# Patient Record
Sex: MALE | Race: BLACK OR AFRICAN AMERICAN | NOT HISPANIC OR LATINO | Employment: OTHER | URBAN - METROPOLITAN AREA
[De-identification: names, ages, dates, MRNs, and addresses within clinical notes are randomized per-mention and may not be internally consistent; named-entity substitution may affect disease eponyms.]

---

## 2017-06-29 ENCOUNTER — OFFICE VISIT (OUTPATIENT)
Dept: INTERNAL MEDICINE | Facility: CLINIC | Age: 72
End: 2017-06-29
Payer: MEDICARE

## 2017-06-29 VITALS
HEART RATE: 76 BPM | SYSTOLIC BLOOD PRESSURE: 110 MMHG | BODY MASS INDEX: 37.24 KG/M2 | DIASTOLIC BLOOD PRESSURE: 60 MMHG | HEIGHT: 71 IN | WEIGHT: 266 LBS

## 2017-06-29 DIAGNOSIS — E78.5 HYPERLIPIDEMIA, UNSPECIFIED HYPERLIPIDEMIA TYPE: ICD-10-CM

## 2017-06-29 DIAGNOSIS — Z13.5 SCREENING FOR EYE CONDITION: ICD-10-CM

## 2017-06-29 DIAGNOSIS — Z87.898 HISTORY OF ELEVATED PSA: ICD-10-CM

## 2017-06-29 DIAGNOSIS — I10 ESSENTIAL HYPERTENSION: Chronic | ICD-10-CM

## 2017-06-29 DIAGNOSIS — F43.10 PTSD (POST-TRAUMATIC STRESS DISORDER): Primary | ICD-10-CM

## 2017-06-29 DIAGNOSIS — M19.019 SHOULDER ARTHRITIS: ICD-10-CM

## 2017-06-29 DIAGNOSIS — I77.819 ECTATIC AORTA: ICD-10-CM

## 2017-06-29 DIAGNOSIS — N40.0 BENIGN NON-NODULAR PROSTATIC HYPERPLASIA WITHOUT LOWER URINARY TRACT SYMPTOMS: ICD-10-CM

## 2017-06-29 DIAGNOSIS — H90.3 SENSORINEURAL HEARING LOSS (SNHL) OF BOTH EARS: ICD-10-CM

## 2017-06-29 DIAGNOSIS — Z00.00 ENCOUNTER FOR PREVENTIVE HEALTH EXAMINATION: ICD-10-CM

## 2017-06-29 DIAGNOSIS — H25.10 NUCLEAR SCLEROSIS, UNSPECIFIED LATERALITY: ICD-10-CM

## 2017-06-29 PROCEDURE — G0439 PPPS, SUBSEQ VISIT: HCPCS | Mod: S$GLB,,, | Performed by: NURSE PRACTITIONER

## 2017-06-29 PROCEDURE — 99499 UNLISTED E&M SERVICE: CPT | Mod: S$GLB,,, | Performed by: NURSE PRACTITIONER

## 2017-06-29 PROCEDURE — 99999 PR PBB SHADOW E&M-EST. PATIENT-LVL IV: CPT | Mod: PBBFAC,,, | Performed by: NURSE PRACTITIONER

## 2017-06-29 NOTE — PATIENT INSTRUCTIONS
Counseling and Referral of Other Preventative  (Italic type indicates deductible and co-insurance are waived)    Patient Name: Brandan Werner  Today's Date: 6/29/2017      SERVICE LIMITATIONS RECOMMENDATION    Vaccines    · Pneumococcal (once after 65)    · Influenza (annually)    · Hepatitis B (if medium/high risk)    · Prevnar 13      Hepatitis B medium/high risk factors:       - End-stage renal disease       - Hemophiliacs who received Factor VII or         IX concentrates       - Clients of institutions for the mentally             retarded       - Persons who live in the same house as          a HepB carrier       - Homosexual men       - Illicit injectable drug abusers     Pneumococcal: N/A     Influenza: currrent     Hepatitis B: CDC handout given     Prevnar 13: [facility] handoutgiven- due    Prostate cancer screening (annually to age 75)     Prostate specific antigen (PSA) Shared decision making with Provider. Sometimes a co-pay may be required if the patient decides to have this test. The USPSTF no longer recommends prostate cancer screening routinely in medicine:   11/2/16- due in nov 2017    Colorectal cancer screening (to age 75)    · Fecal occult blood test (annual)  · Flexible sigmoidoscopy (5y)  · Screening colonoscopy (10y)  · Barium enema   due    Diabetes self-management training (no USPSTF recommendations)  Requires referral by treating physician for patient with diabetes or renal disease. 10 hours of initial DSMT sessions of no less than 30 minutes each in a continuous 12-month period. 2 hours of follow-up DSMT in subsequent years.  N/A    Glaucoma screening (no USPSTF recommendation)  Diabetes mellitus, family history   , age 50 or over    American, age 65 or over  due    Medical nutrition therapy for diabetes or renal disease (no recommended schedule)  Requires referral by treating physician for patient with diabetes or renal disease or kidney transplant within the past 3  years.  Can be provided in same year as diabetes self-management training (DSMT), and CMS recommends medical nutrition therapy take place after DSMT. Up to 3 hours for initial year and 2 hours in subsequent years.  N/A    Cardiovascular screening blood tests (every 5 years)  · Fasting lipid panel  Order as a panel if possible  current- due in nov 2017    Diabetes screening tests (at least every 3 years, Medicare covers annually or at 6-month intervals for prediabetic patients)  · Fasting blood sugar (FBS) or glucose tolerance test (GTT)  Patient must be diagnosed with one of the following:       - Hypertension       - Dyslipidemia       - Obesity (BMI 30kg/m2)       - Previous elevated impaired FBS or GTT       ... or any two of the following:       - Overweight (BMI 25 but <30)       - Family history of diabetes       - Age 65 or older       - History of gestational diabetes or birth of baby weighing more than 9 pounds  current- due in nov 2017    Abdominal aortic aneurysm screening (once)  · Sonogram   Limited to patients who meet one of the following criteria:       - Men who are 65-75 years old and have smoked more than 100 cigarette in their lifetime       - Anyone with a family history of abdominal aortic aneurysm       - Anyone recommended for screening by the USPSTF  last 10/16/16- due for repeat in October 2017    HIV screening (annually for increased risk patients)  · HIV-1 and HIV-2 by EIA, or BINU, rapid antibody test or oral mucosa transudate  Patients must be at increased risk for HIV infection per USPSTF guidelines or pregnant. Tests covered annually for patient at increased risk or as requested by the patient. Pregnant patients may receive up to 3 tests during pregnancy.  Risks discussed, screening is declined    Smoking cessation counseling (up to 8 sessions per year)  Patients must be asymptomatic of tobacco-related conditions to receive as a preventative service.  N/A    Subsequent annual  wellness visit  At least 12 months since last AWV  Return in one year     The following information is provided to all patients.  This information is to help you find resources for any of the problems found today that may be affecting your health:                Living healthy guide: www.[facility].[place].[facility]      Understanding Diabetes: www.diabetes.org      Eating healthy: www.cdc.gov/healthyweight      CDC home safety checklist: www.cdc.gov/steadi/patient.html      Agency on Aging: www.goea.[place].[facility]      Alcoholics anonymous (AA): www.aa.org      Physical Activity: www.[place].nih.gov/fq3tjzx      Tobacco use: www.quitwithusla.org

## 2017-06-29 NOTE — PROGRESS NOTES
"Brandan Werner presented for a  Medicare AWV and comprehensive Health Risk Assessment today. The following components were reviewed and updated:    · Medical history  · Family History  · Social history  · Allergies and Current Medications  · Health Risk Assessment  · Health Maintenance  · Care Team     [facility], [facility]- he could not remember names to update care team in [facility]    ** See Completed Assessments for Annual Wellness Visit within the encounter summary.**       The following assessments were completed:  · Living Situation  · CAGE  · Depression Screening  · Timed Get Up and Go  · Whisper Test  · Cognitive Function Screening  · Nutrition Screening  · ADL Screening  · PAQ Screening    Vitals:    06/29/17 1016   BP: 110/60   Pulse: 76   Weight: 120.7 kg (266 lb)   Height: 5' 10.5" (1.791 m)     Body mass index is 37.63 kg/m².  Physical Exam   Constitutional: He is oriented to person, place, and time. He appears well-developed and well-nourished.   HENT:   Head: Normocephalic and atraumatic.   Cardiovascular: Normal rate, regular rhythm and normal heart sounds.    No murmur heard.  Pulmonary/Chest: Effort normal and breath sounds normal.   Abdominal: Soft. Bowel sounds are normal. There is no tenderness.   Musculoskeletal: He exhibits no edema.   Neurological: He is alert and oriented to person, place, and time.   Skin: Skin is warm and dry.   Psychiatric: He has a normal mood and affect. His behavior is normal. Judgment and thought content normal.   Nursing note and vitals reviewed.        Diagnoses and health risks identified today and associated recommendations/orders:    1. PTSD (post-traumatic stress disorder)  Stable- [facility]    2. Obesity, Class II, BMI 35-39.9, with comorbidity  CHronic with no recent weight loss.Reviewed current BMI & ideal BMI range. Encouraged weight loss,  diet and exercise. Followed by PCP.    3. Nuclear sclerosis, unspecified " laterality  Stable- followed by opthalmology-overdue on annual- referral given    4. Hyperlipidemia, unspecified hyperlipidemia type  Stable- followed by PCP    5. Essential hypertension  Stable- followed by PCP    6. History of elevated PSA  Stable- followed by PCP, urology    7. Benign non-nodular prostatic hyperplasia without lower urinary tract symptoms  Stable- followed by PCP, urology    8. Shoulder arthritis  Stable- followed by orthopedics    9. Ectatic aorta  Stable- followed by PCP    10. Encounter for preventive health examination  Assessments completed  Preventative health recommendations reviewed       11. Screening for eye condition  Stable- followed by PCP  - Ambulatory Referral to Ophthalmology    12. Sensorineural hearing loss (SNHL) of both ears  Stable- [facility]    Provided Brandan with a 5-10 year written screening schedule and personal prevention plan. Recommendations were developed using the USPSTF age appropriate recommendations. Education, counseling, and referrals were provided as needed. After Visit Summary printed and given to patient which includes a list of additional screenings\tests needed.    Return in about 1 year (around 6/29/2018) for HRA.    Anna Parker NP

## 2017-06-29 NOTE — Clinical Note
Dr Chicas Last AAA screen dated 10/16/16 revealed suprarenal diameter 2.6- did you want to repeat screen? Also annual labs due in nov-pt to schedule annual visit- He will schedule annual eye visit Thanks harpreet

## 2017-07-10 ENCOUNTER — OFFICE VISIT (OUTPATIENT)
Dept: OPTOMETRY | Facility: CLINIC | Age: 72
End: 2017-07-10
Payer: MEDICARE

## 2017-07-10 DIAGNOSIS — H25.13 NUCLEAR SCLEROSIS, BILATERAL: Primary | ICD-10-CM

## 2017-07-10 DIAGNOSIS — H52.4 PRESBYOPIA OU: ICD-10-CM

## 2017-07-10 DIAGNOSIS — Z13.5 GLAUCOMA SCREENING: ICD-10-CM

## 2017-07-10 PROCEDURE — 92015 DETERMINE REFRACTIVE STATE: CPT | Mod: S$GLB,,, | Performed by: OPTOMETRIST

## 2017-07-10 PROCEDURE — 99999 PR PBB SHADOW E&M-EST. PATIENT-LVL II: CPT | Mod: PBBFAC,,, | Performed by: OPTOMETRIST

## 2017-07-10 PROCEDURE — 92014 COMPRE OPH EXAM EST PT 1/>: CPT | Mod: S$GLB,,, | Performed by: OPTOMETRIST

## 2017-07-10 RX ORDER — CEFDINIR 300 MG/1
CAPSULE ORAL
COMMUNITY
Start: 2017-07-09 | End: 2018-04-09 | Stop reason: ALTCHOICE

## 2017-07-10 NOTE — PROGRESS NOTES
"HPI     DLS: 10/4/2016 with Dr. Guillermo  Pt states [facility] has [facility]. Wear +2.50 otc readers   Denies f/f  Says he "cant see as good as he used to", but no problems with distance  No gtts    Last edited by Ad Hartman, OD on 7/10/2017  7:54 AM. (History)        ROS     Negative for: Constitutional, Gastrointestinal, Neurological, Skin,   Genitourinary, Musculoskeletal, HENT, Endocrine, Cardiovascular, Eyes,   Respiratory, Psychiatric, Allergic/Imm, Heme/Lymph    Last edited by Ad Hartman, OD on 7/10/2017  7:54 AM. (History)        Assessment /Plan     For exam results, see Encounter Report.    Nuclear sclerosis, bilateral    Glaucoma screening    Presbyopia OU      Cat OU--pt happy w otc readers    PLAN:    rtc 1 yr                 "

## 2017-07-10 NOTE — LETTER
July 10, 2017      Anna Parker, NP  [address] [address]           [place] [facility]  [address]  [place] [address]  Phone: [phone]  Fax: [phone]          Patient: Brandan Werner   MR Number: [id number]   YOB: 1945   Date of Visit: 7/10/2017       Dear Anna Parker:    Thank you for referring Brandan Werner to me for evaluation. Attached you will find relevant portions of my assessment and plan of care.    If you have questions, please do not hesitate to call me. I look forward to following Brandan Werner along with you.    Sincerely,    Ad Hartman, OD    Enclosure  CC:  No Recipients    If you would like to receive this communication electronically, please contact [email or web address] or [phone] to request more information on [facility] access.    For providers and/or their staff who would like to refer a patient to [facility], please contact us through our one-stop-shop provider referral line, [facility], at [phone].    If you feel you have received this communication in error or would no longer like to receive these types of communications, please e-mail [email or web address]

## 2017-08-25 DIAGNOSIS — Z12.11 COLON CANCER SCREENING: ICD-10-CM

## 2018-01-03 ENCOUNTER — LAB VISIT (OUTPATIENT)
Dept: LAB | Facility: HOSPITAL | Age: 73
End: 2018-01-03
Attending: UROLOGY
Payer: MEDICARE

## 2018-01-03 ENCOUNTER — OFFICE VISIT (OUTPATIENT)
Dept: UROLOGY | Facility: CLINIC | Age: 73
End: 2018-01-03
Payer: MEDICARE

## 2018-01-03 VITALS
DIASTOLIC BLOOD PRESSURE: 73 MMHG | HEIGHT: 71 IN | WEIGHT: 266 LBS | BODY MASS INDEX: 37.24 KG/M2 | SYSTOLIC BLOOD PRESSURE: 108 MMHG | HEART RATE: 118 BPM

## 2018-01-03 DIAGNOSIS — R97.20 ELEVATED PSA: ICD-10-CM

## 2018-01-03 DIAGNOSIS — N52.1 ERECTILE DYSFUNCTION DUE TO DISEASES CLASSIFIED ELSEWHERE: ICD-10-CM

## 2018-01-03 DIAGNOSIS — N40.1 BPH WITH OBSTRUCTION/LOWER URINARY TRACT SYMPTOMS: ICD-10-CM

## 2018-01-03 DIAGNOSIS — R97.20 ELEVATED PSA: Primary | ICD-10-CM

## 2018-01-03 DIAGNOSIS — N62 GYNECOMASTIA, MALE: ICD-10-CM

## 2018-01-03 DIAGNOSIS — N13.8 BPH WITH OBSTRUCTION/LOWER URINARY TRACT SYMPTOMS: ICD-10-CM

## 2018-01-03 LAB — COMPLEXED PSA SERPL-MCNC: 5.3 NG/ML

## 2018-01-03 PROCEDURE — 84153 ASSAY OF PSA TOTAL: CPT

## 2018-01-03 PROCEDURE — 99215 OFFICE O/P EST HI 40 MIN: CPT | Mod: S$GLB,,, | Performed by: UROLOGY

## 2018-01-03 PROCEDURE — 36415 COLL VENOUS BLD VENIPUNCTURE: CPT

## 2018-01-03 PROCEDURE — 99999 PR PBB SHADOW E&M-EST. PATIENT-LVL III: CPT | Mod: PBBFAC,,, | Performed by: UROLOGY

## 2018-01-03 RX ORDER — TRAMADOL HYDROCHLORIDE 50 MG/1
TABLET ORAL
COMMUNITY
Start: 2017-11-03 | End: 2019-08-26

## 2018-01-03 RX ORDER — TAMSULOSIN HYDROCHLORIDE 0.4 MG/1
0.4 CAPSULE ORAL NIGHTLY
Qty: 30 CAPSULE | Refills: 11 | Status: SHIPPED | OUTPATIENT
Start: 2018-01-03

## 2018-01-03 RX ORDER — CELECOXIB 200 MG/1
CAPSULE ORAL
COMMUNITY
Start: 2017-09-29 | End: 2019-08-26

## 2018-01-03 RX ORDER — DICLOFENAC SODIUM 10 MG/G
4 GEL TOPICAL 4 TIMES DAILY PRN
Status: ON HOLD | COMMUNITY
Start: 2017-10-02 | End: 2023-09-14

## 2018-01-03 NOTE — PROGRESS NOTES
HPI:  Brandan Werner is a 72 y.o. year old male that  presents with   Chief Complaint   Patient presents with    Follow-up   .  This patient presents to the office for prostate follow-up.  Patient is new to me however is seen [place] [facility] in the past.  Patient is status post prostate biopsy 2013 for atypical small acinar proliferation (ASAP) repeat biopsy in 2014 being negative.  The patient last seen in [place] [facility] in 2016 for follow-up.  Note made of MRI and outside institution showing non-concerning MRI of prostate showing PIRADS 2.    She has noticed some bilateral breast enlargement with no pain she associates with his prostate medication.  He has been on both Flomax and Proscar and is not sure which one he stopped.    He currently has no K strength of stream with nocturia ×3 and no straining to void with no dysuria or gross hematuria    Patient is a 3 piece inflatable penile prosthesis for erectile dysfunction    Chart review shows a PSA from November 2016 being 3.0.  At that time GFR is greater than 60.  CT IVP reviewed by me from 2015 shows an enlarged prostate.  With this finding.      Past Medical History:   Diagnosis Date    BPH (benign prostatic hyperplasia)     Cataract     Elevated PSA     Erectile dysfunction     Hyperlipidemia     Hypertension     Hypogonadism male     Obesity     Urinary tract infection      Social History     Social History    Marital status:      Spouse name: N/A    Number of children: N/A    Years of education: N/A     Occupational History          self-employed     Social History Main Topics    Smoking status: Former Smoker     Packs/day: 1.00     Years: 10.00    Smokeless tobacco: Never Used    Alcohol use No    Drug use: No    Sexual activity: Yes     Partners: Female     Other Topics Concern    Not on file     Social History Narrative    No narrative on file     Past Surgical History:   Procedure Laterality Date    COLONOSCOPY W/  "BIOPSIES  2010    PENILE PROSTHESIS IMPLANT       Family History   Problem Relation Age of Onset    Cancer Mother      cancer    Cataracts Mother     Heart disease Father 62     M.I.    Stroke Brother 62    Amblyopia Neg Hx     Blindness Neg Hx     Glaucoma Neg Hx     Macular degeneration Neg Hx     Retinal detachment Neg Hx     Strabismus Neg Hx     Prostate cancer Neg Hx     Kidney disease Neg Hx            Review of Systems  The patient has no chest pains.  The patient has no shortness of breath  Patient wears        glasses.  All other review of systems are negative.      Physical Exam:  /73   Pulse (!) 118   Ht 5' 10.5" (1.791 m)   Wt 120.7 kg (266 lb)   BMI 37.63 kg/m²   General appearance: alert, cooperative, no distress  Constitutional:Oriented to person, place, and time.appears well-developed and well-nourished.   HEENT: Normocephalic, atraumatic, neck symmetrical, no nasal discharge   Eyes: conjunctivae/corneas clear, PERRL, EOM's intact  Lungs: clear to auscultation bilaterally, no dullness to percussion bilaterally  Heart: regular rate and rhythm without rub; no displacement of the PMI   Abdomen: soft, non-tender; bowel sounds normoactive; no organomegaly  :Penis/perineum without lesions, scrotum without rash/cysts, epididymis nontender bilaterally, urethral meatus in normal location normal size, no penile plaques palpated, prostate:    Smooth enlarged and benign feeling                   seminal vesicles not palpated.  No rectal masses, sphincter tone normal.  Testes equal in size without masses, 3-piece penile implant in place with no evidence of erosion  Extremities: extremities symmetric; no clubbing, cyanosis, or edema  Integument: Skin color, texture, turgor normal; no rashes; hair distrubution normal, mild bilateral gynecomastia  Neurologic: Alert and oriented X 3, normal strength, normal coordination and gait  Psychiatric: no pressured speech; normal affect; no evidence " of impaired cognition     LABS:    Complete Blood Count  Lab Results   Component Value Date    RBC 4.47 (L) 11/02/2016    HGB 14.0 11/02/2016    HCT 42.6 11/02/2016    MCV 95 11/02/2016    MCH 31.3 (H) 11/02/2016    MCHC 32.9 11/02/2016    RDW 13.6 11/02/2016     11/02/2016    MPV 10.1 11/02/2016    GRAN 5.6 11/02/2016    GRAN 64.4 11/02/2016    LYMPH 2.4 11/02/2016    LYMPH 27.8 11/02/2016    MONO 0.5 11/02/2016    MONO 5.6 11/02/2016    EOS 0.2 11/02/2016    BASO 0.02 11/02/2016    EOSINOPHIL 1.7 11/02/2016    BASOPHIL 0.2 11/02/2016    DIFFMETHOD Automated 11/02/2016       Comprehensive Metabolic Panel  Lab Results   Component Value Date     11/02/2016    BUN 24 (H) 11/02/2016    CREATININE 1.0 11/02/2016     11/02/2016    K 4.4 11/02/2016     11/02/2016    PROT 6.4 11/02/2016    ALBUMIN 3.6 11/02/2016    BILITOT 0.4 11/02/2016    AST 12 11/02/2016    ALKPHOS 47 (L) 11/02/2016    CO2 20 (L) 11/02/2016    ALT 20 11/02/2016    ANIONGAP 11 11/02/2016    EGFRNONAA >60.0 11/02/2016    ESTGFRAFRICA >60.0 11/02/2016       PSA  Lab Results   Component Value Date    PSA 3.0 11/02/2016         Assessment:    ICD-10-CM ICD-9-CM    1. Elevated PSA R97.20 790.93 Prostate Specific Antigen, Diagnostic      Prostate Specific Antigen, Diagnostic   2. BPH with obstruction/lower urinary tract symptoms N40.1 600.01     N13.8 599.69    3. Erectile dysfunction due to diseases classified elsewhere N52.1 607.84    4. Gynecomastia, male N62 611.1      The primary encounter diagnosis was Elevated PSA. Diagnoses of BPH with obstruction/lower urinary tract symptoms, Erectile dysfunction due to diseases classified elsewhere, and Gynecomastia, male were also pertinent to this visit.      Plan:.  #1.  Elevated PSA with most recent prostate biopsy being negative in 2014 and ASAP on P BX in 2013 PSA today and patient will follow-up with me in 6 months    #2 and 3.  BPH and gynecomastia.  Plan.  I instructed patient to  stop his finasteride which has a known side effect of gynecomastia.  Patient instructed to continue Flomax.    #4.  Erectile dysfunction.  Plan.  3 piece inflatable device in place.  Orders Placed This Encounter   Procedures    Prostate Specific Antigen, Diagnostic    Prostate Specific Antigen, Diagnostic           Jeremy Dao MD    PLEASE NOTE:  Please be advised that portions of this note were dictated using voice recognition software and may contain dictation related errors in spelling/grammar/appropriate pronouns/syntax or other errors that might have not been found and or corrected on text review.

## 2018-01-04 ENCOUNTER — TELEPHONE (OUTPATIENT)
Dept: UROLOGY | Facility: CLINIC | Age: 73
End: 2018-01-04

## 2018-01-04 DIAGNOSIS — R97.20 ELEVATED PSA: Primary | ICD-10-CM

## 2018-01-04 NOTE — TELEPHONE ENCOUNTER
----- Message from Jeremy Dao MD sent at 1/4/2018  8:49 AM CST -----  Please contact the patient and let him know that his PSA blood test was slightly higher than last year.  It is however lower than it was 2 years ago.  Because of this I would like to keep a closer eye on his PSA.  Therefore please schedule an  appointment for 3 months and instruct patient to get a PSA blood test one week prior to this 3 month appointment.

## 2018-01-04 NOTE — TELEPHONE ENCOUNTER
Patient informed, voiced understanding.  Appointments scheduled accordingly and mailed out to patient.

## 2018-01-15 ENCOUNTER — PES CALL (OUTPATIENT)
Dept: ADMINISTRATIVE | Facility: CLINIC | Age: 73
End: 2018-01-15

## 2018-02-11 ENCOUNTER — OFFICE VISIT (OUTPATIENT)
Dept: URGENT CARE | Facility: CLINIC | Age: 73
End: 2018-02-11
Payer: MEDICARE

## 2018-02-11 VITALS
RESPIRATION RATE: 18 BRPM | DIASTOLIC BLOOD PRESSURE: 82 MMHG | HEART RATE: 77 BPM | SYSTOLIC BLOOD PRESSURE: 135 MMHG | BODY MASS INDEX: 37.24 KG/M2 | OXYGEN SATURATION: 96 % | WEIGHT: 266 LBS | HEIGHT: 71 IN | TEMPERATURE: 98 F

## 2018-02-11 DIAGNOSIS — B34.9 VIRAL SYNDROME: Primary | ICD-10-CM

## 2018-02-11 PROCEDURE — 1159F MED LIST DOCD IN RCRD: CPT | Mod: S$GLB,,, | Performed by: FAMILY MEDICINE

## 2018-02-11 PROCEDURE — 3008F BODY MASS INDEX DOCD: CPT | Mod: S$GLB,,, | Performed by: FAMILY MEDICINE

## 2018-02-11 PROCEDURE — 99214 OFFICE O/P EST MOD 30 MIN: CPT | Mod: S$GLB,,, | Performed by: FAMILY MEDICINE

## 2018-02-11 NOTE — PATIENT INSTRUCTIONS
"  Viral Syndrome (Adult)  A viral illness may cause a number of symptoms. The symptoms depend on the part of the body that the virus affects. If it settles in your nose, throat, and lungs, it may cause cough, sore throat, congestion, and sometimes headache. If it settles in your stomach and intestinal tract, it may cause vomiting and diarrhea. Sometimes it causes vague symptoms like "aching all over," feeling tired, loss of appetite, or fever.  A viral illness usually lasts 1 to 2 weeks, but sometimes it lasts longer. In some cases, a more serious infection can look like a viral syndrome in the first few days of the illness. You may need another exam and additional tests to know the difference. Watch for the warning signs listed below.  Home care  Follow these guidelines for taking care of yourself at home:  · If symptoms are severe, rest at home for the first 2 to 3 days.  · Stay away from cigarette smoke - both your smoke and the smoke from others.  · You may use over-the-counter acetaminophen or ibuprofen for fever, muscle aching, and headache, unless another medicine was prescribed for this. If you have chronic liver or kidney disease or ever had a stomach ulcer or GI bleeding, talk with your doctor before using these medicines. No one who is younger than 18 and ill with a fever should take aspirin. It may cause severe disease or death.  · Your appetite may be poor, so a light diet is fine. Avoid dehydration by drinking 8 to 12 8-ounce glasses of fluids each day. This may include water; orange juice; lemonade; apple, grape, and cranberry juice; clear fruit drinks; electrolyte replacement and sports drinks; and decaffeinated teas and coffee. If you have been diagnosed with a kidney disease, ask your doctor how much and what types of fluids you should drink to prevent dehydration. If you have kidney disease, drinking too much fluid can cause it build up in the your body and be dangerous to your " health.  · Over-the-counter remedies won't shorten the length of the illness but may be helpful for cough, sore throat; and nasal and sinus congestion. Don't use decongestants if you have high blood pressure.  Follow-up care  Follow up with your healthcare provider if you do not improve over the next week.  Call 911  Get emergency medical care if any of the following occur:  · Convulsion  · Feeling weak, dizzy, or like you are going to faint  · Chest pain, shortness of breath, wheezing, or difficulty breathing  When to seek medical advice  Call your healthcare provider right away if any of these occur:  · Cough with lots of colored sputum (mucus) or blood in your sputum  · Chest pain, shortness of breath, wheezing, or difficulty breathing  · Severe headache; face, neck, or ear pain  · Severe, constant pain in the lower right side of your belly (abdominal)  · Continued vomiting (cant keep liquids down)  · Frequent diarrhea (more than 5 times a day); blood (red or black color) or mucus in diarrhea  · Feeling weak, dizzy, or like you are going to faint  · Extreme thirst  · Fever of 100.4°F (38°C) or higher, or as directed by your healthcare provider  Date Last Reviewed: 9/25/2015  © 9584-2927 [employer]. [address] [place] [address], [place] [address]. All rights reserved. This information is not intended as a substitute for professional medical care. Always follow your healthcare professional's instructions.    TRY CLARITAN (generic is Loratidine) AS NEEDED FOR RUNNY NOSE    Make sure that you follow up with your primary care doctor in the next 2-5 days if needed .  Return to the Urgent Care if signs or symptoms change and certainly if you have worsening symptoms go to the nearest emergency department for further evaluation.

## 2018-02-11 NOTE — PROGRESS NOTES
"Subjective:       Patient ID: Brandan Werner is a 72 y.o. male.    Vitals:  height is 5' 10.5" (1.791 m) and weight is 120.7 kg (266 lb). His oral temperature is 98.4 °F (36.9 °C). His blood pressure is 135/82 and his pulse is 77. His respiration is 18 and oxygen saturation is 96%.     Chief Complaint: Sinus Problem    72 yp with 2 days of runny now, clear. No F/C. No N/V/D. Nonsmoker. No hx of reactive airways. Had flu shot      Sinus Problem   This is a new problem. The current episode started yesterday. The problem is unchanged. There has been no fever. His pain is at a severity of 3/10. The pain is mild. Associated symptoms include congestion, sinus pressure and sneezing. Pertinent negatives include no chills, coughing, ear pain, headaches, hoarse voice, shortness of breath or sore throat. Past treatments include nothing.     Review of Systems   Constitution: Negative for chills, fever and malaise/fatigue.   HENT: Positive for congestion, sinus pressure and sneezing. Negative for ear pain, hoarse voice and sore throat.    Eyes: Negative for discharge and redness.   Cardiovascular: Negative for chest pain, dyspnea on exertion and leg swelling.   Respiratory: Negative for cough, shortness of breath, sputum production and wheezing.    Musculoskeletal: Negative for myalgias.   Gastrointestinal: Negative for abdominal pain and nausea.   Neurological: Negative for headaches.       Objective:      Physical Exam   Constitutional: He is oriented to person, place, and time. He appears well-developed and well-nourished. He is cooperative.  Non-toxic appearance. He does not appear ill. No distress.   HENT:   Head: Normocephalic and atraumatic.   Right Ear: Hearing, tympanic membrane, external ear and ear canal normal.   Left Ear: Hearing, tympanic membrane, external ear and ear canal normal.   Nose: Nose normal. No mucosal edema, rhinorrhea or nasal deformity. No epistaxis. Right sinus exhibits no maxillary sinus " tenderness and no frontal sinus tenderness. Left sinus exhibits no maxillary sinus tenderness and no frontal sinus tenderness.   Mouth/Throat: Uvula is midline, oropharynx is clear and moist and mucous membranes are normal. No trismus in the jaw. Normal dentition. No uvula swelling. No posterior oropharyngeal erythema.   Eyes: Conjunctivae and lids are normal. No scleral icterus.   Sclera clear bilat   Neck: Trachea normal, full passive range of motion without pain and phonation normal. Neck supple.   Cardiovascular: Normal rate, regular rhythm, normal heart sounds, intact distal pulses and normal pulses.    Pulmonary/Chest: Effort normal and breath sounds normal. No respiratory distress. He has no wheezes. He has no rales.   Abdominal: Soft. Normal appearance and bowel sounds are normal. He exhibits no distension and no mass. There is no tenderness. There is no guarding.   Musculoskeletal: Normal range of motion. He exhibits no edema or deformity.   Neurological: He is alert and oriented to person, place, and time. He exhibits normal muscle tone. Coordination normal.   Skin: Skin is warm, dry and intact. He is not diaphoretic. No pallor.   Psychiatric: He has a normal mood and affect. His speech is normal and behavior is normal. Judgment and thought content normal. Cognition and memory are normal.   Nursing note and vitals reviewed.      Assessment:       1. Viral syndrome        Plan:         Viral syndrome    TRY CLARITAN (generic is Loratidine) AS NEEDED FOR RUNNY NOSE    Make sure that you follow up with your primary care doctor in the next 2-5 days if needed .  Return to the Urgent Care if signs or symptoms change and certainly if you have worsening symptoms go to the nearest emergency department for further evaluation.

## 2018-02-15 PROBLEM — J84.10 CALCIFIED GRANULOMA OF LUNG: Status: ACTIVE | Noted: 2018-02-15

## 2018-02-15 PROBLEM — J98.4 CALCIFIED GRANULOMA OF LUNG: Status: ACTIVE | Noted: 2018-02-15

## 2018-02-19 ENCOUNTER — DOCUMENTATION ONLY (OUTPATIENT)
Dept: INTERNAL MEDICINE | Facility: CLINIC | Age: 73
End: 2018-02-19

## 2018-02-19 NOTE — PROGRESS NOTES
Pt was scheduled HRA appt this AM- Checked in for HRA appt. Last HRA June 2017. Spoke with Amelia Manual regarding time line for next HRA 2018 appt- recommended rescheduling HRA until after June 2018. Pt notified and agreed to cancel HRA appt.today

## 2018-04-09 ENCOUNTER — OFFICE VISIT (OUTPATIENT)
Dept: URGENT CARE | Facility: CLINIC | Age: 73
End: 2018-04-09
Payer: MEDICARE

## 2018-04-09 VITALS
OXYGEN SATURATION: 96 % | RESPIRATION RATE: 16 BRPM | BODY MASS INDEX: 35.21 KG/M2 | DIASTOLIC BLOOD PRESSURE: 82 MMHG | TEMPERATURE: 99 F | SYSTOLIC BLOOD PRESSURE: 125 MMHG | HEIGHT: 72 IN | WEIGHT: 260 LBS | HEART RATE: 87 BPM

## 2018-04-09 DIAGNOSIS — J32.9 SINUSITIS, UNSPECIFIED CHRONICITY, UNSPECIFIED LOCATION: Primary | ICD-10-CM

## 2018-04-09 PROCEDURE — 3079F DIAST BP 80-89 MM HG: CPT | Mod: CPTII,S$GLB,, | Performed by: FAMILY MEDICINE

## 2018-04-09 PROCEDURE — 99214 OFFICE O/P EST MOD 30 MIN: CPT | Mod: S$GLB,,, | Performed by: FAMILY MEDICINE

## 2018-04-09 PROCEDURE — 3074F SYST BP LT 130 MM HG: CPT | Mod: CPTII,S$GLB,, | Performed by: FAMILY MEDICINE

## 2018-04-09 RX ORDER — AMOXICILLIN 500 MG/1
1000 CAPSULE ORAL EVERY 12 HOURS
Qty: 40 CAPSULE | Refills: 0 | Status: SHIPPED | OUTPATIENT
Start: 2018-04-09 | End: 2018-04-19

## 2018-04-09 RX ORDER — ATORVASTATIN CALCIUM 20 MG/1
20 TABLET, FILM COATED ORAL DAILY
COMMUNITY
End: 2019-08-26

## 2018-04-09 RX ORDER — FLUTICASONE PROPIONATE 50 MCG
2 SPRAY, SUSPENSION (ML) NASAL DAILY
Qty: 1 BOTTLE | Refills: 0 | Status: ON HOLD | OUTPATIENT
Start: 2018-04-09 | End: 2023-09-14

## 2018-04-09 NOTE — PATIENT INSTRUCTIONS
Sinusitis (Antibiotic Treatment)    The sinuses are air-filled spaces within the bones of the face. They connect to the inside of the nose. Sinusitis is an inflammation of the tissue lining the sinus cavity. Sinus inflammation can occur during a cold. It can also be due to allergies to pollens and other particles in the air. Sinusitis can cause symptoms of sinus congestion and fullness. A sinus infection causes fever, headache and facial pain. There is often green or yellow drainage from the nose or into the back of the throat (post-nasal drip). You have been given antibiotics to treat this condition.  Home care:  · Take the full course of antibiotics as instructed. Do not stop taking them, even if you feel better.  · Drink plenty of water, hot tea, and other liquids. This may help thin mucus. It also may promote sinus drainage.  · Heat may help soothe painful areas of the face. Use a towel soaked in hot water. Or,  the shower and direct the hot spray onto your face. Using a vaporizer along with a menthol rub at night may also help.   · An expectorant containing guaifenesin may help thin the mucus and promote drainage from the sinuses.  · Over-the-counter decongestants may be used unless a similar medicine was prescribed. Nasal sprays work the fastest. Use one that contains phenylephrine or oxymetazoline. First blow the nose gently. Then use the spray. Do not use these medicines more often than directed on the label or symptoms may get worse. You may also use tablets containing pseudoephedrine. Avoid products that combine ingredients, because side effects may be increased. Read labels. You can also ask the pharmacist for help. (NOTE: Persons with high blood pressure should not use decongestants. They can raise blood pressure.)  · Over-the-counter antihistamines may help if allergies contributed to your sinusitis.    · Do not use nasal rinses or irrigation during an acute sinus infection, unless told to by  your health care provider. Rinsing may spread the infection to other sinuses.  · Use acetaminophen or ibuprofen to control pain, unless another pain medicine was prescribed. (If you have chronic liver or kidney disease or ever had a stomach ulcer, talk with your doctor before using these medicines. Aspirin should never be used in anyone under 18 years of age who is ill with a fever. It may cause severe liver damage.)  · Don't smoke. This can worsen symptoms.  Follow-up care  Follow up with your healthcare provider or our staff if you are not improving within the next week.  When to seek medical advice  Call your healthcare provider if any of these occur:  · Facial pain or headache becoming more severe  · Stiff neck  · Unusual drowsiness or confusion  · Swelling of the forehead or eyelids  · Vision problems, including blurred or double vision  · Fever of 100.4ºF (38ºC) or higher, or as directed by your healthcare provider  · Seizure  · Breathing problems  · Symptoms not resolving within 10 days  Date Last Reviewed: 4/13/2015  © 8110-7985 [employer]. [address] [place] [address]. All rights reserved. This information is not intended as a substitute for professional medical care. Always follow your healthcare professional's instructions.                                                                    Sinusitis   If your condition worsens or fails to improve we recommend that you receive another evaluation at the ER immediately or contact your PCP to discuss your concerns or return here. You must understand that you've received an urgent care treatment only and that you may be released before all your medical problems are known or treated. You the patient will arrange for followup care as instructed.   If we discussed that I think your illness is viral it will not respond to antibiotics and it will last 10-14 days. However, if over the next few days the symptoms worsen start the  "antibiotics I have given you.   If we discussed that you require antibiotics start them now and take them to completion.   If you are female and on BCP and do take the antibiotics, use additional methods to prevent pregnancy while on the antibiotics and for one cycle after.   Flonase (fluticasone) is a nasal spray which is available over the counter and may help with your symptoms   Zyrtec D, Claritin D or allegra D can also help with symptoms of congestion and drainage.   If you have hypertension avoid using the "D" which is the decongestant   If you just have drainage you can take plain zyrtec, claritin or allegra   If you just have a congested feeling you can take pseudoephedrine (unless you have high blood pressure) which you have to sign for behind the counter. Do not buy the phenylephrine which is on the shelf as it is not effective   Rest and fluids are also important.   Tylenol or ibuprofen can also be used as directed for pain unless you have an allergy to them or medical condition such as stomach ulcers, kidney or liver disease or blood thinners etc for which you should not be taking these type of medications.   If you are flying in the next few days Afrin nose drops for the airplane flight upon take off and landing may help. Other than at those times refrain from using afrin.   If you were prescribed a narcotic do not drive or operate heavy machinery while taking these medications.       "

## 2018-04-09 NOTE — PROGRESS NOTES
Subjective:       Patient ID: Brandan Werner is a 72 y.o. male.    Vitals:  height is 6' (1.829 m) and weight is 117.9 kg (260 lb). His temperature is 98.6 °F (37 °C). His blood pressure is 125/82 and his pulse is 87. His respiration is 16 and oxygen saturation is 96%.     Chief Complaint: Cough    Cough   This is a new problem. Episode onset: 3 days. The problem has been gradually worsening. The problem occurs every few minutes. The cough is productive of sputum. Associated symptoms include nasal congestion and postnasal drip. Pertinent negatives include no chest pain, chills, ear pain, eye redness, fever, headaches, myalgias, sore throat, shortness of breath or wheezing. Nothing aggravates the symptoms. He has tried nothing for the symptoms.     Review of Systems   Constitution: Negative for chills, fever and malaise/fatigue.   HENT: Positive for congestion and postnasal drip. Negative for ear pain, hoarse voice and sore throat.    Eyes: Negative for discharge and redness.   Cardiovascular: Negative for chest pain, dyspnea on exertion and leg swelling.   Respiratory: Positive for cough and sputum production. Negative for shortness of breath and wheezing.    Musculoskeletal: Negative for myalgias.   Gastrointestinal: Negative for abdominal pain and nausea.   Neurological: Negative for headaches.       Objective:      Physical Exam   Constitutional: He is oriented to person, place, and time. He appears well-developed and well-nourished. He is cooperative.  Non-toxic appearance. He does not appear ill. No distress.   HENT:   Head: Normocephalic and atraumatic.   Right Ear: Hearing, tympanic membrane, external ear and ear canal normal.   Left Ear: Hearing, tympanic membrane, external ear and ear canal normal.   Nose: No rhinorrhea or nasal deformity. No epistaxis. Right sinus exhibits maxillary sinus tenderness. Right sinus exhibits no frontal sinus tenderness. Left sinus exhibits maxillary sinus tenderness. Left  sinus exhibits no frontal sinus tenderness.   Mouth/Throat: Uvula is midline and mucous membranes are normal. No trismus in the jaw. Normal dentition. No uvula swelling. Posterior oropharyngeal edema and posterior oropharyngeal erythema present.   Eyes: Conjunctivae and lids are normal. No scleral icterus.   Sclera clear bilat   Neck: Trachea normal, full passive range of motion without pain and phonation normal. Neck supple.   Cardiovascular: Normal rate, regular rhythm, normal heart sounds, intact distal pulses and normal pulses.    Pulmonary/Chest: Effort normal and breath sounds normal. No respiratory distress. He has no decreased breath sounds. He has no wheezes. He has no rhonchi. He has no rales.   Abdominal: Soft. Normal appearance and bowel sounds are normal. He exhibits no distension. There is no tenderness.   Musculoskeletal: Normal range of motion. He exhibits no edema or deformity.   Neurological: He is alert and oriented to person, place, and time. He exhibits normal muscle tone. Coordination normal.   Skin: Skin is warm, dry and intact. He is not diaphoretic. No pallor.   Psychiatric: He has a normal mood and affect. His speech is normal and behavior is normal. Judgment and thought content normal. Cognition and memory are normal.   Nursing note and vitals reviewed.      Assessment:       1. Sinusitis, unspecified chronicity, unspecified location        Plan:         Sinusitis, unspecified chronicity, unspecified location  -     fluticasone (FLONASE) 50 mcg/actuation nasal spray; 2 sprays (100 mcg total) by Each Nare route once daily.  Dispense: 1 Bottle; Refill: 0  -     amoxicillin (AMOXIL) 500 MG capsule; Take 2 capsules (1,000 mg total) by mouth every 12 (twelve) hours.  Dispense: 40 capsule; Refill: 0      Follow Up Comments   Make sure that you follow up with your primary care doctor in the next 2-5 days if needed .  Return to the Urgent Care if signs or symptoms change and certainly if you have  worsening symptoms go to the nearest emergency department for further evaluation.

## 2018-04-13 ENCOUNTER — HOSPITAL ENCOUNTER (EMERGENCY)
Facility: HOSPITAL | Age: 73
Discharge: HOME | End: 2018-04-13
Attending: EMERGENCY MEDICINE
Payer: MEDICARE

## 2018-04-13 VITALS
HEART RATE: 58 BPM | BODY MASS INDEX: 35.21 KG/M2 | HEIGHT: 72 IN | OXYGEN SATURATION: 96 % | SYSTOLIC BLOOD PRESSURE: 165 MMHG | WEIGHT: 260 LBS | RESPIRATION RATE: 18 BRPM | TEMPERATURE: 99 F | DIASTOLIC BLOOD PRESSURE: 86 MMHG

## 2018-04-13 DIAGNOSIS — M54.50 ACUTE RIGHT-SIDED LOW BACK PAIN WITHOUT SCIATICA: Primary | ICD-10-CM

## 2018-04-13 PROCEDURE — 99283 EMERGENCY DEPT VISIT LOW MDM: CPT | Mod: 25

## 2018-04-13 PROCEDURE — 96372 THER/PROPH/DIAG INJ SC/IM: CPT

## 2018-04-13 PROCEDURE — 63600175 PHARM REV CODE 636 W HCPCS: Performed by: EMERGENCY MEDICINE

## 2018-04-13 RX ORDER — TIZANIDINE 2 MG/1
2 TABLET ORAL EVERY 8 HOURS PRN
Qty: 15 TABLET | Refills: 0 | Status: SHIPPED | OUTPATIENT
Start: 2018-04-13 | End: 2018-04-18

## 2018-04-13 RX ORDER — NAPROXEN 500 MG/1
500 TABLET ORAL 2 TIMES DAILY WITH MEALS
Qty: 14 TABLET | Refills: 0 | Status: SHIPPED | OUTPATIENT
Start: 2018-04-13 | End: 2018-04-13 | Stop reason: CLARIF

## 2018-04-13 RX ORDER — KETOROLAC TROMETHAMINE 30 MG/ML
15 INJECTION, SOLUTION INTRAMUSCULAR; INTRAVENOUS
Status: COMPLETED | OUTPATIENT
Start: 2018-04-13 | End: 2018-04-13

## 2018-04-13 RX ADMIN — KETOROLAC TROMETHAMINE 15 MG: 30 INJECTION, SOLUTION INTRAMUSCULAR at 02:04

## 2018-04-13 NOTE — ED PROVIDER NOTES
"Encounter Date: 4/13/2018    SCRIBE #1 NOTE: I, Brandon Ruth, am scribing for, and in the presence of,  Dr. Chadd Boles. I have scribed the entire note.       History     Chief Complaint   Patient presents with    Back Pain     The history is provided by the patient.      Brandan Werner is a 72 y.o. male who  has a past medical history of BPH (benign prostatic hyperplasia); Cataract; Elevated PSA; Erectile dysfunction; Hyperlipidemia; Hypertension; Hypogonadism male; Obesity; and Urinary tract infection.    The patient presents to the ED due to back pain. Patient reports that he has a history of chronic back pain, similar in character and location, but states tonight it has worsened, enough to wake him out of his sleep. Patient describes the pain as a "tight pain" in his lower right back that starts near his hip and goes into his back. Patient denies any radiation, tingling, or numbness in his legs. He denies bowel/bladder dysfunction, fever, or groin numbness. He reports no dysuria, blood in urine, nausea, vomiting, fever or chills. Patient states that the pain is exacerbated when moving from a sitting position to standing. Patient reports that he is still ambulatory, but needs assistance to stand due to pain. Patient states he has not taken anything for the pain. He denies history of back surgery. No history of kidney stones or kidney infections.    Review of patient's allergies indicates:  No Known Allergies  Past Medical History:   Diagnosis Date    BPH (benign prostatic hyperplasia)     Cataract     Elevated PSA     Erectile dysfunction     Hyperlipidemia     Hypertension     Hypogonadism male     Obesity     Urinary tract infection      Past Surgical History:   Procedure Laterality Date    COLONOSCOPY W/ BIOPSIES  2010    PENILE PROSTHESIS IMPLANT       Family History   Problem Relation Age of Onset    Cancer Mother      cancer    Cataracts Mother     Heart disease Father 62     M.I.    " Stroke Brother 62    Amblyopia Neg Hx     Blindness Neg Hx     Glaucoma Neg Hx     Macular degeneration Neg Hx     Retinal detachment Neg Hx     Strabismus Neg Hx     Prostate cancer Neg Hx     Kidney disease Neg Hx      Social History   Substance Use Topics    Smoking status: Former Smoker     Packs/day: 1.00     Years: 10.00    Smokeless tobacco: Never Used    Alcohol use No     Review of Systems   Constitutional: Negative for chills and fever.   HENT: Negative for congestion, ear pain, rhinorrhea and sore throat.    Respiratory: Negative for cough, shortness of breath and wheezing.    Cardiovascular: Negative for chest pain and palpitations.   Gastrointestinal: Negative for abdominal pain, constipation, diarrhea, nausea and vomiting.   Genitourinary: Negative for dysuria, frequency, hematuria and urgency.   Musculoskeletal: Positive for back pain. Negative for myalgias and neck pain.   Skin: Negative for rash and wound.   Neurological: Negative for dizziness, weakness, light-headedness, numbness and headaches.   Hematological: Does not bruise/bleed easily.   Psychiatric/Behavioral: Negative for agitation, behavioral problems and confusion.       Physical Exam     Initial Vitals [04/13/18 0144]   BP Pulse Resp Temp SpO2   (!) 188/93 64 18 98.2 °F (36.8 °C) 95 %      MAP       124.67         Physical Exam    Nursing note and vitals reviewed.  Constitutional: He appears well-developed and well-nourished. He is not diaphoretic. No distress.   HENT:   Head: Normocephalic and atraumatic.   Mouth/Throat: Oropharynx is clear and moist.   Eyes: EOM are normal. Pupils are equal, round, and reactive to light.   Neck: No tracheal deviation present.   Cardiovascular: Normal rate, regular rhythm, normal heart sounds and intact distal pulses.   Pulmonary/Chest: Breath sounds normal. No stridor. No respiratory distress.   Abdominal: Soft. He exhibits no distension and no mass. There is no tenderness.    Musculoskeletal: Normal range of motion. He exhibits no edema.        Cervical back: He exhibits no tenderness and no bony tenderness.        Thoracic back: He exhibits no tenderness and no bony tenderness.        Lumbar back: He exhibits no tenderness and no bony tenderness.        Back:    Focal R-sided lumbar paraspinous muscle tenderness.  No midline/bony tenderness.   Neurological: He is alert and oriented to person, place, and time. No cranial nerve deficit or sensory deficit.   Skin: Skin is warm and dry. Capillary refill takes less than 2 seconds. No rash noted.   Psychiatric: He has a normal mood and affect. His behavior is normal. Thought content normal.         ED Course   Procedures  Labs Reviewed - No data to display          Medical Decision Making:   Initial Assessment:   Patient is a 72 y.o. male with a history of intermittent chronic back pain presents with complaints of lower right back pain that started this evening, waking him up from his sleep. Patient reports similar episodes and history of chronic back pain but states that it had been years since he has had it this bad. He feels he may have rolled onto his back while sleeping and exacerbated his pain. Denies taking any medication for pain today. Patient denies any fever, nausea, vomiting, midline back or leg tenderness, focal weaknesses, numbness, dysuria or blood in bladder. Vitals are unremarkable and patient appears well and shows no acute distress. Exam shows tenderness to right lateral paraspinal muscle without midline or bony tenderness.  Differential Diagnosis:   Differential Diagnosis includes, but is not limited to:  Fracture, dislocation, compartment syndrome, nerve injury/palsy, vascular injury, rhabdomyolysis, hemarthrosis, septic joint, bursitis, muscle strain, ligament tear/sprain, laceration with foreign body, abrasion, soft tissue contusion, osteoarthritis.    ED Management:  After complete evaluation, including thorough  history and physical exam, the patient's symptoms are most likely due to muscule strain/mechanical back pain. There are no concerning features of bilateral weakness, significant new motor/sensory deficits, saddle anesthesia, or bowel/bladder incontinence to suggest acute cauda equina syndrome. On physical exam, there is no focal midline bony tenderness or evidence of significant trauma to suggest acute fracture or hematoma. There is no fever, history of recent surgery, or erythema/fluctuance to suggest acute diskitis, infection, or abscess. The patient will be treated with supportive care; upon chart review, patient is already on Celebrex and diclofenac for chronic joint pain, so will prescribe short course of low dose muscle relaxant. Counseled appropriately on avoiding high-risk activities while taking medication that may cause drowsiness. Discussed other symptomatic and supportive care instructions, including use of heating pads, stretching and ROM exercises, improving posture, and slowly resuming activity as tolerated. Counseled on need to follow-up with PCP for further evaluation if symptoms persist, including further imaging as an outpatient if symptoms persist.  Upon re-evaluation, the patient's status has remained stable.  After complete ED evaluation, clinical impression is most consistent with acute on chronic low back pain.  At this time, I feel there is no emergent condition requiring further evaluation or admission. I believe the patient is stable for discharge from the ED. The patient and any additional family present were updated with test results, overall clinical impression, and recommended further plan of care. All questions were answered. The patient expressed understanding and agreed with current plan for discharge with PCP follow-up within 1 week. Strict return precautions were provided, including return/worsening of current symptoms or any other concerns.                         Clinical  Impression:     1. Acute right-sided low back pain without sciatica              I, Dr. Chadd Boles, personally performed the services described in this documentation. All medical record entries made by the scribe were at my direction and in my presence.  I have reviewed the chart and agree that the record reflects my personal performance and is accurate and complete.     Chadd Boles MD.                 Chadd Boles MD  05/11/18 [id number]

## 2018-04-13 NOTE — ED NOTES
[facility]  LOC: The patient is awake, alert and aware of environment with an appropriate affect, the patient is oriented x 3 and speaking appropriately.  APPEARANCE: Patient uncomfortable and in no acute distress, patient is clean and well groomed, patient's clothing are properly fastened.  SKIN: The skin is warm and dry, patient has normal skin turgor and moist mucus membranes, skin intact, no breakdown or brusing noted.  MUSKULOSKELETAL: Patient moving all extremities well, no obvious swelling or deformities noted.  RESPIRATORY: Airway is open and patent, respirations are spontaneous, patient has a normal effort and rate.

## 2018-06-05 ENCOUNTER — OFFICE VISIT (OUTPATIENT)
Dept: URGENT CARE | Facility: CLINIC | Age: 73
End: 2018-06-05
Payer: MEDICARE

## 2018-06-05 ENCOUNTER — TELEPHONE (OUTPATIENT)
Dept: UROLOGY | Facility: CLINIC | Age: 73
End: 2018-06-05

## 2018-06-05 VITALS
WEIGHT: 260 LBS | DIASTOLIC BLOOD PRESSURE: 88 MMHG | HEART RATE: 75 BPM | SYSTOLIC BLOOD PRESSURE: 134 MMHG | HEIGHT: 72 IN | BODY MASS INDEX: 35.21 KG/M2 | TEMPERATURE: 98 F | RESPIRATION RATE: 16 BRPM | OXYGEN SATURATION: 96 %

## 2018-06-05 DIAGNOSIS — N39.0 URINARY TRACT INFECTION WITHOUT HEMATURIA, SITE UNSPECIFIED: Primary | ICD-10-CM

## 2018-06-05 LAB
BILIRUB UR QL STRIP: NEGATIVE
GLUCOSE UR QL STRIP: NEGATIVE
KETONES UR QL STRIP: NEGATIVE
LEUKOCYTE ESTERASE UR QL STRIP: POSITIVE
PH, POC UA: 5.5 (ref 5–8)
POC BLOOD, URINE: NEGATIVE
POC NITRATES, URINE: NEGATIVE
PROT UR QL STRIP: NEGATIVE
SP GR UR STRIP: 1.03 (ref 1–1.03)
UROBILINOGEN UR STRIP-ACNC: NORMAL (ref 0.3–2.2)

## 2018-06-05 PROCEDURE — 3079F DIAST BP 80-89 MM HG: CPT | Mod: CPTII,S$GLB,, | Performed by: PHYSICIAN ASSISTANT

## 2018-06-05 PROCEDURE — 81003 URINALYSIS AUTO W/O SCOPE: CPT | Mod: QW,S$GLB,, | Performed by: PHYSICIAN ASSISTANT

## 2018-06-05 PROCEDURE — 3075F SYST BP GE 130 - 139MM HG: CPT | Mod: CPTII,S$GLB,, | Performed by: PHYSICIAN ASSISTANT

## 2018-06-05 PROCEDURE — 99214 OFFICE O/P EST MOD 30 MIN: CPT | Mod: 25,S$GLB,, | Performed by: PHYSICIAN ASSISTANT

## 2018-06-05 PROCEDURE — 99000 SPECIMEN HANDLING OFFICE-LAB: CPT | Mod: S$GLB,,, | Performed by: PHYSICIAN ASSISTANT

## 2018-06-05 RX ORDER — CIPROFLOXACIN 250 MG/1
500 TABLET, FILM COATED ORAL 2 TIMES DAILY
Qty: 40 TABLET | Refills: 0 | Status: SHIPPED | OUTPATIENT
Start: 2018-06-05 | End: 2018-06-15

## 2018-06-05 NOTE — PROGRESS NOTES
Subjective:       Patient ID: Brandan Werner is a 72 y.o. male.    Vitals:  height is 6' (1.829 m) and weight is 117.9 kg (260 lb). His tympanic temperature is 98.2 °F (36.8 °C). His blood pressure is 134/88 and his pulse is 75. His respiration is 16 and oxygen saturation is 96%.     Chief Complaint: Back Pain (LT FLANK/BACK PAIN)    PT C/O LT SIDED FLANK OR BACK PAIN. NO TRAUMA. NO HEMATURIA. PT SAYS HE HAS HAD THIS PAIN BEFORE AND WAS DX W KIDNEYB INFECTION AND GIVEN ANTIBIOTICS WHICH RESOLVED HIS SYMPTOMS.      Back Pain   This is a new problem. The current episode started in the past 7 days. The problem occurs intermittently. The problem has been waxing and waning since onset. The pain is present in the lumbar spine. The quality of the pain is described as stabbing. The pain does not radiate. The pain is moderate. The pain is worse during the night. The symptoms are aggravated by lying down. Pertinent negatives include no dysuria or fever. He has tried nothing for the symptoms.     Review of Systems   Constitution: Negative for chills and fever.   Eyes: Negative for discharge.   Skin: Negative for flushing and rash.   Musculoskeletal: Positive for back pain.   Gastrointestinal: Negative for nausea and vomiting.   Genitourinary: Negative for dysuria, genital sores, hematuria and urgency.       Objective:      Physical Exam   Constitutional: He is oriented to person, place, and time. Vital signs are normal. He appears well-developed and well-nourished. He does not appear ill. No distress.   HENT:   Head: Normocephalic and atraumatic.   Right Ear: External ear normal.   Left Ear: External ear normal.   Nose: Nose normal.   Eyes: Conjunctivae and EOM are normal. Right eye exhibits no discharge. Left eye exhibits no discharge.   Neck: Normal range of motion. Neck supple.   Cardiovascular: Normal rate, regular rhythm and normal heart sounds.  Exam reveals no gallop and no friction rub.    No murmur  heard.  Pulmonary/Chest: Effort normal and breath sounds normal. No respiratory distress. He has no wheezes. He has no rales.   Abdominal: Normal appearance and bowel sounds are normal. There is no tenderness. There is no rigidity, no rebound, no guarding, no CVA tenderness, no tenderness at McBurney's point and negative Mclain's sign.   Musculoskeletal: Normal range of motion.        Cervical back: Normal.        Thoracic back: Normal.        Lumbar back: Normal.        Back:    Neurological: He is alert and oriented to person, place, and time.   Skin: Skin is warm and dry. No bruising and no rash noted. He is not diaphoretic. No erythema. No pallor.   Psychiatric: He has a normal mood and affect. His behavior is normal.   Nursing note and vitals reviewed.      Assessment:       1. Urinary tract infection without hematuria, site unspecified        Office Visit on 06/05/2018   Component Date Value Ref Range Status    POC Blood, Urine 06/05/2018 Negative  Negative Final    POC Bilirubin, Urine 06/05/2018 Negative  Negative Final    POC Urobilinogen, Urine 06/05/2018 Normal  0.3 - 2.2 Final    POC Ketones, Urine 06/05/2018 Negative  Negative Final    POC Protein, Urine 06/05/2018 Negative  Negative Final    POC Nitrates, Urine 06/05/2018 Negative  Negative Final    POC Glucose, Urine 06/05/2018 Negative  Negative Final    pH, UA 06/05/2018 5.5  5 - 8 Final    POC Specific Gravity, Urine 06/05/2018 1.030* 1.003 - 1.029 Final    POC Leukocytes, Urine 06/05/2018 Positive* Negative Final     Plan:       Treating patient based on history and complaints. Pt states he has an appointment with his urologist next week. Sending urine for a culture as well. Discussed treatment plan with patient. He expressed verbal understanding and agreement with treatment plan.    Urinary tract infection without hematuria, site unspecified  -     POCT Urinalysis, Dipstick, Automated, W/O Scope  -     ciprofloxacin HCl (CIPRO) 250 MG  tablet; Take 2 tablets (500 mg total) by mouth 2 (two) times daily.  Dispense: 40 tablet; Refill: 0  -     Urine culture      Patient Instructions   -Please take antibiotic to completion.  -We will call with urine culture results in the next 3-7 days.  -Follow up with your urologist next week as discussed.  -Rest and stay hydrated.    Please follow up with your primary care provider within 2-5 days if your signs and symptoms have not resolved or worsen.     If your condition worsens or fails to improve we recommend that you receive another evaluation at the emergency room immediately or contact your primary medical clinic to discuss your concerns.   You must understand that you have received an Urgent Care treatment only and that you may be released before all of your medical problems are known or treated. You, the patient, will arrange for follow up care as instructed.           Patient Instructions   -Please take antibiotic to completion.  -We will call with urine culture results in the next 3-7 days.  -Follow up with your urologist next week as discussed.  -Rest and stay hydrated.    Please follow up with your primary care provider within 2-5 days if your signs and symptoms have not resolved or worsen.     If your condition worsens or fails to improve we recommend that you receive another evaluation at the emergency room immediately or contact your primary medical clinic to discuss your concerns.   You must understand that you have received an Urgent Care treatment only and that you may be released before all of your medical problems are known or treated. You, the patient, will arrange for follow up care as instructed.

## 2018-06-05 NOTE — TELEPHONE ENCOUNTER
----- Message from Amanda Obrien sent at 6/5/2018  9:52 AM CDT -----  Contact: [phone]/pt's wife  Patient is requesting orders for urinalysis.  Please advise.

## 2018-06-05 NOTE — TELEPHONE ENCOUNTER
Returned call, spoke with wife after confirming .  Informed patient had a visit and was evaluated today.  Encouraged to keep appointment with Dr Dao on Friday.

## 2018-06-05 NOTE — PATIENT INSTRUCTIONS
-Please take antibiotic to completion.  -We will call with urine culture results in the next 3-7 days.  -Follow up with your urologist next week as discussed.  -Rest and stay hydrated.    Please follow up with your primary care provider within 2-5 days if your signs and symptoms have not resolved or worsen.     If your condition worsens or fails to improve we recommend that you receive another evaluation at the emergency room immediately or contact your primary medical clinic to discuss your concerns.   You must understand that you have received an Urgent Care treatment only and that you may be released before all of your medical problems are known or treated. You, the patient, will arrange for follow up care as instructed.

## 2018-06-07 LAB
BACTERIA UR CULT: NO GROWTH
BACTERIA UR CULT: NORMAL

## 2018-06-08 ENCOUNTER — TELEPHONE (OUTPATIENT)
Dept: URGENT CARE | Facility: CLINIC | Age: 73
End: 2018-06-08

## 2018-06-08 ENCOUNTER — OFFICE VISIT (OUTPATIENT)
Dept: UROLOGY | Facility: CLINIC | Age: 73
End: 2018-06-08
Payer: MEDICARE

## 2018-06-08 ENCOUNTER — LAB VISIT (OUTPATIENT)
Dept: LAB | Facility: HOSPITAL | Age: 73
End: 2018-06-08
Attending: UROLOGY
Payer: MEDICARE

## 2018-06-08 VITALS
SYSTOLIC BLOOD PRESSURE: 109 MMHG | HEART RATE: 86 BPM | BODY MASS INDEX: 35.21 KG/M2 | HEIGHT: 72 IN | WEIGHT: 260 LBS | DIASTOLIC BLOOD PRESSURE: 76 MMHG

## 2018-06-08 DIAGNOSIS — M54.9 BACK PAIN, UNSPECIFIED BACK LOCATION, UNSPECIFIED BACK PAIN LATERALITY, UNSPECIFIED CHRONICITY: ICD-10-CM

## 2018-06-08 DIAGNOSIS — N40.1 BPH WITH OBSTRUCTION/LOWER URINARY TRACT SYMPTOMS: ICD-10-CM

## 2018-06-08 DIAGNOSIS — N13.8 BPH WITH OBSTRUCTION/LOWER URINARY TRACT SYMPTOMS: ICD-10-CM

## 2018-06-08 DIAGNOSIS — R97.20 ELEVATED PSA: Primary | ICD-10-CM

## 2018-06-08 DIAGNOSIS — R97.20 ELEVATED PSA: ICD-10-CM

## 2018-06-08 DIAGNOSIS — N39.0 URINARY TRACT INFECTION WITHOUT HEMATURIA, SITE UNSPECIFIED: ICD-10-CM

## 2018-06-08 DIAGNOSIS — N52.1 ERECTILE DYSFUNCTION DUE TO DISEASES CLASSIFIED ELSEWHERE: ICD-10-CM

## 2018-06-08 LAB
BILIRUB SERPL-MCNC: ABNORMAL MG/DL
BLOOD URINE, POC: ABNORMAL
COLOR, POC UA: YELLOW
COMPLEXED PSA SERPL-MCNC: 6.4 NG/ML
GLUCOSE UR QL STRIP: NORMAL
KETONES UR QL STRIP: ABNORMAL
LEUKOCYTE ESTERASE URINE, POC: ABNORMAL
NITRITE, POC UA: ABNORMAL
PH, POC UA: 5
PROTEIN, POC: ABNORMAL
SPECIFIC GRAVITY, POC UA: 1.02
UROBILINOGEN, POC UA: NORMAL

## 2018-06-08 PROCEDURE — 36415 COLL VENOUS BLD VENIPUNCTURE: CPT

## 2018-06-08 PROCEDURE — 3074F SYST BP LT 130 MM HG: CPT | Mod: CPTII,S$GLB,, | Performed by: UROLOGY

## 2018-06-08 PROCEDURE — 84153 ASSAY OF PSA TOTAL: CPT

## 2018-06-08 PROCEDURE — 3078F DIAST BP <80 MM HG: CPT | Mod: CPTII,S$GLB,, | Performed by: UROLOGY

## 2018-06-08 PROCEDURE — 99214 OFFICE O/P EST MOD 30 MIN: CPT | Mod: 25,S$GLB,, | Performed by: UROLOGY

## 2018-06-08 PROCEDURE — 99999 PR PBB SHADOW E&M-EST. PATIENT-LVL III: CPT | Mod: PBBFAC,,, | Performed by: UROLOGY

## 2018-06-08 PROCEDURE — 81002 URINALYSIS NONAUTO W/O SCOPE: CPT | Mod: S$GLB,,, | Performed by: UROLOGY

## 2018-06-08 NOTE — PROGRESS NOTES
This patient was last seen by me January this year in follow-up for elevated PSA with history of biopsy showing ASAP in 2013 with repeat biopsy 2014 being negative.  Patient also has a 3 piece inflatable device for erectile dysfunction in place.    Patient now comes in follow-up and had his PSA drawn today.    He is currently on a course of Cipro for possible urinary tract infection.  Urine culture did come back negative.  His symptoms were some back pain    Patient describes back pain as fleeting in nature which gets better with exercise.  No nausea vomiting or fever    Patient has no dysuria gross hematuria.  He has no case strength of stream with nocturia x2.  He is currently on tamsulosin only.  He previously stop finasteride due to gynecomastia    Physical exam reveals reveals a well-developed well-nourished patient  in no acute distress.  Patient is alert and oriented ×3 with normal mood and affect.  Respiratory effort is normal and there is no peripheral edema.  Skin is normal to inspection and palpation.  Penis/perineum without lesions, scrotum without rash/cysts, epididymis nontender bilaterally, urethral meatus in normal location normal size, no penile plaques palpated, prostate:     Smooth, enlarged, and benign-feeling                  seminal vesicles not palpated.  No rectal masses, sphincter tone normal.  Testes equal in size without masses.  Three piece inflatable device in place    /76 (BP Location: Left arm, Patient Position: Sitting, BP Method: Large (Automatic))   Pulse 86   Ht 6' (1.829 m)   Wt 117.9 kg (260 lb)   BMI 35.26 kg/m²   Review of Systems  General ROS: negative for chills, fever or weight loss  Respiratory ROS: no cough, shortness of breath, or wheezing  Cardiovascular ROS: no chest pain or dyspnea on exertion  Musculoskeletal ROS: negative for gait disturbance or muscular weakness    Family History   Problem Relation Age of Onset    Cancer Mother         cancer     Cataracts Mother     Heart disease Father 62        M.I.    Stroke Brother 62    Amblyopia Neg Hx     Blindness Neg Hx     Glaucoma Neg Hx     Macular degeneration Neg Hx     Retinal detachment Neg Hx     Strabismus Neg Hx     Prostate cancer Neg Hx     Kidney disease Neg Hx      Past Medical History:   Diagnosis Date    BPH (benign prostatic hyperplasia)     Cataract     Elevated PSA     Erectile dysfunction     Hyperlipidemia     Hypertension     Hypogonadism male     Obesity     Urinary tract infection      Family History   Problem Relation Age of Onset    Cancer Mother         cancer    Cataracts Mother     Heart disease Father 62        M.I.    Stroke Brother 62    Amblyopia Neg Hx     Blindness Neg Hx     Glaucoma Neg Hx     Macular degeneration Neg Hx     Retinal detachment Neg Hx     Strabismus Neg Hx     Prostate cancer Neg Hx     Kidney disease Neg Hx      Social History   Substance Use Topics    Smoking status: Former Smoker     Packs/day: 1.00     Years: 10.00    Smokeless tobacco: Never Used    Alcohol use No       Impression:      ICD-10-CM ICD-9-CM    1. Elevated PSA R97.20 790.93 Prostate Specific Antigen, Diagnostic   2. BPH with obstruction/lower urinary tract symptoms N40.1 600.01     N13.8 599.69    3. Urinary tract infection without hematuria, site unspecified N39.0 599.0 POCT URINE DIPSTICK WITHOUT MICROSCOPE   4. Back pain, unspecified back location, unspecified back pain laterality, unspecified chronicity M54.9 724.5    5. Erectile dysfunction due to diseases classified elsewhere N52.1 607.84        Plan:    #1.  Elevated PSA with history of ASAP with repeat biopsy being negative.  Plan.  Will contact the patient with any significant finding on his PSA from today.  We will see him back in 3 months with recheck of PSA at that time.  We will be aware that patient has recently stopped his finasteride in assessing repeat PSAs    2.  BPH.  Plan.  Patient is  satisfied with his voiding pattern on tamsulosin    3.  Possible urinary tract infection.  Plan.  Recent urine culture was negative    4.  Back pain.  Plan.  I discussed with the patient that his symptoms do not sound urologic in origin.  I recommend that he follow up with his PCP if they continue    5.  Erectile dysfunction.  Plan.  Patient has 3 piece inflatable device in place.    PLEASE NOTE:  Please be advised that portions of this note were dictated using voice recognition software and may contain dictation related errors in spelling/grammar/appropriate pronouns/syntax or other errors that might have not been found and or corrected on text review.

## 2018-06-08 NOTE — TELEPHONE ENCOUNTER
Called patient, left message to return my call. I was calling to inform patient of his negative urine culture results.

## 2018-06-08 NOTE — TELEPHONE ENCOUNTER
----- Message from Viki Saenz MD sent at 6/7/2018 11:13 AM CDT -----  Please call the patient regarding his urine culture which had no bacteria that grew.  Follow up with his PCP if he has any further symptoms.

## 2018-06-10 ENCOUNTER — PATIENT MESSAGE (OUTPATIENT)
Dept: URGENT CARE | Facility: CLINIC | Age: 73
End: 2018-06-10

## 2018-06-10 ENCOUNTER — OFFICE VISIT (OUTPATIENT)
Dept: URGENT CARE | Facility: CLINIC | Age: 73
End: 2018-06-10
Payer: MEDICARE

## 2018-06-10 VITALS
RESPIRATION RATE: 18 BRPM | OXYGEN SATURATION: 96 % | HEART RATE: 88 BPM | SYSTOLIC BLOOD PRESSURE: 133 MMHG | WEIGHT: 260 LBS | BODY MASS INDEX: 35.21 KG/M2 | DIASTOLIC BLOOD PRESSURE: 80 MMHG | HEIGHT: 72 IN

## 2018-06-10 DIAGNOSIS — E65 BUFFALO HUMP: Primary | ICD-10-CM

## 2018-06-10 PROCEDURE — 3075F SYST BP GE 130 - 139MM HG: CPT | Mod: CPTII,S$GLB,, | Performed by: PHYSICIAN ASSISTANT

## 2018-06-10 PROCEDURE — 99214 OFFICE O/P EST MOD 30 MIN: CPT | Mod: S$GLB,,, | Performed by: PHYSICIAN ASSISTANT

## 2018-06-10 PROCEDURE — 3079F DIAST BP 80-89 MM HG: CPT | Mod: CPTII,S$GLB,, | Performed by: PHYSICIAN ASSISTANT

## 2018-06-10 NOTE — PROGRESS NOTES
Subjective:       Patient ID: Brandan Werner is a 72 y.o. male.    Vitals:  height is 6' (1.829 m) and weight is 117.9 kg (260 lb). His blood pressure is 133/80 and his pulse is 88. His respiration is 18 and oxygen saturation is 96%.     Chief Complaint: Mass    Pt notice a mass on back of his neck.      Mass   This is a new problem. The current episode started today. The problem occurs constantly. The problem has been unchanged. Pertinent negatives include no abdominal pain, chest pain, chills, fever, headaches, nausea, rash, sore throat or vomiting. Nothing aggravates the symptoms. He has tried nothing for the symptoms.     Review of Systems   Constitution: Negative for chills and fever.   HENT: Negative for sore throat.    Eyes: Negative for blurred vision.   Cardiovascular: Negative for chest pain.   Respiratory: Negative for shortness of breath.    Skin: Negative for rash.   Musculoskeletal: Negative for back pain and joint pain.   Gastrointestinal: Negative for abdominal pain, diarrhea, nausea and vomiting.   Neurological: Negative for headaches.   Psychiatric/Behavioral: The patient is not nervous/anxious.    All other systems reviewed and are negative.      Objective:      Physical Exam   Constitutional: He is oriented to person, place, and time. Vital signs are normal. He appears well-developed and well-nourished. He does not appear ill. No distress.   HENT:   Head: Normocephalic and atraumatic.   Right Ear: External ear normal.   Left Ear: External ear normal.   Nose: Nose normal.   Eyes: Conjunctivae, EOM and lids are normal. Right eye exhibits no discharge. Left eye exhibits no discharge.   Neck: Normal range of motion. Neck supple. No spinous process tenderness and no muscular tenderness present. No neck rigidity. No edema, no erythema and normal range of motion present.   Cardiovascular: Normal rate, regular rhythm and normal heart sounds.  Exam reveals no gallop and no friction rub.    No murmur  heard.  Pulmonary/Chest: Effort normal and breath sounds normal. No respiratory distress. He has no decreased breath sounds. He has no wheezes. He has no rhonchi. He has no rales.   Musculoskeletal: Normal range of motion.        Cervical back: He exhibits deformity (resembling buffalo hump). He exhibits normal range of motion, no tenderness, no bony tenderness, no swelling, no edema, no pain and no spasm.        Thoracic back: Normal.        Lumbar back: Normal.   Neurological: He is alert and oriented to person, place, and time.   Skin: Skin is warm and dry. No bruising and no rash noted. He is not diaphoretic. No erythema.   Psychiatric: He has a normal mood and affect. His behavior is normal.   Nursing note and vitals reviewed.      Assessment:       1. [place] hump        Plan:       Pt also has recently noticed gynecomastia. Unknown if this is related to buffalo hump. Advised patient to follow up with his PCP for blood work. Patient expressed verbal understanding and agreement with treatment plan.    [place] hump  -     X-Ray Cervical Spine 2 or 3 Views; Future; Expected date: 06/10/2018      Patient Instructions   -Please follow up with your primary care provider.    Please follow up with your primary care provider within 2-5 days if your signs and symptoms have not resolved or worsen.     If your condition worsens or fails to improve we recommend that you receive another evaluation at the emergency room immediately or contact your primary medical clinic to discuss your concerns.   You must understand that you have received an Urgent Care treatment only and that you may be released before all of your medical problems are known or treated. You, the patient, will arrange for follow up care as instructed.

## 2018-06-10 NOTE — PATIENT INSTRUCTIONS
-Please follow up with your primary care provider.    Please follow up with your primary care provider within 2-5 days if your signs and symptoms have not resolved or worsen.     If your condition worsens or fails to improve we recommend that you receive another evaluation at the emergency room immediately or contact your primary medical clinic to discuss your concerns.   You must understand that you have received an Urgent Care treatment only and that you may be released before all of your medical problems are known or treated. You, the patient, will arrange for follow up care as instructed.

## 2018-06-15 ENCOUNTER — LAB VISIT (OUTPATIENT)
Dept: LAB | Facility: HOSPITAL | Age: 73
End: 2018-06-15
Attending: INTERNAL MEDICINE
Payer: MEDICARE

## 2018-06-15 ENCOUNTER — OFFICE VISIT (OUTPATIENT)
Dept: INTERNAL MEDICINE | Facility: CLINIC | Age: 73
End: 2018-06-15
Payer: MEDICARE

## 2018-06-15 VITALS
SYSTOLIC BLOOD PRESSURE: 114 MMHG | HEART RATE: 69 BPM | DIASTOLIC BLOOD PRESSURE: 73 MMHG | BODY MASS INDEX: 36.4 KG/M2 | OXYGEN SATURATION: 97 % | WEIGHT: 268.75 LBS | TEMPERATURE: 98 F | HEIGHT: 72 IN

## 2018-06-15 DIAGNOSIS — R97.20 ELEVATED PSA: ICD-10-CM

## 2018-06-15 DIAGNOSIS — M40.202 KYPHOSIS OF CERVICAL REGION, UNSPECIFIED KYPHOSIS TYPE: ICD-10-CM

## 2018-06-15 DIAGNOSIS — I10 ESSENTIAL HYPERTENSION: Chronic | ICD-10-CM

## 2018-06-15 DIAGNOSIS — N40.0 BENIGN PROSTATIC HYPERPLASIA, UNSPECIFIED WHETHER LOWER URINARY TRACT SYMPTOMS PRESENT: ICD-10-CM

## 2018-06-15 DIAGNOSIS — I10 ESSENTIAL HYPERTENSION: Primary | Chronic | ICD-10-CM

## 2018-06-15 DIAGNOSIS — M76.61 ACHILLES TENDINITIS, RIGHT LEG: ICD-10-CM

## 2018-06-15 DIAGNOSIS — E78.5 HYPERLIPIDEMIA, UNSPECIFIED HYPERLIPIDEMIA TYPE: ICD-10-CM

## 2018-06-15 DIAGNOSIS — M54.6 ACUTE LEFT-SIDED THORACIC BACK PAIN: ICD-10-CM

## 2018-06-15 LAB
ALBUMIN SERPL BCP-MCNC: 3.7 G/DL
ALP SERPL-CCNC: 57 U/L
ALT SERPL W/O P-5'-P-CCNC: 16 U/L
ANION GAP SERPL CALC-SCNC: 6 MMOL/L
AST SERPL-CCNC: 13 U/L
BILIRUB SERPL-MCNC: 0.5 MG/DL
BUN SERPL-MCNC: 19 MG/DL
CALCIUM SERPL-MCNC: 8.9 MG/DL
CHLORIDE SERPL-SCNC: 107 MMOL/L
CHOLEST SERPL-MCNC: 151 MG/DL
CHOLEST/HDLC SERPL: 3 {RATIO}
CO2 SERPL-SCNC: 27 MMOL/L
CORTIS SERPL-MCNC: 6.9 UG/DL
CREAT SERPL-MCNC: 0.9 MG/DL
EST. GFR  (AFRICAN AMERICAN): >60 ML/MIN/1.73 M^2
EST. GFR  (NON AFRICAN AMERICAN): >60 ML/MIN/1.73 M^2
GLUCOSE SERPL-MCNC: 88 MG/DL
HDLC SERPL-MCNC: 51 MG/DL
HDLC SERPL: 33.8 %
LDLC SERPL CALC-MCNC: 89.8 MG/DL
NONHDLC SERPL-MCNC: 100 MG/DL
POTASSIUM SERPL-SCNC: 4.6 MMOL/L
PROT SERPL-MCNC: 6.5 G/DL
SODIUM SERPL-SCNC: 140 MMOL/L
TESTOST SERPL-MCNC: 388 NG/DL
TRIGL SERPL-MCNC: 51 MG/DL
TSH SERPL DL<=0.005 MIU/L-ACNC: 0.81 UIU/ML

## 2018-06-15 PROCEDURE — 99999 PR PBB SHADOW E&M-EST. PATIENT-LVL IV: CPT | Mod: PBBFAC,,, | Performed by: INTERNAL MEDICINE

## 2018-06-15 PROCEDURE — 99214 OFFICE O/P EST MOD 30 MIN: CPT | Mod: S$GLB,,, | Performed by: INTERNAL MEDICINE

## 2018-06-15 PROCEDURE — 36415 COLL VENOUS BLD VENIPUNCTURE: CPT | Mod: PO

## 2018-06-15 PROCEDURE — 84403 ASSAY OF TOTAL TESTOSTERONE: CPT

## 2018-06-15 PROCEDURE — 80061 LIPID PANEL: CPT

## 2018-06-15 PROCEDURE — 3078F DIAST BP <80 MM HG: CPT | Mod: CPTII,S$GLB,, | Performed by: INTERNAL MEDICINE

## 2018-06-15 PROCEDURE — 80053 COMPREHEN METABOLIC PANEL: CPT

## 2018-06-15 PROCEDURE — 84443 ASSAY THYROID STIM HORMONE: CPT

## 2018-06-15 PROCEDURE — 3074F SYST BP LT 130 MM HG: CPT | Mod: CPTII,S$GLB,, | Performed by: INTERNAL MEDICINE

## 2018-06-15 PROCEDURE — 82533 TOTAL CORTISOL: CPT

## 2018-06-18 ENCOUNTER — HOSPITAL ENCOUNTER (OUTPATIENT)
Dept: RADIOLOGY | Facility: HOSPITAL | Age: 73
Discharge: HOME | End: 2018-06-18
Attending: PHYSICIAN ASSISTANT
Payer: MEDICARE

## 2018-06-18 ENCOUNTER — OFFICE VISIT (OUTPATIENT)
Dept: SPORTS MEDICINE | Facility: CLINIC | Age: 73
End: 2018-06-18
Payer: MEDICARE

## 2018-06-18 VITALS
HEIGHT: 72 IN | HEART RATE: 91 BPM | WEIGHT: 268.75 LBS | DIASTOLIC BLOOD PRESSURE: 80 MMHG | BODY MASS INDEX: 36.4 KG/M2 | SYSTOLIC BLOOD PRESSURE: 124 MMHG

## 2018-06-18 DIAGNOSIS — M25.571 RIGHT ANKLE PAIN, UNSPECIFIED CHRONICITY: ICD-10-CM

## 2018-06-18 DIAGNOSIS — M76.61 ACHILLES TENDINITIS OF RIGHT LOWER EXTREMITY: Primary | ICD-10-CM

## 2018-06-18 PROCEDURE — 3074F SYST BP LT 130 MM HG: CPT | Mod: CPTII,S$GLB,, | Performed by: PHYSICIAN ASSISTANT

## 2018-06-18 PROCEDURE — 3079F DIAST BP 80-89 MM HG: CPT | Mod: CPTII,S$GLB,, | Performed by: PHYSICIAN ASSISTANT

## 2018-06-18 PROCEDURE — 73610 X-RAY EXAM OF ANKLE: CPT | Mod: 26,RT,, | Performed by: RADIOLOGY

## 2018-06-18 PROCEDURE — 99204 OFFICE O/P NEW MOD 45 MIN: CPT | Mod: S$GLB,,, | Performed by: PHYSICIAN ASSISTANT

## 2018-06-18 PROCEDURE — 73610 X-RAY EXAM OF ANKLE: CPT | Mod: TC,FY,PO,RT

## 2018-06-18 PROCEDURE — 99999 PR PBB SHADOW E&M-EST. PATIENT-LVL IV: CPT | Mod: PBBFAC,,, | Performed by: PHYSICIAN ASSISTANT

## 2018-06-18 NOTE — LETTER
June 18, 2018      Km Chicas MD  [address]  [place] [address]           [address] [address]  Phone: [phone]          Patient: Brandan Werner   MR Number: [id number]   YOB: 1945   Date of Visit: 6/18/2018       Dear Dr. Km Chicas:    Thank you for referring Brandan Werner to me for evaluation. Attached you will find relevant portions of my assessment and plan of care.    If you have questions, please do not hesitate to call me. I look forward to following Brandan Werner along with you.    Sincerely,    PAWEL Cerratoosure  CC:  No Recipients    If you would like to receive this communication electronically, please contact [email or web address] or [phone] to request more information on [facility] [facility] access.    For providers and/or their staff who would like to refer a patient to [facility], please contact us through our one-stop-shop provider referral line, [facility], at [phone].    If you feel you have received this communication in error or would no longer like to receive these types of communications, please e-mail [email or web address]

## 2018-06-18 NOTE — PROGRESS NOTES
Subjective:          Chief Complaint: Brandan Werner is a 72 y.o. male who had concerns including Pain of the Left Lower Leg.    Brandan Werner is a .The pain started 6 months ago coming down out of a truck and felt pain in the back of his foot (points to) over his achilles tendon. Pain is intermittent. He reports that the pain is a 3 /10 aching pain today and he has not tried consistently conservative measures which have included activity modifications, rest, and oral medication (celebrex. Once daily). Has not tried icing. Is affecting ADLs and limiting desired level of activity. Denies numbness, tingling, radiation, and inability to bear weight.  Pain is 8 /10 at its worst    Mechanical symptoms: none  Subjective instability: (--)   Worse with walking  Better with rest.   Nocturnal symptoms: (--)    No previous surgeries or trauma on ankle              Review of Systems   Constitution: Negative for chills and fever.   HENT: Negative for congestion and sore throat.    Eyes: Negative for discharge and double vision.   Cardiovascular: Negative for chest pain, palpitations and syncope.   Respiratory: Negative for cough and shortness of breath.    Endocrine: Negative for cold intolerance and heat intolerance.   Skin: Negative for dry skin and rash.   Musculoskeletal: Positive for joint pain. Negative for falls, gout, joint swelling, muscle cramps, muscle weakness, myalgias, neck pain and stiffness.   Gastrointestinal: Negative for abdominal pain, nausea and vomiting.   Neurological: Negative for focal weakness, numbness and paresthesias.       Pain Related Questions  Over the past 3 days, what was your average pain during activity? (I.e. running, jogging, walking, climbing stairs, getting dressed, ect.): 7  Over the past 3 days, what was your highest pain level?: 7  Over the past 3 days, what was your lowest pain level? : 3    Other  How many nights a week are you awakened by your affected body part?:  0  Was the patient's HEIGHT measured or patient reported?: Patient Reported  Was the patient's WEIGHT measured or patient reported?: Measured      Objective:        General: Brandan is well-developed, well-nourished, appears stated age, in no acute distress, alert and oriented to time, place and person.     General    Vitals reviewed.  Constitutional: He is oriented to person, place, and time. He appears well-developed and well-nourished. No distress.   HENT:   Head: Normocephalic and atraumatic.   Nose: Nose normal.   Eyes: Conjunctivae and EOM are normal. Pupils are equal, round, and reactive to light.   Neck: Normal range of motion. Neck supple. No JVD present.   Cardiovascular: Normal rate and regular rhythm.    Pulmonary/Chest: Effort normal and breath sounds normal. No respiratory distress.   Abdominal: Soft. Bowel sounds are normal. He exhibits no distension.   Neurological: He is alert and oriented to person, place, and time.   Psychiatric: He has a normal mood and affect. His behavior is normal. Judgment and thought content normal.     General Musculoskeletal Exam   Gait: normal     Right Ankle/Foot Exam     Inspection   Erythema: absent  Bruising: Ankle - absent Foot - absent  Effusion: Ankle - absent Foot - absent  Atrophy: Ankle - absent Foot - absent    Tenderness   The patient is tender to palpation of the Achilles tendon.    Pain   The patient exhibits pain of the Achilles tendon.    Range of Motion   Ankle Joint   Dorsiflexion: 20   Plantar flexion: 50   Subtalar Joint   Inversion: 30   Eversion: 10     Alignment   Knee Alignment: neutral  Hindfoot Alignment: neutral    Tests   Anterior drawer: negative  Varus tilt: negative  Heel Walk: able to perform  Tiptoe Walk: able to perform  Single Heel Rise: able to perform  Squeeze Test: negative    Other   Ankle Crepitus: absent  Sensation: normal    Comments:  -Ruiz Test  -Matles Test    Left Ankle/Foot Exam     Inspection  Erythema: absent  Bruising:  Ankle - absent Foot - absent  Effusion: Ankle - absent Foot - absent  Atrophy: Ankle - absent Foot - absent    Range of Motion   Ankle Joint  Dorsiflexion: 20   Plantar flexion: 50     Subtalar Joint   Inversion: 30   Eversion: 10     Alignment   Knee Alignment: neutral  Hindfoot Alignment: neutral    Tests   Anterior drawer: negative  Varus tilt: negative  Heel Walk: able to perform  Tiptoe Walk: able to perform  Single Heel Rise: able to perform  Squeeze Test: absent    Other   Ankle Crepitus: absent  Sensation: normal      Muscle Strength   Right Lower Extremity   Anterior tibial:  5/5/5  Posterior tibial:  5/5/5  Gastrocsoleus:  5/5/5  Peroneal muscle:  5/5/5  EHL:  5/5  FDL: 5/5  EDL: 5/5  FHL: 5/5  Left Lower Extremity   Anterior tibial:  5/5/5   Posterior tibial:  5/5/5  Gastrocsoleus:  5/5/5  Peroneal muscle:  5/5/5  EHL:  5/5  FDL: 5/5  EDL: 5/5  FHL: 5/5    Vascular Exam     Right Pulses  Dorsalis Pedis:      2+  Posterior Tibial:      2+        Left Pulses  Dorsalis Pedis:      2+  Posterior Tibial:      2+          Radiographic Findings 06/18/2018:    EXAMINATION:  XR ANKLE COMPLETE 3 VIEW RIGHT    TECHNIQUE:  Three views of the right ankle were obtained, with AP, lateral, and oblique projections submitted.    COMPARISON:  No relevant comparison examinations are currently available.    FINDINGS:  Visualized osseous structures appear intact, with no definite evidence of recent or healing fracture, lytic destructive process, or other significant abnormality identified.  Tibiotalar joint space is adequately maintained, without narrowing.  A posterior calcaneal spur is observed.  No significant soft tissue swelling about the ankle.    Xrays of the right ankle were ordered and reviewed by me today. These findings were discussed and reviewed with the patient.          Assessment:       Encounter Diagnoses   Name Primary?    Achilles tendinitis of right lower extremity Yes    Right ankle pain, unspecified  chronicity           Plan:       1. Continue celebrex 200 mg BID as needed for pain management.  2. Ambulatory referral to physical therapy for ankle strengthening and conditioning.  3. Ice compress to the affected area 2-3x a day for 15-20 minutes as needed for pain management.  4. RTC to see Kofi Danielson PA-C in 8 weeks for follow-up.      All of the patient's questions were answered and the patient will contact us if they have any questions or concerns in the interim.        Patient questionnaires may have been collected.

## 2018-06-22 ENCOUNTER — CLINICAL SUPPORT (OUTPATIENT)
Dept: REHABILITATION | Facility: HOSPITAL | Age: 73
End: 2018-06-22
Payer: MEDICARE

## 2018-06-22 DIAGNOSIS — M62.81 MUSCLE WEAKNESS OF LOWER EXTREMITY: ICD-10-CM

## 2018-06-22 DIAGNOSIS — R26.89 ANTALGIC GAIT: ICD-10-CM

## 2018-06-22 DIAGNOSIS — M76.61 ACHILLES TENDINITIS OF RIGHT LOWER EXTREMITY: ICD-10-CM

## 2018-06-22 PROCEDURE — 97161 PT EVAL LOW COMPLEX 20 MIN: CPT | Mod: PN

## 2018-06-22 PROCEDURE — G8978 MOBILITY CURRENT STATUS: HCPCS | Mod: CK,PN

## 2018-06-22 PROCEDURE — G8979 MOBILITY GOAL STATUS: HCPCS | Mod: CI,PN

## 2018-06-22 PROCEDURE — 97110 THERAPEUTIC EXERCISES: CPT | Mod: PN

## 2018-06-22 NOTE — PLAN OF CARE
[facility]  Physical Therapy Initial Evaluation    Name: Brandan Werner  Clinic Number: [id number]    Therapy Diagnosis:   Encounter Diagnoses   Name Primary?    Achilles tendinitis of right lower extremity     Antalgic gait     Muscle weakness of lower extremity      Physician: Sarath Danielson, *    Physician Orders: PT Eval and Treat   Medical Diagnosis: Achilles tendinitis right  Evaluation Date: 6/22/2018  Authorization Period Expiration: 12/31/2018  Plan of Care Certification Period: 06/22/2018 to 8/23/2018  Visit # / Visits authorized: 1/ 50    Time In: 1010  Time Out: 1100  Total Billable Time: 50 minutes    Precautions: Standard    Subjective   Mr. 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  He states that he was getting better but re-injured his R calf/ankle in a similar way as before approximately 6 weeks ago.  He states that he has been doing what the doctor told him in regards to icing his injury area; this gives him temporary relief.  Also states that the 'doctor said I was getting arthritis in my ankle'.  He is wearing tennis shoes to the initial evaluation, but states that he normally wears work boots.  He is a short-.  He reports no pain in his R calf/ankle when driving; only when walking. Noticeable limp.  Denies DM or circulation problems.  Denies falls, feet numbness/tingling, hip/knee/back pain.     Past Medical History:   Diagnosis Date    BPH (benign prostatic hyperplasia)     Cataract     Elevated PSA     Erectile dysfunction     Hyperlipidemia     Hypertension     Hypogonadism male     Obesity     Urinary tract infection    R shoulder OA and pain    Brandan Werner  has a past surgical history that includes Colonoscopy w/ biopsies (2010) and Penile  prosthesis implant. R shoulder surgery.     Brandan has a current medication list which includes the following prescription(s): ascorbic acid (vitamin c), atorvastatin, celecoxib, diclofenac, fluoxetine, fluticasone, folic acid, polyethylene glycol, simvastatin, tamsulosin, tramadol, vitamin e, and voltaren.    Review of patient's allergies indicates:  No Known Allergies     Imaging:  R ankle Xrays on 6/18/2018; see chart    Prior Therapy: Not for this problemm  Social History: Lives with their spouse  Occupation:   Prior Level of Function: No prior history of R ankle/calf pain.l;  Current Level of Function: Still working full-time.     Pain:  Current 2/10, worst 8/10, best 0/10   Location: right ankles  Description: Aching and Sharp  Aggravating Factors: Walking  Easing Factors: relaxation    Pts goals: To be able to walk without pain.     Objective   Palpation: TTP at the insertion of his R Achilles tendon onto the posterior calcaneus as well as along the tendon 4-6 cm superior to its insertion.           Noted B ankle plantar calcaneal medial/lateral callous formations as well as callous along the medial distal phalanx of his great toe B.     Posture: Stands in B hip and knee flexion with normal L ankle hindfoot valgus in RCS and with L ankle hindfoot valgus > 5 degrees on the R.  Normal arch. Stands in mild B genu valgus. Gluteal atrophy       Compensated hindfoot valgus in RCS.       Obesity.     Gross Movement Analysis:  - Gait:  Antalgic gait pattern with quickened L step to unload the R leg in stance phase.  Early heel off on the right in TSt  - Squats: medial knee collapse.  - Sit-to-Stand: moderate laboring from a standard chair.   - Balance: Good- BLE    Range of Motion:   LE Right Left   Hip flexion 90 (soft block) 90 (soft block)   Hip abduction WNL WNL   Hip ER 35 35   Hip IR  35 35   Hip extension 5 5   Knee WNL WNL   Ankle DF: 9 22   Ankle PF: WNL WNL   Ankle Inversion 40 40   Ankle  Eversion: 22 21   Great toe extension 65 65     Lower Extremity Strength                 LE           Right           Left   Hip flexion: 3+/5 3+/5   Hip abduction 3/5 3/5   Hip extension 4/ 4/5   Hip ER 3/5 3/5   Knee flexion 4/5 4/5   Knee extension 3+/5 3+/5   Ankle dorsiflexion: 5/5 5/5   Ankle plantarflexion: 1/5 2/5   Great toe extension: 4/5 4/5     Special Tests:   Left Right   Anterior drawer negative negative   Calcaneal titl test stiff stiff   DF + ER test negative negative   Squeeze test negative negative   Ruiz test negative negative   Shuck test negative negative   Maltes test negative negative                         Joint Mobility:   -midtarsal joint: mild stiffness B  -subtalar joint: moderate stiffness B  -talocrual joint: moderate stiffness on the right    Sensation: intact light touch sensation to BLE throughout L2-S2 dermatomal pattern  Flexibility:    Ely's test: NT   Popliteal Angle: positive B   Heriberto test: positive B   P/SLR test: 55 degrees B    Edema: mild increase in R leg/ankle/foot edema as compared to the right.     B legs/feets hairless and shiny.  3-5 second capillary refill B toes.       CMS Impairment/Limitation/Restriction for FOTO Ankle Survey    Therapist reviewed FOTO scores for Brandan Werner on 6/22/2018.   FOTO documents entered into [employer] - see Media section.    Limitation Score: 44%  Category: Mobility    Current : CK = at least 40% but < 60% impaired, limited or restricted  Goal: CI = at least 1% but < 20% impaired, limited or restricted         TREATMENT   Treatment Time In: 1045  Treatment Time Out: 1100  Total Treatment time separate from Evaluation time:15'    Brandan received therapeutic exercises to develop strength and flexibility for 15' with PT 1:1 per log sheet.  PT also provided a trial of gastrocnemius, tibialis posterior, and peroneus longus K-tape with ankle in STJN position.  Ambulation pain improved with this trial measure.  Left K-tape on.   DATE  06/22/2018   VISIT 1   G CODE 1/10   FOTO 1/5   Cap Visit  Cap Total 113.20  113.20       Seated towel stretch 5x30 seconds   Ankle PF isos with knee extended 10 reps x 5 sec holds (with towel)               INITIALS JH     Home Exercises and Patient Education Provided  Education provided re:   -Continuation of icing at home.  -Wear time and doffing of K-tape after 3 days.     Written Home Exercises Provided: none provided today.   Brandan demonstrated good  understanding of the education provided.     See EMR under patient instructions for exercises given.   Assessment   Brandan is a 72 y.o. male referred to outpatient Physical Therapy with a medical diagnosis of Achilles tendinitis, right. Pt presents with signs and symptoms consistent with Achilles tendinitis diagnosis including stiffness of gastrocnemius/soleus complex, antalgic gait, pain with contraction of Achilles tendon with eccentric loading, and localized inflammation at mid-portion of Achilles tendon.  Noted gastrocnemius/soleus atrophy on the right indicating a chronic problem.  Complicated by obesity, low activity level, LE proximal musculature and joint stiffness, BLE proximal weakness, and age.     Pt prognosis is Excellent.   Pt will benefit from skilled outpatient Physical Therapy to address the deficits stated above and in the chart below, provide pt/family education, and to maximize pt's level of independence.     Plan of care discussed with patient: Yes  Pt's spiritual, cultural and educational needs considered and patient is agreeable to the plan of care and goals as stated below:     Anticipated Barriers for therapy:      Medical Necessity is demonstrated by the following  History  Co-morbidities and personal factors that may impact the plan of care Co-morbidities:    Arthritis, BMI over 30, Depression, Hearing Impairment, High Blood Pressure, Sleep  dysfunction    Personal Factors:   age     moderate   Examination  Body Structures and  Functions, activity limitations and participation restrictions that may impact the plan of care Body Regions:   lower extremities    Body Systems:    gross symmetry  ROM  strength  gross coordinated movement  balance  gait  transfers  motor control  motor learning    Participation Restrictions:   Work schedule    Activity limitations:   Learning and applying knowledge  no deficits    General Tasks and Commands  no deficits    Communication  no deficits    Mobility  walking, climbing in/out of his work truck    Self care  washing oneself (bathing, drying, washing hands)  dressing  looking after one's health    Domestic Life  doing house work (cleaning house, washing dishes, laundry)    Interactions/Relationships  no deficits    Life Areas  no deficits    Community and Social Life  no deficits         moderate   Clinical Presentation stable and uncomplicated low   Decision Making/ Complexity Score: low     Goals:  Short Term Goals: 3-4 weeks:  1.  Patient will demonstrate 3/5 R gastrocnemius strength for improved LE mechanics  2.  Patient will demonstrate normal non-antalgic gait on smooth level surface at normal gait velocity.  3.  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  4.  Patient will demonstrate 15 degrees of R ankle DF PROM with knee flexed.     Long Term Goals: 8 weeks  1.  Patient will demonstrate 10 degrees of R ankle DF PROM with knee flexed.  2.  Patient will demonstrate 4/5 BLE strength throughout.   3.  Patient will report  at least 1% but < 20% impaired, limited or restricted on ankle FOTO survey.    Plan   Certification Period/Plan of care expiration: 06/22/2018 to 8/23/2018.    Outpatient Physical Therapy 2 times weekly for 8 weeks to include the following interventions: Electrical Stimulation IFC, Gait Training, Iontophoresis (with Dexamethasone), Manual Therapy, Moist Heat/ Ice, Neuromuscular Re-ed, Orthotic Management and Training,  Patient Education, Self Care, Therapeutic Activites and Therapeutic Exercise.     Khoi Vilchis, PT

## 2018-06-25 ENCOUNTER — CLINICAL SUPPORT (OUTPATIENT)
Dept: REHABILITATION | Facility: HOSPITAL | Age: 73
End: 2018-06-25
Payer: MEDICARE

## 2018-06-25 DIAGNOSIS — R26.89 ANTALGIC GAIT: ICD-10-CM

## 2018-06-25 DIAGNOSIS — M76.61 ACHILLES TENDINITIS OF RIGHT LOWER EXTREMITY: ICD-10-CM

## 2018-06-25 DIAGNOSIS — M62.81 MUSCLE WEAKNESS OF LOWER EXTREMITY: ICD-10-CM

## 2018-06-25 PROCEDURE — 97110 THERAPEUTIC EXERCISES: CPT | Mod: PN

## 2018-06-25 PROCEDURE — 97140 MANUAL THERAPY 1/> REGIONS: CPT | Mod: PN

## 2018-06-25 NOTE — PATIENT INSTRUCTIONS
Gastroc, Sitting (Passive)     Sit with leg straight out in front of you and a towel under your heel and around ball of foot. Gently pull toward body. Hold 30 seconds.   Repeat 3 times right side per set. Do 1 sets per session. Do 2 sessions per day.

## 2018-06-29 ENCOUNTER — TELEPHONE (OUTPATIENT)
Dept: REHABILITATION | Facility: HOSPITAL | Age: 73
End: 2018-06-29

## 2018-07-02 ENCOUNTER — CLINICAL SUPPORT (OUTPATIENT)
Dept: REHABILITATION | Facility: HOSPITAL | Age: 73
End: 2018-07-02
Payer: MEDICARE

## 2018-07-02 DIAGNOSIS — M62.81 MUSCLE WEAKNESS OF LOWER EXTREMITY: ICD-10-CM

## 2018-07-02 DIAGNOSIS — R26.89 ANTALGIC GAIT: ICD-10-CM

## 2018-07-02 DIAGNOSIS — M76.61 ACHILLES TENDINITIS OF RIGHT LOWER EXTREMITY: ICD-10-CM

## 2018-07-02 PROCEDURE — 97110 THERAPEUTIC EXERCISES: CPT | Mod: PN

## 2018-07-02 PROCEDURE — 97140 MANUAL THERAPY 1/> REGIONS: CPT | Mod: PN

## 2018-07-02 NOTE — PROGRESS NOTES
Physical Therapy Daily Treatment Note     Name: Brandan Werner  Clinic Number: [id number]    Therapy Diagnosis:   Encounter Diagnoses   Name Primary?    Achilles tendinitis of right lower extremity     Antalgic gait     Muscle weakness of lower extremity      Physician: Sarath Danielson, *    Visit Date: 7/2/2018  Physician Orders: PT Eval and Treat   Medical Diagnosis: Achilles tendinitis right  Evaluation Date: 6/22/2018  Authorization Period Expiration: 12/31/2018  Plan of Care Certification Period: 06/22/2018 to 8/23/2018  Visit # / Visits authorized: 3/ 50     Time In: 1700  Time Out: 1745  Total Billable Time: 45'  Precautions: Standard    Subjective   Mr. Gipson presents to PT today.  Reports that he is doing better.  He is pleased with his progress so far.        Response to previous treatment:no adverse reactions  Functional change: none  Pain: 1/1010  Location: right ankle    Objective   No pain with Achilles passive stretching today or with non-weightbearing R gastrocnemius maximal contraction.  Mild pain with standing double-leg heel raise in R Achilles tendon area.      Brandan received therapeutic exercises to develop strength, ROM and flexibility for 35 minutes per chart below  Including kinesiotape applied to R ankle for gastocnemius inhibition, tibialis posterior and peroneus longus facilitation.   G CODE 3/10   FOTO 3/5   Cap Visit  Cap Total 88.1  291.10       Seated towel stretch 5x30 seconds   Ankle PF isos with knee extended 10 reps x 5 sec holds (with towel)   tband 4 way R ankle RTB 2x10    Heel raises leg press 2x20 at 2 plates (pain with 4 plates)  2x20 at 1 plate single-leg B   Leg press 3x15 at 6 plates   Fitter 5x30 seconds BLE  5x30 seconds single-leg B             Brandan received the following manual therapy techniques: Joint mobilizations and Myofacial release were applied to the: R ankle for 10 minutes, including:  R gastrocnemius/soleus STM/MFR followed by posterior talar  grade III/IV+ joint mobilizations, medial/lateral subtalar joint mobilizations; 5-7 bouts each for 15-30 seconds each.       Home Exercises Provided and Patient Education Provided   Education provided:   - on ktape and to remove if any adverse reactions occur    Written Home Exercises Provided: self gastros stretch with towel/bedsheet.  Exercises were reviewed and Brandan was able to demonstrate them prior to the end of the session.  Brandan demonstrated good  understanding of the education provided.   Pt received a written copy of exercises to perform at home.   See EMR under patient instructions for exercises given.     Brandan demonstrated good  understanding of the education provided.     Assessment   L Achilles tendon strain grade II.    Brandan is progressing well towards his goals.   Pt prognosis is Good.     Pt will continue to benefit from skilled outpatient physical therapy to address the deficits listed in the problem list box on initial evaluation, provide pt/family education and to maximize pt's level of independence in the home and community environment.   Pt's spiritual, cultural and educational needs considered and pt agreeable to plan of care and goals.  Anticipated barriers to physical therapy: none    Goals:  Short Term Goals: 3-4 weeks:  1.  Patient will demonstrate 3/5 R gastrocnemius strength for improved LE mechanics.  2.  Patient will demonstrate normal non-antalgic gait on smooth level surface at normal gait velocity.   3.  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   4.  Patient will demonstrate 15 degrees of R ankle DF PROM with knee flexed.       Long Term Goals: 8 weeks  1.  Patient will demonstrate 10 degrees of R ankle DF PROM with knee flexed.  2.  Patient will demonstrate 4/5 BLE strength throughout.   3.  Patient will report  at least 1% but < 20% impaired, limited or restricted on ankle FOTO survey.     Plan      Continue with PT POC.     Khoi Vilchis, PT

## 2018-07-16 ENCOUNTER — OFFICE VISIT (OUTPATIENT)
Dept: OPTOMETRY | Facility: CLINIC | Age: 73
End: 2018-07-16
Payer: COMMERCIAL

## 2018-07-16 DIAGNOSIS — H52.7 REFRACTIVE ERROR: ICD-10-CM

## 2018-07-16 DIAGNOSIS — H25.13 NUCLEAR SCLEROSIS, BILATERAL: Primary | ICD-10-CM

## 2018-07-16 PROCEDURE — 92015 DETERMINE REFRACTIVE STATE: CPT | Mod: S$GLB,,, | Performed by: OPTOMETRIST

## 2018-07-16 PROCEDURE — 92014 COMPRE OPH EXAM EST PT 1/>: CPT | Mod: S$GLB,,, | Performed by: OPTOMETRIST

## 2018-07-16 PROCEDURE — 99999 PR PBB SHADOW E&M-EST. PATIENT-LVL II: CPT | Mod: PBBFAC,,, | Performed by: OPTOMETRIST

## 2018-07-16 NOTE — PROGRESS NOTES
HPI     No eye problems  Uses visine on occasion  No itchy eyes  No teary eyes  Wants glasses RX    Last edited by Jorge L Guillermo, OD on 7/16/2018  9:00 AM. (History)            Assessment /Plan     For exam results, see Encounter Report.    Nuclear sclerosis, bilateral    Refractive error      1. Educated pt on presence of cataracts and effects on vision. No surgery at this time. Recheck in one year.  2. Spec Rx given. Different lens options discussed with patient. RTC 1 year full exam.

## 2018-07-23 ENCOUNTER — CLINICAL SUPPORT (OUTPATIENT)
Dept: REHABILITATION | Facility: HOSPITAL | Age: 73
End: 2018-07-23
Payer: MEDICARE

## 2018-07-23 DIAGNOSIS — M76.61 ACHILLES TENDINITIS OF RIGHT LOWER EXTREMITY: ICD-10-CM

## 2018-07-23 DIAGNOSIS — R26.89 ANTALGIC GAIT: ICD-10-CM

## 2018-07-23 DIAGNOSIS — M62.81 MUSCLE WEAKNESS OF LOWER EXTREMITY: ICD-10-CM

## 2018-07-23 PROCEDURE — 97110 THERAPEUTIC EXERCISES: CPT | Mod: PN

## 2018-07-23 NOTE — PROGRESS NOTES
Physical Therapy Daily Treatment Note     [facility]  [facility] Number[facility] [id number]    Therapy Diagnosis:   Encounter Diagnoses   Name Primary?    Achilles tendinitis of right lower extremity     Antalgic gait     Muscle weakness of lower extremity      Physician: Sarath Danielson, *    Visit Date: 7/23/2018  Physician Orders: PT Eval and Treat   Medical Diagnosis: Achilles tendinitis right  Evaluation Date: 6/22/2018  Authorization Period Expiration: 12/31/2018  Plan of Care Certification Period: 06/22/2018 to 8/23/2018  Visit # / Visits authorized: 3/ 50     Time In: 1700  Time Out: 1750  Total Billable Time: 30' (2 TE)  Precautions: Standard    Subjective   Mr. Gipson presents to PT today.  Reports that he is doing better.  He has missed his last several PT appointments.     Response to previous treatment:no adverse reactions  Functional change: none  Pain: 1/1010  Location: right ankle    Objective   O:  No Achilles tendon or gastrocnemius pain     Brandan received therapeutic exercises to develop strength, ROM and flexibility for 25 minutes per chart below with PT 1:1 and 10' under supervision.      Brandan received the following manual therapy techniques: Joint mobilizations and Myofacial release were applied to the: R ankle for 10 minutes, including:  R gastrocnemius/soleus STM/MFR followed by posterior talar grade III/IV+ joint mobilizations, medial/lateral subtalar joint mobilizations; 5-7 bouts each for 15-30 seconds each.       G CODE 4/10   FOTO 4/5   Cap Visit  Cap Total 60.64  351.74       Seated towel stretch 5x30 seconds   Ankle PF isos with knee extended 10 reps x 5 sec holds (with towel)   tband 4 way R ankle RTB 2x10    Heel raises leg press 2x20 at 4 plates   2x20 at 2 plate single-leg B   Leg press 3x15 at 8 plates   Fitter 5x30 seconds BLE  5x30 seconds single-leg B   Standing heel raises 2x20             Home Exercises Provided and Patient Education Provided   Education  provided:   - continue HEP     Written Home Exercises Provided: self gastros stretch with towel/bedsheet.  Exercises were reviewed and Brandan was able to demonstrate them prior to the end of the session.  Brandan demonstrated good  understanding of the education provided.   Pt received a written copy of exercises to perform at home.   See EMR under patient instructions for exercises given.     Brandan demonstrated good  understanding of the education provided.     Assessment   L Achilles tendon strain grade II.  Improving overall.     Brandan is progressing well towards his goals.   Pt prognosis is Good.     Pt will continue to benefit from skilled outpatient physical therapy to address the deficits listed in the problem list box on initial evaluation, provide pt/family education and to maximize pt's level of independence in the home and community environment.   Pt's spiritual, cultural and educational needs considered and pt agreeable to plan of care and goals.  Anticipated barriers to physical therapy: none    Goals:  Short Term Goals: 3-4 weeks:  1.  Patient will demonstrate 3/5 R gastrocnemius strength for improved LE mechanics.  2.  Patient will demonstrate normal non-antalgic gait on smooth level surface at normal gait velocity.   3.  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   4.  Patient will demonstrate 15 degrees of R ankle DF PROM with knee flexed.       Long Term Goals: 8 weeks  1.  Patient will demonstrate 10 degrees of R ankle DF PROM with knee flexed.  2.  Patient will demonstrate 4/5 BLE strength throughout.   3.  Patient will report  at least 1% but < 20% impaired, limited or restricted on ankle FOTO survey.     Plan     Continue with PT POC.     Khoi Vilchis, PT

## 2018-07-25 ENCOUNTER — CLINICAL SUPPORT (OUTPATIENT)
Dept: REHABILITATION | Facility: HOSPITAL | Age: 73
End: 2018-07-25
Payer: MEDICARE

## 2018-07-25 DIAGNOSIS — M76.61 ACHILLES TENDINITIS OF RIGHT LOWER EXTREMITY: ICD-10-CM

## 2018-07-25 DIAGNOSIS — M62.81 MUSCLE WEAKNESS OF LOWER EXTREMITY: ICD-10-CM

## 2018-07-25 DIAGNOSIS — R26.89 ANTALGIC GAIT: ICD-10-CM

## 2018-07-25 PROCEDURE — 97110 THERAPEUTIC EXERCISES: CPT | Mod: PN

## 2018-07-25 PROCEDURE — 97140 MANUAL THERAPY 1/> REGIONS: CPT | Mod: PN

## 2018-07-25 NOTE — PROGRESS NOTES
Physical Therapy Daily Treatment Note     Name: Brandan Werner  Clinic Number: [id number]    Therapy Diagnosis:   Encounter Diagnoses   Name Primary?    Achilles tendinitis of right lower extremity     Antalgic gait     Muscle weakness of lower extremity      Physician: Sarath Danielson, *    Visit Date: 7/25/2018  Physician Orders: PT Eval and Treat   Medical Diagnosis: Achilles tendinitis right  Evaluation Date: 6/22/2018  Authorization Period Expiration: 12/31/2018  Plan of Care Certification Period: 06/22/2018 to 8/23/2018  Visit # / Visits authorized: 5/ 50     Time In: 17:00  Time Out: 17:45  Total Billable Time: 45 (TE-2, MT-1)  Precautions: Standard    Subjective   Mr. Gipson presents to PT today. No new c/o. Pt relays felt good after last therapy session    Response to previous treatment:no adverse reactions  Functional change: none  Pain: 1/10  Location: right ankle    Objective       Brandan received therapeutic exercises to develop strength, ROM and flexibility for 35 minutes per chart below with PTA 1:1     Brandan received the following manual therapy techniques: Joint mobilizations and Myofacial release were applied to the: R ankle for 10 minutes, including:  R gastrocnemius/soleus STM/MFR followed by posterior talar grade III/IV+ joint mobilizations, medial/lateral subtalar joint mobilizations; 5-7 bouts each for 15-30 seconds each.       G CODE 5/10 4/10   FOTO Done 4/5   Cap Visit  Cap Total 88.10  439.84 60.64  351.74        Seated towel stretch 5x30 sec 5x30 seconds   Ankle PF isos with knee extended 10 reps x 5 sec holds (with towel) 10 reps x 5 sec holds (with towel)   tband 4 way R ankle RTB 2 x 10 RTB 2x10    Heel raises leg press 2 x 20 at 4 plates  2 x 20 at 2 plates single-leg B 2x20 at 4 plates   2x20 at 2 plate single-leg B   Leg press 3 x 15 at 8 plates 3x15 at 8 plates   Fitter 5 x 30 seconds BLE  5 x 30 seconds single leg B 5x30 seconds BLE  5x30 seconds single-leg B    Standing heel raises 2 x 20 2x20              Home Exercises Provided and Patient Education Provided   Education provided:   - continue HEP with regular stretching    Written Home Exercises Provided: self gastroc stretch with towel/bedsheet.  Exercises were reviewed and Brandan was able to demonstrate them prior to the end of the session.  Brandan demonstrated good  understanding of the education provided.   Pt received a written copy of exercises to perform at home.   See EMR under patient instructions for exercises given.     Brandan demonstrated good  understanding of the education provided.     Assessment     L Achilles tendon strain grade II.  Improving overall. Pt ambulates with slight antalgic gait    Brandan is progressing well towards his goals.   Pt prognosis is Good.     Pt will continue to benefit from skilled outpatient physical therapy to address the deficits listed in the problem list box on initial evaluation, provide pt/family education and to maximize pt's level of independence in the home and community environment.   Pt's spiritual, cultural and educational needs considered and pt agreeable to plan of care and goals.  Anticipated barriers to physical therapy: none    Goals:  Short Term Goals: 3-4 weeks:  1.  Patient will demonstrate 3/5 R gastrocnemius strength for improved LE mechanics.  2.  Patient will demonstrate normal non-antalgic gait on smooth level surface at normal gait velocity.   3.  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   4.  Patient will demonstrate 15 degrees of R ankle DF PROM with knee flexed.       Long Term Goals: 8 weeks  1.  Patient will demonstrate 10 degrees of R ankle DF PROM with knee flexed.  2.  Patient will demonstrate 4/5 BLE strength throughout.   3.  Patient will report  at least 1% but < 20% impaired, limited or restricted on ankle FOTO survey.     Plan     Cont POC to progress towards  established goals    Sammi Estrada PTA

## 2018-07-30 ENCOUNTER — CLINICAL SUPPORT (OUTPATIENT)
Dept: REHABILITATION | Facility: HOSPITAL | Age: 73
End: 2018-07-30
Payer: MEDICARE

## 2018-07-30 DIAGNOSIS — M76.61 ACHILLES TENDINITIS OF RIGHT LOWER EXTREMITY: ICD-10-CM

## 2018-07-30 DIAGNOSIS — M62.81 MUSCLE WEAKNESS OF LOWER EXTREMITY: ICD-10-CM

## 2018-07-30 DIAGNOSIS — R26.89 ANTALGIC GAIT: ICD-10-CM

## 2018-07-30 PROCEDURE — 97140 MANUAL THERAPY 1/> REGIONS: CPT | Mod: PN

## 2018-07-30 PROCEDURE — 97110 THERAPEUTIC EXERCISES: CPT | Mod: PN

## 2018-07-30 NOTE — PROGRESS NOTES
Physical Therapy Daily Treatment Note     Name: Branadn Mosesut  Clinic Number: [id number]    Therapy Diagnosis:   Encounter Diagnoses   Name Primary?    Achilles tendinitis of right lower extremity     Antalgic gait     Muscle weakness of lower extremity      Physician: Sarath Danielson, *    Visit Date: 7/30/2018  Physician Orders: PT Eval and Treat   Medical Diagnosis: Achilles tendinitis right  Evaluation Date: 6/22/2018  Authorization Period Expiration: 12/31/2018  Plan of Care Certification Period: 06/22/2018 to 8/23/2018  Visit # / Visits authorized: 6/ 50     Time In: 17:00  Time Out: 17:45  Total Billable Time: 30 (TE-1, MT-1)  Precautions: Standard    Subjective   Mr. Gipson presents to PT today with R Achilles tendon pain.  He worked today.  He states that he did not notice the pain until he got off work and swapped into his Crocs.      Response to previous treatment:no adverse reactions  Functional change: none  Pain: 4/10  Location: right ankle    Objective   O:  TTP along at his Achilles tendon insertion on the right.  Pain with DF with overpressure.     Brandan received therapeutic exercises to develop strength, ROM and flexibility for 20' minutes per chart below with PT 1:1 and 10' with supervision.      Brandan received the following manual therapy techniques: Joint mobilizations and Myofacial release were applied to the: R ankle for 15 minutes, including:  R gastrocnemius/soleus STM/MFR followed by posterior talar grade III/IV+ joint mobilizations, medial/lateral subtalar joint mobilizations; 5-7 bouts each for 15-30 seconds each.       G CODE 6/10   FOTO 6/10   Cap Visit  Cap Total 57.78  497.62       Seated towel stretch 5x30 sec   Ankle PF isos with knee extended 10 reps x 5 sec holds (with towel)   tband 4 way R ankle RTB 2 x 10   Heel raises leg press 2 x 20 at 4 plates  2 x 20 at 2 plates single-leg B   Leg press 3 x 15 at 8 plates   Fitter 5 x 30 seconds BLE  5 x 30 seconds single  leg B   Standing heel raises 2 x 20             Home Exercises Provided and Patient Education Provided   Education provided:   - continue HEP with regular stretching    Written Home Exercises Provided: self gastroc stretch with towel/bedsheet.  Exercises were reviewed and Brandan was able to demonstrate them prior to the end of the session.  Brandan demonstrated good  understanding of the education provided.   Pt received a written copy of exercises to perform at home.   See EMR under patient instructions for exercises given.     Brandan demonstrated good  understanding of the education provided.     Assessment   L Achilles tendon strain grade II.  Improving overall. Pt ambulates with slight antalgic gait.      Brandan is progressing well towards his goals.   Pt prognosis is Good.     Pt will continue to benefit from skilled outpatient physical therapy to address the deficits listed in the problem list box on initial evaluation, provide pt/family education and to maximize pt's level of independence in the home and community environment.   Pt's spiritual, cultural and educational needs considered and pt agreeable to plan of care and goals.  Anticipated barriers to physical therapy: none    Goals:  Short Term Goals: 3-4 weeks:  1.  Patient will demonstrate 3/5 R gastrocnemius strength for improved LE mechanics.  2.  Patient will demonstrate normal non-antalgic gait on smooth level surface at normal gait velocity.   3.  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   4.  Patient will demonstrate 15 degrees of R ankle DF PROM with knee flexed.       Long Term Goals: 8 weeks  1.  Patient will demonstrate 10 degrees of R ankle DF PROM with knee flexed.  2.  Patient will demonstrate 4/5 BLE strength throughout.   3.  Patient will report  at least 1% but < 20% impaired, limited or restricted on ankle FOTO survey.     Plan   Cont POC to progress towards  established goals    Khoi Vilchis, PT

## 2018-08-01 ENCOUNTER — CLINICAL SUPPORT (OUTPATIENT)
Dept: REHABILITATION | Facility: HOSPITAL | Age: 73
End: 2018-08-01
Payer: MEDICARE

## 2018-08-01 DIAGNOSIS — M76.61 ACHILLES TENDINITIS OF RIGHT LOWER EXTREMITY: ICD-10-CM

## 2018-08-01 DIAGNOSIS — R26.89 ANTALGIC GAIT: ICD-10-CM

## 2018-08-01 DIAGNOSIS — M62.81 MUSCLE WEAKNESS OF LOWER EXTREMITY: ICD-10-CM

## 2018-08-01 PROCEDURE — 97110 THERAPEUTIC EXERCISES: CPT | Mod: PN

## 2018-08-01 PROCEDURE — 97140 MANUAL THERAPY 1/> REGIONS: CPT | Mod: PN

## 2018-08-01 NOTE — PROGRESS NOTES
"  Physical Therapy Daily Treatment Note     Name: Brandan Werner  Clinic Number: [id number]    Therapy Diagnosis:   Encounter Diagnoses   Name Primary?    Achilles tendinitis of right lower extremity     Antalgic gait     Muscle weakness of lower extremity      Physician: Sarath Danielson, *    Visit Date: 8/1/2018  Physician Orders: PT Eval and Treat   Medical Diagnosis: Achilles tendinitis right  Evaluation Date: 6/22/2018  Authorization Period Expiration: 12/31/2018  Plan of Care Certification Period: 06/22/2018 to 8/23/2018  Visit # / Visits authorized: 7/ 50     Time In: 17:07  Time Out: 17:57  Total Billable Time: 50 minutes (TE-2, MT-1)  Precautions: Standard    Subjective     Pt reports: that his ankle is feeling better today that Mondays visit.    He wasn't compliant with home exercise program.  Relays he has difficulty remembering to perform HEP  Response to previous treatment: no adverse reaction  Functional change: none    Pain: 4/10  Location: right ankles     Objective     Brandan received therapeutic exercises to develop strength and ROM for 35 minutes with PTA 1:1 including:  See log    G CODE 7/10 6/10   FOTO 7/10 6/10   Cap Visit  Cap Total 88.10  585.72 57.78  497.62          Seated towel stretch 5x30" 5x30 sec   Ankle PF isos with knee extended 10x5" w/towel 10 reps x 5 sec holds (with towel)   tband 4 way R ankle 2x10 RTB RTB 2 x 10   Heel raises leg press 2x20 at 4 plates  2x20 at 2 plates SL B 2 x 20 at 4 plates  2 x 20 at 2 plates single-leg B   Leg press 3x15 at 8 plates 3 x 15 at 8 plates   Fitter 5x30" BLE  5x30" SL B 5 x 30 seconds BLE  5 x 30 seconds single leg B   Standing heel raises 2x20 2 x 20               Brandan received the following manual therapy techniques: Joint mobilizations, Myofacial release and Soft tissue Mobilization were applied to the: R ankle for 15 minutes, including:   R gastrocnemius/soleus STM/MFR followed by posterior talar grade III/IV+ joint " mobilizations, medial/lateral subtalar joint mobilizations; 5-7 bouts each for 15-30 seconds each.       Home Exercises Provided and Patient Education Provided     Education provided:   - perform HEP daily    Written Home Exercises Provided: Patient instructed to cont prior HEP.  Exercises were reviewed and Brandan was able to demonstrate them prior to the end of the session.  Brandan demonstrated good  understanding of the education provided.     See EMR under Patient Instructions for exercises provided prior visit.    Assessment     Pt tolerates therapy activities without difficulties, relays that he feels better, decreased pain upon completion of session. Rates pain 3/10    Brandan is progressing well towards his goals.   Pt prognosis is Excellent.     Pt will continue to benefit from skilled outpatient physical therapy to address the deficits listed in the problem list box on initial evaluation, provide pt/family education and to maximize pt's level of independence in the home and community environment.     Pt's spiritual, cultural and educational needs considered and pt agreeable to plan of care and goals.    Anticipated barriers to physical therapy: none    Goals:   Short Term Goals: 3-4 weeks:  1.  Patient will demonstrate 3/5 R gastrocnemius strength for improved LE mechanics.  2.  Patient will demonstrate normal non-antalgic gait on smooth level surface at normal gait velocity.   3.  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   4.  Patient will demonstrate 15 degrees of R ankle DF PROM with knee flexed.       Long Term Goals: 8 weeks  1.  Patient will demonstrate 10 degrees of R ankle DF PROM with knee flexed.  2.  Patient will demonstrate 4/5 BLE strength throughout.   3.  Patient will report  at least 1% but < 20% impaired, limited or restricted on ankle FOTO survey.      Plan     Cont POC to progress towards established goals    Sammi  Natalie, PTA

## 2018-08-01 NOTE — PROGRESS NOTES
"  Physical Therapy Daily Treatment Note     Name: Brandan Werner  Clinic Number: [id number]    Therapy Diagnosis:   Encounter Diagnoses   Name Primary?    Achilles tendinitis of right lower extremity     Antalgic gait     Muscle weakness of lower extremity      Physician: Sarath Danielson, *    Visit Date: 8/1/2018  Physician Orders: PT Eval and Treat   Medical Diagnosis: Achilles tendinitis right  Evaluation Date: 6/22/2018  Authorization Period Expiration: 12/31/2018  Plan of Care Certification Period: 06/22/2018 to 8/23/2018  Visit # / Visits authorized: 6/ 50     Time In: ***  Time Out: ***  Total Billable Time: ***    Precautions: Standard    Subjective     Pt reports: ***.  He {Actions; was/was not:89701} compliant with home exercise program.  Response to previous treatment: ***  Functional change: ***    Pain: {0-10:16537::"0"}/10  Location: {RIGHT/LEFT/BILATERAL:66381} {LOCATION ON BODY:62883}     Objective     Brandan received therapeutic exercises to develop {AMB PT PROGRESS OBJECTIVE:38141} for *** minutes including:  ***    Brandan received the following manual therapy techniques: {AMB PT PROGRESS MANUAL THERAPY:90139} were applied to the: *** for *** minutes, including:  ***    Brandan participated in neuromuscular re-education activities to improve: {AMB PT PROGRESS NEURO RE-ED:28956} for *** minutes. The following activities were included:  ***    Brandan participated in dynamic functional therapeutic activities to improve functional performance for ***  minutes, including:  ***    Brandan participated in gait training to improve functional mobility and safety for ***  minutes, including:  ***    Brandan received the following direct contact modalities after being cleared for contraindications: {AMB PT PROGRESS DIRECT CONTACT MODES:09460}    Brandan received the following supervised modalities after being cleared for contradictions: {AMB PT SUPERVISED MODES:44656}    Brandan received hot pack " "for *** minutes to ***.    Brandan received cold pack for *** minutes to ***.      Home Exercises Provided and Patient Education Provided     Education provided:   - ***    Written Home Exercises Provided: {Blank single:28128::"yes","Patient instructed to cont prior HEP"}.  Exercises were reviewed and Brandan was able to demonstrate them prior to the end of the session.  Brandan demonstrated {Desc; good/fair/poor:68951} understanding of the education provided.     See EMR under {Blank single:36467::"Media","Patient Instructions"} for exercises provided {Blank single:19349::"8/1/2018","prior visit"}.    Assessment     ***    Brandan is progressing towards his goals.   Pt prognosis is Good.     Pt will continue to benefit from skilled outpatient physical therapy to address the deficits listed in the problem list box on initial evaluation, provide pt/family education and to maximize pt's level of independence in the home and community environment.     Pt's spiritual, cultural and educational needs considered and pt agreeable to plan of care and goals.    Anticipated barriers to physical therapy: none    Goals:     Short Term Goals: 3-4 weeks:  1.  Patient will demonstrate 3/5 R gastrocnemius strength for improved LE mechanics.  2.  Patient will demonstrate normal non-antalgic gait on smooth level surface at normal gait velocity.   3.  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   4.  Patient will demonstrate 15 degrees of R ankle DF PROM with knee flexed.       Long Term Goals: 8 weeks  1.  Patient will demonstrate 10 degrees of R ankle DF PROM with knee flexed.  2.  Patient will demonstrate 4/5 BLE strength throughout.   3.  Patient will report  at least 1% but < 20% impaired, limited or restricted on ankle FOTO survey.    Plan     Cont POC.    Leonela Rivas, PT     "

## 2018-08-13 ENCOUNTER — OFFICE VISIT (OUTPATIENT)
Dept: SPORTS MEDICINE | Facility: CLINIC | Age: 73
End: 2018-08-13
Payer: MEDICARE

## 2018-08-13 VITALS
HEART RATE: 78 BPM | DIASTOLIC BLOOD PRESSURE: 73 MMHG | SYSTOLIC BLOOD PRESSURE: 124 MMHG | HEIGHT: 72 IN | WEIGHT: 268 LBS | BODY MASS INDEX: 36.3 KG/M2

## 2018-08-13 DIAGNOSIS — M76.61 ACHILLES TENDINITIS OF RIGHT LOWER EXTREMITY: Primary | ICD-10-CM

## 2018-08-13 DIAGNOSIS — M25.571 RIGHT ANKLE PAIN, UNSPECIFIED CHRONICITY: ICD-10-CM

## 2018-08-13 PROCEDURE — 99999 PR PBB SHADOW E&M-EST. PATIENT-LVL III: CPT | Mod: PBBFAC,,, | Performed by: PHYSICIAN ASSISTANT

## 2018-08-13 PROCEDURE — 3074F SYST BP LT 130 MM HG: CPT | Mod: CPTII,S$GLB,, | Performed by: PHYSICIAN ASSISTANT

## 2018-08-13 PROCEDURE — 99213 OFFICE O/P EST LOW 20 MIN: CPT | Mod: S$GLB,,, | Performed by: PHYSICIAN ASSISTANT

## 2018-08-13 PROCEDURE — 3078F DIAST BP <80 MM HG: CPT | Mod: CPTII,S$GLB,, | Performed by: PHYSICIAN ASSISTANT

## 2018-08-13 NOTE — PROGRESS NOTES
Subjective:          Chief Complaint: Brandan Werner is a 72 y.o. male who had concerns including Pain of the Right Ankle.    Brandan Werner is a .    Interval hx: Pain is better with physical therapy. He admits he hasn't been going as often due to travel.    The pain started 6 months ago coming down out of a truck and felt pain in the back of his foot (points to) over his achilles tendon. Pain is intermittent. He reports that the pain is a 3 /10 aching pain today and he has not tried consistently conservative measures which have included activity modifications, rest, and oral medication (celebrex. Once daily). Has not tried icing. Is affecting ADLs and limiting desired level of activity. Denies numbness, tingling, radiation, and inability to bear weight.  Pain is 8 /10 at its worst    Mechanical symptoms: none  Subjective instability: (--)   Worse with walking  Better with rest.   Nocturnal symptoms: (--)    No previous surgeries or trauma on ankle          Pain   Pertinent negatives include no abdominal pain, chest pain, chills, congestion, coughing, fever, joint swelling, myalgias, nausea, neck pain, numbness, rash, sore throat or vomiting.       Review of Systems   Constitution: Negative for chills and fever.   HENT: Negative for congestion and sore throat.    Eyes: Negative for discharge and double vision.   Cardiovascular: Negative for chest pain, palpitations and syncope.   Respiratory: Negative for cough and shortness of breath.    Endocrine: Negative for cold intolerance and heat intolerance.   Skin: Negative for dry skin and rash.   Musculoskeletal: Positive for joint pain. Negative for falls, gout, joint swelling, muscle cramps, muscle weakness, myalgias, neck pain and stiffness.   Gastrointestinal: Negative for abdominal pain, nausea and vomiting.   Neurological: Negative for focal weakness, numbness and paresthesias.       Pain Related Questions  Over the past 3 days, what was your average  pain during activity? (I.e. running, jogging, walking, climbing stairs, getting dressed, ect.): 1  Over the past 3 days, what was your highest pain level?: 1  Over the past 3 days, what was your lowest pain level? : 1    Other  How many nights a week are you awakened by your affected body part?: 0      Objective:        General: Brandan is well-developed, well-nourished, appears stated age, in no acute distress, alert and oriented to time, place and person.     General    Vitals reviewed.  Constitutional: He is oriented to person, place, and time. He appears well-developed and well-nourished. No distress.   HENT:   Head: Normocephalic and atraumatic.   Nose: Nose normal.   Eyes: Conjunctivae and EOM are normal. Pupils are equal, round, and reactive to light.   Neck: Normal range of motion. Neck supple. No JVD present.   Cardiovascular: Normal rate and regular rhythm.    Pulmonary/Chest: Effort normal and breath sounds normal. No respiratory distress.   Abdominal: Soft. Bowel sounds are normal. He exhibits no distension.   Neurological: He is alert and oriented to person, place, and time.   Psychiatric: He has a normal mood and affect. His behavior is normal. Judgment and thought content normal.     General Musculoskeletal Exam   Gait: normal     Right Ankle/Foot Exam     Inspection   Erythema: absent  Bruising: Ankle - absent Foot - absent  Effusion: Ankle - absent Foot - absent  Atrophy: Ankle - absent Foot - absent    Tenderness   The patient is tender to palpation of the Achilles tendon.    Pain   The patient exhibits pain of the Achilles tendon.    Range of Motion   Ankle Joint   Dorsiflexion: 20   Plantar flexion: 50   Subtalar Joint   Inversion: 30   Eversion: 10     Alignment   Knee Alignment: neutral  Hindfoot Alignment: neutral    Tests   Anterior drawer: negative  Varus tilt: negative  Heel Walk: able to perform  Tiptoe Walk: able to perform  Single Heel Rise: able to perform  Squeeze Test:  negative    Other   Ankle Crepitus: absent  Sensation: normal    Comments:  -Ruiz Test  -Matles Test    Left Ankle/Foot Exam     Inspection  Erythema: absent  Bruising: Ankle - absent Foot - absent  Effusion: Ankle - absent Foot - absent  Atrophy: Ankle - absent Foot - absent    Range of Motion   Ankle Joint  Dorsiflexion: 20   Plantar flexion: 50     Subtalar Joint   Inversion: 30   Eversion: 10     Alignment   Knee Alignment: neutral  Hindfoot Alignment: neutral    Tests   Anterior drawer: negative  Varus tilt: negative  Heel Walk: able to perform  Tiptoe Walk: able to perform  Single Heel Rise: able to perform  Squeeze Test: absent    Other   Ankle Crepitus: absent  Sensation: normal      Muscle Strength   Right Lower Extremity   Anterior tibial:  5/5/5  Posterior tibial:  5/5/5  Gastrocsoleus:  5/5/5  Peroneal muscle:  5/5/5  EHL:  5/5  FDL: 5/5  EDL: 5/5  FHL: 5/5  Left Lower Extremity   Anterior tibial:  5/5/5   Posterior tibial:  5/5/5  Gastrocsoleus:  5/5/5  Peroneal muscle:  5/5/5  EHL:  5/5  FDL: 5/5  EDL: 5/5  FHL: 5/5    Vascular Exam     Right Pulses  Dorsalis Pedis:      2+  Posterior Tibial:      2+        Left Pulses  Dorsalis Pedis:      2+  Posterior Tibial:      2+          Radiographic Findings 08/13/2018:    EXAMINATION:  XR ANKLE COMPLETE 3 VIEW RIGHT    TECHNIQUE:  Three views of the right ankle were obtained, with AP, lateral, and oblique projections submitted.    COMPARISON:  No relevant comparison examinations are currently available.    FINDINGS:  Visualized osseous structures appear intact, with no definite evidence of recent or healing fracture, lytic destructive process, or other significant abnormality identified.  Tibiotalar joint space is adequately maintained, without narrowing.  A posterior calcaneal spur is observed.  No significant soft tissue swelling about the ankle.    Xrays of the right ankle were reviewed by me today. These findings were discussed and reviewed with the  patient.          Assessment:       Encounter Diagnoses   Name Primary?    Achilles tendinitis of right lower extremity Yes    Right ankle pain, unspecified chronicity           Plan:       1. Continue celebrex 200 mg BID as needed for pain management.  2. Continue physical therapy for ankle strengthening and conditioning.  3. Continue ice compress to the affected area 2-3x a day for 15-20 minutes as needed for pain management.  4. RTC to see Kofi Danielson PA-C in 3 months for follow-up.      All of the patient's questions were answered and the patient will contact us if they have any questions or concerns in the interim.        Patient questionnaires may have been collected.

## 2018-08-15 ENCOUNTER — CLINICAL SUPPORT (OUTPATIENT)
Dept: REHABILITATION | Facility: HOSPITAL | Age: 73
End: 2018-08-15
Payer: MEDICARE

## 2018-08-15 DIAGNOSIS — M62.81 MUSCLE WEAKNESS OF LOWER EXTREMITY: ICD-10-CM

## 2018-08-15 DIAGNOSIS — M76.61 ACHILLES TENDINITIS OF RIGHT LOWER EXTREMITY: ICD-10-CM

## 2018-08-15 DIAGNOSIS — R26.89 ANTALGIC GAIT: ICD-10-CM

## 2018-08-15 PROCEDURE — 97110 THERAPEUTIC EXERCISES: CPT | Mod: PN

## 2018-08-15 PROCEDURE — 97140 MANUAL THERAPY 1/> REGIONS: CPT | Mod: PN

## 2018-08-15 NOTE — PROGRESS NOTES
"  Physical Therapy Daily Treatment Note     Name: Brandan Werner  Clinic Number: [id number]    Therapy Diagnosis:   Encounter Diagnoses   Name Primary?   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  He was compliant with home exercise program.  Response to previous treatment: no adverse reaction  Functional change: none    Pain: 3/10  Location: right ankles     Objective     Brandan received therapeutic exercises to develop strength, endurance and ROM for 40 minutes with PTA 1:1 including:  See log  G CODE 8/10 7/10 6/10   FOTO 8/10 7/10 6/10   Cap Visit  Cap Total 118.42  704.14 88.10  585.72 57.78  497.62            Seated towel stretch 5x30" 5x30" 5x30 sec   Ankle PF isos with knee extended 10x5" w/towel 10x5" w/towel 10 reps x 5 sec holds (with towel)   tband 4 way R ankle 2x15 RTB 2x10 RTB RTB 2 x 10   Heel raises leg press 2x20 5 plates  2x20 2.5 plates 2x20 at 4 plates  2x20 at 2 plates SL B 2 x 20 at 4 plates  2 x 20 at 2 plates single-leg B   Leg press 3x15 8 plates 3x15 at 8 plates 3 x 15 at 8 plates   Fitter 5x30" BLE 5x30" BLE  5x30" SL B 5 x 30 seconds BLE  5 x 30 seconds single leg B   Standing heel raises 2x25 2x20 2 x 20                Brandan received the following manual therapy techniques: Joint mobilizations, Myofacial release and Soft tissue Mobilization were applied to the: R ankle/achilles/gastroc for 15 minutes, including:  R " gastrocnemius/soleus STM/MFR followed by posterior talar grade II/III joint mobilizations, medial/lateral subtalar joint mobilizations; 5-7 bouts each for 15-30 seconds each  Home Exercises Provided and Patient Education Provided     Education provided:   - cont HEP regularly    Written Home Exercises Provided: Patient instructed to cont prior HEP.  Exercises were reviewed and Brandan was able to demonstrate them prior to the end of the session.  Brandan demonstrated good  understanding of the education provided.     See EMR under Patient Instructions for exercises provided prior visit.    Assessment     Pt relays feeling better upon completion of therapy session.  Tolerates all therex and MT without difficulites or c/o increasd pain  Brandan is progressing well towards his goals.   Pt prognosis is Excellent.     Pt will continue to benefit from skilled outpatient physical therapy to address the deficits listed in the problem list box on initial evaluation, provide pt/family education and to maximize pt's level of independence in the home and community environment.     Pt's spiritual, cultural and educational needs considered and pt agreeable to plan of care and goals.    Anticipated barriers to physical therapy: none    Goals:   Short Term Goals: 3-4 weeks:  1.  Patient will demonstrate 3/5 R gastrocnemius strength for improved LE mechanics.  2.  Patient will demonstrate normal non-antalgic gait on smooth level surface at normal gait velocity.   3.  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   4.  Patient will demonstrate 15 degrees of R ankle DF PROM with knee flexed.       Long Term Goals: 8 weeks  1.  Patient will demonstrate 10 degrees of R ankle DF PROM with knee flexed.  2.  Patient will demonstrate 4/5 BLE strength throughout.   3.  Patient will report  at least 1% but < 20% impaired, limited or restricted on ankle FOTO survey.       Plan      PT to reasses next visit    Sammi Estrada, PTA

## 2018-08-20 ENCOUNTER — CLINICAL SUPPORT (OUTPATIENT)
Dept: REHABILITATION | Facility: HOSPITAL | Age: 73
End: 2018-08-20
Payer: MEDICARE

## 2018-08-20 DIAGNOSIS — M62.81 MUSCLE WEAKNESS OF LOWER EXTREMITY: ICD-10-CM

## 2018-08-20 DIAGNOSIS — R26.89 ANTALGIC GAIT: ICD-10-CM

## 2018-08-20 DIAGNOSIS — M76.61 ACHILLES TENDINITIS OF RIGHT LOWER EXTREMITY: ICD-10-CM

## 2018-08-20 PROCEDURE — 97110 THERAPEUTIC EXERCISES: CPT | Mod: PN

## 2018-08-20 NOTE — PLAN OF CARE
Outpatient Therapy Updated Plan of Care     Visit Date: 8/20/2018  Name: Brandan Werner  Clinic Number: [id number]    Therapy Diagnosis:   Encounter Diagnoses   Name Primary?   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     Subjective   Update: Mr. Werner reports mild R calf pain that is dependent upon how much work he does.  Denies morning pain.  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  He has missed several appointments due to his work schedule.  He states that some days he cannot get back to [place] in time to make his appts.     Objective   Update: Mild loss of ankle DF ROM B.  R gastrocnemius strength 3/5.  B hip and knee extension strength 4-/5.  Comparable sign: descending stairs with R foot on the highest step lowering his body weight.     Assessment   Update: Achilles tendinopathy vs strain.  Weak gastrocnemius/soleus muscle with atrophy.  Weak hip/knee extensor musculature.    Previous Short Term Goals Status:     Short Term Goals: 3-4 weeks:  1.  Patient will demonstrate 3/5 R gastrocnemius strength for improved LE mechanics. MET  2.  Patient will demonstrate normal non-antalgic gait on smooth level surface at normal gait velocity. MET  3.  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  Progressing towards  4.  Patient will demonstrate 15 degrees of R ankle DF PROM  with knee flexed. Progressing towards     New Short Term Goals Status:   -  Long Term Goal Status:   continue per initial plan of care.  Reasons for Recertification of Therapy:   Mr. Gipson's job prevents him from being consistent with his PT course.  However, he would continue to benefit from PT as the appointment he has attended have resulted in improvements in his R calf function, strength, and pain.     Plan     Updated Certification Period: 8/20/2018 to 10/01/2018  Recommended Treatment Plan: 2 times per week for 6 weeks: Electrical Stimulation IFC, Gait Training, Manual Therapy, Moist Heat/ Ice, Neuromuscular Re-ed, Orthotic Management and Training, Patient Education, Self Care, Therapeutic Activites and Therapeutic Exercise  Other Recommendations: Aria Vilchis, PT  8/20/2018      I CERTIFY THE NEED FOR THESE SERVICES FURNISHED UNDER THIS PLAN OF TREATMENT AND WHILE UNDER MY CARE    Physician's comments:        Physician's Signature: ___________________________________________________

## 2018-08-20 NOTE — PROGRESS NOTES
"  Physical Therapy Daily Treatment Note     Name: Brandan Werner  Clinic Number: [id number]    Therapy Diagnosis:   Encounter Diagnoses   Name Primary?    Achilles tendinitis of right lower extremity     Antalgic gait     Muscle weakness of lower extremity      Physician: Sarath Danielson, *    Visit Date: 8/20/2018  Physician Orders: PT Eval and Treat   Medical Diagnosis: Achilles tendinitis right  Evaluation Date: 6/22/2018  Authorization Period Expiration: 12/31/2018  Plan of Care Certification Period: 06/22/2018 to 8/23/2018  Visit # / Visits authorized: 9/ 50     Time In: 1705  Time Out: 1805  Total Billable Time: 30 minutes  (2TE)  Precautions: Standard     Subjective   Mr. Werner reports mild R calf pain that is dependent upon how much work he does.  Denies morning pain.  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  He has missed several appointments due to his work schedule.  He states that some days he cannot get back to [place] in time to make his appts.     He was compliant with home exercise program.  Response to previous treatment: no adverse reaction  Functional change: none  Pain: 3/10  Location: right ankles     Objective     Brandan received therapeutic exercises to develop strength, endurance and ROM for 30 minutes with PT 1:1 and 10' under supervision including: See log     08/20/2018   G CODE 9/10   FOTO 9/10 NEXT   Cap Visit  Cap Total 60.64  704.14            Forward lunges At ballet bar; 3x10 B   Mini-squats  At ballet bar; 3x10 with heel raises   Heel raises leg press 2x20 8.5 plates double leg  2x20 6.5 plates single leg   Leg press 3x15 9 plates double leg  2x10 5 plates double leg   Fitter 5x30" BLE   Standing heel raises 2x20   Forward step-up/down At stairs, single UE support  3x10 B       INITIALS KATHRYN Gipson received the following manual therapy techniques for a total of 5' including CFR to R Achilles tendon at its insertion. "     Home Exercises Provided and Patient Education Provided   Education provided:   - cont HEP regularly    Written Home Exercises Provided: Patient instructed to cont prior HEP.  Exercises were reviewed and Brandan was able to demonstrate them prior to the end of the session.  Brandan demonstrated good  understanding of the education provided.     See EMR under Patient Instructions for exercises provided prior visit.    Assessment   A: Achilles tendinopathy vs strain.  Weak gastrocnemius/soleus muscle with atrophy.  Weak hip/knee extensor musculature.     Brandan is progressing well towards his goals.   Pt prognosis is Excellent.     Pt will continue to benefit from skilled outpatient physical therapy to address the deficits listed in the problem list box on initial evaluation, provide pt/family education and to maximize pt's level of independence in the home and community environment.     Pt's spiritual, cultural and educational needs considered and pt agreeable to plan of care and goals.    Anticipated barriers to physical therapy: none    Goals:   Short Term Goals: 3-4 weeks:  1.  Patient will demonstrate 3/5 R gastrocnemius strength for improved LE mechanics.  2.  Patient will demonstrate normal non-antalgic gait on smooth level surface at normal gait velocity.   3.  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   4.  Patient will demonstrate 15 degrees of R ankle DF PROM with knee flexed.       Long Term Goals: 8 weeks  1.  Patient will demonstrate 10 degrees of R ankle DF PROM with knee extended-.  2.  Patient will demonstrate 4/5 BLE strength throughout.   3.  Patient will report  at least 1% but < 20% impaired, limited or restricted on ankle FOTO survey.     Plan     PT to reasses next visit    Khoi Vilchis, PT

## 2018-08-27 ENCOUNTER — CLINICAL SUPPORT (OUTPATIENT)
Dept: REHABILITATION | Facility: HOSPITAL | Age: 73
End: 2018-08-27
Payer: MEDICARE

## 2018-08-27 DIAGNOSIS — M76.61 ACHILLES TENDINITIS OF RIGHT LOWER EXTREMITY: ICD-10-CM

## 2018-08-27 DIAGNOSIS — R26.89 ANTALGIC GAIT: ICD-10-CM

## 2018-08-27 DIAGNOSIS — M62.81 MUSCLE WEAKNESS OF LOWER EXTREMITY: ICD-10-CM

## 2018-08-27 PROCEDURE — 97140 MANUAL THERAPY 1/> REGIONS: CPT | Mod: PN

## 2018-08-27 PROCEDURE — 97110 THERAPEUTIC EXERCISES: CPT | Mod: PN

## 2018-08-27 NOTE — PROGRESS NOTES
"  Physical Therapy Daily Treatment Note     [facility]  [facility] Number: [id number]    Therapy Diagnosis:   Encounter Diagnoses   Name Primary?    Achilles tendinitis of right lower extremity     Antalgic gait     Muscle weakness of lower extremity      Physician: Sarath Danielson, *    Visit Date: 8/27/2018  Physician Orders: PT Eval and Treat   Medical Diagnosis: Achilles tendinitis right  Evaluation Date: 6/22/2018  Authorization Period Expiration: 12/31/2018  Plan of Care Certification Period: 8/20/2018 to 10/01/2018  Visit # / Visits authorized: 9/ 50     Time In: 1710  Time Out: 1800  Total Billable Time: 50 minutes  (TE-2, MT-1)   Precautions: Standard       Subjective     Pt reports: no new c/o.  Has some mild pain today.  He was compliant with home exercise program.  Response to previous treatment: no adverse reaction  Functional change: none    Pain: 1/10  Location: right ankles     Objective     Brandan received therapeutic exercises to develop strength, endurance and ROM for 40 minutes with PTA 1:1 including:  See log      8/27/2018 08/20/2018   G CODE 10/10 9/10   FOTO 10/10 9/10 NEXT   Cap Visit  Cap Total 88.10  792.24 60.64  704.14                 Forward lunges At ballet bar; 3x10 B At ballet bar; 3x10 B   Mini-squats  At ballet bar; 3x10 w/heel raises At ballet bar; 3x10 with heel raises   Heel raises leg press 2x20 8.5 plates DL  2x20 5.5 plates SL 2x20 8.5 plates double leg  2x20 6.5 plates single leg   Leg press x15 9 plates DL  2x20 10.5 plates DL 3x15 9 plates double leg  2x10 5 plates double leg   Fitter 5x30" BLE 5x30" BLE   Standing heel raises 2x20 2x20   Forward step-up/down At stairs, single UE support 3x10 B At stairs, single UE support  3x10 B          INITIALS SR 1/6 KATHRYN         Brandan received the following manual therapy techniques: Myofacial release and Soft tissue Mobilization were applied to the: R ankle for 10 minutes, including:  CFR to R Achilles tendon at its " insertion/R gastrocnemius/soleus STM/MFR       Home Exercises Provided and Patient Education Provided     Education provided:   - cont HEP daily    Written Home Exercises Provided: Patient instructed to cont prior HEP.  Exercises were reviewed and Brandan was able to demonstrate them prior to the end of the session.  Brandan demonstrated good  understanding of the education provided.     See EMR under Patient Instructions for exercises provided prior visit.    Assessment     Pt tolerates therapy activities without difficulties or c/o increased pain  Brandan is progressing well towards his goals.   Pt prognosis is Excellent.     Pt will continue to benefit from skilled outpatient physical therapy to address the deficits listed in the problem list box on initial evaluation, provide pt/family education and to maximize pt's level of independence in the home and community environment.     Pt's spiritual, cultural and educational needs considered and pt agreeable to plan of care and goals.    Anticipated barriers to physical therapy: none    Goals:   Short Term Goals: 3-4 weeks:  1.  Patient will demonstrate 3/5 R gastrocnemius strength for improved LE mechanics. MET  2.  Patient will demonstrate normal non-antalgic gait on smooth level surface at normal gait velocity. MET  3.  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  Progressing towards  4.  Patient will demonstrate 15 degrees of R ankle DF PROM with knee flexed. Progressing towards      New Short Term Goals Status:   -  Long Term Goal Status:   continue per initial plan of care.  Reasons for Recertification of Therapy:   Mr. Gipson's job prevents him from being consistent with his PT course.  However, he would continue to benefit from PT as the appointment he has attended have resulted in improvements in his R calf function, strength, and pain.        Plan     Cont POC to progress towards established  goals    Sammi Estrada, PTA

## 2018-08-29 ENCOUNTER — CLINICAL SUPPORT (OUTPATIENT)
Dept: REHABILITATION | Facility: HOSPITAL | Age: 73
End: 2018-08-29
Payer: MEDICARE

## 2018-08-29 DIAGNOSIS — R26.89 ANTALGIC GAIT: ICD-10-CM

## 2018-08-29 DIAGNOSIS — M76.61 ACHILLES TENDINITIS OF RIGHT LOWER EXTREMITY: ICD-10-CM

## 2018-08-29 DIAGNOSIS — M62.81 MUSCLE WEAKNESS OF LOWER EXTREMITY: ICD-10-CM

## 2018-08-29 PROCEDURE — 97140 MANUAL THERAPY 1/> REGIONS: CPT | Mod: PN

## 2018-08-29 PROCEDURE — 97110 THERAPEUTIC EXERCISES: CPT | Mod: PN

## 2018-08-29 NOTE — PROGRESS NOTES
"  Physical Therapy Daily Treatment Note     Name: Brandan Mosesut  Clinic Number: [id number]    Therapy Diagnosis: No diagnosis found.  Physician: Sarath Danielson, *    Visit Date: 8/29/2018  Physician Orders: PT Eval and Treat   Medical Diagnosis: Achilles tendinitis right  Evaluation Date: 6/22/2018  Authorization Period Expiration: 12/31/2018  Plan of Care Certification Period: 8/20/2018 to 10/01/2018  Visit # / Visits authorized: 11/ 50     Time In: 1610  Time Out: 1650  Total Billable Time: 40 minutes (TE-1, MT-1)  Precautions: Standard    Subjective     Pt reports: that he feels he is getting better.  Overall decreased pain  He was compliant with home exercise program.  Response to previous treatment: no adverse reaction  Functional change: none    Pain: 2/10  Location: right ankles and achilles      Objective     Brandan received therapeutic exercises to develop strength and ROM for 10 minutes with PTA 1:1 and 20 min supervision including:  See log   8/29/18 8/27/2018 08/20/2018   G CODE  10/10 9/10   FOTO  10/10 9/10 NEXT   Cap Visit  Cap Total 57.78  850.02 88.10  792.24 60.64  704.14                     Forward lunges  At ballet bar; 3x10 B At ballet bar; 3x10 B   Mini-squats   At ballet bar; 3x10 w/heel raises At ballet bar; 3x10 with heel raises   Heel raises leg press  2x20 8.5 plates DL  2x20 5.5 plates SL 2x20 8.5 plates double leg  2x20 6.5 plates single leg   Leg press  x15 9 plates DL  2x20 10.5 plates DL 3x15 9 plates double leg  2x10 5 plates double leg   Fitter  5x30" BLE 5x30" BLE   Standing heel raises  2x20 2x20   Forward step-up/down  At stairs, single UE support 3x10 B At stairs, single UE support  3x10 B            INITIALS  SR 1/6 KATHRYN         Brandan received the following manual therapy techniques: Myofacial release and Soft tissue Mobilization were applied to the: R achilles for 10 minutes  CFR to R Achilles tendon at its insertion/R gastrocnemius/soleus STM/MFR       Home Exercises " Provided and Patient Education Provided     Education provided:   - cont HEP regularly. Pt needs reinforcement    Written Home Exercises Provided: Patient instructed to cont prior HEP.  Exercises were reviewed and Brandan was able to demonstrate them prior to the end of the session.  Brandan demonstrated good  understanding of the education provided.     See EMR under Patient Instructions for exercises provided prior visit.    Assessment     Tolerates therapy activities without difficulties or c/o increased pain.  Pt relays feeling better when done  Brandan is progressing well towards his goals.   Pt prognosis is Excellent.     Pt will continue to benefit from skilled outpatient physical therapy to address the deficits listed in the problem list box on initial evaluation, provide pt/family education and to maximize pt's level of independence in the home and community environment.     Pt's spiritual, cultural and educational needs considered and pt agreeable to plan of care and goals.    Anticipated barriers to physical therapy: none    Goals:   Short Term Goals: 3-4 weeks:  1.  Patient will demonstrate 3/5 R gastrocnemius strength for improved LE mechanics. MET  2.  Patient will demonstrate normal non-antalgic gait on smooth level surface at normal gait velocity. MET  3.  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  Progressing towards  4.  Patient will demonstrate 15 degrees of R ankle DF PROM with knee flexed. Progressing towards      New Short Term Goals Status:   -  Long Term Goal Status:   continue per initial plan of care.  Reasons for Recertification of Therapy:   Mr. Gipson's job prevents him from being consistent with his PT course.  However, he would continue to benefit from PT as the appointment he has attended have resulted in improvements in his R calf function, strength, and pain.        Plan     Cont POC to progress towards established  goals    Sammi Estrada, PTA

## 2018-09-05 ENCOUNTER — CLINICAL SUPPORT (OUTPATIENT)
Dept: REHABILITATION | Facility: HOSPITAL | Age: 73
End: 2018-09-05
Payer: MEDICARE

## 2018-09-05 DIAGNOSIS — R26.89 ANTALGIC GAIT: ICD-10-CM

## 2018-09-05 DIAGNOSIS — M76.61 ACHILLES TENDINITIS OF RIGHT LOWER EXTREMITY: ICD-10-CM

## 2018-09-05 DIAGNOSIS — M62.81 MUSCLE WEAKNESS OF LOWER EXTREMITY: ICD-10-CM

## 2018-09-05 PROCEDURE — 97140 MANUAL THERAPY 1/> REGIONS: CPT | Mod: PN

## 2018-09-05 PROCEDURE — 97110 THERAPEUTIC EXERCISES: CPT | Mod: PN

## 2018-09-05 NOTE — PROGRESS NOTES
"  Physical Therapy Daily Treatment Note     Name: Brandan Werner  Clinic Number: [id number]    Therapy Diagnosis:   Encounter Diagnoses   Name Primary?    Achilles tendinitis of right lower extremity     Antalgic gait     Muscle weakness of lower extremity      Physician: Sarath Danielson, *    Visit Date: 9/5/2018  Physician Orders: PT Eval and Treat   Medical Diagnosis: Achilles tendinitis right  Evaluation Date: 6/22/2018  Authorization Period Expiration: 12/31/2018  Plan of Care Certification Period: 8/20/2018 to 10/01/2018  Visit # / Visits authorized: 12/ 50     Time In: 1710  Time Out: 1755  Total Billable Time: 45 minutes (TE-2, MT-1)  Precautions: Standard       Subjective     Pt reports: that he is feeling better.  NO pain today  He was compliant with home exercise program.  Response to previous treatment: no adverse reaction  Functional change: none    Pain: 0/10  Location: right ankles     Objective     Brandan received therapeutic exercises to develop strength and flexibility for 35 minutes with PTA 1:1 including:  See log     9/5/18 8/29/18 8/27/2018 08/20/2018   G CODE 12   10/10 9/10   FOTO 12   10/10 9/10 NEXT   Cap Visit  Cap Total 88.10  938.12 57.78  850.02 88.10  792.24 60.64  704.14                         Forward lunges // bars 3x12 B   At ballet bar; 3x10 B At ballet bar; 3x10 B   Mini-squats  // bars 3x12 w/heel raises   At ballet bar; 3x10 w/heel raises At ballet bar; 3x10 with heel raises   Heel raises leg press 2x20 9 plates DL   2x20 8.5 plates DL  2x20 5.5 plates SL 2x20 8.5 plates double leg  2x20 6.5 plates single leg   Leg press 3x15 9 plates DL  2x30 7 plates DL   x15 9 plates DL  2x20 10.5 plates DL 3x15 9 plates double leg  2x10 5 plates double leg   Fitter 5x30" BLE   5x30" BLE 5x30" BLE   Standing heel raises 2x25   2x20 2x20   Forward step-up/down Blue step at //bars 1 UE for support 3x12 B   At stairs, single UE support 3x10 B At stairs, single UE support  3x10 B        "       INITIALS SR 3/6   SR 1/6 KATHRYN Gipson received the following manual therapy techniques: Myofacial release and Soft tissue Mobilization were applied to the: R achilles for 10 minutes, including:  CFR to R Achilles tendon at its insertion/R gastrocnemius/soleus STM/MFR         Home Exercises Provided and Patient Education Provided     Education provided:   - cont HEP daily    Written Home Exercises Provided: Patient instructed to cont prior HEP.  Exercises were reviewed and Brandan was able to demonstrate them prior to the end of the session.  Brandan demonstrated good  understanding of the education provided.     See EMR under Patient Instructions for exercises provided prior visit.    Assessment     Pt is tolerating all therapy activities without difficulties or c/o increased pain  Brandan is progressing well towards his goals.   Pt prognosis is Excellent.     Pt will continue to benefit from skilled outpatient physical therapy to address the deficits listed in the problem list box on initial evaluation, provide pt/family education and to maximize pt's level of independence in the home and community environment.     Pt's spiritual, cultural and educational needs considered and pt agreeable to plan of care and goals.    Anticipated barriers to physical therapy: none    Goals:   Short Term Goals: 3-4 weeks:  1.  Patient will demonstrate 3/5 R gastrocnemius strength for improved LE mechanics. MET  2.  Patient will demonstrate normal non-antalgic gait on smooth level surface at normal gait velocity. MET  3.  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  Progressing towards  4.  Patient will demonstrate 15 degrees of R ankle DF PROM with knee flexed. Progressing towards      New Short Term Goals Status:   -  Long Term Goal Status:   continue per initial plan of care.  Reasons for Recertification of Therapy:   Mr. Gipson's job prevents him from  being consistent with his PT course.  However, he would continue to benefit from PT as the appointment he has attended have resulted in improvements in his R calf function, strength, and pain.     Plan     Cont POC to progress towards established goals    Sammi Estrada, PTA

## 2018-09-07 ENCOUNTER — TELEPHONE (OUTPATIENT)
Dept: UROLOGY | Facility: CLINIC | Age: 73
End: 2018-09-07

## 2018-09-07 ENCOUNTER — LAB VISIT (OUTPATIENT)
Dept: LAB | Facility: HOSPITAL | Age: 73
End: 2018-09-07
Attending: UROLOGY
Payer: MEDICARE

## 2018-09-07 ENCOUNTER — OFFICE VISIT (OUTPATIENT)
Dept: UROLOGY | Facility: CLINIC | Age: 73
End: 2018-09-07
Payer: MEDICARE

## 2018-09-07 ENCOUNTER — DOCUMENTATION ONLY (OUTPATIENT)
Dept: UROLOGY | Facility: CLINIC | Age: 73
End: 2018-09-07

## 2018-09-07 VITALS
HEART RATE: 89 BPM | DIASTOLIC BLOOD PRESSURE: 76 MMHG | SYSTOLIC BLOOD PRESSURE: 123 MMHG | BODY MASS INDEX: 34.61 KG/M2 | TEMPERATURE: 98 F | WEIGHT: 255.5 LBS | HEIGHT: 72 IN

## 2018-09-07 DIAGNOSIS — N52.1 ERECTILE DYSFUNCTION DUE TO DISEASES CLASSIFIED ELSEWHERE: ICD-10-CM

## 2018-09-07 DIAGNOSIS — R97.20 ELEVATED PSA: Primary | ICD-10-CM

## 2018-09-07 DIAGNOSIS — N13.8 BPH WITH OBSTRUCTION/LOWER URINARY TRACT SYMPTOMS: ICD-10-CM

## 2018-09-07 DIAGNOSIS — R97.20 ELEVATED PSA: ICD-10-CM

## 2018-09-07 DIAGNOSIS — N40.1 BPH WITH OBSTRUCTION/LOWER URINARY TRACT SYMPTOMS: ICD-10-CM

## 2018-09-07 LAB
BILIRUB SERPL-MCNC: NORMAL MG/DL
BLOOD URINE, POC: NORMAL
COLOR, POC UA: YELLOW
COMPLEXED PSA SERPL-MCNC: 6.6 NG/ML
GLUCOSE UR QL STRIP: NORMAL
KETONES UR QL STRIP: NORMAL
LEUKOCYTE ESTERASE URINE, POC: NORMAL
NITRITE, POC UA: NORMAL
PH, POC UA: 5
PROTEIN, POC: NORMAL
SPECIFIC GRAVITY, POC UA: 1.01
UROBILINOGEN, POC UA: NORMAL

## 2018-09-07 PROCEDURE — 99999 PR PBB SHADOW E&M-EST. PATIENT-LVL III: CPT | Mod: PBBFAC,,, | Performed by: UROLOGY

## 2018-09-07 PROCEDURE — 3078F DIAST BP <80 MM HG: CPT | Mod: CPTII,,, | Performed by: UROLOGY

## 2018-09-07 PROCEDURE — 99214 OFFICE O/P EST MOD 30 MIN: CPT | Mod: S$PBB,,, | Performed by: UROLOGY

## 2018-09-07 PROCEDURE — 1101F PT FALLS ASSESS-DOCD LE1/YR: CPT | Mod: CPTII,,, | Performed by: UROLOGY

## 2018-09-07 PROCEDURE — 99213 OFFICE O/P EST LOW 20 MIN: CPT | Mod: PBBFAC,PO | Performed by: UROLOGY

## 2018-09-07 PROCEDURE — 81002 URINALYSIS NONAUTO W/O SCOPE: CPT | Mod: PBBFAC,PO | Performed by: UROLOGY

## 2018-09-07 PROCEDURE — 84153 ASSAY OF PSA TOTAL: CPT

## 2018-09-07 PROCEDURE — 3074F SYST BP LT 130 MM HG: CPT | Mod: CPTII,,, | Performed by: UROLOGY

## 2018-09-07 PROCEDURE — 36415 COLL VENOUS BLD VENIPUNCTURE: CPT

## 2018-09-07 NOTE — PROGRESS NOTES
This patient was last seen by me  June of this year in follow-up for elevated PSA.  He has not yet had his repeat PSA drawn     He has a prior history of ASAP on prostate biopsy 2013 with repeat biopsy in 2014 being negative    He also has a 3 piece inflatable device for erectile dysfunction    Patient currently on tamsulosin only for BPH.  He previously had stopped finasteride due to gynecomastia.  [facility]    He has no case strength stream with nocturia x3 and no straining to void.  He has no dysuria    Physical exam reveals reveals a well-developed well-nourished patient  in no acute distress.  Patient is alert and oriented ×3 with normal mood and affect.  Respiratory effort is normal and there is no peripheral edema.  Skin is normal to inspection and palpation. Penis/perineum without lesions, scrotum without rash/cysts, epididymis nontender bilaterally, urethral meatus in normal location normal size, no penile plaques palpated, prostate:      Smooth enlarged and benign-feeling                  seminal vesicles not palpated.  No rectal masses, sphincter tone normal.  Testes equal in size without masses.  3 piece inflatable device in place without erosion    /76 (BP Location: Left arm, Patient Position: Sitting, BP Method: Large (Automatic))   Pulse 89   Temp 97.6 °F (36.4 °C) (Oral)   Ht 6' (1.829 m)   Wt 115.9 kg (255 lb 8.2 oz)   BMI 34.65 kg/m²   Review of Systems  General ROS: negative for chills, fever or weight loss  Respiratory ROS: no cough, shortness of breath, or wheezing  Cardiovascular ROS: no chest pain or dyspnea on exertion  Musculoskeletal ROS: negative for gait disturbance or muscular weakness    Family History   Problem Relation Age of Onset    Cancer Mother         cancer    Cataracts Mother     Heart disease Father 62        M.I.    Stroke Brother 62    Amblyopia Neg Hx     Blindness Neg Hx     Glaucoma Neg Hx     Macular degeneration  Neg Hx     Retinal detachment Neg Hx     Strabismus Neg Hx     Prostate cancer Neg Hx     Kidney disease Neg Hx      Past Medical History:   Diagnosis Date    BPH (benign prostatic hyperplasia)     Cataract     Elevated PSA     Erectile dysfunction     Hyperlipidemia     Hypertension     Hypogonadism male     Obesity     Urinary tract infection      Family History   Problem Relation Age of Onset    Cancer Mother         cancer    Cataracts Mother     Heart disease Father 62        M.I.    Stroke Brother 62    Amblyopia Neg Hx     Blindness Neg Hx     Glaucoma Neg Hx     Macular degeneration Neg Hx     Retinal detachment Neg Hx     Strabismus Neg Hx     Prostate cancer Neg Hx     Kidney disease Neg Hx      Social History     Tobacco Use    Smoking status: Former Smoker     Packs/day: 1.00     Years: 10.00     Pack years: 10.00    Smokeless tobacco: Never Used   Substance Use Topics    Alcohol use: No       Impression:      ICD-10-CM ICD-9-CM    1. Elevated PSA R97.20 790.93 POCT URINE DIPSTICK WITHOUT MICROSCOPE      Prostate Specific Antigen, Diagnostic   2. BPH with obstruction/lower urinary tract symptoms N40.1 600.01     N13.8 599.69    3. Erectile dysfunction due to diseases classified elsewhere N52.1 607.84        Plan:    #1. Elevated PSA.  Plan.  Will check PSA today and contact patient with any significant finding.  Otherwise follow-up 6 months with recheck    2. BPH.  Plan.  Continue Flomax    3. Erectile dysfunction.  Plan.  Patient has 3 piece device in place    PLEASE NOTE:  Please be advised that portions of this note were dictated using voice recognition software and may contain dictation related errors in spelling/grammar/appropriate pronouns/syntax or other errors that might have not been found and or corrected on text review.

## 2018-09-12 ENCOUNTER — CLINICAL SUPPORT (OUTPATIENT)
Dept: REHABILITATION | Facility: HOSPITAL | Age: 73
End: 2018-09-12
Payer: MEDICARE

## 2018-09-12 DIAGNOSIS — M62.81 MUSCLE WEAKNESS OF LOWER EXTREMITY: ICD-10-CM

## 2018-09-12 DIAGNOSIS — M76.61 ACHILLES TENDINITIS OF RIGHT LOWER EXTREMITY: ICD-10-CM

## 2018-09-12 DIAGNOSIS — R26.89 ANTALGIC GAIT: ICD-10-CM

## 2018-09-12 PROCEDURE — 97140 MANUAL THERAPY 1/> REGIONS: CPT | Mod: PN

## 2018-09-12 NOTE — PROGRESS NOTES
"  Physical Therapy Daily Treatment Note     Name: Brandan Werner  Clinic Number: [id number]    Therapy Diagnosis: No diagnosis found.  Physician: Sarath Danielson, *    Visit Date: 9/12/2018    Physician Orders: PT Eval and Treat   Medical Diagnosis: Achilles tendinitis right  Evaluation Date: 6/22/2018  Authorization Period Expiration: 12/31/2018  Plan of Care Certification Period: 8/20/2018 to 10/01/2018  Visit # / Visits authorized: 13/ 50     Time In: 1700  Time Out: 1745  Total Billable Time: 15 minutes (MT-1)  Precautions: Standard    Subjective      Pt reports: no pain today.  He was compliant with home exercise program.  Response to previous treatment: no adverse reaction  Functional change: none    Pain: 0/10  Location: right ankles     Objective     Brandan received therapeutic exercises to develop strength and flexibility for 30 minutes with supervision including:  See log     9/12/18 9/5/18 8/29/18 8/27/2018   G CODE 13 12   10/10   FOTO 13 12   10/10   Cap Visit  Cap Total 27.46  965.58 88.10  938.12 57.78  850.02 88.10  792.24                         Forward lunges // 3x15 B // bars 3x12 B   At ballet bar; 3x10 B   Mini-squats  // 3x15 w/heel raises // bars 3x12 w/heel raises   At ballet bar; 3x10 w/heel raises   Heel raises leg press 2x30 DL 9 plates 2x20 9 plates DL   2x20 8.5 plates DL  2x20 5.5 plates SL   Leg press 3x20 9 plates  2x30 7 plates 3x15 9 plates DL  2x30 7 plates DL   x15 9 plates DL  2x20 10.5 plates DL   Fitter 5x30" BLE 5x30" BLE   5x30" BLE   Standing heel raises 2x30 2x25   2x20   Forward step-up/down Blue step // 1 UE for support 3x15 B Blue step at //bars 1 UE for support 3x12 B   At stairs, single UE support 3x10 B              INITIALSheel SR 4/6 SR 3/6   SR 1/6          Brandan received the following manual therapy techniques: Myofacial release and Soft tissue Mobilization were applied to the: R heel/achilles for 15 minutes, including:  CFR to R Achilles tendon at its " insertion/R gastrocnemius/soleus STM/MFR     Home Exercises Provided and Patient Education Provided     Education provided:   - cont HEP and need to  Make next scheduled appt    Written Home Exercises Provided: Patient instructed to cont prior HEP.  Exercises were reviewed and Brandan was able to demonstrate them prior to the end of the session.  Brandan demonstrated good  understanding of the education provided.     See EMR under Patient Instructions for exercises provided prior visit.    Assessment     Pt tolerates therapy without any pain.  Brandan is progressing well towards his goals.   Pt prognosis is Excellent.     Pt will continue to benefit from skilled outpatient physical therapy to address the deficits listed in the problem list box on initial evaluation, provide pt/family education and to maximize pt's level of independence in the home and community environment.     Pt's spiritual, cultural and educational needs considered and pt agreeable to plan of care and goals.    Anticipated barriers to physical therapy: none    Goals:   Short Term Goals: 3-4 weeks:  1.  Patient will demonstrate 3/5 R gastrocnemius strength for improved LE mechanics. MET  2.  Patient will demonstrate normal non-antalgic gait on smooth level surface at normal gait velocity. MET  3.  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  Progressing towards  4.  Patient will demonstrate 15 degrees of R ankle DF PROM with knee flexed. Progressing towards      New Short Term Goals Status:   -  Long Term Goal Status:   continue per initial plan of care.  Reasons for Recertification of Therapy:   Mr. Gipson's job prevents him from being consistent with his PT course.  However, he would continue to benefit from PT as the appointment he has attended have resulted in improvements in his R calf function, strength, and pain.     Plan     Cont POC to progress towards established goals, f/u  with PT next visit    Sammi Estrada, PTA

## 2018-10-15 ENCOUNTER — DOCUMENTATION ONLY (OUTPATIENT)
Dept: REHABILITATION | Facility: HOSPITAL | Age: 73
End: 2018-10-15

## 2018-10-15 PROBLEM — M76.61 ACHILLES TENDINITIS OF RIGHT LOWER EXTREMITY: Status: RESOLVED | Noted: 2018-06-22 | Resolved: 2018-10-15

## 2018-10-15 PROBLEM — M62.81 MUSCLE WEAKNESS OF LOWER EXTREMITY: Status: RESOLVED | Noted: 2018-06-22 | Resolved: 2018-10-15

## 2018-10-15 PROBLEM — R26.89 ANTALGIC GAIT: Status: RESOLVED | Noted: 2018-06-22 | Resolved: 2018-10-15

## 2018-10-15 NOTE — PROGRESS NOTES
Mr. Gipson participated in 14 OPPT visits for a gastrocnemius strain grade II.  Made steady gain toward improved gastroc/soleus strength, hip strength, and ankle DF ROM.  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  Discharged due to 3 cancellations/no-shows met.  Met STGs.  Issued HEP prior to DC.  Discharge from PT at this time.      Khoi Vilchis, JERRELLT

## 2018-10-23 ENCOUNTER — LAB VISIT (OUTPATIENT)
Dept: LAB | Facility: HOSPITAL | Age: 73
End: 2018-10-23
Attending: INTERNAL MEDICINE
Payer: MEDICARE

## 2018-10-23 ENCOUNTER — OFFICE VISIT (OUTPATIENT)
Dept: INTERNAL MEDICINE | Facility: CLINIC | Age: 73
End: 2018-10-23
Payer: MEDICARE

## 2018-10-23 VITALS
TEMPERATURE: 99 F | HEIGHT: 72 IN | DIASTOLIC BLOOD PRESSURE: 87 MMHG | HEART RATE: 64 BPM | SYSTOLIC BLOOD PRESSURE: 132 MMHG | WEIGHT: 254.88 LBS | BODY MASS INDEX: 34.52 KG/M2

## 2018-10-23 DIAGNOSIS — Z00.00 WELL ADULT EXAM: ICD-10-CM

## 2018-10-23 DIAGNOSIS — Z11.3 SCREEN FOR STD (SEXUALLY TRANSMITTED DISEASE): ICD-10-CM

## 2018-10-23 DIAGNOSIS — R97.20 ELEVATED PSA: ICD-10-CM

## 2018-10-23 DIAGNOSIS — Z12.11 ENCOUNTER FOR SCREENING COLONOSCOPY: ICD-10-CM

## 2018-10-23 DIAGNOSIS — E78.5 HYPERLIPIDEMIA, UNSPECIFIED HYPERLIPIDEMIA TYPE: ICD-10-CM

## 2018-10-23 DIAGNOSIS — Z11.4 ENCOUNTER FOR SCREENING FOR HIV: ICD-10-CM

## 2018-10-23 DIAGNOSIS — I10 ESSENTIAL HYPERTENSION: Primary | Chronic | ICD-10-CM

## 2018-10-23 DIAGNOSIS — N40.0 BENIGN PROSTATIC HYPERPLASIA, UNSPECIFIED WHETHER LOWER URINARY TRACT SYMPTOMS PRESENT: ICD-10-CM

## 2018-10-23 DIAGNOSIS — H90.3 SENSORINEURAL HEARING LOSS (SNHL) OF BOTH EARS: ICD-10-CM

## 2018-10-23 DIAGNOSIS — N51 DISORDERS OF MALE GENITAL ORGANS IN DISEASES CLASSIFIED ELSEWHERE: ICD-10-CM

## 2018-10-23 DIAGNOSIS — I10 ESSENTIAL HYPERTENSION: Chronic | ICD-10-CM

## 2018-10-23 DIAGNOSIS — I77.819 ECTATIC AORTA: ICD-10-CM

## 2018-10-23 DIAGNOSIS — F43.10 PTSD (POST-TRAUMATIC STRESS DISORDER): ICD-10-CM

## 2018-10-23 DIAGNOSIS — M19.019 SHOULDER ARTHRITIS: ICD-10-CM

## 2018-10-23 LAB
ALBUMIN SERPL BCP-MCNC: 3.6 G/DL
ALP SERPL-CCNC: 50 U/L
ALT SERPL W/O P-5'-P-CCNC: 16 U/L
ANION GAP SERPL CALC-SCNC: 5 MMOL/L
AST SERPL-CCNC: 17 U/L
BASOPHILS # BLD AUTO: 0.05 K/UL
BASOPHILS NFR BLD: 0.6 %
BILIRUB SERPL-MCNC: 0.4 MG/DL
BUN SERPL-MCNC: 17 MG/DL
CALCIUM SERPL-MCNC: 9.1 MG/DL
CHLORIDE SERPL-SCNC: 108 MMOL/L
CHOLEST SERPL-MCNC: 129 MG/DL
CHOLEST/HDLC SERPL: 2.4 {RATIO}
CO2 SERPL-SCNC: 28 MMOL/L
CREAT SERPL-MCNC: 0.8 MG/DL
DIFFERENTIAL METHOD: ABNORMAL
EOSINOPHIL # BLD AUTO: 0.2 K/UL
EOSINOPHIL NFR BLD: 1.9 %
ERYTHROCYTE [DISTWIDTH] IN BLOOD BY AUTOMATED COUNT: 13.5 %
EST. GFR  (AFRICAN AMERICAN): >60 ML/MIN/1.73 M^2
EST. GFR  (NON AFRICAN AMERICAN): >60 ML/MIN/1.73 M^2
GLUCOSE SERPL-MCNC: 82 MG/DL
HCT VFR BLD AUTO: 43 %
HDLC SERPL-MCNC: 53 MG/DL
HDLC SERPL: 41.1 %
HGB BLD-MCNC: 13.8 G/DL
HIV 1+2 AB+HIV1 P24 AG SERPL QL IA: NEGATIVE
IMM GRANULOCYTES # BLD AUTO: 0.04 K/UL
IMM GRANULOCYTES NFR BLD AUTO: 0.5 %
LDLC SERPL CALC-MCNC: 68 MG/DL
LYMPHOCYTES # BLD AUTO: 2.5 K/UL
LYMPHOCYTES NFR BLD: 27.9 %
MCH RBC QN AUTO: 31.4 PG
MCHC RBC AUTO-ENTMCNC: 32.1 G/DL
MCV RBC AUTO: 98 FL
MONOCYTES # BLD AUTO: 0.4 K/UL
MONOCYTES NFR BLD: 4.9 %
NEUTROPHILS # BLD AUTO: 5.7 K/UL
NEUTROPHILS NFR BLD: 64.2 %
NONHDLC SERPL-MCNC: 76 MG/DL
NRBC BLD-RTO: 0 /100 WBC
PLATELET # BLD AUTO: 254 K/UL
PMV BLD AUTO: 10.4 FL
POTASSIUM SERPL-SCNC: 4.3 MMOL/L
PROT SERPL-MCNC: 6.6 G/DL
RBC # BLD AUTO: 4.39 M/UL
SODIUM SERPL-SCNC: 141 MMOL/L
TRIGL SERPL-MCNC: 40 MG/DL
TSH SERPL DL<=0.005 MIU/L-ACNC: 1.16 UIU/ML
WBC # BLD AUTO: 8.82 K/UL

## 2018-10-23 PROCEDURE — 86592 SYPHILIS TEST NON-TREP QUAL: CPT

## 2018-10-23 PROCEDURE — 80053 COMPREHEN METABOLIC PANEL: CPT

## 2018-10-23 PROCEDURE — 90662 IIV NO PRSV INCREASED AG IM: CPT | Mod: HCNC,S$GLB,, | Performed by: INTERNAL MEDICINE

## 2018-10-23 PROCEDURE — G0009 ADMIN PNEUMOCOCCAL VACCINE: HCPCS | Mod: HCNC,S$GLB,, | Performed by: INTERNAL MEDICINE

## 2018-10-23 PROCEDURE — 90670 PCV13 VACCINE IM: CPT | Mod: HCNC,S$GLB,, | Performed by: INTERNAL MEDICINE

## 2018-10-23 PROCEDURE — 3079F DIAST BP 80-89 MM HG: CPT | Mod: CPTII,HCNC,S$GLB, | Performed by: INTERNAL MEDICINE

## 2018-10-23 PROCEDURE — 84443 ASSAY THYROID STIM HORMONE: CPT

## 2018-10-23 PROCEDURE — 99499 UNLISTED E&M SERVICE: CPT | Mod: S$GLB,,, | Performed by: INTERNAL MEDICINE

## 2018-10-23 PROCEDURE — 99999 PR PBB SHADOW E&M-EST. PATIENT-LVL IV: CPT | Mod: PBBFAC,HCNC,, | Performed by: INTERNAL MEDICINE

## 2018-10-23 PROCEDURE — 3075F SYST BP GE 130 - 139MM HG: CPT | Mod: CPTII,HCNC,S$GLB, | Performed by: INTERNAL MEDICINE

## 2018-10-23 PROCEDURE — 80061 LIPID PANEL: CPT

## 2018-10-23 PROCEDURE — 99397 PER PM REEVAL EST PAT 65+ YR: CPT | Mod: HCNC,25,S$GLB, | Performed by: INTERNAL MEDICINE

## 2018-10-23 PROCEDURE — G0008 ADMIN INFLUENZA VIRUS VAC: HCPCS | Mod: HCNC,S$GLB,, | Performed by: INTERNAL MEDICINE

## 2018-10-23 PROCEDURE — 86703 HIV-1/HIV-2 1 RESULT ANTBDY: CPT

## 2018-10-23 PROCEDURE — 85025 COMPLETE CBC W/AUTO DIFF WBC: CPT

## 2018-10-23 PROCEDURE — 36415 COLL VENOUS BLD VENIPUNCTURE: CPT | Mod: PO

## 2018-10-23 NOTE — PROGRESS NOTES
Subjective:       Patient ID: Brandan Werner is a 73 y.o. male.    Chief Complaint: Annual Exam    HPI     The patient presents for annual physical examination.  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  The patient still notes chronic right shoulder pain.  He has had physical therapy and acupuncture but has not obtained any lasting relief.  He was treated by a chiropractor 1 year ago.   He still notes pain at rest and with mobility. He is still trying to work.  He is unable to lie on his right side.    The patient also has a history of posttraumatic stress disorder.  [facility] is [facility] the [facility].  He does experience sleep disturbances which include waking up with cold sweats at night.    He did note breast enlargement with use of medication for treatment of his BPH.  He is followed by Urology. He does report nocturia x4 daily.    Review of Systems   Constitutional: Negative for activity change, appetite change, chills, fatigue, fever and unexpected weight change.   HENT: Positive for hearing loss. Negative for congestion, ear pain, nosebleeds and postnasal drip.    Eyes: Negative for pain, redness, itching and visual disturbance.   Respiratory: Negative for cough, chest tightness, shortness of breath and wheezing.    Cardiovascular: Negative for chest pain, palpitations and leg swelling.   Gastrointestinal: Negative for abdominal pain, blood in stool, constipation, nausea and vomiting.   Genitourinary: Positive for frequency and urgency. Negative for difficulty urinating, dysuria and hematuria.   Musculoskeletal: Positive for arthralgias. Negative for back pain, gait problem, joint swelling, myalgias, neck pain and neck stiffness.   Skin: Negative for color change and rash.   Neurological: Negative for dizziness, seizures, syncope, weakness, light-headedness,  numbness and headaches.   Hematological: Does not bruise/bleed easily.   Psychiatric/Behavioral: Positive for sleep disturbance. Negative for agitation, confusion and hallucinations. The patient is nervous/anxious.        Objective:      Physical Exam   Constitutional: He is oriented to person, place, and time. He appears well-developed and well-nourished. No distress.   HENT:   Head: Normocephalic and atraumatic.   Right Ear: External ear normal.   Left Ear: External ear normal.   Mouth/Throat: Oropharynx is clear and moist. No oropharyngeal exudate.   Eyes: Conjunctivae and EOM are normal. Pupils are equal, round, and reactive to light. No scleral icterus.   Neck: Normal range of motion. Neck supple. No JVD present. No thyromegaly present.   Cardiovascular: Normal rate, regular rhythm, normal heart sounds and intact distal pulses. Exam reveals no gallop and no friction rub.   No murmur heard.  Pulmonary/Chest: Effort normal and breath sounds normal. No respiratory distress. He has no wheezes. He has no rales.   Abdominal: Soft. Bowel sounds are normal. He exhibits no distension and no mass. There is no tenderness. There is no guarding.   Musculoskeletal: He exhibits no edema.   Right shoulder exhibits decreased abduction and posterior rotation.  There is tenderness on range of motion testing.  Left shoulder range of motion is intact.      Left Achilles tendon is tender on palpation.  Ankle and foot range of motion is intact.   Lymphadenopathy:     He has no cervical adenopathy.   Neurological: He is alert and oriented to person, place, and time. He has normal reflexes. No cranial nerve deficit. He exhibits normal muscle tone.   Skin: Skin is warm and dry. No rash noted.   Psychiatric: He has a normal mood and affect. His behavior is normal. Thought content normal.   Nursing note and vitals reviewed.      Assessment:       1. Essential hypertension    2. Hyperlipidemia, unspecified hyperlipidemia type    3. Benign  prostatic hyperplasia, unspecified whether lower urinary tract symptoms present    4. Elevated PSA    5. Shoulder arthritis    6. Ectatic aorta    7. PTSD (post-traumatic stress disorder)    8. Sensorineural hearing loss (SNHL) of both ears    9. Screen for STD (sexually transmitted disease)    10. Well adult exam    11. Disorders of male genital organs in diseases classified elsewhere     12. Encounter for screening for HIV     13. Encounter for screening colonoscopy        Plan:     Brandan was seen today for annual exam.  Blood tests will be obtained today including testing for STD as requested by the patient.  Prevnar -13 vaccine will be administered today.  A screening colonoscopy will be ordered.    Diagnoses and all orders for this visit:    Essential hypertension  -     URINALYSIS; Future  -     C. trachomatis/N. gonorrhoeae by AMP DNA; Future  -     HIV 1 / 2 ANTIBODY; Future  -     CBC auto differential; Future  -     Comprehensive metabolic panel; Future  -     Lipid panel; Future  -     TSH; Future  -     RPR; Future    Hyperlipidemia, unspecified hyperlipidemia type  -     URINALYSIS; Future  -     C. trachomatis/N. gonorrhoeae by AMP DNA; Future  -     HIV 1 / 2 ANTIBODY; Future  -     CBC auto differential; Future  -     Comprehensive metabolic panel; Future  -     Lipid panel; Future  -     TSH; Future  -     RPR; Future    Benign prostatic hyperplasia, unspecified whether lower urinary tract symptoms present    Elevated PSA    Shoulder arthritis    Ectatic aorta    PTSD (post-traumatic stress disorder)    Sensorineural hearing loss (SNHL) of both ears    Screen for STD (sexually transmitted disease)  -     C. trachomatis/N. gonorrhoeae by AMP DNA; Future  -     HIV 1 / 2 ANTIBODY; Future  -     RPR; Future    Well adult exam  -     URINALYSIS; Future  -     C. trachomatis/N. gonorrhoeae by AMP DNA; Future  -     HIV 1 / 2 ANTIBODY; Future  -     CBC auto differential; Future  -     Comprehensive  metabolic panel; Future  -     Lipid panel; Future  -     TSH; Future  -     RPR; Future    Disorders of male genital organs in diseases classified elsewhere   -     C. trachomatis/N. gonorrhoeae by AMP DNA; Future    Encounter for screening for HIV   -     HIV 1 / 2 ANTIBODY; Future    Encounter for screening colonoscopy  -     Case request GI: COLONOSCOPY    Other orders  -     Influenza - High Dose (65+) (PF) (IM)  -     (In Office Administered) Pneumococcal Conjugate Vaccine (13 Valent) (IM)

## 2018-10-24 LAB — RPR SER QL: NORMAL

## 2018-10-30 ENCOUNTER — TELEPHONE (OUTPATIENT)
Dept: ENDOSCOPY | Facility: HOSPITAL | Age: 73
End: 2018-10-30

## 2018-10-30 DIAGNOSIS — Z12.11 SPECIAL SCREENING FOR MALIGNANT NEOPLASMS, COLON: Primary | ICD-10-CM

## 2018-10-30 RX ORDER — POLYETHYLENE GLYCOL 3350, SODIUM SULFATE ANHYDROUS, SODIUM BICARBONATE, SODIUM CHLORIDE, POTASSIUM CHLORIDE 236; 22.74; 6.74; 5.86; 2.97 G/4L; G/4L; G/4L; G/4L; G/4L
POWDER, FOR SOLUTION ORAL
Qty: 4000 ML | Refills: 0 | Status: SHIPPED | OUTPATIENT
Start: 2018-10-30 | End: 2019-04-30 | Stop reason: ALTCHOICE

## 2018-10-30 NOTE — TELEPHONE ENCOUNTER
Contacted Pt to schedule colonoscopy, no answer, left message for Pt to call back to schedule, number provided [phone].

## 2018-11-24 ENCOUNTER — NURSE TRIAGE (OUTPATIENT)
Dept: ADMINISTRATIVE | Facility: CLINIC | Age: 73
End: 2018-11-24

## 2018-11-24 NOTE — TELEPHONE ENCOUNTER
Pt wife has questions for nurse(pt doesn't have prescription for medication).   Per message 10/30 polyethylene glycol (GOLYTELY,NULYTELY) [phone] [phone] gram suspension sent to [facility] in [place]. Spouse notified. Prep instructions offered. Call back with questions.     Reason for Disposition   Health Information question, no triage required and triager able to answer question    Protocols used: ST INFORMATION ONLY CALL-A-AH

## 2018-11-26 ENCOUNTER — HOSPITAL ENCOUNTER (OUTPATIENT)
Facility: HOSPITAL | Age: 73
Discharge: HOME | End: 2018-11-26
Attending: COLON & RECTAL SURGERY | Admitting: COLON & RECTAL SURGERY
Payer: MEDICARE

## 2018-11-26 ENCOUNTER — ANESTHESIA (OUTPATIENT)
Dept: ENDOSCOPY | Facility: HOSPITAL | Age: 73
End: 2018-11-26
Payer: MEDICARE

## 2018-11-26 ENCOUNTER — ANESTHESIA EVENT (OUTPATIENT)
Dept: ENDOSCOPY | Facility: HOSPITAL | Age: 73
End: 2018-11-26
Payer: MEDICARE

## 2018-11-26 VITALS
TEMPERATURE: 99 F | DIASTOLIC BLOOD PRESSURE: 76 MMHG | OXYGEN SATURATION: 98 % | RESPIRATION RATE: 12 BRPM | WEIGHT: 265 LBS | SYSTOLIC BLOOD PRESSURE: 127 MMHG | HEIGHT: 72 IN | HEART RATE: 82 BPM | BODY MASS INDEX: 35.89 KG/M2

## 2018-11-26 DIAGNOSIS — Z12.11 SCREENING FOR COLON CANCER: ICD-10-CM

## 2018-11-26 PROCEDURE — 45385 COLONOSCOPY W/LESION REMOVAL: CPT | Mod: PT,HCNC,, | Performed by: COLON & RECTAL SURGERY

## 2018-11-26 PROCEDURE — 45385 COLONOSCOPY W/LESION REMOVAL: CPT | Mod: HCNC | Performed by: COLON & RECTAL SURGERY

## 2018-11-26 PROCEDURE — 37000009 HC ANESTHESIA EA ADD 15 MINS: Mod: HCNC | Performed by: COLON & RECTAL SURGERY

## 2018-11-26 PROCEDURE — 63600175 PHARM REV CODE 636 W HCPCS: Mod: HCNC | Performed by: NURSE ANESTHETIST, CERTIFIED REGISTERED

## 2018-11-26 PROCEDURE — 27201089 HC SNARE, DISP (ANY): Mod: HCNC | Performed by: COLON & RECTAL SURGERY

## 2018-11-26 PROCEDURE — 88305 TISSUE EXAM BY PATHOLOGIST: CPT | Mod: 26,HCNC,, | Performed by: PATHOLOGY

## 2018-11-26 PROCEDURE — E9220 PRA ENDO ANESTHESIA: HCPCS | Mod: HCNC,PT,, | Performed by: NURSE ANESTHETIST, CERTIFIED REGISTERED

## 2018-11-26 PROCEDURE — 88305 TISSUE EXAM BY PATHOLOGIST: CPT | Mod: HCNC | Performed by: PATHOLOGY

## 2018-11-26 PROCEDURE — 37000008 HC ANESTHESIA 1ST 15 MINUTES: Mod: HCNC | Performed by: COLON & RECTAL SURGERY

## 2018-11-26 PROCEDURE — 25000003 PHARM REV CODE 250: Mod: HCNC | Performed by: NURSE PRACTITIONER

## 2018-11-26 RX ORDER — LIDOCAINE HCL/PF 100 MG/5ML
SYRINGE (ML) INTRAVENOUS
Status: DISCONTINUED | OUTPATIENT
Start: 2018-11-26 | End: 2018-11-26

## 2018-11-26 RX ORDER — PROPOFOL 10 MG/ML
VIAL (ML) INTRAVENOUS
Status: DISCONTINUED | OUTPATIENT
Start: 2018-11-26 | End: 2018-11-26

## 2018-11-26 RX ORDER — SODIUM CHLORIDE 9 MG/ML
INJECTION, SOLUTION INTRAVENOUS CONTINUOUS
Status: DISCONTINUED | OUTPATIENT
Start: 2018-11-26 | End: 2018-11-26 | Stop reason: HOSPADM

## 2018-11-26 RX ORDER — SODIUM CHLORIDE 0.9 % (FLUSH) 0.9 %
3 SYRINGE (ML) INJECTION
Status: DISCONTINUED | OUTPATIENT
Start: 2018-11-26 | End: 2018-11-26 | Stop reason: HOSPADM

## 2018-11-26 RX ORDER — PROPOFOL 10 MG/ML
VIAL (ML) INTRAVENOUS CONTINUOUS PRN
Status: DISCONTINUED | OUTPATIENT
Start: 2018-11-26 | End: 2018-11-26

## 2018-11-26 RX ADMIN — LIDOCAINE HYDROCHLORIDE 100 MG: 20 INJECTION, SOLUTION INTRAVENOUS at 10:11

## 2018-11-26 RX ADMIN — PROPOFOL 70 MG: 10 INJECTION, EMULSION INTRAVENOUS at 10:11

## 2018-11-26 RX ADMIN — SODIUM CHLORIDE: 0.9 INJECTION, SOLUTION INTRAVENOUS at 10:11

## 2018-11-26 RX ADMIN — PROPOFOL 175 MCG/KG/MIN: 10 INJECTION, EMULSION INTRAVENOUS at 10:11

## 2018-11-26 NOTE — TRANSFER OF CARE
Anesthesia Transfer of Care Note    Patient: Brandan Werner    Procedure(s) Performed: Procedure(s) (LRB):  COLONOSCOPY (N/A)    Patient location: PACU    Anesthesia Type: general    Transport from OR: Transported from OR on room air with adequate spontaneous ventilation    Post pain: adequate analgesia    Post assessment: no apparent anesthetic complications and tolerated procedure well    Post vital signs: stable    Level of consciousness: awake, alert and oriented    Nausea/Vomiting: no nausea/vomiting    Complications: none    Transfer of care protocol was followed      Last vitals:   Visit Vitals  /85   Pulse 88   Temp 36.7 °C (98.1 °F)   Resp 14   Ht 6' (1.829 m)   Wt 120.2 kg (265 lb)   SpO2 96%   BMI 35.94 kg/m²

## 2018-11-26 NOTE — [employer] PATIENT INSTRUCTIONS
Discharge Summary/Instructions after an Endoscopic Procedure  Patient Name: Brandan Werner  Patient MRN: [id number]  Patient YOB: 1945 Monday, November 26, 2018  Germán Moss MD  RESTRICTIONS:  During your procedure today, you received medications for sedation.  These   medications may affect your judgment, balance and coordination.  Therefore,   for 24 hours, you have the following restrictions:   - DO NOT drive a car, operate machinery, make legal/financial decisions,   sign important papers or drink alcohol.    ACTIVITY:  Today: no heavy lifting, straining or running due to procedural   sedation/anesthesia.  The following day: return to full activity including work.  DIET:  Eat and drink normally unless instructed otherwise.     TREATMENT FOR COMMON SIDE EFFECTS:  - Mild abdominal pain, nausea, belching, bloating or excessive gas:  rest,   eat lightly and use a heating pad.  - Sore Throat: treat with throat lozenges and/or gargle with warm salt   water.  - Because air was used during the procedure, expelling large amounts of air   from your rectum or belching is normal.  - If a bowel prep was taken, you may not have a bowel movement for 1-3 days.    This is normal.  SYMPTOMS TO WATCH FOR AND REPORT TO YOUR PHYSICIAN:  1. Abdominal pain or bloating, other than gas cramps.  2. Chest pain.  3. Back pain.  4. Signs of infection such as: chills or fever occurring within 24 hours   after the procedure.  5. Rectal bleeding, which would show as bright red, maroon, or black stools.   (A tablespoon of blood from the rectum is not serious, especially if   hemorrhoids are present.)  6. Vomiting.  7. Weakness or dizziness.  GO DIRECTLY TO THE NEAREST EMERGENCY ROOM IF YOU HAVE ANY OF THE FOLLOWING:      Difficulty breathing              Chills and/or fever over 101 F   Persistent vomiting and/or vomiting blood   Severe abdominal pain   Severe chest pain   Black, tarry stools   Bleeding- more than one  tablespoon   Any other symptom or condition that you feel may need urgent attention  Your doctor recommends these additional instructions:  If any biopsies were taken, your doctors clinic will contact you in 1 to 2   weeks with any results.  - Repeat colonoscopy in 5 years for surveillance.   - Resume previous diet indefinitely.   - Continue present medications.   - Discharge patient to home (ambulatory).  For questions, problems or results please call your physician - Germán Moss MD at Work:  [phone].  [facility], EMERGENCY ROOM PHONE NUMBER: [phone]  IF A COMPLICATION OR EMERGENCY SITUATION ARISES AND YOU ARE UNABLE TO REACH   YOUR PHYSICIAN - GO DIRECTLY TO THE EMERGENCY ROOM.  Germán Moss MD  11/26/2018 10:40:26 AM  This report has been verified and signed electronically.  [employer]

## 2018-11-26 NOTE — H&P
Endoscopy H&P    Procedure : Colonoscopy      asymptomatic screening exam      Past Medical History:   Diagnosis Date    BPH (benign prostatic hyperplasia)     Cataract     Elevated PSA     Erectile dysfunction     Hyperlipidemia     Hypertension     Hypogonadism male     Obesity     Shoulder arthritis     Urinary tract infection        Family History   Problem Relation Age of Onset    Cancer Mother         cancer    Cataracts Mother     Heart disease Father 62        M.I.    Stroke Brother 62    Amblyopia Neg Hx     Blindness Neg Hx     Glaucoma Neg Hx     Macular degeneration Neg Hx     Retinal detachment Neg Hx     Strabismus Neg Hx     Prostate cancer Neg Hx     Kidney disease Neg Hx        Social History     Socioeconomic History    Marital status:      Spouse name: Not on file    Number of children: Not on file    Years of education: Not on file    Highest education level: Not on file   Social Needs    Financial resource strain: Not on file    Food insecurity - worry: Not on file    Food insecurity - inability: Not on file    Transportation needs - medical: Not on file    Transportation needs - non-medical: Not on file   Occupational History    Occupation:      Comment: self-employed   Tobacco Use    Smoking status: Former Smoker     Packs/day: 1.00     Years: 10.00     Pack years: 10.00    Smokeless tobacco: Never Used   Substance and Sexual Activity    Alcohol use: No    Drug use: No    Sexual activity: Yes     Partners: Female   Other Topics Concern    Not on file   Social History Narrative    Not on file       Review of Systems:  Respiratory ROS: no cough, shortness of breath, or wheezing  Cardiovascular ROS: no chest pain or dyspnea on exertion  Gastrointestinal ROS: no abdominal pain, change in bowel habits, or black or bloody stools  Musculoskeletal ROS:  negative  Neurological ROS: no TIA or stroke symptoms        Physical Exam:  General: no distress  Head: normocephalic  Neck: supple, symmetrical, trachea midline  Lungs:  clear to auscultation bilaterally and normal respiratory effort  Heart: regular rate and rhythm, S1, S2 normal, no murmur, rub or gallop  Abdomen: soft, non-tender non-distented; bowel sounds normal; no masses,  no organomegaly  Extremities: no cyanosis or edema, or clubbing       Deep Sedation: Mallampati Score II (hard and soft palate, upper portion of tonsils anduvula visible)    II    Assessment and Plan:  Proceed with Colonoscopy

## 2018-11-26 NOTE — ANESTHESIA PREPROCEDURE EVALUATION
11/26/2018  Brandan Werner is a 73 y.o., male.  Past Medical History:   Diagnosis Date    BPH (benign prostatic hyperplasia)     Cataract     Elevated PSA     Erectile dysfunction     Hyperlipidemia     Hypertension     Hypogonadism male     Obesity     Shoulder arthritis     Urinary tract infection      Past Surgical History:   Procedure Laterality Date    COLONOSCOPY W/ BIOPSIES  2010    INSERTION-PROSTHESIS-PENILE INFLATABLE N/A 9/9/2014    Performed by Reji Arnold MD at [facility] OR [address] [place] [address]    PENILE PROSTHESIS IMPLANT         Anesthesia Evaluation    I have reviewed the Patient Summary Reports.    I have reviewed the Nursing Notes.   I have reviewed the Medications.     Review of Systems  Anesthesia Hx:  No problems with previous Anesthesia    Hematology/Oncology:  Hematology Normal   Oncology Normal     EENT/Dental:EENT/Dental Normal   Cardiovascular:   Hypertension  Hypertension    Pulmonary:  Pulmonary Normal    Renal/:  Renal/ Normal     Hepatic/GI:  Hepatic/GI Normal    Musculoskeletal:  Musculoskeletal Normal    Neurological:  Neurology Normal    Endocrine:  Endocrine Normal    Dermatological:  Skin Normal    Psych:   Psychiatric History          Physical Exam  General:  Well nourished                 Anesthesia Plan  Type of Anesthesia, risks & benefits discussed:  Anesthesia Type:  general  Patient's Preference:   Intra-op Monitoring Plan:   Intra-op Monitoring Plan Comments:   Post Op Pain Control Plan:   Post Op Pain Control Plan Comments:   Induction:   IV  Beta Blocker:  Patient is not currently on a Beta-Blocker (No further documentation required).       Informed Consent: Patient understands risks and agrees with Anesthesia plan.  Questions answered. Anesthesia consent signed with patient.  ASA Score: 2     Day of Surgery Review of History & Physical: I have interviewed and  examined the patient. I have reviewed the patient's H&P dated:  There are no significant changes.  H&P update referred to the provider.         Ready For Surgery From Anesthesia Perspective.

## 2018-12-03 ENCOUNTER — TELEPHONE (OUTPATIENT)
Dept: ENDOSCOPY | Facility: HOSPITAL | Age: 73
End: 2018-12-03

## 2019-02-26 ENCOUNTER — PES CALL (OUTPATIENT)
Dept: ADMINISTRATIVE | Facility: CLINIC | Age: 74
End: 2019-02-26

## 2019-03-07 ENCOUNTER — LAB VISIT (OUTPATIENT)
Dept: LAB | Facility: HOSPITAL | Age: 74
End: 2019-03-07
Attending: UROLOGY
Payer: MEDICARE

## 2019-03-07 ENCOUNTER — OFFICE VISIT (OUTPATIENT)
Dept: UROLOGY | Facility: CLINIC | Age: 74
End: 2019-03-07
Payer: MEDICARE

## 2019-03-07 VITALS
HEART RATE: 76 BPM | HEIGHT: 72 IN | WEIGHT: 265 LBS | BODY MASS INDEX: 35.89 KG/M2 | TEMPERATURE: 99 F | SYSTOLIC BLOOD PRESSURE: 152 MMHG | DIASTOLIC BLOOD PRESSURE: 93 MMHG

## 2019-03-07 DIAGNOSIS — R82.81 PYURIA: ICD-10-CM

## 2019-03-07 DIAGNOSIS — R97.20 ELEVATED PSA: Primary | ICD-10-CM

## 2019-03-07 DIAGNOSIS — R97.20 ELEVATED PSA: ICD-10-CM

## 2019-03-07 DIAGNOSIS — N40.1 BPH WITH OBSTRUCTION/LOWER URINARY TRACT SYMPTOMS: ICD-10-CM

## 2019-03-07 DIAGNOSIS — N13.8 BPH WITH OBSTRUCTION/LOWER URINARY TRACT SYMPTOMS: ICD-10-CM

## 2019-03-07 DIAGNOSIS — N52.1 ERECTILE DYSFUNCTION DUE TO DISEASES CLASSIFIED ELSEWHERE: ICD-10-CM

## 2019-03-07 LAB
BACTERIA #/AREA URNS AUTO: NORMAL /HPF
BILIRUB SERPL-MCNC: ABNORMAL MG/DL
BLOOD URINE, POC: ABNORMAL
CAOX CRY UR QL COMP ASSIST: NORMAL
COLOR, POC UA: YELLOW
COMPLEXED PSA SERPL-MCNC: 7 NG/ML
GLUCOSE UR QL STRIP: ABNORMAL
HYALINE CASTS UR QL AUTO: 0 /LPF
KETONES UR QL STRIP: ABNORMAL
LEUKOCYTE ESTERASE URINE, POC: ABNORMAL
MICROSCOPIC COMMENT: NORMAL
NITRITE, POC UA: ABNORMAL
PH, POC UA: 5
PROTEIN, POC: ABNORMAL
RBC #/AREA URNS AUTO: 0 /HPF (ref 0–4)
SPECIFIC GRAVITY, POC UA: 1.02
SQUAMOUS #/AREA URNS AUTO: 0 /HPF
UROBILINOGEN, POC UA: ABNORMAL
WBC #/AREA URNS AUTO: 0 /HPF (ref 0–5)

## 2019-03-07 PROCEDURE — 3080F DIAST BP >= 90 MM HG: CPT | Mod: HCNC,CPTII,S$GLB, | Performed by: UROLOGY

## 2019-03-07 PROCEDURE — 87088 URINE BACTERIA CULTURE: CPT | Mod: HCNC

## 2019-03-07 PROCEDURE — 84153 ASSAY OF PSA TOTAL: CPT | Mod: HCNC

## 2019-03-07 PROCEDURE — 3080F PR MOST RECENT DIASTOLIC BLOOD PRESSURE >= 90 MM HG: ICD-10-PCS | Mod: HCNC,CPTII,S$GLB, | Performed by: UROLOGY

## 2019-03-07 PROCEDURE — 1101F PR PT FALLS ASSESS DOC 0-1 FALLS W/OUT INJ PAST YR: ICD-10-PCS | Mod: HCNC,CPTII,S$GLB, | Performed by: UROLOGY

## 2019-03-07 PROCEDURE — 81002 URINALYSIS NONAUTO W/O SCOPE: CPT | Mod: HCNC,S$GLB,, | Performed by: UROLOGY

## 2019-03-07 PROCEDURE — 81002 POCT URINE DIPSTICK WITHOUT MICROSCOPE: ICD-10-PCS | Mod: HCNC,S$GLB,, | Performed by: UROLOGY

## 2019-03-07 PROCEDURE — 3077F SYST BP >= 140 MM HG: CPT | Mod: HCNC,CPTII,S$GLB, | Performed by: UROLOGY

## 2019-03-07 PROCEDURE — 36415 COLL VENOUS BLD VENIPUNCTURE: CPT | Mod: HCNC

## 2019-03-07 PROCEDURE — 99999 PR PBB SHADOW E&M-EST. PATIENT-LVL IV: ICD-10-PCS | Mod: PBBFAC,HCNC,, | Performed by: UROLOGY

## 2019-03-07 PROCEDURE — 99214 OFFICE O/P EST MOD 30 MIN: CPT | Mod: 25,HCNC,S$GLB, | Performed by: UROLOGY

## 2019-03-07 PROCEDURE — 99999 PR PBB SHADOW E&M-EST. PATIENT-LVL IV: CPT | Mod: PBBFAC,HCNC,, | Performed by: UROLOGY

## 2019-03-07 PROCEDURE — 81001 URINALYSIS AUTO W/SCOPE: CPT | Mod: HCNC

## 2019-03-07 PROCEDURE — 3077F PR MOST RECENT SYSTOLIC BLOOD PRESSURE >= 140 MM HG: ICD-10-PCS | Mod: HCNC,CPTII,S$GLB, | Performed by: UROLOGY

## 2019-03-07 PROCEDURE — 87086 URINE CULTURE/COLONY COUNT: CPT | Mod: HCNC

## 2019-03-07 PROCEDURE — 99214 PR OFFICE/OUTPT VISIT, EST, LEVL IV, 30-39 MIN: ICD-10-PCS | Mod: 25,HCNC,S$GLB, | Performed by: UROLOGY

## 2019-03-07 PROCEDURE — 1101F PT FALLS ASSESS-DOCD LE1/YR: CPT | Mod: HCNC,CPTII,S$GLB, | Performed by: UROLOGY

## 2019-03-09 LAB — BACTERIA UR CULT: NORMAL

## 2019-04-30 ENCOUNTER — OFFICE VISIT (OUTPATIENT)
Dept: INTERNAL MEDICINE | Facility: CLINIC | Age: 74
End: 2019-04-30
Payer: MEDICARE

## 2019-04-30 VITALS
TEMPERATURE: 98 F | DIASTOLIC BLOOD PRESSURE: 74 MMHG | WEIGHT: 253.5 LBS | SYSTOLIC BLOOD PRESSURE: 122 MMHG | RESPIRATION RATE: 15 BRPM | HEART RATE: 80 BPM | OXYGEN SATURATION: 95 % | BODY MASS INDEX: 34.34 KG/M2 | HEIGHT: 72 IN

## 2019-04-30 DIAGNOSIS — R25.1 TREMOR: ICD-10-CM

## 2019-04-30 DIAGNOSIS — N40.0 BENIGN PROSTATIC HYPERPLASIA, UNSPECIFIED WHETHER LOWER URINARY TRACT SYMPTOMS PRESENT: ICD-10-CM

## 2019-04-30 DIAGNOSIS — I10 ESSENTIAL HYPERTENSION: Primary | ICD-10-CM

## 2019-04-30 DIAGNOSIS — Z77.098 HISTORY OF AGENT ORANGE EXPOSURE: ICD-10-CM

## 2019-04-30 DIAGNOSIS — F43.10 PTSD (POST-TRAUMATIC STRESS DISORDER): ICD-10-CM

## 2019-04-30 DIAGNOSIS — E78.5 HYPERLIPIDEMIA, UNSPECIFIED HYPERLIPIDEMIA TYPE: ICD-10-CM

## 2019-04-30 DIAGNOSIS — I77.819 ECTATIC AORTA: ICD-10-CM

## 2019-04-30 DIAGNOSIS — G89.29 CHRONIC RIGHT SHOULDER PAIN: ICD-10-CM

## 2019-04-30 DIAGNOSIS — M25.511 CHRONIC RIGHT SHOULDER PAIN: ICD-10-CM

## 2019-04-30 DIAGNOSIS — M19.019 SHOULDER ARTHRITIS: ICD-10-CM

## 2019-04-30 PROCEDURE — 3074F SYST BP LT 130 MM HG: CPT | Mod: HCNC,CPTII,S$GLB, | Performed by: INTERNAL MEDICINE

## 2019-04-30 PROCEDURE — 3078F PR MOST RECENT DIASTOLIC BLOOD PRESSURE < 80 MM HG: ICD-10-PCS | Mod: HCNC,CPTII,S$GLB, | Performed by: INTERNAL MEDICINE

## 2019-04-30 PROCEDURE — 3074F PR MOST RECENT SYSTOLIC BLOOD PRESSURE < 130 MM HG: ICD-10-PCS | Mod: HCNC,CPTII,S$GLB, | Performed by: INTERNAL MEDICINE

## 2019-04-30 PROCEDURE — 1101F PT FALLS ASSESS-DOCD LE1/YR: CPT | Mod: HCNC,CPTII,S$GLB, | Performed by: INTERNAL MEDICINE

## 2019-04-30 PROCEDURE — 99214 OFFICE O/P EST MOD 30 MIN: CPT | Mod: HCNC,S$GLB,, | Performed by: INTERNAL MEDICINE

## 2019-04-30 PROCEDURE — 99214 PR OFFICE/OUTPT VISIT, EST, LEVL IV, 30-39 MIN: ICD-10-PCS | Mod: HCNC,S$GLB,, | Performed by: INTERNAL MEDICINE

## 2019-04-30 PROCEDURE — 99499 RISK ADDL DX/OHS AUDIT: ICD-10-PCS | Mod: HCNC,S$GLB,, | Performed by: INTERNAL MEDICINE

## 2019-04-30 PROCEDURE — 99499 UNLISTED E&M SERVICE: CPT | Mod: HCNC,S$GLB,, | Performed by: INTERNAL MEDICINE

## 2019-04-30 PROCEDURE — 99999 PR PBB SHADOW E&M-EST. PATIENT-LVL III: ICD-10-PCS | Mod: PBBFAC,HCNC,, | Performed by: INTERNAL MEDICINE

## 2019-04-30 PROCEDURE — 1101F PR PT FALLS ASSESS DOC 0-1 FALLS W/OUT INJ PAST YR: ICD-10-PCS | Mod: HCNC,CPTII,S$GLB, | Performed by: INTERNAL MEDICINE

## 2019-04-30 PROCEDURE — 99999 PR PBB SHADOW E&M-EST. PATIENT-LVL III: CPT | Mod: PBBFAC,HCNC,, | Performed by: INTERNAL MEDICINE

## 2019-04-30 PROCEDURE — 3078F DIAST BP <80 MM HG: CPT | Mod: HCNC,CPTII,S$GLB, | Performed by: INTERNAL MEDICINE

## 2019-05-08 PROBLEM — M25.511 CHRONIC RIGHT SHOULDER PAIN: Status: ACTIVE | Noted: 2019-05-08

## 2019-05-08 PROBLEM — Z77.098 HISTORY OF AGENT ORANGE EXPOSURE: Status: ACTIVE | Noted: 2019-05-08

## 2019-05-08 PROBLEM — G89.29 CHRONIC RIGHT SHOULDER PAIN: Status: ACTIVE | Noted: 2019-05-08

## 2019-05-08 PROBLEM — R25.1 TREMOR: Status: ACTIVE | Noted: 2019-05-08

## 2019-05-09 NOTE — PROGRESS NOTES
Subjective:       Patient ID: Brandan Werner is a 73 y.o. male.    Chief Complaint: post traumatic stress disorder; Shoulder Pain (6/10 px level); and Hypertension    HPI   The patient presents for follow-up of medical conditions.  The patient relates he is a [place] .  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  He gives a history of bilateral tremors which have been progressive over the past several years.  The time of exposure was 1966 through 1967.  [facility].  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 He has received physical therapy and acupuncture but has not noted any significant relief.  He experiences pain at rest and with mobility.  Decreased strength in the right upper extremities noted.  He is unable to sleep on his right side due to pain. Local steroid injections have not been helpful.    The patient does experience sleep disturbances and anxiety.  He is not sleeping well at night.  He states he often feels as though something is biting him.  He only averages 2-3 hours of sleep at night.  He does experience daytime sleepiness.    Review of Systems   Constitutional: Negative for activity change, appetite change, fatigue and unexpected weight change.   HENT: Negative for sinus pressure and sore throat.    Eyes: Negative for visual disturbance.   Respiratory: Negative for cough, chest tightness, shortness of breath and wheezing.    Cardiovascular: Negative for chest pain, palpitations and leg swelling.   Gastrointestinal: Negative for abdominal pain, blood in stool, nausea and vomiting.   Genitourinary: Negative for dysuria, hematuria and urgency.   Musculoskeletal: Positive for arthralgias. Negative for back pain, gait problem, joint swelling, myalgias and neck stiffness.   Skin: Positive for rash. Negative for color  change.        A recurrent rash involving the left hand is noted.   Neurological: Positive for tremors and weakness. Negative for dizziness, syncope, light-headedness, numbness and headaches.   Psychiatric/Behavioral: Positive for sleep disturbance. Negative for hallucinations and suicidal ideas. The patient is nervous/anxious.        Objective:      Physical Exam   Constitutional: He is oriented to person, place, and time. He appears well-developed and well-nourished. No distress.   The patient's weight has remained stable since 10/23/2018.   HENT:   Head: Normocephalic and atraumatic.   Eyes: Conjunctivae and EOM are normal. No scleral icterus.   Neck: Normal range of motion. Neck supple. No JVD present. No thyromegaly present.   Cardiovascular: Normal rate, regular rhythm, normal heart sounds and intact distal pulses. Exam reveals no gallop and no friction rub.   No murmur heard.  Pulmonary/Chest: Effort normal and breath sounds normal. No respiratory distress. He has no wheezes. He has no rales.   Abdominal: Soft. Bowel sounds are normal. He exhibits no mass. There is no tenderness.   Musculoskeletal:   Right shoulder exhibits decreased abduction and posterior rotation.  The right shoulder is tender on range-of-motion testing.   Lymphadenopathy:     He has no cervical adenopathy.   Neurological: He is alert and oriented to person, place, and time.   Gait is normal.  Fine hand tremors are noted bilaterally. No tremor is present at rest.   Skin: Skin is warm and dry. No rash noted.   Ecchymoses of both forearms are present.  No hand rash is noted.   Nursing note and vitals reviewed.      Assessment:       1. Essential hypertension    2. Hyperlipidemia, unspecified hyperlipidemia type    3. PTSD (post-traumatic stress disorder)    4. Ectatic aorta    5. Benign prostatic hyperplasia, unspecified whether lower urinary tract symptoms present    6. Shoulder arthritis    7. Chronic right shoulder pain    8. Tremor    9.  History of agent Orange exposure        Plan:       Brandan was seen today for post traumatic stress disorder, shoulder pain and hypertension.  Current therapy will be continued.  Orthopedic follow-up will be obtained as discussed.  Blood tests and a follow-up visit in 4 months will be obtained.    Diagnoses and all orders for this visit:    Essential hypertension  -     CBC auto differential; Future  -     TSH; Future    Hyperlipidemia, unspecified hyperlipidemia type  -     Comprehensive metabolic panel; Future  -     Lipid panel; Future    PTSD (post-traumatic stress disorder)    Ectatic aorta    Benign prostatic hyperplasia, unspecified whether lower urinary tract symptoms present    Shoulder arthritis    Chronic right shoulder pain    Tremor

## 2019-06-10 ENCOUNTER — TELEPHONE (OUTPATIENT)
Dept: INTERNAL MEDICINE | Facility: CLINIC | Age: 74
End: 2019-06-10

## 2019-06-10 DIAGNOSIS — H91.8X3 OTHER SPECIFIED HEARING LOSS OF BOTH EARS: Primary | ICD-10-CM

## 2019-06-10 NOTE — TELEPHONE ENCOUNTER
----- Message from Tia Colby sent at 6/10/2019 10:40 AM CDT -----  Contact: Babar Mcghee [phone]  Patient would like to get a referral.  Referral to what specialty:   ENT  Does the patient want the referral with a specific physician:  Yes  [facility] the [facility]  Reason (be specific):  Hearing loss  Does the patient already have the specialty clinic appointment scheduled:  no  If yes, what date is the appointment scheduled:     Is the insurance listed in Epic correct? (this is important for a referral):  Yes   Comments:

## 2019-08-09 ENCOUNTER — LAB VISIT (OUTPATIENT)
Dept: LAB | Facility: HOSPITAL | Age: 74
End: 2019-08-09
Attending: INTERNAL MEDICINE
Payer: MEDICARE

## 2019-08-09 DIAGNOSIS — I10 ESSENTIAL HYPERTENSION: ICD-10-CM

## 2019-08-09 DIAGNOSIS — E78.5 HYPERLIPIDEMIA, UNSPECIFIED HYPERLIPIDEMIA TYPE: ICD-10-CM

## 2019-08-09 LAB
ALBUMIN SERPL BCP-MCNC: 3.9 G/DL (ref 3.5–5.2)
ALP SERPL-CCNC: 45 U/L (ref 55–135)
ALT SERPL W/O P-5'-P-CCNC: 12 U/L (ref 10–44)
ANION GAP SERPL CALC-SCNC: 7 MMOL/L (ref 8–16)
AST SERPL-CCNC: 12 U/L (ref 10–40)
BASOPHILS # BLD AUTO: 0.01 K/UL (ref 0–0.2)
BASOPHILS NFR BLD: 0.1 % (ref 0–1.9)
BILIRUB SERPL-MCNC: 0.4 MG/DL (ref 0.1–1)
BUN SERPL-MCNC: 20 MG/DL (ref 8–23)
CALCIUM SERPL-MCNC: 8.7 MG/DL (ref 8.7–10.5)
CHLORIDE SERPL-SCNC: 106 MMOL/L (ref 95–110)
CHOLEST SERPL-MCNC: 135 MG/DL (ref 120–199)
CHOLEST/HDLC SERPL: 2.5 {RATIO} (ref 2–5)
CO2 SERPL-SCNC: 27 MMOL/L (ref 23–29)
CREAT SERPL-MCNC: 0.8 MG/DL (ref 0.5–1.4)
DIFFERENTIAL METHOD: ABNORMAL
EOSINOPHIL # BLD AUTO: 0.2 K/UL (ref 0–0.5)
EOSINOPHIL NFR BLD: 2.9 % (ref 0–8)
ERYTHROCYTE [DISTWIDTH] IN BLOOD BY AUTOMATED COUNT: 12.9 % (ref 11.5–14.5)
EST. GFR  (AFRICAN AMERICAN): >60 ML/MIN/1.73 M^2
EST. GFR  (NON AFRICAN AMERICAN): >60 ML/MIN/1.73 M^2
GLUCOSE SERPL-MCNC: 116 MG/DL (ref 70–110)
HCT VFR BLD AUTO: 42.5 % (ref 40–54)
HDLC SERPL-MCNC: 55 MG/DL (ref 40–75)
HDLC SERPL: 40.7 % (ref 20–50)
HGB BLD-MCNC: 13.7 G/DL (ref 14–18)
LDLC SERPL CALC-MCNC: 70.4 MG/DL (ref 63–159)
LYMPHOCYTES # BLD AUTO: 2.8 K/UL (ref 1–4.8)
LYMPHOCYTES NFR BLD: 35.5 % (ref 18–48)
MCH RBC QN AUTO: 30.7 PG (ref 27–31)
MCHC RBC AUTO-ENTMCNC: 32.2 G/DL (ref 32–36)
MCV RBC AUTO: 95 FL (ref 82–98)
MONOCYTES # BLD AUTO: 0.5 K/UL (ref 0.3–1)
MONOCYTES NFR BLD: 6 % (ref 4–15)
NEUTROPHILS # BLD AUTO: 4.4 K/UL (ref 1.8–7.7)
NEUTROPHILS NFR BLD: 55.5 % (ref 38–73)
NONHDLC SERPL-MCNC: 80 MG/DL
PLATELET # BLD AUTO: 219 K/UL (ref 150–350)
PMV BLD AUTO: 9.6 FL (ref 9.2–12.9)
POTASSIUM SERPL-SCNC: 3.9 MMOL/L (ref 3.5–5.1)
PROT SERPL-MCNC: 6.4 G/DL (ref 6–8.4)
RBC # BLD AUTO: 4.46 M/UL (ref 4.6–6.2)
SODIUM SERPL-SCNC: 140 MMOL/L (ref 136–145)
TRIGL SERPL-MCNC: 48 MG/DL (ref 30–150)
TSH SERPL DL<=0.005 MIU/L-ACNC: 1.4 UIU/ML (ref 0.4–4)
WBC # BLD AUTO: 7.89 K/UL (ref 3.9–12.7)

## 2019-08-09 PROCEDURE — 36415 COLL VENOUS BLD VENIPUNCTURE: CPT | Mod: HCNC

## 2019-08-09 PROCEDURE — 80053 COMPREHEN METABOLIC PANEL: CPT | Mod: HCNC

## 2019-08-09 PROCEDURE — 80061 LIPID PANEL: CPT | Mod: HCNC

## 2019-08-09 PROCEDURE — 85025 COMPLETE CBC W/AUTO DIFF WBC: CPT | Mod: HCNC

## 2019-08-09 PROCEDURE — 84443 ASSAY THYROID STIM HORMONE: CPT | Mod: HCNC

## 2019-08-16 ENCOUNTER — TELEPHONE (OUTPATIENT)
Dept: INTERNAL MEDICINE | Facility: CLINIC | Age: 74
End: 2019-08-16

## 2019-08-16 NOTE — TELEPHONE ENCOUNTER
----- Message from Chastity Lennon sent at 8/16/2019  9:29 AM CDT -----  Contact: Maria Alejandra / [phone]  Pt missed his appt yesterday as he is out of town. The next available ep is not until November. Can you fit him in sooner? Please call to advise .

## 2019-08-16 NOTE — TELEPHONE ENCOUNTER
I reached shubham and I fit him in a month from now for 4 mos fol up.  Reminded them to arrive by 3:30  This is his last appt of day.  He expressed understanding.

## 2019-08-22 ENCOUNTER — OFFICE VISIT (OUTPATIENT)
Dept: FAMILY MEDICINE | Facility: CLINIC | Age: 74
End: 2019-08-22
Payer: MEDICARE

## 2019-08-22 VITALS
HEART RATE: 80 BPM | BODY MASS INDEX: 33.64 KG/M2 | DIASTOLIC BLOOD PRESSURE: 78 MMHG | WEIGHT: 248 LBS | OXYGEN SATURATION: 96 % | SYSTOLIC BLOOD PRESSURE: 132 MMHG

## 2019-08-22 DIAGNOSIS — M47.812 FACET ARTHRITIS OF CERVICAL REGION: ICD-10-CM

## 2019-08-22 DIAGNOSIS — E78.5 HYPERLIPIDEMIA, UNSPECIFIED HYPERLIPIDEMIA TYPE: ICD-10-CM

## 2019-08-22 DIAGNOSIS — M25.511 CHRONIC RIGHT SHOULDER PAIN: ICD-10-CM

## 2019-08-22 DIAGNOSIS — I10 ESSENTIAL HYPERTENSION: Chronic | ICD-10-CM

## 2019-08-22 DIAGNOSIS — G89.29 CHRONIC RIGHT SHOULDER PAIN: ICD-10-CM

## 2019-08-22 DIAGNOSIS — F43.10 PTSD (POST-TRAUMATIC STRESS DISORDER): ICD-10-CM

## 2019-08-22 DIAGNOSIS — Z00.00 ENCOUNTER FOR PREVENTIVE HEALTH EXAMINATION: Primary | ICD-10-CM

## 2019-08-22 DIAGNOSIS — E66.9 OBESITY (BMI 30.0-34.9): ICD-10-CM

## 2019-08-22 DIAGNOSIS — I77.819 ECTATIC AORTA: ICD-10-CM

## 2019-08-22 DIAGNOSIS — J84.10 CALCIFIED GRANULOMA OF LUNG: ICD-10-CM

## 2019-08-22 DIAGNOSIS — N40.0 BENIGN PROSTATIC HYPERPLASIA, UNSPECIFIED WHETHER LOWER URINARY TRACT SYMPTOMS PRESENT: ICD-10-CM

## 2019-08-22 PROBLEM — J32.9 SINUSITIS: Status: RESOLVED | Noted: 2018-04-09 | Resolved: 2019-08-22

## 2019-08-22 PROBLEM — E66.811 OBESITY (BMI 30.0-34.9): Status: ACTIVE | Noted: 2019-08-22

## 2019-08-22 PROCEDURE — G0439 PPPS, SUBSEQ VISIT: HCPCS | Mod: HCNC,S$GLB,, | Performed by: NURSE PRACTITIONER

## 2019-08-22 PROCEDURE — 3078F DIAST BP <80 MM HG: CPT | Mod: HCNC,CPTII,S$GLB, | Performed by: NURSE PRACTITIONER

## 2019-08-22 PROCEDURE — 3075F PR MOST RECENT SYSTOLIC BLOOD PRESS GE 130-139MM HG: ICD-10-PCS | Mod: HCNC,CPTII,S$GLB, | Performed by: NURSE PRACTITIONER

## 2019-08-22 PROCEDURE — G0439 PR MEDICARE ANNUAL WELLNESS SUBSEQUENT VISIT: ICD-10-PCS | Mod: HCNC,S$GLB,, | Performed by: NURSE PRACTITIONER

## 2019-08-22 PROCEDURE — 99999 PR PBB SHADOW E&M-EST. PATIENT-LVL IV: CPT | Mod: PBBFAC,HCNC,, | Performed by: NURSE PRACTITIONER

## 2019-08-22 PROCEDURE — 3078F PR MOST RECENT DIASTOLIC BLOOD PRESSURE < 80 MM HG: ICD-10-PCS | Mod: HCNC,CPTII,S$GLB, | Performed by: NURSE PRACTITIONER

## 2019-08-22 PROCEDURE — 99999 PR PBB SHADOW E&M-EST. PATIENT-LVL IV: ICD-10-PCS | Mod: PBBFAC,HCNC,, | Performed by: NURSE PRACTITIONER

## 2019-08-22 PROCEDURE — 3075F SYST BP GE 130 - 139MM HG: CPT | Mod: HCNC,CPTII,S$GLB, | Performed by: NURSE PRACTITIONER

## 2019-08-22 NOTE — PROGRESS NOTES
Brandan Werner presented for a  Medicare AWV and comprehensive Health Risk Assessment today. The following components were reviewed and updated:    · Medical history  · Family History  · Social history  · Allergies and Current Medications  · Health Risk Assessment  · Health Maintenance  · Care Team     ** See Completed Assessments for Annual Wellness Visit within the encounter summary.**       The following assessments were completed:  · Living Situation  · CAGE  · Depression Screening  · Timed Get Up and Go  · Whisper Test  · Cognitive Function Screening  · Nutrition Screening  · ADL Screening  · PAQ Screening    Vitals:    08/22/19 0715   BP: 132/78   BP Location: Right arm   Patient Position: Sitting   BP Method: Large (Manual)   Pulse: 80   SpO2: 96%   Weight: 112.5 kg (248 lb 0.3 oz)     Body mass index is 33.64 kg/m².     Physical Exam   Constitutional: He is oriented to person, place, and time. He appears well-developed. No distress.   obese   HENT:   Head: Normocephalic and atraumatic.   Eyes: Pupils are equal, round, and reactive to light. EOM are normal.   Neck: Neck supple. No JVD present. No tracheal deviation present.   Cardiovascular: Normal rate, regular rhythm, normal heart sounds and intact distal pulses.   No murmur heard.  Pulmonary/Chest: Effort normal and breath sounds normal. No respiratory distress. He has no wheezes. He has no rales.   Abdominal: Soft. Bowel sounds are normal. He exhibits no distension and no mass. There is no tenderness.   Musculoskeletal: Normal range of motion. He exhibits no edema or tenderness.   Neurological: He is alert and oriented to person, place, and time. Coordination normal.   Skin: Skin is warm and dry. No erythema. No pallor.   Psychiatric: He has a normal mood and affect. His behavior is normal. Judgment and thought content normal. Cognition and memory are normal. He expresses no homicidal and no suicidal ideation.   Nursing note and vitals reviewed.       [facility].  Medications reconciled based list and patient.  Current Outpatient Medications on File Prior to Visit   Medication Sig Dispense Refill    ascorbic acid (VITAMIN C) 250 MG tablet Take 250 mg by mouth once daily.      atorvastatin (LIPITOR) 40 MG tablet Take 20 mg by mouth once daily.      diclofenac (VOLTAREN) 75 MG EC tablet Take 1 tablet (75 mg total) by mouth 2 (two) times daily as needed (joint pain). 60 tablet 3    FLUoxetine 20 MG capsule Take 40 mg by mouth once daily.      fluticasone (FLONASE) 50 mcg/actuation nasal spray 2 sprays (100 mcg total) by Each Nare route once daily. 1 Bottle 0    FOLIC ACID ORAL Take 1 tablet by mouth every morning.      lisinopril (PRINIVIL,ZESTRIL) 5 MG tablet Take 5 mg by mouth once daily.      meloxicam (MOBIC) 15 MG tablet Take 15 mg by mouth daily as needed for Pain (and inflammation).      multivitamin (ONE DAILY MULTIVITAMIN) per tablet Take 1 tablet by mouth once daily.      polyethylene glycol (GLYCOLAX) 17 gram PwPk Take 17 g by mouth once daily. 30 packet 2    tamsulosin (FLOMAX) 0.4 mg Cp24 Take 1 capsule (0.4 mg total) by mouth nightly. 30 capsule 11    vitamin E 100 UNIT capsule Take 100 Units by mouth every morning.      VOLTAREN 1 % Gel Apply 4 g topically 4 (four) times daily as needed (pain and inflammation).        No current facility-administered medications on file prior to visit.        Diagnoses and health risks identified today and associated recommendations/orders:    1. Encounter for preventive health examination    2. Essential hypertension  Chronic; stable.  Followed by PCP.    3. Hyperlipidemia, unspecified hyperlipidemia type  Chronic; stable on medication.  Followed by PCP.    4. Ectatic aorta  Chronic; stable.  Followed by PCP.    5. Calcified granuloma of lung  Chronic; stable.  Followed by PCP.    6. PTSD (post-traumatic stress disorder)  Chronic; stable on medication.   Followed by external Pyschiatry.  Positive depression screening; PHQ-9 score: 4    No suicidal or homicidal ideation noted.      7. Benign prostatic hyperplasia, unspecified whether lower urinary tract symptoms present  Chronic; stable on medication.  Followed by Urology.    8. Facet arthritis of cervical region  Chronic; stable on medication.  As seen on imaging dated 06/10/18.  Followed by PCP.    9. Chronic right shoulder pain  Chronic; stable on medication.  Followed by PCP.    10. Obesity (BMI 30.0-34.9)  Chronic, stable. Therapeutic lifestyle changes discussed. Followed by PCP.      Provided Brandan with a 5-10 year written screening schedule and personal prevention plan. Recommendations were developed using the USPSTF age appropriate recommendations. Education, counseling, and referrals were provided as needed. After Visit Summary printed and given to patient which includes a list of additional screenings\tests needed.    Follow up in 29 days (on 9/20/2019) for follow-up with PCP, Annual Wellness Visit in 1 year.    Kenyetta Chen NP         I offered to discuss end of life issues, including information on how to make advance directives that the patient could use to name someone who would make medical decisions on their behalf if they became too ill to make themselves.    ___Patient declined  _X_Patient is interested, I provided paper work and offered to discuss.

## 2019-08-22 NOTE — PATIENT INSTRUCTIONS
Counseling and Referral of Other Preventative  (Italic type indicates deductible and co-insurance are waived)    Patient Name: Brandan Werner  Today's Date: 8/22/2019    Health Maintenance       Date Due Completion Date    TETANUS VACCINE 08/20/1963 ---    Shingles Vaccine (1 of 2) 08/20/1995 ---    Fecal Occult Blood Test (FOBT)/[employer] 05/28/2019 5/28/2018 (Done)    Override on 5/28/2018: Done (negative)    Influenza Vaccine (1) 09/01/2019 10/23/2018    Pneumococcal Vaccine (65+ Low/Medium Risk) (2 of 2 - PPSV23) 10/23/2019 10/23/2018    Lipid Panel 08/09/2024 8/9/2019        No orders of the defined types were placed in this encounter.    The following information is provided to all patients.  This information is to help you find resources for any of the problems found today that may be affecting your health:                Living healthy guide: www.[facility].[place].[facility]      Understanding Diabetes: www.diabetes.org      Eating healthy: www.cdc.gov/healthyweight      CDC home safety checklist: www.cdc.gov/steadi/patient.html      Agency on Aging: www.goea.[place].[facility]      Alcoholics anonymous (AA): www.aa.org      Physical Activity: www.[place].nih.gov/kn5kjzk      Tobacco use: www.quitwithusla.org

## 2019-08-26 RX ORDER — MULTIVITAMIN
1 TABLET ORAL DAILY
COMMUNITY
End: 2022-08-09

## 2019-08-26 RX ORDER — FLUOXETINE HYDROCHLORIDE 20 MG/1
40 CAPSULE ORAL DAILY
COMMUNITY
End: 2022-02-25

## 2019-08-26 RX ORDER — LISINOPRIL 5 MG/1
5 TABLET ORAL DAILY
COMMUNITY

## 2019-08-26 RX ORDER — MELOXICAM 15 MG/1
15 TABLET ORAL DAILY PRN
Status: ON HOLD | COMMUNITY
End: 2022-12-02 | Stop reason: HOSPADM

## 2019-08-26 RX ORDER — ATORVASTATIN CALCIUM 40 MG/1
20 TABLET, FILM COATED ORAL DAILY
COMMUNITY

## 2019-11-14 ENCOUNTER — PATIENT OUTREACH (OUTPATIENT)
Dept: ADMINISTRATIVE | Facility: HOSPITAL | Age: 74
End: 2019-11-14

## 2019-11-25 ENCOUNTER — OFFICE VISIT (OUTPATIENT)
Dept: INTERNAL MEDICINE | Facility: CLINIC | Age: 74
End: 2019-11-25
Payer: MEDICARE

## 2019-11-25 ENCOUNTER — LAB VISIT (OUTPATIENT)
Dept: LAB | Facility: HOSPITAL | Age: 74
End: 2019-11-25
Attending: INTERNAL MEDICINE
Payer: MEDICARE

## 2019-11-25 VITALS
SYSTOLIC BLOOD PRESSURE: 110 MMHG | HEIGHT: 72 IN | WEIGHT: 234.38 LBS | DIASTOLIC BLOOD PRESSURE: 70 MMHG | BODY MASS INDEX: 31.74 KG/M2 | TEMPERATURE: 98 F | RESPIRATION RATE: 18 BRPM | HEART RATE: 80 BPM

## 2019-11-25 DIAGNOSIS — R63.4 UNEXPLAINED WEIGHT LOSS: ICD-10-CM

## 2019-11-25 DIAGNOSIS — N40.0 BENIGN PROSTATIC HYPERPLASIA, UNSPECIFIED WHETHER LOWER URINARY TRACT SYMPTOMS PRESENT: ICD-10-CM

## 2019-11-25 DIAGNOSIS — R97.20 ELEVATED PSA: ICD-10-CM

## 2019-11-25 DIAGNOSIS — I10 ESSENTIAL HYPERTENSION: Primary | ICD-10-CM

## 2019-11-25 DIAGNOSIS — F43.10 PTSD (POST-TRAUMATIC STRESS DISORDER): ICD-10-CM

## 2019-11-25 DIAGNOSIS — R73.09 BLOOD GLUCOSE ABNORMAL: ICD-10-CM

## 2019-11-25 DIAGNOSIS — R25.1 TREMOR: ICD-10-CM

## 2019-11-25 DIAGNOSIS — E78.5 HYPERLIPIDEMIA, UNSPECIFIED HYPERLIPIDEMIA TYPE: ICD-10-CM

## 2019-11-25 DIAGNOSIS — M19.019 SHOULDER ARTHRITIS: ICD-10-CM

## 2019-11-25 LAB
ALBUMIN SERPL BCP-MCNC: 3.8 G/DL (ref 3.5–5.2)
ALP SERPL-CCNC: 52 U/L (ref 55–135)
ALT SERPL W/O P-5'-P-CCNC: 9 U/L (ref 10–44)
ANION GAP SERPL CALC-SCNC: 7 MMOL/L (ref 8–16)
AST SERPL-CCNC: 12 U/L (ref 10–40)
BASOPHILS # BLD AUTO: 0.04 K/UL (ref 0–0.2)
BASOPHILS NFR BLD: 0.4 % (ref 0–1.9)
BILIRUB SERPL-MCNC: 0.7 MG/DL (ref 0.1–1)
BUN SERPL-MCNC: 17 MG/DL (ref 8–23)
CALCIUM SERPL-MCNC: 9 MG/DL (ref 8.7–10.5)
CHLORIDE SERPL-SCNC: 107 MMOL/L (ref 95–110)
CO2 SERPL-SCNC: 26 MMOL/L (ref 23–29)
CREAT SERPL-MCNC: 0.8 MG/DL (ref 0.5–1.4)
DIFFERENTIAL METHOD: ABNORMAL
EOSINOPHIL # BLD AUTO: 0.1 K/UL (ref 0–0.5)
EOSINOPHIL NFR BLD: 0.8 % (ref 0–8)
ERYTHROCYTE [DISTWIDTH] IN BLOOD BY AUTOMATED COUNT: 12.8 % (ref 11.5–14.5)
EST. GFR  (AFRICAN AMERICAN): >60 ML/MIN/1.73 M^2
EST. GFR  (NON AFRICAN AMERICAN): >60 ML/MIN/1.73 M^2
ESTIMATED AVG GLUCOSE: 114 MG/DL (ref 68–131)
GLUCOSE SERPL-MCNC: 92 MG/DL (ref 70–110)
HBA1C MFR BLD HPLC: 5.6 % (ref 4–5.6)
HCT VFR BLD AUTO: 41.9 % (ref 40–54)
HGB BLD-MCNC: 13.7 G/DL (ref 14–18)
IMM GRANULOCYTES # BLD AUTO: 0.05 K/UL (ref 0–0.04)
IMM GRANULOCYTES NFR BLD AUTO: 0.5 % (ref 0–0.5)
LYMPHOCYTES # BLD AUTO: 2.2 K/UL (ref 1–4.8)
LYMPHOCYTES NFR BLD: 20.9 % (ref 18–48)
MCH RBC QN AUTO: 31.9 PG (ref 27–31)
MCHC RBC AUTO-ENTMCNC: 32.7 G/DL (ref 32–36)
MCV RBC AUTO: 97 FL (ref 82–98)
MONOCYTES # BLD AUTO: 0.6 K/UL (ref 0.3–1)
MONOCYTES NFR BLD: 5.6 % (ref 4–15)
NEUTROPHILS # BLD AUTO: 7.6 K/UL (ref 1.8–7.7)
NEUTROPHILS NFR BLD: 71.8 % (ref 38–73)
NRBC BLD-RTO: 0 /100 WBC
PLATELET # BLD AUTO: 224 K/UL (ref 150–350)
PMV BLD AUTO: 10.6 FL (ref 9.2–12.9)
POTASSIUM SERPL-SCNC: 4.5 MMOL/L (ref 3.5–5.1)
PROT SERPL-MCNC: 6.5 G/DL (ref 6–8.4)
RBC # BLD AUTO: 4.3 M/UL (ref 4.6–6.2)
SODIUM SERPL-SCNC: 140 MMOL/L (ref 136–145)
T4 FREE SERPL-MCNC: 0.84 NG/DL (ref 0.71–1.51)
TSH SERPL DL<=0.005 MIU/L-ACNC: 1.44 UIU/ML (ref 0.4–4)
WBC # BLD AUTO: 10.63 K/UL (ref 3.9–12.7)

## 2019-11-25 PROCEDURE — 1125F PR PAIN SEVERITY QUANTIFIED, PAIN PRESENT: ICD-10-PCS | Mod: HCNC,S$GLB,, | Performed by: INTERNAL MEDICINE

## 2019-11-25 PROCEDURE — 80053 COMPREHEN METABOLIC PANEL: CPT | Mod: HCNC

## 2019-11-25 PROCEDURE — 1125F AMNT PAIN NOTED PAIN PRSNT: CPT | Mod: HCNC,S$GLB,, | Performed by: INTERNAL MEDICINE

## 2019-11-25 PROCEDURE — 3074F PR MOST RECENT SYSTOLIC BLOOD PRESSURE < 130 MM HG: ICD-10-PCS | Mod: HCNC,CPTII,S$GLB, | Performed by: INTERNAL MEDICINE

## 2019-11-25 PROCEDURE — 3078F PR MOST RECENT DIASTOLIC BLOOD PRESSURE < 80 MM HG: ICD-10-PCS | Mod: HCNC,CPTII,S$GLB, | Performed by: INTERNAL MEDICINE

## 2019-11-25 PROCEDURE — 1101F PT FALLS ASSESS-DOCD LE1/YR: CPT | Mod: HCNC,CPTII,S$GLB, | Performed by: INTERNAL MEDICINE

## 2019-11-25 PROCEDURE — 99499 UNLISTED E&M SERVICE: CPT | Mod: HCNC,S$GLB,, | Performed by: INTERNAL MEDICINE

## 2019-11-25 PROCEDURE — 85025 COMPLETE CBC W/AUTO DIFF WBC: CPT | Mod: HCNC

## 2019-11-25 PROCEDURE — 99214 OFFICE O/P EST MOD 30 MIN: CPT | Mod: HCNC,S$GLB,, | Performed by: INTERNAL MEDICINE

## 2019-11-25 PROCEDURE — 84439 ASSAY OF FREE THYROXINE: CPT | Mod: HCNC

## 2019-11-25 PROCEDURE — 84443 ASSAY THYROID STIM HORMONE: CPT | Mod: HCNC

## 2019-11-25 PROCEDURE — 1159F PR MEDICATION LIST DOCUMENTED IN MEDICAL RECORD: ICD-10-PCS | Mod: HCNC,S$GLB,, | Performed by: INTERNAL MEDICINE

## 2019-11-25 PROCEDURE — 99499 RISK ADDL DX/OHS AUDIT: ICD-10-PCS | Mod: HCNC,S$GLB,, | Performed by: INTERNAL MEDICINE

## 2019-11-25 PROCEDURE — 99999 PR PBB SHADOW E&M-EST. PATIENT-LVL III: CPT | Mod: PBBFAC,HCNC,, | Performed by: INTERNAL MEDICINE

## 2019-11-25 PROCEDURE — 99999 PR PBB SHADOW E&M-EST. PATIENT-LVL III: ICD-10-PCS | Mod: PBBFAC,HCNC,, | Performed by: INTERNAL MEDICINE

## 2019-11-25 PROCEDURE — 83036 HEMOGLOBIN GLYCOSYLATED A1C: CPT | Mod: HCNC

## 2019-11-25 PROCEDURE — 3074F SYST BP LT 130 MM HG: CPT | Mod: HCNC,CPTII,S$GLB, | Performed by: INTERNAL MEDICINE

## 2019-11-25 PROCEDURE — 1101F PR PT FALLS ASSESS DOC 0-1 FALLS W/OUT INJ PAST YR: ICD-10-PCS | Mod: HCNC,CPTII,S$GLB, | Performed by: INTERNAL MEDICINE

## 2019-11-25 PROCEDURE — 36415 COLL VENOUS BLD VENIPUNCTURE: CPT | Mod: HCNC,PO

## 2019-11-25 PROCEDURE — 1159F MED LIST DOCD IN RCRD: CPT | Mod: HCNC,S$GLB,, | Performed by: INTERNAL MEDICINE

## 2019-11-25 PROCEDURE — 99214 PR OFFICE/OUTPT VISIT, EST, LEVL IV, 30-39 MIN: ICD-10-PCS | Mod: HCNC,S$GLB,, | Performed by: INTERNAL MEDICINE

## 2019-11-25 PROCEDURE — 3078F DIAST BP <80 MM HG: CPT | Mod: HCNC,CPTII,S$GLB, | Performed by: INTERNAL MEDICINE

## 2019-11-25 NOTE — PROGRESS NOTES
Subjective:       Patient ID: Brandan Werner is a 74 y.o. male.    Chief Complaint: Follow-up (4 month); Hand shaking (both ); and Shoulder Pain (right)    HPI   The patient presents for follow-up of medical conditions.  His main complaint today is worsening bilateral hand tremor.  The tremor affects his handwriting.    He has chronic right shoulder pain.  He has been advised that he needs shoulder surgery but he is not yet ready to proceed.  He feels this would interfere with his ability to work and earn a living at the present time.    Active medical conditions include hypertension, hyperlipidemia, abnormal blood glucose, posttraumatic stress disorder, BPH, shoulder arthritis with chronic right shoulder pain.    Review of Systems   Constitutional: Positive for activity change, appetite change and unexpected weight change. Negative for chills, diaphoresis, fatigue and fever.   HENT: Negative for sinus pressure and sore throat.    Eyes: Negative for visual disturbance.   Respiratory: Negative for cough, chest tightness, shortness of breath and wheezing.    Cardiovascular: Negative for chest pain, palpitations and leg swelling.   Gastrointestinal: Negative for abdominal pain, blood in stool, nausea and vomiting.   Genitourinary: Negative for dysuria, hematuria and urgency.   Musculoskeletal: Positive for arthralgias. Negative for back pain, gait problem, joint swelling, myalgias and neck stiffness.   Skin: Negative for color change and rash.   Neurological: Positive for tremors. Negative for dizziness, syncope, speech difficulty, weakness, light-headedness, numbness and headaches.   Psychiatric/Behavioral: Negative for sleep disturbance.       Objective:      Physical Exam   Constitutional: He is oriented to person, place, and time. He appears well-developed and well-nourished. No distress.   The patient has lost 19 lb since 04/30/2019.   HENT:   Head: Normocephalic and atraumatic.   Eyes: Conjunctivae and EOM are  normal. No scleral icterus.   Neck: Normal range of motion. Neck supple. No JVD present. No thyromegaly present.   Cardiovascular: Normal rate, regular rhythm, normal heart sounds and intact distal pulses. Exam reveals no gallop and no friction rub.   No murmur heard.  Pulmonary/Chest: Effort normal and breath sounds normal. No respiratory distress. He has no wheezes. He has no rales.   Abdominal: Soft. Bowel sounds are normal. He exhibits no mass. There is no tenderness.   Musculoskeletal: He exhibits tenderness.   Marked limitation of range of motion of the right shoulder is present with decreased abduction and rotation noted. Left shoulder range of motion is intact   Lymphadenopathy:     He has no cervical adenopathy.   Neurological: He is alert and oriented to person, place, and time.   Gait is normal.  Bilateral hand tremors are present.  A resting tremor is present bilaterally. No muscle weakness is appreciated.   Skin: Skin is warm and dry. No rash noted.   Nursing note and vitals reviewed.      Results for orders placed or performed in visit on 08/09/19   CBC auto differential   Result Value Ref Range    WBC 7.89 3.90 - 12.70 K/uL    RBC 4.46 (L) 4.60 - 6.20 M/uL    Hemoglobin 13.7 (L) 14.0 - 18.0 g/dL    Hematocrit 42.5 40.0 - 54.0 %    Mean Corpuscular Volume 95 82 - 98 fL    Mean Corpuscular Hemoglobin 30.7 27.0 - 31.0 pg    Mean Corpuscular Hemoglobin Conc 32.2 32.0 - 36.0 g/dL    RDW 12.9 11.5 - 14.5 %    Platelets 219 150 - 350 K/uL    MPV 9.6 9.2 - 12.9 fL    Gran # (ANC) 4.4 1.8 - 7.7 K/uL    Lymph # 2.8 1.0 - 4.8 K/uL    Mono # 0.5 0.3 - 1.0 K/uL    Eos # 0.2 0.0 - 0.5 K/uL    Baso # 0.01 0.00 - 0.20 K/uL    Gran% 55.5 38.0 - 73.0 %    Lymph% 35.5 18.0 - 48.0 %    Mono% 6.0 4.0 - 15.0 %    Eosinophil% 2.9 0.0 - 8.0 %    Basophil% 0.1 0.0 - 1.9 %    Differential Method Automated    Comprehensive metabolic panel   Result Value Ref Range    Sodium 140 136 - 145 mmol/L    Potassium 3.9 3.5 - 5.1 mmol/L     Chloride 106 95 - 110 mmol/L    CO2 27 23 - 29 mmol/L    Glucose 116 (H) 70 - 110 mg/dL    BUN, Bld 20 8 - 23 mg/dL    Creatinine 0.8 0.5 - 1.4 mg/dL    Calcium 8.7 8.7 - 10.5 mg/dL    Total Protein 6.4 6.0 - 8.4 g/dL    Albumin 3.9 3.5 - 5.2 g/dL    Total Bilirubin 0.4 0.1 - 1.0 mg/dL    Alkaline Phosphatase 45 (L) 55 - 135 U/L    AST 12 10 - 40 U/L    ALT 12 10 - 44 U/L    Anion Gap 7 (L) 8 - 16 mmol/L    eGFR if African American >60 >60 mL/min/1.73 m^2    eGFR if non African American >60 >60 mL/min/1.73 m^2   Lipid panel   Result Value Ref Range    Cholesterol 135 120 - 199 mg/dL    Triglycerides 48 30 - 150 mg/dL    HDL 55 40 - 75 mg/dL    LDL Cholesterol 70.4 63.0 - 159.0 mg/dL    Hdl/Cholesterol Ratio 40.7 20.0 - 50.0 %    Total Cholesterol/HDL Ratio 2.5 2.0 - 5.0    Non-HDL Cholesterol 80 mg/dL   TSH   Result Value Ref Range    TSH 1.404 0.400 - 4.000 uIU/mL       Assessment:       1. Essential hypertension    2. Tremor    3. Blood glucose abnormal    4. Unexplained weight loss    5. Benign prostatic hyperplasia, unspecified whether lower urinary tract symptoms present    6. PTSD (post-traumatic stress disorder)    7. Hyperlipidemia, unspecified hyperlipidemia type    8. Shoulder arthritis    9. Elevated PSA        Plan:       Brandan was seen today for follow-up of worsening tremors and other medical conditions.  Fasting blood tests will be obtained today.  Neurology consultation will be obtained regarding hand tremors.  Current medications will be continued.  The patient is to return to clinic in 6 months.    Diagnoses and all orders for this visit:    Essential hypertension    Tremor  -     Comprehensive metabolic panel; Future  -     Hemoglobin A1c; Future  -     T4, free; Future  -     TSH; Future  -     CBC auto differential; Future  -     Ambulatory consult to Neurology    Blood glucose abnormal  -     Comprehensive metabolic panel; Future  -     Hemoglobin A1c; Future  -     T4, free; Future  -      TSH; Future  -     CBC auto differential; Future    Unexplained weight loss  -     Comprehensive metabolic panel; Future  -     Hemoglobin A1c; Future  -     T4, free; Future  -     TSH; Future  -     CBC auto differential; Future    Benign prostatic hyperplasia, unspecified whether lower urinary tract symptoms present    PTSD (post-traumatic stress disorder)    Hyperlipidemia, unspecified hyperlipidemia type    Shoulder arthritis    Elevated PSA

## 2020-01-27 ENCOUNTER — PATIENT OUTREACH (OUTPATIENT)
Dept: ADMINISTRATIVE | Facility: OTHER | Age: 75
End: 2020-01-27

## 2020-01-30 ENCOUNTER — TELEPHONE (OUTPATIENT)
Dept: UROLOGY | Facility: CLINIC | Age: 75
End: 2020-01-30

## 2020-01-30 ENCOUNTER — LAB VISIT (OUTPATIENT)
Dept: LAB | Facility: HOSPITAL | Age: 75
End: 2020-01-30
Attending: STUDENT IN AN ORGANIZED HEALTH CARE EDUCATION/TRAINING PROGRAM
Payer: MEDICARE

## 2020-01-30 ENCOUNTER — OFFICE VISIT (OUTPATIENT)
Dept: UROLOGY | Facility: CLINIC | Age: 75
End: 2020-01-30
Payer: MEDICARE

## 2020-01-30 VITALS — DIASTOLIC BLOOD PRESSURE: 70 MMHG | TEMPERATURE: 98 F | HEART RATE: 87 BPM | SYSTOLIC BLOOD PRESSURE: 101 MMHG

## 2020-01-30 DIAGNOSIS — Z12.5 ENCOUNTER FOR PROSTATE CANCER SCREENING: ICD-10-CM

## 2020-01-30 DIAGNOSIS — Z12.5 ENCOUNTER FOR PROSTATE CANCER SCREENING: Primary | ICD-10-CM

## 2020-01-30 DIAGNOSIS — R97.20 ELEVATED PSA: ICD-10-CM

## 2020-01-30 LAB — COMPLEXED PSA SERPL-MCNC: 6.5 NG/ML (ref 0–4)

## 2020-01-30 PROCEDURE — 3078F PR MOST RECENT DIASTOLIC BLOOD PRESSURE < 80 MM HG: ICD-10-PCS | Mod: HCNC,CPTII,S$GLB, | Performed by: STUDENT IN AN ORGANIZED HEALTH CARE EDUCATION/TRAINING PROGRAM

## 2020-01-30 PROCEDURE — 3074F SYST BP LT 130 MM HG: CPT | Mod: HCNC,CPTII,S$GLB, | Performed by: STUDENT IN AN ORGANIZED HEALTH CARE EDUCATION/TRAINING PROGRAM

## 2020-01-30 PROCEDURE — 1101F PR PT FALLS ASSESS DOC 0-1 FALLS W/OUT INJ PAST YR: ICD-10-PCS | Mod: HCNC,CPTII,S$GLB, | Performed by: STUDENT IN AN ORGANIZED HEALTH CARE EDUCATION/TRAINING PROGRAM

## 2020-01-30 PROCEDURE — 1159F PR MEDICATION LIST DOCUMENTED IN MEDICAL RECORD: ICD-10-PCS | Mod: HCNC,S$GLB,, | Performed by: STUDENT IN AN ORGANIZED HEALTH CARE EDUCATION/TRAINING PROGRAM

## 2020-01-30 PROCEDURE — 99999 PR PBB SHADOW E&M-EST. PATIENT-LVL III: ICD-10-PCS | Mod: PBBFAC,HCNC,, | Performed by: STUDENT IN AN ORGANIZED HEALTH CARE EDUCATION/TRAINING PROGRAM

## 2020-01-30 PROCEDURE — 1126F PR PAIN SEVERITY QUANTIFIED, NO PAIN PRESENT: ICD-10-PCS | Mod: HCNC,S$GLB,, | Performed by: STUDENT IN AN ORGANIZED HEALTH CARE EDUCATION/TRAINING PROGRAM

## 2020-01-30 PROCEDURE — 36415 COLL VENOUS BLD VENIPUNCTURE: CPT | Mod: HCNC

## 2020-01-30 PROCEDURE — 99214 PR OFFICE/OUTPT VISIT, EST, LEVL IV, 30-39 MIN: ICD-10-PCS | Mod: HCNC,S$GLB,, | Performed by: STUDENT IN AN ORGANIZED HEALTH CARE EDUCATION/TRAINING PROGRAM

## 2020-01-30 PROCEDURE — 1126F AMNT PAIN NOTED NONE PRSNT: CPT | Mod: HCNC,S$GLB,, | Performed by: STUDENT IN AN ORGANIZED HEALTH CARE EDUCATION/TRAINING PROGRAM

## 2020-01-30 PROCEDURE — 3074F PR MOST RECENT SYSTOLIC BLOOD PRESSURE < 130 MM HG: ICD-10-PCS | Mod: HCNC,CPTII,S$GLB, | Performed by: STUDENT IN AN ORGANIZED HEALTH CARE EDUCATION/TRAINING PROGRAM

## 2020-01-30 PROCEDURE — 84153 ASSAY OF PSA TOTAL: CPT | Mod: HCNC

## 2020-01-30 PROCEDURE — 1159F MED LIST DOCD IN RCRD: CPT | Mod: HCNC,S$GLB,, | Performed by: STUDENT IN AN ORGANIZED HEALTH CARE EDUCATION/TRAINING PROGRAM

## 2020-01-30 PROCEDURE — 3078F DIAST BP <80 MM HG: CPT | Mod: HCNC,CPTII,S$GLB, | Performed by: STUDENT IN AN ORGANIZED HEALTH CARE EDUCATION/TRAINING PROGRAM

## 2020-01-30 PROCEDURE — 99999 PR PBB SHADOW E&M-EST. PATIENT-LVL III: CPT | Mod: PBBFAC,HCNC,, | Performed by: STUDENT IN AN ORGANIZED HEALTH CARE EDUCATION/TRAINING PROGRAM

## 2020-01-30 PROCEDURE — 1101F PT FALLS ASSESS-DOCD LE1/YR: CPT | Mod: HCNC,CPTII,S$GLB, | Performed by: STUDENT IN AN ORGANIZED HEALTH CARE EDUCATION/TRAINING PROGRAM

## 2020-01-30 PROCEDURE — 99214 OFFICE O/P EST MOD 30 MIN: CPT | Mod: HCNC,S$GLB,, | Performed by: STUDENT IN AN ORGANIZED HEALTH CARE EDUCATION/TRAINING PROGRAM

## 2020-01-30 NOTE — PROGRESS NOTES
Subjective:       Patient ID: Brandan Werner is a 74 y.o. male.    Chief Complaint:  Prostate cancer screening  This is a 74 y.o.  male patient that is new to me but not new to the system.  The patient is self referred to me for continued monitoring of his PSA.  He does not have any family history of prostate cancer. Has 3 piece IPP for ED. He has a history of 2 neg TRUS biopsies in the past - no cancer detected, ASAP on initial biopsy.      Prostate biopsy history:  2/3/14 was done for a PSA of 62 (10/18/2013); results were benign  4/8/13 was done for a PSA of 4.49 (2/22/13); results had ASAP      LAST PSA  Lab Results   Component Value Date    PSA 3.0 11/02/2016    PSA 5.0 (H) 05/02/2014    PSA 6.2 (H) 10/18/2013    PSA 4.49 (H) 02/22/2013    PSA 4.1 (H) 05/09/2012    PSA 4.04 (H) 04/05/2012    PSA 2.8 08/31/2010    PSA 3.1 02/25/2009    PSA 2.2 09/06/2007    PSA 1.8 09/30/2006    PSA 1.7 12/28/2005    PSA 1.8 10/30/2004    PSADIAG 7.0 (H) 03/07/2019    PSADIAG 6.6 (H) 09/07/2018    PSADIAG 6.4 (H) 06/08/2018    PSADIAG 5.3 (H) 01/03/2018    PSADIAG 2.9 02/22/2016    PSADIAG 2.9 08/24/2015    PSADIAG 5.7 (H) 02/24/2015    PSADIAG 5.1 (H) 06/26/2014    PSADIAG 5.8 (H) 12/03/2013       Lab Results   Component Value Date    CREATININE 0.8 11/25/2019      Past Medical History:   Diagnosis Date    BPH (benign prostatic hyperplasia)     Cataract     Elevated PSA     Erectile dysfunction     Hyperlipidemia     Hypertension     Hypogonadism male     Obesity     Shoulder arthritis     Urinary tract infection        Past Surgical History:   Procedure Laterality Date    COLONOSCOPY N/A 11/26/2018    Procedure: COLONOSCOPY;  Surgeon: Germán Moss MD;  Location: [facility] ([address] [place] [address]);  Service: Endoscopy;  Laterality: N/A;    COLONOSCOPY W/ BIOPSIES  2010    PENILE PROSTHESIS IMPLANT         Family History   Problem Relation Age of Onset    Cancer Mother         cancer    Cataracts Mother     Heart disease  Father 62        M.I.    Stroke Brother 62    Amblyopia Neg Hx     Blindness Neg Hx     Glaucoma Neg Hx     Macular degeneration Neg Hx     Retinal detachment Neg Hx     Strabismus Neg Hx     Prostate cancer Neg Hx     Kidney disease Neg Hx        Social History     Tobacco Use    Smoking status: Former Smoker     Packs/day: 1.00     Years: 10.00     Pack years: 10.00    Smokeless tobacco: Never Used   Substance Use Topics    Alcohol use: No    Drug use: No       Current Outpatient Medications on File Prior to Visit   Medication Sig Dispense Refill    ascorbic acid (VITAMIN C) 250 MG tablet Take 250 mg by mouth once daily.      atorvastatin (LIPITOR) 40 MG tablet Take 20 mg by mouth once daily.      diclofenac (VOLTAREN) 75 MG EC tablet Take 1 tablet (75 mg total) by mouth 2 (two) times daily as needed (joint pain). 60 tablet 3    FLUoxetine 20 MG capsule Take 40 mg by mouth once daily.      fluticasone (FLONASE) 50 mcg/actuation nasal spray 2 sprays (100 mcg total) by Each Nare route once daily. 1 Bottle 0    FOLIC ACID ORAL Take 1 tablet by mouth every morning.      lisinopril (PRINIVIL,ZESTRIL) 5 MG tablet Take 5 mg by mouth once daily.      meloxicam (MOBIC) 15 MG tablet Take 15 mg by mouth daily as needed for Pain (and inflammation).      multivitamin (ONE DAILY MULTIVITAMIN) per tablet Take 1 tablet by mouth once daily.      polyethylene glycol (GLYCOLAX) 17 gram PwPk Take 17 g by mouth once daily. 30 packet 2    tamsulosin (FLOMAX) 0.4 mg Cp24 Take 1 capsule (0.4 mg total) by mouth nightly. 30 capsule 11    vitamin E 100 UNIT capsule Take 100 Units by mouth every morning.      VOLTAREN 1 % Gel Apply 4 g topically 4 (four) times daily as needed (pain and inflammation).        No current facility-administered medications on file prior to visit.        Review of patient's allergies indicates:  No Known Allergies    Review of Systems   Constitutional: Negative for chills.   HENT:  Negative for congestion.    Eyes: Negative for visual disturbance.   Respiratory: Negative for shortness of breath.    Cardiovascular: Negative for chest pain.   Gastrointestinal: Negative for abdominal distention.   Musculoskeletal: Negative for gait problem.   Skin: Negative for color change.   Neurological: Negative for dizziness.   Psychiatric/Behavioral: Negative for agitation.       Objective:      Physical Exam   Constitutional: He appears well-developed and well-nourished.   HENT:   Head: Normocephalic.   Eyes: Pupils are equal, round, and reactive to light.   Neck: Normal range of motion.   Cardiovascular: Intact distal pulses.   Pulmonary/Chest: Effort normal.   Abdominal: Soft.   Genitourinary:   Genitourinary Comments: Smooth 35-40 g prostate; nonboggy, no nodules palpable   Musculoskeletal: Normal range of motion.   Neurological: He is alert.   Skin: Skin is warm and dry.   Psychiatric: He has a normal mood and affect.       Assessment:       1. Encounter for prostate cancer screening    2. Elevated PSA        Plan:       1. PSA today- patient desires a phone call with results.   2. The American Urological Association guideline statements regarding PSA blood testing for prostate cancer screening are as follows for this patient's age group:    The Panel does not recommend routine PSA screening in men age 70+ years or any man with less than a 10 to 15 year life expectancy. (Recommendation; Evidence Strength Grade C)Some men age 70+ years who are in excellent health may benefit from prostate cancer screening. Men over age 70 years who wish to be screened should do so after an understanding that the ratio of benefit to harm declines with age, although there is evidence that men with high risk disease in this age range may benefit from early diagnosis and treatment over a decade or less. In order to identify the older man more likely to benefit from treatment if screening takes place, the Panel recommends  two approaches. First, increasing the prostate biopsy threshold based on evidence that men with a PSA level above 10ng/mL are more likely to benefit from treatment of prostate cancer when compared to those with a PSA below 10ng/mL. Second, discontinuation of PSA screening among men with a PSA below 3ng/mL, given evidence that these men have a significantly lower likelihood of being diagnosed with a lethal prostate cancer during the remaining years of life when compared to men with a PSA above 3ng/mL.    https://www.auanet.org/guidelines/prostate-cancer-early-detection-(2013-reviewed-for-currency-2018)    3. CC Dr. Chicas        Encounter for prostate cancer screening  -     PSA, Screening; Future; Expected date: 01/30/2020    Elevated PSA

## 2020-01-30 NOTE — TELEPHONE ENCOUNTER
----- Message from Katharine Laboy MD sent at 1/30/2020  1:11 PM CST -----  Please call to let him know his PSA resulted back at 6.5, which was lower than when it was last checked by Dr. Dao indicating to me that it is stable. Please create a reminder for patient to get next PSA in 1 year and see me after in clinic.

## 2020-02-03 ENCOUNTER — TELEPHONE (OUTPATIENT)
Dept: INTERNAL MEDICINE | Facility: CLINIC | Age: 75
End: 2020-02-03

## 2020-02-03 ENCOUNTER — OFFICE VISIT (OUTPATIENT)
Dept: URGENT CARE | Facility: CLINIC | Age: 75
End: 2020-02-03

## 2020-02-03 VITALS
TEMPERATURE: 98 F | HEART RATE: 74 BPM | RESPIRATION RATE: 18 BRPM | DIASTOLIC BLOOD PRESSURE: 89 MMHG | SYSTOLIC BLOOD PRESSURE: 138 MMHG | BODY MASS INDEX: 32.64 KG/M2 | HEIGHT: 72 IN | WEIGHT: 241 LBS | OXYGEN SATURATION: 98 %

## 2020-02-03 DIAGNOSIS — Z02.4 ENCOUNTER FOR COMMERCIAL DRIVER MEDICAL EXAMINATION (CDME): Primary | ICD-10-CM

## 2020-02-03 DIAGNOSIS — G47.9 SLEEP DISTURBANCE: Primary | ICD-10-CM

## 2020-02-03 PROBLEM — H90.3 SENSORINEURAL HEARING LOSS (SNHL) OF BOTH EARS: Chronic | Status: ACTIVE | Noted: 2017-06-29

## 2020-02-03 PROCEDURE — 99499 UNLISTED E&M SERVICE: CPT | Mod: S$GLB,,, | Performed by: NURSE PRACTITIONER

## 2020-02-03 PROCEDURE — 99499 PR PHYSICAL - DOT/CDL: ICD-10-PCS | Mod: S$GLB,,, | Performed by: NURSE PRACTITIONER

## 2020-02-03 NOTE — PROGRESS NOTES
Subjective:       Patient ID: Brandan Werner is a 74 y.o. male.    Chief Complaint: Annual Exam (CDL Physical)    HPI  ROS     Objective:      Physical Exam    Assessment:       1. Encounter for  medical examination (CDME)        Plan:       Urine noted positive for protein, ketones, and blood.  States he is just started seeing a urologist for his BPH.  States he has a hearing aid eval next week.  Stop Bang was 3, indicating high risk for apnea.  Advised patient I would give a 3 month temporary until he can have these resolved and give me paperwork regarding these issues.  Verbalized understanding.               Follow up if symptoms worsen or fail to improve.      Patient Instructions   Please follow up with your Primary care provider within 2-5 days if your signs and symptoms have not resolved or worsen.     If your condition worsens or fails to improve we recommend that you receive another evaluation at the emergency room immediately or contact your primary medical clinic to discuss your concerns.    You must understand that you have received an Urgent Care treatment only and that you may be released before all of your medical problems are known or treated.   You, the patient, will arrange for follow up care as instructed.

## 2020-02-03 NOTE — TELEPHONE ENCOUNTER
"----- Message from Lyndon Snell sent at 2/3/2020  1:28 PM CST -----  Contact: Spouse [phone]  Type: Orders Request    What orders/ testing are being requested?    Is there a future appointment scheduled for the patient with PCP? No     When?  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 Spouse [phone]    Please call an advise  Thank you        "

## 2020-02-09 ENCOUNTER — PATIENT OUTREACH (OUTPATIENT)
Dept: ADMINISTRATIVE | Facility: OTHER | Age: 75
End: 2020-02-09

## 2020-02-11 ENCOUNTER — OFFICE VISIT (OUTPATIENT)
Dept: UROLOGY | Facility: CLINIC | Age: 75
End: 2020-02-11
Payer: MEDICARE

## 2020-02-11 VITALS — HEART RATE: 66 BPM | SYSTOLIC BLOOD PRESSURE: 110 MMHG | DIASTOLIC BLOOD PRESSURE: 73 MMHG

## 2020-02-11 DIAGNOSIS — R31.9 HEMATURIA, UNSPECIFIED TYPE: Primary | ICD-10-CM

## 2020-02-11 DIAGNOSIS — R31.21 ASYMPTOMATIC MICROSCOPIC HEMATURIA: ICD-10-CM

## 2020-02-11 DIAGNOSIS — R97.20 ELEVATED PSA: ICD-10-CM

## 2020-02-11 LAB
BILIRUB SERPL-MCNC: NORMAL MG/DL
BLOOD URINE, POC: NORMAL
COLOR, POC UA: YELLOW
GLUCOSE UR QL STRIP: NORMAL
KETONES UR QL STRIP: NORMAL
LEUKOCYTE ESTERASE URINE, POC: NORMAL
NITRITE, POC UA: NORMAL
PH, POC UA: 5
PROTEIN, POC: NORMAL
SPECIFIC GRAVITY, POC UA: 1.02
UROBILINOGEN, POC UA: NORMAL

## 2020-02-11 PROCEDURE — 99214 PR OFFICE/OUTPT VISIT, EST, LEVL IV, 30-39 MIN: ICD-10-PCS | Mod: HCNC,25,S$GLB, | Performed by: STUDENT IN AN ORGANIZED HEALTH CARE EDUCATION/TRAINING PROGRAM

## 2020-02-11 PROCEDURE — 1159F PR MEDICATION LIST DOCUMENTED IN MEDICAL RECORD: ICD-10-PCS | Mod: HCNC,S$GLB,, | Performed by: STUDENT IN AN ORGANIZED HEALTH CARE EDUCATION/TRAINING PROGRAM

## 2020-02-11 PROCEDURE — 81002 URINALYSIS NONAUTO W/O SCOPE: CPT | Mod: HCNC,S$GLB,, | Performed by: STUDENT IN AN ORGANIZED HEALTH CARE EDUCATION/TRAINING PROGRAM

## 2020-02-11 PROCEDURE — 1159F MED LIST DOCD IN RCRD: CPT | Mod: HCNC,S$GLB,, | Performed by: STUDENT IN AN ORGANIZED HEALTH CARE EDUCATION/TRAINING PROGRAM

## 2020-02-11 PROCEDURE — 3078F PR MOST RECENT DIASTOLIC BLOOD PRESSURE < 80 MM HG: ICD-10-PCS | Mod: HCNC,CPTII,S$GLB, | Performed by: STUDENT IN AN ORGANIZED HEALTH CARE EDUCATION/TRAINING PROGRAM

## 2020-02-11 PROCEDURE — 99999 PR PBB SHADOW E&M-EST. PATIENT-LVL III: ICD-10-PCS | Mod: PBBFAC,HCNC,, | Performed by: STUDENT IN AN ORGANIZED HEALTH CARE EDUCATION/TRAINING PROGRAM

## 2020-02-11 PROCEDURE — 99214 OFFICE O/P EST MOD 30 MIN: CPT | Mod: HCNC,25,S$GLB, | Performed by: STUDENT IN AN ORGANIZED HEALTH CARE EDUCATION/TRAINING PROGRAM

## 2020-02-11 PROCEDURE — 3074F PR MOST RECENT SYSTOLIC BLOOD PRESSURE < 130 MM HG: ICD-10-PCS | Mod: HCNC,CPTII,S$GLB, | Performed by: STUDENT IN AN ORGANIZED HEALTH CARE EDUCATION/TRAINING PROGRAM

## 2020-02-11 PROCEDURE — 3078F DIAST BP <80 MM HG: CPT | Mod: HCNC,CPTII,S$GLB, | Performed by: STUDENT IN AN ORGANIZED HEALTH CARE EDUCATION/TRAINING PROGRAM

## 2020-02-11 PROCEDURE — 1126F PR PAIN SEVERITY QUANTIFIED, NO PAIN PRESENT: ICD-10-PCS | Mod: HCNC,S$GLB,, | Performed by: STUDENT IN AN ORGANIZED HEALTH CARE EDUCATION/TRAINING PROGRAM

## 2020-02-11 PROCEDURE — 3074F SYST BP LT 130 MM HG: CPT | Mod: HCNC,CPTII,S$GLB, | Performed by: STUDENT IN AN ORGANIZED HEALTH CARE EDUCATION/TRAINING PROGRAM

## 2020-02-11 PROCEDURE — 1101F PR PT FALLS ASSESS DOC 0-1 FALLS W/OUT INJ PAST YR: ICD-10-PCS | Mod: HCNC,CPTII,S$GLB, | Performed by: STUDENT IN AN ORGANIZED HEALTH CARE EDUCATION/TRAINING PROGRAM

## 2020-02-11 PROCEDURE — 1101F PT FALLS ASSESS-DOCD LE1/YR: CPT | Mod: HCNC,CPTII,S$GLB, | Performed by: STUDENT IN AN ORGANIZED HEALTH CARE EDUCATION/TRAINING PROGRAM

## 2020-02-11 PROCEDURE — 99999 PR PBB SHADOW E&M-EST. PATIENT-LVL III: CPT | Mod: PBBFAC,HCNC,, | Performed by: STUDENT IN AN ORGANIZED HEALTH CARE EDUCATION/TRAINING PROGRAM

## 2020-02-11 PROCEDURE — 81002 POCT URINE DIPSTICK WITHOUT MICROSCOPE: ICD-10-PCS | Mod: HCNC,S$GLB,, | Performed by: STUDENT IN AN ORGANIZED HEALTH CARE EDUCATION/TRAINING PROGRAM

## 2020-02-11 PROCEDURE — 1126F AMNT PAIN NOTED NONE PRSNT: CPT | Mod: HCNC,S$GLB,, | Performed by: STUDENT IN AN ORGANIZED HEALTH CARE EDUCATION/TRAINING PROGRAM

## 2020-02-11 NOTE — PROGRESS NOTES
Subjective:       Patient ID: Brandan Werner is a 74 y.o. male.    Chief Complaint: Hematuria  This is a 74 y.o.  male patient that is an established patient of mine.   The patient is self referred to me for continued monitoring of his PSA.  He does not have any family history of prostate cancer. Has 3 piece IPP for ED. He has a history of 2 neg TRUS biopsies in the past - no cancer detected, ASAP on initial biopsy.    Prostate biopsy history:  2/3/14 was done for a PSA of 62 (10/18/2013); results were benign  4/8/13 was done for a PSA of 4.49 (2/22/13); results had ASAP    He was on flomax, then finasteride was added. He stopped finasteride due to breast enlargement side effects. 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 He actually has undergone a benign CT urogram and cystoscopy in 2015 with Dr. Gusman. He states sometimes he postpones urination (holds urine) and he believes that may have caused trace blood in the urine.   POCT urinalysis - neg today    LAST PSA  Lab Results   Component Value Date    PSA 6.5 (H) 01/30/2020    PSA 3.0 11/02/2016    PSA 5.0 (H) 05/02/2014    PSA 6.2 (H) 10/18/2013    PSA 4.49 (H) 02/22/2013    PSA 4.1 (H) 05/09/2012    PSA 4.04 (H) 04/05/2012    PSA 2.8 08/31/2010    PSA 3.1 02/25/2009    PSA 2.2 09/06/2007    PSA 1.8 09/30/2006    PSA 1.7 12/28/2005    PSA 1.8 10/30/2004    PSADIAG 7.0 (H) 03/07/2019    PSADIAG 6.6 (H) 09/07/2018    PSADIAG 6.4 (H) 06/08/2018    PSADIAG 5.3 (H) 01/03/2018    PSADIAG 2.9 02/22/2016    PSADIAG 2.9 08/24/2015    PSADIAG 5.7 (H) 02/24/2015    PSADIAG 5.1 (H) 06/26/2014    PSADIAG 5.8 (H) 12/03/2013       Lab Results   Component Value Date    CREATININE 0.8 11/25/2019    ---  Past Medical History:   Diagnosis Date    Anxiety     BPH (benign prostatic hyperplasia)     Cataract     Elevated PSA     Erectile dysfunction     Hyperlipidemia      Hypertension     Hypogonadism male     Obesity     Shoulder arthritis     Urinary tract infection        Past Surgical History:   Procedure Laterality Date    COLONOSCOPY N/A 11/26/2018    Procedure: COLONOSCOPY;  Surgeon: Germán Moss MD;  Location: [facility] ([address] [place] [address]);  Service: Endoscopy;  Laterality: N/A;    COLONOSCOPY W/ BIOPSIES  2010    PENILE PROSTHESIS IMPLANT       Family History   Problem Relation Age of Onset    Cancer Mother         cancer    Cataracts Mother     Heart disease Father 62        M.I.    Stroke Brother 62    Amblyopia Neg Hx     Blindness Neg Hx     Glaucoma Neg Hx     Macular degeneration Neg Hx     Retinal detachment Neg Hx     Strabismus Neg Hx     Prostate cancer Neg Hx     Kidney disease Neg Hx        Social History     Tobacco Use    Smoking status: Former Smoker     Packs/day: 1.00     Years: 10.00     Pack years: 10.00    Smokeless tobacco: Never Used   Substance Use Topics    Alcohol use: No    Drug use: No       Current Outpatient Medications on File Prior to Visit   Medication Sig Dispense Refill    ascorbic acid (VITAMIN C) 250 MG tablet Take 250 mg by mouth once daily.      atorvastatin (LIPITOR) 40 MG tablet Take 20 mg by mouth once daily.      diclofenac (VOLTAREN) 75 MG EC tablet Take 1 tablet (75 mg total) by mouth 2 (two) times daily as needed (joint pain). 60 tablet 3    FLUoxetine 20 MG capsule Take 40 mg by mouth once daily.      fluticasone (FLONASE) 50 mcg/actuation nasal spray 2 sprays (100 mcg total) by Each Nare route once daily. 1 Bottle 0    FOLIC ACID ORAL Take 1 tablet by mouth every morning.      lisinopril (PRINIVIL,ZESTRIL) 5 MG tablet Take 5 mg by mouth once daily.      meloxicam (MOBIC) 15 MG tablet Take 15 mg by mouth daily as needed for Pain (and inflammation).      multivitamin (ONE DAILY MULTIVITAMIN) per tablet Take 1 tablet by mouth once daily.      polyethylene glycol (GLYCOLAX) 17 gram PwPk Take 17 g by mouth  once daily. 30 packet 2    tamsulosin (FLOMAX) 0.4 mg Cp24 Take 1 capsule (0.4 mg total) by mouth nightly. 30 capsule 11    vitamin E 100 UNIT capsule Take 100 Units by mouth every morning.      VOLTAREN 1 % Gel Apply 4 g topically 4 (four) times daily as needed (pain and inflammation).        No current facility-administered medications on file prior to visit.        Review of patient's allergies indicates:  No Known Allergies    Review of Systems   Constitutional: Negative for chills.   HENT: Negative for congestion.    Eyes: Negative for visual disturbance.   Respiratory: Negative for shortness of breath.    Cardiovascular: Negative for chest pain.   Gastrointestinal: Negative for abdominal distention.   Musculoskeletal: Negative for gait problem.   Skin: Negative for color change.   Neurological: Negative for dizziness.   Psychiatric/Behavioral: Negative for agitation.       Objective:      Physical Exam   Constitutional: He appears well-developed and well-nourished.   HENT:   Head: Normocephalic.   Eyes: Pupils are equal, round, and reactive to light.   Neck: Normal range of motion.   Cardiovascular: Intact distal pulses.   Pulmonary/Chest: Effort normal.   Abdominal: Soft. He exhibits no distension. There is no tenderness.   Musculoskeletal: Normal range of motion.   Neurological: He is alert.   Skin: Skin is warm and dry.   Psychiatric: He has a normal mood and affect.       Assessment:       1. Hematuria, unspecified type    2. Elevated PSA    3. Asymptomatic microscopic hematuria        Plan:         1. Counseled the patient that even though today's urinalysis is benign, in light of his previous microscopic hematuria as well as the urine performed by an outside facility during his routine physical detecting trace blood in the urine, I recommend repeating the workup. The workup includes a CT urogram and a flexible cystoscopy performed here in the clinic. After answering all of the questions about the  workup the patient was amenable in proceeding.  2. BMP, CT urogram, cystoscopy.  3. 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     Hematuria, unspecified type  -     POCT URINE DIPSTICK WITHOUT MICROSCOPE  -     Basic metabolic panel; Future; Expected date: 02/11/2020  -     CT Urogram Abd Pelvis W WO; Future; Expected date: 02/11/2020  -     Cystoscopy; Future; Expected date: 02/11/2020    Elevated PSA  -     POCT URINE DIPSTICK WITHOUT MICROSCOPE    Asymptomatic microscopic hematuria  -     Basic metabolic panel; Future; Expected date: 02/11/2020  -     CT Urogram Abd Pelvis W WO; Future; Expected date: 02/11/2020  -     Cystoscopy; Future; Expected date: 02/11/2020

## 2020-02-12 ENCOUNTER — LAB VISIT (OUTPATIENT)
Dept: LAB | Facility: HOSPITAL | Age: 75
End: 2020-02-12
Attending: STUDENT IN AN ORGANIZED HEALTH CARE EDUCATION/TRAINING PROGRAM
Payer: MEDICARE

## 2020-02-12 DIAGNOSIS — R31.21 ASYMPTOMATIC MICROSCOPIC HEMATURIA: ICD-10-CM

## 2020-02-12 DIAGNOSIS — R31.9 HEMATURIA, UNSPECIFIED TYPE: ICD-10-CM

## 2020-02-12 LAB
ANION GAP SERPL CALC-SCNC: 5 MMOL/L (ref 8–16)
BUN SERPL-MCNC: 22 MG/DL (ref 8–23)
CALCIUM SERPL-MCNC: 8.4 MG/DL (ref 8.7–10.5)
CHLORIDE SERPL-SCNC: 108 MMOL/L (ref 95–110)
CO2 SERPL-SCNC: 28 MMOL/L (ref 23–29)
CREAT SERPL-MCNC: 0.9 MG/DL (ref 0.5–1.4)
EST. GFR  (AFRICAN AMERICAN): >60 ML/MIN/1.73 M^2
EST. GFR  (NON AFRICAN AMERICAN): >60 ML/MIN/1.73 M^2
GLUCOSE SERPL-MCNC: 93 MG/DL (ref 70–110)
POTASSIUM SERPL-SCNC: 4 MMOL/L (ref 3.5–5.1)
SODIUM SERPL-SCNC: 141 MMOL/L (ref 136–145)

## 2020-02-12 PROCEDURE — 80048 BASIC METABOLIC PNL TOTAL CA: CPT | Mod: HCNC

## 2020-02-12 PROCEDURE — 36415 COLL VENOUS BLD VENIPUNCTURE: CPT | Mod: HCNC

## 2020-02-14 ENCOUNTER — HOSPITAL ENCOUNTER (OUTPATIENT)
Dept: RADIOLOGY | Facility: HOSPITAL | Age: 75
Discharge: HOME | End: 2020-02-14
Attending: STUDENT IN AN ORGANIZED HEALTH CARE EDUCATION/TRAINING PROGRAM
Payer: MEDICARE

## 2020-02-14 DIAGNOSIS — R31.9 HEMATURIA, UNSPECIFIED TYPE: ICD-10-CM

## 2020-02-14 DIAGNOSIS — R31.21 ASYMPTOMATIC MICROSCOPIC HEMATURIA: ICD-10-CM

## 2020-02-14 PROCEDURE — 74178 CT ABD&PLV WO CNTR FLWD CNTR: CPT | Mod: 26,HCNC,, | Performed by: RADIOLOGY

## 2020-02-14 PROCEDURE — 74178 CT ABD&PLV WO CNTR FLWD CNTR: CPT | Mod: TC,HCNC

## 2020-02-14 PROCEDURE — 74178 CT UROGRAM ABD PELVIS W WO: ICD-10-PCS | Mod: 26,HCNC,, | Performed by: RADIOLOGY

## 2020-02-14 PROCEDURE — 25500020 PHARM REV CODE 255: Mod: HCNC | Performed by: STUDENT IN AN ORGANIZED HEALTH CARE EDUCATION/TRAINING PROGRAM

## 2020-02-14 RX ADMIN — IOHEXOL 150 ML: 350 INJECTION, SOLUTION INTRAVENOUS at 04:02

## 2020-02-18 ENCOUNTER — PATIENT OUTREACH (OUTPATIENT)
Dept: ADMINISTRATIVE | Facility: OTHER | Age: 75
End: 2020-02-18

## 2020-02-20 ENCOUNTER — PROCEDURE VISIT (OUTPATIENT)
Dept: UROLOGY | Facility: CLINIC | Age: 75
End: 2020-02-20
Payer: MEDICARE

## 2020-02-20 VITALS
TEMPERATURE: 98 F | HEART RATE: 80 BPM | SYSTOLIC BLOOD PRESSURE: 128 MMHG | OXYGEN SATURATION: 100 % | DIASTOLIC BLOOD PRESSURE: 80 MMHG

## 2020-02-20 DIAGNOSIS — R31.21 ASYMPTOMATIC MICROSCOPIC HEMATURIA: ICD-10-CM

## 2020-02-20 DIAGNOSIS — R31.9 HEMATURIA, UNSPECIFIED TYPE: ICD-10-CM

## 2020-02-20 PROCEDURE — 52000 PR CYSTOURETHROSCOPY: ICD-10-PCS | Mod: HCNC,S$GLB,, | Performed by: STUDENT IN AN ORGANIZED HEALTH CARE EDUCATION/TRAINING PROGRAM

## 2020-02-20 PROCEDURE — 52000 CYSTOURETHROSCOPY: CPT | Mod: HCNC,S$GLB,, | Performed by: STUDENT IN AN ORGANIZED HEALTH CARE EDUCATION/TRAINING PROGRAM

## 2020-02-20 NOTE — LETTER
February 20, 2020      [facility]  [address], [address]  [place] [address]  Phone: [phone]       Patient: Brandan Werner   YOB: 1945  Date of Visit: 02/20/2020    To Whom It May Concern:    Damián Werner  was at [facility] on 02/20/2020. He has undergone the workup for the trace blood in the urine. He may return to work with no restrictions. Please see my clinic note for further details. If you have any questions or concerns, or if I can be of further assistance, please do not hesitate to contact me.    Sincerely,      Katharine Laboy MD

## 2020-02-20 NOTE — PROCEDURES
Procedures   Procedure:   1. Flexible cysto-uretheroscopy    Pre Procedure Diagnosis:  1. Microscopic hematuria    Post Procedure Diagnosis:  1. Microscopic hematuria  2. Enlarged prostate  3. Kidney stone seen on CT    Surgeon: Katharine Laboy MD    Anesthesia: 2% uro-jet lidocaine jelly for local analgesia    Procedure note:  A flexible cysto-urethroscopy was performed after consent was obtained.  The risks and benefits were explained. 2% lidocaine urojet was used for local analgesia. The genitalia was prepped and draped in the sterile fashion.     The flexible scope was advanced into the urethra and into the bladder. A urethral stricture was not seen during scope passage.     Once the cystoscope was in the bladder, we systematically surveyed the entire bladder. The bilateral ureteral orifices were identified in their normal orthotopic locations. clear bilateral ureteral efflux was identified in retroflexed position    The bladder was completely surveyed in a systematic fashion. No bladder tumors or lesions were seen. Upon retroflexion a median lobe was noted to be present - significantly enlarged with prominent vascularity.    As the flexible cystoscope was being withdrawn, the prostate was evaluated carefully. The lateral lobes of the prostate were noted to be significant enlarged and in contact with each other, known as kissing lobes.     The patient tolerated the procedure well without complication.     Findings in summary:  Trilobar hypertrophy of the prostate.  Normal bladder mucosa      Assessment: 74 y.o. male with microscopic hematuria     Plan:  1. Cystoscopy findings - benign, only demonstrated an enlarged prostate.  2. CT urogram - small kidney stone and enlarged prostate.  3. The trace blood in the urine is likely due to the enlarged prostate and possibly also the kidney stone. No interventions needed at this time. He will continue flomax medication for the prostate and we will continue to monitor his  kidney stone with yearly ultrasound.

## 2020-02-20 NOTE — LETTER
February 20, 2020      [facility]  [address], [address]  [place] [address]  Phone: [phone]       Patient: Brandan Werner   YOB: 1945  Date of Visit: 02/20/2020    To Whom It May Concern:    Damián Werner  was at [facility] on 02/20/2020. He has undergone the workup for the trace blood in the urine. 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 Please see my clinic note for further details. If you have any questions or concerns, or if I can be of further assistance, please do not hesitate to contact me.      Sincerely,      Katharine Laboy MD

## 2020-02-28 ENCOUNTER — PATIENT OUTREACH (OUTPATIENT)
Dept: ADMINISTRATIVE | Facility: OTHER | Age: 75
End: 2020-02-28

## 2020-02-28 ENCOUNTER — TELEPHONE (OUTPATIENT)
Dept: URGENT CARE | Facility: CLINIC | Age: 75
End: 2020-02-28

## 2020-02-28 NOTE — TELEPHONE ENCOUNTER
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 I called [facility] and spoke to Tony Oliva who told me to give patient copy of physical because we should be giving patient's their CDL long form physical along with the Medical Examiner's Certificate every time a patient is seen because 's have to have this with them at all times.

## 2020-03-02 ENCOUNTER — OFFICE VISIT (OUTPATIENT)
Dept: NEUROLOGY | Facility: CLINIC | Age: 75
End: 2020-03-02
Payer: MEDICARE

## 2020-03-02 VITALS
WEIGHT: 244.38 LBS | HEART RATE: 80 BPM | SYSTOLIC BLOOD PRESSURE: 127 MMHG | HEIGHT: 72 IN | DIASTOLIC BLOOD PRESSURE: 80 MMHG | BODY MASS INDEX: 33.1 KG/M2

## 2020-03-02 DIAGNOSIS — R25.1 TREMOR: Primary | ICD-10-CM

## 2020-03-02 PROCEDURE — 1101F PT FALLS ASSESS-DOCD LE1/YR: CPT | Mod: HCNC,CPTII,S$GLB, | Performed by: PSYCHIATRY & NEUROLOGY

## 2020-03-02 PROCEDURE — 3074F PR MOST RECENT SYSTOLIC BLOOD PRESSURE < 130 MM HG: ICD-10-PCS | Mod: HCNC,CPTII,S$GLB, | Performed by: PSYCHIATRY & NEUROLOGY

## 2020-03-02 PROCEDURE — 99499 RISK ADDL DX/OHS AUDIT: ICD-10-PCS | Mod: HCNC,S$GLB,, | Performed by: PSYCHIATRY & NEUROLOGY

## 2020-03-02 PROCEDURE — 99999 PR PBB SHADOW E&M-EST. PATIENT-LVL III: CPT | Mod: PBBFAC,HCNC,, | Performed by: PSYCHIATRY & NEUROLOGY

## 2020-03-02 PROCEDURE — 99204 OFFICE O/P NEW MOD 45 MIN: CPT | Mod: HCNC,S$GLB,, | Performed by: PSYCHIATRY & NEUROLOGY

## 2020-03-02 PROCEDURE — 1126F PR PAIN SEVERITY QUANTIFIED, NO PAIN PRESENT: ICD-10-PCS | Mod: HCNC,S$GLB,, | Performed by: PSYCHIATRY & NEUROLOGY

## 2020-03-02 PROCEDURE — 1159F MED LIST DOCD IN RCRD: CPT | Mod: HCNC,S$GLB,, | Performed by: PSYCHIATRY & NEUROLOGY

## 2020-03-02 PROCEDURE — 99204 PR OFFICE/OUTPT VISIT, NEW, LEVL IV, 45-59 MIN: ICD-10-PCS | Mod: HCNC,S$GLB,, | Performed by: PSYCHIATRY & NEUROLOGY

## 2020-03-02 PROCEDURE — 99499 UNLISTED E&M SERVICE: CPT | Mod: HCNC,S$GLB,, | Performed by: PSYCHIATRY & NEUROLOGY

## 2020-03-02 PROCEDURE — 3079F PR MOST RECENT DIASTOLIC BLOOD PRESSURE 80-89 MM HG: ICD-10-PCS | Mod: HCNC,CPTII,S$GLB, | Performed by: PSYCHIATRY & NEUROLOGY

## 2020-03-02 PROCEDURE — 1126F AMNT PAIN NOTED NONE PRSNT: CPT | Mod: HCNC,S$GLB,, | Performed by: PSYCHIATRY & NEUROLOGY

## 2020-03-02 PROCEDURE — 1101F PR PT FALLS ASSESS DOC 0-1 FALLS W/OUT INJ PAST YR: ICD-10-PCS | Mod: HCNC,CPTII,S$GLB, | Performed by: PSYCHIATRY & NEUROLOGY

## 2020-03-02 PROCEDURE — 99999 PR PBB SHADOW E&M-EST. PATIENT-LVL III: ICD-10-PCS | Mod: PBBFAC,HCNC,, | Performed by: PSYCHIATRY & NEUROLOGY

## 2020-03-02 PROCEDURE — 1159F PR MEDICATION LIST DOCUMENTED IN MEDICAL RECORD: ICD-10-PCS | Mod: HCNC,S$GLB,, | Performed by: PSYCHIATRY & NEUROLOGY

## 2020-03-02 PROCEDURE — 3079F DIAST BP 80-89 MM HG: CPT | Mod: HCNC,CPTII,S$GLB, | Performed by: PSYCHIATRY & NEUROLOGY

## 2020-03-02 PROCEDURE — 3074F SYST BP LT 130 MM HG: CPT | Mod: HCNC,CPTII,S$GLB, | Performed by: PSYCHIATRY & NEUROLOGY

## 2020-03-02 NOTE — ASSESSMENT & PLAN NOTE
Jerky hand tremor, without PD-ism. No issues with gait.  Suggested Brain and Cspine MRI.  Suggested he must bring his medication list to screen for etiologies and perhaps then we can trial an anti tremor medication.

## 2020-03-02 NOTE — PROGRESS NOTES
"MOVEMENT DISORDERS CLINIC NEW CONSULT NOTE    PCP/Referring Provider: Km Chicas MD  [address] [facility] [place] [address]  Date of Service: 3/2/2020    Chief Complaint: Tremor    HPI: Brandan Werner is a R HANDED 74 y.o. male with a medical issues significant for PTSD, HTN, who presents with tremor of his hands since 1 year. His wife first noted this tremor a year ago. He notes when he brings a fork to his mouth he shakes. When he writes he also shakes and writing is illegible. He can also have a resting tremor at times.He notes he's had no fall,s and can  Walk 10 blocks. Does note R shoulder pain for 4-5 years.  He notes he took a "parkinsons medication" this AM for the first time, but unclear what he took. Unclear if his tremor improved.    Neuroleptic exposure:  None    PD Review of Symptoms:  Anosmia: none  Dysarthria/Hypophonia: none  Dysphagia/Sialorrhea: none  Depression: none  Cognitive slowing: short term at times  Hallucinations: none  Urinary changes: yes  Constipation: none  Falls: none  Freezing: none  Micrographia:  none  Sleep issues:  -COLEEN: none  -RBD: talks in sleep    Review of Systems:   Review of Systems   Constitutional: Negative for fever.   HENT: Negative for congestion.    Eyes: Negative for double vision.   Respiratory: Negative for cough and shortness of breath.    Cardiovascular: Negative for chest pain and leg swelling.   Gastrointestinal: Negative for nausea.   Genitourinary: Negative for dysuria.   Skin: Negative for rash.   Neurological: Positive for tremors. Negative for speech change and headaches.   Psychiatric/Behavioral: Positive for memory loss. Negative for depression.         Current Medications:  Outpatient Encounter Medications as of 3/2/2020   Medication Sig Dispense Refill    ascorbic acid (VITAMIN C) 250 MG tablet Take 250 mg by mouth once daily.      atorvastatin (LIPITOR) 40 MG tablet Take 20 mg by mouth once daily.      diclofenac (VOLTAREN) 75 " MG EC tablet Take 1 tablet (75 mg total) by mouth 2 (two) times daily as needed (joint pain). 60 tablet 3    FLUoxetine 20 MG capsule Take 40 mg by mouth once daily.      FOLIC ACID ORAL Take 1 tablet by mouth every morning.      lisinopril (PRINIVIL,ZESTRIL) 5 MG tablet Take 5 mg by mouth once daily.      meloxicam (MOBIC) 15 MG tablet Take 15 mg by mouth daily as needed for Pain (and inflammation).      multivitamin (ONE DAILY MULTIVITAMIN) per tablet Take 1 tablet by mouth once daily.      polyethylene glycol (GLYCOLAX) 17 gram PwPk Take 17 g by mouth once daily. 30 packet 2    tamsulosin (FLOMAX) 0.4 mg Cp24 Take 1 capsule (0.4 mg total) by mouth nightly. 30 capsule 11    vitamin E 100 UNIT capsule Take 100 Units by mouth every morning.      VOLTAREN 1 % Gel Apply 4 g topically 4 (four) times daily as needed (pain and inflammation).       fluticasone (FLONASE) 50 mcg/actuation nasal spray 2 sprays (100 mcg total) by Each Nare route once daily. (Patient not taking: Reported on 3/2/2020) 1 Bottle 0     No facility-administered encounter medications on file as of 3/2/2020.        Past Medical History:  Patient Active Problem List   Diagnosis    Essential hypertension    Hyperlipidemia    BPH (benign prostatic hyperplasia)    Elevated PSA    Nuclear sclerosis    PTSD (post-traumatic stress disorder)    Shoulder arthritis    Ectatic aorta    Sensorineural hearing loss (SNHL) of both ears    Calcified granuloma of lung    Tremor    Chronic right shoulder pain    History of agent Orange exposure    Obesity (BMI 30.0-34.9)    Facet arthritis of cervical region       Past Surgical History:  Past Surgical History:   Procedure Laterality Date    COLONOSCOPY N/A 11/26/2018    Procedure: COLONOSCOPY;  Surgeon: Germán Moss MD;  Location: [address]);  Service: Endoscopy;  Laterality: N/A;    COLONOSCOPY W/ BIOPSIES  2010    PENILE PROSTHESIS IMPLANT         Current Living Situation:  home    Social:  Social History     Socioeconomic History    Marital status:      Spouse name: Not on file    Number of children: Not on file    Years of education: Not on file    Highest education level: Not on file   Occupational History    Occupation:      Comment: self-employed   Social Needs    Financial resource strain: Not on file    Food insecurity:     Worry: Not on file     Inability: Not on file    Transportation needs:     Medical: Not on file     Non-medical: Not on file   Tobacco Use    Smoking status: Former Smoker     Packs/day: 1.00     Years: 10.00     Pack years: 10.00    Smokeless tobacco: Never Used   Substance and Sexual Activity    Alcohol use: No    Drug use: No    Sexual activity: Yes     Partners: Female   Lifestyle    Physical activity:     Days per week: Not on file     Minutes per session: Not on file    Stress: Not on file   Relationships    Social connections:     Talks on phone: Not on file     Gets together: Not on file     Attends [employer] service: Not on file     Active member of club or organization: Not on file     Attends meetings of clubs or organizations: Not on file     Relationship status: Not on file   Other Topics Concern    Not on file   Social History Narrative    Not on file       Family History:  Family History   Problem Relation Age of Onset    Cancer Mother         cancer    Cataracts Mother     Heart disease Father 62        M.I.    Stroke Brother 62    Amblyopia Neg Hx     Blindness Neg Hx     Glaucoma Neg Hx     Macular degeneration Neg Hx     Retinal detachment Neg Hx     Strabismus Neg Hx     Prostate cancer Neg Hx     Kidney disease Neg Hx        PHYSICAL:  /80 (BP Location: Left arm, Patient Position: Sitting)   Pulse 80   Ht 6' (1.829 m)   Wt 110.9 kg (244 lb 6.1 oz)   BMI 33.14 kg/m²     General Medical Examination:  General: Good hygiene, appropriate appearance.  HEENT: Normocephalic, atraumatic.    Neck: Supple.   Chest: Unlabored breathing.   CV: Symmetric pulses.   Ext: No clubbing, cyanosis, or edema.     Mental Status:  Mood/Affect: Appropriate/congruent.  Level of consciousness: Awake, alert.  Orientation: Oriented to person, place, time and situation.  Language: No Dysarthria  No hypomimia or hypophonia    Cranial nerves:  I: Not tested  II: PERRL, VFF to counting  III, IV, VI: EOMI with conjugate gaze and no nystagmus on end gaze  V: Facial sensation intact and symmetric over the bilateral V1-V3  VII: Facial muscle activation intact and symmetric over the bilateral upper and lower face  VIII: Hearing intact in the b/l ears and symmetrical to finger rub  IX, X, XII: TUP midline - no atrophy or fasiculations  X: SCMs and shoulder shrug full strength b/l and symmetric    Tremor Exam   Arms extended Arms in wing position, fingers almost touching Re-emergent Arms extended wrists extended Intention Resting Kinetic   Left ?++ jerky ?+ ? ? ?+ ? ?+   Right ?++ ?+ ? ? ?+ ?+ ?+   Head tremor:  NEG  Leg tremor: NEG     Archimedes Spirals   Left ?++   Right ?++         Motor:   -UE: 4/5 deltoids; 5/5 biceps, triceps; 5/5 wrist flexors, extensors; 5/5 interosseous; 5/5   -LEs: 5/5 hip flexion, extension; 5/5 knee flexion, extension; 5/5 ankle flexion, extension        DTRs:  ? Biceps Triceps Brachioradialis Knee Ankle   Left 2+ 2+ 2+ 0 0   Right 2+ 2+ 2+ 0 0       ? Finger taps Finger flicks OLIVA Heel taps   Left 0 0 0 0   Right 0 0 0 0     Neck tone: 0  ? Arm Leg   Left 0 0   Right 0 0     Sensation:   -Light touch: Intact and symmetric in the bilateral upper and lower extremities.  -Temp: Intact and symmetric in the bilateral upper and lower extremities.  -Vibration: Intact and symmetric in the bilateral upper and lower extremities.    Coordination:   -Finger to nose: nl    Gait:  -Arises from chair without use of hands.  -Stoop is neg  -Casual gait is: nl based  -Stride length: nl  -Arm Swing: nl  -Turning:  nl  -FOG: neg      Laboratory Data:  TSH WNL    Imaging:  NA    Assessment//Plan:   Problem List Items Addressed This Visit        Neuro    Tremor - Primary    Current Assessment & Plan     Jerky hand tremor, without PD-ism. No issues with gait.  Suggested Brain and Cspine MRI.  Suggested he must bring his medication list to screen for etiologies and perhaps then we can trial an anti tremor medication.          Relevant Orders    MRI Brain Without Contrast    MRI Cervical Spine With Contrast          Follow-up: 2mos  Discussed the importance of ongoing exercise in efforts to improve mobility and balance.  Suggested a diet high in antioxidants such as berries and green tea.    Briana Murillo MD, MS  [facility]  Department of Neurology  Movement Disorders

## 2020-03-02 NOTE — LETTER
March 2, 2020      Km Chicas MD  [address] [facility] [place] [address]           [facility]  [address]  [place] [address]  Phone: [phone]  Fax: [phone]          Patient: Brandan Werner   MR Number: [id number]   YOB: 1945   Date of Visit: 3/2/2020       Dear Dr. Km Chicas:    Thank you for referring Brandan Werner to me for evaluation. Attached you will find relevant portions of my assessment and plan of care.    If you have questions, please do not hesitate to call me. I look forward to following Brandan Werner along with you.    Sincerely,    Briana Murillo MD    Enclosure  CC:  No Recipients    If you would like to receive this communication electronically, please contact [email or web address] or [phone] to request more information on [employer] Link access.    For providers and/or their staff who would like to refer a patient to [facility], please contact us through our one-stop-shop provider referral line, [facility], at [phone].    If you feel you have received this communication in error or would no longer like to receive these types of communications, please e-mail [email or web address]

## 2020-03-03 RX ORDER — CELECOXIB 100 MG/1
200 CAPSULE ORAL
COMMUNITY
End: 2022-02-25

## 2020-03-03 RX ORDER — CELECOXIB 200 MG/1
200 CAPSULE ORAL
COMMUNITY
End: 2020-07-23

## 2020-03-03 RX ORDER — DIPHENHYDRAMINE HCL 25 MG
25 TABLET ORAL NIGHTLY PRN
COMMUNITY
End: 2022-08-09

## 2020-03-03 RX ORDER — TRAZODONE HYDROCHLORIDE 100 MG/1
100 TABLET ORAL NIGHTLY
COMMUNITY
End: 2022-02-25

## 2020-03-05 ENCOUNTER — HOSPITAL ENCOUNTER (OUTPATIENT)
Dept: RADIOLOGY | Facility: HOSPITAL | Age: 75
Discharge: HOME | End: 2020-03-05
Attending: PSYCHIATRY & NEUROLOGY
Payer: MEDICARE

## 2020-03-05 DIAGNOSIS — R25.1 TREMOR: ICD-10-CM

## 2020-03-05 PROCEDURE — 70551 MRI BRAIN STEM W/O DYE: CPT | Mod: TC,HCNC

## 2020-03-05 PROCEDURE — 70551 MRI BRAIN WITHOUT CONTRAST: ICD-10-PCS | Mod: 26,HCNC,, | Performed by: RADIOLOGY

## 2020-03-05 PROCEDURE — 72156 MRI NECK SPINE W/O & W/DYE: CPT | Mod: 26,HCNC,, | Performed by: RADIOLOGY

## 2020-03-05 PROCEDURE — 72156 MRI NECK SPINE W/O & W/DYE: CPT | Mod: TC,HCNC

## 2020-03-05 PROCEDURE — 70551 MRI BRAIN STEM W/O DYE: CPT | Mod: 26,HCNC,, | Performed by: RADIOLOGY

## 2020-03-05 PROCEDURE — 25500020 PHARM REV CODE 255: Mod: HCNC | Performed by: PSYCHIATRY & NEUROLOGY

## 2020-03-05 PROCEDURE — 72156 MRI CERVICAL SPINE W WO CONTRAST: ICD-10-PCS | Mod: 26,HCNC,, | Performed by: RADIOLOGY

## 2020-03-05 PROCEDURE — A9585 GADOBUTROL INJECTION: HCPCS | Mod: HCNC | Performed by: PSYCHIATRY & NEUROLOGY

## 2020-03-05 RX ORDER — GADOBUTROL 604.72 MG/ML
10 INJECTION INTRAVENOUS
Status: COMPLETED | OUTPATIENT
Start: 2020-03-05 | End: 2020-03-05

## 2020-03-05 RX ADMIN — GADOBUTROL 10 ML: 604.72 INJECTION INTRAVENOUS at 02:03

## 2020-03-06 ENCOUNTER — DOCUMENTATION ONLY (OUTPATIENT)
Dept: NEUROLOGY | Facility: CLINIC | Age: 75
End: 2020-03-06

## 2020-03-12 ENCOUNTER — PES CALL (OUTPATIENT)
Dept: ADMINISTRATIVE | Facility: CLINIC | Age: 75
End: 2020-03-12

## 2020-05-13 ENCOUNTER — PATIENT OUTREACH (OUTPATIENT)
Dept: ADMINISTRATIVE | Facility: OTHER | Age: 75
End: 2020-05-13

## 2020-05-18 ENCOUNTER — OFFICE VISIT (OUTPATIENT)
Dept: SLEEP MEDICINE | Facility: CLINIC | Age: 75
End: 2020-05-18
Payer: MEDICARE

## 2020-05-18 VITALS
HEIGHT: 72 IN | DIASTOLIC BLOOD PRESSURE: 73 MMHG | WEIGHT: 249.63 LBS | HEART RATE: 72 BPM | SYSTOLIC BLOOD PRESSURE: 111 MMHG | BODY MASS INDEX: 33.81 KG/M2

## 2020-05-18 DIAGNOSIS — G47.9 SLEEP DISTURBANCE: ICD-10-CM

## 2020-05-18 PROCEDURE — 3074F PR MOST RECENT SYSTOLIC BLOOD PRESSURE < 130 MM HG: ICD-10-PCS | Mod: HCNC,CPTII,S$GLB, | Performed by: PSYCHIATRY & NEUROLOGY

## 2020-05-18 PROCEDURE — 1125F AMNT PAIN NOTED PAIN PRSNT: CPT | Mod: HCNC,S$GLB,, | Performed by: PSYCHIATRY & NEUROLOGY

## 2020-05-18 PROCEDURE — 3078F DIAST BP <80 MM HG: CPT | Mod: HCNC,CPTII,S$GLB, | Performed by: PSYCHIATRY & NEUROLOGY

## 2020-05-18 PROCEDURE — 1100F PR PT FALLS ASSESS DOC 2+ FALLS/FALL W/INJURY/YR: ICD-10-PCS | Mod: HCNC,CPTII,S$GLB, | Performed by: PSYCHIATRY & NEUROLOGY

## 2020-05-18 PROCEDURE — 99204 OFFICE O/P NEW MOD 45 MIN: CPT | Mod: HCNC,S$GLB,, | Performed by: PSYCHIATRY & NEUROLOGY

## 2020-05-18 PROCEDURE — 99204 PR OFFICE/OUTPT VISIT, NEW, LEVL IV, 45-59 MIN: ICD-10-PCS | Mod: HCNC,S$GLB,, | Performed by: PSYCHIATRY & NEUROLOGY

## 2020-05-18 PROCEDURE — 99999 PR PBB SHADOW E&M-EST. PATIENT-LVL III: CPT | Mod: PBBFAC,HCNC,, | Performed by: PSYCHIATRY & NEUROLOGY

## 2020-05-18 PROCEDURE — 3288F FALL RISK ASSESSMENT DOCD: CPT | Mod: HCNC,CPTII,S$GLB, | Performed by: PSYCHIATRY & NEUROLOGY

## 2020-05-18 PROCEDURE — 3288F PR FALLS RISK ASSESSMENT DOCUMENTED: ICD-10-PCS | Mod: HCNC,CPTII,S$GLB, | Performed by: PSYCHIATRY & NEUROLOGY

## 2020-05-18 PROCEDURE — 3074F SYST BP LT 130 MM HG: CPT | Mod: HCNC,CPTII,S$GLB, | Performed by: PSYCHIATRY & NEUROLOGY

## 2020-05-18 PROCEDURE — 1125F PR PAIN SEVERITY QUANTIFIED, PAIN PRESENT: ICD-10-PCS | Mod: HCNC,S$GLB,, | Performed by: PSYCHIATRY & NEUROLOGY

## 2020-05-18 PROCEDURE — 1159F MED LIST DOCD IN RCRD: CPT | Mod: HCNC,S$GLB,, | Performed by: PSYCHIATRY & NEUROLOGY

## 2020-05-18 PROCEDURE — 1159F PR MEDICATION LIST DOCUMENTED IN MEDICAL RECORD: ICD-10-PCS | Mod: HCNC,S$GLB,, | Performed by: PSYCHIATRY & NEUROLOGY

## 2020-05-18 PROCEDURE — 99999 PR PBB SHADOW E&M-EST. PATIENT-LVL III: ICD-10-PCS | Mod: PBBFAC,HCNC,, | Performed by: PSYCHIATRY & NEUROLOGY

## 2020-05-18 PROCEDURE — 3078F PR MOST RECENT DIASTOLIC BLOOD PRESSURE < 80 MM HG: ICD-10-PCS | Mod: HCNC,CPTII,S$GLB, | Performed by: PSYCHIATRY & NEUROLOGY

## 2020-05-18 PROCEDURE — 1100F PTFALLS ASSESS-DOCD GE2>/YR: CPT | Mod: HCNC,CPTII,S$GLB, | Performed by: PSYCHIATRY & NEUROLOGY

## 2020-05-18 NOTE — PROGRESS NOTES
Brandan Werner  was seen at the request of  Km Chicas MD for sleep evaluation.    05/18/2020 INITIAL HISTORY OF PRESENT ILLNESS:  Brandan Werner is a 74 y.o. male is here to be evaluated for a sleep disorder.       CHIEF COMPLAINT:      Reports difficulty falling falling and staying asleep for the last 10 years    The patient's complaints include excessive daytime fatigue,  and interrupted sleep since  Over  10 yrs ago.    Brandan Werner denied  snoring,  witnessed breathing pauses and  gasping for air in sleep.    [facility]  - trazodone 100mg.  He is also treated for PTSD.    The patient never had tonsillectomy, adenoidectomy or UPPP    Reports difficulty falling and staying asleep     Denies symptoms concerning for parasomnia except for occasional somniloquy.  he Denies cataplexy, sleep paralysis, hallucinations surrounding sleep time.     Brandan Werner denied symptoms concerning for RLS.  He is seeing a neurologist for tremor      EPWORTH SLEEPINESS SCALE 5/18/2020   Sitting and reading 0   Watching TV 2   Sitting, inactive in a public place (e.g. a theatre or a meeting) 0   As a passenger in a car for an hour without a break 0   Lying down to rest in the afternoon when circumstances permit 1   Sitting and talking to someone 0   Sitting quietly after a lunch without alcohol 0   In a car, while stopped for a few minutes in traffic 0   Total score 3       PHQ9 5/18/2020   Little interest or pleasure in doing things: Not at all   Feeling down, depressed or hopeless: Not at all   Trouble falling asleep, staying asleep, or sleeping too much: Several days   Feeling tired or having little energy: Not at all   Poor appetite or overeating: Not at all   Feeling bad about yourself- or that you are a failure or have let yourself or family down Not at all   Trouble concentrating on things, such as reading the newspaper or watching television: Not at all   Moving or  speaking so slowly that other people could have noticed. Or the opposite- being so fidgety or restless that you have been moving around a lot more than usual: Not at all   Thoughts that you would be better off dead or hurting yourself in some way: Not at all   If you indicated you have experienced any of the aforementioned problems, how difficult have these problems made it for you to do your work, take care of things at home or get along with other people? Not difficult at all   Total Score 1     GAD7 5/18/2020   1. Feeling nervous, anxious, or on edge? 1   2. Not being able to stop or control worrying? 0   3. Worrying too much about different things? 0   4. Trouble relaxing? 1   5. Being so restless that it is hard to sit still? 0   6. Becoming easily annoyed or irritable? 0   7. Feeling afraid as if something awful might happen? 0   8. If you checked off any problems, how difficult have these problems made it for you to do your work, take care of things at home, or get along with other people? 0   ANTWAN-7 Score 2         SLEEP ROUTINE AND LIFESTYLE 05/18/2020 :    Sleep Clinic New Patient 5/18/2020   What time do you go to bed on a week day? (Give a range) 8:00p-9:00p   What time do you go to bed on a day off? (Give a range) 8:00p-9:00p   How long does it take you to fall asleep? (Give a range) instant- 3 hours   On average, how many times per night do you wake up? 2-3   How long does it take you to fall back into sleep? (Give a range) immediately   What time do you wake up to start your day on a week day? (Give a range) 3:00p   What time do you wake up to start your day on a day off? (Give a range) 3:00p   What time do you get out of bed? (Give a range) varies   On average, how many hours do you sleep? 10 hours   On average, how many naps do you take per day? 0   Rate your sleep quality from 0 to 5 (0-poor, 5-great). 4   Have you experienced:  N/a   How much weight have you lost or gained (in lbs.) in the last  year? 0   On average, how many times per night do you go to the bathroom?  2-3   Have you ever had a sleep study/CPAP machine/surgery for sleep apnea? No   Have you ever had a CPAP machine for sleep apnea? No   Have you ever had surgery for sleep apnea? No       Sleep Clinic ROS  5/18/2020   Difficulty breathing through the nose?  No   Sore throat or dry mouth in the morning? Sometimes   Irregular or very fast heart beat?  No   Shortness of breath?  No   Acid reflux? Sometimes   Body aches and pains?  No   Morning headaches? No   Dizziness? No   Mood changes?  No   Do you exercise?  No   Do you feel like moving your legs a lot?  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  Bed partner: his wife  Exercise routine:walks - 10 blocks  Caffeine: 1 cup in AM  Alcohol: no  Smoking:no    DME:       PAST MEDICAL HISTORY:    Active Ambulatory Problems     Diagnosis Date Noted    Essential hypertension     Hyperlipidemia     BPH (benign prostatic hyperplasia)     Elevated PSA 03/11/2013    Nuclear sclerosis 04/19/2013    PTSD (post-traumatic stress disorder) 09/28/2016    Shoulder arthritis 10/03/2016    Ectatic aorta 06/29/2017    Sensorineural hearing loss (SNHL) of both ears 06/29/2017    Calcified granuloma of lung 02/15/2018    Tremor 05/08/2019    Chronic right shoulder pain 05/08/2019    History of agent Orange exposure 05/08/2019    Obesity (BMI 30.0-34.9) 08/22/2019    Facet arthritis of cervical region 06/10/2018     Resolved Ambulatory Problems     Diagnosis Date Noted    Hypogonadism male     Erectile dysfunction     Obesity, Class II, BMI 35-39.9, with comorbidity     History of elevated PSA 09/28/2016    Impacted cerumen of right ear 09/28/2016    Sinusitis 04/09/2018    Achilles tendinitis of right lower extremity 06/22/2018    Antalgic gait 06/22/2018    Muscle weakness of lower extremity 06/22/2018     Past  Medical History:   Diagnosis Date    Anxiety     Cataract     Hypertension     Obesity     Urinary tract infection                 PAST SURGICAL HISTORY:    Past Surgical History:   Procedure Laterality Date    COLONOSCOPY N/A 11/26/2018    Procedure: COLONOSCOPY;  Surgeon: Germán Moss MD;  Location: [facility] ([address] [place] [address]);  Service: Endoscopy;  Laterality: N/A;    COLONOSCOPY W/ BIOPSIES  2010    PENILE PROSTHESIS IMPLANT           FAMILY HISTORY:                Family History   Problem Relation Age of Onset    Cancer Mother         cancer    Cataracts Mother     Heart disease Father 62        M.I.    Stroke Brother 62    Amblyopia Neg Hx     Blindness Neg Hx     Glaucoma Neg Hx     Macular degeneration Neg Hx     Retinal detachment Neg Hx     Strabismus Neg Hx     Prostate cancer Neg Hx     Kidney disease Neg Hx        SOCIAL HISTORY:          Tobacco:   Social History     Tobacco Use   Smoking Status Former Smoker    Packs/day: 1.00    Years: 10.00    Pack years: 10.00   Smokeless Tobacco Never Used       alcohol use:    Social History     Substance and Sexual Activity   Alcohol Use No                   ALLERGIES:  Review of patient's allergies indicates:  No Known Allergies    CURRENT MEDICATIONS:    Current Outpatient Medications   Medication Sig Dispense Refill    ascorbic acid (VITAMIN C) 250 MG tablet Take 250 mg by mouth once daily.      atorvastatin (LIPITOR) 40 MG tablet Take 20 mg by mouth once daily.      celecoxib (CELEBREX) 100 MG capsule Take 200 mg by mouth.       celecoxib (CELEBREX) 200 MG capsule Take 200 mg by mouth.      diclofenac (VOLTAREN) 75 MG EC tablet Take 1 tablet (75 mg total) by mouth 2 (two) times daily as needed (joint pain). 60 tablet 3    diphenhydrAMINE (SOMINEX) 25 mg tablet Take 25 mg by mouth nightly as needed for Insomnia.      FLUoxetine 20 MG capsule Take 40 mg by mouth once daily.      fluticasone (FLONASE) 50 mcg/actuation nasal spray 2  sprays (100 mcg total) by Each Nare route once daily. 1 Bottle 0    FOLIC ACID ORAL Take 1 tablet by mouth every morning.      lisinopril (PRINIVIL,ZESTRIL) 5 MG tablet Take 5 mg by mouth once daily.      meloxicam (MOBIC) 15 MG tablet Take 15 mg by mouth daily as needed for Pain (and inflammation).      multivitamin (ONE DAILY MULTIVITAMIN) per tablet Take 1 tablet by mouth once daily.      polyethylene glycol (GLYCOLAX) 17 gram PwPk Take 17 g by mouth once daily. 30 packet 2    tamsulosin (FLOMAX) 0.4 mg Cp24 Take 1 capsule (0.4 mg total) by mouth nightly. 30 capsule 11    traZODone (DESYREL) 100 MG tablet Take 100 mg by mouth every evening.      vitamin E 100 UNIT capsule Take 100 Units by mouth every morning.      VOLTAREN 1 % Gel Apply 4 g topically 4 (four) times daily as needed (pain and inflammation).        No current facility-administered medications for this visit.                         PHYSICAL EXAM:  /73 (BP Location: Left arm, Patient Position: Sitting, BP Method: Large (Automatic))   Pulse 72   Ht 6' (1.829 m)   Wt 113.2 kg (249 lb 9.6 oz)   BMI 33.85 kg/m²   GENERAL: Normal development, well groomed.  HEENT:   HEENT:  Conjunctivae are non-erythematous; Pupils equal, round, and reactive to light; Nose is symmetrical; Nasal mucosa is pink and moist; Septum is midline; Inferior turbinates are normal; Nasal airflow is normal; Posterior pharynx is pink; Modified Mallampati:II; Posterior palate is low; Tonsils not visualized; Uvula is normal and pink;Tongue is enlarged; Dentition is fair; No TMJ tenderness; Jaw opening and protrusion without click and without discomfort.  NECK: Supple. Neck circumference is 17 inches. No thyromegaly. No palpable nodes.     SKIN: On face and neck: No abrasions, no rashes, no lesions.  No subcutaneous nodules are palpable.  RESPIRATORY: Chest is clear to auscultation.  Normal chest expansion and non-labored breathing at rest.  CARDIOVASCULAR: Normal S1,  S2.  No murmurs, gallops or rubs. No carotid bruits bilaterally.  No edema. No clubbing. No cyanosis.    NEURO: Oriented to time, place and person. Normal attention span and concentration. Gait normal.    PSYCH: Affect is full. Mood is normal  MUSCULOSKELETAL: Moves 4 extremities. Gait normal.         Using [facility]: yes      ASSESSMENT:              1. Insomnia; PTSD and possible  sleep disordered breathing likely plays a role.     2. Sleep Apnea NEC. The patient symptomatically has  Interrupted sleep and nocturia with some daytime fatigue.  The patient has medical co-morbidities of hyperlipidemia and hypertension, undergoing work up for possible neurodegenerative disease  which can be worsened by COLEEN. This warrants further investigation for possible obstructive sleep apnea.          PLAN:    Diagnostic: HST (given the speed - needs study completed by [facility] for his CDL) The nature of this procedure and its indication was discussed with the patient. he would  like to come discuss PSG results.    Weight loss strategies were discussed in detail     Sleep hygiene was provided - brochure was provided     Following recommendations were given in the AVS:     He can take his Trazodone 30-60 min before bedtime. If it still take you too long to fall asleep, I will consider Increasing Trazodone to 150 mg.     during our discussion today, we talked about the etiology of  COLEEN as well as the potential ramifications of untreated sleep apnea, which could include daytime sleepiness, hypertension, heart disease and/or stroke.  We discussed potential treatment options, which could include weight loss, body positioning, continuous positive airway pressure (CPAP), or referral for surgical consideration. Meanwhile, he  is urged to avoid supine sleep, weight gain and alcoholic beverages since all of these can worsen COLEEN.     Precautions: The patient was advised to abstain from driving should he feel sleepy or drowsy.    Follow up: MD  after the sleep study has been completed.     Thank you for allowing me the opportunity to participate in the care of your patient.    This visit summary will be sent to referring provider via inbasket

## 2020-05-18 NOTE — LETTER
May 18, 2020      Km Chicas MD  [address] [facility]  [place] [address]           [facility]  [address] [place] [address]  Phone: [phone]  Fax: [phone]          Patient: Brandan Werner   MR Number: [id number]   YOB: 1945   Date of Visit: 5/18/2020       Dear Dr. Km Chicas:    Thank you for referring Brandan Werner to me for evaluation. Attached you will find relevant portions of my assessment and plan of care.    If you have questions, please do not hesitate to call me. I look forward to following Brandan Werner along with you.    Sincerely,    Emelia Nolasco MD    Enclosure  CC:  No Recipients    If you would like to receive this communication electronically, please contact [email or web address] or [phone] to request more information on [employer] Link access.    For providers and/or their staff who would like to refer a patient to [facility], please contact us through our one-stop-shop provider referral line, [facility] , at [phone].    If you feel you have received this communication in error or would no longer like to receive these types of communications, please e-mail [email or web address]

## 2020-05-18 NOTE — PATIENT INSTRUCTIONS
SLEEP LAB ([facility] or [facility]) will contact you to schedulethe sleep study. Their number is [phone] (ext 2). Please call them if you do not hear from them in 2 weeks from now.  The [facility] is located on 7th floor of the [facility]; [place] lab is located in [facility].    SLEEP CLINIC (my assistant) will call you when the sleep study results are ready - if you have not heard from us by 2 weeks from the date of the study, please call [phone] (ext 1) or you can use [facility] to contact me.    You are advised to abstain from driving should you feel sleepy or drowsy.  ________________________________________________________    He can take his Trazodone 30-60 min before bedtime. If it still take you too long to fall asleep, I will consider Increasing Trazodone to 150 mg.  ____________________________________________________________          Sleep Hygiene Practices     1. Try going to bed only when you are drowsy.  ?   2. If you are unable to fall asleep or stay asleep, leave your bedroom and engage in a quiet activity elsewhere. Do not permit yourself to fall asleep outside the bedroom. Return to bed when and only when you are sleepy. Repeat this process as often as necessary throughout night.   3. Maintain regular wake-up time, even on days off work & weekends   4. Use your bedroom for sleep and sex   5. Do not watch TV in bed  6. Avoid napping during the daytime. If daytime sleepiness becomes overwhelming, limit nap time to a single nap of less than 1hr, no later than 3pm.   7. Distract your mind. Avoid clock watching. Lying in bed unable to sleep and frustrated needs to be avoided. Try reading or watching a videotape or listening to books on tape. It may be necessary to go into another room to do these.   8. Avoid caffeine within 4-6hrs of bedtime   9. Avoid use of nicotine close to bedtime   10. do not drink alcoholic beverages within 4-6hrs of bedtime   11. While a light snack before  bedtime can help promote sound sleep, avoid large meals.   12. Obtain regular exercise, but avoid strenuous exercise within 4hrs of bedtime   13. Minimize light, noise, and extremes in temperature in the bedroom.   14. Precautions: The patient was advised to abstain from driving should they feel sleepy or drowsy.

## 2020-05-20 PROBLEM — N20.0 KIDNEY STONE: Status: ACTIVE | Noted: 2020-05-20

## 2020-05-20 PROBLEM — I70.0 ATHEROSCLEROSIS OF AORTA: Status: ACTIVE | Noted: 2020-05-20

## 2020-05-20 PROBLEM — Z96.0 HISTORY OF PENILE IMPLANT: Status: ACTIVE | Noted: 2020-05-20

## 2020-05-25 ENCOUNTER — OFFICE VISIT (OUTPATIENT)
Dept: INTERNAL MEDICINE | Facility: CLINIC | Age: 75
End: 2020-05-25
Payer: MEDICARE

## 2020-05-25 VITALS
OXYGEN SATURATION: 95 % | HEART RATE: 78 BPM | SYSTOLIC BLOOD PRESSURE: 112 MMHG | TEMPERATURE: 99 F | BODY MASS INDEX: 34.16 KG/M2 | WEIGHT: 252.19 LBS | DIASTOLIC BLOOD PRESSURE: 72 MMHG | HEIGHT: 72 IN | RESPIRATION RATE: 16 BRPM

## 2020-05-25 DIAGNOSIS — J84.10 CALCIFIED GRANULOMA OF LUNG: ICD-10-CM

## 2020-05-25 DIAGNOSIS — M19.019 SHOULDER ARTHRITIS: ICD-10-CM

## 2020-05-25 DIAGNOSIS — H90.3 SENSORINEURAL HEARING LOSS (SNHL) OF BOTH EARS: Chronic | ICD-10-CM

## 2020-05-25 DIAGNOSIS — I77.819 ECTATIC AORTA: ICD-10-CM

## 2020-05-25 DIAGNOSIS — I10 ESSENTIAL HYPERTENSION: Primary | Chronic | ICD-10-CM

## 2020-05-25 DIAGNOSIS — R21 RASH: ICD-10-CM

## 2020-05-25 PROCEDURE — 96372 THER/PROPH/DIAG INJ SC/IM: CPT | Mod: HCNC,S$GLB,, | Performed by: INTERNAL MEDICINE

## 2020-05-25 PROCEDURE — 1101F PR PT FALLS ASSESS DOC 0-1 FALLS W/OUT INJ PAST YR: ICD-10-PCS | Mod: HCNC,CPTII,S$GLB, | Performed by: INTERNAL MEDICINE

## 2020-05-25 PROCEDURE — 3074F SYST BP LT 130 MM HG: CPT | Mod: HCNC,CPTII,S$GLB, | Performed by: INTERNAL MEDICINE

## 2020-05-25 PROCEDURE — 1126F AMNT PAIN NOTED NONE PRSNT: CPT | Mod: HCNC,S$GLB,, | Performed by: INTERNAL MEDICINE

## 2020-05-25 PROCEDURE — 99214 PR OFFICE/OUTPT VISIT, EST, LEVL IV, 30-39 MIN: ICD-10-PCS | Mod: 25,HCNC,S$GLB, | Performed by: INTERNAL MEDICINE

## 2020-05-25 PROCEDURE — 3008F PR BODY MASS INDEX (BMI) DOCUMENTED: ICD-10-PCS | Mod: HCNC,CPTII,S$GLB, | Performed by: INTERNAL MEDICINE

## 2020-05-25 PROCEDURE — 99214 OFFICE O/P EST MOD 30 MIN: CPT | Mod: 25,HCNC,S$GLB, | Performed by: INTERNAL MEDICINE

## 2020-05-25 PROCEDURE — 99999 PR PBB SHADOW E&M-EST. PATIENT-LVL IV: CPT | Mod: PBBFAC,HCNC,, | Performed by: INTERNAL MEDICINE

## 2020-05-25 PROCEDURE — 99999 PR PBB SHADOW E&M-EST. PATIENT-LVL IV: ICD-10-PCS | Mod: PBBFAC,HCNC,, | Performed by: INTERNAL MEDICINE

## 2020-05-25 PROCEDURE — 3074F PR MOST RECENT SYSTOLIC BLOOD PRESSURE < 130 MM HG: ICD-10-PCS | Mod: HCNC,CPTII,S$GLB, | Performed by: INTERNAL MEDICINE

## 2020-05-25 PROCEDURE — 3008F BODY MASS INDEX DOCD: CPT | Mod: HCNC,CPTII,S$GLB, | Performed by: INTERNAL MEDICINE

## 2020-05-25 PROCEDURE — 99499 RISK ADDL DX/OHS AUDIT: ICD-10-PCS | Mod: HCNC,S$GLB,, | Performed by: INTERNAL MEDICINE

## 2020-05-25 PROCEDURE — 1101F PT FALLS ASSESS-DOCD LE1/YR: CPT | Mod: HCNC,CPTII,S$GLB, | Performed by: INTERNAL MEDICINE

## 2020-05-25 PROCEDURE — 1159F PR MEDICATION LIST DOCUMENTED IN MEDICAL RECORD: ICD-10-PCS | Mod: HCNC,S$GLB,, | Performed by: INTERNAL MEDICINE

## 2020-05-25 PROCEDURE — 3078F PR MOST RECENT DIASTOLIC BLOOD PRESSURE < 80 MM HG: ICD-10-PCS | Mod: HCNC,CPTII,S$GLB, | Performed by: INTERNAL MEDICINE

## 2020-05-25 PROCEDURE — 1159F MED LIST DOCD IN RCRD: CPT | Mod: HCNC,S$GLB,, | Performed by: INTERNAL MEDICINE

## 2020-05-25 PROCEDURE — 96372 PR INJECTION,THERAP/PROPH/DIAG2ST, IM OR SUBCUT: ICD-10-PCS | Mod: HCNC,S$GLB,, | Performed by: INTERNAL MEDICINE

## 2020-05-25 PROCEDURE — 1126F PR PAIN SEVERITY QUANTIFIED, NO PAIN PRESENT: ICD-10-PCS | Mod: HCNC,S$GLB,, | Performed by: INTERNAL MEDICINE

## 2020-05-25 PROCEDURE — 3078F DIAST BP <80 MM HG: CPT | Mod: HCNC,CPTII,S$GLB, | Performed by: INTERNAL MEDICINE

## 2020-05-25 PROCEDURE — 99499 UNLISTED E&M SERVICE: CPT | Mod: HCNC,S$GLB,, | Performed by: INTERNAL MEDICINE

## 2020-05-25 RX ORDER — TRIAMCINOLONE ACETONIDE 1 MG/G
CREAM TOPICAL 3 TIMES DAILY
Qty: 45 G | Refills: 0 | Status: ON HOLD | OUTPATIENT
Start: 2020-05-25 | End: 2023-09-14

## 2020-05-25 RX ORDER — TRIAMCINOLONE ACETONIDE 40 MG/ML
40 INJECTION, SUSPENSION INTRA-ARTICULAR; INTRAMUSCULAR
Status: COMPLETED | OUTPATIENT
Start: 2020-05-25 | End: 2020-05-25

## 2020-05-25 RX ORDER — HYDROXYZINE HYDROCHLORIDE 25 MG/1
25 TABLET, FILM COATED ORAL EVERY 8 HOURS PRN
Qty: 40 TABLET | Refills: 1 | Status: SHIPPED | OUTPATIENT
Start: 2020-05-25 | End: 2020-06-24

## 2020-05-25 RX ADMIN — TRIAMCINOLONE ACETONIDE 40 MG: 40 INJECTION, SUSPENSION INTRA-ARTICULAR; INTRAMUSCULAR at 10:05

## 2020-05-25 NOTE — PROGRESS NOTES
Subjective:       Patient ID: Brandan Werner is a 74 y.o. male.    Chief Complaint: Follow-up (6 mo); Tremors; and Shoulder Pain (Right shoulder; limited mobility)    HPI   The patient presents for follow-up of several medical conditions.  He has been noting a right arm rash for the past 3 days.  The rash is pruritic.  He denies any new chemical exposure.    He has chronic right shoulder pain.  He has been evaluated by Orthopedics before.  He does not desire surgery.    He has chronic hearing loss.  He is currently using hearing aids bilaterally.  This has helped.  He denies having any tinnitus or vertigo.    Active medical conditions include hypertension, shoulder arthritis, ectatic aorta, sensorineural hearing loss bilaterally, calcified granuloma of the lung, BPH, PTSD, hyperlipidemia, and tremor.  Review of Systems   Constitutional: Negative for activity change, appetite change, fatigue and unexpected weight change.   HENT: Positive for hearing loss. Negative for sinus pressure and sore throat.    Eyes: Negative for visual disturbance.   Respiratory: Negative for cough, chest tightness, shortness of breath and wheezing.    Cardiovascular: Negative for chest pain, palpitations and leg swelling.   Gastrointestinal: Negative for abdominal pain, blood in stool, nausea and vomiting.   Genitourinary: Negative for dysuria, hematuria and urgency.   Musculoskeletal: Positive for arthralgias. Negative for back pain, gait problem, joint swelling, myalgias and neck stiffness.   Skin: Positive for rash. Negative for color change.   Neurological: Negative for dizziness, syncope, weakness, light-headedness, numbness and headaches.   Psychiatric/Behavioral: Negative for sleep disturbance.       Objective:      Physical Exam  Vitals signs and nursing note reviewed.   Constitutional:       General: He is not in acute distress.     Appearance: He is well-developed and well-nourished.      Comments: The patient has gained 18 lb  since 11/25/2019.   HENT:      Head: Normocephalic and atraumatic.   Musculoskeletal:         General: Tenderness present.      Comments: The right shoulder exhibits decreased abduction and rotation.  It is tender on range-of-motion testing.  Left shoulder range of motion is intact.   Skin:     General: Skin is warm and dry.      Findings: Rash present.      Comments: Scaly excoriated eruption of the right arm is present.         Assessment:       1. Essential hypertension    2. Shoulder arthritis    3. Ectatic aorta    4. Calcified granuloma of lung    5. Sensorineural hearing loss (SNHL) of both ears    6. Rash        Plan:     Brandan was seen today for follow-up, tremors and shoulder pain.  Triamcinolone and hydroxyzine will be ordered for the skin rash.  Patient was encouraged to use his hearing aids while driving.  Kenalog will be administered at this for the rash.    Diagnoses and all orders for this visit:    Essential hypertension    Shoulder arthritis    Ectatic aorta    Calcified granuloma of lung    Sensorineural hearing loss (SNHL) of both ears    Rash    Other orders  -     triamcinolone acetonide 0.1% (KENALOG) 0.1 % cream; Apply topically 3 (three) times daily. (Patient not taking: Reported on 6/22/2020)  -     hydrOXYzine HCL (ATARAX) 25 MG tablet; Take 1 tablet (25 mg total) by mouth every 8 (eight) hours as needed for Itching.  -     triamcinolone acetonide injection 40 mg

## 2020-05-28 ENCOUNTER — TELEPHONE (OUTPATIENT)
Dept: NEUROLOGY | Facility: CLINIC | Age: 75
End: 2020-05-28

## 2020-06-22 ENCOUNTER — OFFICE VISIT (OUTPATIENT)
Dept: URGENT CARE | Facility: CLINIC | Age: 75
End: 2020-06-22
Payer: MEDICARE

## 2020-06-22 VITALS
DIASTOLIC BLOOD PRESSURE: 75 MMHG | SYSTOLIC BLOOD PRESSURE: 120 MMHG | TEMPERATURE: 97 F | WEIGHT: 240 LBS | HEIGHT: 72 IN | BODY MASS INDEX: 32.51 KG/M2 | RESPIRATION RATE: 18 BRPM | OXYGEN SATURATION: 95 % | HEART RATE: 91 BPM

## 2020-06-22 DIAGNOSIS — S20.211A CONTUSION OF RIB ON RIGHT SIDE, INITIAL ENCOUNTER: ICD-10-CM
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
## 2020-06-22 DIAGNOSIS — M25.511 ACUTE PAIN OF RIGHT SHOULDER: ICD-10-CM

## 2020-06-22 DIAGNOSIS — M24.011 LOOSE BODY OF RIGHT SHOULDER: ICD-10-CM

## 2020-06-22 PROCEDURE — 99214 OFFICE O/P EST MOD 30 MIN: CPT | Mod: S$GLB,,, | Performed by: PHYSICIAN ASSISTANT

## 2020-06-22 PROCEDURE — 71100 XR RIBS 2 VIEW RIGHT: ICD-10-PCS | Mod: FY,RT,S$GLB, | Performed by: RADIOLOGY

## 2020-06-22 PROCEDURE — 99214 PR OFFICE/OUTPT VISIT, EST, LEVL IV, 30-39 MIN: ICD-10-PCS | Mod: S$GLB,,, | Performed by: PHYSICIAN ASSISTANT

## 2020-06-22 PROCEDURE — 71100 X-RAY EXAM RIBS UNI 2 VIEWS: CPT | Mod: FY,RT,S$GLB, | Performed by: RADIOLOGY

## 2020-06-22 PROCEDURE — 73030 XR SHOULDER TRAUMA 3 VIEW RIGHT: ICD-10-PCS | Mod: FY,RT,S$GLB, | Performed by: RADIOLOGY

## 2020-06-22 PROCEDURE — 73030 X-RAY EXAM OF SHOULDER: CPT | Mod: FY,RT,S$GLB, | Performed by: RADIOLOGY

## 2020-06-22 NOTE — PROGRESS NOTES
"Subjective:       Patient ID: Brandan Werner is a 74 y.o. male.    Vitals:  height is 6' (1.829 m) and weight is 108.9 kg (240 lb). His temperature is 97.2 °F (36.2 °C). His blood pressure is 120/75 and his pulse is 91. His respiration is 18 and oxygen saturation is 95%.     Chief Complaint: Shoulder Pain    Patient presents with right shoulder pain after a fall on Saturday.     States he was walking, tripped and fell onto dirt on his right side. Hit his shoulder and ribs on right. Now c/o pain to right ribs. Exacerbated with deep inhalation and movement. Also c/o right shoulder pain however he chronically has R shoulder pain. States "I need to have surgery on it." But he does not remember what his previous diagnoses was.  Denies head injury, LOC, N/V, confusion, AMS or presyncope or lightheadedness.    Shoulder Pain   The pain is present in the right shoulder. This is a new problem. The current episode started in the past 7 days. There has been a history of trauma. The problem occurs constantly. The problem has been unchanged. The quality of the pain is described as aching. The pain is at a severity of 4/10. The pain is mild. Associated symptoms include a limited range of motion and stiffness. Pertinent negatives include no fever or headaches. The symptoms are aggravated by activity. He has tried acetaminophen and NSAIDS for the symptoms. The treatment provided mild relief.       Constitution: Negative for chills, fatigue and fever.   HENT: Negative for facial swelling, facial trauma, congestion and sore throat.    Neck: Negative for neck stiffness and painful lymph nodes.   Cardiovascular: Negative for chest trauma, chest pain and leg swelling.   Eyes: Negative for eye trauma, double vision and blurred vision.   Respiratory: Negative for cough and shortness of breath.    Gastrointestinal: Negative for abdominal trauma, abdominal pain, nausea, vomiting, diarrhea and rectal bleeding.   Genitourinary: Negative for " dysuria, frequency, urgency, hematuria, genital trauma and pelvic pain.   Musculoskeletal: Positive for pain, trauma, joint pain and abnormal ROM of joint. Negative for joint swelling, pain with walking, muscle cramps and muscle ache.   Skin: Negative for color change, pale, rash, wound, abrasion and laceration.   Allergic/Immunologic: Negative for seasonal allergies.   Neurological: Negative for dizziness, history of vertigo, light-headedness, passing out, coordination disturbances, headaches, altered mental status and loss of consciousness.   Hematologic/Lymphatic: Negative for swollen lymph nodes, easy bruising/bleeding, history of blood clots and history of bleeding disorder. Does not bruise/bleed easily.   Psychiatric/Behavioral: Negative for altered mental status, nervous/anxious, sleep disturbance and depression. The patient is not nervous/anxious.        Objective:      Physical Exam   Constitutional: He is oriented to person, place, and time. He appears well-developed. He is cooperative.  Non-toxic appearance. He does not appear ill. No distress.   HENT:   Head: Normocephalic and atraumatic.   Right Ear: Hearing and external ear normal.   Left Ear: Hearing and external ear normal.   Eyes: Conjunctivae and lids are normal. No scleral icterus.   Neck: Trachea normal, full passive range of motion without pain and phonation normal. Neck supple. No neck rigidity. No edema and no erythema present.   Cardiovascular: Normal rate.   Pulmonary/Chest: Effort normal. No respiratory distress. He exhibits no mass, no tenderness, no laceration, no crepitus, no edema, no deformity, no swelling and no retraction.   There is right rib pain reproducible on deep inhalation        Comments: There is right rib pain reproducible on deep inhalation    Abdominal: Normal appearance.   Musculoskeletal:      Right shoulder: He exhibits decreased range of motion (decreased ROM in right shoulder on flex and abduction to about 45 degrees  secondary to pain), tenderness (anterior shoulder, posterior shoulder, muscular ttp) and pain. He exhibits no bony tenderness, no swelling, no effusion, no crepitus, no deformity, no laceration, no spasm, normal pulse and normal strength.      Cervical back: Normal. He exhibits normal range of motion, no tenderness, no bony tenderness, no swelling, no edema, no deformity, no laceration and no pain.      Thoracic back: He exhibits normal range of motion, no tenderness, no bony tenderness, no swelling, no edema, no deformity and no laceration.      Lumbar back: Normal. He exhibits normal range of motion, no tenderness, no bony tenderness, no swelling, no edema and no deformity.      Comments: Sensation equal in bilat UE  2+ radial pulse bilat  Arms appear equal in color bilat   Neurological: He is alert and oriented to person, place, and time. He exhibits normal muscle tone. Coordination normal.   Skin: Skin is dry, intact, not diaphoretic and not pale.   Psychiatric: His speech is normal and behavior is normal. Judgment and thought content normal.   Nursing note and vitals reviewed.  X-ray Ribs 2 View Right    Result Date: 6/22/2020  EXAMINATION: XR RIBS 2 VIEW RIGHT CLINICAL HISTORY: Unspecified fall, initial encounter TECHNIQUE: Two views of the right ribs were performed. COMPARISON: Right shoulder: 08/29/2016. FINDINGS: No displaced rib fracture or underlying pulmonary or pleural disease identified in this patient status post fall. Degenerative changes are present at the glenohumeral and acromioclavicular joints.     Please see above. Electronically signed by: Emelina Kerns MD Date:    06/22/2020 Time:    16:03    X-ray Shoulder Trauma 3 View Right    Result Date: 6/22/2020  EXAMINATION: XR SHOULDER TRAUMA 3 VIEW RIGHT CLINICAL HISTORY: Unspecified fall, initial encounter TECHNIQUE: Three or four views of the right shoulder were performed. COMPARISON: Right shoulder series 08/29/2016 FINDINGS: Moderate to  advanced degenerative changes at the glenohumeral joint with prominent marginal osteophytes about the humeral head, progressed from 08/29/2016 exam.  Within the medial aspect of the acromial humeral joint space on the frontal view there is an approximately 12 mm well corticated oval-shaped ossific body which could represent a loose body.  Moderate to advanced degenerative changes at the AC joint, grossly similar to prior. No displaced fracture, dislocation or destructive osseous process seen.  No subcutaneous emphysema or radiodense retained foreign body.     No acute displaced fracture-dislocation identified. Moderate to advanced degenerative changes about the shoulder, grossly similar at the AC joint but progressed at the glenohumeral joint, with suspected 12 mm loose body at the acromial humeral joint interval. Electronically signed by: Ad Burnette MD Date:    06/22/2020 Time:    16:05        Assessment:       1. Fall, initial encounter    2. Acute pain of right shoulder    3. Loose body of right shoulder    4. Contusion of rib on right side, initial encounter        Plan:       Loose body in right shoulder. Likely chronic. Recommend sling, RICE and see ortho tomorrow. Our sling is too small. Called in sling to his pharmacy  No rib fractures on xray however recommend tx as bruise or fracture. Incentive spirometer. Rest, ice. F/u pcp within 1 week for re-evaluation    ED for any worsening or changes. All qeustions answered, he is happy with this plan.    Fall, initial encounter  -     X-Ray Ribs 2 View Right; Future; Expected date: 06/22/2020  -     X-Ray Shoulder Trauma 3 view Right; Future; Expected date: 06/22/2020    Acute pain of right shoulder  -     Ambulatory referral/consult to Orthopedics  -     SLING FOR HOME USE    Loose body of right shoulder  -     Ambulatory referral/consult to Orthopedics    Contusion of rib on right side, initial encounter    Other orders  -     arm brace Misc; 1 Piece by  Misc.(Non-Drug; Combo Route) route once. for 1 dose  Dispense: 1 each; Refill: 0    Labs reviewed, pertinent imaging reviewed, previous medical records, medical history, surgical history, social history, family history reviewed.       Patient Instructions   Incentive spirometer - use this throughout the day. Can  at [email or web address] or [facility]  Tylenol for pain    Please follow up with orthopedics tomorrow  [phone] - call this number for appointment    Please follow up with primary care within 1 week  Please rest shoulder, ice, keep in sling    Tylenol for pain      Please arrange follow up with your primary medical clinic as soon as possible. You must understand that you've received an Urgent Care treatment only and that you may be released before all of your medical problems are known or treated. You, the patient, will arrange for follow up as instructed. If your symptoms worsen or fail to improve you should go to the Emergency Room.  WE CANNOT RULE OUT ALL POSSIBLE CAUSES OF YOUR SYMPTOMS IN THE URGENT CARE SETTING PLEASE GO TO THE ER IF YOU FEELS YOUR CONDITION IS WORSENING OR YOU WOULD LIKE EMERGENT EVALUATION.    Please return here or go to the Emergency Department for any concerns or worsening of condition.  If you were prescribed antibiotics, please take them to completion.  If you were prescribed a narcotic medication, do not drive or operate heavy equipment or machinery while taking these medications.  Please follow up with your primary care doctor or specialist as needed.    If you  smoke, please stop smoking.        Shoulder Pain with Uncertain Cause  Shoulder pain can have many causes. Pain often comes from the structures that surround the shoulder joint. These are the joint capsule, ligaments, tendons, muscles, and bursa. Pain can also come from cartilage in the joint. Cartilage can become worn out or injured. Its important to know whats causing your pain so the healthcare provider can use the  correct treatment. But sometimes its difficult to find the exact cause of shoulder pain. You may need to see a specialist (orthopedist). You may also need special tests such as a CT scan or MRI. The provider may need to use special tools to look inside the joint (arthroscopy).  Shoulder pain can be treated with a sling or a device that keeps your shoulder from moving. You can take an anti-inflammatory medicine such as ibuprofen to ease pain. You may need to do special shoulder exercises. Follow up with a specialist if the pain is severe or doesnt go away after a few weeks.  Home care  Follow these tips when caring for yourself at home:  · If a sling was given to you, leave it in place for the time advised by your healthcare provider. If you arent sure how long to wear it, ask for advice. If the sling becomes loose, adjust it so that your forearm is level with the ground. Your shoulder should feel well supported.  · Put an ice pack on the injured area for 20 minutes every 1 to 2 hours the first day. You can make your own ice pack by putting ice cubes in a plastic bag. Wrap the bag in a thin towel. Continue with ice packs 3 to 4 times a day for the next 2 days. Then use the pack as needed to ease pain and swelling.  · You may use acetaminophen or ibuprofen to control pain, unless another pain medicine was prescribed. If you have chronic liver or kidney disease, talk with your healthcare provider before using these medicines. Also talk with your provider if youve ever had a stomach ulcer or GI bleeding.  · Shoulder pain may seem worse at night, when there is less to distract you from the pain. If you sleep on your side, try to keep weight off your painful shoulder. Propping pillows behind you may stop you from rolling over onto that shoulder during sleep.   · Shoulder and elbow joints can become stiff if left in a sling for too long. You should start range of motion exercises about 7 to 10 days after the injury.  Talk with your provider to find out what type of exercises to do and how soon to start.  · You can take the sling off to shower or bathe.  Follow-up care  Follow up with your healthcare provider if you dont start to get better in the next 5 days.  When to seek medical advice  Call your healthcare provider right away if any of these occur:  · Pain or swelling gets worse or continues for more than a few days  · Your hand or fingers become cold, blue, numb, or tingly  · Large amount of bruising on your shoulder or upper arm  · Difficulty moving your hand or fingers  · Weakness in your hand or fingers  · Your shoulder becomes stiff  · It feels like your shoulder is popping out  · You are less able to do your daily activities  Date Last Reviewed: 10/1/2016  © 5725-4089 [employer]. [address] [place] [address], [place] [id number]. All rights reserved. This information is not intended as a substitute for professional medical care. Always follow your healthcare professional's instructions.        Rib Contusion     A rib contusion is a bruise to one or more rib bones. It may cause pain, tenderness, swelling and a purplish discoloration. There may be a sharp pain while breathing.  You will be assessed for other injuries. You will likely be given pain medicine. Rib contusions heal on their own, without further treatment. However, pain may take weeks to months to go away.   Note that a small crack (fracture) in the rib may cause the same symptoms as a rib contusion. The small crack may not be seen on a chest X-ray. However, the conditions are managed in the same way.  Home care  · Rest. Avoid heavy lifting, strenuous exertion, or any activity that causes pain.  · Ice the area to reduce pain and swelling. Put ice cubes in a plastic bag or use a cold pack. (Wrap the cold source in a thin towel. Do not place it directly on your skin.) Ice the injured area for 20 minutes every 1 to 2 hours the first day. Continue with ice  packs 3 to 4 times a day for the next 2 days, then as needed for the relief of pain and swelling.  · Take any prescribed pain medicine as directed by your healthcare provider. If none was prescribed, take acetaminophen, ibuprofen, or naproxen to control pain.  · If you have a significant injury, you may be given a device called an incentive spirometer to keep your lungs healthy. Use as directed.  Follow-up care  Follow up with your healthcare provider during the next week or as directed.  When to seek medical advice  Call your healthcare provider for any of the following:  · Shortness of breath or trouble breathing  · Increasing chest pain with breathing  · Coughing  · Dizziness, weakness, or fainting  · New or worsening pain  · Fever of 100.4°F (38ºC) or higher, or as directed by your healthcare provider  Date Last Reviewed: 2/1/2017  © 0374-8010 [employer]. [address] [place] [address], [place] [address]. All rights reserved. This information is not intended as a substitute for professional medical care. Always follow your healthcare professional's instructions.

## 2020-06-22 NOTE — PATIENT INSTRUCTIONS
Incentive spirometer - use this throughout the day. Can  at [place] or [facility]  Tylenol for pain    Please follow up with orthopedics tomorrow  [phone] - call this number for appointment    Please follow up with primary care within 1 week  Please rest shoulder, ice, keep in sling    Tylenol for pain      Please arrange follow up with your primary medical clinic as soon as possible. You must understand that you've received an Urgent Care treatment only and that you may be released before all of your medical problems are known or treated. You, the patient, will arrange for follow up as instructed. If your symptoms worsen or fail to improve you should go to the Emergency Room.  WE CANNOT RULE OUT ALL POSSIBLE CAUSES OF YOUR SYMPTOMS IN THE URGENT CARE SETTING PLEASE GO TO THE ER IF YOU FEELS YOUR CONDITION IS WORSENING OR YOU WOULD LIKE EMERGENT EVALUATION.    Please return here or go to the Emergency Department for any concerns or worsening of condition.  If you were prescribed antibiotics, please take them to completion.  If you were prescribed a narcotic medication, do not drive or operate heavy equipment or machinery while taking these medications.  Please follow up with your primary care doctor or specialist as needed.    If you  smoke, please stop smoking.        Shoulder Pain with Uncertain Cause  Shoulder pain can have many causes. Pain often comes from the structures that surround the shoulder joint. These are the joint capsule, ligaments, tendons, muscles, and bursa. Pain can also come from cartilage in the joint. Cartilage can become worn out or injured. Its important to know whats causing your pain so the healthcare provider can use the correct treatment. But sometimes its difficult to find the exact cause of shoulder pain. You may need to see a specialist (orthopedist). You may also need special tests such as a CT scan or MRI. The provider may need to use special tools to look inside the joint  (arthroscopy).  Shoulder pain can be treated with a sling or a device that keeps your shoulder from moving. You can take an anti-inflammatory medicine such as ibuprofen to ease pain. You may need to do special shoulder exercises. Follow up with a specialist if the pain is severe or doesnt go away after a few weeks.  Home care  Follow these tips when caring for yourself at home:  · If a sling was given to you, leave it in place for the time advised by your healthcare provider. If you arent sure how long to wear it, ask for advice. If the sling becomes loose, adjust it so that your forearm is level with the ground. Your shoulder should feel well supported.  · Put an ice pack on the injured area for 20 minutes every 1 to 2 hours the first day. You can make your own ice pack by putting ice cubes in a plastic bag. Wrap the bag in a thin towel. Continue with ice packs 3 to 4 times a day for the next 2 days. Then use the pack as needed to ease pain and swelling.  · You may use acetaminophen or ibuprofen to control pain, unless another pain medicine was prescribed. If you have chronic liver or kidney disease, talk with your healthcare provider before using these medicines. Also talk with your provider if youve ever had a stomach ulcer or GI bleeding.  · Shoulder pain may seem worse at night, when there is less to distract you from the pain. If you sleep on your side, try to keep weight off your painful shoulder. Propping pillows behind you may stop you from rolling over onto that shoulder during sleep.   · Shoulder and elbow joints can become stiff if left in a sling for too long. You should start range of motion exercises about 7 to 10 days after the injury. Talk with your provider to find out what type of exercises to do and how soon to start.  · You can take the sling off to shower or bathe.  Follow-up care  Follow up with your healthcare provider if you dont start to get better in the next 5 days.  When to seek  medical advice  Call your healthcare provider right away if any of these occur:  · Pain or swelling gets worse or continues for more than a few days  · Your hand or fingers become cold, blue, numb, or tingly  · Large amount of bruising on your shoulder or upper arm  · Difficulty moving your hand or fingers  · Weakness in your hand or fingers  · Your shoulder becomes stiff  · It feels like your shoulder is popping out  · You are less able to do your daily activities  Date Last Reviewed: 10/1/2016  © 9611-9881 [employer]. [address] [place] [address]. All rights reserved. This information is not intended as a substitute for professional medical care. Always follow your healthcare professional's instructions.        Rib Contusion     A rib contusion is a bruise to one or more rib bones. It may cause pain, tenderness, swelling and a purplish discoloration. There may be a sharp pain while breathing.  You will be assessed for other injuries. You will likely be given pain medicine. Rib contusions heal on their own, without further treatment. However, pain may take weeks to months to go away.   Note that a small crack (fracture) in the rib may cause the same symptoms as a rib contusion. The small crack may not be seen on a chest X-ray. However, the conditions are managed in the same way.  Home care  · Rest. Avoid heavy lifting, strenuous exertion, or any activity that causes pain.  · Ice the area to reduce pain and swelling. Put ice cubes in a plastic bag or use a cold pack. (Wrap the cold source in a thin towel. Do not place it directly on your skin.) Ice the injured area for 20 minutes every 1 to 2 hours the first day. Continue with ice packs 3 to 4 times a day for the next 2 days, then as needed for the relief of pain and swelling.  · Take any prescribed pain medicine as directed by your healthcare provider. If none was prescribed, take acetaminophen, ibuprofen, or naproxen to control  pain.  · If you have a significant injury, you may be given a device called an incentive spirometer to keep your lungs healthy. Use as directed.  Follow-up care  Follow up with your healthcare provider during the next week or as directed.  When to seek medical advice  Call your healthcare provider for any of the following:  · Shortness of breath or trouble breathing  · Increasing chest pain with breathing  · Coughing  · Dizziness, weakness, or fainting  · New or worsening pain  · Fever of 100.4°F (38ºC) or higher, or as directed by your healthcare provider  Date Last Reviewed: 2/1/2017  © 8834-0707 [employer]. [address] [place] [address], [place] [address]. All rights reserved. This information is not intended as a substitute for professional medical care. Always follow your healthcare professional's instructions.

## 2020-07-08 ENCOUNTER — TELEPHONE (OUTPATIENT)
Dept: NEUROLOGY | Facility: CLINIC | Age: 75
End: 2020-07-08

## 2020-07-08 NOTE — TELEPHONE ENCOUNTER
"Spoke with Mr. Werner, he was informed his upcoming appt will need to be converted to a virtual visit, there are no in person appts on 7/17/2020. Mr. Werner states "he has not done a virtual appt before". He confirmed there is no one to assist with virtual appt. Mr. Werner rescheduled his appt for an in person appt on 8/27/2020 at 9 am. Appt letter mailed.   "

## 2020-07-20 ENCOUNTER — PATIENT OUTREACH (OUTPATIENT)
Dept: ADMINISTRATIVE | Facility: OTHER | Age: 75
End: 2020-07-20

## 2020-07-20 NOTE — PROGRESS NOTES
Requested updates within Care Everywhere.  Patient's chart was reviewed for overdue SHARI topics.  Immunizations reconciled.    Orders placed:  Tasked appts:  Labs Linked:

## 2020-07-23 ENCOUNTER — OFFICE VISIT (OUTPATIENT)
Dept: ORTHOPEDICS | Facility: CLINIC | Age: 75
End: 2020-07-23
Payer: MEDICARE

## 2020-07-23 VITALS — WEIGHT: 240 LBS | HEIGHT: 72 IN | BODY MASS INDEX: 32.51 KG/M2

## 2020-07-23 DIAGNOSIS — M19.019 SHOULDER ARTHRITIS: Primary | ICD-10-CM

## 2020-07-23 PROCEDURE — 3008F PR BODY MASS INDEX (BMI) DOCUMENTED: ICD-10-PCS | Mod: HCNC,CPTII,S$GLB, | Performed by: ORTHOPAEDIC SURGERY

## 2020-07-23 PROCEDURE — 1101F PR PT FALLS ASSESS DOC 0-1 FALLS W/OUT INJ PAST YR: ICD-10-PCS | Mod: HCNC,CPTII,S$GLB, | Performed by: ORTHOPAEDIC SURGERY

## 2020-07-23 PROCEDURE — 20610 DRAIN/INJ JOINT/BURSA W/O US: CPT | Mod: HCNC,RT,S$GLB, | Performed by: ORTHOPAEDIC SURGERY

## 2020-07-23 PROCEDURE — 20610 PR DRAIN/INJECT LARGE JOINT/BURSA: ICD-10-PCS | Mod: HCNC,RT,S$GLB, | Performed by: ORTHOPAEDIC SURGERY

## 2020-07-23 PROCEDURE — 1159F PR MEDICATION LIST DOCUMENTED IN MEDICAL RECORD: ICD-10-PCS | Mod: HCNC,S$GLB,, | Performed by: ORTHOPAEDIC SURGERY

## 2020-07-23 PROCEDURE — 1101F PT FALLS ASSESS-DOCD LE1/YR: CPT | Mod: HCNC,CPTII,S$GLB, | Performed by: ORTHOPAEDIC SURGERY

## 2020-07-23 PROCEDURE — 99203 OFFICE O/P NEW LOW 30 MIN: CPT | Mod: 25,HCNC,S$GLB, | Performed by: ORTHOPAEDIC SURGERY

## 2020-07-23 PROCEDURE — 1125F PR PAIN SEVERITY QUANTIFIED, PAIN PRESENT: ICD-10-PCS | Mod: HCNC,S$GLB,, | Performed by: ORTHOPAEDIC SURGERY

## 2020-07-23 PROCEDURE — 99203 PR OFFICE/OUTPT VISIT, NEW, LEVL III, 30-44 MIN: ICD-10-PCS | Mod: 25,HCNC,S$GLB, | Performed by: ORTHOPAEDIC SURGERY

## 2020-07-23 PROCEDURE — 1125F AMNT PAIN NOTED PAIN PRSNT: CPT | Mod: HCNC,S$GLB,, | Performed by: ORTHOPAEDIC SURGERY

## 2020-07-23 PROCEDURE — 3008F BODY MASS INDEX DOCD: CPT | Mod: HCNC,CPTII,S$GLB, | Performed by: ORTHOPAEDIC SURGERY

## 2020-07-23 PROCEDURE — 1159F MED LIST DOCD IN RCRD: CPT | Mod: HCNC,S$GLB,, | Performed by: ORTHOPAEDIC SURGERY

## 2020-07-23 PROCEDURE — 99999 PR PBB SHADOW E&M-EST. PATIENT-LVL III: ICD-10-PCS | Mod: PBBFAC,HCNC,, | Performed by: ORTHOPAEDIC SURGERY

## 2020-07-23 PROCEDURE — 99999 PR PBB SHADOW E&M-EST. PATIENT-LVL III: CPT | Mod: PBBFAC,HCNC,, | Performed by: ORTHOPAEDIC SURGERY

## 2020-07-23 RX ORDER — TRIAMCINOLONE ACETONIDE 40 MG/ML
40 INJECTION, SUSPENSION INTRA-ARTICULAR; INTRAMUSCULAR
Status: COMPLETED | OUTPATIENT
Start: 2020-07-23 | End: 2020-07-23

## 2020-07-23 RX ADMIN — TRIAMCINOLONE ACETONIDE 40 MG: 40 INJECTION, SUSPENSION INTRA-ARTICULAR; INTRAMUSCULAR at 10:07

## 2020-07-23 NOTE — LETTER
July 23, 2020      Abril Landa PA-C  [address] [facility] [address]           [place] [facility]  [address], [address]  [place] [address]  Phone: [phone]          Patient: Brandan Werner   MR Number: [id number]   YOB: 1945   Date of Visit: 7/23/2020       Dear Abril Landa:    Thank you for referring Brandan Werner to me for evaluation. Attached you will find relevant portions of my assessment and plan of care.    If you have questions, please do not hesitate to call me. I look forward to following Brandan Werner along with you.    Sincerely,    Ellis Richardson Jr., MD    Enclosure  CC:  No Recipients    If you would like to receive this communication electronically, please contact [email or web address] or [phone] to request more information on [employer] access.    For providers and/or their staff who would like to refer a patient to [facility], please contact us through our one-stop-shop provider referral line, [facility] , at [phone].    If you feel you have received this communication in error or would no longer like to receive these types of communications, please e-mail [email or web address]

## 2020-07-27 ENCOUNTER — TELEPHONE (OUTPATIENT)
Dept: NEUROLOGY | Facility: CLINIC | Age: 75
End: 2020-07-27

## 2020-07-27 NOTE — TELEPHONE ENCOUNTER
"----- Message from Briana Murillo MD sent at 7/27/2020 12:21 PM CDT -----  Milagrso please help by calling him and reviewing his medication list (entering into the chart)  At last visit he didn't know his medications and I told him we cannot diagnose or treat him if we don't know what he's taking  ----- Message -----  From: Milagros Bartholomew MA  Sent: 7/27/2020   8:31 AM CDT  To: Briana Murillo MD    Good morning,     Pt came to the clinic for appt which is for next month. He informed Michelle he will to speak with you. He states "he does not know his diagnosis, he is not on medication". Will like a call back.       "

## 2020-07-27 NOTE — TELEPHONE ENCOUNTER
"Spoke with  Alphonso, he was informed Dr. Murillo wants me to go over medication and make sure they are updated in Epic. Per  Alphonso he states "he is taking everything in the system".   "

## 2020-08-24 ENCOUNTER — PES CALL (OUTPATIENT)
Dept: ADMINISTRATIVE | Facility: CLINIC | Age: 75
End: 2020-08-24

## 2020-08-26 ENCOUNTER — TELEPHONE (OUTPATIENT)
Dept: NEUROLOGY | Facility: CLINIC | Age: 75
End: 2020-08-26

## 2020-08-26 NOTE — TELEPHONE ENCOUNTER
"Spoke with Mr. Werner, he was informed due to the Inclement weather Thursday 8/37 appts are being converted to virtual visits, there are no in person appts. Mr. Werner verbalized understanding stating "he will reschedule for another day in person, do not how to do a virtual". Appt rescheduled to 10/8/2020 at 8 am.   "

## 2020-09-14 ENCOUNTER — PATIENT OUTREACH (OUTPATIENT)
Dept: ADMINISTRATIVE | Facility: OTHER | Age: 75
End: 2020-09-14

## 2020-09-15 ENCOUNTER — OFFICE VISIT (OUTPATIENT)
Dept: ORTHOPEDICS | Facility: CLINIC | Age: 75
End: 2020-09-15
Payer: MEDICARE

## 2020-09-15 VITALS — WEIGHT: 240 LBS | HEIGHT: 72 IN | BODY MASS INDEX: 32.51 KG/M2

## 2020-09-15 DIAGNOSIS — M19.019 SHOULDER ARTHRITIS: Primary | ICD-10-CM

## 2020-09-15 PROCEDURE — 99999 PR PBB SHADOW E&M-EST. PATIENT-LVL IV: ICD-10-PCS | Mod: PBBFAC,HCNC,, | Performed by: ORTHOPAEDIC SURGERY

## 2020-09-15 PROCEDURE — 99212 OFFICE O/P EST SF 10 MIN: CPT | Mod: HCNC,25,S$GLB, | Performed by: ORTHOPAEDIC SURGERY

## 2020-09-15 PROCEDURE — 1125F AMNT PAIN NOTED PAIN PRSNT: CPT | Mod: HCNC,S$GLB,, | Performed by: ORTHOPAEDIC SURGERY

## 2020-09-15 PROCEDURE — 99999 PR PBB SHADOW E&M-EST. PATIENT-LVL IV: CPT | Mod: PBBFAC,HCNC,, | Performed by: ORTHOPAEDIC SURGERY

## 2020-09-15 PROCEDURE — 1159F MED LIST DOCD IN RCRD: CPT | Mod: HCNC,S$GLB,, | Performed by: ORTHOPAEDIC SURGERY

## 2020-09-15 PROCEDURE — 1101F PR PT FALLS ASSESS DOC 0-1 FALLS W/OUT INJ PAST YR: ICD-10-PCS | Mod: HCNC,CPTII,S$GLB, | Performed by: ORTHOPAEDIC SURGERY

## 2020-09-15 PROCEDURE — 20610 DRAIN/INJ JOINT/BURSA W/O US: CPT | Mod: HCNC,RT,S$GLB, | Performed by: ORTHOPAEDIC SURGERY

## 2020-09-15 PROCEDURE — 1125F PR PAIN SEVERITY QUANTIFIED, PAIN PRESENT: ICD-10-PCS | Mod: HCNC,S$GLB,, | Performed by: ORTHOPAEDIC SURGERY

## 2020-09-15 PROCEDURE — 1159F PR MEDICATION LIST DOCUMENTED IN MEDICAL RECORD: ICD-10-PCS | Mod: HCNC,S$GLB,, | Performed by: ORTHOPAEDIC SURGERY

## 2020-09-15 PROCEDURE — 1101F PT FALLS ASSESS-DOCD LE1/YR: CPT | Mod: HCNC,CPTII,S$GLB, | Performed by: ORTHOPAEDIC SURGERY

## 2020-09-15 PROCEDURE — 99212 PR OFFICE/OUTPT VISIT, EST, LEVL II, 10-19 MIN: ICD-10-PCS | Mod: HCNC,25,S$GLB, | Performed by: ORTHOPAEDIC SURGERY

## 2020-09-15 PROCEDURE — 20610 PR DRAIN/INJECT LARGE JOINT/BURSA: ICD-10-PCS | Mod: HCNC,RT,S$GLB, | Performed by: ORTHOPAEDIC SURGERY

## 2020-09-15 RX ORDER — GARLIC 1000 MG
CAPSULE ORAL
Status: ON HOLD | COMMUNITY
End: 2023-09-14

## 2020-09-15 RX ORDER — TRIAMCINOLONE ACETONIDE 40 MG/ML
40 INJECTION, SUSPENSION INTRA-ARTICULAR; INTRAMUSCULAR
Status: DISCONTINUED | OUTPATIENT
Start: 2020-09-15 | End: 2020-09-15 | Stop reason: HOSPADM

## 2020-09-15 RX ADMIN — TRIAMCINOLONE ACETONIDE 40 MG: 40 INJECTION, SUSPENSION INTRA-ARTICULAR; INTRAMUSCULAR at 10:09

## 2020-09-15 NOTE — PROGRESS NOTES
Subjective:      Patient ID: Brandan Werner is a 75 y.o. male.    Chief Complaint: Follow-up of the Right Shoulder      HPI: Brandan Werner here in follow-up of chronic right shoulder pain related to end-stage osteoarthritis.  Pain with any ROM. Patient was last seen and treated approximately 2 months ago with corticosteroid injection.  Patient reports injection was helpful for 1 month.  He also finds relief with Celebrex as needed.  Previously, Dr. Richardson recommended total shoulder replacement.  Patient is considering surgery in the future.    Past Medical History:   Diagnosis Date    Anxiety     BPH (benign prostatic hyperplasia)     Cataract     Elevated PSA     Erectile dysfunction     Hyperlipidemia     Hypertension     Hypogonadism male     Kidney stone 5/20/2020    Obesity     PTSD (post-traumatic stress disorder)     Shoulder arthritis     Urinary tract infection        Current Outpatient Medications:     ascorbic acid (VITAMIN C) 250 MG tablet, Take 250 mg by mouth once daily., Disp: , Rfl:     atorvastatin (LIPITOR) 40 MG tablet, Take 20 mg by mouth once daily., Disp: , Rfl:     celecoxib (CELEBREX) 100 MG capsule, Take 200 mg by mouth. , Disp: , Rfl:     FLUoxetine 20 MG capsule, Take 40 mg by mouth once daily., Disp: , Rfl:     FOLIC ACID ORAL, Take 1 tablet by mouth every morning., Disp: , Rfl:     folic acid/multivit-min/lutein (CENTRUM SILVER ORAL), Take by mouth., Disp: , Rfl:     garlic 1,000 mg Cap, Take by mouth., Disp: , Rfl:     GINGER ROOT XT-FENNEL SD XT ORAL, Take 550 mg by mouth., Disp: , Rfl:     glucosam/chond-msm1/C/agueda/bor (GLUCOSAMINE-CHOND-MSM COMPLEX ORAL), Take by mouth., Disp: , Rfl:     lisinopril (PRINIVIL,ZESTRIL) 5 MG tablet, Take 5 mg by mouth once daily., Disp: , Rfl:     mecobalamin (B12 ACTIVE ORAL), Take by mouth., Disp: , Rfl:     meloxicam (MOBIC) 15 MG tablet, Take 15 mg by mouth daily as needed for Pain (and inflammation)., Disp: , Rfl:      multivitamin (ONE DAILY MULTIVITAMIN) per tablet, Take 1 tablet by mouth once daily., Disp: , Rfl:     tamsulosin (FLOMAX) 0.4 mg Cp24, Take 1 capsule (0.4 mg total) by mouth nightly., Disp: 30 capsule, Rfl: 11    traZODone (DESYREL) 100 MG tablet, Take 100 mg by mouth every evening., Disp: , Rfl:     vitamin E 100 UNIT capsule, Take 100 Units by mouth every morning., Disp: , Rfl:     diclofenac (VOLTAREN) 75 MG EC tablet, Take 1 tablet (75 mg total) by mouth 2 (two) times daily as needed (joint pain). (Patient not taking: Reported on 9/15/2020), Disp: 60 tablet, Rfl: 3    diphenhydrAMINE (SOMINEX) 25 mg tablet, Take 25 mg by mouth nightly as needed for Insomnia., Disp: , Rfl:     fluticasone (FLONASE) 50 mcg/actuation nasal spray, 2 sprays (100 mcg total) by Each Nare route once daily. (Patient not taking: Reported on 9/15/2020), Disp: 1 Bottle, Rfl: 0    polyethylene glycol (GLYCOLAX) 17 gram PwPk, Take 17 g by mouth once daily. (Patient not taking: Reported on 9/15/2020), Disp: 30 packet, Rfl: 2    triamcinolone acetonide 0.1% (KENALOG) 0.1 % cream, Apply topically 3 (three) times daily. (Patient not taking: Reported on 9/15/2020), Disp: 45 g, Rfl: 0    VOLTAREN 1 % Gel, Apply 4 g topically 4 (four) times daily as needed (pain and inflammation). , Disp: , Rfl:   Review of patient's allergies indicates:  No Known Allergies    Ht 6' (1.829 m)   Wt 108.9 kg (240 lb)   BMI 32.55 kg/m²     Review of Systems   Musculoskeletal: Positive for arthritis, joint pain and stiffness.         Objective:    Ortho Exam       Right shoulder  Skin: No rashes or lesions on exposed areas.  Atrophy: none noted.  Tenderness to palpation: global.  AROM (deg): abduction-45, flexion-60, rotation- restricted, painful rotation- present.  Crepitation with ROM  Cross arm adduction test- positive.  Instability testing: negative.   Distal neuro: normal, no muscle wasting or atrophy.  Pulses: Positive peripheral pulses.   GEN: Well  developed, well nourished male. AAOX3. No acute distress.   Normocephalic, atraumatic.   ZO  Breathing unlabored.  Mood and affect appropriate.       Assessment:     Imaging: AP lateral x-ray right shoulder demonstrates severe arthritic changes of the shoulder joint as well as osteophyte formation        1. Shoulder arthritis          Plan:       Orders Placed This Encounter    Large Joint Aspiration/Injection      Patient understands the nature of the problem in regards to osteoarthritis.  He understands his condition will not improve and will continue to progress.  He is considering total shoulder replacement in the future but for now he would like to receive palliative corticosteroid injections as needed.    Follow up in about 3 months (around 12/15/2020).

## 2020-09-15 NOTE — PROCEDURES
Large Joint Aspiration/Injection    Date/Time: 9/15/2020 10:30 AM  Performed by: Mita Bravo PA-C  Authorized by: Mita Bravo PA-C     Medications:  40 mg triamcinolone acetonide 40 mg/mL    PROCEDURE NOTE:  I have explained the risks, benefits, and alternatives of the procedure in detail.  The patient voices understanding and all questions have been answered.  The patient agrees to proceed as planned, consents to injection. Pause for timeout. After a sterile prep of the skin performed in the normal fashion, the right shoulder is injected from the posterior approach using a 22 gauge needle with a combination of 2cc 1% lidocaine and 40 mg of kenalog. The patient is cautioned and immediate relief of pain is secondary to the local anesthetic and will be temporary.  After the anesthetic wears off there may be a increase in pain that may last for a few hours or a few days and they should use ice to help alleviate this flair up of pain.

## 2020-09-23 ENCOUNTER — OFFICE VISIT (OUTPATIENT)
Dept: INTERNAL MEDICINE | Facility: CLINIC | Age: 75
End: 2020-09-23
Payer: MEDICARE

## 2020-09-23 VITALS
BODY MASS INDEX: 31.85 KG/M2 | SYSTOLIC BLOOD PRESSURE: 102 MMHG | OXYGEN SATURATION: 97 % | HEART RATE: 72 BPM | DIASTOLIC BLOOD PRESSURE: 60 MMHG | RESPIRATION RATE: 14 BRPM | WEIGHT: 240.31 LBS | HEIGHT: 73 IN

## 2020-09-23 DIAGNOSIS — N20.0 KIDNEY STONE: ICD-10-CM

## 2020-09-23 DIAGNOSIS — J84.10 CALCIFIED GRANULOMA OF LUNG: ICD-10-CM

## 2020-09-23 DIAGNOSIS — F43.10 PTSD (POST-TRAUMATIC STRESS DISORDER): ICD-10-CM

## 2020-09-23 DIAGNOSIS — M19.019 SHOULDER ARTHRITIS: ICD-10-CM

## 2020-09-23 DIAGNOSIS — I10 ESSENTIAL HYPERTENSION: Chronic | ICD-10-CM

## 2020-09-23 DIAGNOSIS — R97.20 ELEVATED PSA: ICD-10-CM

## 2020-09-23 DIAGNOSIS — E66.9 OBESITY (BMI 30.0-34.9): ICD-10-CM

## 2020-09-23 DIAGNOSIS — H90.3 SENSORINEURAL HEARING LOSS (SNHL) OF BOTH EARS: Chronic | ICD-10-CM

## 2020-09-23 DIAGNOSIS — Z96.0 HISTORY OF PENILE IMPLANT: ICD-10-CM

## 2020-09-23 DIAGNOSIS — E78.5 HYPERLIPIDEMIA, UNSPECIFIED HYPERLIPIDEMIA TYPE: ICD-10-CM

## 2020-09-23 DIAGNOSIS — G89.29 CHRONIC RIGHT SHOULDER PAIN: ICD-10-CM

## 2020-09-23 DIAGNOSIS — N40.0 BENIGN PROSTATIC HYPERPLASIA, UNSPECIFIED WHETHER LOWER URINARY TRACT SYMPTOMS PRESENT: ICD-10-CM

## 2020-09-23 DIAGNOSIS — Z77.098 HISTORY OF AGENT ORANGE EXPOSURE: ICD-10-CM

## 2020-09-23 DIAGNOSIS — R25.1 TREMOR: ICD-10-CM

## 2020-09-23 DIAGNOSIS — H25.10 NUCLEAR SCLEROSIS, UNSPECIFIED LATERALITY: ICD-10-CM

## 2020-09-23 DIAGNOSIS — I70.0 ATHEROSCLEROSIS OF AORTA: ICD-10-CM

## 2020-09-23 DIAGNOSIS — M25.511 CHRONIC RIGHT SHOULDER PAIN: ICD-10-CM

## 2020-09-23 DIAGNOSIS — Z00.00 ENCOUNTER FOR PREVENTIVE HEALTH EXAMINATION: Primary | ICD-10-CM

## 2020-09-23 DIAGNOSIS — I77.819 ECTATIC AORTA: ICD-10-CM

## 2020-09-23 DIAGNOSIS — M47.812 FACET ARTHRITIS OF CERVICAL REGION: ICD-10-CM

## 2020-09-23 PROCEDURE — 3078F DIAST BP <80 MM HG: CPT | Mod: HCNC,CPTII,S$GLB, | Performed by: NURSE PRACTITIONER

## 2020-09-23 PROCEDURE — 99999 PR PBB SHADOW E&M-EST. PATIENT-LVL V: ICD-10-PCS | Mod: PBBFAC,HCNC,, | Performed by: NURSE PRACTITIONER

## 2020-09-23 PROCEDURE — G0439 PPPS, SUBSEQ VISIT: HCPCS | Mod: HCNC,S$GLB,, | Performed by: NURSE PRACTITIONER

## 2020-09-23 PROCEDURE — G0439 PR MEDICARE ANNUAL WELLNESS SUBSEQUENT VISIT: ICD-10-PCS | Mod: HCNC,S$GLB,, | Performed by: NURSE PRACTITIONER

## 2020-09-23 PROCEDURE — 3074F SYST BP LT 130 MM HG: CPT | Mod: HCNC,CPTII,S$GLB, | Performed by: NURSE PRACTITIONER

## 2020-09-23 PROCEDURE — 3078F PR MOST RECENT DIASTOLIC BLOOD PRESSURE < 80 MM HG: ICD-10-PCS | Mod: HCNC,CPTII,S$GLB, | Performed by: NURSE PRACTITIONER

## 2020-09-23 PROCEDURE — 3074F PR MOST RECENT SYSTOLIC BLOOD PRESSURE < 130 MM HG: ICD-10-PCS | Mod: HCNC,CPTII,S$GLB, | Performed by: NURSE PRACTITIONER

## 2020-09-23 PROCEDURE — 99999 PR PBB SHADOW E&M-EST. PATIENT-LVL V: CPT | Mod: PBBFAC,HCNC,, | Performed by: NURSE PRACTITIONER

## 2020-09-23 NOTE — PATIENT INSTRUCTIONS
Counseling and Referral of Other Preventative  (Italic type indicates deductible and co-insurance are waived)    Patient Name: Brandan Werner  Today's Date: 9/23/2020    Health Maintenance       Date Due Completion Date    TETANUS VACCINE In the future for injury   ---    High Dose Statin 09/23/2021 9/23/2020    Colorectal Cancer Screening 11/26/2023 11/26/2018        Lipid Panel      Eye exam-  Current on exam- [facility]    [facility] please schedule an appt to get rechecked with hearing aid     Follow up with Dr Chicas on your changes in memory-       Eat brain healthy foods- handout-    Due-please schedule       8/9/2019            The following information is provided to all patients.  This information is to help you find resources for any of the problems found today that may be affecting your health:                Living healthy guide: www.[facility].[place].[facility]      Understanding Diabetes: www.diabetes.org      Eating healthy: www.cdc.gov/healthyweight      [facility] home safety checklist: www.cdc.gov/steadi/patient.html      Agency on Aging: www.goea.[place].[facility]      Alcoholics anonymous (AA): www.aa.org      Physical Activity: www.[place].nih.gov/iz5gree      Tobacco use: www.quitwithusla.org     Low-Cholesterol Diet  Your body needs cholesterol to build new cells and create certain hormones. There are 2 kinds of cholesterol in your blood:     · HDL (good) cholesterol. This prevents fat deposits (plaque) from building up in your arteries. In this way it protects against heart disease and stroke.  · LDL (bad) cholesterol. This stays in your body and sticks to artery walls. Over time it may block blood flow to the heart and brain. This can cause a heart attack or stroke.  The cholesterol in your blood comes from 2 sources: cholesterol in food that you eat and cholesterol that your liver makes. You should limit the amount of cholesterol in your diet. But the cholesterol  that your body makes has the greatest disease risk. And your body makes more cholesterol when your diet is high in bad fats (saturated and trans fats). There are 2 kinds of fats you can eat:  · Good fats, or unsaturated fats (mono-unsaturated and poly-unsaturated). They raise the level of good cholesterol and lower the level of bad cholesterol. Good fats are found in vegetable oils such as olive, sunflower, corn, and soybean oils, and in nuts and seeds.  · Bad fats, or saturated fats (including foods high in cholesterol) and trans fats. These raise your risk of disease. They lower the good cholesterol and raise the level of bad cholesterol. Bad fats are found in animal products, including meat, whole-milk dairy products, and butter. Some plants are also high in bad fats (coconut and palm plants). Trans fats are found in hard (stick) margarines. They are also in many fast foods and commercially baked goods. Soft margarine sold in tubs has fewer trans fats and is safer to use.  High blood cholesterol is usually due to a diet high in saturated fat, along with not being physically active. In some cases, genetics plays a role in causing high cholesterol. The tips below will help you create healthy eating habits that will help lower your blood cholesterol level.  Create a diet high in good fats, low in bad fats (and low in cholesterol)  The following steps will help you create a diet high in good fats and low in bad fats:  · Talk with your doctor before starting a low cholesterol diet or weight loss program.  · Learn to read nutrition labels and select appropriate portion sizes.  · When cooking, use plant-based unsaturated vegetable oils (sunflower, corn, soybean, canola, peanut, and olive oils).  · Avoid saturated fats found in animal products such as meat, dairy (whole-milk, cheese and ice cream), poultry skin, and egg yolks. Plants high in saturated oils include coconut oil, palm oil, and palm kernel oil.  · If you eat  meat, choose smaller portions and lean cuts, such as round, desmond, sirloin, or loin. Eat more meatless meals.  · Replace meat with fish at least 2 times a week. Fish is an important source of the unsaturated fat called omega-3 fatty acids. This fat has potential to lower the risk of heart disease.  · Replace whole-milk dairy products with low-fat or nonfat products. Try soy products. Soy helps to reduce total cholesterol.  · Supplement your diet with protective fibers. Eat nuts, seeds, and whole grains rather than white rice and bread. These foods lower both cholesterol and triglyceride levels. (Triglycerides are another fat found in the blood.) Walnuts are one of the best sources of omega-3 fatty acids.  · Eat plenty of fresh fruits and vegetables daily.  · Avoid fast foods and commercial baked goods. Assume they contain saturated fat unless labeled otherwise.  Date Last Reviewed: 8/1/2016 © 2000-2017 [employer]. [address] [place] [address]. All rights reserved. This information is not intended as a substitute for professional medical care. Always follow your healthcare professional's instructions.        Low-Fat Diet    A low-fat diet will help you lose weight. It also can lower cholesterol and prevent symptoms of gallbladder disease. The average American diet contains up to 50% fat. This means that 50% of all calories come from fat (about 80 grams to 100 grams of fat per day). Choosing normal portions of foods from the list below can help lower your fat intake. Experts recommend that only 20% to 35% of your daily calories come from fat. The remaining 65% to 80% of calories will come from protein and carbohydrates. This is much healthier for you.  Breads  Ok: Whole-wheat or rye bread, shashi or soda crackers, nori toast, plain rolls, bagels, English muffins  Avoid: Rolls and breads containing whole milk or egg; waffles, pancakes, biscuits, corn bread; cheese crackers, other flavored  crackers, pastries, doughnuts  Cereals  Ok: Oatmeal, whole-wheat, bran, multigrain, rice  Avoid: Granola or other cereals that have oil, coconut, or more than 2 grams of fat per serving  Cheese and eggs  Ok: Cheeses labeled low-fat; 3 whole eggs per week; egg whites and egg substitutes as desired  Avoid: All other cheeses  Desserts  Ok: Gelatin, slushy, tiana food cake, meringues, nonfat yogurt, and puddings or sherbet made with nonfat milk  Avoid: Any other store-bought desserts, or desserts that have fat, whole milk, cream, chocolate, and coconut  Drinks  Ok: Nonfat milk, coffee, tea, fizzy (carbonated) drinks  Avoid: Whole and reduced-fat milk, evaporated and condensed milk, hot chocolate mixes, milk shakes, malts, eggnog  Fats  Ok: You may have up to 3 teaspoons of fat daily. This can be butter, margarine, mayonnaise, or healthy oils (canola or olive)  Avoid: Cream, nondairy creams, cream cheese, gravies, and cream sauces  Fruits  Ok: All fruits made without fat  Avoid: Coconut, olives  Meats, poultry, fish  Ok: Limit meat to 6 ounces daily (broiled, roasted, baked, grilled, or boiled). Buy lean cuts, and trim off the fat. Try beef, fish, lamb, pork, and canned fish packed in water; also chicken and turkey with the skin removed.  Avoid: Fried meats, fish, or poultry; fried eggs, and fish canned in oils; fatty meats such as kiran, sausage, corned beef, hot dogs, and lunch meats; meats with gravies and sauces  Potatoes, beans, pasta  Ok: Dried beans, split peas, lentils, potatoes, rice, pasta made without added fat  Avoid: French fries, potato chips, potatoes prepared with butter, refried beans  Soups  Ok: Clear broth soups without fat and with allowed vegetables  Avoid: Cream-based soups  Vegetables  Ok: Fresh, frozen, canned or dried vegetables, all made without added fat  Avoid: Fried vegetables and those prepared with butter, cream, sauces  Miscellaneous  Ok: Salt, sugar, jelly, hard candy, marshmallows,  honey, syrup, spices and herbs, mustard, ketchup, lemon, and vinegar. Try to limit sweets and added sugars.  Avoid: Chocolate, nuts, coconut, and cream candies; sunflower, sesame, and other seeds; fried foods; cream sauces and gravies; pizza  Date Last Reviewed: 8/1/2016  © 2094-1122 [employer]. [address] [place] [address], [place] [address]. All rights reserved. This information is not intended as a substitute for professional medical care. Always follow your healthcare professional's instructions.

## 2020-09-23 NOTE — PROGRESS NOTES
"Brandan Werner presented for a  Medicare AWV and comprehensive Health Risk Assessment today. The following components were reviewed and updated:    · Medical history  · Family History  · Social history  · Allergies and Current Medications  · Health Risk Assessment  · Health Maintenance  · [facility]    ** See Completed Assessments for Annual Wellness Visit within the encounter summary.**       The following assessments were completed:  · Living Situation  · CAGE  · Depression Screening  · Timed Get Up and Go  · Whisper Test  · Cognitive Function Screening-subjective changes with memory difficulties- clock draw test abnormal-partially correct  · Nutrition Screening  · ADL Screening  · PAQ Screening    Vitals:    09/23/20 0835   BP: 102/60   Pulse: 72   Resp: 14   SpO2: 97%   Weight: 109 kg (240 lb 4.8 oz)   Height: 6' 0.5" (1.842 m)     Body mass index is 32.14 kg/m².  Physical Exam  Constitutional:       Appearance: He is well-developed.   HENT:      Head: Normocephalic.      Comments: Wears mask     Right Ear: External ear normal.      Left Ear: External ear normal.      Nose: Nose normal.      Mouth/Throat:      Pharynx: No oropharyngeal exudate.   Eyes:      General: No scleral icterus.     Conjunctiva/sclera: Conjunctivae normal.      Pupils: Pupils are equal, round, and reactive to light.   Neck:      Musculoskeletal: Normal range of motion and neck supple.   Cardiovascular:      Rate and Rhythm: Normal rate and regular rhythm.   Pulmonary:      Effort: Pulmonary effort is normal. No respiratory distress.      Breath sounds: Normal breath sounds.   Abdominal:      General: Bowel sounds are normal. There is no distension.      Palpations: Abdomen is soft.      Comments: obese   Musculoskeletal: Normal range of motion.   Skin:     General: Skin is warm and dry.      Findings: No rash.   Neurological:      General: No focal deficit present.      Mental Status: He is alert and oriented to " person, place, and time.      Cranial Nerves: No cranial nerve deficit.      Motor: No abnormal muscle tone.      Coordination: Coordination normal.      Deep Tendon Reflexes: Reflexes are normal and symmetric. Reflexes normal.   Psychiatric:         Mood and Affect: Mood normal.         Behavior: Behavior normal.         Thought Content: Thought content normal.         Judgment: Judgment normal.           Lab Results   Component Value Date    HGBA1C 5.6 11/25/2019    CHOL 135 08/09/2019    HDL 55 08/09/2019    LDLCALC 70.4 08/09/2019    TRIG 48 08/09/2019    CHOLHDL 40.7 08/09/2019      Lab Results   Component Value Date    PSA 6.5 (H) 01/30/2020       Diagnoses and health risks identified today and associated recommendations/orders:    1. Hyperlipidemia, unspecified hyperlipidemia type  Stable- followed by PCP  - Lipid Panel; Future    2. Benign prostatic hyperplasia, unspecified whether lower urinary tract symptoms present  Stable- followed by PCP    3. Elevated PSA  Stable- followed by PCP    4. Nuclear sclerosis, unspecified laterality  Stable- followed by PCP, opth    5. PTSD (post-traumatic stress disorder)  [facility]able- [facility]    6. Shoulder arthritis  Stable- followed by PCP, ortho    7. Ectatic aorta  Stable- followed by PCP    8. Calcified granuloma of lung  Stable- followed by PCP    9. Tremor  Stable- followed by PCP, neurology    10. Chronic right shoulder pain  Stable- followed by PCP, ortho    11. History of agent Orange exposure  Stable- [facility]    12. Obesity (BMI 30.0-34.9)  Chronic problem - no recent weight loss. Followed by PCP.   Centers for Disease Control and Prevention (CDC)  weight recommendations for current BMI & ideal BMI range discussed with patient.  Recommended  Low fat diet, regular exercise x 1 hour as tolerated every day.  .     13. Facet arthritis of cervical region  Stable- followed by PCP    14. Kidney stone  Stable- followed by PCP    15.  Atherosclerosis of aorta  Stable- followed by PCP    16. Sensorineural hearing loss (SNHL) of both ears  Stable- [facility]    17. Essential hypertension  Stable- followed by PCP    18. History of penile implant  Stable- followed by PCP    19. Encounter for preventive health examination  Here for Health Risk Assessment/Annual Wellness Visit.  Health maintenance reviewed and updated. Follow up in one year.         Provided [place] with a 5-10 year written screening schedule and personal prevention plan. Recommendations were developed using the USPSTF age appropriate recommendations. Education, counseling, and referrals were provided as needed. After Visit Summary printed and given to patient which includes a list of additional screenings\tests needed. Abnormal clock draw test & subjective memory changes- referred to PCP for additional workup- neurology W/U in progress for tremors. Encouraged weight loss & regular exercise- covid 19 pandemic- sit & be fit on TV as well, low fat diet- include brain healthy anti-oxidant foods- handout out. [facility].  Permission obtained from pt to notify neurology MD on this HRA visit assessmsment & memory changes & abn clock draw test.     Follow up in about 1 year (around 9/23/2021) for HRA.    Anna Parker NP  I offered to discuss end of life issues, including information on how to make advance directives that the patient could use to name someone who would make medical decisions on their behalf if they became too ill to make themselves.    ___Patient declined  _X_Patient is interested, I provided paper work and offered to discuss.

## 2020-09-24 ENCOUNTER — LAB VISIT (OUTPATIENT)
Dept: LAB | Facility: HOSPITAL | Age: 75
End: 2020-09-24
Attending: NURSE PRACTITIONER
Payer: MEDICARE

## 2020-09-24 DIAGNOSIS — R41.3 MEMORY DEFICITS: Primary | ICD-10-CM

## 2020-09-24 DIAGNOSIS — E78.5 HYPERLIPIDEMIA, UNSPECIFIED HYPERLIPIDEMIA TYPE: ICD-10-CM

## 2020-09-24 LAB
CHOLEST SERPL-MCNC: 159 MG/DL (ref 120–199)
CHOLEST/HDLC SERPL: 2.3 {RATIO} (ref 2–5)
HDLC SERPL-MCNC: 69 MG/DL (ref 40–75)
HDLC SERPL: 43.4 % (ref 20–50)
LDLC SERPL CALC-MCNC: 81.8 MG/DL (ref 63–159)
NONHDLC SERPL-MCNC: 90 MG/DL
TRIGL SERPL-MCNC: 41 MG/DL (ref 30–150)

## 2020-09-24 PROCEDURE — 80061 LIPID PANEL: CPT | Mod: HCNC

## 2020-09-24 PROCEDURE — 36415 COLL VENOUS BLD VENIPUNCTURE: CPT | Mod: HCNC

## 2020-09-28 ENCOUNTER — OFFICE VISIT (OUTPATIENT)
Dept: INTERNAL MEDICINE | Facility: CLINIC | Age: 75
End: 2020-09-28
Payer: MEDICARE

## 2020-09-28 VITALS
DIASTOLIC BLOOD PRESSURE: 82 MMHG | TEMPERATURE: 98 F | HEART RATE: 88 BPM | WEIGHT: 233.69 LBS | RESPIRATION RATE: 18 BRPM | BODY MASS INDEX: 31.65 KG/M2 | SYSTOLIC BLOOD PRESSURE: 122 MMHG | HEIGHT: 72 IN | OXYGEN SATURATION: 96 %

## 2020-09-28 DIAGNOSIS — Z12.5 ENCOUNTER FOR SCREENING FOR MALIGNANT NEOPLASM OF PROSTATE: ICD-10-CM

## 2020-09-28 DIAGNOSIS — N40.0 BENIGN PROSTATIC HYPERPLASIA, UNSPECIFIED WHETHER LOWER URINARY TRACT SYMPTOMS PRESENT: ICD-10-CM

## 2020-09-28 DIAGNOSIS — I10 ESSENTIAL HYPERTENSION: Primary | ICD-10-CM

## 2020-09-28 DIAGNOSIS — E78.5 HYPERLIPIDEMIA, UNSPECIFIED HYPERLIPIDEMIA TYPE: ICD-10-CM

## 2020-09-28 DIAGNOSIS — I70.0 ATHEROSCLEROSIS OF AORTA: ICD-10-CM

## 2020-09-28 DIAGNOSIS — G89.29 CHRONIC RIGHT SHOULDER PAIN: ICD-10-CM

## 2020-09-28 DIAGNOSIS — M25.511 CHRONIC RIGHT SHOULDER PAIN: ICD-10-CM

## 2020-09-28 DIAGNOSIS — F43.10 PTSD (POST-TRAUMATIC STRESS DISORDER): ICD-10-CM

## 2020-09-28 PROCEDURE — 3079F DIAST BP 80-89 MM HG: CPT | Mod: HCNC,CPTII,S$GLB, | Performed by: INTERNAL MEDICINE

## 2020-09-28 PROCEDURE — 1101F PT FALLS ASSESS-DOCD LE1/YR: CPT | Mod: HCNC,CPTII,S$GLB, | Performed by: INTERNAL MEDICINE

## 2020-09-28 PROCEDURE — 3079F PR MOST RECENT DIASTOLIC BLOOD PRESSURE 80-89 MM HG: ICD-10-PCS | Mod: HCNC,CPTII,S$GLB, | Performed by: INTERNAL MEDICINE

## 2020-09-28 PROCEDURE — 1159F PR MEDICATION LIST DOCUMENTED IN MEDICAL RECORD: ICD-10-PCS | Mod: HCNC,S$GLB,, | Performed by: INTERNAL MEDICINE

## 2020-09-28 PROCEDURE — 1101F PR PT FALLS ASSESS DOC 0-1 FALLS W/OUT INJ PAST YR: ICD-10-PCS | Mod: HCNC,CPTII,S$GLB, | Performed by: INTERNAL MEDICINE

## 2020-09-28 PROCEDURE — 99214 OFFICE O/P EST MOD 30 MIN: CPT | Mod: 25,HCNC,S$GLB, | Performed by: INTERNAL MEDICINE

## 2020-09-28 PROCEDURE — 99214 PR OFFICE/OUTPT VISIT, EST, LEVL IV, 30-39 MIN: ICD-10-PCS | Mod: 25,HCNC,S$GLB, | Performed by: INTERNAL MEDICINE

## 2020-09-28 PROCEDURE — 99999 PR PBB SHADOW E&M-EST. PATIENT-LVL V: CPT | Mod: PBBFAC,HCNC,, | Performed by: INTERNAL MEDICINE

## 2020-09-28 PROCEDURE — 3074F SYST BP LT 130 MM HG: CPT | Mod: HCNC,CPTII,S$GLB, | Performed by: INTERNAL MEDICINE

## 2020-09-28 PROCEDURE — G0009 PNEUMOCOCCAL POLYSACCHARIDE VACCINE 23-VALENT =>2YO SQ IM: ICD-10-PCS | Mod: HCNC,S$GLB,, | Performed by: INTERNAL MEDICINE

## 2020-09-28 PROCEDURE — 3074F PR MOST RECENT SYSTOLIC BLOOD PRESSURE < 130 MM HG: ICD-10-PCS | Mod: HCNC,CPTII,S$GLB, | Performed by: INTERNAL MEDICINE

## 2020-09-28 PROCEDURE — 1125F AMNT PAIN NOTED PAIN PRSNT: CPT | Mod: HCNC,S$GLB,, | Performed by: INTERNAL MEDICINE

## 2020-09-28 PROCEDURE — 1125F PR PAIN SEVERITY QUANTIFIED, PAIN PRESENT: ICD-10-PCS | Mod: HCNC,S$GLB,, | Performed by: INTERNAL MEDICINE

## 2020-09-28 PROCEDURE — 99999 PR PBB SHADOW E&M-EST. PATIENT-LVL V: ICD-10-PCS | Mod: PBBFAC,HCNC,, | Performed by: INTERNAL MEDICINE

## 2020-09-28 PROCEDURE — G0009 ADMIN PNEUMOCOCCAL VACCINE: HCPCS | Mod: HCNC,S$GLB,, | Performed by: INTERNAL MEDICINE

## 2020-09-28 PROCEDURE — 90732 PPSV23 VACC 2 YRS+ SUBQ/IM: CPT | Mod: HCNC,S$GLB,, | Performed by: INTERNAL MEDICINE

## 2020-09-28 PROCEDURE — 1159F MED LIST DOCD IN RCRD: CPT | Mod: HCNC,S$GLB,, | Performed by: INTERNAL MEDICINE

## 2020-09-28 PROCEDURE — 90732 PNEUMOCOCCAL POLYSACCHARIDE VACCINE 23-VALENT =>2YO SQ IM: ICD-10-PCS | Mod: HCNC,S$GLB,, | Performed by: INTERNAL MEDICINE

## 2020-09-28 NOTE — PROGRESS NOTES
Subjective:       Patient ID: Brandan Werner is a 75 y.o. male.    Chief Complaint: Follow-up    HPI   The patient presents for of medical conditions which include hypertension, hyperlipidemia, BPH, shoulder arthritis, PTSD, ectatic aorta, chronically elevated PSA level.  The patient also has obesity but has been following a reduced calorie diet to lose weight.  He restarted his workouts at the gym.  He is followed by his orthopedic specialist for his right shoulder pain.    He does note chronic bilateral tremors.  Sometimes the tremors interfere with his ability to feed himself.  He does not monitor blood pressure levels.      Review of Systems   Constitutional: Positive for activity change and appetite change. Negative for fatigue and unexpected weight change.   HENT: Negative for sinus pressure/congestion and sore throat.    Eyes: Negative for visual disturbance.   Respiratory: Negative for cough, chest tightness, shortness of breath and wheezing.    Cardiovascular: Negative for chest pain, palpitations and leg swelling.   Gastrointestinal: Negative for abdominal pain, blood in stool, nausea and vomiting.   Genitourinary: Negative for dysuria, hematuria and urgency.   Musculoskeletal: Negative for arthralgias, back pain, gait problem, joint swelling, myalgias and neck stiffness.   Integumentary:  Negative for color change and rash.   Neurological: Positive for tremors. Negative for dizziness, seizures, syncope, weakness, light-headedness, numbness and headaches.   Psychiatric/Behavioral: Negative for sleep disturbance.         Objective:      Physical Exam  Vitals signs and nursing note reviewed.   Constitutional:       General: He is not in acute distress.     Appearance: He is well-developed.      Comments: The patient has lost 19 lb since 05/25/2020.   HENT:      Head: Normocephalic and atraumatic.   Eyes:      General: No scleral icterus.     Conjunctiva/sclera: Conjunctivae normal.   Neck:       Musculoskeletal: Normal range of motion and neck supple.      Thyroid: No thyromegaly.      Vascular: No JVD.   Cardiovascular:      Rate and Rhythm: Normal rate and regular rhythm.      Heart sounds: Normal heart sounds. No murmur. No friction rub. No gallop.    Pulmonary:      Effort: Pulmonary effort is normal. No respiratory distress.      Breath sounds: Normal breath sounds. No wheezing or rales.   Abdominal:      General: Bowel sounds are normal.      Palpations: Abdomen is soft. There is no mass.      Tenderness: There is no abdominal tenderness.   Musculoskeletal: Normal range of motion.         General: Tenderness present.      Comments: Shoulder joint tenderness is present with range-of-motion testing bilaterally.   Lymphadenopathy:      Cervical: No cervical adenopathy.   Skin:     General: Skin is warm and dry.      Findings: No rash.   Neurological:      Mental Status: He is alert and oriented to person, place, and time.      Comments: Gait is normal.   Psychiatric:         Mood and Affect: Mood normal.         Behavior: Behavior normal.         Thought Content: Thought content normal.         Results for orders placed or performed in visit on 09/24/20   Lipid Panel   Result Value Ref Range    Cholesterol 159 120 - 199 mg/dL    Triglycerides 41 30 - 150 mg/dL    HDL 69 40 - 75 mg/dL    LDL Cholesterol 81.8 63.0 - 159.0 mg/dL    Hdl/Cholesterol Ratio 43.4 20.0 - 50.0 %    Total Cholesterol/HDL Ratio 2.3 2.0 - 5.0    Non-HDL Cholesterol 90 mg/dL       Assessment:       1. Essential hypertension    2. Atherosclerosis of aorta    3. Chronic right shoulder pain    4. PTSD (post-traumatic stress disorder)    5. Benign prostatic hyperplasia, unspecified whether lower urinary tract symptoms present    6. Hyperlipidemia, unspecified hyperlipidemia type        Plan:     Brandan was seen today for follow-up.  The patient has been encouraged to continue weight loss efforts.  Pneumovax will be administered today.   Blood tests and a follow-up visit in 6 months will be obtained.    Diagnoses and all orders for this visit:    Essential hypertension    Atherosclerosis of aorta    Chronic right shoulder pain    PTSD (post-traumatic stress disorder)    Benign prostatic hyperplasia, unspecified whether lower urinary tract symptoms present    Hyperlipidemia, unspecified hyperlipidemia type

## 2020-10-08 ENCOUNTER — TELEPHONE (OUTPATIENT)
Dept: NEUROLOGY | Facility: CLINIC | Age: 75
End: 2020-10-08

## 2020-10-08 NOTE — TELEPHONE ENCOUNTER
----- Message from Shauna Villanueva sent at 10/8/2020  8:16 AM CDT -----  Regarding: appt  Contact: pt@ [phone]  Caller is asking to have patient seen later today, says patient's alarm clock didn't go off, and he missed his 8am appt with Dr. Murillo. Please call.

## 2020-10-08 NOTE — TELEPHONE ENCOUNTER
"Spoke with Mr. Serra, he states "he took a sleeping pill last night and overslept, needs to reschedule appt". Mr. Serra rescheduled the appt for 11/10/2020 at 4:20. Mailed appt letter.   "

## 2020-11-10 ENCOUNTER — OFFICE VISIT (OUTPATIENT)
Dept: NEUROLOGY | Facility: CLINIC | Age: 75
End: 2020-11-10
Payer: MEDICARE

## 2020-11-10 VITALS
DIASTOLIC BLOOD PRESSURE: 83 MMHG | HEIGHT: 72 IN | BODY MASS INDEX: 31.42 KG/M2 | HEART RATE: 81 BPM | WEIGHT: 231.94 LBS | SYSTOLIC BLOOD PRESSURE: 120 MMHG

## 2020-11-10 DIAGNOSIS — R25.1 TREMOR: Chronic | ICD-10-CM

## 2020-11-10 PROCEDURE — 99999 PR PBB SHADOW E&M-EST. PATIENT-LVL IV: CPT | Mod: PBBFAC,HCNC,, | Performed by: PSYCHIATRY & NEUROLOGY

## 2020-11-10 PROCEDURE — 99214 PR OFFICE/OUTPT VISIT, EST, LEVL IV, 30-39 MIN: ICD-10-PCS | Mod: HCNC,S$GLB,, | Performed by: PSYCHIATRY & NEUROLOGY

## 2020-11-10 PROCEDURE — 1126F PR PAIN SEVERITY QUANTIFIED, NO PAIN PRESENT: ICD-10-PCS | Mod: HCNC,S$GLB,, | Performed by: PSYCHIATRY & NEUROLOGY

## 2020-11-10 PROCEDURE — 99499 UNLISTED E&M SERVICE: CPT | Mod: S$PBB,HCNC,, | Performed by: PSYCHIATRY & NEUROLOGY

## 2020-11-10 PROCEDURE — 3079F DIAST BP 80-89 MM HG: CPT | Mod: HCNC,CPTII,S$GLB, | Performed by: PSYCHIATRY & NEUROLOGY

## 2020-11-10 PROCEDURE — 3079F PR MOST RECENT DIASTOLIC BLOOD PRESSURE 80-89 MM HG: ICD-10-PCS | Mod: HCNC,CPTII,S$GLB, | Performed by: PSYCHIATRY & NEUROLOGY

## 2020-11-10 PROCEDURE — 3074F SYST BP LT 130 MM HG: CPT | Mod: HCNC,CPTII,S$GLB, | Performed by: PSYCHIATRY & NEUROLOGY

## 2020-11-10 PROCEDURE — 1159F MED LIST DOCD IN RCRD: CPT | Mod: HCNC,S$GLB,, | Performed by: PSYCHIATRY & NEUROLOGY

## 2020-11-10 PROCEDURE — 1101F PT FALLS ASSESS-DOCD LE1/YR: CPT | Mod: HCNC,CPTII,S$GLB, | Performed by: PSYCHIATRY & NEUROLOGY

## 2020-11-10 PROCEDURE — 99999 PR PBB SHADOW E&M-EST. PATIENT-LVL IV: ICD-10-PCS | Mod: PBBFAC,HCNC,, | Performed by: PSYCHIATRY & NEUROLOGY

## 2020-11-10 PROCEDURE — 1126F AMNT PAIN NOTED NONE PRSNT: CPT | Mod: HCNC,S$GLB,, | Performed by: PSYCHIATRY & NEUROLOGY

## 2020-11-10 PROCEDURE — 1159F PR MEDICATION LIST DOCUMENTED IN MEDICAL RECORD: ICD-10-PCS | Mod: HCNC,S$GLB,, | Performed by: PSYCHIATRY & NEUROLOGY

## 2020-11-10 PROCEDURE — 99499 RISK ADDL DX/OHS AUDIT: ICD-10-PCS | Mod: S$PBB,HCNC,, | Performed by: PSYCHIATRY & NEUROLOGY

## 2020-11-10 PROCEDURE — 99214 OFFICE O/P EST MOD 30 MIN: CPT | Mod: HCNC,S$GLB,, | Performed by: PSYCHIATRY & NEUROLOGY

## 2020-11-10 PROCEDURE — 1101F PR PT FALLS ASSESS DOC 0-1 FALLS W/OUT INJ PAST YR: ICD-10-PCS | Mod: HCNC,CPTII,S$GLB, | Performed by: PSYCHIATRY & NEUROLOGY

## 2020-11-10 PROCEDURE — 3074F PR MOST RECENT SYSTOLIC BLOOD PRESSURE < 130 MM HG: ICD-10-PCS | Mod: HCNC,CPTII,S$GLB, | Performed by: PSYCHIATRY & NEUROLOGY

## 2020-11-10 RX ORDER — PRIMIDONE 50 MG/1
50 TABLET ORAL NIGHTLY
Qty: 30 TABLET | Refills: 11 | Status: SHIPPED | OUTPATIENT
Start: 2020-11-10 | End: 2021-11-10

## 2020-11-10 NOTE — ASSESSMENT & PLAN NOTE
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 No hand weakness to strongly suggest surgery.    Primidone 50mg QHS

## 2020-11-10 NOTE — PROGRESS NOTES
"MOVEMENT DISORDERS CLINIC NEW CONSULT NOTE    PCP/Referring Provider: Aaareferral Self  No address on file  Date of Service: 11/10/2020    Chief Complaint: Tremor    Interval Hx    Since last visit,   Continues to have postural; and resting jerky tremor    No imbalance    MRI cspine shows severe bilateral neural foraminal stenosis. No central stenosis  MRI brain NL    "priorHPI: Brandan Werner is a R HANDED 75 y.o. male with a medical issues significant for PTSD, HTN, who presents with tremor of his hands since 1 year. His wife first noted this tremor a year ago. He notes when he brings a fork to his mouth he shakes. When he writes he also shakes and writing is illegible. He can also have a resting tremor at times.He notes he's had no fall,s and can  Walk 10 blocks. Does note R shoulder pain for 4-5 years.  He notes he took a "parkinsons medication" this AM for the first time, but unclear what he took. Unclear if his tremor improved.    Neuroleptic exposure:  None    PD Review of Symptoms:  Anosmia: none  Dysarthria/Hypophonia: none  Dysphagia/Sialorrhea: none  Depression: none  Cognitive slowing: short term at times  Hallucinations: none  Urinary changes: yes  Constipation: none  Falls: none  Freezing: none  Micrographia:  none  Sleep issues:  -COLEEN: none  -RBD: talks in sleep"    Review of Systems:   Review of Systems   Constitutional: Negative for fever.   HENT: Negative for congestion.    Eyes: Negative for double vision.   Respiratory: Negative for cough and shortness of breath.    Cardiovascular: Negative for chest pain and leg swelling.   Gastrointestinal: Negative for nausea.   Genitourinary: Negative for dysuria.   Skin: Negative for rash.   Neurological: Positive for tremors. Negative for speech change and headaches.   Psychiatric/Behavioral: Positive for memory loss. Negative for depression.         Current Medications:  Outpatient Encounter Medications as of 11/10/2020   Medication Sig Dispense Refill "    ascorbic acid (VITAMIN C) 250 MG tablet Take 250 mg by mouth once daily.      atorvastatin (LIPITOR) 40 MG tablet Take 20 mg by mouth once daily.      celecoxib (CELEBREX) 100 MG capsule Take 200 mg by mouth.       diclofenac (VOLTAREN) 75 MG EC tablet Take 1 tablet (75 mg total) by mouth 2 (two) times daily as needed (joint pain). 60 tablet 3    diphenhydrAMINE (SOMINEX) 25 mg tablet Take 25 mg by mouth nightly as needed for Insomnia.      FLUoxetine 20 MG capsule Take 40 mg by mouth once daily.      FOLIC ACID ORAL Take 1 tablet by mouth every morning.      folic acid/multivit-min/lutein (CENTRUM SILVER ORAL) Take by mouth.      garlic 1,000 mg Cap Take by mouth.      GINGER ROOT XT-FENNEL SD XT ORAL Take 550 mg by mouth.      glucosam/chond-msm1/C/agueda/bor (GLUCOSAMINE-CHOND-MSM COMPLEX ORAL) Take by mouth.      lisinopril (PRINIVIL,ZESTRIL) 5 MG tablet Take 5 mg by mouth once daily.      mecobalamin (B12 ACTIVE ORAL) Take by mouth.      meloxicam (MOBIC) 15 MG tablet Take 15 mg by mouth daily as needed for Pain (and inflammation).      multivitamin (ONE DAILY MULTIVITAMIN) per tablet Take 1 tablet by mouth once daily.      polyethylene glycol (GLYCOLAX) 17 gram PwPk Take 17 g by mouth once daily. 30 packet 2    tamsulosin (FLOMAX) 0.4 mg Cp24 Take 1 capsule (0.4 mg total) by mouth nightly. 30 capsule 11    traZODone (DESYREL) 100 MG tablet Take 100 mg by mouth every evening.      vitamin E 100 UNIT capsule Take 100 Units by mouth every morning.      VOLTAREN 1 % Gel Apply 4 g topically 4 (four) times daily as needed (pain and inflammation).       fluticasone (FLONASE) 50 mcg/actuation nasal spray 2 sprays (100 mcg total) by Each Nare route once daily. (Patient not taking: Reported on 9/28/2020) 1 Bottle 0    primidone (MYSOLINE) 50 MG Tab Take 1 tablet (50 mg total) by mouth every evening. 30 tablet 11    triamcinolone acetonide 0.1% (KENALOG) 0.1 % cream Apply topically 3 (three) times  daily. (Patient not taking: Reported on 9/15/2020) 45 g 0     No facility-administered encounter medications on file as of 11/10/2020.        Past Medical History:  Patient Active Problem List   Diagnosis    Essential hypertension    Hyperlipidemia    BPH (benign prostatic hyperplasia)    Elevated PSA    Nuclear sclerosis    PTSD (post-traumatic stress disorder)    Shoulder arthritis    Ectatic aorta    Sensorineural hearing loss (SNHL) of both ears    Calcified granuloma of lung    Tremor    Chronic right shoulder pain    History of agent Orange exposure    Obesity (BMI 30.0-34.9)    Facet arthritis of cervical region    Kidney stone    Atherosclerosis of aorta    History of penile implant       Past Surgical History:  Past Surgical History:   Procedure Laterality Date    COLONOSCOPY N/A 11/26/2018    Procedure: COLONOSCOPY;  Surgeon: Germán Moss MD;  Location: [facility] ([address] [place] [address]);  Service: Endoscopy;  Laterality: N/A;    COLONOSCOPY W/ BIOPSIES  2010    PENILE PROSTHESIS IMPLANT         Current Living Situation: home    Social:  Social History     Socioeconomic History    Marital status:      Spouse name: Not on file    Number of children: Not on file    Years of education: Not on file    Highest education level: Not on file   Occupational History    Occupation:      Comment: self-employed   Social Needs    Financial resource strain: Not hard at all    Food insecurity     Worry: Never true     Inability: Never true    Transportation needs     Medical: No     Non-medical: No   Tobacco Use    Smoking status: Former Smoker     Packs/day: 1.00     Years: 10.00     Pack years: 10.00    Smokeless tobacco: Never Used   Substance and Sexual Activity    Alcohol use: No    Drug use: No    Sexual activity: Yes     Partners: Female   Lifestyle    Physical activity     Days per week: 3 days     Minutes per session: 10 min    Stress: Only a little   Relationships     Social connections     Talks on phone: More than three times a week     Gets together: More than three times a week     Attends [employer] service: Never     Active member of club or organization: No     Attends meetings of clubs or organizations: Never     Relationship status:    Other Topics Concern    Not on file   Social History Narrative    Not on file       Family History:  Family History   Problem Relation Age of Onset    Cancer Mother         cancer    Cataracts Mother     Heart disease Father 62        M.I.    Stroke Brother 62    Amblyopia Neg Hx     Blindness Neg Hx     Glaucoma Neg Hx     Macular degeneration Neg Hx     Retinal detachment Neg Hx     Strabismus Neg Hx     Prostate cancer Neg Hx     Kidney disease Neg Hx        PHYSICAL:  /83   Pulse 81   Ht 6' (1.829 m)   Wt 105.2 kg (231 lb 14.8 oz)   BMI 31.45 kg/m²     General Medical Examination:  General: Good hygiene, appropriate appearance.  HEENT: Normocephalic, atraumatic.   Neck: Supple.   Chest: Unlabored breathing.   CV: Symmetric pulses.   Ext: No clubbing, cyanosis, or edema.     Mental Status:  Mood/Affect: Appropriate/congruent.  Level of consciousness: Awake, alert.  Orientation: Oriented to person, place, time and situation.  Language: No Dysarthria  No hypomimia or hypophonia    Cranial nerves:  I: Not tested  II: PERRL, VFF to counting  III, IV, VI: EOMI with conjugate gaze and no nystagmus on end gaze  V: Facial sensation intact and symmetric over the bilateral V1-V3  VII: Facial muscle activation intact and symmetric over the bilateral upper and lower face  VIII: Hearing intact in the b/l ears and symmetrical to finger rub  IX, X, XII: TUP midline - no atrophy or fasiculations  X: SCMs and shoulder shrug full strength b/l and symmetric    Tremor Exam   Arms extended Arms in wing position, fingers almost touching Re-emergent Arms extended wrists extended Intention Resting Kinetic   Left ?++ jerky ?+ ? ?  ?+ ? ?+   Right ?++ ?+ ? ? ?+ ?+ ?+   Head tremor:  NEG  Leg tremor: NEG     Archimedes Spirals   Left ?++   Right ?++         Motor:   -UE: 4/5 deltoids; 5/5 biceps, triceps; 5/5 wrist flexors, extensors; 5/5 interosseous; 5/5   -LEs: 5/5 hip flexion, extension; 5/5 knee flexion, extension; 5/5 ankle flexion, extension        DTRs:  ? Biceps Triceps Brachioradialis Knee Ankle   Left 2+ 2+ 2+ 0 0   Right 2+ 2+ 2+ 0 0       ? Finger taps Finger flicks OLIVA Heel taps   Left 0 0 0 0   Right 0 0 0 0     Neck tone: 0  ? Arm Leg   Left 0 0   Right 0 0     Sensation:   -Light touch: Intact and symmetric in the bilateral upper and lower extremities.  -Temp: Intact and symmetric in the bilateral upper and lower extremities.  -Vibration: Intact and symmetric in the bilateral upper and lower extremities.    Coordination:   -Finger to nose: nl    Gait:  -Arises from chair without use of hands.  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 No hand weakness to strongly suggest surgery.    Primidone 50mg QHS                   Briana Murillo MD, MS [facility]  Department of Neurology  Movement Disorders

## 2020-11-11 ENCOUNTER — OFFICE VISIT (OUTPATIENT)
Dept: URGENT CARE | Facility: CLINIC | Age: 75
End: 2020-11-11
Payer: MEDICARE

## 2020-11-11 VITALS
TEMPERATURE: 98 F | SYSTOLIC BLOOD PRESSURE: 139 MMHG | RESPIRATION RATE: 16 BRPM | DIASTOLIC BLOOD PRESSURE: 85 MMHG | BODY MASS INDEX: 31.29 KG/M2 | HEIGHT: 72 IN | WEIGHT: 231 LBS | OXYGEN SATURATION: 95 % | HEART RATE: 63 BPM

## 2020-11-11 DIAGNOSIS — R14.2 BELCHING: Primary | ICD-10-CM

## 2020-11-11 PROCEDURE — 99214 OFFICE O/P EST MOD 30 MIN: CPT | Mod: S$GLB,,, | Performed by: NURSE PRACTITIONER

## 2020-11-11 PROCEDURE — 99214 PR OFFICE/OUTPT VISIT, EST, LEVL IV, 30-39 MIN: ICD-10-PCS | Mod: S$GLB,,, | Performed by: NURSE PRACTITIONER

## 2020-11-11 NOTE — PATIENT INSTRUCTIONS
If your condition worsens or fails to improve we recommend that you receive another evaluation at the ER immediately or contact your PCP to discuss your concerns or return here. You must understand that you've received an urgent care treatment only and that you may be released before all your medical problems are known or treated. You the patient will arrange for followup care as instructed.    If you were prescribed antibiotics, please take them to completion.  If you were prescribed a narcotic medication, do not drive or operate heavy equipment or machinery while taking these medications.  Please follow up with your primary care doctor or specialist as needed.  If you  smoke, please stop smoking.    Tips to Control Acid Reflux    To control acid reflux, youll need to make some basic diet and lifestyle changes. The simple steps outlined below may be all youll need to ease discomfort.  Watch what you eat  · Avoid fatty foods and spicy foods.  · Eat fewer acidic foods, such as citrus and tomato-based foods. These can increase symptoms.  · Limit drinking alcohol, caffeine, and fizzy beverages. All increase acid reflux.  · Try limiting chocolate, peppermint, and spearmint. These can worsen acid reflux in some people.  Watch when you eat  · Avoid lying down for 3 hours after eating.  · Do not snack before going to bed.  Raise your head  Raising your head and upper body by 4 to 6 inches helps limit reflux when youre lying down. Put blocks under the head of your bed frame to raise it.  Other changes  · Lose weight, if you need to  · Dont exercise near bedtime  · Avoid tight-fitting clothes  · Limit aspirin and ibuprofen  · Stop smoking   Date Last Reviewed: 7/1/2016 © 2000-2017 [employer]. [address] [place] [address], [place] [address]. All rights reserved. This information is not intended as a substitute for professional medical care. Always follow your healthcare professional's instructions.

## 2020-11-11 NOTE — PROGRESS NOTES
Subjective:       Patient ID: Brandan Werner is a 75 y.o. male.    Vitals:  height is 6' (1.829 m) and weight is 104.8 kg (231 lb). His temperature is 97.7 °F (36.5 °C). His blood pressure is 139/85 and his pulse is 63. His respiration is 16 and oxygen saturation is 95%.     Chief Complaint: Gastroesophageal Reflux    This is a 75 y.o. male with history of tremor, PTSD, nuclear sclerosis, sensorineural hearing loss of both ears, [facility], calcific granuloma of lung, hypertension, HLD, BPH, elevated PSA, who presents today with a chief complaint of belching yesterday, patient reports  he took Tums and belching resolved, patient denies any belching today, denies nausea, vomiting, diarrhea, denies fever, body aches or chills, denies chest pain, exertional chest pain, radiating chest pain, cough or shortness of breath, denies dizziness or positional lightheadedness, denies facial weakness, denies radiating neck arm pain, denies numbness or tingling, patient denies history of acid reflux, denies epigastric abdominal pain or generalized abdominal pain, upon chart review patient was seen by Neurology yesterday for his tremors for which he was given medication, patient reports he have not started the medication yet and has to  the medication from the pharmacy, patient denies urinary frequency, urgency, dysuria hematuria, denies bladder or bowel incontinence, denies flank pain, abdominal pain or heartburn  Patient is poor historian due to Unable to obtain good medical history regarding patient complaint of belching as his hearing aid does not work, patient does have hearing aid in place but unable to understand all the questions completely    Gastroesophageal Reflux  He complains of belching. He reports no abdominal pain, no chest pain, no coughing, no dysphagia, no early satiety, no globus sensation, no heartburn, no hoarse voice, no nausea or no sore throat. This is a new problem. The  current episode started yesterday. The problem occurs occasionally. The problem has been waxing and waning. Pertinent negatives include no fatigue. He has tried nothing for the symptoms.       Constitution: Negative for chills, fatigue and fever.   HENT: Negative for congestion and sore throat.    Neck: Negative for painful lymph nodes.   Cardiovascular: Negative for chest pain and leg swelling.   Eyes: Negative for double vision and blurred vision.   Respiratory: Negative for cough and shortness of breath.    Gastrointestinal: Negative for abdominal pain, nausea, vomiting, diarrhea and heartburn.   Genitourinary: Negative for dysuria, frequency, urgency, urine decreased, hematuria, history of kidney stones, genital trauma, painful intercourse, genital sore, penile discharge, painful ejaculation, penile pain, penile swelling, scrotal swelling and testicular pain.   Musculoskeletal: Negative for joint pain, joint swelling, back pain, muscle cramps and muscle ache.   Skin: Negative for color change, pale, rash and lesion.   Allergic/Immunologic: Negative for seasonal allergies.   Neurological: Negative for dizziness, history of vertigo, light-headedness, passing out and headaches.   Hematologic/Lymphatic: Negative for swollen lymph nodes, easy bruising/bleeding and history of blood clots. Does not bruise/bleed easily.   Psychiatric/Behavioral: Negative for nervous/anxious, sleep disturbance and depression. The patient is not nervous/anxious.        Objective:      Physical Exam   Constitutional: He is oriented to person, place, and time. He appears well-developed. He is cooperative.  Non-toxic appearance. He does not appear ill. No distress.      Comments:Patient sitting comfortably on the exam table, non toxic appearance  and answering questions appropriately, no acute distress     HENT:   Head: Normocephalic and atraumatic.   Ears:   Right Ear: Tympanic membrane, external ear and ear canal normal. Decreased hearing  (hearing aid in place, not working properly) is noted.   Left Ear: Tympanic membrane, external ear and ear canal normal. Decreased hearing is noted.   Nose: Nose normal. No mucosal edema, rhinorrhea or nasal deformity. No epistaxis. Right sinus exhibits no maxillary sinus tenderness and no frontal sinus tenderness. Left sinus exhibits no maxillary sinus tenderness and no frontal sinus tenderness.   Mouth/Throat: Uvula is midline, oropharynx is clear and moist and mucous membranes are normal. No trismus in the jaw. Normal dentition. No uvula swelling. No oropharyngeal exudate, posterior oropharyngeal edema or posterior oropharyngeal erythema.   Eyes: Conjunctivae and lids are normal. No scleral icterus.   Neck: Trachea normal, full passive range of motion without pain and phonation normal. Neck supple. No neck rigidity. No edema and no erythema present.   Cardiovascular: Normal rate, regular rhythm, normal heart sounds and normal pulses.   Pulmonary/Chest: Effort normal and breath sounds normal. No respiratory distress. He has no decreased breath sounds. He has no rhonchi.   Abdominal: Normal appearance.   Musculoskeletal: Normal range of motion.         General: No deformity.   Neurological: He is alert and oriented to person, place, and time. He exhibits normal muscle tone. Coordination normal.   Skin: Skin is warm, dry, intact, not diaphoretic and not pale. Psychiatric: His speech is normal and behavior is normal. Judgment and thought content normal.   Nursing note and vitals reviewed.          Patient in no acute distress.  Vitals reassuring.  Patient is a poor historian. Limited evaluation as patient unable to provide complete/detailed history.  Strict ER precautions given.  I strongly recommended patient to obtain another evaluation if symptom worsens or does not improve as we cannot rule out all the possible causes of his symptoms in the urgent care setting.  Discussed results/diagnosis/plan in depth with  patient in clinic. Strict precautions given to patient to monitor for worsening signs and symptoms. Advised to follow up with primary.All questions answered. Strict ER precautions given. If your symptoms worsens of fail to improve you should go to the Emergency Room. Discharge and follow-up instructions given verbally/printed. Patient voiced understanding and in agreement with current treatment plan.        Assessment:       1. Belching        Plan:         Belching      Patient Instructions     If your condition worsens or fails to improve we recommend that you receive another evaluation at the ER immediately or contact your PCP to discuss your concerns or return here. You must understand that you've received an urgent care treatment only and that you may be released before all your medical problems are known or treated. You the patient will arrange for followup care as instructed.    If you were prescribed antibiotics, please take them to completion.  If you were prescribed a narcotic medication, do not drive or operate heavy equipment or machinery while taking these medications.  Please follow up with your primary care doctor or specialist as needed.  If you  smoke, please stop smoking.    Tips to Control Acid Reflux    To control acid reflux, youll need to make some basic diet and lifestyle changes. The simple steps outlined below may be all youll need to ease discomfort.  Watch what you eat  · Avoid fatty foods and spicy foods.  · Eat fewer acidic foods, such as citrus and tomato-based foods. These can increase symptoms.  · Limit drinking alcohol, caffeine, and fizzy beverages. All increase acid reflux.  · Try limiting chocolate, peppermint, and spearmint. These can worsen acid reflux in some people.  Watch when you eat  · Avoid lying down for 3 hours after eating.  · Do not snack before going to bed.  Raise your head  Raising your head and upper body by 4 to 6 inches helps limit reflux when youre lying  down. Put blocks under the head of your bed frame to raise it.  Other changes  · Lose weight, if you need to  · Dont exercise near bedtime  · Avoid tight-fitting clothes  · Limit aspirin and ibuprofen  · Stop smoking   Date Last Reviewed: 7/1/2016  © 4664-6491 [employer] [address] [place] [address], [place] [address]. All rights reserved. This information is not intended as a substitute for professional medical care. Always follow your healthcare professional's instructions.

## 2020-11-27 ENCOUNTER — TELEPHONE (OUTPATIENT)
Dept: INTERNAL MEDICINE | Facility: CLINIC | Age: 75
End: 2020-11-27

## 2020-11-27 NOTE — TELEPHONE ENCOUNTER
----- Message from Shanti Gomez sent at 11/27/2020  9:00 AM CST -----  Contact: self/[phone]  .1 Patient would like to get medical advice.  Symptoms (please be specific): botching a lot, stomach ache  How long has patient had these symptoms: 2  weeks ago  Pharmacy name and phone#:[facility] [address] [address] [place] [id number] [facility] [phone] (Phone) [phone] (Fax)  Any drug allergies: on file  Comments: Patient would like to get medical advice. Offered an appointment with another physician but refused.

## 2020-11-27 NOTE — TELEPHONE ENCOUNTER
Wife called for herself and I reached her at this phone.    He tried over the counter reflux meds and seemed to help but didn't continue.  I told her to have him take daily for 2 weeks and notice what food he eats that may bring this on so can avoid it.    Told her to call if any more concerns.

## 2020-12-17 ENCOUNTER — OFFICE VISIT (OUTPATIENT)
Dept: ORTHOPEDICS | Facility: CLINIC | Age: 75
End: 2020-12-17
Payer: MEDICARE

## 2020-12-17 VITALS
HEART RATE: 85 BPM | BODY MASS INDEX: 31.29 KG/M2 | WEIGHT: 231 LBS | SYSTOLIC BLOOD PRESSURE: 138 MMHG | HEIGHT: 72 IN | DIASTOLIC BLOOD PRESSURE: 90 MMHG

## 2020-12-17 DIAGNOSIS — M19.019 SHOULDER ARTHRITIS: Primary | ICD-10-CM

## 2020-12-17 PROCEDURE — 1159F PR MEDICATION LIST DOCUMENTED IN MEDICAL RECORD: ICD-10-PCS | Mod: HCNC,S$GLB,, | Performed by: ORTHOPAEDIC SURGERY

## 2020-12-17 PROCEDURE — 3080F DIAST BP >= 90 MM HG: CPT | Mod: HCNC,CPTII,S$GLB, | Performed by: ORTHOPAEDIC SURGERY

## 2020-12-17 PROCEDURE — 99999 PR PBB SHADOW E&M-EST. PATIENT-LVL IV: CPT | Mod: PBBFAC,HCNC,, | Performed by: ORTHOPAEDIC SURGERY

## 2020-12-17 PROCEDURE — 1101F PT FALLS ASSESS-DOCD LE1/YR: CPT | Mod: HCNC,CPTII,S$GLB, | Performed by: ORTHOPAEDIC SURGERY

## 2020-12-17 PROCEDURE — 99999 PR PBB SHADOW E&M-EST. PATIENT-LVL IV: ICD-10-PCS | Mod: PBBFAC,HCNC,, | Performed by: ORTHOPAEDIC SURGERY

## 2020-12-17 PROCEDURE — 3288F FALL RISK ASSESSMENT DOCD: CPT | Mod: HCNC,CPTII,S$GLB, | Performed by: ORTHOPAEDIC SURGERY

## 2020-12-17 PROCEDURE — 3288F PR FALLS RISK ASSESSMENT DOCUMENTED: ICD-10-PCS | Mod: HCNC,CPTII,S$GLB, | Performed by: ORTHOPAEDIC SURGERY

## 2020-12-17 PROCEDURE — 1125F AMNT PAIN NOTED PAIN PRSNT: CPT | Mod: HCNC,S$GLB,, | Performed by: ORTHOPAEDIC SURGERY

## 2020-12-17 PROCEDURE — 3080F PR MOST RECENT DIASTOLIC BLOOD PRESSURE >= 90 MM HG: ICD-10-PCS | Mod: HCNC,CPTII,S$GLB, | Performed by: ORTHOPAEDIC SURGERY

## 2020-12-17 PROCEDURE — 99213 OFFICE O/P EST LOW 20 MIN: CPT | Mod: HCNC,25,S$GLB, | Performed by: ORTHOPAEDIC SURGERY

## 2020-12-17 PROCEDURE — 3075F PR MOST RECENT SYSTOLIC BLOOD PRESS GE 130-139MM HG: ICD-10-PCS | Mod: HCNC,CPTII,S$GLB, | Performed by: ORTHOPAEDIC SURGERY

## 2020-12-17 PROCEDURE — 1101F PR PT FALLS ASSESS DOC 0-1 FALLS W/OUT INJ PAST YR: ICD-10-PCS | Mod: HCNC,CPTII,S$GLB, | Performed by: ORTHOPAEDIC SURGERY

## 2020-12-17 PROCEDURE — 1159F MED LIST DOCD IN RCRD: CPT | Mod: HCNC,S$GLB,, | Performed by: ORTHOPAEDIC SURGERY

## 2020-12-17 PROCEDURE — 20610 PR DRAIN/INJECT LARGE JOINT/BURSA: ICD-10-PCS | Mod: HCNC,RT,S$GLB, | Performed by: ORTHOPAEDIC SURGERY

## 2020-12-17 PROCEDURE — 1125F PR PAIN SEVERITY QUANTIFIED, PAIN PRESENT: ICD-10-PCS | Mod: HCNC,S$GLB,, | Performed by: ORTHOPAEDIC SURGERY

## 2020-12-17 PROCEDURE — 3075F SYST BP GE 130 - 139MM HG: CPT | Mod: HCNC,CPTII,S$GLB, | Performed by: ORTHOPAEDIC SURGERY

## 2020-12-17 PROCEDURE — 99213 PR OFFICE/OUTPT VISIT, EST, LEVL III, 20-29 MIN: ICD-10-PCS | Mod: HCNC,25,S$GLB, | Performed by: ORTHOPAEDIC SURGERY

## 2020-12-17 PROCEDURE — 20610 DRAIN/INJ JOINT/BURSA W/O US: CPT | Mod: HCNC,RT,S$GLB, | Performed by: ORTHOPAEDIC SURGERY

## 2020-12-17 RX ORDER — TRIAMCINOLONE ACETONIDE 40 MG/ML
40 INJECTION, SUSPENSION INTRA-ARTICULAR; INTRAMUSCULAR
Status: COMPLETED | OUTPATIENT
Start: 2020-12-17 | End: 2020-12-17

## 2020-12-17 RX ADMIN — TRIAMCINOLONE ACETONIDE 40 MG: 40 INJECTION, SUSPENSION INTRA-ARTICULAR; INTRAMUSCULAR at 10:12

## 2020-12-17 NOTE — PROGRESS NOTES
Subjective:      Patient ID: Brandan Werner is a 75 y.o. male.  Chief Complaint: Follow-up and Pain of the Right Shoulder      HPI  Brandan Werner is a  75 y.o. male presenting today for follow up of right shoulder arthritis severe.  He reports that he is having a flare-up again in the right shoulder  He would like some injections today  Not interested in surgery at the present time.    Review of patient's allergies indicates:  No Known Allergies      Current Outpatient Medications   Medication Sig Dispense Refill    ascorbic acid (VITAMIN C) 250 MG tablet Take 250 mg by mouth once daily.      atorvastatin (LIPITOR) 40 MG tablet Take 20 mg by mouth once daily.      celecoxib (CELEBREX) 100 MG capsule Take 200 mg by mouth.       diclofenac (VOLTAREN) 75 MG EC tablet Take 1 tablet (75 mg total) by mouth 2 (two) times daily as needed (joint pain). 60 tablet 3    diphenhydrAMINE (SOMINEX) 25 mg tablet Take 25 mg by mouth nightly as needed for Insomnia.      FLUoxetine 20 MG capsule Take 40 mg by mouth once daily.      FOLIC ACID ORAL Take 1 tablet by mouth every morning.      folic acid/multivit-min/lutein (CENTRUM SILVER ORAL) Take by mouth.      garlic 1,000 mg Cap Take by mouth.      GINGER ROOT XT-FENNEL SD XT ORAL Take 550 mg by mouth.      glucosam/chond-msm1/C/agueda/bor (GLUCOSAMINE-CHOND-MSM COMPLEX ORAL) Take by mouth.      lisinopril (PRINIVIL,ZESTRIL) 5 MG tablet Take 5 mg by mouth once daily.      mecobalamin (B12 ACTIVE ORAL) Take by mouth.      meloxicam (MOBIC) 15 MG tablet Take 15 mg by mouth daily as needed for Pain (and inflammation).      multivitamin (ONE DAILY MULTIVITAMIN) per tablet Take 1 tablet by mouth once daily.      polyethylene glycol (GLYCOLAX) 17 gram PwPk Take 17 g by mouth once daily. 30 packet 2    primidone (MYSOLINE) 50 MG Tab Take 1 tablet (50 mg total) by mouth every evening. 30 tablet 11    tamsulosin (FLOMAX) 0.4 mg Cp24 Take 1 capsule (0.4 mg total) by mouth  nightly. 30 capsule 11    traZODone (DESYREL) 100 MG tablet Take 100 mg by mouth every evening.      triamcinolone acetonide 0.1% (KENALOG) 0.1 % cream Apply topically 3 (three) times daily. 45 g 0    vitamin E 100 UNIT capsule Take 100 Units by mouth every morning.      VOLTAREN 1 % Gel Apply 4 g topically 4 (four) times daily as needed (pain and inflammation).       fluticasone (FLONASE) 50 mcg/actuation nasal spray 2 sprays (100 mcg total) by Each Nare route once daily. (Patient not taking: Reported on 12/17/2020) 1 Bottle 0     No current facility-administered medications for this visit.        Past Medical History:   Diagnosis Date    Anxiety     BPH (benign prostatic hyperplasia)     Cataract     Elevated PSA     Erectile dysfunction     Hyperlipidemia     Hypertension     Hypogonadism male     Kidney stone 5/20/2020    Obesity     PTSD (post-traumatic stress disorder)     Shoulder arthritis     Urinary tract infection        Past Surgical History:   Procedure Laterality Date    COLONOSCOPY N/A 11/26/2018    Procedure: COLONOSCOPY;  Surgeon: Germán Moss MD;  Location: [address]);  Service: Endoscopy;  Laterality: N/A;    COLONOSCOPY W/ BIOPSIES  2010    PENILE PROSTHESIS IMPLANT         OBJECTIVE:   PHYSICAL EXAM:  Height: 6' (182.9 cm) Weight: 104.8 kg (231 lb)  Vitals:    12/17/20 1025   BP: (!) 138/90   Pulse: 85   Weight: 104.8 kg (231 lb)   Height: 6' (1.829 m)   PainSc:   6     Ortho/SPM Exam  Examination right shoulder mild tenderness posteriorly  Range of motion limited secondary to stiffness and pain  Has basically no external rotation very limited elevation  Crepitation with motion  Rotator cuff strength intact  Neurologic exam normal    RADIOGRAPHS:  Previous x-rays AP lateral right shoulder demonstrates severe arthritic changes of the joint  Comments: I have personally reviewed the imaging and I agree with the above radiologist's report.    ASSESSMENT/PLAN:      IMPRESSION:  Severe glenohumeral arthritis right shoulder    PLAN:  After pause for time-out patient identified the right shoulder injected with Kenalog 40 mg 2 cc xylocaine sterile technique  He tolerated the procedure well without complication  Continue current medications  Consider surgery in the future for right total shoulder arthroplasty    FOLLOW UP:  2-3 months    Disclaimer: This note has been generated using voice-recognition software. There may be typographical errors that have been missed during proof-reading.

## 2021-01-25 ENCOUNTER — OFFICE VISIT (OUTPATIENT)
Dept: NEUROLOGY | Facility: CLINIC | Age: 76
End: 2021-01-25
Payer: MEDICARE

## 2021-01-25 DIAGNOSIS — F43.10 PTSD (POST-TRAUMATIC STRESS DISORDER): Primary | ICD-10-CM

## 2021-01-25 DIAGNOSIS — I10 ESSENTIAL HYPERTENSION: Chronic | ICD-10-CM

## 2021-01-25 DIAGNOSIS — Z77.098 HISTORY OF AGENT ORANGE EXPOSURE: ICD-10-CM

## 2021-01-25 DIAGNOSIS — E78.5 HYPERLIPIDEMIA, UNSPECIFIED HYPERLIPIDEMIA TYPE: ICD-10-CM

## 2021-01-25 DIAGNOSIS — R41.89 SIGNS AND SYMPTOMS INVOLVING COGNITION: ICD-10-CM

## 2021-01-25 DIAGNOSIS — R25.1 TREMOR: Chronic | ICD-10-CM

## 2021-01-25 PROCEDURE — 90791 PSYCH DIAGNOSTIC EVALUATION: CPT | Mod: HCNC,95,, | Performed by: PSYCHIATRY & NEUROLOGY

## 2021-01-25 PROCEDURE — 99499 NO LOS: ICD-10-PCS | Mod: HCNC,95,, | Performed by: PSYCHIATRY & NEUROLOGY

## 2021-01-25 PROCEDURE — 90791 PR PSYCHIATRIC DIAGNOSTIC EVALUATION: ICD-10-PCS | Mod: HCNC,95,, | Performed by: PSYCHIATRY & NEUROLOGY

## 2021-01-25 PROCEDURE — 99499 UNLISTED E&M SERVICE: CPT | Mod: HCNC,95,, | Performed by: PSYCHIATRY & NEUROLOGY

## 2021-02-19 ENCOUNTER — TELEPHONE (OUTPATIENT)
Dept: NEUROLOGY | Facility: CLINIC | Age: 76
End: 2021-02-19

## 2021-02-22 ENCOUNTER — INITIAL CONSULT (OUTPATIENT)
Dept: NEUROLOGY | Facility: CLINIC | Age: 76
End: 2021-02-22
Payer: MEDICARE

## 2021-02-22 DIAGNOSIS — I10 ESSENTIAL HYPERTENSION: Chronic | ICD-10-CM

## 2021-02-22 DIAGNOSIS — F43.10 PTSD (POST-TRAUMATIC STRESS DISORDER): Primary | ICD-10-CM

## 2021-02-22 DIAGNOSIS — E78.5 HYPERLIPIDEMIA, UNSPECIFIED HYPERLIPIDEMIA TYPE: ICD-10-CM

## 2021-02-22 DIAGNOSIS — R41.3 MEMORY DEFICITS: ICD-10-CM

## 2021-02-22 DIAGNOSIS — Z77.098 HISTORY OF AGENT ORANGE EXPOSURE: ICD-10-CM

## 2021-02-22 PROCEDURE — 96139 PSYCL/NRPSYC TST TECH EA: CPT | Mod: S$GLB,,, | Performed by: PSYCHIATRY & NEUROLOGY

## 2021-02-22 PROCEDURE — 99499 UNLISTED E&M SERVICE: CPT | Mod: S$GLB,,, | Performed by: PSYCHIATRY & NEUROLOGY

## 2021-02-22 PROCEDURE — 99999 PR PBB SHADOW E&M-EST. PATIENT-LVL I: ICD-10-PCS | Mod: PBBFAC,,, | Performed by: PSYCHIATRY & NEUROLOGY

## 2021-02-22 PROCEDURE — 96133 NRPSYC TST EVAL PHYS/QHP EA: CPT | Mod: S$GLB,,, | Performed by: PSYCHIATRY & NEUROLOGY

## 2021-02-22 PROCEDURE — 99999 PR PBB SHADOW E&M-EST. PATIENT-LVL I: CPT | Mod: PBBFAC,,, | Performed by: PSYCHIATRY & NEUROLOGY

## 2021-02-22 PROCEDURE — 96138 PSYCL/NRPSYC TECH 1ST: CPT | Mod: S$GLB,,, | Performed by: PSYCHIATRY & NEUROLOGY

## 2021-02-22 PROCEDURE — 96139 PR PSYCH/NEUROPSYCH TEST ADMIN/SCORING, BY TECH, 2+ TESTS, EA ADDTL 30 MIN: ICD-10-PCS | Mod: S$GLB,,, | Performed by: PSYCHIATRY & NEUROLOGY

## 2021-02-22 PROCEDURE — 96133 PR NEUROPSYCHOLOGIC TEST EVAL SVCS, EA ADDTL HR: ICD-10-PCS | Mod: S$GLB,,, | Performed by: PSYCHIATRY & NEUROLOGY

## 2021-02-22 PROCEDURE — 96132 PR NEUROPSYCHOLOGIC TEST EVAL SVCS, 1ST HR: ICD-10-PCS | Mod: S$GLB,,, | Performed by: PSYCHIATRY & NEUROLOGY

## 2021-02-22 PROCEDURE — 96138 PR PSYCH/NEUROPSYCH TEST ADMIN/SCORING, BY TECH, 2+ TESTS, 1ST 30 MIN: ICD-10-PCS | Mod: S$GLB,,, | Performed by: PSYCHIATRY & NEUROLOGY

## 2021-02-22 PROCEDURE — 99499 NO LOS: ICD-10-PCS | Mod: S$GLB,,, | Performed by: PSYCHIATRY & NEUROLOGY

## 2021-02-22 PROCEDURE — 96132 NRPSYC TST EVAL PHYS/QHP 1ST: CPT | Mod: S$GLB,,, | Performed by: PSYCHIATRY & NEUROLOGY

## 2021-03-02 ENCOUNTER — LAB VISIT (OUTPATIENT)
Dept: LAB | Facility: HOSPITAL | Age: 76
End: 2021-03-02
Attending: INTERNAL MEDICINE
Payer: MEDICARE

## 2021-03-02 DIAGNOSIS — E78.5 HYPERLIPIDEMIA, UNSPECIFIED HYPERLIPIDEMIA TYPE: ICD-10-CM

## 2021-03-02 DIAGNOSIS — N40.0 BENIGN PROSTATIC HYPERPLASIA, UNSPECIFIED WHETHER LOWER URINARY TRACT SYMPTOMS PRESENT: ICD-10-CM

## 2021-03-02 DIAGNOSIS — Z12.5 ENCOUNTER FOR SCREENING FOR MALIGNANT NEOPLASM OF PROSTATE: ICD-10-CM

## 2021-03-02 DIAGNOSIS — I10 ESSENTIAL HYPERTENSION: ICD-10-CM

## 2021-03-02 LAB
ALBUMIN SERPL BCP-MCNC: 4 G/DL (ref 3.5–5.2)
ALP SERPL-CCNC: 46 U/L (ref 55–135)
ALT SERPL W/O P-5'-P-CCNC: 11 U/L (ref 10–44)
ANION GAP SERPL CALC-SCNC: 5 MMOL/L (ref 8–16)
AST SERPL-CCNC: 14 U/L (ref 10–40)
BASOPHILS # BLD AUTO: 0.04 K/UL (ref 0–0.2)
BASOPHILS NFR BLD: 0.5 % (ref 0–1.9)
BILIRUB SERPL-MCNC: 0.4 MG/DL (ref 0.1–1)
BUN SERPL-MCNC: 17 MG/DL (ref 8–23)
CALCIUM SERPL-MCNC: 8.5 MG/DL (ref 8.7–10.5)
CHLORIDE SERPL-SCNC: 104 MMOL/L (ref 95–110)
CO2 SERPL-SCNC: 30 MMOL/L (ref 23–29)
COMPLEXED PSA SERPL-MCNC: 7 NG/ML (ref 0–4)
CREAT SERPL-MCNC: 1.1 MG/DL (ref 0.5–1.4)
DIFFERENTIAL METHOD: ABNORMAL
EOSINOPHIL # BLD AUTO: 0.3 K/UL (ref 0–0.5)
EOSINOPHIL NFR BLD: 3.2 % (ref 0–8)
ERYTHROCYTE [DISTWIDTH] IN BLOOD BY AUTOMATED COUNT: 12.6 % (ref 11.5–14.5)
EST. GFR  (AFRICAN AMERICAN): >60 ML/MIN/1.73 M^2
EST. GFR  (NON AFRICAN AMERICAN): >60 ML/MIN/1.73 M^2
GLUCOSE SERPL-MCNC: 94 MG/DL (ref 70–110)
HCT VFR BLD AUTO: 43.3 % (ref 40–54)
HGB BLD-MCNC: 14.1 G/DL (ref 14–18)
IMM GRANULOCYTES # BLD AUTO: 0.02 K/UL (ref 0–0.04)
IMM GRANULOCYTES NFR BLD AUTO: 0.2 % (ref 0–0.5)
LYMPHOCYTES # BLD AUTO: 2.7 K/UL (ref 1–4.8)
LYMPHOCYTES NFR BLD: 34.1 % (ref 18–48)
MCH RBC QN AUTO: 31.4 PG (ref 27–31)
MCHC RBC AUTO-ENTMCNC: 32.6 G/DL (ref 32–36)
MCV RBC AUTO: 96 FL (ref 82–98)
MONOCYTES # BLD AUTO: 0.5 K/UL (ref 0.3–1)
MONOCYTES NFR BLD: 6.1 % (ref 4–15)
NEUTROPHILS # BLD AUTO: 4.5 K/UL (ref 1.8–7.7)
NEUTROPHILS NFR BLD: 55.9 % (ref 38–73)
NRBC BLD-RTO: 0 /100 WBC
PLATELET # BLD AUTO: 205 K/UL (ref 150–350)
PMV BLD AUTO: 10.3 FL (ref 9.2–12.9)
POTASSIUM SERPL-SCNC: 4.5 MMOL/L (ref 3.5–5.1)
PROT SERPL-MCNC: 6.6 G/DL (ref 6–8.4)
RBC # BLD AUTO: 4.49 M/UL (ref 4.6–6.2)
SODIUM SERPL-SCNC: 139 MMOL/L (ref 136–145)
TSH SERPL DL<=0.005 MIU/L-ACNC: 1.76 UIU/ML (ref 0.4–4)
WBC # BLD AUTO: 8.03 K/UL (ref 3.9–12.7)

## 2021-03-02 PROCEDURE — 36415 COLL VENOUS BLD VENIPUNCTURE: CPT

## 2021-03-02 PROCEDURE — 85025 COMPLETE CBC W/AUTO DIFF WBC: CPT

## 2021-03-02 PROCEDURE — 80053 COMPREHEN METABOLIC PANEL: CPT

## 2021-03-02 PROCEDURE — 84443 ASSAY THYROID STIM HORMONE: CPT

## 2021-03-02 PROCEDURE — 84153 ASSAY OF PSA TOTAL: CPT

## 2021-03-08 ENCOUNTER — OFFICE VISIT (OUTPATIENT)
Dept: INTERNAL MEDICINE | Facility: CLINIC | Age: 76
End: 2021-03-08
Payer: MEDICARE

## 2021-03-08 VITALS
TEMPERATURE: 98 F | BODY MASS INDEX: 32.07 KG/M2 | SYSTOLIC BLOOD PRESSURE: 120 MMHG | DIASTOLIC BLOOD PRESSURE: 70 MMHG | HEIGHT: 72 IN | OXYGEN SATURATION: 97 % | HEART RATE: 75 BPM | WEIGHT: 236.75 LBS

## 2021-03-08 DIAGNOSIS — N40.0 BENIGN PROSTATIC HYPERPLASIA, UNSPECIFIED WHETHER LOWER URINARY TRACT SYMPTOMS PRESENT: ICD-10-CM

## 2021-03-08 DIAGNOSIS — M47.812 FACET ARTHRITIS OF CERVICAL REGION: ICD-10-CM

## 2021-03-08 DIAGNOSIS — I70.0 ATHEROSCLEROSIS OF AORTA: ICD-10-CM

## 2021-03-08 DIAGNOSIS — E78.5 HYPERLIPIDEMIA, UNSPECIFIED HYPERLIPIDEMIA TYPE: ICD-10-CM

## 2021-03-08 DIAGNOSIS — J84.10 CALCIFIED GRANULOMA OF LUNG: ICD-10-CM

## 2021-03-08 DIAGNOSIS — R97.20 ELEVATED PSA: ICD-10-CM

## 2021-03-08 DIAGNOSIS — G89.29 CHRONIC RIGHT SHOULDER PAIN: ICD-10-CM

## 2021-03-08 DIAGNOSIS — M25.511 CHRONIC RIGHT SHOULDER PAIN: ICD-10-CM

## 2021-03-08 DIAGNOSIS — I10 ESSENTIAL HYPERTENSION: ICD-10-CM

## 2021-03-08 DIAGNOSIS — Z00.00 WELL ADULT EXAM: Primary | ICD-10-CM

## 2021-03-08 PROCEDURE — 3074F SYST BP LT 130 MM HG: CPT | Mod: CPTII,S$GLB,, | Performed by: INTERNAL MEDICINE

## 2021-03-08 PROCEDURE — 3078F DIAST BP <80 MM HG: CPT | Mod: CPTII,S$GLB,, | Performed by: INTERNAL MEDICINE

## 2021-03-08 PROCEDURE — 3288F PR FALLS RISK ASSESSMENT DOCUMENTED: ICD-10-PCS | Mod: CPTII,S$GLB,, | Performed by: INTERNAL MEDICINE

## 2021-03-08 PROCEDURE — 99397 PER PM REEVAL EST PAT 65+ YR: CPT | Mod: S$GLB,,, | Performed by: INTERNAL MEDICINE

## 2021-03-08 PROCEDURE — 1125F PR PAIN SEVERITY QUANTIFIED, PAIN PRESENT: ICD-10-PCS | Mod: S$GLB,,, | Performed by: INTERNAL MEDICINE

## 2021-03-08 PROCEDURE — 99499 RISK ADDL DX/OHS AUDIT: ICD-10-PCS | Mod: S$GLB,,, | Performed by: INTERNAL MEDICINE

## 2021-03-08 PROCEDURE — 99397 PR PREVENTIVE VISIT,EST,65 & OVER: ICD-10-PCS | Mod: S$GLB,,, | Performed by: INTERNAL MEDICINE

## 2021-03-08 PROCEDURE — 1125F AMNT PAIN NOTED PAIN PRSNT: CPT | Mod: S$GLB,,, | Performed by: INTERNAL MEDICINE

## 2021-03-08 PROCEDURE — 99499 UNLISTED E&M SERVICE: CPT | Mod: S$GLB,,, | Performed by: INTERNAL MEDICINE

## 2021-03-08 PROCEDURE — 3074F PR MOST RECENT SYSTOLIC BLOOD PRESSURE < 130 MM HG: ICD-10-PCS | Mod: CPTII,S$GLB,, | Performed by: INTERNAL MEDICINE

## 2021-03-08 PROCEDURE — 3288F FALL RISK ASSESSMENT DOCD: CPT | Mod: CPTII,S$GLB,, | Performed by: INTERNAL MEDICINE

## 2021-03-08 PROCEDURE — 99999 PR PBB SHADOW E&M-EST. PATIENT-LVL V: CPT | Mod: PBBFAC,,, | Performed by: INTERNAL MEDICINE

## 2021-03-08 PROCEDURE — 1101F PT FALLS ASSESS-DOCD LE1/YR: CPT | Mod: CPTII,S$GLB,, | Performed by: INTERNAL MEDICINE

## 2021-03-08 PROCEDURE — 99999 PR PBB SHADOW E&M-EST. PATIENT-LVL V: ICD-10-PCS | Mod: PBBFAC,,, | Performed by: INTERNAL MEDICINE

## 2021-03-08 PROCEDURE — 1101F PR PT FALLS ASSESS DOC 0-1 FALLS W/OUT INJ PAST YR: ICD-10-PCS | Mod: CPTII,S$GLB,, | Performed by: INTERNAL MEDICINE

## 2021-03-08 PROCEDURE — 3078F PR MOST RECENT DIASTOLIC BLOOD PRESSURE < 80 MM HG: ICD-10-PCS | Mod: CPTII,S$GLB,, | Performed by: INTERNAL MEDICINE

## 2021-03-08 RX ORDER — FINASTERIDE 5 MG/1
TABLET, FILM COATED ORAL
COMMUNITY
Start: 2021-01-28 | End: 2022-08-09

## 2021-03-10 ENCOUNTER — OFFICE VISIT (OUTPATIENT)
Dept: NEUROLOGY | Facility: CLINIC | Age: 76
End: 2021-03-10
Payer: MEDICARE

## 2021-03-10 DIAGNOSIS — F43.10 PTSD (POST-TRAUMATIC STRESS DISORDER): Primary | ICD-10-CM

## 2021-03-10 DIAGNOSIS — R41.89 SIGNS AND SYMPTOMS INVOLVING COGNITION: ICD-10-CM

## 2021-03-10 PROCEDURE — 99499 NO LOS: ICD-10-PCS | Mod: 95,,, | Performed by: PSYCHIATRY & NEUROLOGY

## 2021-03-10 PROCEDURE — 99499 UNLISTED E&M SERVICE: CPT | Mod: 95,,, | Performed by: PSYCHIATRY & NEUROLOGY

## 2021-03-18 ENCOUNTER — OFFICE VISIT (OUTPATIENT)
Dept: ORTHOPEDICS | Facility: CLINIC | Age: 76
End: 2021-03-18
Payer: MEDICARE

## 2021-03-18 VITALS — HEIGHT: 72 IN | BODY MASS INDEX: 31.97 KG/M2 | WEIGHT: 236 LBS

## 2021-03-18 DIAGNOSIS — M19.019 SHOULDER ARTHRITIS: Primary | ICD-10-CM

## 2021-03-18 PROCEDURE — 20610 PR DRAIN/INJECT LARGE JOINT/BURSA: ICD-10-PCS | Mod: RT,S$GLB,, | Performed by: ORTHOPAEDIC SURGERY

## 2021-03-18 PROCEDURE — 99999 PR PBB SHADOW E&M-EST. PATIENT-LVL IV: ICD-10-PCS | Mod: PBBFAC,,, | Performed by: ORTHOPAEDIC SURGERY

## 2021-03-18 PROCEDURE — 99999 PR PBB SHADOW E&M-EST. PATIENT-LVL IV: CPT | Mod: PBBFAC,,, | Performed by: ORTHOPAEDIC SURGERY

## 2021-03-18 PROCEDURE — 1125F AMNT PAIN NOTED PAIN PRSNT: CPT | Mod: S$GLB,,, | Performed by: ORTHOPAEDIC SURGERY

## 2021-03-18 PROCEDURE — 1159F MED LIST DOCD IN RCRD: CPT | Mod: S$GLB,,, | Performed by: ORTHOPAEDIC SURGERY

## 2021-03-18 PROCEDURE — 3288F FALL RISK ASSESSMENT DOCD: CPT | Mod: CPTII,S$GLB,, | Performed by: ORTHOPAEDIC SURGERY

## 2021-03-18 PROCEDURE — 20610 DRAIN/INJ JOINT/BURSA W/O US: CPT | Mod: RT,S$GLB,, | Performed by: ORTHOPAEDIC SURGERY

## 2021-03-18 PROCEDURE — 1159F PR MEDICATION LIST DOCUMENTED IN MEDICAL RECORD: ICD-10-PCS | Mod: S$GLB,,, | Performed by: ORTHOPAEDIC SURGERY

## 2021-03-18 PROCEDURE — 99213 PR OFFICE/OUTPT VISIT, EST, LEVL III, 20-29 MIN: ICD-10-PCS | Mod: 25,S$GLB,, | Performed by: ORTHOPAEDIC SURGERY

## 2021-03-18 PROCEDURE — 3288F PR FALLS RISK ASSESSMENT DOCUMENTED: ICD-10-PCS | Mod: CPTII,S$GLB,, | Performed by: ORTHOPAEDIC SURGERY

## 2021-03-18 PROCEDURE — 99213 OFFICE O/P EST LOW 20 MIN: CPT | Mod: 25,S$GLB,, | Performed by: ORTHOPAEDIC SURGERY

## 2021-03-18 PROCEDURE — 1125F PR PAIN SEVERITY QUANTIFIED, PAIN PRESENT: ICD-10-PCS | Mod: S$GLB,,, | Performed by: ORTHOPAEDIC SURGERY

## 2021-03-18 PROCEDURE — 1101F PR PT FALLS ASSESS DOC 0-1 FALLS W/OUT INJ PAST YR: ICD-10-PCS | Mod: CPTII,S$GLB,, | Performed by: ORTHOPAEDIC SURGERY

## 2021-03-18 PROCEDURE — 1101F PT FALLS ASSESS-DOCD LE1/YR: CPT | Mod: CPTII,S$GLB,, | Performed by: ORTHOPAEDIC SURGERY

## 2021-03-18 RX ORDER — TRIAMCINOLONE ACETONIDE 40 MG/ML
40 INJECTION, SUSPENSION INTRA-ARTICULAR; INTRAMUSCULAR
Status: COMPLETED | OUTPATIENT
Start: 2021-03-18 | End: 2021-03-18

## 2021-03-18 RX ADMIN — TRIAMCINOLONE ACETONIDE 40 MG: 40 INJECTION, SUSPENSION INTRA-ARTICULAR; INTRAMUSCULAR at 10:03

## 2021-03-30 ENCOUNTER — OFFICE VISIT (OUTPATIENT)
Dept: NEUROLOGY | Facility: CLINIC | Age: 76
End: 2021-03-30
Payer: MEDICARE

## 2021-03-30 VITALS
BODY MASS INDEX: 31.95 KG/M2 | DIASTOLIC BLOOD PRESSURE: 85 MMHG | HEIGHT: 72 IN | HEART RATE: 66 BPM | WEIGHT: 235.88 LBS | SYSTOLIC BLOOD PRESSURE: 133 MMHG

## 2021-03-30 DIAGNOSIS — R25.1 TREMOR: Chronic | ICD-10-CM

## 2021-03-30 PROCEDURE — 3079F DIAST BP 80-89 MM HG: CPT | Mod: CPTII,S$GLB,, | Performed by: PSYCHIATRY & NEUROLOGY

## 2021-03-30 PROCEDURE — 3288F PR FALLS RISK ASSESSMENT DOCUMENTED: ICD-10-PCS | Mod: CPTII,S$GLB,, | Performed by: PSYCHIATRY & NEUROLOGY

## 2021-03-30 PROCEDURE — 3288F FALL RISK ASSESSMENT DOCD: CPT | Mod: CPTII,S$GLB,, | Performed by: PSYCHIATRY & NEUROLOGY

## 2021-03-30 PROCEDURE — 3079F PR MOST RECENT DIASTOLIC BLOOD PRESSURE 80-89 MM HG: ICD-10-PCS | Mod: CPTII,S$GLB,, | Performed by: PSYCHIATRY & NEUROLOGY

## 2021-03-30 PROCEDURE — 1101F PT FALLS ASSESS-DOCD LE1/YR: CPT | Mod: CPTII,S$GLB,, | Performed by: PSYCHIATRY & NEUROLOGY

## 2021-03-30 PROCEDURE — 1159F MED LIST DOCD IN RCRD: CPT | Mod: S$GLB,,, | Performed by: PSYCHIATRY & NEUROLOGY

## 2021-03-30 PROCEDURE — 3075F SYST BP GE 130 - 139MM HG: CPT | Mod: CPTII,S$GLB,, | Performed by: PSYCHIATRY & NEUROLOGY

## 2021-03-30 PROCEDURE — 99214 OFFICE O/P EST MOD 30 MIN: CPT | Mod: S$GLB,,, | Performed by: PSYCHIATRY & NEUROLOGY

## 2021-03-30 PROCEDURE — 1101F PR PT FALLS ASSESS DOC 0-1 FALLS W/OUT INJ PAST YR: ICD-10-PCS | Mod: CPTII,S$GLB,, | Performed by: PSYCHIATRY & NEUROLOGY

## 2021-03-30 PROCEDURE — 99999 PR PBB SHADOW E&M-EST. PATIENT-LVL IV: ICD-10-PCS | Mod: PBBFAC,,, | Performed by: PSYCHIATRY & NEUROLOGY

## 2021-03-30 PROCEDURE — 99214 PR OFFICE/OUTPT VISIT, EST, LEVL IV, 30-39 MIN: ICD-10-PCS | Mod: S$GLB,,, | Performed by: PSYCHIATRY & NEUROLOGY

## 2021-03-30 PROCEDURE — 1126F PR PAIN SEVERITY QUANTIFIED, NO PAIN PRESENT: ICD-10-PCS | Mod: S$GLB,,, | Performed by: PSYCHIATRY & NEUROLOGY

## 2021-03-30 PROCEDURE — 99999 PR PBB SHADOW E&M-EST. PATIENT-LVL IV: CPT | Mod: PBBFAC,,, | Performed by: PSYCHIATRY & NEUROLOGY

## 2021-03-30 PROCEDURE — 99499 RISK ADDL DX/OHS AUDIT: ICD-10-PCS | Mod: S$GLB,,, | Performed by: PSYCHIATRY & NEUROLOGY

## 2021-03-30 PROCEDURE — 3075F PR MOST RECENT SYSTOLIC BLOOD PRESS GE 130-139MM HG: ICD-10-PCS | Mod: CPTII,S$GLB,, | Performed by: PSYCHIATRY & NEUROLOGY

## 2021-03-30 PROCEDURE — 99499 UNLISTED E&M SERVICE: CPT | Mod: S$GLB,,, | Performed by: PSYCHIATRY & NEUROLOGY

## 2021-03-30 PROCEDURE — 1159F PR MEDICATION LIST DOCUMENTED IN MEDICAL RECORD: ICD-10-PCS | Mod: S$GLB,,, | Performed by: PSYCHIATRY & NEUROLOGY

## 2021-03-30 PROCEDURE — 1126F AMNT PAIN NOTED NONE PRSNT: CPT | Mod: S$GLB,,, | Performed by: PSYCHIATRY & NEUROLOGY

## 2021-03-30 RX ORDER — PRIMIDONE 50 MG/1
100 TABLET ORAL NIGHTLY
Qty: 60 TABLET | Refills: 11 | Status: SHIPPED | OUTPATIENT
Start: 2021-03-30 | End: 2023-11-06

## 2021-04-09 ENCOUNTER — PES CALL (OUTPATIENT)
Dept: ADMINISTRATIVE | Facility: CLINIC | Age: 76
End: 2021-04-09

## 2021-08-06 ENCOUNTER — OFFICE VISIT (OUTPATIENT)
Dept: URGENT CARE | Facility: CLINIC | Age: 76
End: 2021-08-06
Payer: MEDICARE

## 2021-08-06 VITALS
WEIGHT: 225 LBS | OXYGEN SATURATION: 96 % | TEMPERATURE: 99 F | HEART RATE: 71 BPM | HEIGHT: 72 IN | RESPIRATION RATE: 16 BRPM | SYSTOLIC BLOOD PRESSURE: 127 MMHG | BODY MASS INDEX: 30.48 KG/M2 | DIASTOLIC BLOOD PRESSURE: 85 MMHG

## 2021-08-06 DIAGNOSIS — T24.211A: Primary | ICD-10-CM

## 2021-08-06 PROCEDURE — 1160F PR REVIEW ALL MEDS BY PRESCRIBER/CLIN PHARMACIST DOCUMENTED: ICD-10-PCS | Mod: CPTII,S$GLB,, | Performed by: NURSE PRACTITIONER

## 2021-08-06 PROCEDURE — 97597 DBRDMT OPN WND 1ST 20 CM/<: CPT | Mod: S$GLB,,, | Performed by: NURSE PRACTITIONER

## 2021-08-06 PROCEDURE — 97598 WOUND DEBRIDEMENT: ICD-10-PCS | Mod: S$GLB,,, | Performed by: NURSE PRACTITIONER

## 2021-08-06 PROCEDURE — 3079F DIAST BP 80-89 MM HG: CPT | Mod: CPTII,S$GLB,, | Performed by: NURSE PRACTITIONER

## 2021-08-06 PROCEDURE — 1125F AMNT PAIN NOTED PAIN PRSNT: CPT | Mod: CPTII,S$GLB,, | Performed by: NURSE PRACTITIONER

## 2021-08-06 PROCEDURE — 1159F MED LIST DOCD IN RCRD: CPT | Mod: CPTII,S$GLB,, | Performed by: NURSE PRACTITIONER

## 2021-08-06 PROCEDURE — 1125F PR PAIN SEVERITY QUANTIFIED, PAIN PRESENT: ICD-10-PCS | Mod: CPTII,S$GLB,, | Performed by: NURSE PRACTITIONER

## 2021-08-06 PROCEDURE — 3074F SYST BP LT 130 MM HG: CPT | Mod: CPTII,S$GLB,, | Performed by: NURSE PRACTITIONER

## 2021-08-06 PROCEDURE — 97597 WOUND DEBRIDEMENT: ICD-10-PCS | Mod: S$GLB,,, | Performed by: NURSE PRACTITIONER

## 2021-08-06 PROCEDURE — 97598 DBRDMT OPN WND ADDL 20CM/<: CPT | Mod: S$GLB,,, | Performed by: NURSE PRACTITIONER

## 2021-08-06 PROCEDURE — 1160F RVW MEDS BY RX/DR IN RCRD: CPT | Mod: CPTII,S$GLB,, | Performed by: NURSE PRACTITIONER

## 2021-08-06 PROCEDURE — 1159F PR MEDICATION LIST DOCUMENTED IN MEDICAL RECORD: ICD-10-PCS | Mod: CPTII,S$GLB,, | Performed by: NURSE PRACTITIONER

## 2021-08-06 PROCEDURE — 3079F PR MOST RECENT DIASTOLIC BLOOD PRESSURE 80-89 MM HG: ICD-10-PCS | Mod: CPTII,S$GLB,, | Performed by: NURSE PRACTITIONER

## 2021-08-06 PROCEDURE — 3074F PR MOST RECENT SYSTOLIC BLOOD PRESSURE < 130 MM HG: ICD-10-PCS | Mod: CPTII,S$GLB,, | Performed by: NURSE PRACTITIONER

## 2021-08-06 PROCEDURE — 99214 PR OFFICE/OUTPT VISIT, EST, LEVL IV, 30-39 MIN: ICD-10-PCS | Mod: 25,S$GLB,, | Performed by: NURSE PRACTITIONER

## 2021-08-06 PROCEDURE — 99214 OFFICE O/P EST MOD 30 MIN: CPT | Mod: 25,S$GLB,, | Performed by: NURSE PRACTITIONER

## 2021-08-06 RX ORDER — SILVER SULFADIAZINE 10 G/1000G
CREAM TOPICAL DAILY
Qty: 400 G | Refills: 0 | Status: SHIPPED | OUTPATIENT
Start: 2021-08-06 | End: 2022-08-09

## 2021-08-06 RX ORDER — SILVER SULFADIAZINE 10 G/1000G
CREAM TOPICAL
Status: COMPLETED | OUTPATIENT
Start: 2021-08-06 | End: 2021-08-06

## 2021-08-06 RX ORDER — DOXYCYCLINE 100 MG/1
100 CAPSULE ORAL 2 TIMES DAILY
Qty: 20 CAPSULE | Refills: 0 | Status: SHIPPED | OUTPATIENT
Start: 2021-08-06 | End: 2021-08-16

## 2021-08-06 RX ADMIN — SILVER SULFADIAZINE: 10 CREAM TOPICAL at 07:08

## 2021-08-10 ENCOUNTER — TELEPHONE (OUTPATIENT)
Dept: URGENT CARE | Facility: CLINIC | Age: 76
End: 2021-08-10

## 2021-08-11 ENCOUNTER — OFFICE VISIT (OUTPATIENT)
Dept: URGENT CARE | Facility: CLINIC | Age: 76
End: 2021-08-11
Payer: MEDICARE

## 2021-08-11 VITALS
WEIGHT: 225 LBS | SYSTOLIC BLOOD PRESSURE: 151 MMHG | HEIGHT: 72 IN | BODY MASS INDEX: 30.48 KG/M2 | DIASTOLIC BLOOD PRESSURE: 75 MMHG | TEMPERATURE: 98 F | HEART RATE: 54 BPM | RESPIRATION RATE: 16 BRPM | OXYGEN SATURATION: 95 %

## 2021-08-11 DIAGNOSIS — T24.211D PARTIAL THICKNESS BURN OF RIGHT THIGH, SUBSEQUENT ENCOUNTER: Primary | ICD-10-CM

## 2021-08-11 PROCEDURE — 1159F MED LIST DOCD IN RCRD: CPT | Mod: CPTII,S$GLB,, | Performed by: NURSE PRACTITIONER

## 2021-08-11 PROCEDURE — 1159F PR MEDICATION LIST DOCUMENTED IN MEDICAL RECORD: ICD-10-PCS | Mod: CPTII,S$GLB,, | Performed by: NURSE PRACTITIONER

## 2021-08-11 PROCEDURE — 3077F PR MOST RECENT SYSTOLIC BLOOD PRESSURE >= 140 MM HG: ICD-10-PCS | Mod: CPTII,S$GLB,, | Performed by: NURSE PRACTITIONER

## 2021-08-11 PROCEDURE — 1160F RVW MEDS BY RX/DR IN RCRD: CPT | Mod: CPTII,S$GLB,, | Performed by: NURSE PRACTITIONER

## 2021-08-11 PROCEDURE — 3078F PR MOST RECENT DIASTOLIC BLOOD PRESSURE < 80 MM HG: ICD-10-PCS | Mod: CPTII,S$GLB,, | Performed by: NURSE PRACTITIONER

## 2021-08-11 PROCEDURE — 1160F PR REVIEW ALL MEDS BY PRESCRIBER/CLIN PHARMACIST DOCUMENTED: ICD-10-PCS | Mod: CPTII,S$GLB,, | Performed by: NURSE PRACTITIONER

## 2021-08-11 PROCEDURE — 3077F SYST BP >= 140 MM HG: CPT | Mod: CPTII,S$GLB,, | Performed by: NURSE PRACTITIONER

## 2021-08-11 PROCEDURE — 3078F DIAST BP <80 MM HG: CPT | Mod: CPTII,S$GLB,, | Performed by: NURSE PRACTITIONER

## 2021-08-11 PROCEDURE — 99214 PR OFFICE/OUTPT VISIT, EST, LEVL IV, 30-39 MIN: ICD-10-PCS | Mod: S$GLB,,, | Performed by: NURSE PRACTITIONER

## 2021-08-11 PROCEDURE — 99214 OFFICE O/P EST MOD 30 MIN: CPT | Mod: S$GLB,,, | Performed by: NURSE PRACTITIONER

## 2021-08-20 ENCOUNTER — PES CALL (OUTPATIENT)
Dept: ADMINISTRATIVE | Facility: CLINIC | Age: 76
End: 2021-08-20

## 2021-08-27 ENCOUNTER — OFFICE VISIT (OUTPATIENT)
Dept: URGENT CARE | Facility: CLINIC | Age: 76
End: 2021-08-27
Payer: MEDICARE

## 2021-08-27 VITALS
WEIGHT: 225 LBS | SYSTOLIC BLOOD PRESSURE: 135 MMHG | HEART RATE: 77 BPM | TEMPERATURE: 99 F | DIASTOLIC BLOOD PRESSURE: 83 MMHG | RESPIRATION RATE: 18 BRPM | HEIGHT: 72 IN | BODY MASS INDEX: 30.48 KG/M2 | OXYGEN SATURATION: 95 %

## 2021-08-27 DIAGNOSIS — Z51.89 VISIT FOR WOUND CHECK: Primary | ICD-10-CM

## 2021-08-27 PROCEDURE — 3079F DIAST BP 80-89 MM HG: CPT | Mod: CPTII,S$GLB,, | Performed by: PHYSICIAN ASSISTANT

## 2021-08-27 PROCEDURE — 3079F PR MOST RECENT DIASTOLIC BLOOD PRESSURE 80-89 MM HG: ICD-10-PCS | Mod: CPTII,S$GLB,, | Performed by: PHYSICIAN ASSISTANT

## 2021-08-27 PROCEDURE — 3075F SYST BP GE 130 - 139MM HG: CPT | Mod: CPTII,S$GLB,, | Performed by: PHYSICIAN ASSISTANT

## 2021-08-27 PROCEDURE — 3075F PR MOST RECENT SYSTOLIC BLOOD PRESS GE 130-139MM HG: ICD-10-PCS | Mod: CPTII,S$GLB,, | Performed by: PHYSICIAN ASSISTANT

## 2021-08-27 PROCEDURE — 1159F MED LIST DOCD IN RCRD: CPT | Mod: CPTII,S$GLB,, | Performed by: PHYSICIAN ASSISTANT

## 2021-08-27 PROCEDURE — 99214 PR OFFICE/OUTPT VISIT, EST, LEVL IV, 30-39 MIN: ICD-10-PCS | Mod: S$GLB,,, | Performed by: PHYSICIAN ASSISTANT

## 2021-08-27 PROCEDURE — 1160F RVW MEDS BY RX/DR IN RCRD: CPT | Mod: CPTII,S$GLB,, | Performed by: PHYSICIAN ASSISTANT

## 2021-08-27 PROCEDURE — 1160F PR REVIEW ALL MEDS BY PRESCRIBER/CLIN PHARMACIST DOCUMENTED: ICD-10-PCS | Mod: CPTII,S$GLB,, | Performed by: PHYSICIAN ASSISTANT

## 2021-08-27 PROCEDURE — 1159F PR MEDICATION LIST DOCUMENTED IN MEDICAL RECORD: ICD-10-PCS | Mod: CPTII,S$GLB,, | Performed by: PHYSICIAN ASSISTANT

## 2021-08-27 PROCEDURE — 99214 OFFICE O/P EST MOD 30 MIN: CPT | Mod: S$GLB,,, | Performed by: PHYSICIAN ASSISTANT

## 2021-09-03 ENCOUNTER — PATIENT MESSAGE (OUTPATIENT)
Dept: NEUROLOGY | Facility: CLINIC | Age: 76
End: 2021-09-03

## 2022-02-21 ENCOUNTER — PATIENT MESSAGE (OUTPATIENT)
Dept: NEUROLOGY | Facility: CLINIC | Age: 77
End: 2022-02-21
Payer: MEDICARE

## 2022-02-22 ENCOUNTER — LAB VISIT (OUTPATIENT)
Dept: LAB | Facility: HOSPITAL | Age: 77
End: 2022-02-22
Attending: INTERNAL MEDICINE
Payer: MEDICARE

## 2022-02-22 DIAGNOSIS — E78.5 HYPERLIPIDEMIA, UNSPECIFIED HYPERLIPIDEMIA TYPE: ICD-10-CM

## 2022-02-22 DIAGNOSIS — I10 ESSENTIAL HYPERTENSION: ICD-10-CM

## 2022-02-22 DIAGNOSIS — N40.0 BENIGN PROSTATIC HYPERPLASIA, UNSPECIFIED WHETHER LOWER URINARY TRACT SYMPTOMS PRESENT: ICD-10-CM

## 2022-02-22 LAB
ALBUMIN SERPL BCP-MCNC: 3.6 G/DL (ref 3.5–5.2)
ALP SERPL-CCNC: 38 U/L (ref 55–135)
ALT SERPL W/O P-5'-P-CCNC: 12 U/L (ref 10–44)
ANION GAP SERPL CALC-SCNC: 5 MMOL/L (ref 8–16)
AST SERPL-CCNC: 11 U/L (ref 10–40)
BASOPHILS # BLD AUTO: 0.04 K/UL (ref 0–0.2)
BASOPHILS NFR BLD: 0.6 % (ref 0–1.9)
BILIRUB SERPL-MCNC: 0.5 MG/DL (ref 0.1–1)
BUN SERPL-MCNC: 20 MG/DL (ref 8–23)
CALCIUM SERPL-MCNC: 8.5 MG/DL (ref 8.7–10.5)
CHLORIDE SERPL-SCNC: 107 MMOL/L (ref 95–110)
CHOLEST SERPL-MCNC: 144 MG/DL (ref 120–199)
CHOLEST/HDLC SERPL: 2.5 {RATIO} (ref 2–5)
CO2 SERPL-SCNC: 26 MMOL/L (ref 23–29)
CREAT SERPL-MCNC: 1 MG/DL (ref 0.5–1.4)
DIFFERENTIAL METHOD: ABNORMAL
EOSINOPHIL # BLD AUTO: 0.1 K/UL (ref 0–0.5)
EOSINOPHIL NFR BLD: 1.8 % (ref 0–8)
ERYTHROCYTE [DISTWIDTH] IN BLOOD BY AUTOMATED COUNT: 12.8 % (ref 11.5–14.5)
EST. GFR  (AFRICAN AMERICAN): >60 ML/MIN/1.73 M^2
EST. GFR  (NON AFRICAN AMERICAN): >60 ML/MIN/1.73 M^2
GLUCOSE SERPL-MCNC: 92 MG/DL (ref 70–110)
HCT VFR BLD AUTO: 38.3 % (ref 40–54)
HDLC SERPL-MCNC: 57 MG/DL (ref 40–75)
HDLC SERPL: 39.6 % (ref 20–50)
HGB BLD-MCNC: 12.6 G/DL (ref 14–18)
IMM GRANULOCYTES # BLD AUTO: 0.03 K/UL (ref 0–0.04)
IMM GRANULOCYTES NFR BLD AUTO: 0.4 % (ref 0–0.5)
LDLC SERPL CALC-MCNC: 78.4 MG/DL (ref 63–159)
LYMPHOCYTES # BLD AUTO: 2.4 K/UL (ref 1–4.8)
LYMPHOCYTES NFR BLD: 33.4 % (ref 18–48)
MCH RBC QN AUTO: 31.3 PG (ref 27–31)
MCHC RBC AUTO-ENTMCNC: 32.9 G/DL (ref 32–36)
MCV RBC AUTO: 95 FL (ref 82–98)
MONOCYTES # BLD AUTO: 0.5 K/UL (ref 0.3–1)
MONOCYTES NFR BLD: 6.4 % (ref 4–15)
NEUTROPHILS # BLD AUTO: 4.1 K/UL (ref 1.8–7.7)
NEUTROPHILS NFR BLD: 57.4 % (ref 38–73)
NONHDLC SERPL-MCNC: 87 MG/DL
NRBC BLD-RTO: 0 /100 WBC
PLATELET # BLD AUTO: 215 K/UL (ref 150–450)
PMV BLD AUTO: 9.9 FL (ref 9.2–12.9)
POTASSIUM SERPL-SCNC: 4.3 MMOL/L (ref 3.5–5.1)
PROT SERPL-MCNC: 6.2 G/DL (ref 6–8.4)
RBC # BLD AUTO: 4.03 M/UL (ref 4.6–6.2)
SODIUM SERPL-SCNC: 138 MMOL/L (ref 136–145)
TRIGL SERPL-MCNC: 43 MG/DL (ref 30–150)
WBC # BLD AUTO: 7.21 K/UL (ref 3.9–12.7)

## 2022-02-22 PROCEDURE — 80053 COMPREHEN METABOLIC PANEL: CPT | Mod: HCNC | Performed by: INTERNAL MEDICINE

## 2022-02-22 PROCEDURE — 80061 LIPID PANEL: CPT | Mod: HCNC | Performed by: INTERNAL MEDICINE

## 2022-02-22 PROCEDURE — 36415 COLL VENOUS BLD VENIPUNCTURE: CPT | Mod: HCNC | Performed by: INTERNAL MEDICINE

## 2022-02-22 PROCEDURE — 85025 COMPLETE CBC W/AUTO DIFF WBC: CPT | Mod: HCNC | Performed by: INTERNAL MEDICINE

## 2022-02-25 ENCOUNTER — LAB VISIT (OUTPATIENT)
Dept: LAB | Facility: HOSPITAL | Age: 77
End: 2022-02-25
Attending: INTERNAL MEDICINE
Payer: MEDICARE

## 2022-02-25 ENCOUNTER — OFFICE VISIT (OUTPATIENT)
Dept: INTERNAL MEDICINE | Facility: CLINIC | Age: 77
End: 2022-02-25
Payer: MEDICARE

## 2022-02-25 VITALS
SYSTOLIC BLOOD PRESSURE: 130 MMHG | BODY MASS INDEX: 30.25 KG/M2 | DIASTOLIC BLOOD PRESSURE: 80 MMHG | HEIGHT: 72 IN | WEIGHT: 223.31 LBS | RESPIRATION RATE: 20 BRPM | HEART RATE: 72 BPM | TEMPERATURE: 98 F

## 2022-02-25 DIAGNOSIS — I70.0 ATHEROSCLEROSIS OF AORTA: ICD-10-CM

## 2022-02-25 DIAGNOSIS — F43.10 PTSD (POST-TRAUMATIC STRESS DISORDER): ICD-10-CM

## 2022-02-25 DIAGNOSIS — R97.20 ELEVATED PSA: ICD-10-CM

## 2022-02-25 DIAGNOSIS — R25.1 TREMOR: ICD-10-CM

## 2022-02-25 DIAGNOSIS — E78.5 HYPERLIPIDEMIA, UNSPECIFIED HYPERLIPIDEMIA TYPE: ICD-10-CM

## 2022-02-25 DIAGNOSIS — D64.9 NORMOCYTIC ANEMIA: ICD-10-CM

## 2022-02-25 DIAGNOSIS — I10 ESSENTIAL HYPERTENSION: Primary | ICD-10-CM

## 2022-02-25 DIAGNOSIS — R26.89 OTHER ABNORMALITIES OF GAIT AND MOBILITY: ICD-10-CM

## 2022-02-25 DIAGNOSIS — Z12.5 ENCOUNTER FOR SCREENING FOR MALIGNANT NEOPLASM OF PROSTATE: ICD-10-CM

## 2022-02-25 DIAGNOSIS — N40.0 BENIGN PROSTATIC HYPERPLASIA, UNSPECIFIED WHETHER LOWER URINARY TRACT SYMPTOMS PRESENT: ICD-10-CM

## 2022-02-25 LAB
FERRITIN SERPL-MCNC: 52 NG/ML (ref 20–300)
IRON SERPL-MCNC: 51 UG/DL (ref 45–160)
SATURATED IRON: 16 % (ref 20–50)
TOTAL IRON BINDING CAPACITY: 311 UG/DL (ref 250–450)
TRANSFERRIN SERPL-MCNC: 210 MG/DL (ref 200–375)
VIT B12 SERPL-MCNC: 1477 PG/ML (ref 210–950)

## 2022-02-25 PROCEDURE — 3288F PR FALLS RISK ASSESSMENT DOCUMENTED: ICD-10-PCS | Mod: HCNC,CPTII,S$GLB, | Performed by: INTERNAL MEDICINE

## 2022-02-25 PROCEDURE — 3288F FALL RISK ASSESSMENT DOCD: CPT | Mod: HCNC,CPTII,S$GLB, | Performed by: INTERNAL MEDICINE

## 2022-02-25 PROCEDURE — 82607 VITAMIN B-12: CPT | Mod: HCNC | Performed by: INTERNAL MEDICINE

## 2022-02-25 PROCEDURE — 99999 PR PBB SHADOW E&M-EST. PATIENT-LVL V: ICD-10-PCS | Mod: PBBFAC,HCNC,, | Performed by: INTERNAL MEDICINE

## 2022-02-25 PROCEDURE — 1159F PR MEDICATION LIST DOCUMENTED IN MEDICAL RECORD: ICD-10-PCS | Mod: HCNC,CPTII,S$GLB, | Performed by: INTERNAL MEDICINE

## 2022-02-25 PROCEDURE — 1101F PR PT FALLS ASSESS DOC 0-1 FALLS W/OUT INJ PAST YR: ICD-10-PCS | Mod: HCNC,CPTII,S$GLB, | Performed by: INTERNAL MEDICINE

## 2022-02-25 PROCEDURE — 3079F DIAST BP 80-89 MM HG: CPT | Mod: HCNC,CPTII,S$GLB, | Performed by: INTERNAL MEDICINE

## 2022-02-25 PROCEDURE — 99397 PR PREVENTIVE VISIT,EST,65 & OVER: ICD-10-PCS | Mod: HCNC,S$GLB,, | Performed by: INTERNAL MEDICINE

## 2022-02-25 PROCEDURE — 1126F AMNT PAIN NOTED NONE PRSNT: CPT | Mod: HCNC,CPTII,S$GLB, | Performed by: INTERNAL MEDICINE

## 2022-02-25 PROCEDURE — 3075F PR MOST RECENT SYSTOLIC BLOOD PRESS GE 130-139MM HG: ICD-10-PCS | Mod: HCNC,CPTII,S$GLB, | Performed by: INTERNAL MEDICINE

## 2022-02-25 PROCEDURE — 1126F PR PAIN SEVERITY QUANTIFIED, NO PAIN PRESENT: ICD-10-PCS | Mod: HCNC,CPTII,S$GLB, | Performed by: INTERNAL MEDICINE

## 2022-02-25 PROCEDURE — 84466 ASSAY OF TRANSFERRIN: CPT | Mod: HCNC | Performed by: INTERNAL MEDICINE

## 2022-02-25 PROCEDURE — 3075F SYST BP GE 130 - 139MM HG: CPT | Mod: HCNC,CPTII,S$GLB, | Performed by: INTERNAL MEDICINE

## 2022-02-25 PROCEDURE — 82728 ASSAY OF FERRITIN: CPT | Mod: HCNC | Performed by: INTERNAL MEDICINE

## 2022-02-25 PROCEDURE — 99999 PR PBB SHADOW E&M-EST. PATIENT-LVL V: CPT | Mod: PBBFAC,HCNC,, | Performed by: INTERNAL MEDICINE

## 2022-02-25 PROCEDURE — 1160F PR REVIEW ALL MEDS BY PRESCRIBER/CLIN PHARMACIST DOCUMENTED: ICD-10-PCS | Mod: HCNC,CPTII,S$GLB, | Performed by: INTERNAL MEDICINE

## 2022-02-25 PROCEDURE — 36415 COLL VENOUS BLD VENIPUNCTURE: CPT | Mod: HCNC,PO | Performed by: INTERNAL MEDICINE

## 2022-02-25 PROCEDURE — 1160F RVW MEDS BY RX/DR IN RCRD: CPT | Mod: HCNC,CPTII,S$GLB, | Performed by: INTERNAL MEDICINE

## 2022-02-25 PROCEDURE — 3079F PR MOST RECENT DIASTOLIC BLOOD PRESSURE 80-89 MM HG: ICD-10-PCS | Mod: HCNC,CPTII,S$GLB, | Performed by: INTERNAL MEDICINE

## 2022-02-25 PROCEDURE — 99397 PER PM REEVAL EST PAT 65+ YR: CPT | Mod: HCNC,S$GLB,, | Performed by: INTERNAL MEDICINE

## 2022-02-25 PROCEDURE — 1159F MED LIST DOCD IN RCRD: CPT | Mod: HCNC,CPTII,S$GLB, | Performed by: INTERNAL MEDICINE

## 2022-02-25 PROCEDURE — 1101F PT FALLS ASSESS-DOCD LE1/YR: CPT | Mod: HCNC,CPTII,S$GLB, | Performed by: INTERNAL MEDICINE

## 2022-02-25 RX ORDER — TRAZODONE HYDROCHLORIDE 100 MG/1
1 TABLET ORAL NIGHTLY PRN
COMMUNITY
Start: 2021-11-15 | End: 2022-08-09

## 2022-02-25 RX ORDER — FLUOXETINE HYDROCHLORIDE 40 MG/1
CAPSULE ORAL
Status: ON HOLD | COMMUNITY
Start: 2021-11-15 | End: 2023-09-14

## 2022-02-25 RX ORDER — CELECOXIB 200 MG/1
CAPSULE ORAL
Status: ON HOLD | COMMUNITY
Start: 2022-02-07 | End: 2022-12-02 | Stop reason: HOSPADM

## 2022-02-25 NOTE — PROGRESS NOTES
Subjective:       Patient ID: Brandan Werner is a 76 y.o. male.    Chief Complaint: Annual Exam    HPI   The patient presents for annual physical examination and follow-up of medical conditions which include hypertension, BPH, elevated PSA level, chronic right shoulder pain, memory difficulty, and PTSD.  The patient reports his tremors are about the same.  He is using primidone at bedtime.  He does complain of intermittent memory impairment such as remembering where he place things.  He has difficulty remembering where he parked his car.  He has had neuro psychological evaluation.  Apparently no major memory impairment was documented.    The patient is tolerating his blood pressure medication well without side effects.  He reports good intake of water daily.  He does experience 2- 3 episodes of nocturia daily.  Urinary stream has been good on current therapy.  Elevated PSA levels have been noted.    Right shoulder pain is unchanged.  Pain is exacerbated by lifting.    Immunization record was reviewed.  The patient reports that he did receive his influenza vaccine at the [facility].    Screening tests were reviewed.  The patient will be due for colonoscopy in 2023. He does have a personal history of colon polyps.    No interval change in past medical history, family history, or social history since prior evaluations.    Review of Systems   Constitutional: Positive for appetite change. Negative for activity change, fatigue and unexpected weight change.   HENT: Negative for sinus pressure/congestion and sore throat.    Eyes: Negative for visual disturbance.   Respiratory: Negative for cough, chest tightness, shortness of breath and wheezing.    Cardiovascular: Negative for chest pain, palpitations and leg swelling.   Gastrointestinal: Negative for abdominal pain, blood in stool, nausea and vomiting.   Genitourinary: Negative for dysuria, hematuria and urgency.   Musculoskeletal: Negative for arthralgias, back  pain, gait problem, joint swelling, myalgias and neck stiffness.   Integumentary:  Negative for color change and rash.   Neurological: Negative for dizziness, syncope, weakness, light-headedness, numbness and headaches.   Psychiatric/Behavioral: Negative for sleep disturbance.            Physical Exam  Vitals and nursing note reviewed.   Constitutional:       General: He is not in acute distress.     Appearance: He is well-developed.   HENT:      Head: Normocephalic and atraumatic.   Eyes:      General: No scleral icterus.     Conjunctiva/sclera: Conjunctivae normal.   Neck:      Thyroid: No thyromegaly.      Vascular: No JVD.   Cardiovascular:      Rate and Rhythm: Normal rate and regular rhythm.      Heart sounds: Normal heart sounds. No murmur heard.    No friction rub. No gallop.   Pulmonary:      Effort: Pulmonary effort is normal. No respiratory distress.      Breath sounds: Normal breath sounds. No wheezing or rales.   Abdominal:      General: Bowel sounds are normal.      Palpations: Abdomen is soft. There is no mass.      Tenderness: There is no abdominal tenderness.   Musculoskeletal:         General: No tenderness. Normal range of motion.      Cervical back: Normal range of motion and neck supple.   Lymphadenopathy:      Cervical: No cervical adenopathy.   Skin:     General: Skin is warm and dry.      Findings: No rash.   Neurological:      Mental Status: He is alert and oriented to person, place, and time.      Comments: Gait is normal.   Psychiatric:         Mood and Affect: Mood normal.         Behavior: Behavior normal.         Thought Content: Thought content normal.           Lab Visit on 02/22/2022   Component Date Value Ref Range Status    Sodium 02/22/2022 138  136 - 145 mmol/L Final    Potassium 02/22/2022 4.3  3.5 - 5.1 mmol/L Final    Chloride 02/22/2022 107  95 - 110 mmol/L Final    CO2 02/22/2022 26  23 - 29 mmol/L Final    Glucose 02/22/2022 92  70 - 110 mg/dL Final    BUN 02/22/2022  20  8 - 23 mg/dL Final    Creatinine 02/22/2022 1.0  0.5 - 1.4 mg/dL Final    Calcium 02/22/2022 8.5 (A) 8.7 - 10.5 mg/dL Final    Total Protein 02/22/2022 6.2  6.0 - 8.4 g/dL Final    Albumin 02/22/2022 3.6  3.5 - 5.2 g/dL Final    Total Bilirubin 02/22/2022 0.5  0.1 - 1.0 mg/dL Final    Comment: For infants and newborns, interpretation of results should be based  on gestational age, weight and in agreement with clinical  observations.    Premature Infant recommended reference ranges:  Up to 24 hours.............<8.0 mg/dL  Up to 48 hours............<12.0 mg/dL  3-5 days..................<15.0 mg/dL  6-29 days.................<15.0 mg/dL      Alkaline Phosphatase 02/22/2022 38 (A) 55 - 135 U/L Final    AST 02/22/2022 11  10 - 40 U/L Final    ALT 02/22/2022 12  10 - 44 U/L Final    Anion Gap 02/22/2022 5 (A) 8 - 16 mmol/L Final    eGFR if African American 02/22/2022 >60  >60 mL/min/1.73 m^2 Final    eGFR if non African American 02/22/2022 >60  >60 mL/min/1.73 m^2 Final    Comment: Calculation used to obtain the estimated glomerular filtration  rate (eGFR) is the CKD-EPI equation.       Cholesterol 02/22/2022 144  120 - 199 mg/dL Final    Comment: The National Cholesterol Education Program (NCEP) has set the  following guidelines (reference ranges) for Cholesterol:  Optimal.....................<200 mg/dL  Borderline High.............200-239 mg/dL  High........................> or = 240 mg/dL      Triglycerides 02/22/2022 43  30 - 150 mg/dL Final    Comment: The National Cholesterol Education Program (NCEP) has set the  following guidelines (reference values) for triglycerides:  Normal......................<150 mg/dL  Borderline High.............150-199 mg/dL  High........................200-499 mg/dL      HDL 02/22/2022 57  40 - 75 mg/dL Final    Comment: The National Cholesterol Education Program (NCEP) has set the  following guidelines (reference values) for HDL Cholesterol:  Low...............<40  mg/dL  Optimal...........>60 mg/dL      LDL Cholesterol 02/22/2022 78.4  63.0 - 159.0 mg/dL Final    Comment: The National Cholesterol Education Program (NCEP) has set the  following guidelines (reference values) for LDL Cholesterol:  Optimal.......................<130 mg/dL  Borderline High...............130-159 mg/dL  High..........................160-189 mg/dL  Very High.....................>190 mg/dL      HDL/Cholesterol Ratio 02/22/2022 39.6  20.0 - 50.0 % Final    Total Cholesterol/HDL Ratio 02/22/2022 2.5  2.0 - 5.0 Final    Non-HDL Cholesterol 02/22/2022 87  mg/dL Final    Comment: Risk category and Non-HDL cholesterol goals:  Coronary heart disease (CHD)or equivalent (10-year risk of CHD >20%):  Non-HDL cholesterol goal     <130 mg/dL  Two or more CHD risk factors and 10-year risk of CHD <= 20%:  Non-HDL cholesterol goal     <160 mg/dL  0 to 1 CHD risk factor:  Non-HDL cholesterol goal     <190 mg/dL      WBC 02/22/2022 7.21  3.90 - 12.70 K/uL Final    RBC 02/22/2022 4.03 (A) 4.60 - 6.20 M/uL Final    Hemoglobin 02/22/2022 12.6 (A) 14.0 - 18.0 g/dL Final    Hematocrit 02/22/2022 38.3 (A) 40.0 - 54.0 % Final    MCV 02/22/2022 95  82 - 98 fL Final    MCH 02/22/2022 31.3 (A) 27.0 - 31.0 pg Final    MCHC 02/22/2022 32.9  32.0 - 36.0 g/dL Final    RDW 02/22/2022 12.8  11.5 - 14.5 % Final    Platelets 02/22/2022 215  150 - 450 K/uL Final    MPV 02/22/2022 9.9  9.2 - 12.9 fL Final    Immature Granulocytes 02/22/2022 0.4  0.0 - 0.5 % Final    Gran # (ANC) 02/22/2022 4.1  1.8 - 7.7 K/uL Final    Immature Grans (Abs) 02/22/2022 0.03  0.00 - 0.04 K/uL Final    Comment: Mild elevation in immature granulocytes is non specific and   can be seen in a variety of conditions including stress response,   acute inflammation, trauma and pregnancy. Correlation with other   laboratory and clinical findings is essential.      Lymph # 02/22/2022 2.4  1.0 - 4.8 K/uL Final    Mono # 02/22/2022 0.5  0.3 - 1.0 K/uL  Final    Eos # 02/22/2022 0.1  0.0 - 0.5 K/uL Final    Baso # 02/22/2022 0.04  0.00 - 0.20 K/uL Final    nRBC 02/22/2022 0  0 /100 WBC Final    Gran % 02/22/2022 57.4  38.0 - 73.0 % Final    Lymph % 02/22/2022 33.4  18.0 - 48.0 % Final    Mono % 02/22/2022 6.4  4.0 - 15.0 % Final    Eosinophil % 02/22/2022 1.8  0.0 - 8.0 % Final    Basophil % 02/22/2022 0.6  0.0 - 1.9 % Final    Differential Method 02/22/2022 Automated   Final       Assessment & Plan:      Brandan was seen today for annual exam.  Current therapy will be continued.  Iron and B12 levels will be obtained.    The patient has been advised to follow up with Neurology as far as his memory impairment is concerned.  He was also advised to follow-up with orthopedics regarding his right shoulder pain.    The patient will return to clinic in 6 months.    Diagnoses and all orders for this visit:    Essential hypertension    PTSD (post-traumatic stress disorder)    Hyperlipidemia, unspecified hyperlipidemia type    Elevated PSA    Benign prostatic hyperplasia, unspecified whether lower urinary tract symptoms present    Tremor    Normocytic anemia  -     Vitamin B12; Future  -     Ferritin; Future  -     Iron and TIBC; Future    Other abnormalities of gait and mobility   -     Vitamin B12; Future    Atherosclerosis of aorta         Follow up in about 6 months (around 8/25/2022).     Km Chicas MD

## 2022-08-02 ENCOUNTER — PES CALL (OUTPATIENT)
Dept: ADMINISTRATIVE | Facility: OTHER | Age: 77
End: 2022-08-02
Payer: MEDICARE

## 2022-08-08 ENCOUNTER — TELEPHONE (OUTPATIENT)
Dept: ADMINISTRATIVE | Facility: CLINIC | Age: 77
End: 2022-08-08
Payer: MEDICARE

## 2022-08-08 NOTE — TELEPHONE ENCOUNTER
Called pt, no answer; left message informing pt I was calling to remind pt of his in office EAWV on 8/9/22 and to return my call if he had any questions; left my name and number

## 2022-08-09 ENCOUNTER — OFFICE VISIT (OUTPATIENT)
Dept: FAMILY MEDICINE | Facility: CLINIC | Age: 77
End: 2022-08-09
Payer: MEDICARE

## 2022-08-09 VITALS
BODY MASS INDEX: 28.72 KG/M2 | OXYGEN SATURATION: 97 % | HEART RATE: 87 BPM | DIASTOLIC BLOOD PRESSURE: 78 MMHG | SYSTOLIC BLOOD PRESSURE: 110 MMHG | WEIGHT: 212.06 LBS | HEIGHT: 72 IN

## 2022-08-09 DIAGNOSIS — R26.89 IMPAIRED GAIT AND MOBILITY: ICD-10-CM

## 2022-08-09 DIAGNOSIS — F32.9 MAJOR DEPRESSIVE DISORDER, REMISSION STATUS UNSPECIFIED, UNSPECIFIED WHETHER RECURRENT: ICD-10-CM

## 2022-08-09 DIAGNOSIS — Z74.09 OTHER REDUCED MOBILITY: ICD-10-CM

## 2022-08-09 DIAGNOSIS — I10 ESSENTIAL HYPERTENSION: Chronic | ICD-10-CM

## 2022-08-09 DIAGNOSIS — E78.5 HYPERLIPIDEMIA, UNSPECIFIED HYPERLIPIDEMIA TYPE: ICD-10-CM

## 2022-08-09 DIAGNOSIS — H90.3 SENSORINEURAL HEARING LOSS (SNHL) OF BOTH EARS: Chronic | ICD-10-CM

## 2022-08-09 DIAGNOSIS — I77.819 ECTATIC AORTA: ICD-10-CM

## 2022-08-09 DIAGNOSIS — J84.10 CALCIFIED GRANULOMA OF LUNG: ICD-10-CM

## 2022-08-09 DIAGNOSIS — G89.29 CHRONIC RIGHT SHOULDER PAIN: ICD-10-CM

## 2022-08-09 DIAGNOSIS — R29.6 FREQUENT FALLS: ICD-10-CM

## 2022-08-09 DIAGNOSIS — Z00.00 ENCOUNTER FOR PREVENTIVE HEALTH EXAMINATION: Primary | ICD-10-CM

## 2022-08-09 DIAGNOSIS — R25.1 TREMOR: Chronic | ICD-10-CM

## 2022-08-09 DIAGNOSIS — I70.0 ATHEROSCLEROSIS OF AORTA: ICD-10-CM

## 2022-08-09 DIAGNOSIS — F43.21 ADJUSTMENT DISORDER WITH DEPRESSED MOOD: ICD-10-CM

## 2022-08-09 DIAGNOSIS — E66.3 OVERWEIGHT (BMI 25.0-29.9): ICD-10-CM

## 2022-08-09 DIAGNOSIS — N40.0 BENIGN PROSTATIC HYPERPLASIA, UNSPECIFIED WHETHER LOWER URINARY TRACT SYMPTOMS PRESENT: ICD-10-CM

## 2022-08-09 DIAGNOSIS — F43.10 PTSD (POST-TRAUMATIC STRESS DISORDER): ICD-10-CM

## 2022-08-09 DIAGNOSIS — M25.511 CHRONIC RIGHT SHOULDER PAIN: ICD-10-CM

## 2022-08-09 PROBLEM — D64.9 ANEMIA: Status: ACTIVE | Noted: 2022-08-09

## 2022-08-09 PROCEDURE — G0439 PPPS, SUBSEQ VISIT: HCPCS | Mod: S$GLB,,, | Performed by: NURSE PRACTITIONER

## 2022-08-09 PROCEDURE — 1159F MED LIST DOCD IN RCRD: CPT | Mod: CPTII,S$GLB,, | Performed by: NURSE PRACTITIONER

## 2022-08-09 PROCEDURE — 1100F PTFALLS ASSESS-DOCD GE2>/YR: CPT | Mod: CPTII,S$GLB,, | Performed by: NURSE PRACTITIONER

## 2022-08-09 PROCEDURE — 1170F FXNL STATUS ASSESSED: CPT | Mod: CPTII,S$GLB,, | Performed by: NURSE PRACTITIONER

## 2022-08-09 PROCEDURE — 1126F PR PAIN SEVERITY QUANTIFIED, NO PAIN PRESENT: ICD-10-PCS | Mod: CPTII,S$GLB,, | Performed by: NURSE PRACTITIONER

## 2022-08-09 PROCEDURE — 1159F PR MEDICATION LIST DOCUMENTED IN MEDICAL RECORD: ICD-10-PCS | Mod: CPTII,S$GLB,, | Performed by: NURSE PRACTITIONER

## 2022-08-09 PROCEDURE — 3288F FALL RISK ASSESSMENT DOCD: CPT | Mod: CPTII,S$GLB,, | Performed by: NURSE PRACTITIONER

## 2022-08-09 PROCEDURE — 3078F DIAST BP <80 MM HG: CPT | Mod: CPTII,S$GLB,, | Performed by: NURSE PRACTITIONER

## 2022-08-09 PROCEDURE — 1170F PR FUNCTIONAL STATUS ASSESSED: ICD-10-PCS | Mod: CPTII,S$GLB,, | Performed by: NURSE PRACTITIONER

## 2022-08-09 PROCEDURE — 99999 PR PBB SHADOW E&M-EST. PATIENT-LVL V: CPT | Mod: PBBFAC,,, | Performed by: NURSE PRACTITIONER

## 2022-08-09 PROCEDURE — 99999 PR PBB SHADOW E&M-EST. PATIENT-LVL V: ICD-10-PCS | Mod: PBBFAC,,, | Performed by: NURSE PRACTITIONER

## 2022-08-09 PROCEDURE — G0439 PR MEDICARE ANNUAL WELLNESS SUBSEQUENT VISIT: ICD-10-PCS | Mod: S$GLB,,, | Performed by: NURSE PRACTITIONER

## 2022-08-09 PROCEDURE — 3074F SYST BP LT 130 MM HG: CPT | Mod: CPTII,S$GLB,, | Performed by: NURSE PRACTITIONER

## 2022-08-09 PROCEDURE — 1160F PR REVIEW ALL MEDS BY PRESCRIBER/CLIN PHARMACIST DOCUMENTED: ICD-10-PCS | Mod: CPTII,S$GLB,, | Performed by: NURSE PRACTITIONER

## 2022-08-09 PROCEDURE — 1126F AMNT PAIN NOTED NONE PRSNT: CPT | Mod: CPTII,S$GLB,, | Performed by: NURSE PRACTITIONER

## 2022-08-09 PROCEDURE — 1160F RVW MEDS BY RX/DR IN RCRD: CPT | Mod: CPTII,S$GLB,, | Performed by: NURSE PRACTITIONER

## 2022-08-09 PROCEDURE — 3078F PR MOST RECENT DIASTOLIC BLOOD PRESSURE < 80 MM HG: ICD-10-PCS | Mod: CPTII,S$GLB,, | Performed by: NURSE PRACTITIONER

## 2022-08-09 PROCEDURE — 3288F PR FALLS RISK ASSESSMENT DOCUMENTED: ICD-10-PCS | Mod: CPTII,S$GLB,, | Performed by: NURSE PRACTITIONER

## 2022-08-09 PROCEDURE — 1100F PR PT FALLS ASSESS DOC 2+ FALLS/FALL W/INJURY/YR: ICD-10-PCS | Mod: CPTII,S$GLB,, | Performed by: NURSE PRACTITIONER

## 2022-08-09 PROCEDURE — 3074F PR MOST RECENT SYSTOLIC BLOOD PRESSURE < 130 MM HG: ICD-10-PCS | Mod: CPTII,S$GLB,, | Performed by: NURSE PRACTITIONER

## 2022-08-09 NOTE — PROGRESS NOTES
Brandan Werner presented for a  Medicare AWV and comprehensive Health Risk Assessment today. The following components were reviewed and updated:    · Medical history  · Family History  · Social history  · Allergies and Current Medications  · Health Risk Assessment  · Health Maintenance  · Care Team         ** See Completed Assessments for Annual Wellness Visit within the encounter summary.**         The following assessments were completed:  · Living Situation  · CAGE  · Depression Screening  · Timed Get Up and Go  · Whisper Test  · Cognitive Function Screening      · Nutrition Screening  · ADL Screening  · PAQ Screening        Vitals:    08/09/22 1053   BP: 110/78   BP Location: Right arm   Patient Position: Sitting   BP Method: Large (Manual)   Pulse: 87   SpO2: 97%   Weight: 96.2 kg (212 lb 1.3 oz)   Height: 6' (1.829 m)     Body mass index is 28.76 kg/m².     Physical Exam  Vitals reviewed.   Constitutional:       General: He is not in acute distress.     Appearance: Normal appearance. He is well-developed and well-groomed.   HENT:      Head: Normocephalic.   Cardiovascular:      Rate and Rhythm: Normal rate.   Pulmonary:      Effort: Pulmonary effort is normal. No respiratory distress.   Skin:     General: Skin is warm and dry.      Coloration: Skin is not pale.   Neurological:      Mental Status: He is alert and oriented to person, place, and time.      Coordination: Coordination normal.      Gait: Gait abnormal (slowed but steady gait).   Psychiatric:         Attention and Perception: Attention normal.         Mood and Affect: Mood and affect normal.         Speech: Speech normal.         Behavior: Behavior normal. Behavior is cooperative.         Cognition and Memory: Cognition normal. Memory is impaired. He exhibits impaired recent memory.             Diagnoses and health risks identified today and associated recommendations/orders:    1. Encounter for preventive health examination    2. Essential  hypertension  Chronic; stable on medication. Follow up with PCP.    3. Atherosclerosis of aorta  Chronic; stable on medication. As seen on previous imaging. Follow up with PCP.    4. Ectatic aorta  Chronic; stable. Follow up with PCP.    5. Hyperlipidemia, unspecified hyperlipidemia type  Chronic; stable on medication. Follow up with PCP.    6. Calcified granuloma of lung  Chronic; stable. As seen on previous imaging. Follow up with PCP.    7. PTSD (post-traumatic stress disorder)  Chronic; stable on medication. Follow up with PCP.    8. Major depressive disorder, remission status unspecified, unspecified whether recurrent  Chronic; stable on medication. Follow up with PCP.    9. Adjustment disorder with depressed mood  Chronic; stable on medication. Follow up with PCP.    10. Chronic right shoulder pain  Chronic; stable on medication. Followed by Orthopedics.    11. Benign prostatic hyperplasia, unspecified whether lower urinary tract symptoms present  Chronic; stable on medication. Follow up with PCP.    12. Tremor  Chronic; stable on medication. Followed by Neurology.    13. Sensorineural hearing loss (SNHL) of both ears  Chronic; wearing hearing aids but still having difficulty. Follow up with PCP.    14. Impaired gait and mobility  Reports multiple falls in the past year; not using an assistive device. Willing to try PT for gait evaluation. Referral placed today.  - Ambulatory referral/consult to Physical/Occupational Therapy; Future    15. Other reduced mobility  Reports multiple falls in the past year; not using an assistive device. Willing to try PT for gait evaluation. Referral placed today.    16. Frequent falls  Reports multiple falls in the past year; not using an assistive device. Willing to try PT for gait evaluation. Referral placed today.  - Ambulatory referral/consult to Physical/Occupational Therapy; Future    17. Overweight (BMI 25.0-29.9)  Continue to eat a low salt/low fat diet and discussed  importance of engaging in physical activity at least 5x/week for a minimum of 30 min/day.      Provided Brandan with a 5-10 year written screening schedule and personal prevention plan. Recommendations were developed using the USPSTF age appropriate recommendations. Education, counseling, and referrals were provided as needed. After Visit Summary printed and given to patient which includes a list of additional screenings/tests needed.    Follow up for your next annual wellness visit.    Shanti Roberson NP    Advance Care Planning     I offered to discuss advanced care planning, including how to pick a person who would make decisions for you if you were unable to make them for yourself, called a health care power of , and what kind of decisions you might make such as use of life sustaining treatments such as ventilators and tube feeding when faced with a life limiting illness recorded on a living will that they will need to know. (How you want to be cared for as you near the end of your natural life)     X  Patient is unable to engage in a discussion regarding advanced directives due to cognitive impairment.

## 2022-08-09 NOTE — PATIENT INSTRUCTIONS
Counseling and Referral of Other Preventative  (Italic type indicates deductible and co-insurance are waived)    Patient Name: Brandan Werner  Today's Date: 8/9/2022    Health Maintenance       Date Due Completion Date    COVID-19 Vaccine (4 - Booster for Pfizer series) 02/27/2022 10/27/2021    Influenza Vaccine (1) 09/01/2022 10/27/2021    Colonoscopy 11/26/2023 11/26/2018    Lipid Panel 02/22/2027 2/22/2022    TETANUS VACCINE 07/29/2031 7/29/2021        Orders Placed This Encounter   Procedures    Ambulatory referral/consult to Physical/Occupational Therapy     The following information is provided to all patients.  This information is to help you find resources for any of the problems found today that may be affecting your health:                Living healthy guide: www.[facility].[place].gov      Understanding Diabetes: www.diabetes.org      Eating healthy: www.cdc.gov/healthyweight      [facility] home safety checklist: www.cdc.gov/steadi/patient.html      Agency on Aging: www.goea.[place].[facility]      Alcoholics anonymous (AA): www.aa.org      Physical Activity: www.[place].nih.gov/at9mahg      Tobacco use: www.quitwithusla.org

## 2022-08-10 ENCOUNTER — CLINICAL SUPPORT (OUTPATIENT)
Dept: REHABILITATION | Facility: HOSPITAL | Age: 77
End: 2022-08-10
Payer: MEDICARE

## 2022-08-10 DIAGNOSIS — R29.6 FREQUENT FALLS: ICD-10-CM

## 2022-08-10 DIAGNOSIS — Z91.81 HISTORY OF FALL: ICD-10-CM

## 2022-08-10 DIAGNOSIS — R26.89 IMPAIRED GAIT AND MOBILITY: ICD-10-CM

## 2022-08-10 DIAGNOSIS — R26.89 IMBALANCE: ICD-10-CM

## 2022-08-10 PROCEDURE — 97162 PT EVAL MOD COMPLEX 30 MIN: CPT | Mod: PN

## 2022-08-10 NOTE — PLAN OF CARE
"[facility]  Physical Therapy Neurological Rehabilitation Initial Evaluation    Name: Brandan Werner  Clinic Number: [id number]    Therapy Diagnosis:   Encounter Diagnoses   Name Primary?    Impaired gait and mobility     Frequent falls     History of fall     Imbalance      Physician: Shanti Roberson NP    Physician Orders: PT Eval and Treat   Medical Diagnosis from Referral:   R26.89 (ICD-10-CM) - Impaired gait and mobility   R29.6 (ICD-10-CM) - Frequent falls   Evaluation Date: 8/10/2022  Authorization Period Expiration: 12/31/2022  Plan of Care Expiration: 9/23/2022  Visit # / Visits authorized: 1/ 1    Time In: 1:20PM  Time Out: 2:00PM  Total Billable Time: 40 minutes (1 mod eval)    Precautions: Standard and Fall    Subjective   Date of onset: Worsening balance over the last several years.  History of current condition - Brandan reports: 4-5 falls in the last 6 months. Often finds himself tripping over things when he walks. He is often careful with his mobility "I don't rush myself because that makes me lose my balance." Has a history of right shoulder pain as well; success with cortisone shots in the past. Believes he may need surgery in the future.      Medical History:   Past Medical History:   Diagnosis Date    Anxiety     BPH (benign prostatic hyperplasia)     Cataract     Elevated PSA     Erectile dysfunction     Hyperlipidemia     Hypertension     Hypogonadism male     Kidney stone 5/20/2020    Obesity     PTSD (post-traumatic stress disorder)     Shoulder arthritis     Urinary tract infection        Surgical History:   Brandan Werner  has a past surgical history that includes Colonoscopy w/ biopsies (2010); Penile prosthesis implant; and Colonoscopy (N/A, 11/26/2018).    Medications:   Brandan has a current medication list which includes the following prescription(s): ascorbic acid (vitamin c), atorvastatin, celecoxib, diclofenac, fluoxetine, fluticasone " propionate, folic acid/multivit-min/lutein, garlic, marychuy root xt/fennel sd xt, glucosam/chond-msm1/c/agueda/bor, lisinopril, mecobalamin, meloxicam, peg 400-propylene glycol, primidone, psyllium, tamsulosin, triamcinolone acetonide 0.1%, vitamin e, and voltaren.    Allergies:   Review of patient's allergies indicates:  No Known Allergies     Imaging  - MRI Brain without Contrast (3/5/2020): Age-appropriate cerebral volume loss mild moderate patchy T2 FLAIR signal hyperintensity supratentorial white matter while nonspecific most suggestive for underlying chronic microvascular ischemic change. Otherwise unremarkable noncontrast MRI brain specifically without evidence for acute infarction Incidental partially empty sella.  - MRI Cervical Spine (3/5/2020): Multilevel degenerative change cervical spine as detailed above most pronounced at C5/C6 with posterior disc osteophyte, uncovertebral joint hypertrophy and facet joint arthropathy with mild central canal stenosis with severe bilateral neural foraminal stenosis.  - X-Ray Shoulder Right Trauma (6/22/2020): No acute displaced fracture-dislocation identified. Moderate to advanced degenerative changes about the shoulder, grossly similar at the AC joint but progressed at the glenohumeral joint, with suspected 12 mm loose body at the acromial humeral joint interval. There is no cord signal abnormality to suggest edema.  No abnormal intrathecal enhancement. Incidental asymmetry of the submandibular glands right being larger than left with mild dilatation of the right submandibular duct without abnormal enhancement of the gland which may be sequela of prior sialoadenitis and ductal obstruction.  Clinical correlation and further evaluation with CT as warranted    Prior Therapy: At this location for Achilles tendonitis in the past   Social History: Lives with wife  Falls: 4-5 falls in the last 6 months   DME: Has a wheelchair and walker from mother in law (does not use)  Home  "Environment: Single story home; 1 step to enter   Exercise Routine / History: Use to go to [facility] but has not been exercising since that location closed in   Family Present at time of Eval: No   Occupation: [employer] (still uses his [employer])  Prior Level of Function: Independent   Current Level of Function: Independent with most tasks but is limited in right upper extremity use due to shoulder dysfunction    Pain:  Current 10, worst /10, best 8/10   Location: right shoulder   Description: Aching  Aggravating Factors: Shoulder elevation and laying on right side  Easing Factors: Avoiding aggravating positions    Patient's goals: "Whatever you think"    Objective     - Command followin% simple; 75% complex   - Speech: no deficits     Mental status: alert, oriented to person, place, and time, normal mood, behavior, speech, dress, motor activity, and thought processes  Behavior:  calm, cooperative and adequate rapport can be established  Attention Span and Concentration:  Easily distracted    Dominant hand: right     Posture Alignment: Decreased thoracic kyphosis; forward head posture; rounded shoulders    Sensation: Light Touch: Intact per screen         Coordination:   - fine motor: Significant impairment in B opposition (tremor noted)   - LE coordination: Impaired rapid alternating movement    ROM:   UPPER EXTREMITY--AROM/PROM  (R) UE: 50% limited in shoulder elevation  (L) UE: WFLs         RANGE OF MOTION--LOWER EXTREMITIES  (R) LE Mild hamstring and gastroc soleus length  (L) LE: Mild hamstring and gastroc soleus length    Lower Extremity Strength   RLE LLE   Hip Flexion: 4+/5 4+/5   Hip Abduction: 4/5 4/5   Knee Extension: 5/5 5/5   Knee Flexion: 5/5 5/5   Ankle Dorsiflexion: 5/5 5/5     HANNA  BALANCE ASSESSMENT TOOL  1. Sitting to Standing   3 - able to stand independely using hands  2. Standing Unsupported   4 - able to stand safely 2 minutes without hold  3. Sitting Unsupported   4 - able " to sit safely and securely 2 minutes  4. Standing to Sitting   3 - controls descent by using hands  5. Pivot Transfer   3 - able to transfer safely with definite use of hands  6. Standing with Eyes Closed   4 - albe to stand 10 seconds safely  7. Standing with Feet Together   3 - able to place feet together independently and stand for 1 minute with supervision  8. Reaching Forward with Outstretched Arm   4 - can reach forward confidently 25 cm/10 inches  9. Retrieving Object from Floor   3 - able to pick slipper but needs supervision  10. Turning to Look Behind   2 - turns sideways only but maintains balance  11. Turning 360 Degrees   2 - able to turn 360 safely but slowly  12. Placing Alternate Foot on Step   4 - able to stand independently safely and complete 8 steps in 20 seconds  13. Standing with One Foot in Front   4 - able to tandem stand independently and hold 30 seconds  14. Standing on One Foot   3- able to lift leg independently and hold 5-10 seconds    TOTAL SCORE: 46  Maximum: 56    Score interpretation:   0-20 = wheelchair bound  21-40 = walking with assistance  41-56 = independent    Fall risk cutoff scores:   Elderly and history of falls: <51/56   Elderly and no history of falls: <42/56   CVA: <45/56    MDC:  Parkinson's = 5 points  Acute CVA = 6.9 points  Chronic CVA = 4.7 points  Pulmonary Disease = 5.9 points  Progressive Dementia = 1.9 points    30-Second Sit to Stand: 14 repetitions     CMS Impairment/Limitation/Restriction for FOTO NOC-Neuromuscular disorder Survey    Therapist reviewed FOTO scores for Brandan Werner on 8/10/2022.   FOTO documents entered into [employer] - see Media section.    Limitation Score: 24% (predicted 21%)       TREATMENT   Treatment not initiated today; suggestions for follow up = home strengthening and walking program/weekly exercise recommendations; treadmill versus recumbent bike for CV endurance; mod-high level balance training    Home Exercises and Patient Education  Provided    Education provided:   - PT plan of care  - Benefits of PT for balance intervention especially in context of frequent falls     Written Home Exercises Provided: Not today; provide next please.    Assessment   Brandan is a 76 y.o. male referred to outpatient Physical Therapy with a medical diagnosis of (1) R26.89 (ICD-10-CM) - Impaired gait and mobility and (2) R29.6 (ICD-10-CM) - Frequent falls. Patient presents with mild proximal bilateral lower extremity weakness; history of frequent falls; low objective risk for falls per Reynolds Balance Assessment; and current sedentary lifestyle. Patient would benefit from skilled physical therapy services to address moderate/higher level balance, improve general activity tolerance, decrease risk for future falls/injury, improve safety awareness and fall recovery technique, and improve overall quality of life. Recommended patient also seek physical versus occupational therapy order for his right shoulder if his orthopedist deems it appropriate.    Patient prognosis is Good.   Patient will benefit from skilled outpatient Physical Therapy to address the deficits stated above and in the chart below, provide patient/family education, and to maximize patient's level of independence.     Plan of care discussed with patient: Yes  Patient's spiritual, cultural and educational needs considered and patient is agreeable to the plan of care and goals as stated below:     Anticipated Barriers for therapy: Co-morbidities     Medical Necessity is demonstrated by the following  History  Co-morbidities and personal factors that may impact the plan of care Co-morbidities:   Anxiety   BPH (benign prostatic hyperplasia)   Cataract   Elevated PSA   Erectile dysfunction   Hyperlipidemia   Hypertension   Hypogonadism male   Kidney stone   Obesity   PTSD (post-traumatic stress disorder)   Shoulder arthritis   Urinary tract infection     Personal Factors:   age  coping style  lifestyle     high    Examination  Body Structures and Functions, activity limitations and participation restrictions that may impact the plan of care Body Regions:   lower extremities  trunk    Body Systems:    strength  gross coordinated movement  balance  gait  transfers  transitions  motor control  motor learning    Participation Restrictions:   Decreased quality of life secondary to impairments    Activity limitations:   Learning and applying knowledge  no deficits    General Tasks and Commands  no deficits    Communication  no deficits    Mobility  lifting and carrying objects  walking    Self care  no deficits    Domestic Life  shopping  cooking  doing house work (cleaning house, washing dishes, laundry)  assisting others    Interactions/Relationships  no deficits    Life Areas  no deficits    Community and Social Life  community life  recreation and leisure         high   Clinical Presentation evolving clinical presentation with changing clinical characteristics moderate   Decision Making/ Complexity Score: moderate     Goals:  Short Term Goals: 3 weeks   1. Patient will be compliant with HEP in order to maximize PT benefits  2. Patient will score </=20% on FOTO limitation survey in order to improve balance confidence with community mobility    Long Term Goals: 6 weeks   3. Patient will score </= 15% on FOTO limitation survey in order to improve self-perception of functional mobility deficits  4. Patient will improve bilateral lower extremity MMT grades by >/=1/2 grade in order to improve strength for ADL completion  5. Patient will score >/= 51/56 on Reynolds Balance Assessment with least restrictive assistive device in order to reduce risk for falls and improve postural control  6. Patient will perform 1 floor <> stand transfer with bilateral upper extremity support in order to demonstrate safe functional mobility in case of future fall  7. Patient will report 0 falls from initiation of PT management  8. Patient will begin some  form of home/community fitness in order to sustain progress gained in PT    Plan   Plan of care Certification: 8/10/2022 to 9/23/2022.    Outpatient Physical Therapy 2 times weekly for 6 weeks to include the following interventions: Gait Training, Manual Therapy, Moist Heat/ Ice, Neuromuscular Re-ed, Patient Education, Self Care, Therapeutic Activities, Therapeutic Exercise and Modalities PRN.     SHARIF NORTH, PT

## 2022-08-12 PROBLEM — Z91.81 HISTORY OF FALL: Status: ACTIVE | Noted: 2022-08-12

## 2022-08-12 PROBLEM — R26.89 IMBALANCE: Status: ACTIVE | Noted: 2022-08-12

## 2022-08-17 ENCOUNTER — TELEPHONE (OUTPATIENT)
Dept: REHABILITATION | Facility: HOSPITAL | Age: 77
End: 2022-08-17
Payer: MEDICARE

## 2022-08-17 NOTE — TELEPHONE ENCOUNTER
Physical Therapy: No show/Cancellation of Visit  Date: 8/17/22     Patient was a no call no show for today's PT appointment. Reason for no show is because patient was unaware he had been scheduled. Called pt and spoke to him briefly regarding the mix up.  He was not given a schedule after his evaluation and told his schedule would be mailed to him via [employer] the following week.  Confirmed date and time of next appointment which pt plan on attending  Patient's next scheduled appointment is 8/18/22 at 2:45 pm.      Initial Evaluation: 8/10/22  Plan of Care Expiration: 9/23/22  Cancel: 0  No show: 1    Luz Stanford, PTA

## 2022-08-18 ENCOUNTER — CLINICAL SUPPORT (OUTPATIENT)
Dept: REHABILITATION | Facility: HOSPITAL | Age: 77
End: 2022-08-18
Payer: MEDICARE

## 2022-08-18 DIAGNOSIS — R26.89 IMBALANCE: ICD-10-CM

## 2022-08-18 DIAGNOSIS — Z91.81 HISTORY OF FALL: Primary | ICD-10-CM

## 2022-08-18 PROCEDURE — 97110 THERAPEUTIC EXERCISES: CPT | Mod: PN,CQ

## 2022-08-18 PROCEDURE — 97112 NEUROMUSCULAR REEDUCATION: CPT | Mod: PN,CQ

## 2022-08-18 NOTE — PROGRESS NOTES
"[facility]   Physical Therapy Treatment Note     Name: Brandan [facility]  Clinic Number: [id number]    Therapy Diagnosis:   Encounter Diagnoses   Name Primary?    History of fall Yes    Imbalance      Physician: No ref. provider found    Visit Date: 2022    Physician Orders: PT Eval and Treat   Medical Diagnosis from Referral:   R26.89 (ICD-10-CM) - Impaired gait and mobility   R29.6 (ICD-10-CM) - Frequent falls   Evaluation Date: 8/10/2022  Authorization Period Expiration: 2022  Plan of Care Expiration: 2022  Visit # / Visits authorized:   FOTO #:   PTA Visit #:      Time In: 2:40 pm  Time Out: 3:25 pm  Total Billable Time: 45 minutes (2 TE + 1 NMR)    Precautions: Standard and Fall    SUBJECTIVE     Pt reports: No complaints of pain .  He will be compliant with home exercise program.  Response to previous treatment: initial eval  Functional change: ongoing    Pain: 0/10  Location: right shoulder      OBJECTIVE     Objective Measures updated at progress report unless specified.     Treatment     Brandan received the treatments listed below:      therapeutic exercises to develop strength, endurance, ROM and flexibility for 30 minutes including:  Nustep level 4.0 8 minutes >> treadmill next  Hamstring stretch at stairs: 2x30" B  Gastroc stretch on incline board: 3x30"  Calf raises: 2x15 (hep review)  Standing hip abduction/extension: 2x10 with red theraband (hep review)  Mini squats: 2x10 (hep review)  6" Step ups: forward 2x10 B                     : lateral up over x20  Lateral steps with red theraband: 6 lengths x 10 feet      neuromuscular re-education activities to improve: Balance, Coordination, Kinesthetic and Proprioception for 15 minutes. The following activities were included:  Tandem balance on blue foam ovals: Static 2x30"                                                              : Chest press with volleyball 2x10   Tandem walkin lengths x 10 " feet  Lateral walking on airex: 6 lengths x 10 feet  Lateral walking with cone taps (4):5 6 lengths x 10 feet    therapeutic activities to improve functional performance for 00 minutes, including:  Not performed today    gait training to improve functional mobility and safety for 00 minutes, including:  Not performed today        Patient Education and Home Exercises     Home Exercises Provided and Patient Education Provided     Education provided:   - daily HEP compliance  - importance of a daily walking    Written Home Exercises Provided: yes. Exercises were reviewed and Brandan was able to demonstrate them prior to the end of the session.  Brandan demonstrated good  understanding of the education provided. See EMR under Patient Instructions for exercises provided during therapy sessions    ASSESSMENT     Brandan arrived to session without any complaints of pain and was agreeable to treatment.  Pt tolerated first follow up session without adverse response.  Good activity tolerance noted without any rest breaks required throughout all standing therex and balance training.  A few minor losses of balance on compliant surfaces that he was able to recover from without assistance via reaching strategy. Pt appeared less steady on RLE during single leg activity. HEP review provided with walking program and basic strengthening, follow up on compliance.       Brandan Is progressing well towards his goals.   Pt prognosis is Good.     Pt will continue to benefit from skilled outpatient physical therapy to address the deficits listed in the problem list box on initial evaluation, provide pt/family education and to maximize pt's level of independence in the home and community environment.     Pt's spiritual, cultural and educational needs considered and pt agreeable to plan of care and goals.     Anticipated barriers to physical therapy: Co-morbidities     Goals:   Short Term Goals: 3 weeks   1. Patient will be compliant with  HEP in order to maximize PT benefits  2. Patient will score </=20% on FOTO limitation survey in order to improve balance confidence with community mobility     Long Term Goals: 6 weeks   3. Patient will score </= 15% on FOTO limitation survey in order to improve self-perception of functional mobility deficits  4. Patient will improve bilateral lower extremity MMT grades by >/=1/2 grade in order to improve strength for ADL completion  5. Patient will score >/= 51/56 on Reynolds Balance Assessment with least restrictive assistive device in order to reduce risk for falls and improve postural control  6. Patient will perform 1 floor <> stand transfer with bilateral upper extremity support in order to demonstrate safe functional mobility in case of future fall  7. Patient will report 0 falls from initiation of PT management  8. Patient will begin some form of home/community fitness in order to sustain progress gained in PT      PLAN     Plan to cont with progression of PT goals per POC.    Luz Stanford, PTA

## 2022-08-19 ENCOUNTER — LAB VISIT (OUTPATIENT)
Dept: LAB | Facility: HOSPITAL | Age: 77
End: 2022-08-19
Attending: INTERNAL MEDICINE
Payer: MEDICARE

## 2022-08-19 DIAGNOSIS — E78.5 HYPERLIPIDEMIA, UNSPECIFIED HYPERLIPIDEMIA TYPE: ICD-10-CM

## 2022-08-19 DIAGNOSIS — Z12.5 ENCOUNTER FOR SCREENING FOR MALIGNANT NEOPLASM OF PROSTATE: ICD-10-CM

## 2022-08-19 DIAGNOSIS — I10 ESSENTIAL HYPERTENSION: ICD-10-CM

## 2022-08-19 DIAGNOSIS — R97.20 ELEVATED PSA: ICD-10-CM

## 2022-08-19 DIAGNOSIS — D64.9 NORMOCYTIC ANEMIA: ICD-10-CM

## 2022-08-19 LAB
ALBUMIN SERPL BCP-MCNC: 3.8 G/DL (ref 3.5–5.2)
ALP SERPL-CCNC: 40 U/L (ref 55–135)
ALT SERPL W/O P-5'-P-CCNC: 10 U/L (ref 10–44)
ANION GAP SERPL CALC-SCNC: 8 MMOL/L (ref 8–16)
AST SERPL-CCNC: 12 U/L (ref 10–40)
BASOPHILS # BLD AUTO: 0.04 K/UL (ref 0–0.2)
BASOPHILS NFR BLD: 0.5 % (ref 0–1.9)
BILIRUB SERPL-MCNC: 0.6 MG/DL (ref 0.1–1)
BUN SERPL-MCNC: 21 MG/DL (ref 8–23)
CALCIUM SERPL-MCNC: 8.9 MG/DL (ref 8.7–10.5)
CHLORIDE SERPL-SCNC: 108 MMOL/L (ref 95–110)
CHOLEST SERPL-MCNC: 136 MG/DL (ref 120–199)
CHOLEST/HDLC SERPL: 2.1 {RATIO} (ref 2–5)
CO2 SERPL-SCNC: 24 MMOL/L (ref 23–29)
COMPLEXED PSA SERPL-MCNC: 8.2 NG/ML (ref 0–4)
CREAT SERPL-MCNC: 1.1 MG/DL (ref 0.5–1.4)
DIFFERENTIAL METHOD: ABNORMAL
EOSINOPHIL # BLD AUTO: 0.2 K/UL (ref 0–0.5)
EOSINOPHIL NFR BLD: 2.6 % (ref 0–8)
ERYTHROCYTE [DISTWIDTH] IN BLOOD BY AUTOMATED COUNT: 12.9 % (ref 11.5–14.5)
EST. GFR  (NO RACE VARIABLE): >60 ML/MIN/1.73 M^2
GLUCOSE SERPL-MCNC: 90 MG/DL (ref 70–110)
HCT VFR BLD AUTO: 38.7 % (ref 40–54)
HDLC SERPL-MCNC: 64 MG/DL (ref 40–75)
HDLC SERPL: 47.1 % (ref 20–50)
HGB BLD-MCNC: 12.8 G/DL (ref 14–18)
IMM GRANULOCYTES # BLD AUTO: 0.02 K/UL (ref 0–0.04)
IMM GRANULOCYTES NFR BLD AUTO: 0.3 % (ref 0–0.5)
LDLC SERPL CALC-MCNC: 65.4 MG/DL (ref 63–159)
LYMPHOCYTES # BLD AUTO: 2.7 K/UL (ref 1–4.8)
LYMPHOCYTES NFR BLD: 35.8 % (ref 18–48)
MCH RBC QN AUTO: 31.5 PG (ref 27–31)
MCHC RBC AUTO-ENTMCNC: 33.1 G/DL (ref 32–36)
MCV RBC AUTO: 95 FL (ref 82–98)
MONOCYTES # BLD AUTO: 0.4 K/UL (ref 0.3–1)
MONOCYTES NFR BLD: 5.7 % (ref 4–15)
NEUTROPHILS # BLD AUTO: 4.1 K/UL (ref 1.8–7.7)
NEUTROPHILS NFR BLD: 55.1 % (ref 38–73)
NONHDLC SERPL-MCNC: 72 MG/DL
NRBC BLD-RTO: 0 /100 WBC
PLATELET # BLD AUTO: 196 K/UL (ref 150–450)
PMV BLD AUTO: 10.4 FL (ref 9.2–12.9)
POTASSIUM SERPL-SCNC: 4.8 MMOL/L (ref 3.5–5.1)
PROT SERPL-MCNC: 6.3 G/DL (ref 6–8.4)
RBC # BLD AUTO: 4.06 M/UL (ref 4.6–6.2)
SODIUM SERPL-SCNC: 140 MMOL/L (ref 136–145)
TRIGL SERPL-MCNC: 33 MG/DL (ref 30–150)
TSH SERPL DL<=0.005 MIU/L-ACNC: 1.33 UIU/ML (ref 0.4–4)
WBC # BLD AUTO: 7.43 K/UL (ref 3.9–12.7)

## 2022-08-19 PROCEDURE — 80053 COMPREHEN METABOLIC PANEL: CPT | Performed by: INTERNAL MEDICINE

## 2022-08-19 PROCEDURE — 36415 COLL VENOUS BLD VENIPUNCTURE: CPT | Performed by: INTERNAL MEDICINE

## 2022-08-19 PROCEDURE — 84443 ASSAY THYROID STIM HORMONE: CPT | Performed by: INTERNAL MEDICINE

## 2022-08-19 PROCEDURE — 85025 COMPLETE CBC W/AUTO DIFF WBC: CPT | Performed by: INTERNAL MEDICINE

## 2022-08-19 PROCEDURE — 84153 ASSAY OF PSA TOTAL: CPT | Performed by: INTERNAL MEDICINE

## 2022-08-19 PROCEDURE — 80061 LIPID PANEL: CPT | Performed by: INTERNAL MEDICINE

## 2022-08-22 ENCOUNTER — CLINICAL SUPPORT (OUTPATIENT)
Dept: REHABILITATION | Facility: HOSPITAL | Age: 77
End: 2022-08-22
Payer: MEDICARE

## 2022-08-22 DIAGNOSIS — Z91.81 HISTORY OF FALL: Primary | ICD-10-CM

## 2022-08-22 DIAGNOSIS — R26.89 IMBALANCE: ICD-10-CM

## 2022-08-22 PROCEDURE — 97112 NEUROMUSCULAR REEDUCATION: CPT | Mod: PN

## 2022-08-22 PROCEDURE — 97110 THERAPEUTIC EXERCISES: CPT | Mod: PN

## 2022-08-22 NOTE — PROGRESS NOTES
"[facility]   Physical Therapy Treatment Note     Name: Brandan [facility]  Clinic Number: [id number]    Therapy Diagnosis:   Encounter Diagnoses   Name Primary?    History of fall Yes    Imbalance      Physician: No ref. provider found    Visit Date: 2022    Physician Orders: PT Eval and Treat   Medical Diagnosis from Referral:   R26.89 (ICD-10-CM) - Impaired gait and mobility   R29.6 (ICD-10-CM) - Frequent falls   Evaluation Date: 8/10/2022  Authorization Period Expiration: 2022  Plan of Care Expiration: 2022  Visit # / Visits authorized: 3/TBD  FOTO #: 3/5  PTA Visit #: 0/5     Time In: 2:52 pm  Time Out: 3:31 pm  Total Billable Time: 38 minutes (1 TE + 2 NMR)    Precautions: Standard and Fall    SUBJECTIVE     Pt reports: no pain but he stated that he losses his balance from time to time. NO new falls lately.  He will be compliant with home exercise program.  Response to previous treatment: initial eval  Functional change: ongoing    Pain: 0/10  Location: right shoulder      OBJECTIVE     Objective Measures updated at progress report unless specified.     Treatment     Brandan received the treatments listed below:      therapeutic exercises to develop strength, endurance, ROM and flexibility for 15 minutes including:  Nustep level 4.0 7 minutes >> treadmill next  Black soft step Step ups: forward 2x10 B                      : lateral up over x20  Sit to stand  2 x 10 without UE support    neuromuscular re-education activities to improve: Balance, Coordination, Kinesthetic and Proprioception for 23 minutes. The following activities were included:  Tandem balance on blue foam ovals: Static 2x30"                                                              : Chest press with volleyball 2x10   Tandem walkin lengths x 10 feet    Hurdles (4) in // bars:  4 length x 10 feet each way forward     3 length x 10 feet each way laterally    Standing feet together on airex  : chest " press with 3# ball 2 x 10    Patient educated on fall prevention x 5 minutes    therapeutic activities to improve functional performance for 00 minutes, including:  Not performed today    gait training to improve functional mobility and safety for 00 minutes, including:  Not performed today    Patient Education and Home Exercises     Home Exercises Provided and Patient Education Provided     Education provided:   - fall prevention tips  - returning to the gym safely  - importance of a daily walking    Written Home Exercises Provided: yes. Exercises were reviewed and Brandan was able to demonstrate them prior to the end of the session.  Brandan demonstrated good  understanding of the education provided. See EMR under Patient Instructions for exercises provided during therapy sessions    ASSESSMENT     Brandan arrived to session without any complaints of pain and was agreeable to treatment.  Pt stated that he notices that he is not losing his balance as often and he has had 0 falls since eval.  He required minimal UE support during session in // bars with coaxing but was able to attempt much of the NMR training with UE support as needed.        Brandan Is progressing well towards his goals.   Pt prognosis is Good.     Pt will continue to benefit from skilled outpatient physical therapy to address the deficits listed in the problem list box on initial evaluation, provide pt/family education and to maximize pt's level of independence in the home and community environment.     Pt's spiritual, cultural and educational needs considered and pt agreeable to plan of care and goals.     Anticipated barriers to physical therapy: Co-morbidities     Goals:   Short Term Goals: 3 weeks   1. Patient will be compliant with HEP in order to maximize PT benefits  (PROGRESSING, NOT MET)  2. Patient will score </=20% on FOTO limitation survey in order to improve balance confidence with community mobility (PROGRESSING, NOT MET)     Long  Term Goals: 6 weeks   3. Patient will score </= 15% on FOTO limitation survey in order to improve self-perception of functional mobility deficits (PROGRESSING, NOT MET)  4. Patient will improve bilateral lower extremity MMT grades by >/=1/2 grade in order to improve strength for ADL completion(PROGRESSING, NOT MET)  5. Patient will score >/= 51/56 on Reynolds Balance Assessment with least restrictive assistive device in order to reduce risk for falls and improve postural control (PROGRESSING, NOT MET)  6. Patient will perform 1 floor <> stand transfer with bilateral upper extremity support in order to demonstrate safe functional mobility in case of future fall (PROGRESSING, NOT MET)  7. Patient will report 0 falls from initiation of PT management (PROGRESSING, NOT MET)  8. Patient will begin some form of home/community fitness in order to sustain progress gained in PT (PROGRESSING, NOT MET)      PLAN     Plan to cont with progression of PT goals per POC.    Fabienne Ramos, PT

## 2022-08-24 ENCOUNTER — CLINICAL SUPPORT (OUTPATIENT)
Dept: REHABILITATION | Facility: HOSPITAL | Age: 77
End: 2022-08-24
Payer: MEDICARE

## 2022-08-24 DIAGNOSIS — R26.89 IMBALANCE: ICD-10-CM

## 2022-08-24 DIAGNOSIS — Z91.81 HISTORY OF FALL: Primary | ICD-10-CM

## 2022-08-24 PROCEDURE — 97110 THERAPEUTIC EXERCISES: CPT | Mod: PN,CQ

## 2022-08-24 PROCEDURE — 97112 NEUROMUSCULAR REEDUCATION: CPT | Mod: PN,CQ

## 2022-08-24 NOTE — PROGRESS NOTES
"[facility]   Physical Therapy Treatment Note     Name: Brandan [facility]  Clinic Number: [id number]    Therapy Diagnosis:   Encounter Diagnoses   Name Primary?    History of fall Yes    Imbalance      Physician: No ref. provider found    Visit Date: 2022    Physician Orders: PT Eval and Treat   Medical Diagnosis from Referral:   R26.89 (ICD-10-CM) - Impaired gait and mobility   R29.6 (ICD-10-CM) - Frequent falls   Evaluation Date: 8/10/2022  Authorization Period Expiration: 2022  Plan of Care Expiration: 2022  Visit # / Visits authorized: 4/TBD  FOTO #: 3/5  PTA Visit #:      Time In: 2:45 pm  Time Out: 3:30 pm  Total Billable Time: 45 minutes (1 TE + 2 NMR)    Precautions: Standard and Fall    SUBJECTIVE     Pt reports: no pain but he stated that he losses his balance from time to time. NO new falls lately.  He will be compliant with home exercise program.  Response to previous treatment: initial eval  Functional change: ongoing    Pain: 0/10  Location: right shoulder      OBJECTIVE     Objective Measures updated at progress report unless specified.     Treatment     Brandan received the treatments listed below:      therapeutic exercises to develop strength, endurance, ROM and flexibility for 15 minutes including:  Treadmill 8 minutes speed 1.2-1.5  Black soft step Step ups: forward 2x10 B                      : lateral up over x20  Sit to stand  2 x 10 without UE support    neuromuscular re-education activities to improve: Balance, Coordination, Kinesthetic and Proprioception for 25 minutes. The following activities were included:  Tandem balance on blue foam ovals: Static 2x30"                                                              : Chest press with volleyball 2x10   Tandem walkin lengths x 10 feet    Hurdles (4) in // bars:  6 length x 10 feet each way forward     6 length x 10 feet each way laterally    Standing feet together on airex  : chest press with " 3# ball 2 x 10      therapeutic activities to improve functional performance for 00 minutes, including:  Not performed today    gait training to improve functional mobility and safety for 00 minutes, including:  Not performed today    Patient Education and Home Exercises     Home Exercises Provided and Patient Education Provided     Education provided:   - fall prevention tips  - returning to the gym safely  - importance of a daily walking    Written Home Exercises Provided: yes. Exercises were reviewed and Brandan was able to demonstrate them prior to the end of the session.  Brandan demonstrated good  understanding of the education provided. See EMR under Patient Instructions for exercises provided during therapy sessions    ASSESSMENT     Brandan arrived to session without any complaints of pain and was agreeable to treatment.  Introduced treadmill today with verbal cuing to stay on task as gait mechanics and speed both declined with conversation and distraction.  No losses of balance during hurdles but required verbal cuing to slow down and for controlled movement.  He was appropriately fatigued upon completion of session and required a brief seated rest break for water before exiting clinic.       Brandan Is progressing well towards his goals.   Pt prognosis is Good.     Pt will continue to benefit from skilled outpatient physical therapy to address the deficits listed in the problem list box on initial evaluation, provide pt/family education and to maximize pt's level of independence in the home and community environment.     Pt's spiritual, cultural and educational needs considered and pt agreeable to plan of care and goals.     Anticipated barriers to physical therapy: Co-morbidities     Goals:   Short Term Goals: 3 weeks   1. Patient will be compliant with HEP in order to maximize PT benefits  (PROGRESSING, NOT MET)  2. Patient will score </=20% on FOTO limitation survey in order to improve balance  confidence with community mobility (PROGRESSING, NOT MET)     Long Term Goals: 6 weeks   3. Patient will score </= 15% on FOTO limitation survey in order to improve self-perception of functional mobility deficits (PROGRESSING, NOT MET)  4. Patient will improve bilateral lower extremity MMT grades by >/=1/2 grade in order to improve strength for ADL completion(PROGRESSING, NOT MET)  5. Patient will score >/= 51/56 on Reynolds Balance Assessment with least restrictive assistive device in order to reduce risk for falls and improve postural control (PROGRESSING, NOT MET)  6. Patient will perform 1 floor <> stand transfer with bilateral upper extremity support in order to demonstrate safe functional mobility in case of future fall (PROGRESSING, NOT MET)  7. Patient will report 0 falls from initiation of PT management (PROGRESSING, NOT MET)  8. Patient will begin some form of home/community fitness in order to sustain progress gained in PT (PROGRESSING, NOT MET)      PLAN     Plan to cont with progression of PT goals per POC.    Luz Stanford, PTA

## 2022-08-26 ENCOUNTER — OFFICE VISIT (OUTPATIENT)
Dept: INTERNAL MEDICINE | Facility: CLINIC | Age: 77
End: 2022-08-26
Payer: MEDICARE

## 2022-08-26 VITALS
BODY MASS INDEX: 28.78 KG/M2 | OXYGEN SATURATION: 99 % | RESPIRATION RATE: 18 BRPM | TEMPERATURE: 98 F | SYSTOLIC BLOOD PRESSURE: 102 MMHG | HEIGHT: 72 IN | WEIGHT: 212.5 LBS | DIASTOLIC BLOOD PRESSURE: 70 MMHG | HEART RATE: 80 BPM

## 2022-08-26 DIAGNOSIS — G89.29 CHRONIC RIGHT SHOULDER PAIN: ICD-10-CM

## 2022-08-26 DIAGNOSIS — I10 ESSENTIAL HYPERTENSION: Primary | Chronic | ICD-10-CM

## 2022-08-26 DIAGNOSIS — I70.0 ATHEROSCLEROSIS OF AORTA: ICD-10-CM

## 2022-08-26 DIAGNOSIS — M19.019 SHOULDER ARTHRITIS: ICD-10-CM

## 2022-08-26 DIAGNOSIS — M25.511 CHRONIC RIGHT SHOULDER PAIN: ICD-10-CM

## 2022-08-26 DIAGNOSIS — E78.49 OTHER HYPERLIPIDEMIA: ICD-10-CM

## 2022-08-26 DIAGNOSIS — R97.20 ELEVATED PSA: ICD-10-CM

## 2022-08-26 DIAGNOSIS — N40.0 BENIGN PROSTATIC HYPERPLASIA, UNSPECIFIED WHETHER LOWER URINARY TRACT SYMPTOMS PRESENT: ICD-10-CM

## 2022-08-26 PROCEDURE — 3074F SYST BP LT 130 MM HG: CPT | Mod: CPTII,S$GLB,, | Performed by: INTERNAL MEDICINE

## 2022-08-26 PROCEDURE — 1160F PR REVIEW ALL MEDS BY PRESCRIBER/CLIN PHARMACIST DOCUMENTED: ICD-10-PCS | Mod: CPTII,S$GLB,, | Performed by: INTERNAL MEDICINE

## 2022-08-26 PROCEDURE — 3288F PR FALLS RISK ASSESSMENT DOCUMENTED: ICD-10-PCS | Mod: CPTII,S$GLB,, | Performed by: INTERNAL MEDICINE

## 2022-08-26 PROCEDURE — 1101F PT FALLS ASSESS-DOCD LE1/YR: CPT | Mod: CPTII,S$GLB,, | Performed by: INTERNAL MEDICINE

## 2022-08-26 PROCEDURE — 99214 PR OFFICE/OUTPT VISIT, EST, LEVL IV, 30-39 MIN: ICD-10-PCS | Mod: S$GLB,,, | Performed by: INTERNAL MEDICINE

## 2022-08-26 PROCEDURE — 3078F DIAST BP <80 MM HG: CPT | Mod: CPTII,S$GLB,, | Performed by: INTERNAL MEDICINE

## 2022-08-26 PROCEDURE — 3288F FALL RISK ASSESSMENT DOCD: CPT | Mod: CPTII,S$GLB,, | Performed by: INTERNAL MEDICINE

## 2022-08-26 PROCEDURE — 1159F MED LIST DOCD IN RCRD: CPT | Mod: CPTII,S$GLB,, | Performed by: INTERNAL MEDICINE

## 2022-08-26 PROCEDURE — 3078F PR MOST RECENT DIASTOLIC BLOOD PRESSURE < 80 MM HG: ICD-10-PCS | Mod: CPTII,S$GLB,, | Performed by: INTERNAL MEDICINE

## 2022-08-26 PROCEDURE — 1126F AMNT PAIN NOTED NONE PRSNT: CPT | Mod: CPTII,S$GLB,, | Performed by: INTERNAL MEDICINE

## 2022-08-26 PROCEDURE — 99214 OFFICE O/P EST MOD 30 MIN: CPT | Mod: S$GLB,,, | Performed by: INTERNAL MEDICINE

## 2022-08-26 PROCEDURE — 1159F PR MEDICATION LIST DOCUMENTED IN MEDICAL RECORD: ICD-10-PCS | Mod: CPTII,S$GLB,, | Performed by: INTERNAL MEDICINE

## 2022-08-26 PROCEDURE — 99999 PR PBB SHADOW E&M-EST. PATIENT-LVL V: CPT | Mod: PBBFAC,,, | Performed by: INTERNAL MEDICINE

## 2022-08-26 PROCEDURE — 3074F PR MOST RECENT SYSTOLIC BLOOD PRESSURE < 130 MM HG: ICD-10-PCS | Mod: CPTII,S$GLB,, | Performed by: INTERNAL MEDICINE

## 2022-08-26 PROCEDURE — 1101F PR PT FALLS ASSESS DOC 0-1 FALLS W/OUT INJ PAST YR: ICD-10-PCS | Mod: CPTII,S$GLB,, | Performed by: INTERNAL MEDICINE

## 2022-08-26 PROCEDURE — 99999 PR PBB SHADOW E&M-EST. PATIENT-LVL V: ICD-10-PCS | Mod: PBBFAC,,, | Performed by: INTERNAL MEDICINE

## 2022-08-26 PROCEDURE — 1126F PR PAIN SEVERITY QUANTIFIED, NO PAIN PRESENT: ICD-10-PCS | Mod: CPTII,S$GLB,, | Performed by: INTERNAL MEDICINE

## 2022-08-26 PROCEDURE — 1160F RVW MEDS BY RX/DR IN RCRD: CPT | Mod: CPTII,S$GLB,, | Performed by: INTERNAL MEDICINE

## 2022-08-26 RX ORDER — TRAZODONE HYDROCHLORIDE 100 MG/1
1 TABLET ORAL NIGHTLY PRN
Status: ON HOLD | COMMUNITY
Start: 2022-05-16 | End: 2023-09-14

## 2022-08-26 NOTE — PROGRESS NOTES
Subjective:       Patient ID: Brandan Werner is a 77 y.o. male.    Chief Complaint: Follow-up    HPI   The patient presents for follow-up of medical conditions which include hypertension, hyperlipidemia, tremor, impaired gait, elevated PSA level, BPH.  The patient also has anemia, memory impairment, chronic right shoulder pain.  He has been diagnosed to have arthritis.  Is the patient states he has been receiving outpatient physical therapy to help improve his gait and balance.  He has had several falls over the past year.    He has chronic right shoulder pain with decreased mobility.  He has not seen or orthopedics in recent months.  We discussed obtaining follow-up consultation.      The patient is also followed at the [facility] for depression, posttraumatic stress.  He also has a chronic hand tremor which she feels is getting worse.    The patient has elevated PSA level.  We discussed obtaining follow-up consultation with Urology.      Review of Systems   Constitutional: Negative for activity change, appetite change, fatigue and unexpected weight change.   Eyes: Negative for visual disturbance.   Respiratory: Negative for cough, chest tightness, shortness of breath and wheezing.    Cardiovascular: Negative for chest pain, palpitations and leg swelling.   Gastrointestinal: Negative for abdominal pain, blood in stool, nausea and vomiting.   Genitourinary: Negative for dysuria, hematuria and urgency.        Nocturia is also reported.   Musculoskeletal: Positive for arthralgias. Negative for back pain, gait problem, joint swelling and myalgias.   Integumentary:  Negative for color change and rash.   Neurological: Positive for tremors and memory loss. Negative for dizziness, syncope, weakness, light-headedness, numbness and headaches.   Psychiatric/Behavioral: Negative for sleep disturbance.            Physical Exam  Vitals and nursing note reviewed.   Constitutional:       General: He is not in acute  distress.     Appearance: He is well-developed.      Comments: The patient has lost 11 lb since 02/25/2022.   HENT:      Head: Normocephalic and atraumatic.   Eyes:      General: No scleral icterus.     Conjunctiva/sclera: Conjunctivae normal.   Neck:      Thyroid: No thyromegaly.      Vascular: No JVD.   Cardiovascular:      Rate and Rhythm: Normal rate and regular rhythm.      Heart sounds: Normal heart sounds. No murmur heard.    No friction rub. No gallop.   Pulmonary:      Effort: Pulmonary effort is normal. No respiratory distress.      Breath sounds: Normal breath sounds. No wheezing or rales.   Abdominal:      General: Bowel sounds are normal.      Palpations: Abdomen is soft. There is no mass.      Tenderness: There is no abdominal tenderness.   Musculoskeletal:         General: No tenderness.      Cervical back: Normal range of motion and neck supple.      Comments: Right shoulder:  Decreased abduction and posterior rotation noted.  There is tenderness on range-of-motion testing.    Left shoulder: Full abduction and posterior rotation.   Lymphadenopathy:      Cervical: No cervical adenopathy.   Skin:     General: Skin is warm and dry.      Findings: No rash.   Neurological:      Mental Status: He is alert and oriented to person, place, and time.      Comments: Gait is antalgic.   Psychiatric:         Mood and Affect: Mood normal.         Behavior: Behavior normal.           Lab Visit on 08/19/2022   Component Date Value Ref Range Status    Sodium 08/19/2022 140  136 - 145 mmol/L Final    Potassium 08/19/2022 4.8  3.5 - 5.1 mmol/L Final    Chloride 08/19/2022 108  95 - 110 mmol/L Final    CO2 08/19/2022 24  23 - 29 mmol/L Final    Glucose 08/19/2022 90  70 - 110 mg/dL Final    BUN 08/19/2022 21  8 - 23 mg/dL Final    Creatinine 08/19/2022 1.1  0.5 - 1.4 mg/dL Final    Calcium 08/19/2022 8.9  8.7 - 10.5 mg/dL Final    Total Protein 08/19/2022 6.3  6.0 - 8.4 g/dL Final    Albumin 08/19/2022 3.8  3.5  - 5.2 g/dL Final    Total Bilirubin 08/19/2022 0.6  0.1 - 1.0 mg/dL Final    Comment: For infants and newborns, interpretation of results should be based  on gestational age, weight and in agreement with clinical  observations.    Premature Infant recommended reference ranges:  Up to 24 hours.............<8.0 mg/dL  Up to 48 hours............<12.0 mg/dL  3-5 days..................<15.0 mg/dL  6-29 days.................<15.0 mg/dL      Alkaline Phosphatase 08/19/2022 40 (A) 55 - 135 U/L Final    AST 08/19/2022 12  10 - 40 U/L Final    ALT 08/19/2022 10  10 - 44 U/L Final    Anion Gap 08/19/2022 8  8 - 16 mmol/L Final    eGFR 08/19/2022 >60  >60 mL/min/1.73 m^2 Final    Cholesterol 08/19/2022 136  120 - 199 mg/dL Final    Comment: The National Cholesterol Education Program (NCEP) has set the  following guidelines (reference ranges) for Cholesterol:  Optimal.....................<200 mg/dL  Borderline High.............200-239 mg/dL  High........................> or = 240 mg/dL      Triglycerides 08/19/2022 33  30 - 150 mg/dL Final    Comment: The National Cholesterol Education Program (NCEP) has set the  following guidelines (reference values) for triglycerides:  Normal......................<150 mg/dL  Borderline High.............150-199 mg/dL  High........................200-499 mg/dL      HDL 08/19/2022 64  40 - 75 mg/dL Final    Comment: The National Cholesterol Education Program (NCEP) has set the  following guidelines (reference values) for HDL Cholesterol:  Low...............<40 mg/dL  Optimal...........>60 mg/dL      LDL Cholesterol 08/19/2022 65.4  63.0 - 159.0 mg/dL Final    Comment: The National Cholesterol Education Program (NCEP) has set the  following guidelines (reference values) for LDL Cholesterol:  Optimal.......................<130 mg/dL  Borderline High...............130-159 mg/dL  High..........................160-189 mg/dL  Very High.....................>190 mg/dL      HDL/Cholesterol  Ratio 08/19/2022 47.1  20.0 - 50.0 % Final    Total Cholesterol/HDL Ratio 08/19/2022 2.1  2.0 - 5.0 Final    Non-HDL Cholesterol 08/19/2022 72  mg/dL Final    Comment: Risk category and Non-HDL cholesterol goals:  Coronary heart disease (CHD)or equivalent (10-year risk of CHD >20%):  Non-HDL cholesterol goal     <130 mg/dL  Two or more CHD risk factors and 10-year risk of CHD <= 20%:  Non-HDL cholesterol goal     <160 mg/dL  0 to 1 CHD risk factor:  Non-HDL cholesterol goal     <190 mg/dL      WBC 08/19/2022 7.43  3.90 - 12.70 K/uL Final    RBC 08/19/2022 4.06 (A) 4.60 - 6.20 M/uL Final    Hemoglobin 08/19/2022 12.8 (A) 14.0 - 18.0 g/dL Final    Hematocrit 08/19/2022 38.7 (A) 40.0 - 54.0 % Final    MCV 08/19/2022 95  82 - 98 fL Final    MCH 08/19/2022 31.5 (A) 27.0 - 31.0 pg Final    MCHC 08/19/2022 33.1  32.0 - 36.0 g/dL Final    RDW 08/19/2022 12.9  11.5 - 14.5 % Final    Platelets 08/19/2022 196  150 - 450 K/uL Final    MPV 08/19/2022 10.4  9.2 - 12.9 fL Final    Immature Granulocytes 08/19/2022 0.3  0.0 - 0.5 % Final    Gran # (ANC) 08/19/2022 4.1  1.8 - 7.7 K/uL Final    Immature Grans (Abs) 08/19/2022 0.02  0.00 - 0.04 K/uL Final    Comment: Mild elevation in immature granulocytes is non specific and   can be seen in a variety of conditions including stress response,   acute inflammation, trauma and pregnancy. Correlation with other   laboratory and clinical findings is essential.      Lymph # 08/19/2022 2.7  1.0 - 4.8 K/uL Final    Mono # 08/19/2022 0.4  0.3 - 1.0 K/uL Final    Eos # 08/19/2022 0.2  0.0 - 0.5 K/uL Final    Baso # 08/19/2022 0.04  0.00 - 0.20 K/uL Final    nRBC 08/19/2022 0  0 /100 WBC Final    Gran % 08/19/2022 55.1  38.0 - 73.0 % Final    Lymph % 08/19/2022 35.8  18.0 - 48.0 % Final    Mono % 08/19/2022 5.7  4.0 - 15.0 % Final    Eosinophil % 08/19/2022 2.6  0.0 - 8.0 % Final    Basophil % 08/19/2022 0.5  0.0 - 1.9 % Final    Differential Method 08/19/2022 Automated    Final    TSH 08/19/2022 1.327  0.400 - 4.000 uIU/mL Final    PSA, Screen 08/19/2022 8.2 (A) 0.00 - 4.00 ng/mL Final    Comment: The testing method is a chemiluminescent microparticle immunoassay   manufactured by [employer] and performed on the [employer]   or   [employer] system. Values obtained with different assay manufacturers   for   methods may be different and cannot be used interchangeably.  PSA Expected levels:  Hormonal Therapy: <0.05 ng/ml  Prostatectomy: <0.01 ng/ml  Radiation Therapy: <1.00 ng/ml         Assessment & Plan:      Brandan was seen today for follow-up.    Diagnoses and all orders for this visit:    Essential hypertension  -     Comprehensive Metabolic Panel; Future  -     CBC Auto Differential; Future  -     Prostate Specific Antigen, Diagnostic; Future    Other hyperlipidemia  -     Comprehensive Metabolic Panel; Future  -     CBC Auto Differential; Future  -     Prostate Specific Antigen, Diagnostic; Future    Shoulder arthritis    Benign prostatic hyperplasia, unspecified whether lower urinary tract symptoms present  -     Ambulatory referral/consult to Urology; Future  -     Prostate Specific Antigen, Diagnostic; Future    Elevated PSA  -     Ambulatory referral/consult to Urology; Future  -     Prostate Specific Antigen, Diagnostic; Future    Atherosclerosis of aorta    Chronic right shoulder pain  -     Ambulatory referral/consult to Orthopedics; Future         Follow up in about 6 months (around 2/26/2023).     Km Chicas MD

## 2022-08-29 ENCOUNTER — CLINICAL SUPPORT (OUTPATIENT)
Dept: REHABILITATION | Facility: HOSPITAL | Age: 77
End: 2022-08-29
Payer: MEDICARE

## 2022-08-29 DIAGNOSIS — R26.89 IMBALANCE: ICD-10-CM

## 2022-08-29 DIAGNOSIS — Z91.81 HISTORY OF FALL: Primary | ICD-10-CM

## 2022-08-29 PROCEDURE — 97112 NEUROMUSCULAR REEDUCATION: CPT | Mod: PN

## 2022-08-29 PROCEDURE — 97110 THERAPEUTIC EXERCISES: CPT | Mod: PN

## 2022-08-29 NOTE — PROGRESS NOTES
"[facility]   Physical Therapy Treatment Note     Name: Brandan [facility]  Clinic Number: [id number]    Therapy Diagnosis:   Encounter Diagnoses   Name Primary?    History of fall Yes    Imbalance      Physician: No ref. provider found    Visit Date: 8/29/2022    Physician Orders: PT Eval and Treat   Medical Diagnosis from Referral:   R26.89 (ICD-10-CM) - Impaired gait and mobility   R29.6 (ICD-10-CM) - Frequent falls   Evaluation Date: 8/10/2022  Authorization Period Expiration: 12/31/2022  Plan of Care Expiration: 9/23/2022  Visit # / Visits authorized: 5/TBD  FOTO #: 5/5 (next please)  PTA Visit #: 0/5     Time In: 2:53 pm  Time Out: 3:35 pm  Total Billable Time: 42 minutes (1 TE + 2 NMR)    Precautions: Standard and Fall    SUBJECTIVE     Pt reports: no pain.  NO new falls lately. He stated that he is open to coming to therapy at least 2 more weeks if needed.   He  will be  compliant with home exercise program.  Response to previous treatment: initial eval  Functional change: ongoing    Pain: 0/10  Location: right shoulder      OBJECTIVE     Objective Measures updated at progress report unless specified.   Needs reassessment of goals of possible + FOTO next visit please    Treatment     Brandan received the treatments listed below:      therapeutic exercises to develop strength, endurance, ROM and flexibility for 18 minutes including:  Treadmill 8 minutes speed 1.2-1.5 (start of session)   Black soft step Step ups: forward 2x10 B                      : lateral up over x20  Sit to stand  2 x 10 without UE support  Stepper  [place] Level 4 bilateral lower extremities only (end of session)    neuromuscular re-education activities to improve: Balance, Coordination, Kinesthetic and Proprioception for 24 minutes. The following activities were included:  Tandem balance on blue foam ovals: Static 2x30"                                                              : Chest press with volleyball 2x10 "     Tandem walking on foam beam:  6 lengths x 10 feet //    Hurdles (4) on floor:  6 length x 10 feet each way forward     6 length x 10 feet each way laterally    Side stepping on foam  6 x 10 ft with //     [place] taps (6)+ side steps  2 laps over 20 feet       Patient Education and Home Exercises     Home Exercises Provided and Patient Education Provided     Education provided:   - fall prevention tips  - returning to the gym safely  - importance of a daily walking    Written Home Exercises Provided: yes. Exercises were reviewed and Brandan was able to demonstrate them prior to the end of the session.  Brandan demonstrated good  understanding of the education provided. See EMR under Patient Instructions for exercises provided during therapy sessions    ASSESSMENT     Brandan arrived to session without any complaints of pain and was agreeable to treatment.  He required less UE support today during session even when in // bars.   He was appropriately fatigued upon completion of session. He would benefit from reassessment soon to determine goals met.     Brandan Is progressing well towards his goals.   Pt prognosis is Good.     Pt will continue to benefit from skilled outpatient physical therapy to address the deficits listed in the problem list box on initial evaluation, provide pt/family education and to maximize pt's level of independence in the home and community environment.     Pt's spiritual, cultural and educational needs considered and pt agreeable to plan of care and goals.     Anticipated barriers to physical therapy: Co-morbidities     Goals:   Short Term Goals: 3 weeks   Patient will be compliant with HEP in order to maximize PT benefits  (PROGRESSING, NOT MET)  Patient will score </=20% on FOTO limitation survey in order to improve balance confidence with community mobility (PROGRESSING, NOT MET)     Long Term Goals: 6 weeks   Patient will score </= 15% on FOTO limitation survey in order to improve  self-perception of functional mobility deficits (PROGRESSING, NOT MET)  Patient will improve bilateral lower extremity MMT grades by >/=1/2 grade in order to improve strength for ADL completion(PROGRESSING, NOT MET)  Patient will score >/= 51/56 on Reynolds Balance Assessment with least restrictive assistive device in order to reduce risk for falls and improve postural control (PROGRESSING, NOT MET)  Patient will perform 1 floor <> stand transfer with bilateral upper extremity support in order to demonstrate safe functional mobility in case of future fall (PROGRESSING, NOT MET)  Patient will report 0 falls from initiation of PT management (PROGRESSING, NOT MET)  Patient will begin some form of home/community fitness in order to sustain progress gained in PT (PROGRESSING, NOT MET)      PLAN   Reassess next session of possible, continue to progress toward goals.   Plan to cont with progression of PT goals per POC.    Fabienne Ramos, PT

## 2022-08-30 ENCOUNTER — OFFICE VISIT (OUTPATIENT)
Dept: UROLOGY | Facility: CLINIC | Age: 77
End: 2022-08-30
Payer: MEDICARE

## 2022-08-30 ENCOUNTER — CLINICAL SUPPORT (OUTPATIENT)
Dept: REHABILITATION | Facility: HOSPITAL | Age: 77
End: 2022-08-30
Payer: MEDICARE

## 2022-08-30 VITALS
WEIGHT: 213.94 LBS | SYSTOLIC BLOOD PRESSURE: 121 MMHG | BODY MASS INDEX: 28.98 KG/M2 | HEART RATE: 77 BPM | HEIGHT: 72 IN | DIASTOLIC BLOOD PRESSURE: 71 MMHG

## 2022-08-30 DIAGNOSIS — R97.20 ELEVATED PSA: ICD-10-CM

## 2022-08-30 DIAGNOSIS — Z91.81 HISTORY OF FALL: Primary | ICD-10-CM

## 2022-08-30 DIAGNOSIS — N40.0 BENIGN PROSTATIC HYPERPLASIA, UNSPECIFIED WHETHER LOWER URINARY TRACT SYMPTOMS PRESENT: ICD-10-CM

## 2022-08-30 DIAGNOSIS — R26.89 IMBALANCE: ICD-10-CM

## 2022-08-30 DIAGNOSIS — N20.0 KIDNEY STONE: ICD-10-CM

## 2022-08-30 LAB
BACTERIA #/AREA URNS AUTO: ABNORMAL /HPF
BILIRUB SERPL-MCNC: NEGATIVE MG/DL
BLOOD URINE, POC: ABNORMAL
CLARITY, POC UA: CLEAR
COLOR, POC UA: ABNORMAL
GLUCOSE UR QL STRIP: NORMAL
KETONES UR QL STRIP: NEGATIVE
LEUKOCYTE ESTERASE URINE, POC: ABNORMAL
MICROSCOPIC COMMENT: ABNORMAL
NITRITE, POC UA: NEGATIVE
PH, POC UA: 5
POC RESIDUAL URINE VOLUME: 67 ML (ref 0–100)
PROTEIN, POC: ABNORMAL
RBC #/AREA URNS AUTO: 1 /HPF (ref 0–4)
SPECIFIC GRAVITY, POC UA: 1.02
UROBILINOGEN, POC UA: NORMAL
WBC #/AREA URNS AUTO: 29 /HPF (ref 0–5)

## 2022-08-30 PROCEDURE — 97110 THERAPEUTIC EXERCISES: CPT | Mod: PN

## 2022-08-30 PROCEDURE — 51798 POCT BLADDER SCAN: ICD-10-PCS | Mod: S$GLB,,, | Performed by: UROLOGY

## 2022-08-30 PROCEDURE — 3078F PR MOST RECENT DIASTOLIC BLOOD PRESSURE < 80 MM HG: ICD-10-PCS | Mod: CPTII,S$GLB,, | Performed by: UROLOGY

## 2022-08-30 PROCEDURE — 51798 US URINE CAPACITY MEASURE: CPT | Mod: S$GLB,,, | Performed by: UROLOGY

## 2022-08-30 PROCEDURE — 99999 PR PBB SHADOW E&M-EST. PATIENT-LVL IV: CPT | Mod: PBBFAC,,, | Performed by: UROLOGY

## 2022-08-30 PROCEDURE — 81001 URINALYSIS AUTO W/SCOPE: CPT | Performed by: UROLOGY

## 2022-08-30 PROCEDURE — 3288F PR FALLS RISK ASSESSMENT DOCUMENTED: ICD-10-PCS | Mod: CPTII,S$GLB,, | Performed by: UROLOGY

## 2022-08-30 PROCEDURE — 1100F PR PT FALLS ASSESS DOC 2+ FALLS/FALL W/INJURY/YR: ICD-10-PCS | Mod: CPTII,S$GLB,, | Performed by: UROLOGY

## 2022-08-30 PROCEDURE — 1126F PR PAIN SEVERITY QUANTIFIED, NO PAIN PRESENT: ICD-10-PCS | Mod: CPTII,S$GLB,, | Performed by: UROLOGY

## 2022-08-30 PROCEDURE — 1100F PTFALLS ASSESS-DOCD GE2>/YR: CPT | Mod: CPTII,S$GLB,, | Performed by: UROLOGY

## 2022-08-30 PROCEDURE — 3074F PR MOST RECENT SYSTOLIC BLOOD PRESSURE < 130 MM HG: ICD-10-PCS | Mod: CPTII,S$GLB,, | Performed by: UROLOGY

## 2022-08-30 PROCEDURE — 99214 OFFICE O/P EST MOD 30 MIN: CPT | Mod: S$GLB,,, | Performed by: UROLOGY

## 2022-08-30 PROCEDURE — 3288F FALL RISK ASSESSMENT DOCD: CPT | Mod: CPTII,S$GLB,, | Performed by: UROLOGY

## 2022-08-30 PROCEDURE — 99214 PR OFFICE/OUTPT VISIT, EST, LEVL IV, 30-39 MIN: ICD-10-PCS | Mod: S$GLB,,, | Performed by: UROLOGY

## 2022-08-30 PROCEDURE — 81002 POCT URINE DIPSTICK WITHOUT MICROSCOPE: ICD-10-PCS | Mod: S$GLB,,, | Performed by: UROLOGY

## 2022-08-30 PROCEDURE — 1126F AMNT PAIN NOTED NONE PRSNT: CPT | Mod: CPTII,S$GLB,, | Performed by: UROLOGY

## 2022-08-30 PROCEDURE — 81002 URINALYSIS NONAUTO W/O SCOPE: CPT | Mod: S$GLB,,, | Performed by: UROLOGY

## 2022-08-30 PROCEDURE — 1159F MED LIST DOCD IN RCRD: CPT | Mod: CPTII,S$GLB,, | Performed by: UROLOGY

## 2022-08-30 PROCEDURE — 1159F PR MEDICATION LIST DOCUMENTED IN MEDICAL RECORD: ICD-10-PCS | Mod: CPTII,S$GLB,, | Performed by: UROLOGY

## 2022-08-30 PROCEDURE — 99999 PR PBB SHADOW E&M-EST. PATIENT-LVL IV: ICD-10-PCS | Mod: PBBFAC,,, | Performed by: UROLOGY

## 2022-08-30 PROCEDURE — 3078F DIAST BP <80 MM HG: CPT | Mod: CPTII,S$GLB,, | Performed by: UROLOGY

## 2022-08-30 PROCEDURE — 3074F SYST BP LT 130 MM HG: CPT | Mod: CPTII,S$GLB,, | Performed by: UROLOGY

## 2022-08-30 RX ORDER — FINASTERIDE 5 MG/1
5 TABLET, FILM COATED ORAL DAILY
Qty: 30 TABLET | Refills: 11 | Status: ON HOLD | OUTPATIENT
Start: 2022-08-30 | End: 2023-09-14

## 2022-08-30 NOTE — PROGRESS NOTES
"[facility]   Physical Therapy Treatment Note     Name: Brandan [facility]  Clinic Number: [id number]    Therapy Diagnosis:   Encounter Diagnoses   Name Primary?    History of fall Yes    Imbalance      Physician: Shanti Roberson NP     Visit Date: 8/30/2022    Physician Orders: PT Eval and Treat   Medical Diagnosis from Referral:   R26.89 (ICD-10-CM) - Impaired gait and mobility   R29.6 (ICD-10-CM) - Frequent falls   Evaluation Date: 8/10/2022  Authorization Period Expiration: 12/31/2022  Plan of Care Expiration: 9/23/2022  Visit # / Visits authorized: 6/TBD  FOTO #: 6/5 (next please)  PTA Visit #: 0/5     Time In: 2:55 pm  Time Out: 3:33 pm  Total Billable Time: 38 minutes (3 TE)    Precautions: Standard and Fall    SUBJECTIVE     Pt reports: No pain nor new falls. Plans to maybe join a gym after finishing with PT  He  will be  compliant with home exercise program.  Response to previous treatment: No adverse response  Functional change: ongoing    Pain: 0/10  Location: right shoulder      OBJECTIVE     Objective Measures updated at progress report unless specified.     Treatment     Brandan received the treatments listed below:      therapeutic exercises to develop strength, endurance, ROM and flexibility for 38 minutes including:    Stepper [place] Level 4 bilateral lower extremities and left upper extremity x 8 minutes   Resisted side stepping x 8 lengths x 10 feet each green theraband   Cybex hamstring curl 3.5 plates 3x15  Cybex leg press 7 plates 4x10  Treadmill 3 minutes speed 1.3-1.5 (shortened due to late arrival)    neuromuscular re-education activities to improve: Balance, Coordination, Kinesthetic and Proprioception for 00 minutes. The following activities were included:    NOT TODAY  Tandem balance on blue foam ovals: Static 2x30"                                                              : Chest press with volleyball 2x10     Tandem walking on foam beam:  6 lengths x 10 feet " //    Hurdles (4) on floor:  6 length x 10 feet each way forward     6 length x 10 feet each way laterally    Side stepping on foam  6 x 10 ft with //     Cone taps (6)+ side steps  2 laps over 20 feet       Patient Education and Home Exercises     Home Exercises Provided and Patient Education Provided     Education provided:   - fall prevention tips  - returning to the gym safely  - importance of a daily walking    Written Home Exercises Provided: yes. Exercises were reviewed and Brandan was able to demonstrate them prior to the end of the session.  Brandan demonstrated good  understanding of the education provided. See EMR under Patient Instructions for exercises provided during therapy sessions    ASSESSMENT     Brandan arrived to session agreeable to treatment. Able to accommodate late arrival. He tolerated session with appropriate levels of fatigue. Gym simulation completed today due to consideration of return to community exercise post-discharge. He requires occasionally verbal cuing for eccentric control when using Cybex machinery. He would benefit from continued PT services to achieve functional goals established at evaluation.    Brandan Is progressing well towards his goals.   Pt prognosis is Good.     Pt will continue to benefit from skilled outpatient physical therapy to address the deficits listed in the problem list box on initial evaluation, provide pt/family education and to maximize pt's level of independence in the home and community environment.     Pt's spiritual, cultural and educational needs considered and pt agreeable to plan of care and goals.     Anticipated barriers to physical therapy: Co-morbidities     Goals:   Short Term Goals: 3 weeks   Patient will be compliant with HEP in order to maximize PT benefits  (PROGRESSING, NOT MET)  Patient will score </=20% on FOTO limitation survey in order to improve balance confidence with community mobility (PROGRESSING, NOT MET)     Long Term Goals:  6 weeks   Patient will score </= 15% on FOTO limitation survey in order to improve self-perception of functional mobility deficits (PROGRESSING, NOT MET)  Patient will improve bilateral lower extremity MMT grades by >/=1/2 grade in order to improve strength for ADL completion(PROGRESSING, NOT MET)  Patient will score >/= 51/56 on Reynolds Balance Assessment with least restrictive assistive device in order to reduce risk for falls and improve postural control (PROGRESSING, NOT MET)  Patient will perform 1 floor <> stand transfer with bilateral upper extremity support in order to demonstrate safe functional mobility in case of future fall (PROGRESSING, NOT MET)  Patient will report 0 falls from initiation of PT management (PROGRESSING, NOT MET)  Patient will begin some form of home/community fitness in order to sustain progress gained in PT (PROGRESSING, NOT MET)      PLAN   Continue to progress toward goals.   Plan to cont with progression of PT goals per POC.  Reassess goals next if timely arrival.    SHARIF NORTH, PT

## 2022-08-30 NOTE — PROGRESS NOTES
Subjective:       Patient ID: Brandan Werner is a 77 y.o. male.    Chief Complaint: Benign Prostatic Hypertrophy     This is a 77 y.o.  male patient that is new to me.  The patient was referred to me by Dr. Chicas for BPH, elevated PSA.  Past patient of Dr. Bennett last seen in 2020 for cystoscopy for microhematuria that was negative except large prostate.  H/o penile implant that still uses.  CTU in 2020 with 150 gm prostate no other findings.  Long h/o elevated PSA.    PSA 6.2 in 2013 and biopsy negative  Biopsy in 2013 with ASAP  PSA recently checked by PCP and was 8.2   Moderate/severe LUTs, AUA SS 19/2, PVR 67         LAST PSA  Lab Results   Component Value Date    PSA 8.2 (H) 08/19/2022    PSA 7.0 (H) 03/02/2021    PSA 6.5 (H) 01/30/2020    PSA 3.0 11/02/2016    PSA 5.0 (H) 05/02/2014    PSA 6.2 (H) 10/18/2013    PSA 4.49 (H) 02/22/2013    PSA 4.1 (H) 05/09/2012    PSA 4.04 (H) 04/05/2012    PSA 2.8 08/31/2010    PSA 3.1 02/25/2009    PSA 2.2 09/06/2007    PSA 1.8 09/30/2006    PSA 1.7 12/28/2005    PSA 1.8 10/30/2004    PSADIAG 7.0 (H) 03/07/2019    PSADIAG 6.6 (H) 09/07/2018    PSADIAG 6.4 (H) 06/08/2018    PSADIAG 5.3 (H) 01/03/2018    PSADIAG 2.9 02/22/2016    PSADIAG 2.9 08/24/2015    PSADIAG 5.7 (H) 02/24/2015    PSADIAG 5.1 (H) 06/26/2014    PSADIAG 5.8 (H) 12/03/2013       Lab Results   Component Value Date    CREATININE 1.1 08/19/2022       ---  PMH/PSH/Medications/Allergies/Social history reviewed and as in chart.    Review of Systems    Objective:      Physical Exam    Assessment:     Problem Noted   Elevated Psa 3/11/2013    Biopsy 2013 ASAP 1 core, 2014 benign  PSA 8/22--8.2  Volume 150 at time of CTU in 2020       Bph (Benign Prostatic Hyperplasia)     150 gm prostate on CTU 2020  Initial AUA SS (8/2022): 19/2  PVR 67     Kidney Stone 5/20/2020    Left NON OBSTRUCTING      History of Penile Implant 5/20/2020       Plan:     Discussed LUTs, moderately bothersome.  Will start finasteride given size  of prostate in past.  Side effects, risks, benefits and alternatives of the medication discussed.    PSA relatively normal for age, given past biopsies/large prostate feel reasonable to observe for now.  Will trend on finasteride.    Follow up in 6 months with PSA prior.      Dannie Murray MD

## 2022-09-06 ENCOUNTER — CLINICAL SUPPORT (OUTPATIENT)
Dept: REHABILITATION | Facility: HOSPITAL | Age: 77
End: 2022-09-06
Payer: MEDICARE

## 2022-09-06 DIAGNOSIS — Z91.81 HISTORY OF FALL: Primary | ICD-10-CM

## 2022-09-06 DIAGNOSIS — R26.89 IMBALANCE: ICD-10-CM

## 2022-09-06 PROCEDURE — 97110 THERAPEUTIC EXERCISES: CPT | Mod: PN

## 2022-09-06 PROCEDURE — 97112 NEUROMUSCULAR REEDUCATION: CPT | Mod: PN

## 2022-09-06 NOTE — PROGRESS NOTES
"[facility]   Physical Therapy Treatment Note     [facility]  Cl[facility] Number: [id number]    Therapy Diagnosis:   Encounter Diagnoses   Name Primary?    History of fall Yes    Imbalance      Physician: Shanti Roberson NP     Visit Date: 9/6/2022    Physician Orders: PT Eval and Treat   Medical Diagnosis from Referral:   R26.89 (ICD-10-CM) - Impaired gait and mobility   R29.6 (ICD-10-CM) - Frequent falls   Evaluation Date: 8/10/2022  Authorization Period Expiration: 12/31/2022  Plan of Care Expiration: 9/23/2022  Visit # / Visits authorized: 6/20 (+ eval)  FOTO #: Next at d/c  PTA Visit #: 0/5     Time In: 3:30PM  Time Out: 4:10PM  Total Billable Time: 40 minutes (1 TE, 2 NMR)    Precautions: Standard and Fall    SUBJECTIVE     Pt reports: No new complaints today nor any major pain. May speak with MD about right shoulder pain. Has been exercising at home but may join a gym soon. He says he will likely be ready for discharge next week from balance PT.  He was compliant with home exercise program.  Response to previous treatment: No adverse response  Functional change: ongoing    Pain: 0/10  Location: right shoulder      OBJECTIVE     Objective Measures updated at progress report unless specified.     CMS Impairment/Limitation/Restriction for FOTO NOC-Neuromuscular disorder Survey: 11%     Treatment     Brandan received the treatments listed below:      therapeutic exercises to develop strength, endurance, ROM and flexibility for 15 minutes including:    Treadmill with bilateral upper extremity support for cardiovascular endurance 5 minutes speed 1.3-1.5  Hamstring stretch at stairs 2x30"B  Gastroc stretch on Fitter 2x30"  Standing heel raises 3x10  Stepper [place] Level 5 bilateral lower extremities only x 6 minutes   FOTO survey administered    NOT TODAY  Cybex hamstring curl 3.5 plates 3x15  Cybex leg press 7 plates 4x10    neuromuscular re-education activities to improve: " Sepsis with suspicion of intra-ab abscess Sepsis with suspicion of intra-ab abscess "Balance, Coordination, Kinesthetic and Proprioception for 25 minutes. The following activities were included:    -- Floor <> stand transfer x 1 SBA  -- Tall kneel <> short kneel transfers 2x10  -- Tall kneel chest press with volleyball x10 (additional sets held due to shoulder pain)  -- Lateral stepping on airex beam; no upper extremity support 6 lengths x 10 feet each  -- Lateral resisted stepping with red theraband around ankles; no upper extremity support 6 lengths x 10 feet each    NOT TODAY  Tandem balance on blue foam ovals: Static 2x30"                                                              : Chest press with volleyball 2x10     Tandem walking on foam beam:  6 lengths x 10 feet //    Hurdles (4) on floor:  6 length x 10 feet each way forward     6 length x 10 feet each way laterally    Cone taps (6)+ side steps  2 laps over 20 feet       Patient Education and Home Exercises     Home Exercises Provided and Patient Education Provided     Education provided:   - fall prevention tips  - returning to the gym safely  - importance of a daily walking    Written Home Exercises Provided: yes. Exercises were reviewed and Brandan was able to demonstrate them prior to the end of the session.  Brandan demonstrated good  understanding of the education provided. See EMR under Patient Instructions for exercises provided during therapy sessions    ASSESSMENT     Brandan arrived to session agreeable to treatment. Right shoulder still bothersome in terms of pain; encouraged patient to consult with established orthopedist as he reports success with injections in the past (ortho appointment scheduled later this month). Per FOTO, self-perception of functional mobility improved since initial evaluation. Developmental sequencing completed with appropriate responses and good technique for floor transfer. As he plans to join gym and has capacity for continued independence with home/community exercise, he may be appropriate for " discharge next session    Brandan Is progressing well towards his goals.   Pt prognosis is Good.     Pt will continue to benefit from skilled outpatient physical therapy to address the deficits listed in the problem list box on initial evaluation, provide pt/family education and to maximize pt's level of independence in the home and community environment.     Pt's spiritual, cultural and educational needs considered and pt agreeable to plan of care and goals.     Anticipated barriers to physical therapy: Co-morbidities     Goals:   Short Term Goals: 3 weeks   Patient will be compliant with HEP in order to maximize PT benefits  MET  Patient will score </=20% on FOTO limitation survey in order to improve balance confidence with community mobility MET     Long Term Goals: 6 weeks   Patient will score </= 15% on FOTO limitation survey in order to improve self-perception of functional mobility deficits MET  Patient will improve bilateral lower extremity MMT grades by >/=1/2 grade in order to improve strength for ADL completion(PROGRESSING, NOT MET)  Patient will score >/= 51/56 on Reynolds Balance Assessment with least restrictive assistive device in order to reduce risk for falls and improve postural control (PROGRESSING, NOT MET)  Patient will perform 1 floor <> stand transfer with bilateral upper extremity support in order to demonstrate safe functional mobility in case of future fall MET during developmental sequencing  Patient will report 0 falls from initiation of PT management (PROGRESSING, NOT MET)  Patient will begin some form of home/community fitness in order to sustain progress gained in PT (PROGRESSING, NOT MET)      PLAN   Continue to progress toward goals.   Plan to cont with progression of PT goals per POC.  Consider discharge next    SHARIF NORTH, PT              Sepsis with suspicion of intra-ab abscess Sepsis with suspicion of intra-ab abscess

## 2022-09-15 ENCOUNTER — CLINICAL SUPPORT (OUTPATIENT)
Dept: REHABILITATION | Facility: HOSPITAL | Age: 77
End: 2022-09-15
Payer: MEDICARE

## 2022-09-15 DIAGNOSIS — Z91.81 HISTORY OF FALL: Primary | ICD-10-CM

## 2022-09-15 DIAGNOSIS — R26.89 IMBALANCE: ICD-10-CM

## 2022-09-15 PROCEDURE — 97112 NEUROMUSCULAR REEDUCATION: CPT | Mod: PN

## 2022-09-15 PROCEDURE — 97110 THERAPEUTIC EXERCISES: CPT | Mod: PN

## 2022-09-15 NOTE — PROGRESS NOTES
[facility]   Physical Therapy Treatment Note     Name: Brandan Werner  [facility] Number: [id number]    Therapy Diagnosis:   Encounter Diagnoses   Name Primary?    History of fall Yes    Imbalance      Physician: Shanti Roberson NP     Visit Date: 9/15/2022    Physician Orders: PT Eval and Treat   Medical Diagnosis from Referral:   R26.89 (ICD-10-CM) - Impaired gait and mobility   R29.6 (ICD-10-CM) - Frequent falls   Evaluation Date: 8/10/2022  Authorization Period Expiration: 12/31/2022  Plan of Care Expiration: 9/23/2022  Visit # / Visits authorized: 7/20 (+ eval)  FOTO #: Next at d/c  PTA Visit #: 0/5     Time In: 5:00PM  Time Out: 5:30PM  Total Billable Time: 30 minutes (1 TE, 1 NMR)    Precautions: Standard and Fall    SUBJECTIVE     Patient reports: Agreeable to discharge from balance PT today but plans to discuss options for shoulder pain management at upcoming appointment with Dr. Richardson. Might be joining a gym soon.  He was compliant with home exercise program.  Response to previous treatment: No adverse response  Functional change: See goals    Pain: 0/10  Location: right shoulder      OBJECTIVE     Objective Measures updated at progress report unless specified.     HANNA  BALANCE ASSESSMENT TOOL  1. Sitting to Standing   4 - able to stand without using hands and stabilize independently  2. Standing Unsupported   4 - able to stand safely 2 minutes without hold  3. Sitting Unsupported   4 - able to sit safely and securely 2 minutes  4. Standing to Sitting   4 - sits safely with minimal use of hands  5. Pivot Transfer   4 - able to trnasfer safely with minor use of hands  6. Standing with Eyes Closed   4 - albe to stand 10 seconds safely  7. Standing with Feet Together   4 - able to place feet together independently and stand 1 minute safely  8. Reaching Forward with Outstretched Arm   4 - can reach forward confidently 25 cm/10 inches  9. Retrieving Object from Floor   4 - able to   slipper safely and easily  10. Turning to Look Behind   4 - looks behind from both sides and weights shifts well  11. Turning 360 Degrees   4 - able to turn 360 in seconds or less  12. Placing Alternate Foot on Step   4 - able to stand independently safely and complete 8 steps in 20 seconds  13. Standing with One Foot in Front   4 - able to tandem stand independently and hold 30 seconds  14. Standing on One Foot   3- able to lift leg independently and hold 5-10 seconds    TOTAL SCORE: 55  Maximum: 56    Score interpretation:   0-20 = wheelchair bound  21-40 = walking with assistance  41-56 = independent    Fall risk cutoff scores:   Elderly and history of falls: <51/56   Elderly and no history of falls: <42/56   CVA: <45/56    MDC:  Parkinson's = 5 points  Acute CVA = 6.9 points  Chronic CVA = 4.7 points  Pulmonary Disease = 5.9 points  Progressive Dementia = 1.9 points     Lower Extremity Strength    RLE LLE   Hip Flexion: 4+/5 (4+/5) 4+/5 (4+/5)   Hip Abduction: 4/5 (4+/5) 4/5 (4+/5)   Knee Extension: 5/5 (5/5) 5/5 (5/5)   Knee Flexion: 5/5 (5/5) 5/5 (5/5)   Ankle Dorsiflexion: 5/5 (5/5) 5/5 (5/5)        Treatment     Brandan received the treatments listed below:      therapeutic exercises to develop strength, endurance, ROM and flexibility for 15 minutes including:    Manual muscle testing (see above)  Patient education on CDC/ACSM guidelines for weekly physical activity   HEP review / options for home and gym exercise for bilateral lower extremity    NOT TODAY  Cybex hamstring curl 3.5 plates 3x15  Cybex leg press 7 plates 4x10    neuromuscular re-education activities to improve: Balance, Coordination, Kinesthetic and Proprioception for 15 minutes. The following activities were included:    Reynolds Balance Assessment (see above)      Patient Education and Home Exercises     Home Exercises Provided and Patient Education Provided     Education provided:   - fall prevention tips  - returning to the gym  safely  - importance of a daily walking    Written Home Exercises Provided: yes. Exercises were reviewed and Brandan was able to demonstrate them prior to the end of the session.  Brandan demonstrated good  understanding of the education provided. See EMR under Patient Instructions for exercises provided during therapy sessions    ASSESSMENT     Brandan has achieved nearly all functional goals since starting physical therapy. Improved fall risk / balance per Reynolds Balance Assessment retested today. Patient understands importance of continuing home/community exercise to sustain benefits of PT. Appropriate for discharge at this time considering goal achievement and capacity for continued home/community exercise.    Brandan Is progressing well towards his goals.   Pt prognosis is Good.     Pt will continue to benefit from skilled outpatient physical therapy to address the deficits listed in the problem list box on initial evaluation, provide pt/family education and to maximize pt's level of independence in the home and community environment.     Pt's spiritual, cultural and educational needs considered and pt agreeable to plan of care and goals.     Anticipated barriers to physical therapy: Co-morbidities     Goals:   Short Term Goals: 3 weeks   Patient will be compliant with HEP in order to maximize PT benefits  MET  Patient will score </=20% on FOTO limitation survey in order to improve balance confidence with community mobility MET     Long Term Goals: 6 weeks   Patient will score </= 15% on FOTO limitation survey in order to improve self-perception of functional mobility deficits MET  Patient will improve bilateral lower extremity MMT grades by >/=1/2 grade in order to improve strength for ADL completion MET for hip abductors  Patient will score >/= 51/56 on Reynolds Balance Assessment with least restrictive assistive device in order to reduce risk for falls and improve postural control MET  Patient will perform 1 floor  <> stand transfer with bilateral upper extremity support in order to demonstrate safe functional mobility in case of future fall MET - during developmental sequencing  Patient will report 0 falls from initiation of PT management MET  Patient will begin some form of home/community fitness in order to sustain progress gained in PT PROGRESSING - patient plans to join gym      PLAN   Discharge balance PT; patient will discuss options for shoulder management at upcoming appointment with Dr. Richardson later in September    SHARIF NORTH, PT

## 2022-09-27 ENCOUNTER — OFFICE VISIT (OUTPATIENT)
Dept: ORTHOPEDICS | Facility: CLINIC | Age: 77
End: 2022-09-27
Payer: MEDICARE

## 2022-09-27 VITALS — WEIGHT: 213 LBS | HEIGHT: 78 IN | BODY MASS INDEX: 24.65 KG/M2

## 2022-09-27 DIAGNOSIS — M25.511 CHRONIC RIGHT SHOULDER PAIN: ICD-10-CM

## 2022-09-27 DIAGNOSIS — M19.019 SHOULDER ARTHRITIS: Primary | ICD-10-CM

## 2022-09-27 DIAGNOSIS — G89.29 CHRONIC RIGHT SHOULDER PAIN: ICD-10-CM

## 2022-09-27 PROCEDURE — 3288F FALL RISK ASSESSMENT DOCD: CPT | Mod: CPTII,S$GLB,, | Performed by: ORTHOPAEDIC SURGERY

## 2022-09-27 PROCEDURE — 1159F PR MEDICATION LIST DOCUMENTED IN MEDICAL RECORD: ICD-10-PCS | Mod: CPTII,S$GLB,, | Performed by: ORTHOPAEDIC SURGERY

## 2022-09-27 PROCEDURE — 3288F PR FALLS RISK ASSESSMENT DOCUMENTED: ICD-10-PCS | Mod: CPTII,S$GLB,, | Performed by: ORTHOPAEDIC SURGERY

## 2022-09-27 PROCEDURE — 1100F PTFALLS ASSESS-DOCD GE2>/YR: CPT | Mod: CPTII,S$GLB,, | Performed by: ORTHOPAEDIC SURGERY

## 2022-09-27 PROCEDURE — 1125F PR PAIN SEVERITY QUANTIFIED, PAIN PRESENT: ICD-10-PCS | Mod: CPTII,S$GLB,, | Performed by: ORTHOPAEDIC SURGERY

## 2022-09-27 PROCEDURE — 99213 PR OFFICE/OUTPT VISIT, EST, LEVL III, 20-29 MIN: ICD-10-PCS | Mod: 25,S$GLB,, | Performed by: ORTHOPAEDIC SURGERY

## 2022-09-27 PROCEDURE — 99999 PR PBB SHADOW E&M-EST. PATIENT-LVL IV: CPT | Mod: PBBFAC,,, | Performed by: ORTHOPAEDIC SURGERY

## 2022-09-27 PROCEDURE — 1100F PR PT FALLS ASSESS DOC 2+ FALLS/FALL W/INJURY/YR: ICD-10-PCS | Mod: CPTII,S$GLB,, | Performed by: ORTHOPAEDIC SURGERY

## 2022-09-27 PROCEDURE — 99213 OFFICE O/P EST LOW 20 MIN: CPT | Mod: 25,S$GLB,, | Performed by: ORTHOPAEDIC SURGERY

## 2022-09-27 PROCEDURE — 1159F MED LIST DOCD IN RCRD: CPT | Mod: CPTII,S$GLB,, | Performed by: ORTHOPAEDIC SURGERY

## 2022-09-27 PROCEDURE — 1125F AMNT PAIN NOTED PAIN PRSNT: CPT | Mod: CPTII,S$GLB,, | Performed by: ORTHOPAEDIC SURGERY

## 2022-09-27 PROCEDURE — 20610 PR DRAIN/INJECT LARGE JOINT/BURSA: ICD-10-PCS | Mod: RT,S$GLB,, | Performed by: ORTHOPAEDIC SURGERY

## 2022-09-27 PROCEDURE — 99999 PR PBB SHADOW E&M-EST. PATIENT-LVL IV: ICD-10-PCS | Mod: PBBFAC,,, | Performed by: ORTHOPAEDIC SURGERY

## 2022-09-27 PROCEDURE — 20610 DRAIN/INJ JOINT/BURSA W/O US: CPT | Mod: RT,S$GLB,, | Performed by: ORTHOPAEDIC SURGERY

## 2022-09-27 RX ORDER — TRIAMCINOLONE ACETONIDE 40 MG/ML
40 INJECTION, SUSPENSION INTRA-ARTICULAR; INTRAMUSCULAR
Status: COMPLETED | OUTPATIENT
Start: 2022-09-27 | End: 2022-09-27

## 2022-09-27 RX ADMIN — TRIAMCINOLONE ACETONIDE 40 MG: 40 INJECTION, SUSPENSION INTRA-ARTICULAR; INTRAMUSCULAR at 11:09

## 2022-09-27 NOTE — PROGRESS NOTES
Subjective:      Patient ID: Brandan Werner is a 77 y.o. male.  Chief Complaint: Pain of the Right Shoulder      HPI  Brandan Werner is a  77 y.o. male presenting today for follow up of right shoulder arthritis severe.  He reports that he is having a flare-up again  nonrigid surgery would like injection which has helped in the past.    Review of patient's allergies indicates:  No Known Allergies      Current Outpatient Medications   Medication Sig Dispense Refill    ascorbic acid, vitamin C, (VITAMIN C) 250 MG tablet Take 250 mg by mouth once daily.      atorvastatin (LIPITOR) 40 MG tablet Take 20 mg by mouth once daily.      celecoxib (CELEBREX) 200 MG capsule TAKE TWO CAPSULES BY MOUTH ONCE DAILY AS NEEDED FOR PAIN AND INFLAMMATION      diclofenac (VOLTAREN) 75 MG EC tablet Take 1 tablet (75 mg total) by mouth 2 (two) times daily as needed (joint pain). 60 tablet 3    finasteride (PROSCAR) 5 mg tablet Take 1 tablet (5 mg total) by mouth once daily. 30 tablet 11    FLUoxetine 40 MG capsule TAKE ONE CAPSULE BY MOUTH EVERY DAY FOR MENTAL HEALTH      fluticasone (FLONASE) 50 mcg/actuation nasal spray 2 sprays (100 mcg total) by Each Nare route once daily. 1 Bottle 0    folic acid/multivit-min/lutein (CENTRUM SILVER ORAL) Take by mouth.      garlic 1,000 mg Cap Take by mouth.      GINGER ROOT XT-FENNEL SD XT ORAL Take 550 mg by mouth.      glucosam/chond-msm1/C/agueda/bor (GLUCOSAMINE-CHOND-MSM COMPLEX ORAL) Take by mouth.      lisinopril (PRINIVIL,ZESTRIL) 5 MG tablet Take 5 mg by mouth once daily.      mecobalamin (B12 ACTIVE ORAL) Take by mouth.      meloxicam (MOBIC) 15 MG tablet Take 15 mg by mouth daily as needed for Pain (and inflammation).      peg 400-propylene glycol 0.4-0.3 % Drop INSTILL ONE DROP IN EACH EYE FOUR TIMES A DAY AS NEEDED FOR DRY EYES      primidone (MYSOLINE) 50 MG Tab Take 2 tablets (100 mg total) by mouth every evening. 60 tablet 11    psyllium (METAMUCIL) powder Take 1 packet by mouth 3  "(three) times daily.      tamsulosin (FLOMAX) 0.4 mg Cp24 Take 1 capsule (0.4 mg total) by mouth nightly. 30 capsule 11    traZODone (DESYREL) 100 MG tablet Take 1 tablet by mouth nightly as needed.      triamcinolone acetonide 0.1% (KENALOG) 0.1 % cream Apply topically 3 (three) times daily. 45 g 0    vitamin E 100 UNIT capsule Take 100 Units by mouth every morning.      VOLTAREN 1 % Gel Apply 4 g topically 4 (four) times daily as needed (pain and inflammation).        No current facility-administered medications for this visit.       Past Medical History:   Diagnosis Date    Anxiety     BPH (benign prostatic hyperplasia)     Cataract     Elevated PSA     Erectile dysfunction     Hyperlipidemia     Hypertension     Hypogonadism male     Kidney stone 5/20/2020    Obesity     PTSD (post-traumatic stress disorder)     Shoulder arthritis     Urinary tract infection        Past Surgical History:   Procedure Laterality Date    COLONOSCOPY N/A 11/26/2018    Procedure: COLONOSCOPY;  Surgeon: Germán Moss MD;  Location: [address]);  Service: Endoscopy;  Laterality: N/A;    COLONOSCOPY W/ BIOPSIES  2010    PENILE PROSTHESIS IMPLANT         OBJECTIVE:   PHYSICAL EXAM:  Height: 6' 6" (198.1 cm) Weight: 96.6 kg (213 lb)  Vitals:    09/27/22 1017   Weight: 96.6 kg (213 lb)   Height: 6' 6" (1.981 m)   PainSc:   6   PainLoc: Shoulder     Ortho/SPM Exam no swelling  range of motion limited due to stiffness pain   mild crepitation  rotator cuff strength intact   no instability     Examination right shoulder no tenderness    RADIOGRAPHS:  Previous x-rays show severe arthritis right shoulder  Comments: I have personally reviewed the imaging and I agree with the above radiologist's report.    ASSESSMENT/PLAN:     IMPRESSION:  Severe arthritis glenohumeral right shoulder    PLAN:  We talked about options including injection versus surgery   he would like injection   After pause for time-out to identify the right shoulder " injected with Kenalog 40 mg 2 cc xylocaine sterile technique   Tolerated the procedure well without complication   continue current medications   consider surgery for shoulder replacement in the future    FOLLOW UP:  3 months    Disclaimer: This note has been generated using voice-recognition software. There may be typographical errors that have been missed during proof-reading.

## 2022-11-01 ENCOUNTER — OFFICE VISIT (OUTPATIENT)
Dept: UROLOGY | Facility: CLINIC | Age: 77
End: 2022-11-01
Payer: MEDICARE

## 2022-11-01 VITALS
BODY MASS INDEX: 24.46 KG/M2 | HEIGHT: 78 IN | DIASTOLIC BLOOD PRESSURE: 93 MMHG | SYSTOLIC BLOOD PRESSURE: 150 MMHG | WEIGHT: 211.44 LBS | HEART RATE: 77 BPM

## 2022-11-01 DIAGNOSIS — N40.0 BENIGN PROSTATIC HYPERPLASIA, UNSPECIFIED WHETHER LOWER URINARY TRACT SYMPTOMS PRESENT: Primary | ICD-10-CM

## 2022-11-01 LAB
BACTERIA #/AREA URNS AUTO: ABNORMAL /HPF
BILIRUB SERPL-MCNC: NEGATIVE MG/DL
BLOOD URINE, POC: ABNORMAL
CLARITY, POC UA: CLEAR
COLOR, POC UA: YELLOW
GLUCOSE UR QL STRIP: NORMAL
KETONES UR QL STRIP: NEGATIVE
LEUKOCYTE ESTERASE URINE, POC: ABNORMAL
MICROSCOPIC COMMENT: ABNORMAL
NITRITE, POC UA: NEGATIVE
PH, POC UA: 5
POC RESIDUAL URINE VOLUME: 0 ML (ref 0–100)
PROTEIN, POC: 30
RBC #/AREA URNS AUTO: 3 /HPF (ref 0–4)
SPECIFIC GRAVITY, POC UA: 1.02
TRI-PHOS CRY UR QL COMP ASSIST: ABNORMAL
UROBILINOGEN, POC UA: NORMAL
WBC #/AREA URNS AUTO: 30 /HPF (ref 0–5)

## 2022-11-01 PROCEDURE — 3080F DIAST BP >= 90 MM HG: CPT | Mod: CPTII,S$GLB,, | Performed by: UROLOGY

## 2022-11-01 PROCEDURE — 99214 OFFICE O/P EST MOD 30 MIN: CPT | Mod: S$GLB,,, | Performed by: UROLOGY

## 2022-11-01 PROCEDURE — 51798 POCT BLADDER SCAN: ICD-10-PCS | Mod: S$GLB,,, | Performed by: UROLOGY

## 2022-11-01 PROCEDURE — 81001 URINALYSIS AUTO W/SCOPE: CPT | Performed by: UROLOGY

## 2022-11-01 PROCEDURE — 99999 PR PBB SHADOW E&M-EST. PATIENT-LVL IV: ICD-10-PCS | Mod: PBBFAC,,, | Performed by: UROLOGY

## 2022-11-01 PROCEDURE — 81002 POCT URINE DIPSTICK WITHOUT MICROSCOPE: ICD-10-PCS | Mod: S$GLB,,, | Performed by: UROLOGY

## 2022-11-01 PROCEDURE — 1101F PT FALLS ASSESS-DOCD LE1/YR: CPT | Mod: CPTII,S$GLB,, | Performed by: UROLOGY

## 2022-11-01 PROCEDURE — 1160F RVW MEDS BY RX/DR IN RCRD: CPT | Mod: CPTII,S$GLB,, | Performed by: UROLOGY

## 2022-11-01 PROCEDURE — 3288F PR FALLS RISK ASSESSMENT DOCUMENTED: ICD-10-PCS | Mod: CPTII,S$GLB,, | Performed by: UROLOGY

## 2022-11-01 PROCEDURE — 1160F PR REVIEW ALL MEDS BY PRESCRIBER/CLIN PHARMACIST DOCUMENTED: ICD-10-PCS | Mod: CPTII,S$GLB,, | Performed by: UROLOGY

## 2022-11-01 PROCEDURE — 3080F PR MOST RECENT DIASTOLIC BLOOD PRESSURE >= 90 MM HG: ICD-10-PCS | Mod: CPTII,S$GLB,, | Performed by: UROLOGY

## 2022-11-01 PROCEDURE — 3077F PR MOST RECENT SYSTOLIC BLOOD PRESSURE >= 140 MM HG: ICD-10-PCS | Mod: CPTII,S$GLB,, | Performed by: UROLOGY

## 2022-11-01 PROCEDURE — 3288F FALL RISK ASSESSMENT DOCD: CPT | Mod: CPTII,S$GLB,, | Performed by: UROLOGY

## 2022-11-01 PROCEDURE — 1159F PR MEDICATION LIST DOCUMENTED IN MEDICAL RECORD: ICD-10-PCS | Mod: CPTII,S$GLB,, | Performed by: UROLOGY

## 2022-11-01 PROCEDURE — 1126F AMNT PAIN NOTED NONE PRSNT: CPT | Mod: CPTII,S$GLB,, | Performed by: UROLOGY

## 2022-11-01 PROCEDURE — 1126F PR PAIN SEVERITY QUANTIFIED, NO PAIN PRESENT: ICD-10-PCS | Mod: CPTII,S$GLB,, | Performed by: UROLOGY

## 2022-11-01 PROCEDURE — 51798 US URINE CAPACITY MEASURE: CPT | Mod: S$GLB,,, | Performed by: UROLOGY

## 2022-11-01 PROCEDURE — 81002 URINALYSIS NONAUTO W/O SCOPE: CPT | Mod: S$GLB,,, | Performed by: UROLOGY

## 2022-11-01 PROCEDURE — 99999 PR PBB SHADOW E&M-EST. PATIENT-LVL IV: CPT | Mod: PBBFAC,,, | Performed by: UROLOGY

## 2022-11-01 PROCEDURE — 87086 URINE CULTURE/COLONY COUNT: CPT | Performed by: UROLOGY

## 2022-11-01 PROCEDURE — 1159F MED LIST DOCD IN RCRD: CPT | Mod: CPTII,S$GLB,, | Performed by: UROLOGY

## 2022-11-01 PROCEDURE — 99214 PR OFFICE/OUTPT VISIT, EST, LEVL IV, 30-39 MIN: ICD-10-PCS | Mod: S$GLB,,, | Performed by: UROLOGY

## 2022-11-01 PROCEDURE — 1101F PR PT FALLS ASSESS DOC 0-1 FALLS W/OUT INJ PAST YR: ICD-10-PCS | Mod: CPTII,S$GLB,, | Performed by: UROLOGY

## 2022-11-01 PROCEDURE — 3077F SYST BP >= 140 MM HG: CPT | Mod: CPTII,S$GLB,, | Performed by: UROLOGY

## 2022-11-01 RX ORDER — CARBIDOPA AND LEVODOPA 25; 100 MG/1; MG/1
TABLET ORAL
COMMUNITY
Start: 2022-10-18

## 2022-11-01 NOTE — PROGRESS NOTES
Subjective:       Patient ID: Brandan Werner is a 77 y.o. male.    Chief Complaint: Urinary Frequency     This is a 77 y.o.  male patient with BPH, elevated PSA.  Past patient of Dr. Bennett last seen in 2020 for cystoscopy for microhematuria that was negative except large prostate.  H/o penile implant that still uses.  CTU in 2020 with 150 gm prostate no other findings.  Long h/o elevated PSA.    PSA 6.2 in 2013 and biopsy negative  Biopsy in 2013 with ASAP  PSA recently checked by PCP and was 8.2   Moderate/severe LUTs, AUA SS 19/2, PVR 67    Worsening frequency of urination on finasteride, tamsulosin.           LAST PSA  Lab Results   Component Value Date    PSA 8.2 (H) 08/19/2022    PSA 7.0 (H) 03/02/2021    PSA 6.5 (H) 01/30/2020    PSA 3.0 11/02/2016    PSA 5.0 (H) 05/02/2014    PSA 6.2 (H) 10/18/2013    PSA 4.49 (H) 02/22/2013    PSA 4.1 (H) 05/09/2012    PSA 4.04 (H) 04/05/2012    PSA 2.8 08/31/2010    PSA 3.1 02/25/2009    PSA 2.2 09/06/2007    PSA 1.8 09/30/2006    PSA 1.7 12/28/2005    PSA 1.8 10/30/2004    PSADIAG 7.0 (H) 03/07/2019    PSADIAG 6.6 (H) 09/07/2018    PSADIAG 6.4 (H) 06/08/2018    PSADIAG 5.3 (H) 01/03/2018    PSADIAG 2.9 02/22/2016    PSADIAG 2.9 08/24/2015    PSADIAG 5.7 (H) 02/24/2015    PSADIAG 5.1 (H) 06/26/2014    PSADIAG 5.8 (H) 12/03/2013       Lab Results   Component Value Date    CREATININE 1.1 08/19/2022       ---  PMH/PSH/Medications/Allergies/Social history reviewed and as in chart.    Review of Systems   Constitutional:  Negative for activity change, chills and fever.   HENT:  Negative for congestion.    Respiratory:  Negative for cough, chest tightness and shortness of breath.    Cardiovascular:  Negative for chest pain and palpitations.   Gastrointestinal:  Negative for abdominal distention, abdominal pain, nausea and vomiting.   Genitourinary:  Positive for difficulty urinating and frequency. Negative for flank pain, hematuria, penile pain, scrotal swelling and testicular  pain.   Musculoskeletal:  Negative for gait problem.     Objective:      Physical Exam  HENT:      Head: Atraumatic.   Pulmonary:      Effort: Pulmonary effort is normal.   Neurological:      General: No focal deficit present.      Mental Status: He is alert and oriented to person, place, and time.       Assessment:     Problem Noted   Elevated Psa 3/11/2013    Biopsy 2013 ASAP 1 core, 2014 benign  PSA 8/22--8.2  Volume 150 at time of CTU in 2020       Bph (Benign Prostatic Hyperplasia)     150 gm prostate on CTU 2020  Initial AUA SS (8/2022): 19/2  PVR 67     Kidney Stone 5/20/2020    Left NON OBSTRUCTING      History of Penile Implant 5/20/2020       Plan:     Schedule cysto/TRUS  Follow up to review    Dannie Murray MD

## 2022-11-02 DIAGNOSIS — R31.29 MICROHEMATURIA: ICD-10-CM

## 2022-11-03 ENCOUNTER — LAB VISIT (OUTPATIENT)
Dept: LAB | Facility: HOSPITAL | Age: 77
End: 2022-11-03
Attending: UROLOGY
Payer: MEDICARE

## 2022-11-03 DIAGNOSIS — R31.29 MICROHEMATURIA: ICD-10-CM

## 2022-11-03 LAB
ANION GAP SERPL CALC-SCNC: 12 MMOL/L (ref 8–16)
BACTERIA UR CULT: NO GROWTH
BUN SERPL-MCNC: 13 MG/DL (ref 8–23)
CALCIUM SERPL-MCNC: 9.7 MG/DL (ref 8.7–10.5)
CHLORIDE SERPL-SCNC: 104 MMOL/L (ref 95–110)
CO2 SERPL-SCNC: 24 MMOL/L (ref 23–29)
CREAT SERPL-MCNC: 1 MG/DL (ref 0.5–1.4)
EST. GFR  (NO RACE VARIABLE): >60 ML/MIN/1.73 M^2
GLUCOSE SERPL-MCNC: 99 MG/DL (ref 70–110)
POTASSIUM SERPL-SCNC: 4.8 MMOL/L (ref 3.5–5.1)
SODIUM SERPL-SCNC: 140 MMOL/L (ref 136–145)

## 2022-11-03 PROCEDURE — 80048 BASIC METABOLIC PNL TOTAL CA: CPT | Performed by: UROLOGY

## 2022-11-03 PROCEDURE — 36415 COLL VENOUS BLD VENIPUNCTURE: CPT | Performed by: UROLOGY

## 2022-11-14 ENCOUNTER — HOSPITAL ENCOUNTER (OUTPATIENT)
Dept: RADIOLOGY | Facility: HOSPITAL | Age: 77
Discharge: HOME | End: 2022-11-14
Attending: UROLOGY
Payer: MEDICARE

## 2022-11-14 DIAGNOSIS — R31.29 MICROHEMATURIA: ICD-10-CM

## 2022-11-14 PROCEDURE — 74178 CT ABD&PLV WO CNTR FLWD CNTR: CPT | Mod: 26,,, | Performed by: RADIOLOGY

## 2022-11-14 PROCEDURE — 74178 CT ABD&PLV WO CNTR FLWD CNTR: CPT | Mod: TC

## 2022-11-14 PROCEDURE — 25500020 PHARM REV CODE 255: Performed by: UROLOGY

## 2022-11-14 PROCEDURE — 74178 CT UROGRAM ABD PELVIS W WO: ICD-10-PCS | Mod: 26,,, | Performed by: RADIOLOGY

## 2022-11-14 RX ADMIN — IOHEXOL 150 ML: 350 INJECTION, SOLUTION INTRAVENOUS at 09:11

## 2022-11-16 ENCOUNTER — PROCEDURE VISIT (OUTPATIENT)
Dept: UROLOGY | Facility: CLINIC | Age: 77
End: 2022-11-16
Payer: MEDICARE

## 2022-11-16 VITALS
DIASTOLIC BLOOD PRESSURE: 82 MMHG | WEIGHT: 211.63 LBS | HEIGHT: 78 IN | HEART RATE: 55 BPM | BODY MASS INDEX: 24.48 KG/M2 | SYSTOLIC BLOOD PRESSURE: 112 MMHG

## 2022-11-16 DIAGNOSIS — R97.20 ELEVATED PSA: ICD-10-CM

## 2022-11-16 DIAGNOSIS — N20.0 KIDNEY STONE ON LEFT SIDE: ICD-10-CM

## 2022-11-16 DIAGNOSIS — N20.0 KIDNEY STONE: Primary | ICD-10-CM

## 2022-11-16 DIAGNOSIS — N40.0 BENIGN PROSTATIC HYPERPLASIA, UNSPECIFIED WHETHER LOWER URINARY TRACT SYMPTOMS PRESENT: ICD-10-CM

## 2022-11-16 DIAGNOSIS — S37.002A INJURY OF LEFT KIDNEY, INITIAL ENCOUNTER: ICD-10-CM

## 2022-11-16 PROCEDURE — 99214 OFFICE O/P EST MOD 30 MIN: CPT | Mod: S$GLB,,, | Performed by: UROLOGY

## 2022-11-16 PROCEDURE — 99214 PR OFFICE/OUTPT VISIT, EST, LEVL IV, 30-39 MIN: ICD-10-PCS | Mod: S$GLB,,, | Performed by: UROLOGY

## 2022-11-16 RX ORDER — LIDOCAINE HYDROCHLORIDE 10 MG/ML
1 INJECTION, SOLUTION EPIDURAL; INFILTRATION; INTRACAUDAL; PERINEURAL ONCE
Status: CANCELLED | OUTPATIENT
Start: 2022-11-16 | End: 2022-11-16

## 2022-11-16 RX ORDER — FLUOXETINE HYDROCHLORIDE 20 MG/1
60 CAPSULE ORAL
Status: ON HOLD | COMMUNITY
Start: 2022-11-09 | End: 2023-09-14

## 2022-11-16 RX ORDER — CEFAZOLIN SODIUM 2 G/50ML
2 SOLUTION INTRAVENOUS
Status: CANCELLED | OUTPATIENT
Start: 2022-11-16

## 2022-11-16 RX ORDER — ACETAMINOPHEN 500 MG
1000 TABLET ORAL
Status: CANCELLED | OUTPATIENT
Start: 2022-11-16

## 2022-11-16 NOTE — H&P (VIEW-ONLY)
Subjective:       Patient ID: Brandan Werner is a 77 y.o. male.    Chief Complaint: Follow-up     This is a 77 y.o.  male patient with BPH, elevated PSA.  Past patient of Dr. Bennett last seen in 2020 for cystoscopy for microhematuria that was negative except large prostate.  H/o penile implant that still uses.  CTU in 2020 with 150 gm prostate no other findings.  Long h/o elevated PSA.    PSA 6.2 in 2013 and biopsy negative  Biopsy in 2013 with ASAP  PSA recently checked by PCP and was 8.2   Moderate/severe LUTs, AUA SS 19/2, PVR 67  UA showed microhematuria.  CTU done showed left 1 cm UPJ stone with moderate hydronephrosis 11/22 and left non obstructing stones.  No symptoms.      Worsening frequency of urination on finasteride, tamsulosin.           LAST PSA  Lab Results   Component Value Date    PSA 8.2 (H) 08/19/2022    PSA 7.0 (H) 03/02/2021    PSA 6.5 (H) 01/30/2020    PSA 3.0 11/02/2016    PSA 5.0 (H) 05/02/2014    PSA 6.2 (H) 10/18/2013    PSA 4.49 (H) 02/22/2013    PSA 4.1 (H) 05/09/2012    PSA 4.04 (H) 04/05/2012    PSA 2.8 08/31/2010    PSA 3.1 02/25/2009    PSA 2.2 09/06/2007    PSA 1.8 09/30/2006    PSA 1.7 12/28/2005    PSA 1.8 10/30/2004    PSADIAG 7.0 (H) 03/07/2019    PSADIAG 6.6 (H) 09/07/2018    PSADIAG 6.4 (H) 06/08/2018    PSADIAG 5.3 (H) 01/03/2018    PSADIAG 2.9 02/22/2016    PSADIAG 2.9 08/24/2015    PSADIAG 5.7 (H) 02/24/2015    PSADIAG 5.1 (H) 06/26/2014    PSADIAG 5.8 (H) 12/03/2013       Lab Results   Component Value Date    CREATININE 1.0 11/03/2022       ---  PMH/PSH/Medications/Allergies/Social history reviewed and as in chart.    Review of Systems   Constitutional:  Negative for activity change, chills and fever.   HENT:  Negative for congestion.    Respiratory:  Negative for cough, chest tightness and shortness of breath.    Cardiovascular:  Negative for chest pain and palpitations.   Gastrointestinal:  Negative for abdominal distention, abdominal pain, nausea and vomiting.    Genitourinary:  Positive for difficulty urinating and frequency. Negative for flank pain, hematuria, penile pain, scrotal swelling and testicular pain.   Musculoskeletal:  Negative for gait problem.     Objective:      Physical Exam  HENT:      Head: Atraumatic.   Pulmonary:      Effort: Pulmonary effort is normal.   Neurological:      General: No focal deficit present.      Mental Status: He is alert and oriented to person, place, and time.       Assessment:     Problem Noted   Kidney Stone 5/20/2020    Left 1 cm UPJ and non obstructing on CTU 11/2022 with moderate hydronephrosis      Elevated Psa 3/11/2013    Biopsy 2013 ASAP 1 core, 2014 benign  PSA 8/22--8.2  Volume 183, PSAD 0.04       Bph (Benign Prostatic Hyperplasia)     183 gm prostate on CTU 2022  Initial AUA SS (8/2022): 19/2  PVR 67     History of Penile Implant 5/20/2020       Plan:     Urogram reviewed, large left renal pelvis stone and left nonobstructing stone.  Discussed options including ureteroscopy possibly in a staged manner, shockwave lithotripsy.  Given multiple stones inside the stone recommended stent placement to facilitate clearance in hopefully 1 procedure with ureteroscopy in the future.  We will plan for stent placement tomorrow 11/17  Ureteroscopy 1-2 weeks after this to try to clear all left-sided stones.  Also discussed enlarged prostate that has significant growth since 2020, now 183 g by CT.  We will need to have treatment of this in the future given his significant lower urinary tract symptoms.    I have explained the indication, risks, benefits, and alternatives of the procedure in detail.  Risks including but not limited to bleeding, infection, injury to the urethra, bladder, ureter, need for additional treatments, inability or incomplete removal of kidney stones, pain, and discomfort related to the stent were discussed in depth with the patient.  The patient voices understanding and all questions have been answered.  The  patient agrees to proceed as planned with left stent then left ureteroscopy, laser lithotripsy, and ureteral stent placement in the future.      Dannie Murray MD

## 2022-11-17 ENCOUNTER — ANESTHESIA EVENT (OUTPATIENT)
Dept: SURGERY | Facility: HOSPITAL | Age: 77
End: 2022-11-17
Payer: MEDICARE

## 2022-11-17 ENCOUNTER — ANESTHESIA (OUTPATIENT)
Dept: SURGERY | Facility: HOSPITAL | Age: 77
End: 2022-11-17
Payer: MEDICARE

## 2022-11-17 ENCOUNTER — HOSPITAL ENCOUNTER (OUTPATIENT)
Facility: HOSPITAL | Age: 77
Discharge: HOME | End: 2022-11-19
Attending: UROLOGY | Admitting: UROLOGY
Payer: MEDICARE

## 2022-11-17 DIAGNOSIS — N20.0 KIDNEY STONE: Primary | ICD-10-CM

## 2022-11-17 DIAGNOSIS — Z01.818 PREOP TESTING: ICD-10-CM

## 2022-11-17 DIAGNOSIS — N20.0 KIDNEY STONE ON LEFT SIDE: ICD-10-CM

## 2022-11-17 LAB
APTT BLDCRRT: 28.2 SEC (ref 21–32)
BASOPHILS # BLD AUTO: 0.04 K/UL (ref 0–0.2)
BASOPHILS NFR BLD: 0.5 % (ref 0–1.9)
DIFFERENTIAL METHOD: ABNORMAL
EOSINOPHIL # BLD AUTO: 0.1 K/UL (ref 0–0.5)
EOSINOPHIL NFR BLD: 1.4 % (ref 0–8)
ERYTHROCYTE [DISTWIDTH] IN BLOOD BY AUTOMATED COUNT: 12.4 % (ref 11.5–14.5)
HCT VFR BLD AUTO: 38.4 % (ref 40–54)
HGB BLD-MCNC: 12.7 G/DL (ref 14–18)
IMM GRANULOCYTES # BLD AUTO: 0.03 K/UL (ref 0–0.04)
IMM GRANULOCYTES NFR BLD AUTO: 0.4 % (ref 0–0.5)
INR PPP: 1 (ref 0.8–1.2)
LYMPHOCYTES # BLD AUTO: 1.7 K/UL (ref 1–4.8)
LYMPHOCYTES NFR BLD: 22.2 % (ref 18–48)
MCH RBC QN AUTO: 31.5 PG (ref 27–31)
MCHC RBC AUTO-ENTMCNC: 33.1 G/DL (ref 32–36)
MCV RBC AUTO: 95 FL (ref 82–98)
MONOCYTES # BLD AUTO: 0.5 K/UL (ref 0.3–1)
MONOCYTES NFR BLD: 6.6 % (ref 4–15)
NEUTROPHILS # BLD AUTO: 5.3 K/UL (ref 1.8–7.7)
NEUTROPHILS NFR BLD: 68.9 % (ref 38–73)
NRBC BLD-RTO: 0 /100 WBC
PLATELET # BLD AUTO: 244 K/UL (ref 150–450)
PMV BLD AUTO: 9 FL (ref 9.2–12.9)
PROTHROMBIN TIME: 10.6 SEC (ref 9–12.5)
RBC # BLD AUTO: 4.03 M/UL (ref 4.6–6.2)
WBC # BLD AUTO: 7.69 K/UL (ref 3.9–12.7)

## 2022-11-17 PROCEDURE — 85610 PROTHROMBIN TIME: CPT | Performed by: UROLOGY

## 2022-11-17 PROCEDURE — 37000009 HC ANESTHESIA EA ADD 15 MINS: Performed by: UROLOGY

## 2022-11-17 PROCEDURE — 36000706: Performed by: UROLOGY

## 2022-11-17 PROCEDURE — 25000003 PHARM REV CODE 250: Performed by: UROLOGY

## 2022-11-17 PROCEDURE — 52000 PR CYSTOURETHROSCOPY: ICD-10-PCS | Mod: ,,, | Performed by: UROLOGY

## 2022-11-17 PROCEDURE — 71000039 HC RECOVERY, EACH ADD'L HOUR: Performed by: UROLOGY

## 2022-11-17 PROCEDURE — 37000008 HC ANESTHESIA 1ST 15 MINUTES: Performed by: UROLOGY

## 2022-11-17 PROCEDURE — 25000003 PHARM REV CODE 250: Performed by: NURSE ANESTHETIST, CERTIFIED REGISTERED

## 2022-11-17 PROCEDURE — D9220A PRA ANESTHESIA: Mod: ANES,,, | Performed by: STUDENT IN AN ORGANIZED HEALTH CARE EDUCATION/TRAINING PROGRAM

## 2022-11-17 PROCEDURE — D9220A PRA ANESTHESIA: ICD-10-PCS | Mod: CRNA,,, | Performed by: NURSE ANESTHETIST, CERTIFIED REGISTERED

## 2022-11-17 PROCEDURE — 51700 IRRIGATION OF BLADDER: CPT

## 2022-11-17 PROCEDURE — 71000033 HC RECOVERY, INTIAL HOUR: Performed by: UROLOGY

## 2022-11-17 PROCEDURE — 85025 COMPLETE CBC W/AUTO DIFF WBC: CPT | Performed by: UROLOGY

## 2022-11-17 PROCEDURE — 93005 ELECTROCARDIOGRAM TRACING: CPT | Mod: 59

## 2022-11-17 PROCEDURE — 63600175 PHARM REV CODE 636 W HCPCS: Performed by: ANESTHESIOLOGY

## 2022-11-17 PROCEDURE — C1758 CATHETER, URETERAL: HCPCS | Performed by: UROLOGY

## 2022-11-17 PROCEDURE — D9220A PRA ANESTHESIA: Mod: CRNA,,, | Performed by: NURSE ANESTHETIST, CERTIFIED REGISTERED

## 2022-11-17 PROCEDURE — 85730 THROMBOPLASTIN TIME PARTIAL: CPT | Performed by: UROLOGY

## 2022-11-17 PROCEDURE — 36000707: Performed by: UROLOGY

## 2022-11-17 PROCEDURE — 52000 CYSTOURETHROSCOPY: CPT | Mod: ,,, | Performed by: UROLOGY

## 2022-11-17 PROCEDURE — 63600175 PHARM REV CODE 636 W HCPCS: Performed by: NURSE ANESTHETIST, CERTIFIED REGISTERED

## 2022-11-17 PROCEDURE — 36415 COLL VENOUS BLD VENIPUNCTURE: CPT | Performed by: UROLOGY

## 2022-11-17 PROCEDURE — 63600175 PHARM REV CODE 636 W HCPCS: Performed by: UROLOGY

## 2022-11-17 PROCEDURE — D9220A PRA ANESTHESIA: ICD-10-PCS | Mod: ANES,,, | Performed by: STUDENT IN AN ORGANIZED HEALTH CARE EDUCATION/TRAINING PROGRAM

## 2022-11-17 PROCEDURE — 25500020 PHARM REV CODE 255: Performed by: UROLOGY

## 2022-11-17 PROCEDURE — C1769 GUIDE WIRE: HCPCS | Performed by: UROLOGY

## 2022-11-17 PROCEDURE — 63600175 PHARM REV CODE 636 W HCPCS: Mod: JG | Performed by: UROLOGY

## 2022-11-17 RX ORDER — SODIUM CHLORIDE 0.9 % (FLUSH) 0.9 %
3 SYRINGE (ML) INJECTION
Status: DISCONTINUED | OUTPATIENT
Start: 2022-11-17 | End: 2022-11-17 | Stop reason: HOSPADM

## 2022-11-17 RX ORDER — PROPOFOL 10 MG/ML
VIAL (ML) INTRAVENOUS CONTINUOUS PRN
Status: DISCONTINUED | OUTPATIENT
Start: 2022-11-17 | End: 2022-11-18

## 2022-11-17 RX ORDER — FINASTERIDE 5 MG/1
5 TABLET, FILM COATED ORAL DAILY
Status: DISCONTINUED | OUTPATIENT
Start: 2022-11-18 | End: 2022-11-19 | Stop reason: HOSPADM

## 2022-11-17 RX ORDER — METOPROLOL TARTRATE 1 MG/ML
5 INJECTION, SOLUTION INTRAVENOUS EVERY 6 HOURS PRN
Status: DISCONTINUED | OUTPATIENT
Start: 2022-11-17 | End: 2022-11-19 | Stop reason: HOSPADM

## 2022-11-17 RX ORDER — HYDROCODONE BITARTRATE AND ACETAMINOPHEN 5; 325 MG/1; MG/1
1 TABLET ORAL EVERY 4 HOURS PRN
Status: DISCONTINUED | OUTPATIENT
Start: 2022-11-17 | End: 2022-11-19 | Stop reason: HOSPADM

## 2022-11-17 RX ORDER — HYDRALAZINE HYDROCHLORIDE 20 MG/ML
10 INJECTION INTRAMUSCULAR; INTRAVENOUS EVERY 6 HOURS PRN
Status: DISCONTINUED | OUTPATIENT
Start: 2022-11-17 | End: 2022-11-19 | Stop reason: HOSPADM

## 2022-11-17 RX ORDER — ACETAMINOPHEN 325 MG/1
650 TABLET ORAL EVERY 4 HOURS PRN
Status: DISCONTINUED | OUTPATIENT
Start: 2022-11-17 | End: 2022-11-19 | Stop reason: HOSPADM

## 2022-11-17 RX ORDER — DIPHENHYDRAMINE HYDROCHLORIDE 50 MG/ML
12.5 INJECTION INTRAMUSCULAR; INTRAVENOUS EVERY 6 HOURS PRN
Status: DISCONTINUED | OUTPATIENT
Start: 2022-11-17 | End: 2022-11-17 | Stop reason: HOSPADM

## 2022-11-17 RX ORDER — ACETAMINOPHEN 500 MG
1000 TABLET ORAL
Status: COMPLETED | OUTPATIENT
Start: 2022-11-17 | End: 2022-11-17

## 2022-11-17 RX ORDER — HYDROMORPHONE HYDROCHLORIDE 2 MG/ML
0.5 INJECTION, SOLUTION INTRAMUSCULAR; INTRAVENOUS; SUBCUTANEOUS EVERY 5 MIN PRN
Status: DISCONTINUED | OUTPATIENT
Start: 2022-11-17 | End: 2022-11-17 | Stop reason: HOSPADM

## 2022-11-17 RX ORDER — FLUOXETINE HYDROCHLORIDE 20 MG/1
40 CAPSULE ORAL DAILY
Status: DISCONTINUED | OUTPATIENT
Start: 2022-11-18 | End: 2022-11-19 | Stop reason: HOSPADM

## 2022-11-17 RX ORDER — CARBIDOPA AND LEVODOPA 25; 100 MG/1; MG/1
1 TABLET ORAL 3 TIMES DAILY
Status: DISCONTINUED | OUTPATIENT
Start: 2022-11-17 | End: 2022-11-19 | Stop reason: HOSPADM

## 2022-11-17 RX ORDER — ATORVASTATIN CALCIUM 20 MG/1
20 TABLET, FILM COATED ORAL DAILY
Status: DISCONTINUED | OUTPATIENT
Start: 2022-11-18 | End: 2022-11-19 | Stop reason: HOSPADM

## 2022-11-17 RX ORDER — TAMSULOSIN HYDROCHLORIDE 0.4 MG/1
0.4 CAPSULE ORAL NIGHTLY
Status: DISCONTINUED | OUTPATIENT
Start: 2022-11-17 | End: 2022-11-19 | Stop reason: HOSPADM

## 2022-11-17 RX ORDER — CEFAZOLIN SODIUM 2 G/50ML
2 SOLUTION INTRAVENOUS
Status: COMPLETED | OUTPATIENT
Start: 2022-11-17 | End: 2022-11-18

## 2022-11-17 RX ORDER — ONDANSETRON 2 MG/ML
4 INJECTION INTRAMUSCULAR; INTRAVENOUS DAILY PRN
Status: DISCONTINUED | OUTPATIENT
Start: 2022-11-17 | End: 2022-11-17 | Stop reason: HOSPADM

## 2022-11-17 RX ORDER — SODIUM CHLORIDE, SODIUM LACTATE, POTASSIUM CHLORIDE, CALCIUM CHLORIDE 600; 310; 30; 20 MG/100ML; MG/100ML; MG/100ML; MG/100ML
INJECTION, SOLUTION INTRAVENOUS CONTINUOUS
Status: DISCONTINUED | OUTPATIENT
Start: 2022-11-17 | End: 2022-11-19 | Stop reason: HOSPADM

## 2022-11-17 RX ORDER — DEXMEDETOMIDINE HYDROCHLORIDE 100 UG/ML
INJECTION, SOLUTION INTRAVENOUS
Status: DISCONTINUED | OUTPATIENT
Start: 2022-11-17 | End: 2022-11-18

## 2022-11-17 RX ORDER — LIDOCAINE HYDROCHLORIDE 10 MG/ML
1 INJECTION, SOLUTION EPIDURAL; INFILTRATION; INTRACAUDAL; PERINEURAL ONCE
Status: DISCONTINUED | OUTPATIENT
Start: 2022-11-17 | End: 2022-11-17 | Stop reason: HOSPADM

## 2022-11-17 RX ORDER — PROPOFOL 10 MG/ML
VIAL (ML) INTRAVENOUS
Status: DISCONTINUED | OUTPATIENT
Start: 2022-11-17 | End: 2022-11-18

## 2022-11-17 RX ORDER — LIDOCAINE HYDROCHLORIDE 20 MG/ML
INJECTION INTRAVENOUS
Status: DISCONTINUED | OUTPATIENT
Start: 2022-11-17 | End: 2022-11-18

## 2022-11-17 RX ORDER — METHYLENE BLUE 5 MG/ML
INJECTION INTRAVENOUS
Status: DISCONTINUED | OUTPATIENT
Start: 2022-11-17 | End: 2022-11-17 | Stop reason: HOSPADM

## 2022-11-17 RX ORDER — CEFAZOLIN SODIUM 2 G/50ML
2 SOLUTION INTRAVENOUS
Status: COMPLETED | OUTPATIENT
Start: 2022-11-17 | End: 2022-11-17

## 2022-11-17 RX ORDER — KETAMINE HCL IN 0.9 % NACL 50 MG/5 ML
SYRINGE (ML) INTRAVENOUS
Status: DISCONTINUED | OUTPATIENT
Start: 2022-11-17 | End: 2022-11-18

## 2022-11-17 RX ORDER — ONDANSETRON 2 MG/ML
4 INJECTION INTRAMUSCULAR; INTRAVENOUS EVERY 12 HOURS PRN
Status: DISCONTINUED | OUTPATIENT
Start: 2022-11-17 | End: 2022-11-19 | Stop reason: HOSPADM

## 2022-11-17 RX ORDER — LISINOPRIL 5 MG/1
5 TABLET ORAL DAILY
Status: DISCONTINUED | OUTPATIENT
Start: 2022-11-18 | End: 2022-11-19 | Stop reason: HOSPADM

## 2022-11-17 RX ORDER — TRAZODONE HYDROCHLORIDE 100 MG/1
100 TABLET ORAL NIGHTLY PRN
Status: DISCONTINUED | OUTPATIENT
Start: 2022-11-17 | End: 2022-11-19 | Stop reason: HOSPADM

## 2022-11-17 RX ORDER — FENTANYL CITRATE 50 UG/ML
INJECTION, SOLUTION INTRAMUSCULAR; INTRAVENOUS
Status: DISCONTINUED | OUTPATIENT
Start: 2022-11-17 | End: 2022-11-18

## 2022-11-17 RX ADMIN — DEXMEDETOMIDINE HYDROCHLORIDE 6 MCG: 100 INJECTION, SOLUTION, CONCENTRATE INTRAVENOUS at 02:11

## 2022-11-17 RX ADMIN — HYDROMORPHONE HYDROCHLORIDE 0.5 MG: 2 INJECTION, SOLUTION INTRAMUSCULAR; INTRAVENOUS; SUBCUTANEOUS at 03:11

## 2022-11-17 RX ADMIN — PROPOFOL 50 MG: 10 INJECTION, EMULSION INTRAVENOUS at 02:11

## 2022-11-17 RX ADMIN — Medication 20 MG: at 02:11

## 2022-11-17 RX ADMIN — HYDROMORPHONE HYDROCHLORIDE 0.5 MG: 2 INJECTION, SOLUTION INTRAMUSCULAR; INTRAVENOUS; SUBCUTANEOUS at 04:11

## 2022-11-17 RX ADMIN — TAMSULOSIN HYDROCHLORIDE 0.4 MG: 0.4 CAPSULE ORAL at 09:11

## 2022-11-17 RX ADMIN — FENTANYL CITRATE 25 MCG: 50 INJECTION, SOLUTION INTRAMUSCULAR; INTRAVENOUS at 02:11

## 2022-11-17 RX ADMIN — LIDOCAINE HYDROCHLORIDE 100 MG: 20 INJECTION, SOLUTION INTRAVENOUS at 02:11

## 2022-11-17 RX ADMIN — SODIUM CHLORIDE, SODIUM LACTATE, POTASSIUM CHLORIDE, AND CALCIUM CHLORIDE: .6; .31; .03; .02 INJECTION, SOLUTION INTRAVENOUS at 02:11

## 2022-11-17 RX ADMIN — ACETAMINOPHEN 1000 MG: 500 TABLET ORAL at 12:11

## 2022-11-17 RX ADMIN — Medication 10 MG: at 02:11

## 2022-11-17 RX ADMIN — PROPOFOL 100 MCG/KG/MIN: 10 INJECTION, EMULSION INTRAVENOUS at 02:11

## 2022-11-17 RX ADMIN — SODIUM CHLORIDE, SODIUM LACTATE, POTASSIUM CHLORIDE, AND CALCIUM CHLORIDE: .6; .31; .03; .02 INJECTION, SOLUTION INTRAVENOUS at 05:11

## 2022-11-17 RX ADMIN — HYDROCODONE BITARTRATE AND ACETAMINOPHEN 1 TABLET: 5; 325 TABLET ORAL at 04:11

## 2022-11-17 RX ADMIN — CEFAZOLIN SODIUM 2 G: 2 SOLUTION INTRAVENOUS at 10:11

## 2022-11-17 RX ADMIN — FENTANYL CITRATE 25 MCG: 50 INJECTION, SOLUTION INTRAMUSCULAR; INTRAVENOUS at 03:11

## 2022-11-17 RX ADMIN — CARBIDOPA AND LEVODOPA 1 TABLET: 25; 100 TABLET ORAL at 09:11

## 2022-11-17 RX ADMIN — CEFAZOLIN SODIUM 2 G: 2 SOLUTION INTRAVENOUS at 02:11

## 2022-11-17 NOTE — NURSING
Pt. To unit. C/O catheter insertion discomfort. Scant drainage from meatus noted. Urine in catheter bag bright red, some clots noted. VSS. Pt. Slightly sleepy but easily aroused. Bed alarm on and call light in reach. Pt. Instructed to call for assistance.

## 2022-11-17 NOTE — OP NOTE
Operative Note    Procedure date: 11/17/22    Preoperative Diagnosis:  Left renal pelvis stone, left hydronephrosis    Postoperative Diagnosis: same    Procedure:  Cystoscopy, attempted left double-J stent placement    Surgeon:  Dannie Murray MD    Anesthesia:  Mac  EBL:  20  Tubes and Drains:  18 Turkish coude Dior catheter  Specimen(s):  None    Complications:  Unable to place left ureteral stent secondary to large prostate and prosthesis, unable to identify left ureteral orifice    Indication:  76 yo male with enlarged prostate microscopic hematuria.  CT urogram for microscopic hematuria showed a large left renal pelvis stone approximately 10 mm in size with other left-sided nonobstructing stones.  It was recommended he undergo left stent placement.  His prostate was also noted to be quite enlarged.  He also has a penile prosthesis.  He wished to proceed with the procedure.    Findings:     Large prostate, vascular and friable.  Unable to identify left ureteral orifice with significant time spent trying to identify.  Given methylene blue and still unable to identify left ureteral orifice.  Bleeding from the bladder neck after manipulation.  Dior catheter placed.  Tried to limit manipulation secondary to prosthesis.    Description of Procedure:  The patient was identified and surgical site verification was performed prior to obtaining consent.  The patient was brought to the cystoscopy suite.  SCDs were placed on the bilateral lower extremities and were cycling during the entire procedure.  Adequate MAC anesthesia was obtained, and the patient was positioned in dorsal lithotomy and prepped and draped in the normal standard fashion.  A preoperative Time Out was performed addressing the anticipated surgical site, procedure, and safety precautions; everyone in the room agreed.  The patient received preoperative antibiotics.      The 22 Fr cystoscope was inserted into the patient's urethral meatus and advanced  to the bladder.  The prostate was very large it was hard to get the scope all the way into the bladder.  The prostate was very vascular and friable in the median lobe of the prostate bled easily with manipulation.  While examined the bladder overall appeared to be normal there was edema around the bladder neck.  There were no obvious masses or tumors within the bladder.  He is a 30 degree lens tried at find the left and right ureteral orifice but was unable to do so.  This has been a significant amount of time trying to identify the left ureter orifice and this just started up more bleeding and I was unable to do so.  I did changed to a 70 degree lens and also continuous flow resectoscope in an effort to find the ureteral orifice but was unable.  After significant amount of time and manipulation more bleeding was starting and I realized that more manipulation given the patient's penile prosthesis possibly would cause more complications.  The scope was slowly removed and the stent placement was unsuccessful.  Given the bleeding from the prostate I did place an 18 Omani coude catheter return of clear slightly pink urine this irrigated to clear pink.  20 cc sterile water was placed into the balloon.  The patient was awakened and transferred to the recovery room in stable condition having tolerated the procedure well.      Dispo:  He will be admitted overnight and I will request that Interventional Radiology place an antegrade stent tomorrow.  Hopefully void trial in the morning pending on clarity of urine.    Dannie Murray MD

## 2022-11-17 NOTE — TRANSFER OF CARE
Anesthesia Transfer of Care Note    Patient: Brandan Werner    Procedure(s) Performed: Procedure(s) (LRB):  CYSTOSCOPY, WITH URETERAL STENT INSERTION (Left)    Patient location: PACU    Anesthesia Type: general    Transport from OR: Transported from OR on 2-3 L/min O2 by NC with adequate spontaneous ventilation    Post pain: adequate analgesia    Post assessment: no apparent anesthetic complications    Post vital signs: stable    Level of consciousness: sedated    Nausea/Vomiting: no nausea/vomiting    Complications: none    Transfer of care protocol was followed      Last vitals:   Visit Vitals  BP (!) 152/88 (BP Location: Left arm, Patient Position: Lying)   Pulse 65   Temp 37.2 °C (98.9 °F) (Skin)   Resp 17   Ht 6' (1.829 m)   Wt 95.7 kg (211 lb)   SpO2 96%   BMI 28.62 kg/m²

## 2022-11-17 NOTE — ANESTHESIA PREPROCEDURE EVALUATION
11/17/2022  Brandan Werner is a 77 y.o., male presents for cystoscopy under [place]    Past Medical History:   Diagnosis Date    Anxiety     BPH (benign prostatic hyperplasia)     Cataract     Elevated PSA     Erectile dysfunction     Hyperlipidemia     Hypertension     Hypogonadism male     Kidney stone 5/20/2020    Obesity     PTSD (post-traumatic stress disorder)     Shoulder arthritis     Urinary tract infection      Past Surgical History:   Procedure Laterality Date    COLONOSCOPY N/A 11/26/2018    Procedure: COLONOSCOPY;  Surgeon: Germán Moss MD;  Location: [address];  Service: Endoscopy;  Laterality: N/A;    COLONOSCOPY W/ BIOPSIES  2010    PENILE PROSTHESIS IMPLANT         Pre-op Assessment    I have reviewed the Patient Summary Reports.    I have reviewed the Nursing Notes. I have reviewed the NPO Status.   I have reviewed the Medications.     Review of Systems  Anesthesia Hx:  No problems with previous Anesthesia  History of prior surgery of interest to airway management or planning:  Denies Personal Hx of Anesthesia complications.   Social:  Former Smoker    Hematology/Oncology:     Oncology Normal    -- Anemia:   EENT/Dental:EENT/Dental Normal   Cardiovascular:   Exercise tolerance: good Hypertension Denies MI.   hyperlipidemia  Hypertension    Pulmonary:  Pulmonary Normal  Denies COPD.  Denies Asthma.  Denies Sleep Apnea.    Renal/:   renal calculi BPH    Hepatic/GI:  Hepatic/GI Normal  Denies GERD.    Musculoskeletal:  Musculoskeletal Normal    Neurological:  Neurology Normal Denies TIA.  Denies CVA. Denies Seizures.    Endocrine:  Endocrine Normal Denies Diabetes. Denies Hypothyroidism.  Denies Hyperthyroidism.    Dermatological:  Skin Normal    Psych:   Psychiatric History          Physical Exam  General:  Well nourished      Airway/Jaw/Neck:  Mouth Opening:  Normal   Tongue: Normal   Mallampati: II TM Distance: Normal        Chest/Lungs:  Chest/Lungs Findings: Clear to auscultation, Normal Respiratory Rate      Heart/Vascular:  Heart Findings: Rate: Normal  Rhythm: Regular Rhythm  Sounds: Normal             Anesthesia Plan  Type of Anesthesia, risks & benefits discussed:  Anesthesia Type:  general    Patient's Preference:   Plan Factors:          Intra-op Monitoring Plan: Standard ASA Monitors  Intra-op Monitoring Plan Comments:   Post Op Pain Control Plan: multimodal analgesia  Post Op Pain Control Plan Comments:     Induction:   IV  Beta Blocker:  Patient is not currently on a Beta-Blocker (No further documentation required).       Informed Consent: Informed consent signed with the Patient and all parties understand the risks and agree with anesthesia plan.  All questions answered.  Anesthesia consent signed with patient.  ASA Score: 2     Day of Surgery Review of History & Physical:  There are no significant changes.            Ready For Surgery From Anesthesia Perspective.           Physical Exam  General: Well nourished    Airway:  Mallampati: II   Mouth Opening: Normal  TM Distance: Normal  Tongue: Normal    Chest/Lungs:  Clear to auscultation, Normal Respiratory Rate    Heart:  Rate: Normal  Rhythm: Regular Rhythm  Sounds: Normal          Anesthesia Plan  Type of Anesthesia, risks & benefits discussed:    Anesthesia Type: general  Intra-op Monitoring Plan: Standard ASA Monitors  Post Op Pain Control Plan: multimodal analgesia  Induction:  IV  Airway Plan: Direct  Informed Consent: Informed consent signed with the Patient and all parties understand the risks and agree with anesthesia plan.  All questions answered.   ASA Score: 2    Ready For Surgery From Anesthesia Perspective.       .

## 2022-11-17 NOTE — INTERVAL H&P NOTE
The patient has been examined and the H&P has been reviewed:    I concur with the findings and no changes have occurred since H&P was written.    Anesthesia/Surgery risks, benefits and alternative options discussed and understood by patient/family.      Problem Noted   Kidney Stone 5/20/2020    Left 1 cm UPJ and non obstructing on CTU 11/2022 with moderate hydronephrosis      Elevated Psa 3/11/2013    Biopsy 2013 ASAP 1 core, 2014 benign  PSA 8/22--8.2  Volume 183, PSAD 0.04       Bph (Benign Prostatic Hyperplasia)     183 gm prostate on CTU 2022  Initial AUA SS (8/2022): 19/2  PVR 67     History of Penile Implant 5/20/2020     OR for cysotscopy, left RPG, left JJ stent  Delayed URS, will plan 12/1/22  Antibiotic on call  The risks, benefits, alteratives of the procedure were discussed with the patient.  The patient was given time for questions, all questions were answered.  Written, informed consent was obtained.      Dannie Murray MD

## 2022-11-18 LAB
ANION GAP SERPL CALC-SCNC: 6 MMOL/L (ref 8–16)
BASOPHILS # BLD AUTO: 0.04 K/UL (ref 0–0.2)
BASOPHILS NFR BLD: 0.4 % (ref 0–1.9)
BILIRUB UR QL STRIP: NEGATIVE
BUN SERPL-MCNC: 13 MG/DL (ref 8–23)
CALCIUM SERPL-MCNC: 8.4 MG/DL (ref 8.7–10.5)
CHLORIDE SERPL-SCNC: 104 MMOL/L (ref 95–110)
CLARITY UR: ABNORMAL
CO2 SERPL-SCNC: 27 MMOL/L (ref 23–29)
COLOR UR: COLORLESS
CREAT SERPL-MCNC: 0.9 MG/DL (ref 0.5–1.4)
DIFFERENTIAL METHOD: ABNORMAL
EOSINOPHIL # BLD AUTO: 0.1 K/UL (ref 0–0.5)
EOSINOPHIL NFR BLD: 0.8 % (ref 0–8)
ERYTHROCYTE [DISTWIDTH] IN BLOOD BY AUTOMATED COUNT: 12.4 % (ref 11.5–14.5)
EST. GFR  (NO RACE VARIABLE): >60 ML/MIN/1.73 M^2
GLUCOSE SERPL-MCNC: 80 MG/DL (ref 70–110)
GLUCOSE UR QL STRIP: NEGATIVE
HCT VFR BLD AUTO: 35.6 % (ref 40–54)
HGB BLD-MCNC: 11.7 G/DL (ref 14–18)
HGB UR QL STRIP: ABNORMAL
IMM GRANULOCYTES # BLD AUTO: 0.04 K/UL (ref 0–0.04)
IMM GRANULOCYTES NFR BLD AUTO: 0.4 % (ref 0–0.5)
KETONES UR QL STRIP: ABNORMAL
LEUKOCYTE ESTERASE UR QL STRIP: NEGATIVE
LYMPHOCYTES # BLD AUTO: 2 K/UL (ref 1–4.8)
LYMPHOCYTES NFR BLD: 18.9 % (ref 18–48)
MCH RBC QN AUTO: 31.3 PG (ref 27–31)
MCHC RBC AUTO-ENTMCNC: 32.9 G/DL (ref 32–36)
MCV RBC AUTO: 95 FL (ref 82–98)
MICROSCOPIC COMMENT: ABNORMAL
MONOCYTES # BLD AUTO: 0.6 K/UL (ref 0.3–1)
MONOCYTES NFR BLD: 5.4 % (ref 4–15)
NEUTROPHILS # BLD AUTO: 7.9 K/UL (ref 1.8–7.7)
NEUTROPHILS NFR BLD: 74.1 % (ref 38–73)
NITRITE UR QL STRIP: NEGATIVE
NRBC BLD-RTO: 0 /100 WBC
PH UR STRIP: 8 [PH] (ref 5–8)
PLATELET # BLD AUTO: 247 K/UL (ref 150–450)
PMV BLD AUTO: 9.9 FL (ref 9.2–12.9)
POTASSIUM SERPL-SCNC: 4 MMOL/L (ref 3.5–5.1)
PROT UR QL STRIP: NEGATIVE
RBC # BLD AUTO: 3.74 M/UL (ref 4.6–6.2)
RBC #/AREA URNS HPF: >100 /HPF (ref 0–4)
SODIUM SERPL-SCNC: 137 MMOL/L (ref 136–145)
SP GR UR STRIP: 1.01 (ref 1–1.03)
UNIDENT CRYS URNS QL MICRO: 193
URN SPEC COLLECT METH UR: ABNORMAL
UROBILINOGEN UR STRIP-ACNC: NEGATIVE EU/DL
WBC # BLD AUTO: 10.67 K/UL (ref 3.9–12.7)
WBC #/AREA URNS HPF: 59 /HPF (ref 0–5)
YEAST URNS QL MICRO: ABNORMAL

## 2022-11-18 PROCEDURE — 25000003 PHARM REV CODE 250: Performed by: UROLOGY

## 2022-11-18 PROCEDURE — 81000 URINALYSIS NONAUTO W/SCOPE: CPT | Performed by: UROLOGY

## 2022-11-18 PROCEDURE — 51798 US URINE CAPACITY MEASURE: CPT

## 2022-11-18 PROCEDURE — 36415 COLL VENOUS BLD VENIPUNCTURE: CPT | Performed by: UROLOGY

## 2022-11-18 PROCEDURE — 63600175 PHARM REV CODE 636 W HCPCS: Performed by: UROLOGY

## 2022-11-18 PROCEDURE — 25000003 PHARM REV CODE 250: Performed by: RADIOLOGY

## 2022-11-18 PROCEDURE — 85025 COMPLETE CBC W/AUTO DIFF WBC: CPT | Performed by: UROLOGY

## 2022-11-18 PROCEDURE — 87086 URINE CULTURE/COLONY COUNT: CPT | Performed by: UROLOGY

## 2022-11-18 PROCEDURE — 94761 N-INVAS EAR/PLS OXIMETRY MLT: CPT | Mod: 59

## 2022-11-18 PROCEDURE — 99900035 HC TECH TIME PER 15 MIN (STAT)

## 2022-11-18 PROCEDURE — 63600175 PHARM REV CODE 636 W HCPCS: Performed by: RADIOLOGY

## 2022-11-18 PROCEDURE — 51700 IRRIGATION OF BLADDER: CPT

## 2022-11-18 PROCEDURE — 80048 BASIC METABOLIC PNL TOTAL CA: CPT | Performed by: UROLOGY

## 2022-11-18 PROCEDURE — 25500020 PHARM REV CODE 255: Performed by: RADIOLOGY

## 2022-11-18 PROCEDURE — 94799 UNLISTED PULMONARY SVC/PX: CPT

## 2022-11-18 RX ORDER — SODIUM CHLORIDE 9 MG/ML
INJECTION, SOLUTION INTRAVENOUS
Status: COMPLETED | OUTPATIENT
Start: 2022-11-18 | End: 2022-11-18

## 2022-11-18 RX ORDER — FENTANYL CITRATE 50 UG/ML
INJECTION, SOLUTION INTRAMUSCULAR; INTRAVENOUS
Status: COMPLETED | OUTPATIENT
Start: 2022-11-18 | End: 2022-11-18

## 2022-11-18 RX ORDER — LIDOCAINE HYDROCHLORIDE 10 MG/ML
INJECTION INFILTRATION; PERINEURAL
Status: COMPLETED | OUTPATIENT
Start: 2022-11-18 | End: 2022-11-18

## 2022-11-18 RX ORDER — MIDAZOLAM HYDROCHLORIDE 1 MG/ML
INJECTION INTRAMUSCULAR; INTRAVENOUS
Status: COMPLETED | OUTPATIENT
Start: 2022-11-18 | End: 2022-11-18

## 2022-11-18 RX ADMIN — SODIUM CHLORIDE, SODIUM LACTATE, POTASSIUM CHLORIDE, AND CALCIUM CHLORIDE: .6; .31; .03; .02 INJECTION, SOLUTION INTRAVENOUS at 10:11

## 2022-11-18 RX ADMIN — FLUOXETINE HYDROCHLORIDE 40 MG: 20 CAPSULE ORAL at 09:11

## 2022-11-18 RX ADMIN — MIDAZOLAM HYDROCHLORIDE 1 MG: 1 INJECTION INTRAMUSCULAR; INTRAVENOUS at 01:11

## 2022-11-18 RX ADMIN — CARBIDOPA AND LEVODOPA 1 TABLET: 25; 100 TABLET ORAL at 09:11

## 2022-11-18 RX ADMIN — LISINOPRIL 5 MG: 5 TABLET ORAL at 09:11

## 2022-11-18 RX ADMIN — SODIUM CHLORIDE 500 ML: 0.9 INJECTION, SOLUTION INTRAVENOUS at 01:11

## 2022-11-18 RX ADMIN — LIDOCAINE HYDROCHLORIDE 5 ML: 10 INJECTION, SOLUTION INFILTRATION; PERINEURAL at 02:11

## 2022-11-18 RX ADMIN — CARBIDOPA AND LEVODOPA 1 TABLET: 25; 100 TABLET ORAL at 03:11

## 2022-11-18 RX ADMIN — TAMSULOSIN HYDROCHLORIDE 0.4 MG: 0.4 CAPSULE ORAL at 08:11

## 2022-11-18 RX ADMIN — CEFAZOLIN SODIUM 2 G: 2 SOLUTION INTRAVENOUS at 05:11

## 2022-11-18 RX ADMIN — CARBIDOPA AND LEVODOPA 1 TABLET: 25; 100 TABLET ORAL at 08:11

## 2022-11-18 RX ADMIN — IOHEXOL 25 ML: 350 INJECTION, SOLUTION INTRAVENOUS at 02:11

## 2022-11-18 RX ADMIN — MIDAZOLAM HYDROCHLORIDE 0.5 MG: 1 INJECTION INTRAMUSCULAR; INTRAVENOUS at 02:11

## 2022-11-18 RX ADMIN — FENTANYL CITRATE 50 MCG: 50 INJECTION INTRAMUSCULAR; INTRAVENOUS at 01:11

## 2022-11-18 RX ADMIN — FENTANYL CITRATE 25 MCG: 50 INJECTION INTRAMUSCULAR; INTRAVENOUS at 02:11

## 2022-11-18 RX ADMIN — ATORVASTATIN CALCIUM 20 MG: 20 TABLET, FILM COATED ORAL at 09:11

## 2022-11-18 RX ADMIN — LIDOCAINE HYDROCHLORIDE 10 ML: 10 INJECTION, SOLUTION INFILTRATION; PERINEURAL at 01:11

## 2022-11-18 NOTE — ANESTHESIA POSTPROCEDURE EVALUATION
Anesthesia Post Evaluation    Patient: Brandan Werner    Procedure(s) Performed: Procedure(s) (LRB):  CYSTOSCOPY (Left)    Final Anesthesia Type: general      Patient location during evaluation: floor  Patient participation: Yes- Able to Participate  Level of consciousness: awake  Post-procedure vital signs: reviewed and stable  Pain management: adequate  Airway patency: patent    PONV status at discharge: No PONV  Anesthetic complications: no      Cardiovascular status: blood pressure returned to baseline  Respiratory status: unassisted  Hydration status: euvolemic  Follow-up not needed.          Vitals Value Taken Time   /86 11/18/22 1638   Temp 36.6 °C (97.9 °F) 11/18/22 1638   Pulse 80 11/18/22 1638   Resp 18 11/18/22 1638   SpO2 95 % 11/18/22 1638         Event Time   Out of Recovery 17:00:13         Pain/Levi Score: Pain Rating Prior to Med Admin: 6 (11/17/2022  4:15 PM)  Levi Score: 10 (11/17/2022  3:50 PM)

## 2022-11-18 NOTE — PROCEDURES
Radiology Post-Procedure Note    Pre Op Diagnosis: left hydronephrosis, left UVJ stone    Post Op Diagnosis: left hydronephrosis    Procedure:left percutaneous nephrostomy    Procedure performed by: Mio Vasquez MD    Written Informed Consent Obtained: Yes    Specimen Removed: YES urine from left kidney for C/S    Estimated Blood Loss: less than 50     Findings: Local anesthesia and moderate sedation were used.      Ultrasound and fluoroscopy were used to localize the left collecting system and a percutaneous catheter was introduced.  Position of the catheter in the collecting system was confirmed using injection of iodinated contrast.  Unable to advance a guidewire past the UVJ stone for JJ stent placement. Very little contrast material entered the left ureter suggesting high grade obstruction.    The patient tolerated the procedure well and there were no complications.  A specimen was sent to the lab for further analysis and culture. Please see Imaging report for further details.    Mio Vasquez MD  Staff Radiologist  Department of Radiology  Pager: [phone]

## 2022-11-18 NOTE — H&P
Inpatient Radiology Pre-procedure Note    History of Present Illness:  Brandan Werner is a 77 y.o. male who presents for left PCN and possible JJ ureteral stent placement.  Admission H&P reviewed.  Past Medical History:   Diagnosis Date    Anxiety     BPH (benign prostatic hyperplasia)     Cataract     Elevated PSA     Erectile dysfunction     Hyperlipidemia     Hypertension     Hypogonadism male     Kidney stone 5/20/2020    Obesity     PTSD (post-traumatic stress disorder)     Shoulder arthritis     Urinary tract infection      Past Surgical History:   Procedure Laterality Date    COLONOSCOPY N/A 11/26/2018    Procedure: COLONOSCOPY;  Surgeon: Germán Moss MD;  Location: [facility] ([address] [place] [address]);  Service: Endoscopy;  Laterality: N/A;    COLONOSCOPY W/ BIOPSIES  2010    CYSTOSCOPY Left 11/17/2022    Procedure: CYSTOSCOPY;  Surgeon: Dannie Murray MD;  Location: [facility];  Service: Urology;  Laterality: Left;    PENILE PROSTHESIS IMPLANT         Review of Systems:   As documented in primary team H&P    Home Meds:   Prior to Admission medications    Medication Sig Start Date End Date Taking? Authorizing Provider   ascorbic acid, vitamin C, (VITAMIN C) 250 MG tablet Take 250 mg by mouth once daily.   Yes Historical Provider   atorvastatin (LIPITOR) 40 MG tablet Take 20 mg by mouth once daily.   Yes Historical Provider   carbidopa-levodopa  mg (SINEMET)  mg per tablet TAKE 1 TABLET BY MOUTH THREE TIMES A DAY FOR PARKINSON'S DISE** WITH MEALSASE 10/18/22  Yes Historical Provider   celecoxib (CELEBREX) 200 MG capsule TAKE TWO CAPSULES BY MOUTH ONCE DAILY AS NEEDED FOR PAIN AND INFLAMMATION 2/7/22  Yes Historical Provider   diclofenac (VOLTAREN) 75 MG EC tablet Take 1 tablet (75 mg total) by mouth 2 (two) times daily as needed (joint pain). 10/3/16  Yes Ellis Richardson Jr., MD   finasteride (PROSCAR) 5 mg tablet Take 1 tablet (5 mg total) by mouth once daily. 8/30/22 8/25/23 Yes Dannie Murray MD    FLUoxetine 20 MG capsule 60 mg. 11/9/22  Yes Historical Provider   FLUoxetine 40 MG capsule TAKE ONE CAPSULE BY MOUTH EVERY DAY FOR MENTAL HEALTH 11/15/21  Yes Historical Provider   fluticasone (FLONASE) 50 mcg/actuation nasal spray 2 sprays (100 mcg total) by Each Nare route once daily. 4/9/18  Yes Viki Saenz MD   folic acid/multivit-min/lutein (CENTRUM SILVER ORAL) Take by mouth.   Yes Historical Provider   garlic 1,000 mg Cap Take by mouth.   Yes Historical Provider   GINGER ROOT XT-FENNEL SD XT ORAL Take 550 mg by mouth.   Yes Historical Provider   glucosam/chond-msm1/C/agueda/bor (GLUCOSAMINE-CHOND-MSM COMPLEX ORAL) Take by mouth.   Yes Historical Provider   lisinopril (PRINIVIL,ZESTRIL) 5 MG tablet Take 5 mg by mouth once daily.   Yes Historical Provider   mecobalamin (B12 ACTIVE ORAL) Take by mouth.   Yes Historical Provider   meloxicam (MOBIC) 15 MG tablet Take 15 mg by mouth daily as needed for Pain (and inflammation).   Yes Historical Provider   peg 400-propylene glycol 0.4-0.3 % Drop INSTILL ONE DROP IN EACH EYE FOUR TIMES A DAY AS NEEDED FOR DRY EYES 1/28/21  Yes Historical Provider   psyllium (METAMUCIL) powder Take 1 packet by mouth 3 (three) times daily.   Yes Historical Provider   tamsulosin (FLOMAX) 0.4 mg Cp24 Take 1 capsule (0.4 mg total) by mouth nightly. 1/3/18  Yes Jeremy Dao MD   traZODone (DESYREL) 100 MG tablet Take 1 tablet by mouth nightly as needed. 5/16/22  Yes Historical Provider   triamcinolone acetonide 0.1% (KENALOG) 0.1 % cream Apply topically 3 (three) times daily. 5/25/20  Yes Km Chicas MD   vitamin E 100 UNIT capsule Take 100 Units by mouth every morning.   Yes Historical Provider   VOLTAREN 1 % Gel Apply 4 g topically 4 (four) times daily as needed (pain and inflammation).  10/2/17  Yes Historical Provider   primidone (MYSOLINE) 50 MG Tab Take 2 tablets (100 mg total) by mouth every evening. 3/30/21 9/27/22  Briana Murillo MD     Scheduled Meds:     atorvastatin  20 mg Oral Daily    carbidopa-levodopa  mg  1 tablet Oral TID    finasteride  5 mg Oral Daily    FLUoxetine  40 mg Oral Daily    lisinopriL  5 mg Oral Daily    tamsulosin  0.4 mg Oral Nightly     Continuous Infusions:    lactated ringers 75 mL/hr at 11/18/22 0743     PRN Meds:acetaminophen, hydrALAZINE, HYDROcodone-acetaminophen, metoprolol, ondansetron, traZODone  Anticoagulants/Antiplatelets: aspirin    Allergies: Review of patient's allergies indicates:  No Known Allergies  Sedation Hx: have not been any systemic reactions    Labs:  Recent Labs   Lab 11/17/22  1228   INR 1.0       Recent Labs   Lab 11/18/22  0743   WBC 10.67   HGB 11.7*   HCT 35.6*   MCV 95         Recent Labs   Lab 11/18/22  0743   GLU 80      K 4.0      CO2 27   BUN 13   CREATININE 0.9   CALCIUM 8.4*         Vitals:  Temp: 98.4 °F (36.9 °C) (11/18/22 1102)  Pulse: 74 (11/18/22 1137)  Resp: 17 (11/18/22 1137)  BP: 137/78 (11/18/22 1102)  SpO2: 96 % (11/18/22 1137)     Physical Exam:  ASA: 2  Mallampati: 1    General: no acute distress  Mental Status: alert and oriented to person, place and time  HEENT: normocephalic, atraumatic  Chest: unlabored breathing  Heart: regular heart rate  Abdomen: nondistended  Extremity: moves all extremities    Plan: proceed with left PCN and possible JJ stent placement  Sedation Plan: moderate    Mio Vasquez MD  Staff Radiologist  Department of Radiology  Pager: [phone]

## 2022-11-18 NOTE — PLAN OF CARE
went to meet with patient. Patient's spouse at bedside. Patient reports he is independent and lives at home with his spouse. He does not use any DME or have Home Health. He still drives, but his wife will transport home. Patient still has a ludwig in place, which he tells me should be removed at discharge. Patient tells me he also has a procedure with IR for today.  will request Urology follow-up from access navigators. Patient/Wife encouraged to call with any questions or concerns.  will continue to follow patient through transitions of care and assist with any discharge needs.     Patient Contacts    Name Relation Home Work Mobile   Ivone Werner Spouse [phone]     Future Appointments   Date Time Provider Department Center   12/27/2022 10:20 AM Ellis Richardson Jr., MD [facility] ORTHO [place] [facility]   2/9/2023  8:30 AM Ad Hartman OD [facility] OPTOMTY [place]   2/22/2023  7:50 AM APPOINTMENT [facility]IA [facility] LAB [place] [facility]   2/27/2023  8:00 AM Km Chicas MD [facility] IM [place]   8/10/2023  1:00 PM Shanti Roberson NP [facility] MED [place]         11/18/22 0939   Discharge Assessment   Assessment Type Discharge Planning Assessment   Confirmed/corrected address, phone number and insurance Yes   Confirmed Demographics Correct on Facesheet   Source of Information patient;family   Lives With significant other;spouse   Facility Arrived From: Home   Do you expect to return to your current living situation? Yes   Do you have help at home or someone to help you manage your care at home? Yes   Who are your caregiver(s) and their phone number(s)? Ivone (Spouse) Phone#[phone]   Prior to hospitilization cognitive status: Alert/Oriented   Current cognitive status: Alert/Oriented   Walking or Climbing Stairs Difficulty none   Dressing/Bathing Difficulty none   Equipment Currently Used at Home none   Do you take prescription medications? Yes   Do you have  prescription coverage? Yes   Do you have any problems affording any of your prescribed medications? No   Is the patient taking medications as prescribed? yes   How do you get to doctors appointments? car, drives self   Are you on dialysis? No   Do you take coumadin? No   Discharge Plan A Home   Discharge Plan B Home with family   DME Needed Upon Discharge  none   Discharge Plan discussed with: Patient   Discharge Barriers Identified None   Physical Activity   On average, how many days per week do you engage in moderate to strenuous exercise (like a brisk walk)? Patient refu   On average, how many minutes do you engage in exercise at this level? Patient refu   Financial Resource Strain   How hard is it for you to pay for the very basics like food, housing, medical care, and heating? Not hard   Housing Stability   In the last 12 months, was there a time when you were not able to pay the mortgage or rent on time? N   In the last 12 months, was there a time when you did not have a steady place to sleep or slept in a shelter (including now)? N   Transportation Needs   In the past 12 months, has lack of transportation kept you from medical appointments or from getting medications? no   In the past 12 months, has lack of transportation kept you from meetings, work, or from getting things needed for daily living? No   Food Insecurity   Within the past 12 months, you worried that your food would run out before you got the money to buy more. Never true   Within the past 12 months, the food you bought just didn't last and you didn't have money to get more. Never true   Stress   Do you feel stress - tense, restless, nervous, or anxious, or unable to sleep at night because your mind is troubled all the time - these days? Patient refu   Social Connections   In a typical week, how many times do you talk on the phone with family, friends, or neighbors? More than 3   How often do you get together with friends or relatives? More than  3   How often do you attend Tenriism or Latter day services? Patient refu   Do you belong to any clubs or organizations such as Tenriism groups, unions, fraternal or athletic groups, or school groups? Patient refu   How often do you attend meetings of the clubs or organizations you belong to? Patient refu   Are you , , , , never , or living with a partner?    Alcohol Use   Q1: How often do you have a drink containing alcohol? Never   Q2: How many drinks containing alcohol do you have on a typical day when you are drinking? None   Q3: How often do you have six or more drinks on one occasion? Never     Jamaica James RN    [phone]

## 2022-11-18 NOTE — NURSING
Urinary Catheter removed per Dr. Murray order. Catheter irrigated prior to removal. Bladder scan completed post removal, see Intake and output.    Xenia Cervantes is a 64 y.o. male in flu shot

## 2022-11-18 NOTE — PLAN OF CARE
VN note: progress notes, labs and vital signs reviewed. Will be available to intervene if needed.      THICKNESS SKIN GRAFT performed by Domingo Cabral MD at [address] OR       Immunization History:   Immunization History   Administered Date(s) Administered    COVID-19, Sarah Espinoza, Primary or Immunocompromised, PF, 100mcg/0.5mL 01/06/2021, 02/03/2021, 12/08/2021    Influenza A (T4S0-45) Vaccine PF IM 11/03/2009    Influenza Virus Vaccine 11/02/2017, 10/09/2018, 10/15/2019, 10/19/2020    Influenza, Keyona Mullen, IM, (6 mo and older Fluzone, Flulaval, Fluarix and 3 yrs and older Afluria) 10/07/2021    Tdap (Boostrix, Adacel) 07/28/2015       Active Problems:  Patient Active Problem List   Diagnosis Code    Dyspareunia in female N94.10    Post-menopause atrophic vaginitis N95.2    Serrated adenoma of colon D12.6    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    Mass of muscle of left lower extremity M62.89    Sarcoma ([address] C49.9    Debility R53.81       Isolation/Infection:   Isolation            No Isolation          Patient Infection Status       Infection Onset Added Last Indicated Last Indicated By Review Planned Expiration Resolved Resolved By    None active    Resolved    COVID-19 (Rule Out) 11/19/21 11/19/21 11/19/21 COVID-19 (Ordered)   11/20/21 Rule-Out Test Resulted            Nurse Assessment:  Last Vital Signs: BP 99/62   Pulse 77   Temp 98 °F (36.7 °C) (Oral)   Resp 16   Ht 5' 3\" (1.6 m)   Wt 137 lb 12.6 oz (62.5 kg)   LMP 01/01/2010 (Approximate)   SpO2 95%   BMI 24.41 kg/m²     Last documented pain score (0-10 scale): Pain Level: 1  Last Weight:   Wt Readings from Last 1 Encounters:   03/25/22 137 lb 12.6 oz (62.5 kg)     Mental Status:  oriented and alert    IV Access:  - None    Nursing Mobility/ADLs:  Walking   Assisted  Transfer  Assisted  Bathing  Assisted  Dressing  Independent  Toileting  Independent  Feeding  Independent  Med Admin  Independent  Med Delivery   whole    Wound Care Documentation and Therapy:  Negative Pressure Wound Therapy Anterior;Distal;Left;Upper (Active)   Wound Type Surgical 03/30/22 2043 Unit Type V. A.C Ultra  03/30/22 2043   Dressing Type Other (Comment) 03/30/22 1213   Cycle Continuous 03/30/22 2043   Target Pressure (mmHg) 125 03/30/22 2043   Canister changed? No 03/30/22 2043   Dressing Status Clean; Intact;Dry 03/30/22 2043   Drainage Amount None 03/30/22 2043   Output (ml) 0 ml 03/30/22 2043   Wound Assessment Other (Comment) 03/30/22 2043   Odor None 03/30/22 2043   Number of days: 10        Elimination:  Continence: Bowel: Yes  Bladder: Yes  Urinary Catheter: None   Colostomy/Ileostomy/Ileal Conduit: No       Date of Last BM: 4/1/22    Intake/Output Summary (Last 24 hours) at 3/31/2022 1419  Last data filed at 3/31/2022 1208  Gross per 24 hour   Intake 555.1 ml   Output 350 ml   Net 205.1 ml     I/O last 3 completed shifts: In: 1275.1 [P.O.:1200; I.V.:10; IV Piggyback:65.1]  Out: 1311 [Urine:1300; Drains:11]    Safety Concerns: At Risk for Falls    Impairments/Disabilities:      None    Nutrition Therapy:  Current Nutrition Therapy:   - Oral Diet:  General    Routes of Feeding: Oral  Liquids: Thin Liquids  Daily Fluid Restriction: no  Last Modified Barium Swallow with Video (Video Swallowing Test): not done    Treatments at the Time of Hospital Discharge:   Respiratory Treatments: n/a  Oxygen Therapy:  is not on home oxygen therapy. Ventilator:    - No ventilator support    Rehab Therapies: Physical Therapy and Occupational Therapy  Weight Bearing Status/Restrictions: No weight bearing restrictions  Other Medical Equipment (for information only, NOT a DME order):  walker  Other Treatments: Dressing change to LLE      Please complete dressing change to thigh qDay, dressing with antibiotic ointment, Telfa (non-adherent Pad), wrapped with Kerlix and ACE wrap.         Patient's personal belongings (please select all that are sent with patient):  clothing    RN SIGNATURE:  Electronically signed by Lukas Solis RN on 4/2/22 at 11:50 AM EDT    CASE MANAGEMENT/SOCIAL WORK SECTION    Inpatient Status Date: ***    Readmission Risk Assessment Score:  Readmission Risk              Risk of Unplanned Readmission:  12           Discharging to Facility/ Agency   Name: [address]  Address:  Phone: [phone]  Fax:    Dialysis Facility (if applicable)   Name:  Address:  Dialysis Schedule:  Phone:  Fax:    / signature: Electronically signed by HÉCTOR Avelar on 4/1/22 at 2:10 PM EDT    PHYSICIAN SECTION    Prognosis: Good    Condition at Discharge: Stable    Rehab Potential (if transferring to Rehab): Good    Recommended Labs or Other Treatments After Discharge: PT/OT    Physician Certification: I certify the above information and transfer of Lorna Hay  is necessary for the continuing treatment of the diagnosis listed and that she requires Home Care for greater 30 days.      Update Admission H&P: No change in H&P    PHYSICIAN SIGNATURE:  Electronically signed by Aurora You DO on 4/2/22 at 11:35 AM EDT

## 2022-11-18 NOTE — CARE UPDATE
Pt AOx4. Pt only c/o pain when receiving bladder irrigation. Irrigation performed 3X - each time inserted 500 mL of NS. Output got progressively lighter red in color. No clots noted at any time.  Safety maintained throughout shift with bed in low/locked position and call light within reach.

## 2022-11-18 NOTE — PROGRESS NOTES
[place] [facility]  Urology  Progress Note    Patient Name: Brandan Werner  MRN: [id number]  Admission Date: 11/17/2022  Hospital Length of Stay: 0 days    POD 1    Subjective:     Interval History: MARIZA, urine slight red w/o clots.  No N/V.  Afebrile.  NPO.      Objective:     Temp:  [97.2 °F (36.2 °C)-98.9 °F (37.2 °C)] 98.4 °F (36.9 °C)  Pulse:  [48-73] 71  Resp:  [10-20] 19  SpO2:  [94 %-100 %] 95 %  BP: ()/(55-88) 148/86       NAD  RRR  CTAB  ABD S/ND/NTTP       Penis normal        Dior clear slight red w/o clots.        Ext: no edema      CBC:  Recent Labs   Lab 11/17/22  1228   WBC 7.69   HGB 12.7*   HCT 38.4*            Urology Specific Assessment:     Problem Noted   Kidney Stone 5/20/2020    Left 1 cm UPJ and non obstructing on CTU 11/2022 with moderate hydronephrosis     Unable to place left stent 11/17/22 due to large prostate, prosthesis and unable to find ureteral orifice.      Elevated Psa 3/11/2013    Biopsy 2013 ASAP 1 core, 2014 benign  PSA 8/22--8.2  Volume 183, PSAD 0.04       Bph (Benign Prostatic Hyperplasia)     183 gm prostate on CTU 2022  Initial AUA SS (8/2022): 19/2  PVR 67     History of Penile Implant 5/20/2020     Plan:   IR consult for left antegrade stent  NPO  Void trial pending timing of IR procedure  Hopefully d/c after procedure pending progress.   Discussed plan for outpatient left ESWL after stent placement and future HoLEP vs robotic simple prostatectomy for very large prostate, BPH w LUTs.     Dannie Murray MD  Urology

## 2022-11-18 NOTE — PLAN OF CARE
VN cued into room to complete admit assessment. VIP model introduced; VN working alongside bedside treatment team.  Plan of care reviewed with patient. Patient informed of fall risk, fall precautions, call light within reach, side rails x2 elevated. Patient notified to ask staff for assistance. Patient verbalized complete understanding. Time allowed for questions. Will continue to monitor and intervene as needed.

## 2022-11-18 NOTE — TRANSFER OF CARE
Anesthesia Transfer of Care Note    Patient: Brandan Werner    Procedure(s) Performed: Procedure(s) (LRB):  CYSTOSCOPY (Left)    Patient location: PACU    Anesthesia Type: general    Transport from OR: Transported from OR on 2-3 L/min O2 by NC with adequate spontaneous ventilation    Post pain: adequate analgesia    Post assessment: no apparent anesthetic complications    Post vital signs: stable    Level of consciousness: sedated    Nausea/Vomiting: no nausea/vomiting    Complications: none    Transfer of care protocol was followed      Last vitals:   Visit Vitals  BP (!) 148/86   Pulse 71   Temp 36.9 °C (98.4 °F)   Resp 19   Ht 6' (1.829 m)   Wt 92.5 kg (203 lb 14.8 oz)   SpO2 95%   BMI 27.66 kg/m²

## 2022-11-18 NOTE — PLAN OF CARE
Pt. AAOx4. Wife at bedside. Pt. Had L nephro tube placed during shift and urinary catheter removed. Pt. Urinating spontaneously, Urine remains dark red, Dr. Murray aware. Tolerating regular diet. Drain and urine output charted.  IVF infusing. Bed alarm on and call light in reach.   Problem: Adult Inpatient Plan of Care  Goal: Plan of Care Review  Outcome: Ongoing, Progressing     Problem: Adult Inpatient Plan of Care  Goal: Patient-Specific Goal (Individualized)  Outcome: Ongoing, Progressing     Problem: Adult Inpatient Plan of Care  Goal: Absence of Hospital-Acquired Illness or Injury  Outcome: Ongoing, Progressing     Problem: Adult Inpatient Plan of Care  Goal: Optimal Comfort and Wellbeing  Outcome: Ongoing, Progressing     Problem: Adult Inpatient Plan of Care  Goal: Readiness for Transition of Care  Outcome: Ongoing, Progressing     Problem: Infection  Goal: Absence of Infection Signs and Symptoms  Outcome: Ongoing, Progressing

## 2022-11-19 VITALS
TEMPERATURE: 98 F | HEIGHT: 72 IN | DIASTOLIC BLOOD PRESSURE: 79 MMHG | RESPIRATION RATE: 18 BRPM | HEART RATE: 71 BPM | WEIGHT: 208.31 LBS | SYSTOLIC BLOOD PRESSURE: 132 MMHG | OXYGEN SATURATION: 95 % | BODY MASS INDEX: 28.22 KG/M2

## 2022-11-19 LAB — BACTERIA UR CULT: NO GROWTH

## 2022-11-19 PROCEDURE — 94799 UNLISTED PULMONARY SVC/PX: CPT

## 2022-11-19 PROCEDURE — 25000003 PHARM REV CODE 250: Performed by: UROLOGY

## 2022-11-19 PROCEDURE — 99900035 HC TECH TIME PER 15 MIN (STAT)

## 2022-11-19 RX ORDER — TRAMADOL HYDROCHLORIDE 50 MG/1
50 TABLET ORAL EVERY 6 HOURS PRN
Qty: 12 TABLET | Refills: 0 | Status: SHIPPED | OUTPATIENT
Start: 2022-11-19 | End: 2022-11-22

## 2022-11-19 RX ORDER — ASPIRIN 81 MG
100 TABLET, DELAYED RELEASE (ENTERIC COATED) ORAL 2 TIMES DAILY
Qty: 14 TABLET | Refills: 0 | Status: SHIPPED | OUTPATIENT
Start: 2022-11-19 | End: 2022-11-26

## 2022-11-19 RX ORDER — AMOXICILLIN AND CLAVULANATE POTASSIUM 875; 125 MG/1; MG/1
1 TABLET, FILM COATED ORAL 2 TIMES DAILY
Qty: 14 TABLET | Refills: 0 | Status: SHIPPED | OUTPATIENT
Start: 2022-11-19 | End: 2022-11-26

## 2022-11-19 RX ADMIN — ATORVASTATIN CALCIUM 20 MG: 20 TABLET, FILM COATED ORAL at 09:11

## 2022-11-19 RX ADMIN — FLUOXETINE HYDROCHLORIDE 40 MG: 20 CAPSULE ORAL at 09:11

## 2022-11-19 RX ADMIN — LISINOPRIL 5 MG: 5 TABLET ORAL at 09:11

## 2022-11-19 RX ADMIN — CARBIDOPA AND LEVODOPA 1 TABLET: 25; 100 TABLET ORAL at 09:11

## 2022-11-19 RX ADMIN — FINASTERIDE 5 MG: 5 TABLET, FILM COATED ORAL at 09:11

## 2022-11-19 NOTE — PROGRESS NOTES
Discharge orders noted. Additional clinical references attached.    Patient's discharge instructions given by bedside RN and reviewed via this VN.  Education provided on new medication, diagnosis, and follow-up appointments.  Teach back method used. Patient verbalized understanding. All questions answered. Transport to [facility] requested. Floor nurse notified.      11/19/22 1230   AVS Confirmation   Discharge instructions and AVS given to and reviewed with patient and/or significant other. Yes

## 2022-11-19 NOTE — DISCHARGE INSTRUCTIONS
Flush nephrostomy tube with 10 cc normal saline daily as instructed.    Some blood in the urine is normal and expected.  Drink plenty of fluids.     Call the clinic at [phone] or return to the emergency department if you  have persistent fever >101.4, severe bleeding that does not stop, pain not controlled with medications, severe nausea and vomiting limiting intake of liquids and solids, or any other concerns.      You will follow up this week, office will call.  We will also arrange to have stent internalized and nephrostomy tube removed hopefully this week.

## 2022-11-19 NOTE — PLAN OF CARE
0912  HEATH was informed by coco nurse Nevaeh of DC order.     0928  Patient resting quietly in bed with spouse, Ivone Werner ([phone]), at the bedside when CM rounded via [employer]. Patient in agreement with plan to discharge home today & denied the need for assistance with transportation at time of discharge.     Message sent to the schedulers informing of plan to discharge today & previously requested urol hospfu appt not scheduled yet. CM requested that the pt/spouse be notified of urol hospfu appt.    Message sent to nurse Escobar & coco Herring informing that the pt is cleared to discharge after nephrostomy tube teaching.

## 2022-11-19 NOTE — PROGRESS NOTES
[place] [facility]  Urology  Progress Note    Patient Name: Brandan Werner  MRN: [id number]  Admission Date: 11/17/2022  Hospital Length of Stay: 0 days    POD 2/1     Subjective:     Interval History: MARIZA.  PCN placed yesterday, PCN urine clear, voided urine clearing per patient no clots.  No N/V.  Afebrile.      Objective:     Temp:  [97.9 °F (36.6 °C)-99.6 °F (37.6 °C)] 97.9 °F (36.6 °C)  Pulse:  [65-83] 65  Resp:  [16-18] 18  SpO2:  [95 %-98 %] 98 %  BP: (126-146)/(62-86) 135/62       NAD  RRR  CTAB  ABD S/ND/NTTP       Penis normal       Ludwig no ludwig, left PCN clear        Ext: no edema    Significant Labs:  BMP:  Recent Labs   Lab 11/18/22  0743      K 4.0      CO2 27   BUN 13   CREATININE 0.9   CALCIUM 8.4*       CBC:  Recent Labs   Lab 11/17/22  1228 11/18/22  0743   WBC 7.69 10.67   HGB 12.7* 11.7*   HCT 38.4* 35.6*    247         Urology Specific Assessment:     Problem Noted   Kidney Stone 5/20/2020    Left 1 cm UPJ and non obstructing on CTU 11/2022 with moderate hydronephrosis     Unable to place left stent 11/17/22 due to large prostate, prosthesis and unable to find ureteral orifice.   Left PCN 11/18/22     Elevated Psa 3/11/2013    Biopsy 2013 ASAP 1 core, 2014 benign  PSA 8/22--8.2  Volume 183, PSAD 0.04       Bph (Benign Prostatic Hyperplasia)     183 gm prostate on CTU 2022  Initial AUA SS (8/2022): 19/2  PVR 67     History of Penile Implant 5/20/2020     Plan:   PCN clearing, voiding w/o clots  Ok for d/c, will arrange outpt follow up this week and stent internalization soon    Dannie Murray MD  Urology

## 2022-11-19 NOTE — CARE UPDATE
Pt has had no c/o pain or discomfort that required medications. Appears to have no trouble voiding. Nephrology tube has put out 350 mL of bright red urine and 150 of light pink urine. Pt has used IS hourly with maximum rate of 2000. Safety has been maintained throughout shift with bed in low/locked position and call light within reach.

## 2022-11-19 NOTE — DISCHARGE SUMMARY
[facility] Surg  Discharge Note  Short Stay    Procedure(s) (LRB):  CYSTOSCOPY (Left)      OUTCOME:  Failed stent placement, nephrostomy (left) placed 11/18/22.  Now ready for discharge.      DISPOSITION: Home or Self Care    FINAL DIAGNOSIS:  Kidney stone    FOLLOWUP: In clinic    DISCHARGE INSTRUCTIONS:    Discharge Procedure Orders   Diet general     Call MD for:  temperature >100.4     Call MD for:  persistent nausea and vomiting     Call MD for:  severe uncontrolled pain     Call MD for:   Order Comments: Some blood in the urine is normal and expected.  Drink plenty of fluids.     You are being sent home with a nephrostomy tube This is a tube that helps your kidney to drain into your bladder.  This can cause some mild flank pain and bladder spasms.  It can also cause some blood in your urine, which is normal.  If you have any questions or concerns regarding your stent, please call the urology office.      Call the clinic at [phone] or return to the emergency department if you  have persistent fever >101.4, severe bleeding that does not stop, pain not controlled with medications, severe nausea and vomiting limiting intake of liquids and solids, or any other concerns.     Activity as tolerated         Clinical Reference Documents Added to Patient Instructions         Document    CYSTOSCOPY DISCHARGE INSTRUCTIONS (ENGLISH)    KIDNEY STONES IN ADULTS (ENGLISH)            TIME SPENT ON DISCHARGE: 5 minutes

## 2022-11-19 NOTE — NURSING
Nephrostomy tube teaching provided at length to pt and wife, with wife anxious with return demonstration and concerns with being able to complete flushing. Communicated to Dr Murray, who states that he will have his nurse follow up with pt/wife on Monday regarding status of flushing, and have him f/u in clinic if needed. Pt and wife aware of POC, plan for d/c home today.

## 2022-11-21 ENCOUNTER — TELEPHONE (OUTPATIENT)
Dept: UROLOGY | Facility: CLINIC | Age: 77
End: 2022-11-21
Payer: MEDICARE

## 2022-11-21 DIAGNOSIS — N20.0 KIDNEY STONE: Primary | ICD-10-CM

## 2022-11-21 NOTE — TELEPHONE ENCOUNTER
----- Message from Ritu Chen sent at 11/21/2022 10:38 AM CST -----  Type:  Needs Medical Advice    Who Called: Pt  Symptoms (please be specific): Constipation   How long has patient had these symptoms:  yesterday  Pharmacy name and phone #: [facility] [place] [id number]   Phone: [phone]  Fax: [phone]  Would the patient rather a call back or a response via [facility]? call  Best Call Back Number: [phone]  Additional Information: please call want to know if sometime else he can take

## 2022-11-21 NOTE — TELEPHONE ENCOUNTER
Spoke with patient's wife, she was able to flush nephrostomy tube on her own.  Recommended miralax over the counter and increase fiber.  Confirmed patient taking Colace, Augmentin, and Tramadol.

## 2022-11-21 NOTE — TELEPHONE ENCOUNTER
----- Message from Juliana Hernandez sent at 11/21/2022  9:47 AM CST -----  Type:  Needs Medical Advice    Who Called: CINTHYA WALLACE 'S WIFE  Symptoms (please be specific):    How long has patient had these symptoms:    Pharmacy name and phone #:    Would the patient rather a call back or a response via [facility]?   Best Call Back Number: [phone]  Additional Information: WAS TOLD TO JUST COME IN AND HAVE KIDNEY TUBE DRAINED . IT WASN'T DONE YESTERDAY. SHE JUST WANTS TO MAKE SURE SHE CAN BRING HER  IN.

## 2022-11-21 NOTE — TELEPHONE ENCOUNTER
----- Message from Dannie Murray MD sent at 11/19/2022  2:23 PM CST -----  Nurse visit Monday to flush left PCN with 10 cc normal saline, schedule follow up with me later next week, I will contact IR Monday to internalize stent sometime next week.

## 2022-11-22 ENCOUNTER — TELEPHONE (OUTPATIENT)
Dept: UROLOGY | Facility: CLINIC | Age: 77
End: 2022-11-22
Payer: MEDICARE

## 2022-11-22 DIAGNOSIS — N20.0 KIDNEY STONE: Primary | ICD-10-CM

## 2022-11-22 NOTE — TELEPHONE ENCOUNTER
Returned call, no answer.  [facility] has not reached out to patient today.

## 2022-11-22 NOTE — TELEPHONE ENCOUNTER
----- Message from Ritu Chen sent at 11/22/2022 11:14 AM CST -----  Type:  Patient Returning Call    Who Called:Pt  Who Left Message for Patient:unknown  Does the patient know what this is regarding?:unknown  Would the patient rather a call back or a response via [facility]? call  Best Call Back Number:[phone] Ivone  Additional Information: pt would like office to call wife

## 2022-11-22 NOTE — TELEPHONE ENCOUNTER
Spoke with patient's wife, things are going well with patient.  She called in to update.  Informed procedure for ESWL will be on 12/2/22.  Nurse to contact the day before with instructions.  She voiced understanding.

## 2022-11-23 ENCOUNTER — TELEPHONE (OUTPATIENT)
Dept: UROLOGY | Facility: CLINIC | Age: 77
End: 2022-11-23
Payer: MEDICARE

## 2022-11-23 NOTE — TELEPHONE ENCOUNTER
Returned call, wife states someone called in regards to arrival time options, but she don't know who called.  Informed I would send message to surgery dept.

## 2022-11-23 NOTE — TELEPHONE ENCOUNTER
----- Message from Ritu Chen sent at 11/23/2022  1:05 PM CST -----  Type:  Patient Returning Call    Who Called:pt wife Maria Alejandra   Who Left Message for Patient:office  Does the patient know what this is regarding?:surgery   Would the patient rather a call back or a response via [facility]? call  Best Call Back Number:[phone]  Additional Information: please call wife

## 2022-11-28 ENCOUNTER — TELEPHONE (OUTPATIENT)
Dept: UROLOGY | Facility: CLINIC | Age: 77
End: 2022-11-28
Payer: MEDICARE

## 2022-11-28 ENCOUNTER — NURSE TRIAGE (OUTPATIENT)
Dept: ADMINISTRATIVE | Facility: CLINIC | Age: 77
End: 2022-11-28
Payer: MEDICARE

## 2022-11-28 ENCOUNTER — HOSPITAL ENCOUNTER (OUTPATIENT)
Dept: INTERVENTIONAL RADIOLOGY/VASCULAR | Facility: HOSPITAL | Age: 77
Discharge: HOME | End: 2022-11-28
Attending: UROLOGY
Payer: MEDICARE

## 2022-11-28 VITALS
OXYGEN SATURATION: 92 % | HEIGHT: 72 IN | SYSTOLIC BLOOD PRESSURE: 143 MMHG | TEMPERATURE: 98 F | HEART RATE: 64 BPM | BODY MASS INDEX: 28.17 KG/M2 | DIASTOLIC BLOOD PRESSURE: 76 MMHG | WEIGHT: 208 LBS | RESPIRATION RATE: 14 BRPM

## 2022-11-28 DIAGNOSIS — N20.0 KIDNEY STONE: ICD-10-CM

## 2022-11-28 PROCEDURE — 25000003 PHARM REV CODE 250: Performed by: RADIOLOGY

## 2022-11-28 PROCEDURE — 50693 PLMT URETERAL STENT PRQ: CPT | Mod: LT,,, | Performed by: RADIOLOGY

## 2022-11-28 PROCEDURE — 63600175 PHARM REV CODE 636 W HCPCS: Performed by: RADIOLOGY

## 2022-11-28 PROCEDURE — 99152 MOD SED SAME PHYS/QHP 5/>YRS: CPT

## 2022-11-28 PROCEDURE — 50693 IR URETERAL STENT PLACEMENT: ICD-10-PCS | Mod: LT,,, | Performed by: RADIOLOGY

## 2022-11-28 PROCEDURE — C2617 STENT, NON-COR, TEM W/O DEL: HCPCS

## 2022-11-28 PROCEDURE — 99153 MOD SED SAME PHYS/QHP EA: CPT

## 2022-11-28 RX ORDER — SODIUM CHLORIDE 9 MG/ML
INJECTION, SOLUTION INTRAVENOUS
Status: COMPLETED | OUTPATIENT
Start: 2022-11-28 | End: 2022-11-28

## 2022-11-28 RX ORDER — LIDOCAINE HYDROCHLORIDE 10 MG/ML
INJECTION INFILTRATION; PERINEURAL
Status: COMPLETED | OUTPATIENT
Start: 2022-11-28 | End: 2022-11-28

## 2022-11-28 RX ORDER — HYDROMORPHONE HYDROCHLORIDE 2 MG/ML
INJECTION, SOLUTION INTRAMUSCULAR; INTRAVENOUS; SUBCUTANEOUS
Status: COMPLETED | OUTPATIENT
Start: 2022-11-28 | End: 2022-11-28

## 2022-11-28 RX ORDER — FENTANYL CITRATE 50 UG/ML
INJECTION, SOLUTION INTRAMUSCULAR; INTRAVENOUS
Status: COMPLETED | OUTPATIENT
Start: 2022-11-28 | End: 2022-11-28

## 2022-11-28 RX ORDER — CIPROFLOXACIN 2 MG/ML
400 INJECTION, SOLUTION INTRAVENOUS ONCE
Status: COMPLETED | OUTPATIENT
Start: 2022-11-28 | End: 2022-11-28

## 2022-11-28 RX ORDER — MIDAZOLAM HYDROCHLORIDE 1 MG/ML
INJECTION INTRAMUSCULAR; INTRAVENOUS
Status: COMPLETED | OUTPATIENT
Start: 2022-11-28 | End: 2022-11-28

## 2022-11-28 RX ADMIN — MIDAZOLAM HYDROCHLORIDE 1 MG: 1 INJECTION, SOLUTION INTRAMUSCULAR; INTRAVENOUS at 11:11

## 2022-11-28 RX ADMIN — FENTANYL CITRATE 50 MCG: 50 INJECTION, SOLUTION INTRAMUSCULAR; INTRAVENOUS at 11:11

## 2022-11-28 RX ADMIN — CIPROFLOXACIN 400 MG: 2 INJECTION, SOLUTION INTRAVENOUS at 10:11

## 2022-11-28 RX ADMIN — FENTANYL CITRATE 25 MCG: 50 INJECTION, SOLUTION INTRAMUSCULAR; INTRAVENOUS at 12:11

## 2022-11-28 RX ADMIN — MIDAZOLAM HYDROCHLORIDE 0.5 MG: 1 INJECTION, SOLUTION INTRAMUSCULAR; INTRAVENOUS at 12:11

## 2022-11-28 RX ADMIN — SODIUM CHLORIDE 500 ML: 0.9 INJECTION, SOLUTION INTRAVENOUS at 11:11

## 2022-11-28 RX ADMIN — HYDROMORPHONE HYDROCHLORIDE 0.5 MG: 2 INJECTION INTRAMUSCULAR; INTRAVENOUS; SUBCUTANEOUS at 12:11

## 2022-11-28 RX ADMIN — LIDOCAINE HYDROCHLORIDE 5 ML: 10 INJECTION, SOLUTION INFILTRATION; PERINEURAL at 11:11

## 2022-11-28 NOTE — TELEPHONE ENCOUNTER
Call triaged.  No answer at several numbers.  Contacted patient, informed wife called stating he's been nauseated.  Patient added he's dizzy.  Has not eaten today, did have procedure with IR today.  He stated his wife is on the way to  something to eat for him now.  Will check on him tomorrow with recommendations from Dr Murray.  He voiced understanding.

## 2022-11-28 NOTE — PROCEDURES
Interventional Radiology Immediate Post-Procedure Note    Pre-Op Diagnosis: Left hydronephrosis  Post-Op Diagnosis: Same    Procedure: Antegrade nephrostogram, conversion of perc neph to double J ureteral stent    Procedure performed by: To Laws MD  Assistants: None    Estimated Blood Loss: Minimal  Specimen Removed: Yes    Findings/description of procedure:  Complete obstruction at left UPJ 2/2 at least 1 impacted stone. This was crossed and an 8-Fr x 24 cm double J stent was placed with distal tip in the urinary bladder and proximal tip in an upper pole calyx. Minimal bleeding so perc access was removed.    No immediate complications. Patient tolerated procedure well. Please see full dictated procedure report for additional details and recommendations.      To Laws MD  [facility]  Pager [phone]

## 2022-11-28 NOTE — SEDATION DOCUMENTATION
IR procedure - ureteral stent placement - is completed. Patient tolerated fair. He is awake and oriented. Vital signs are stable. Patient will return to IR pre/post to complete the prescribed recovery time of one hour.

## 2022-11-28 NOTE — H&P
Interventional Radiology Pre-Procedure History & Physical      Chief Complaint/Reason for Referral: Left hydronephrosis     History of Present Illness:  Brandan Werner is a 77 y.o. male who presents with left hydronephrosis 2/2 stones at the UPJ. Retrograde stent placement unsuccessful. Underwent perc nephrostomy on 11/18/22. Referred back for antegrade placement of double J stent and possible removal of the the indwelling neph tube.    D/w Dr. Murray.    Past Medical History:   Diagnosis Date    Anxiety     BPH (benign prostatic hyperplasia)     Cataract     Elevated PSA     Erectile dysfunction     Hyperlipidemia     Hypertension     Hypogonadism male     Kidney stone 5/20/2020    Obesity     PTSD (post-traumatic stress disorder)     Shoulder arthritis     Urinary tract infection      Past Surgical History:   Procedure Laterality Date    COLONOSCOPY N/A 11/26/2018    Procedure: COLONOSCOPY;  Surgeon: Germán Moss MD;  Location: [address]);  Service: Endoscopy;  Laterality: N/A;    COLONOSCOPY W/ BIOPSIES 2010    CYSTOSCOPY Left 11/17/2022    Procedure: CYSTOSCOPY;  Surgeon: Dannie Murray MD;  Location: [facility];  Service: Urology;  Laterality: Left;    PENILE PROSTHESIS IMPLANT         Allergies:   Review of patient's allergies indicates:  No Known Allergies     Home Meds:   Prior to Admission medications    Medication Sig Start Date End Date Taking? Authorizing Provider   atorvastatin (LIPITOR) 40 MG tablet Take 20 mg by mouth once daily.   Yes Historical Provider   carbidopa-levodopa  mg (SINEMET)  mg per tablet TAKE 1 TABLET BY MOUTH THREE TIMES A DAY FOR PARKINSON'S DISE** WITH MEALSASE 10/18/22  Yes Historical Provider   celecoxib (CELEBREX) 200 MG capsule TAKE TWO CAPSULES BY MOUTH ONCE DAILY AS NEEDED FOR PAIN AND INFLAMMATION 2/7/22  Yes Historical Provider   FLUoxetine 20 MG capsule 60 mg. 11/9/22  Yes Historical Provider   tamsulosin (FLOMAX) 0.4 mg Cp24 Take 1 capsule  (0.4 mg total) by mouth nightly. 1/3/18  Yes Jeremy Dao MD   ascorbic acid, vitamin C, (VITAMIN C) 250 MG tablet Take 250 mg by mouth once daily.    Historical Provider   diclofenac (VOLTAREN) 75 MG EC tablet Take 1 tablet (75 mg total) by mouth 2 (two) times daily as needed (joint pain). 10/3/16   Ellis Richardson Jr., MD   finasteride (PROSCAR) 5 mg tablet Take 1 tablet (5 mg total) by mouth once daily. 8/30/22 8/25/23  Dannie Murray MD   FLUoxetine 40 MG capsule TAKE ONE CAPSULE BY MOUTH EVERY DAY FOR MENTAL HEALTH 11/15/21   Historical Provider   fluticasone (FLONASE) 50 mcg/actuation nasal spray 2 sprays (100 mcg total) by Each Nare route once daily. 4/9/18   Viki Saenz MD   folic acid/multivit-min/lutein (CENTRUM SILVER ORAL) Take by mouth.    Historical Provider   garlic 1,000 mg Cap Take by mouth.    Historical Provider   GINGER ROOT XT-FENNEL SD XT ORAL Take 550 mg by mouth.    Historical Provider   glucosam/chond-msm1/C/agueda/bor (GLUCOSAMINE-CHOND-MSM COMPLEX ORAL) Take by mouth.    Historical Provider   lisinopril (PRINIVIL,ZESTRIL) 5 MG tablet Take 5 mg by mouth once daily.    Historical Provider   mecobalamin (B12 ACTIVE ORAL) Take by mouth.    Historical Provider   meloxicam (MOBIC) 15 MG tablet Take 15 mg by mouth daily as needed for Pain (and inflammation).    Historical Provider   peg 400-propylene glycol 0.4-0.3 % Drop INSTILL ONE DROP IN EACH EYE FOUR TIMES A DAY AS NEEDED FOR DRY EYES 1/28/21   Historical Provider   primidone (MYSOLINE) 50 MG Tab Take 2 tablets (100 mg total) by mouth every evening. 3/30/21 9/27/22  Briana Murillo MD   psyllium (METAMUCIL) powder Take 1 packet by mouth 3 (three) times daily.    Historical Provider   traZODone (DESYREL) 100 MG tablet Take 1 tablet by mouth nightly as needed. 5/16/22   Historical Provider   triamcinolone acetonide 0.1% (KENALOG) 0.1 % cream Apply topically 3 (three) times daily. 5/25/20   Km Chicas MD   vitamin E  100 UNIT capsule Take 100 Units by mouth every morning.    Historical Provider   VOLTAREN 1 % Gel Apply 4 g topically 4 (four) times daily as needed (pain and inflammation).  10/2/17   Historical Provider       Anticoagulation/Antiplatelet Meds: no anticoagulation    Review of Systems:   Hematological: no known coagulopathies  Respiratory: no shortness of breath  Cardiovascular: no chest pain  Gastrointestinal: no abdominal pain  Genitourinary: no dysuria  Musculoskeletal: negative  Neurological: no TIA or stroke symptoms     Physical Exam:  Temp: 98.3 °F (36.8 °C) (11/28/22 1026)  Pulse: 76 (11/28/22 1026)  Resp: 15 (11/28/22 1026)  BP: (!) 146/85 (11/28/22 1026)  SpO2: 97 % (11/28/22 1026)    General: WNWD, NAD  HEENT: Normocephalic, sclera anicteric, oropharynx clear  Neck: Supple, no palpable lymphadenopathy  Heart: RRR  Lungs: Symmetric excursions, breathing unlabored  Abd: NTND, soft, left neph tube in place, clear urine in bag  Extremities: BERUMEN  Neuro: Gross nonfocal    Laboratory:  Lab Results   Component Value Date    INR 1.0 11/17/2022       Lab Results   Component Value Date    WBC 10.67 11/18/2022    HGB 11.7 (L) 11/18/2022    HCT 35.6 (L) 11/18/2022    MCV 95 11/18/2022     11/18/2022      Lab Results   Component Value Date    GLU 80 11/18/2022     11/18/2022    K 4.0 11/18/2022     11/18/2022    CO2 27 11/18/2022    BUN 13 11/18/2022    CREATININE 0.9 11/18/2022    CALCIUM 8.4 (L) 11/18/2022    ALT 10 08/19/2022    AST 12 08/19/2022    ALBUMIN 3.8 08/19/2022    BILITOT 0.6 08/19/2022    BILIDIR 0.2 05/21/2008       Imaging:  Neph tube palcement 11/18/22 and CT scan 11/14/22 reviewed.    Assessment/Plan:  77 y.o. male with left hydro. Will attempt internalization today.    Sedation:  Sedation history: have not been any systemic reactions  ASA: 3 / Mallampati: 3  Sedation plan: Up to moderate (Versed, fentanyl)     Risks (including, but not limited to, pain, bleeding, infection, damage  to nearby structures, treatment failure/recurrence, and the need for additional procedures), potential benefits, and alternatives were discussed with the patient. All questions were answered to the best of my abilities. The patient wishes to proceed. Written informed consent was obtained.      To Laws MD  [facility]  Pager [phone]

## 2022-11-28 NOTE — TELEPHONE ENCOUNTER
Wife calling on behalf of pt. States pt had procedure done today and since procedure has felt nauseated. No vomiting. Wife would like to speak with someone about procedure and symptoms. Also asking if pt should be on antibiotics. Advised would route to provider asking about concerns.  Advised to call back with concerns or worsening symptoms. Verbalized understanding.     Reason for Disposition   Patient wants to be seen    Additional Information   Negative: Shock suspected (e.g., cold/pale/clammy skin, too weak to stand, low BP, rapid pulse)   Negative: Sounds like a life-threatening emergency to the triager   Negative: Unable to walk, or can only walk with assistance (e.g., requires support)   Negative: Difficulty breathing   Negative: Insulin-dependent diabetes (Type I) and glucose > 400 mg/dL (22 mmol/L)   Negative: Drinking very little and dehydration suspected (e.g., no urine > 12 hours, very dry mouth, very lightheaded)   Negative: Patient sounds very sick or weak to the triager   Negative: Fever > 104 F  (40 C)   Negative: Fever > 101 F  (38.3 C) and over 60 years of age   Negative: Fever > 100.0 F  (37.8 C) and bedridden (e.g., nursing home patient, CVA, chronic illness, recovering from surgery)   Negative: Fever > 100.0 F  (37.8 C) and diabetes mellitus or weak immune system (e.g., HIV positive, cancer chemo, splenectomy, chronic steroids)   Negative: Taking any of the following medications: digoxin (Lanoxin), lithium, theophylline, phenytoin (Dilantin)   Negative: Yellowish color of the skin or white of the eye (i.e., jaundice)   Negative: Fever present > 3 days (72 hours)    Protocols used: Nausea-A-OH

## 2022-11-28 NOTE — NURSING
Patient is discharged from IR. IV is removed. Site is dressed, and patient is advised to leave dressing in place for at least 20 to 30 minutes. AVS is printed and reviewed. Upcoming appointments and the [facility] number is highlighted for convenience. The opportunity to ask questions is provided. Patient is wheeled to family vehicle on the entrance ramp.

## 2022-11-28 NOTE — DISCHARGE SUMMARY
Interventional Radiology Short Stay Discharge Summary      Admit Date: 11/28/2022  Discharge Date: 11/28/2022     Hospital Course: Uneventful    Discharge Diagnosis: Left hydronephrosis s/p conversion of perc neph to double J ureteral stent today    Discharge Condition: Stable    Discharge Disposition: Home    Diet: Resume prior diet    Activity: Activity as tolerated and no driving for today    Follow-up: With referring provider      To Laws MD  [facility]  Pager [phone]

## 2022-11-29 DIAGNOSIS — N20.0 KIDNEY STONE: Primary | ICD-10-CM

## 2022-11-29 RX ORDER — TRAMADOL HYDROCHLORIDE 50 MG/1
50 TABLET ORAL EVERY 6 HOURS PRN
Qty: 12 TABLET | Refills: 0 | Status: SHIPPED | OUTPATIENT
Start: 2022-11-29 | End: 2022-12-02

## 2022-11-29 NOTE — TELEPHONE ENCOUNTER
----- Message from Erica Hall sent at 11/29/2022  9:29 AM CST -----  Regarding: call back  Contact: [phone]  Type:  Patient Returning Call    Who Called: PT   Who Left Message for Patient: Nurse   Does the patient know what this is regarding?: Yes   Would the patient rather a call back or a response via [facility]? Call back   Best Call Back Number: [phone]  Additional Information: Patient is also complaining of pain and needs pain meds.

## 2022-11-30 ENCOUNTER — TELEPHONE (OUTPATIENT)
Dept: UROLOGY | Facility: CLINIC | Age: 77
End: 2022-11-30
Payer: MEDICARE

## 2022-11-30 NOTE — TELEPHONE ENCOUNTER
Reached out to patient, informed of recommendations.  He informed me has not been feeling like that.  Picked up tramadol from pharmacy.

## 2022-11-30 NOTE — TELEPHONE ENCOUNTER
----- Message from Ginger Osorio sent at 11/29/2022  3:39 PM CST -----  Type:  RX Refill Request    Who Called:   Refill or New Rx:refill   RX Name and Strength: traMADoL (ULTRAM) 50 mg tablet and  eq stool softner 100mg (not list)  Preferred Pharmacy with phone number: [facility] [address] [facility] [place] [facility] [address] [facility]  Local or Mail Order:local  Ordering Provider:Trevor  Would the patient rather a call back or a response via [facility]? Call   Best Call Back Number:[phone]  Additional Information:   Caller stated she called this morning regarding this and pharmacy doesn't have it

## 2022-12-02 ENCOUNTER — ANESTHESIA (OUTPATIENT)
Dept: SURGERY | Facility: HOSPITAL | Age: 77
End: 2022-12-02
Payer: MEDICARE

## 2022-12-02 ENCOUNTER — ANESTHESIA EVENT (OUTPATIENT)
Dept: SURGERY | Facility: HOSPITAL | Age: 77
End: 2022-12-02
Payer: MEDICARE

## 2022-12-02 ENCOUNTER — HOSPITAL ENCOUNTER (OUTPATIENT)
Facility: HOSPITAL | Age: 77
Discharge: HOME | End: 2022-12-02
Attending: UROLOGY | Admitting: UROLOGY
Payer: MEDICARE

## 2022-12-02 DIAGNOSIS — N20.0 KIDNEY STONE: Primary | ICD-10-CM

## 2022-12-02 PROCEDURE — 36000705 HC OR TIME LEV I EA ADD 15 MIN: Performed by: UROLOGY

## 2022-12-02 PROCEDURE — 50590 PR FRAGMENT KIDNEY STONE/ ESWL: ICD-10-PCS | Mod: LT,,, | Performed by: UROLOGY

## 2022-12-02 PROCEDURE — 63600175 PHARM REV CODE 636 W HCPCS: Performed by: ANESTHESIOLOGY

## 2022-12-02 PROCEDURE — 71000016 HC POSTOP RECOV ADDL HR: Performed by: UROLOGY

## 2022-12-02 PROCEDURE — 25000003 PHARM REV CODE 250: Performed by: NURSE ANESTHETIST, CERTIFIED REGISTERED

## 2022-12-02 PROCEDURE — 63600175 PHARM REV CODE 636 W HCPCS: Performed by: NURSE ANESTHETIST, CERTIFIED REGISTERED

## 2022-12-02 PROCEDURE — 37000008 HC ANESTHESIA 1ST 15 MINUTES: Performed by: UROLOGY

## 2022-12-02 PROCEDURE — 63600175 PHARM REV CODE 636 W HCPCS: Performed by: UROLOGY

## 2022-12-02 PROCEDURE — 71000033 HC RECOVERY, INTIAL HOUR: Performed by: UROLOGY

## 2022-12-02 PROCEDURE — 37000009 HC ANESTHESIA EA ADD 15 MINS: Performed by: UROLOGY

## 2022-12-02 PROCEDURE — 71000015 HC POSTOP RECOV 1ST HR: Performed by: UROLOGY

## 2022-12-02 PROCEDURE — 36000704 HC OR TIME LEV I 1ST 15 MIN: Performed by: UROLOGY

## 2022-12-02 PROCEDURE — 50590 FRAGMENTING OF KIDNEY STONE: CPT | Mod: LT,,, | Performed by: UROLOGY

## 2022-12-02 RX ORDER — HYDROMORPHONE HYDROCHLORIDE 2 MG/ML
0.4 INJECTION, SOLUTION INTRAMUSCULAR; INTRAVENOUS; SUBCUTANEOUS ONCE
Status: COMPLETED | OUTPATIENT
Start: 2022-12-02 | End: 2022-12-02

## 2022-12-02 RX ORDER — PHENYLEPHRINE HYDROCHLORIDE 10 MG/ML
INJECTION INTRAVENOUS
Status: DISCONTINUED | OUTPATIENT
Start: 2022-12-02 | End: 2022-12-02

## 2022-12-02 RX ORDER — FENTANYL CITRATE 50 UG/ML
INJECTION, SOLUTION INTRAMUSCULAR; INTRAVENOUS
Status: DISCONTINUED | OUTPATIENT
Start: 2022-12-02 | End: 2022-12-02

## 2022-12-02 RX ORDER — DEXAMETHASONE SODIUM PHOSPHATE 4 MG/ML
INJECTION, SOLUTION INTRA-ARTICULAR; INTRALESIONAL; INTRAMUSCULAR; INTRAVENOUS; SOFT TISSUE
Status: DISCONTINUED | OUTPATIENT
Start: 2022-12-02 | End: 2022-12-02

## 2022-12-02 RX ORDER — ACETAMINOPHEN 10 MG/ML
1000 INJECTION, SOLUTION INTRAVENOUS ONCE
Status: DISCONTINUED | OUTPATIENT
Start: 2022-12-02 | End: 2022-12-02 | Stop reason: HOSPADM

## 2022-12-02 RX ORDER — ONDANSETRON 2 MG/ML
INJECTION INTRAMUSCULAR; INTRAVENOUS
Status: DISCONTINUED | OUTPATIENT
Start: 2022-12-02 | End: 2022-12-02

## 2022-12-02 RX ORDER — HYDROMORPHONE HYDROCHLORIDE 2 MG/ML
0.4 INJECTION, SOLUTION INTRAMUSCULAR; INTRAVENOUS; SUBCUTANEOUS EVERY 5 MIN PRN
Status: DISCONTINUED | OUTPATIENT
Start: 2022-12-02 | End: 2022-12-02 | Stop reason: HOSPADM

## 2022-12-02 RX ORDER — LIDOCAINE HYDROCHLORIDE 20 MG/ML
INJECTION INTRAVENOUS
Status: DISCONTINUED | OUTPATIENT
Start: 2022-12-02 | End: 2022-12-02

## 2022-12-02 RX ORDER — MORPHINE SULFATE 2 MG/ML
3 INJECTION, SOLUTION INTRAMUSCULAR; INTRAVENOUS
Status: DISCONTINUED | OUTPATIENT
Start: 2022-12-02 | End: 2022-12-02 | Stop reason: HOSPADM

## 2022-12-02 RX ORDER — PROPOFOL 10 MG/ML
VIAL (ML) INTRAVENOUS
Status: DISCONTINUED | OUTPATIENT
Start: 2022-12-02 | End: 2022-12-02

## 2022-12-02 RX ORDER — CEFAZOLIN SODIUM 2 G/50ML
2 SOLUTION INTRAVENOUS ONCE
Status: COMPLETED | OUTPATIENT
Start: 2022-12-02 | End: 2022-12-02

## 2022-12-02 RX ORDER — HYDROCODONE BITARTRATE AND ACETAMINOPHEN 5; 325 MG/1; MG/1
1 TABLET ORAL EVERY 4 HOURS PRN
Status: DISCONTINUED | OUTPATIENT
Start: 2022-12-02 | End: 2022-12-02 | Stop reason: HOSPADM

## 2022-12-02 RX ORDER — ACETAMINOPHEN 325 MG/1
650 TABLET ORAL EVERY 4 HOURS PRN
Status: DISCONTINUED | OUTPATIENT
Start: 2022-12-02 | End: 2022-12-02 | Stop reason: HOSPADM

## 2022-12-02 RX ORDER — ONDANSETRON 2 MG/ML
4 INJECTION INTRAMUSCULAR; INTRAVENOUS DAILY PRN
Status: DISCONTINUED | OUTPATIENT
Start: 2022-12-02 | End: 2022-12-02 | Stop reason: HOSPADM

## 2022-12-02 RX ORDER — ONDANSETRON 8 MG/1
8 TABLET, ORALLY DISINTEGRATING ORAL EVERY 8 HOURS PRN
Status: DISCONTINUED | OUTPATIENT
Start: 2022-12-02 | End: 2022-12-02 | Stop reason: HOSPADM

## 2022-12-02 RX ADMIN — PROPOFOL 150 MG: 10 INJECTION, EMULSION INTRAVENOUS at 08:12

## 2022-12-02 RX ADMIN — ONDANSETRON 4 MG: 2 INJECTION, SOLUTION INTRAMUSCULAR; INTRAVENOUS at 09:12

## 2022-12-02 RX ADMIN — SODIUM CHLORIDE, SODIUM LACTATE, POTASSIUM CHLORIDE, AND CALCIUM CHLORIDE: .6; .31; .03; .02 INJECTION, SOLUTION INTRAVENOUS at 08:12

## 2022-12-02 RX ADMIN — FENTANYL CITRATE 50 MCG: 50 INJECTION, SOLUTION INTRAMUSCULAR; INTRAVENOUS at 08:12

## 2022-12-02 RX ADMIN — DEXAMETHASONE SODIUM PHOSPHATE 4 MG: 4 INJECTION, SOLUTION INTRA-ARTICULAR; INTRALESIONAL; INTRAMUSCULAR; INTRAVENOUS; SOFT TISSUE at 09:12

## 2022-12-02 RX ADMIN — LIDOCAINE HYDROCHLORIDE 80 MG: 20 INJECTION, SOLUTION INTRAVENOUS at 08:12

## 2022-12-02 RX ADMIN — HYDROMORPHONE HYDROCHLORIDE 0.4 MG: 2 INJECTION, SOLUTION INTRAMUSCULAR; INTRAVENOUS; SUBCUTANEOUS at 10:12

## 2022-12-02 RX ADMIN — CEFAZOLIN SODIUM 2 G: 2 SOLUTION INTRAVENOUS at 08:12

## 2022-12-02 RX ADMIN — PHENYLEPHRINE HYDROCHLORIDE 100 MCG: 10 INJECTION INTRAVENOUS at 09:12

## 2022-12-02 NOTE — ANESTHESIA PREPROCEDURE EVALUATION
12/02/2022  Brandan Werner is a 77 y.o., male LITHOTRIPSY, ESWL under GA    Past Medical History:   Diagnosis Date    Anxiety     BPH (benign prostatic hyperplasia)     Cataract     Elevated PSA     Erectile dysfunction     Hyperlipidemia     Hypertension     Hypogonadism male     Kidney stone 5/20/2020    Obesity     PTSD (post-traumatic stress disorder)     Shoulder arthritis     Urinary tract infection      Past Surgical History:   Procedure Laterality Date    COLONOSCOPY N/A 11/26/2018    Procedure: COLONOSCOPY;  Surgeon: Germán Moss MD;  Location: [address]);  Service: Endoscopy;  Laterality: N/A;    COLONOSCOPY W/ BIOPSIES  2010    CYSTOSCOPY Left 11/17/2022    Procedure: CYSTOSCOPY;  Surgeon: Dannie Murray MD;  Location: [facility];  Service: Urology;  Laterality: Left;    PENILE PROSTHESIS IMPLANT         Pre-op Assessment    I have reviewed the Patient Summary Reports.   I have reviewed the NPO Status.   I have reviewed the Medications.     Review of Systems  Anesthesia Hx:  No problems with previous Anesthesia  History of prior surgery of interest to airway management or planning:  Denies Personal Hx of Anesthesia complications.   Social:  Former Smoker    Hematology/Oncology:         -- Anemia:   Cardiovascular:   Exercise tolerance: good Hypertension hyperlipidemia  Hypertension    Pulmonary:  Pulmonary Normal    Renal/:   renal calculi BPH    Hepatic/GI:  Hepatic/GI Normal    Neurological:  Neurology Normal    Endocrine:  Endocrine Normal    Psych:   Psychiatric History          Physical Exam  General:  Well nourished      Airway/Jaw/Neck:  Mouth Opening: Normal   Tongue: Normal   Mallampati: II TM Distance: Normal       Dental:  Dental Findings: Periodontal disease, Severe     Chest/Lungs:  Chest/Lungs Findings: Clear to auscultation, Normal Respiratory  Rate      Heart/Vascular:  Heart Findings: Rate: Normal  Rhythm: Regular Rhythm  Sounds: Normal        Mental Status:  Mental Status Findings:  Alert and Oriented       Lab Results   Component Value Date    WBC 10.67 11/18/2022    HGB 11.7 (L) 11/18/2022    HCT 35.6 (L) 11/18/2022     11/18/2022    CHOL 136 08/19/2022    TRIG 33 08/19/2022    HDL 64 08/19/2022    ALT 10 08/19/2022    AST 12 08/19/2022     11/18/2022    K 4.0 11/18/2022     11/18/2022    CREATININE 0.9 11/18/2022    BUN 13 11/18/2022    CO2 27 11/18/2022    TSH 1.327 08/19/2022    PSA 8.2 (H) 08/19/2022    INR 1.0 11/17/2022    HGBA1C 5.6 11/25/2019         Anesthesia Plan  Type of Anesthesia, risks & benefits discussed:  Anesthesia Type:  general, MAC    Patient's Preference:   Plan Factors:          Intra-op Monitoring Plan: Standard ASA Monitors  Intra-op Monitoring Plan Comments:   Post Op Pain Control Plan: multimodal analgesia  Post Op Pain Control Plan Comments:     Induction:   IV  Beta Blocker:  Patient is not currently on a Beta-Blocker (No further documentation required).       Informed Consent: Informed consent signed with the Patient and all parties understand the risks and agree with anesthesia plan.  All questions answered.  Anesthesia consent signed with patient.  ASA Score: 2     Day of Surgery Review of History & Physical:              Ready For Surgery From Anesthesia Perspective.           Physical Exam  General: Well nourished    Airway:  Mallampati: II   Mouth Opening: Normal  TM Distance: Normal  Tongue: Normal    Dental:  Periodontal disease, Severe    Chest/Lungs:  Clear to auscultation, Normal Respiratory Rate    Heart:  Rate: Normal  Rhythm: Regular Rhythm  Sounds: Normal          Anesthesia Plan  Type of Anesthesia, risks & benefits discussed:    Anesthesia Type: general, MAC  Intra-op Monitoring Plan: Standard ASA Monitors  Post Op Pain Control Plan: multimodal analgesia  Induction:  IV  Airway Plan:  Direct  Informed Consent: Informed consent signed with the Patient and all parties understand the risks and agree with anesthesia plan.  All questions answered.   ASA Score: 2    Ready For Surgery From Anesthesia Perspective.       .

## 2022-12-02 NOTE — H&P
[facility])  Urology   H&P    Patient Name: Brandan Werner  MRN: [id number]      Subjective:     HPI:   Brandan Werner is a 77 y.o. male who presents with left kidney stone s/p failed left stent, pcn and stent internalization for left ESWL.  He has been doing well since PCN internalized to stent on Monday.  NO N/V/F/C.  Urine clear.  Reports has not taken NSAIDs in last 5 days as instructed, no blood thinners.      Past Medical History:   Diagnosis Date    Anxiety     BPH (benign prostatic hyperplasia)     Cataract     Elevated PSA     Erectile dysfunction     Hyperlipidemia     Hypertension     Hypogonadism male     Kidney stone 2020    Obesity     PTSD (post-traumatic stress disorder)     Shoulder arthritis     Urinary tract infection        Past Surgical History:   Procedure Laterality Date    COLONOSCOPY N/A 2018    Procedure: COLONOSCOPY;  Surgeon: Germán Moss MD;  Location: [address]);  Service: Endoscopy;  Laterality: N/A;    COLONOSCOPY W/ BIOPSIES      CYSTOSCOPY Left 2022    Procedure: CYSTOSCOPY;  Surgeon: Dannie Murray MD;  Location: [facility];  Service: Urology;  Laterality: Left;    PENILE PROSTHESIS IMPLANT         Review of patient's allergies indicates:  No Known Allergies    Family History       Problem Relation (Age of Onset)    Cancer Mother    Cataracts Mother    Heart disease Father (62)    Stroke Brother (62)            Tobacco Use    Smoking status: Former     Packs/day: 1.00     Years: 10.00     Pack years: 10.00     Types: Cigarettes     Quit date:      Years since quittin.9    Smokeless tobacco: Never   Substance and Sexual Activity    Alcohol use: No    Drug use: No    Sexual activity: Yes     Partners: Female       Review of Systems    Objective:     Temp:  [98.1 °F (36.7 °C)] 98.1 °F (36.7 °C)  Pulse:  [68] 68  Resp:  [18] 18  SpO2:  [98 %] 98 %  BP: (131)/(74) 131/74       NAD  RRR  CTAB  ABD S/ND/NTTP       Ext: no  edema      Significant Labs:  BMP:  No results for input(s): NA, K, CL, CO2, BUN, CREATININE, LABGLOM, GLUCOSE, CALCIUM in the last 168 hours.    CBC:  No results for input(s): WBC, HGB, HCT, PLT in the last 168 hours.    Urinalysis  No results for input(s): COLORU, CLARITYU, SPECGRAV, PHUR, PROTEINUA, GLUCOSEU, BILIRUBINCON, BLOODU, WBCU, RBCU, BACTERIA, MUCUS, NITRITE, LEUKOCYTESUR, UROBILINOGEN, HYALINECASTS in the last 24 hours.    Results for orders placed or performed during the hospital encounter of 11/14/22 (from the past 2160 hour(s))   CT Urogram Abd Pelvis W WO    Impression    Left UPJ stone measuring 1.0 cm with moderately severe upstream hydronephrosis.  Additional nonobstructing left renal calculi.    Bilateral low-density renal cortical lesions, too small to characterize and possible cysts.    Urinary bladder is partially decompressed with enlarged heterogeneous prostate with central gland protrusion.  Associated mild lower perivesicular stranding.  Etiology is nonspecific to include sequela of chronic partial outlet obstruction with underlying inflammatory component not excluded.  Suggest correlation with PSA and subsequent assessment such as cystoscopy as warranted.    Few shotty perirectal and left iliac chain node measuring up to 0.7 cm which appear slightly more conspicuous from prior, though remain nonspecific.  Continued close attention at follow-up recommended at a minimum.    Additional findings as above.      Electronically signed by: Robby Burden  Date:    11/14/2022  Time:    13:20           Assessment:     Problem Noted   Kidney Stone 5/20/2020    Left 1 cm UPJ and non obstructing on CTU 11/2022 with moderate hydronephrosis     Unable to place left stent 11/17/22 due to large prostate, prosthesis and unable to find ureteral orifice.   Left PCN 11/18/22, internalized to stent 11/28     Elevated Psa 3/11/2013    Biopsy 2013 ASAP 1 core, 2014 benign  PSA 8/22--8.2  Volume 183, PSAD  0.04       Bph (Benign Prostatic Hyperplasia)     183 gm prostate on CTU 2022  Initial AUA SS (8/2022): 19/2  PVR 67     History of Penile Implant 5/20/2020     Plan:     OR for left ESWL  The risks, benefits, alteratives of the procedure were discussed with the patient.  The patient was given time for questions, all questions were answered.  Written, informed consent was obtained.      Thank you for your consult.     Dannie Murray MD  [facility]

## 2022-12-02 NOTE — TRANSFER OF CARE
Anesthesia Transfer of Care Note    Patient: Brandan Werner    Procedure(s) Performed: Procedure(s) (LRB):  LITHOTRIPSY, ESWL [employer] ESWL machine, scheduling call [phone] 60 minutes (Left)    Patient location: PACU    Anesthesia Type: general    Transport from OR: Transported from OR on 6-10 L/min O2 by face mask with adequate spontaneous ventilation    Post pain: adequate analgesia    Post assessment: no apparent anesthetic complications and tolerated procedure well    Post vital signs: stable    Level of consciousness: awake    Nausea/Vomiting: no nausea/vomiting    Complications: none    Transfer of care protocol was followed      Last vitals:   Visit Vitals  /74 (BP Location: Right arm, Patient Position: Lying)   Pulse 68   Temp 36.7 °C (98.1 °F) (Skin)   Resp 18   Ht 6' (1.829 m)   Wt 94.3 kg (208 lb)   SpO2 98%   BMI 28.21 kg/m²

## 2022-12-02 NOTE — DISCHARGE SUMMARY
[facility] - Surgery (Hospital)  Discharge Note  Short Stay    Procedure(s) (LRB):  LITHOTRIPSY, ESWL [employer] ESWL machine, scheduling call [phone] 60 minutes (Left)      OUTCOME: Patient tolerated treatment/procedure well without complication and is now ready for discharge.    DISPOSITION: Home or Self Care    FINAL DIAGNOSIS:  Kidney stone    FOLLOWUP: In clinic    DISCHARGE INSTRUCTIONS:    Discharge Procedure Orders   X-Ray Abdomen AP 1 View   Standing Status: Future Standing Exp. Date: 06/02/23     Order Specific Question Answer Comments   Reason for Exam: f/u left ESWL for left UPJ stone    May the Radiologist modify the order per protocol to meet the clinical needs of the patient? Yes      Diet general     No dressing needed     Call MD for:  temperature >100.4     Call MD for:  persistent nausea and vomiting     Call MD for:  severe uncontrolled pain     Call MD for:   Order Comments: Strain urine.  Bring any fragments passed to you follow up appointment.    Some blood in the urine is normal and expected.  Drink plenty of fluids.     Some pain is expected.      Call the clinic at [phone] or return to the emergency department if you  have persistent fever >101.4, severe bleeding that does not stop, pain not controlled with medications, severe nausea and vomiting limiting intake of liquids and solids, or any other concerns.      Obtain KUB (xray) prior to follow up.  Office will call to make appointment.     Activity as tolerated        TIME SPENT ON DISCHARGE: 5 minutes

## 2022-12-02 NOTE — ANESTHESIA PROCEDURE NOTES
Intubation    Date/Time: 12/2/2022 8:44 AM  Performed by: Cintia Kam CRNA  Authorized by: Katharine Mckeon MD     Intubation:     Induction:  Intravenous    Intubated:  Postinduction    Mask Ventilation:  Not attempted    Attempts:  1    Attempted By:  CRNA    Difficult Airway Encountered?: No      Complications:  None    Airway Device:  Intubating LMA    Airway Device Size:  4.0    Style/Cuff Inflation:  Cuffed (inflated to minimal occlusive pressure)    Placement Verified By:  Capnometry    Complicating Factors:  None    Findings Post-Intubation:  BS equal bilateral and atraumatic/condition of teeth unchanged

## 2022-12-02 NOTE — PATIENT INSTRUCTIONS
ESWL post-op instructions:  You may see some blood in your urine while the stone fragments are passing and a few days afterward. Do not be alarmed, even if the urine was clear for a while. Push fluids and refrain from strenuous activity until clearing occurs. If you have difficulty passing clots or don't improve, call us. You can also try sitting in a warm tub of water to help to urinate if needed. Avoid medications such as Aspirin, Advil, Motrin, Plavix, or Coumadin, which thin the blood and cause bleeding.  If you have never had your stones analyzed, strain all urine and bring stone fragments with you to your next appointment.  Diet:  You may return to your normal diet immediately. Alcohol, spicy foods, acidy foods and drinks with caffeine may cause irritation or frequency and should be used in moderation. To keep your urine flowing freely and to avoid constipation, drink plenty of fluids during the day (8-10 glasses).  Activity:  While the kidney is healing do not engage in strenuous activity. If you are active, you may see more blood in the urine. We would suggest cutting down your activity under these circumstances until the bleeding has stopped, perhaps two weeks.  Bowels:  It is important to keep your bowels regular during the postoperative period. Straining with bowel movements can cause bleeding. A bowel movement every other day is reasonable. Use a mild over-the-counter laxative if needed, such as Milk of Magnesia 2-3 tablespoons, or 1-2 Dulcolax tablets. Call if you continue to have problems. Narcotics can worsen constipation; if you had been taking narcotics for pain, before, during or after your surgery, you may be constipated. Ditropan for bladder spasms may also cause constipation.  Problems you should report to us:  Fevers over 101.5 degrees Fahrenheit.   Inability to urinate.   Drug reactions (hives, rash, nausea, vomiting, diarrhea)   Severe burning or pain with urination that is not improving.    You will also have some burning with urination. This is normal after stone therapy and is also expected while the stent is in place. This burning should be mild or moderate and should improve over time. Severe burning, especially when it is not improving, can be a sign of infection.  Follow-up  After the stone has been fragmented you will likely need an x-ray prior to next clinic appointment    Bring stone fragments with you to your next appt.     In some select cases it is important to not leave the string on the stent.  The stent is usually removed 1-4 weeks after treatment.    Call Urology clinic with any questions or concerns.

## 2022-12-02 NOTE — OP NOTE
Extracorporal Shock Wave Lithotripsy (ESWL)  OPERATIVE NOTE    Procedure date: .    Preoperative Diagnosis:  left kidney stone    Postoperative Diagnosis: same    Procedure:  left extracorporal shock wave lithotripsy    Surgeon:  Dannie Murray MD    Anesthesia:  LMA  EBL:  0  Tubes and Drains:  none  Specimen(s):  none    Complications:  none    Indication:  78 yo M with left UPJ stone s/p failed stent, left PCN, internalized stent.  Here for left ESWL.      Findings:  Left 1 cm UPJ stone treated.      Description of Procedure:  The patient was identified and surgical site verification was performed prior to obtaining consent.  The patient was brought to the OR.  SCDs were placed on the bilateral lower extremities and were cycling during the entire procedure.  Fluoroscopy was done prior to anesthesia and the lefg stone was confirmed.  Adequate LMA anesthesia was obtained, and the patient was positioned supine on the table. A preoperative Time Out was performed addressing the anticipated surgical site, procedure, and safety precautions; everyone in the room agreed.  The patient received preoperative antibiotics.      After anesthesia was obtained, The stone was localized in the cross-hairs. The ESWL machine started to perform the shock wave lithotripsy.  With a slow ramp, the frequency was started at 60/minute and maximum was 90/minute.  Over a few hundred shocks the power was increased to a maximum of 7.  Intermittent fluoroscopy was used to confirm accurate shock wave delivery.  After a total of 2500 shocks, there appeared to be good fragmentation.  The patient was awakened and transferred to the recovery room in stable condition having tolerated the procedure well.      Dispositions:  follow up in 1 week with KUB.     Dannie Murray MD           
No complaints

## 2022-12-02 NOTE — ANESTHESIA POSTPROCEDURE EVALUATION
Anesthesia Post Evaluation    Patient: Brandan Werner    Procedure(s) Performed: Procedure(s) (LRB):  LITHOTRIPSY, ESWL [employer] [place] ESWL machine, scheduling call [phone] 60 minutes (Left)    Final Anesthesia Type: general      Patient location during evaluation: PACU  Patient participation: Yes- Able to Participate  Level of consciousness: awake and alert  Post-procedure vital signs: reviewed and stable  Pain management: adequate  Airway patency: patent  COLEEN mitigation strategies: Multimodal analgesia  PONV status at discharge: No PONV  Anesthetic complications: no      Cardiovascular status: hemodynamically stable and blood pressure returned to baseline  Respiratory status: room air, unassisted and spontaneous ventilation  Hydration status: euvolemic  Follow-up not needed.          Vitals Value Taken Time   /67 12/02/22 1130   Temp 36.7 °C (98.1 °F) 12/02/22 1130   Pulse 67 12/02/22 1130   Resp 16 12/02/22 1130   SpO2 98 % 12/02/22 1130         Event Time   Out of Recovery 10:20:05         Pain/Elvi Score: Pain Rating Prior to Med Admin: 6 (12/2/2022 10:42 AM)  Levi Score: 10 (12/2/2022 11:34 AM)

## 2022-12-05 VITALS
HEART RATE: 67 BPM | TEMPERATURE: 98 F | SYSTOLIC BLOOD PRESSURE: 138 MMHG | BODY MASS INDEX: 28.17 KG/M2 | DIASTOLIC BLOOD PRESSURE: 67 MMHG | OXYGEN SATURATION: 98 % | RESPIRATION RATE: 16 BRPM | HEIGHT: 72 IN | WEIGHT: 208 LBS

## 2022-12-14 ENCOUNTER — TELEPHONE (OUTPATIENT)
Dept: UROLOGY | Facility: CLINIC | Age: 77
End: 2022-12-14
Payer: MEDICARE

## 2022-12-14 NOTE — TELEPHONE ENCOUNTER
----- Message from Dannie Murray MD sent at 12/14/2022 10:37 AM CST -----  Regarding: f./u with KUB ASAP  Needs to follow up with KUB ASAP, MANUELB is ordered.

## 2022-12-15 ENCOUNTER — OFFICE VISIT (OUTPATIENT)
Dept: UROLOGY | Facility: CLINIC | Age: 77
End: 2022-12-15
Payer: MEDICARE

## 2022-12-15 ENCOUNTER — HOSPITAL ENCOUNTER (OUTPATIENT)
Dept: RADIOLOGY | Facility: HOSPITAL | Age: 77
Discharge: HOME | End: 2022-12-15
Attending: UROLOGY
Payer: MEDICARE

## 2022-12-15 DIAGNOSIS — N20.0 KIDNEY STONE: ICD-10-CM

## 2022-12-15 DIAGNOSIS — R97.20 ELEVATED PSA: ICD-10-CM

## 2022-12-15 DIAGNOSIS — N20.0 KIDNEY STONE: Primary | ICD-10-CM

## 2022-12-15 DIAGNOSIS — N40.0 BENIGN PROSTATIC HYPERPLASIA, UNSPECIFIED WHETHER LOWER URINARY TRACT SYMPTOMS PRESENT: ICD-10-CM

## 2022-12-15 LAB
BILIRUB SERPL-MCNC: NEGATIVE MG/DL
BLOOD URINE, POC: 250
CLARITY, POC UA: ABNORMAL
COLOR, POC UA: YELLOW
GLUCOSE UR QL STRIP: NORMAL
KETONES UR QL STRIP: NEGATIVE
LEUKOCYTE ESTERASE URINE, POC: ABNORMAL
NITRITE, POC UA: NEGATIVE
PH, POC UA: 5
PROTEIN, POC: 100
SPECIFIC GRAVITY, POC UA: 1.02
UROBILINOGEN, POC UA: NORMAL

## 2022-12-15 PROCEDURE — 1126F AMNT PAIN NOTED NONE PRSNT: CPT | Mod: CPTII,S$GLB,, | Performed by: UROLOGY

## 2022-12-15 PROCEDURE — 99999 PR PBB SHADOW E&M-EST. PATIENT-LVL III: CPT | Mod: PBBFAC,,, | Performed by: UROLOGY

## 2022-12-15 PROCEDURE — 74018 XR ABDOMEN AP 1 VIEW: ICD-10-PCS | Mod: 26,,, | Performed by: RADIOLOGY

## 2022-12-15 PROCEDURE — 99214 OFFICE O/P EST MOD 30 MIN: CPT | Mod: 24,S$GLB,, | Performed by: UROLOGY

## 2022-12-15 PROCEDURE — 74018 RADEX ABDOMEN 1 VIEW: CPT | Mod: 26,,, | Performed by: RADIOLOGY

## 2022-12-15 PROCEDURE — 1101F PT FALLS ASSESS-DOCD LE1/YR: CPT | Mod: CPTII,S$GLB,, | Performed by: UROLOGY

## 2022-12-15 PROCEDURE — 1160F RVW MEDS BY RX/DR IN RCRD: CPT | Mod: CPTII,S$GLB,, | Performed by: UROLOGY

## 2022-12-15 PROCEDURE — 1159F PR MEDICATION LIST DOCUMENTED IN MEDICAL RECORD: ICD-10-PCS | Mod: CPTII,S$GLB,, | Performed by: UROLOGY

## 2022-12-15 PROCEDURE — 1101F PR PT FALLS ASSESS DOC 0-1 FALLS W/OUT INJ PAST YR: ICD-10-PCS | Mod: CPTII,S$GLB,, | Performed by: UROLOGY

## 2022-12-15 PROCEDURE — 1160F PR REVIEW ALL MEDS BY PRESCRIBER/CLIN PHARMACIST DOCUMENTED: ICD-10-PCS | Mod: CPTII,S$GLB,, | Performed by: UROLOGY

## 2022-12-15 PROCEDURE — 3288F PR FALLS RISK ASSESSMENT DOCUMENTED: ICD-10-PCS | Mod: CPTII,S$GLB,, | Performed by: UROLOGY

## 2022-12-15 PROCEDURE — 81002 URINALYSIS NONAUTO W/O SCOPE: CPT | Mod: S$GLB,,, | Performed by: UROLOGY

## 2022-12-15 PROCEDURE — 1126F PR PAIN SEVERITY QUANTIFIED, NO PAIN PRESENT: ICD-10-PCS | Mod: CPTII,S$GLB,, | Performed by: UROLOGY

## 2022-12-15 PROCEDURE — 74018 RADEX ABDOMEN 1 VIEW: CPT | Mod: TC,FY

## 2022-12-15 PROCEDURE — 99999 PR PBB SHADOW E&M-EST. PATIENT-LVL III: ICD-10-PCS | Mod: PBBFAC,,, | Performed by: UROLOGY

## 2022-12-15 PROCEDURE — 99214 PR OFFICE/OUTPT VISIT, EST, LEVL IV, 30-39 MIN: ICD-10-PCS | Mod: 24,S$GLB,, | Performed by: UROLOGY

## 2022-12-15 PROCEDURE — 87186 SC STD MICRODIL/AGAR DIL: CPT | Performed by: UROLOGY

## 2022-12-15 PROCEDURE — 81002 POCT URINE DIPSTICK WITHOUT MICROSCOPE: ICD-10-PCS | Mod: S$GLB,,, | Performed by: UROLOGY

## 2022-12-15 PROCEDURE — 1159F MED LIST DOCD IN RCRD: CPT | Mod: CPTII,S$GLB,, | Performed by: UROLOGY

## 2022-12-15 PROCEDURE — 87077 CULTURE AEROBIC IDENTIFY: CPT | Performed by: UROLOGY

## 2022-12-15 PROCEDURE — 3288F FALL RISK ASSESSMENT DOCD: CPT | Mod: CPTII,S$GLB,, | Performed by: UROLOGY

## 2022-12-15 PROCEDURE — 87086 URINE CULTURE/COLONY COUNT: CPT | Performed by: UROLOGY

## 2022-12-15 PROCEDURE — 87088 URINE BACTERIA CULTURE: CPT | Performed by: UROLOGY

## 2022-12-15 NOTE — PROGRESS NOTES
Subjective:       Patient ID: Brandan Werner is a 77 y.o. male.    Chief Complaint: Follow-up (KUB results)       This is a 77 y.o.  male patient with BPH, elevated PSA, kidney stone.  Past patient of Dr. Laboy last seen in 2020 for cystoscopy for microhematuria that was negative except large prostate.  H/o penile implant that still uses.  CTU in 2020 with 150 gm prostate no other findings.  Long h/o elevated PSA.    PSA 6.2 in 2013 and biopsy negative  Biopsy in 2013 with ASAP  PSA recently checked by PCP and was 8.2   Moderate/severe LUTs, AUA SS 19/2, PVR 67  Worsening frequency of urination on finasteride, tamsulosin.    UA showed microhematuria.  CTU done showed left 1 cm UPJ stone with moderate hydronephrosis 11/22 and left non obstructing stones.    Failed left stent, required PCN and antegrade stent  Left ESWL 12/2  Follow-up KUB shows resolution of UPJ stone continued lower pole left stone.             LAST PSA  Lab Results   Component Value Date    PSA 8.2 (H) 08/19/2022    PSA 7.0 (H) 03/02/2021    PSA 6.5 (H) 01/30/2020    PSA 3.0 11/02/2016    PSA 5.0 (H) 05/02/2014    PSA 6.2 (H) 10/18/2013    PSA 4.49 (H) 02/22/2013    PSA 4.1 (H) 05/09/2012    PSA 4.04 (H) 04/05/2012    PSA 2.8 08/31/2010    PSA 3.1 02/25/2009    PSA 2.2 09/06/2007    PSA 1.8 09/30/2006    PSA 1.7 12/28/2005    PSA 1.8 10/30/2004    PSADIAG 7.0 (H) 03/07/2019    PSADIAG 6.6 (H) 09/07/2018    PSADIAG 6.4 (H) 06/08/2018    PSADIAG 5.3 (H) 01/03/2018    PSADIAG 2.9 02/22/2016    PSADIAG 2.9 08/24/2015    PSADIAG 5.7 (H) 02/24/2015    PSADIAG 5.1 (H) 06/26/2014    PSADIAG 5.8 (H) 12/03/2013       Lab Results   Component Value Date    CREATININE 0.9 11/18/2022       ---  PMH/PSH/Medications/Allergies/Social history reviewed and as in chart.    Review of Systems   Constitutional:  Negative for activity change, chills and fever.   HENT:  Negative for congestion.    Respiratory:  Negative for cough, chest tightness and shortness of breath.     Cardiovascular:  Negative for chest pain and palpitations.   Gastrointestinal:  Negative for abdominal distention, abdominal pain, nausea and vomiting.   Genitourinary:  Positive for difficulty urinating. Negative for flank pain, frequency, hematuria, penile pain, scrotal swelling and testicular pain.   Musculoskeletal:  Negative for gait problem.     Objective:      Physical Exam  HENT:      Head: Atraumatic.   Pulmonary:      Effort: Pulmonary effort is normal.   Neurological:      General: No focal deficit present.      Mental Status: He is alert and oriented to person, place, and time.       Assessment:     Problem Noted   Kidney Stone 5/20/2020    Left 1.5 cm UPJ and non obstructing left lower pole greater than 1 cm on CTU 11/2022 with moderate hydronephrosis     Unable to place left stent 11/17/22 due to large prostate, prosthesis and unable to find ureteral orifice.   Left PCN 11/18/22, internalized to stent 11/28  Left ESWL UPJ stone 12/2/22  KUB after with 1.8 cm left lower pole stone consistent with lower pole stone on CT urogram, no renal pelvis/gualberto stent stone seen.     Elevated Psa 3/11/2013    Biopsy 2013 ASAP 1 core, 2014 benign  PSA 8/22--8.2  Volume 183, PSAD 0.04       Bph (Benign Prostatic Hyperplasia)     183 gm prostate on CTU 2022  Initial AUA SS (8/2022): 19/2  PVR 67     History of Penile Implant 5/20/2020       Plan:     Past CT scan and KUB reviewed.  It appears left extracorporeal shockwave lithotripsy was successful for clearing UPJ stone however there still remains a left lower pole stone unclear if part of this is from fragments from the UPJ stone given size on KUB measured at 1.8 cm  I discussed options including observation of lower pole stone with removal of stent, left ureteroscopy given previous antegrade stent placement, repeat left extracorporeal shockwave lithotripsy for lower pole stone.  Discussed that shockwave lithotripsy repeat might be successful given successful  procedure with the UPJ stone however greater than 1.5 cm lower pole stone not ideal candidate for this.  Discussed that left ureteroscopy is more invasive with higher chance of complete stone clearance.  Discussed with observation always a risk that the lower pole stone will become obstructing.  He is going to discuss with his wife, will discuss with a virtual visit soon.  Also discussed treatment of his prostate given enlarged prostate of greater than 180 g.  He says he is voiding okay currently and is not sure he wishes to have prostate surgery.  Discussed HoLEP versus robotic simple prostatectomy.  Also discussed enlarged prostate and elevated PSA.  He is had multiple negative biopsies in the past.  He has a low PSA density.  He wishes to observe at current.      Dannie Murray MD

## 2022-12-17 LAB — BACTERIA UR CULT: ABNORMAL

## 2022-12-22 ENCOUNTER — TELEPHONE (OUTPATIENT)
Dept: UROLOGY | Facility: CLINIC | Age: 77
End: 2022-12-22
Payer: MEDICARE

## 2022-12-22 DIAGNOSIS — N20.0 KIDNEY STONE: Primary | ICD-10-CM

## 2022-12-22 RX ORDER — SULFAMETHOXAZOLE AND TRIMETHOPRIM 800; 160 MG/1; MG/1
1 TABLET ORAL 2 TIMES DAILY
Qty: 20 TABLET | Refills: 0 | Status: SHIPPED | OUTPATIENT
Start: 2022-12-22 | End: 2023-01-01

## 2022-12-22 NOTE — TELEPHONE ENCOUNTER
----- Message from Sujit Duke sent at 12/22/2022  3:27 PM CST -----  Contact: pt  .Type:  Needs Medical Advice    Who Called: pt  Would the patient rather a call back or a response via [facility]?  Call back  Best Call Back Number: [phone]  Additional Information: Pt. Is returning a call back from the office.

## 2022-12-22 NOTE — TELEPHONE ENCOUNTER
Returned the patient wife (Ivone) call to relate Per  message Inform patient that able to schedule left ESWL on 1/9/22.  No NSAIDs fish oil or blood thinners prior for 5 days. Antibiotic sent to pharmacy to start taking. The wife voiced her understanding of being aware.

## 2023-01-04 ENCOUNTER — HOSPITAL ENCOUNTER (OUTPATIENT)
Dept: PREADMISSION TESTING | Facility: HOSPITAL | Age: 78
Discharge: HOME | End: 2023-01-04
Attending: UROLOGY
Payer: MEDICARE

## 2023-01-04 VITALS
DIASTOLIC BLOOD PRESSURE: 83 MMHG | OXYGEN SATURATION: 98 % | HEART RATE: 77 BPM | SYSTOLIC BLOOD PRESSURE: 127 MMHG | RESPIRATION RATE: 16 BRPM | BODY MASS INDEX: 29.22 KG/M2 | HEIGHT: 72 IN | WEIGHT: 215.75 LBS | TEMPERATURE: 100 F

## 2023-01-04 RX ORDER — SODIUM CHLORIDE, SODIUM LACTATE, POTASSIUM CHLORIDE, CALCIUM CHLORIDE 600; 310; 30; 20 MG/100ML; MG/100ML; MG/100ML; MG/100ML
INJECTION, SOLUTION INTRAVENOUS CONTINUOUS
Status: CANCELLED | OUTPATIENT
Start: 2023-01-04

## 2023-01-04 RX ORDER — LIDOCAINE HYDROCHLORIDE 10 MG/ML
1 INJECTION, SOLUTION EPIDURAL; INFILTRATION; INTRACAUDAL; PERINEURAL ONCE
Status: CANCELLED | OUTPATIENT
Start: 2023-01-04 | End: 2023-01-04

## 2023-01-04 NOTE — DISCHARGE INSTRUCTIONS
Your surgery is scheduled for 1/9/23.    Please report to [facility] on the 1st Floor at 145 p.m.    THIS TIME IS SUBJECT TO CHANGE.  YOU WILL RECEIVE A PHONE CALL THE DAY BEFORE SURGERY BY 3:30 PM TO CONFIRM YOUR TIME OF ARRIVAL.  IF YOU HAVE NOT RECEIVED A PHONE CALL BY 3:30 PM THE DAY BEFORE YOUR SURGERY PLEASE CALL [phone]     INSTRUCTIONS IMPORTANT!!!  ¨ Do not eat or drink after 12 midnight-including water, candy, gum, & mints. OK to brush teeth.      ____  Proceed to [facility] on *** for additional testing.        ____  Do not wear makeup, including mascara.  ____  No powder, lotions or creams to surgical area.  ____  Please remove all jewelry, including piercings and leave at home.  ____  No money or valuables needed. Please leave at home.  ____  Please bring any documents given by your doctor.  ____  If going home the same day, arrange for a ride home. You will not be able to             drive if Anesthesia was used.  ____  Children under 18 years require a parent / guardian present the entire time             they are in surgery / recovery.  ____  Wear loose fitting clothing. Allow for dressings, bandages.  ____  Stop Aspirin, Ibuprofen, Motrin, Aleve, Goody's/BC powders, Excedrine and Naproxen (NSAIDS) at least 3-5 days before surgery, unless otherwise instructed by your doctor, or the nurse.   You MAY use Tylenol/acetaminophen until day of surgery.  ____  Wash the surgical area with Hibiclens the night before surgery, and again the             morning of surgery.  Be sure to rinse hibiclens off completely (if instructed by   nurse).  ____  If you take diabetic medication, do not take am of surgery unless instructed by Doctor.  ____  Call MD for temperature above 101 degrees or any other signs of infection such as Urinary (bladder) infection, Upper respiratory infection, skin boils, etc.  ____ Stop taking any Fish Oil supplement or any Vitamins that contain Vitamin E at  least 5 days prior to surgery.  ____ Do Not wear your contact lenses the day of your procedure.  You may wear your glasses.      ____Do not shave surgical site for 3 days prior to surgery.  ____ Practice Good hand washing before, during, and after procedure.      I have read or had read and explained to me, and understand the above information.  Additional comments or instructions:  For additional questions call [phone]      ANESTHESIA SIDE EFFECTS  -For the first 24 hours after surgery:  Do not drive, use heavy equipment, make important decisions, or drink alcohol  -It is normal to feel sleepy for several hours.  Rest until you are more awake.  -Have someone stay with you, if needed.  They can watch for problems and help keep you safe.  -Some possible post anesthesia side effects include: nausea and vomiting, sore throat and hoarseness, sleepiness, and dizziness.        Pre-Op Bathing Instructions    Before surgery, you can play an important role in your own health.    Because skin is not sterile, we need to be sure that your skin is as free of germs as possible. By following the instructions below, you can reduce the number of germs on your skin before surgery.    IMPORTANT: You will need to shower with a special soap called Hibiclens*, available at any pharmacy.  If you are allergic to Chlorhexidine (the antiseptic in Hibiclens), use an antibacterial soap such as Dial Soap for your preoperative shower.  You will shower with Hibiclens both the night before your surgery and the morning of your surgery.  Do not use Hibiclens on the head, face or genitals to avoid injury to those areas.    STEP #1: THE NIGHT BEFORE YOUR SURGERY     Do not shave the area of your body where your surgery will be performed.  Shower and wash your hair and body as usual with your normal soap and shampoo.  Rinse your hair and body thoroughly after you shower to remove all soap residue.  With your hand, apply one packet of Hibiclens soap to  the surgical site.   Wash the site gently for five (5) minutes. Do not scrub your skin too hard.   Do not wash with your regular soap after Hibiclens is used.  Rinse your body thoroughly.  Pat yourself dry with a clean, soft towel.  Do not use lotion, cream, or powder.  Wear clean clothes.    STEP #2: THE MORNING OF YOUR SURGERY     Repeat Step #1.    * Not to be used by people allergic to Chlorhexidine.

## 2023-01-05 ENCOUNTER — TELEPHONE (OUTPATIENT)
Dept: UROLOGY | Facility: CLINIC | Age: 78
End: 2023-01-05
Payer: MEDICARE

## 2023-01-05 NOTE — TELEPHONE ENCOUNTER
----- Message from Ginger Easton sent at 1/5/2023 10:04 AM CST -----  Type:  Patient Returning Call    Who Called:Wife / Pop  Would the patient rather a call back or a response via [facility]? Call back   Best Call Back Number:  [phone]   Additional Information: calling to say pt has been passing blood since yesterday

## 2023-01-06 ENCOUNTER — HOSPITAL ENCOUNTER (EMERGENCY)
Facility: HOSPITAL | Age: 78
Discharge: HOME | End: 2023-01-06
Attending: EMERGENCY MEDICINE
Payer: MEDICARE

## 2023-01-06 VITALS
SYSTOLIC BLOOD PRESSURE: 127 MMHG | BODY MASS INDEX: 27.12 KG/M2 | DIASTOLIC BLOOD PRESSURE: 86 MMHG | HEART RATE: 70 BPM | WEIGHT: 200 LBS | TEMPERATURE: 99 F | RESPIRATION RATE: 14 BRPM | OXYGEN SATURATION: 97 %

## 2023-01-06 DIAGNOSIS — R31.9 HEMATURIA, UNSPECIFIED TYPE: Primary | ICD-10-CM

## 2023-01-06 LAB
ALBUMIN SERPL BCP-MCNC: 3.8 G/DL (ref 3.5–5.2)
ALP SERPL-CCNC: 55 U/L (ref 55–135)
ALT SERPL W/O P-5'-P-CCNC: 9 U/L (ref 10–44)
ANION GAP SERPL CALC-SCNC: 9 MMOL/L (ref 8–16)
AST SERPL-CCNC: 12 U/L (ref 10–40)
BACTERIA #/AREA URNS HPF: ABNORMAL /HPF
BASOPHILS # BLD AUTO: 0.07 K/UL (ref 0–0.2)
BASOPHILS NFR BLD: 0.7 % (ref 0–1.9)
BILIRUB SERPL-MCNC: 0.5 MG/DL (ref 0.1–1)
BILIRUB UR QL STRIP: ABNORMAL
BUN SERPL-MCNC: 19 MG/DL (ref 8–23)
CALCIUM SERPL-MCNC: 8.9 MG/DL (ref 8.7–10.5)
CHLORIDE SERPL-SCNC: 107 MMOL/L (ref 95–110)
CLARITY UR: ABNORMAL
CO2 SERPL-SCNC: 23 MMOL/L (ref 23–29)
COLOR UR: ABNORMAL
CREAT SERPL-MCNC: 1.1 MG/DL (ref 0.5–1.4)
DIFFERENTIAL METHOD: ABNORMAL
EOSINOPHIL # BLD AUTO: 0.3 K/UL (ref 0–0.5)
EOSINOPHIL NFR BLD: 2.9 % (ref 0–8)
ERYTHROCYTE [DISTWIDTH] IN BLOOD BY AUTOMATED COUNT: 12.7 % (ref 11.5–14.5)
EST. GFR  (NO RACE VARIABLE): >60 ML/MIN/1.73 M^2
GLUCOSE SERPL-MCNC: 78 MG/DL (ref 70–110)
GLUCOSE UR QL STRIP: ABNORMAL
HCT VFR BLD AUTO: 39 % (ref 40–54)
HGB BLD-MCNC: 12.8 G/DL (ref 14–18)
HGB UR QL STRIP: ABNORMAL
IMM GRANULOCYTES # BLD AUTO: 0.04 K/UL (ref 0–0.04)
IMM GRANULOCYTES NFR BLD AUTO: 0.4 % (ref 0–0.5)
KETONES UR QL STRIP: ABNORMAL
LEUKOCYTE ESTERASE UR QL STRIP: ABNORMAL
LYMPHOCYTES # BLD AUTO: 3.2 K/UL (ref 1–4.8)
LYMPHOCYTES NFR BLD: 30.3 % (ref 18–48)
MCH RBC QN AUTO: 31.7 PG (ref 27–31)
MCHC RBC AUTO-ENTMCNC: 32.8 G/DL (ref 32–36)
MCV RBC AUTO: 97 FL (ref 82–98)
MICROSCOPIC COMMENT: ABNORMAL
MONOCYTES # BLD AUTO: 0.7 K/UL (ref 0.3–1)
MONOCYTES NFR BLD: 6.2 % (ref 4–15)
NEUTROPHILS # BLD AUTO: 6.2 K/UL (ref 1.8–7.7)
NEUTROPHILS NFR BLD: 59.5 % (ref 38–73)
NITRITE UR QL STRIP: ABNORMAL
NRBC BLD-RTO: 0 /100 WBC
PH UR STRIP: ABNORMAL [PH] (ref 5–8)
PLATELET # BLD AUTO: 233 K/UL (ref 150–450)
PMV BLD AUTO: 9.7 FL (ref 9.2–12.9)
POTASSIUM SERPL-SCNC: 4.3 MMOL/L (ref 3.5–5.1)
PROT SERPL-MCNC: 7 G/DL (ref 6–8.4)
PROT UR QL STRIP: ABNORMAL
RBC # BLD AUTO: 4.04 M/UL (ref 4.6–6.2)
RBC #/AREA URNS HPF: >100 /HPF (ref 0–4)
SODIUM SERPL-SCNC: 139 MMOL/L (ref 136–145)
SP GR UR STRIP: ABNORMAL (ref 1–1.03)
URN SPEC COLLECT METH UR: ABNORMAL
UROBILINOGEN UR STRIP-ACNC: ABNORMAL EU/DL
WBC # BLD AUTO: 10.44 K/UL (ref 3.9–12.7)
WBC #/AREA URNS HPF: 4 /HPF (ref 0–5)

## 2023-01-06 PROCEDURE — 81000 URINALYSIS NONAUTO W/SCOPE: CPT | Mod: HCNC | Performed by: EMERGENCY MEDICINE

## 2023-01-06 PROCEDURE — 87086 URINE CULTURE/COLONY COUNT: CPT | Mod: HCNC | Performed by: EMERGENCY MEDICINE

## 2023-01-06 PROCEDURE — 99283 EMERGENCY DEPT VISIT LOW MDM: CPT | Mod: HCNC

## 2023-01-06 PROCEDURE — 80053 COMPREHEN METABOLIC PANEL: CPT | Mod: HCNC | Performed by: EMERGENCY MEDICINE

## 2023-01-06 PROCEDURE — 85025 COMPLETE CBC W/AUTO DIFF WBC: CPT | Mod: HCNC | Performed by: EMERGENCY MEDICINE

## 2023-01-06 RX ORDER — CIPROFLOXACIN 500 MG/1
500 TABLET ORAL 2 TIMES DAILY
Qty: 14 TABLET | Refills: 0 | Status: SHIPPED | OUTPATIENT
Start: 2023-01-06 | End: 2023-01-13

## 2023-01-06 NOTE — ED PROVIDER NOTES
Encounter Date: 1/6/2023       History     Chief Complaint   Patient presents with    Hematuria     Pt has a scheduled kidney stone operation on the 9th.  Pt has now started experiencing hematuria which is new for him. Pt states that he has penile pain which is also new.  Pt is also complaining of flank pain. Urine cup provided for specimen.      Brandan Werner is a 77 y.o. male who  has a past medical history of Anxiety, BPH (benign prostatic hyperplasia), Cataract, Elevated PSA, Erectile dysfunction, Hyperlipidemia, Hypertension, Hypogonadism male, Kidney stone (5/20/2020), Obesity, PTSD (post-traumatic stress disorder), Shoulder arthritis, and Urinary tract infection.    The patient presents to the ED due to blood in his urine.  This has been going on for the past day and a half.  He reports having burning to the tip of his penis during this time.  Patient is supposed to receive lithotripsy on 01/09/2023, and and was advised to come here for further evaluation.  Patient has a ureteral stent placed during his last admission last month.  He reports no pain at this time or passing clots.  No other concerns noted today.    The history is provided by the patient.   Review of patient's allergies indicates:  No Known Allergies  Past Medical History:   Diagnosis Date    Anxiety     BPH (benign prostatic hyperplasia)     Cataract     Elevated PSA     Erectile dysfunction     Hyperlipidemia     Hypertension     Hypogonadism male     Kidney stone 5/20/2020    Obesity     PTSD (post-traumatic stress disorder)     Shoulder arthritis     Urinary tract infection      Past Surgical History:   Procedure Laterality Date    COLONOSCOPY N/A 11/26/2018    Procedure: COLONOSCOPY;  Surgeon: Germán Moss MD;  Location: [address]);  Service: Endoscopy;  Laterality: N/A;    COLONOSCOPY W/ BIOPSIES  2010    CYSTOSCOPY Left 11/17/2022    Procedure: CYSTOSCOPY;  Surgeon: Dannie Murray MD;  Location: [facility];  Service:  Urology;  Laterality: Left;    EXTRACORPOREAL SHOCK WAVE LITHOTRIPSY Left 2022    Procedure: LITHOTRIPSY, ESWL [employer] [place] ESWL machine, scheduling call [phone] 60 minutes;  Surgeon: Dannie Murray MD;  Location: [facility];  Service: Urology;  Laterality: Left;  Confirmed with nexmed  [facility] conf # [id number]    PENILE PROSTHESIS IMPLANT       Family History   Problem Relation Age of Onset    Cancer Mother         cancer    Cataracts Mother     Heart disease Father 62        M.I.    Stroke Brother 62    Amblyopia Neg Hx     Blindness Neg Hx     Glaucoma Neg Hx     Macular degeneration Neg Hx     Retinal detachment Neg Hx     Strabismus Neg Hx     Prostate cancer Neg Hx     Kidney disease Neg Hx      Social History     Tobacco Use    Smoking status: Former     Packs/day: 1.00     Years: 10.00     Pack years: 10.00     Types: Cigarettes     Quit date:      Years since quittin.0    Smokeless tobacco: Never   Substance Use Topics    Alcohol use: No    Drug use: No     Review of Systems   Constitutional:  Negative for fever.   HENT:  Negative for sore throat.    Respiratory:  Negative for shortness of breath.    Cardiovascular:  Negative for chest pain.   Gastrointestinal:  Negative for nausea.   Genitourinary:  Positive for dysuria and hematuria.   Musculoskeletal:  Negative for back pain.   Skin:  Negative for rash.   Neurological:  Negative for weakness.   Hematological:  Does not bruise/bleed easily.     Physical Exam     Initial Vitals [23 1136]   BP Pulse Resp Temp SpO2   127/86 70 14 99.2 °F (37.3 °C) 97 %      MAP       --         Physical Exam    Nursing note and vitals reviewed.  Constitutional: He appears well-developed and well-nourished. He is not diaphoretic. No distress.   HENT:   Head: Normocephalic and atraumatic.   Mouth/Throat: Oropharynx is clear and moist.   Eyes: EOM are normal. Pupils are equal, round, and reactive to light.   Neck: No tracheal deviation  present.   Cardiovascular:  Normal rate, regular rhythm, normal heart sounds and intact distal pulses.           Pulmonary/Chest: Breath sounds normal. No stridor. No respiratory distress.   Abdominal: Abdomen is soft. He exhibits no distension and no mass. There is no abdominal tenderness.   Genitourinary:    Penis normal.   No discharge found.    Genitourinary Comments: Dried blood from meatus     Musculoskeletal:         General: No edema. Normal range of motion.     Neurological: He is alert and oriented to person, place, and time. No cranial nerve deficit or sensory deficit.   Skin: Skin is warm and dry. Capillary refill takes less than 2 seconds. No rash noted.   Psychiatric: He has a normal mood and affect. His behavior is normal. Thought content normal.       ED Course   Procedures  Labs Reviewed   URINALYSIS, REFLEX TO URINE CULTURE - Abnormal; Notable for the following components:       Result Value    Color, UA Red (*)     Appearance, UA Cloudy (*)     All other components within normal limits    Narrative:     Specimen Source->Urine   CBC W/ AUTO DIFFERENTIAL - Abnormal; Notable for the following components:    RBC 4.04 (*)     Hemoglobin 12.8 (*)     Hematocrit 39.0 (*)     MCH 31.7 (*)     All other components within normal limits   COMPREHENSIVE METABOLIC PANEL - Abnormal; Notable for the following components:    ALT 9 (*)     All other components within normal limits   URINALYSIS MICROSCOPIC - Abnormal; Notable for the following components:    RBC, UA >100 (*)     All other components within normal limits    Narrative:     Specimen Source->Urine   CULTURE, URINE          Imaging Results    None          Medications - No data to display  Medical Decision Making:   History:   Old Medical Records: I decided to obtain old medical records.  Old Records Summarized: other records.       <> Summary of Records: Patient is followed by Dr. Murray with Urology.  Patient had shockwave lithotripsy  12/02/2022    Differential Diagnosis:   Differential diagnosis includes but is not limited to:   Acute kidney injury ureteral stone, sepsis  ED Management:  Given prior urologic procedures Dr. Murray recommended a 1 week course of Cipro/Bactrim.  Will start patient on ciprofloxacin, advised of side effects including tendon rupture/muscle damage.  Patient verbalized understanding.  He is in no apparent distress on exam.  He is stable for discharge at this time.  Recommended he drink plenty of fluids and follow-up as planned for his procedure later this week.  No emergent process was identified today he appears stable for discharge.  Return precautions were discussed for worsening symptoms including clots pain fevers or any other concerns.    After taking into careful account the historical factors and physical exam findings of the patient's presentation today, in conjunction with the empirical and objective data obtained on ED workup, no acute emergent medical condition has been identified. The patient appears to be low risk for an emergent medical condition and I feel it is safe and appropriate at this time for the patient to be discharged to follow-up as detailed in their discharge instructions for reevaluation and possible continued outpatient workup and management. I have discussed the specifics of the workup with the patient and the patient has verbalized understanding of the details of the workup, the diagnosis, the treatment plan, and the need for outpatient follow-up.  Although the patient has no emergent etiology today this does not preclude the development of an emergent condition so in addition, I have advised the patient that they can return to the ED and/or activate EMS at any time with worsening of their symptoms, change of their symptoms, or with any other medical complaint.  The patient remained comfortable and stable during their visit in the ED.  Discharge and follow-up instructions discussed with  the patient who expressed understanding and willingness to comply with my recommendations.             ED Course as of 01/06/23 1336   Fri Jan 06, 2023   1222 Discussed with Dr. Murray, who is familiar with this gentlemen, hematuria likely coming from previous ureteral stent.  As patient is voiding normally without pain clots recommends blood work and urine culture.  Would not recommend  CT imaging unless lab work demonstrates significant abnormalities or patient began to have significant pain.  [RN]   1241 CBC auto differential(!)  No significant abnormality [RN]   1251 Comprehensive metabolic panel(!)  No significant findings [RN]   1332 Urinalysis Microscopic(!)  Hematuria noted, no signs to suggest infection [RN]   1332 Urinalysis, Reflex to Urine Culture Urine, Clean Catch(!)  Hematuria without signs to suggest infection.  Urine culture pending [RN]      ED Course User Index  [RN] Ellis Hartman Jr., MD                 Clinical Impression:   Final diagnoses:  [R31.9] Hematuria, unspecified type (Primary)        ED Disposition Condition    Discharge Stable          ED Prescriptions       Medication Sig Dispense Start Date End Date Auth. Provider    ciprofloxacin HCl (CIPRO) 500 MG tablet Take 1 tablet (500 mg total) by mouth 2 (two) times daily. for 7 days 14 tablet 1/6/2023 1/13/2023 Ellis Hartman Jr., MD          Follow-up Information       Follow up With Specialties Details Why Contact Info    Dannie Murray MD Urology   [address]  [address]  [place] [address] [phone]              Portions of this note were dictated using voice recognition software and may contain dictation related errors in spelling/grammar/syntax not found on text review       Ellis Hartman Jr., MD  01/06/23 1336

## 2023-01-06 NOTE — DISCHARGE INSTRUCTIONS
Plenty of fluids to try to clear urine.  Please follow-up with your urologist for your surgery later this week.  Please take the antibiotics to completion.  Please be aware that although unlikely ciprofloxacin can cause muscle/tendon damage.  Thank you!

## 2023-01-08 LAB — BACTERIA UR CULT: NO GROWTH

## 2023-01-12 ENCOUNTER — OFFICE VISIT (OUTPATIENT)
Dept: ORTHOPEDICS | Facility: CLINIC | Age: 78
End: 2023-01-12
Payer: MEDICARE

## 2023-01-12 VITALS — WEIGHT: 200 LBS | HEIGHT: 72 IN | BODY MASS INDEX: 27.09 KG/M2

## 2023-01-12 DIAGNOSIS — N20.0 KIDNEY STONE: Primary | ICD-10-CM

## 2023-01-12 DIAGNOSIS — M19.019 SHOULDER ARTHRITIS: Primary | ICD-10-CM

## 2023-01-12 PROCEDURE — 3288F FALL RISK ASSESSMENT DOCD: CPT | Mod: HCNC,CPTII,S$GLB, | Performed by: ORTHOPAEDIC SURGERY

## 2023-01-12 PROCEDURE — 1101F PT FALLS ASSESS-DOCD LE1/YR: CPT | Mod: HCNC,CPTII,S$GLB, | Performed by: ORTHOPAEDIC SURGERY

## 2023-01-12 PROCEDURE — 99214 OFFICE O/P EST MOD 30 MIN: CPT | Mod: HCNC,S$GLB,, | Performed by: ORTHOPAEDIC SURGERY

## 2023-01-12 PROCEDURE — 99999 PR PBB SHADOW E&M-EST. PATIENT-LVL III: ICD-10-PCS | Mod: PBBFAC,HCNC,, | Performed by: ORTHOPAEDIC SURGERY

## 2023-01-12 PROCEDURE — 99214 PR OFFICE/OUTPT VISIT, EST, LEVL IV, 30-39 MIN: ICD-10-PCS | Mod: HCNC,S$GLB,, | Performed by: ORTHOPAEDIC SURGERY

## 2023-01-12 PROCEDURE — 3288F PR FALLS RISK ASSESSMENT DOCUMENTED: ICD-10-PCS | Mod: HCNC,CPTII,S$GLB, | Performed by: ORTHOPAEDIC SURGERY

## 2023-01-12 PROCEDURE — 1159F PR MEDICATION LIST DOCUMENTED IN MEDICAL RECORD: ICD-10-PCS | Mod: HCNC,CPTII,S$GLB, | Performed by: ORTHOPAEDIC SURGERY

## 2023-01-12 PROCEDURE — 99999 PR PBB SHADOW E&M-EST. PATIENT-LVL III: CPT | Mod: PBBFAC,HCNC,, | Performed by: ORTHOPAEDIC SURGERY

## 2023-01-12 PROCEDURE — 1125F AMNT PAIN NOTED PAIN PRSNT: CPT | Mod: HCNC,CPTII,S$GLB, | Performed by: ORTHOPAEDIC SURGERY

## 2023-01-12 PROCEDURE — 1125F PR PAIN SEVERITY QUANTIFIED, PAIN PRESENT: ICD-10-PCS | Mod: HCNC,CPTII,S$GLB, | Performed by: ORTHOPAEDIC SURGERY

## 2023-01-12 PROCEDURE — 1101F PR PT FALLS ASSESS DOC 0-1 FALLS W/OUT INJ PAST YR: ICD-10-PCS | Mod: HCNC,CPTII,S$GLB, | Performed by: ORTHOPAEDIC SURGERY

## 2023-01-12 PROCEDURE — 1159F MED LIST DOCD IN RCRD: CPT | Mod: HCNC,CPTII,S$GLB, | Performed by: ORTHOPAEDIC SURGERY

## 2023-01-12 RX ORDER — CELECOXIB 200 MG/1
200 CAPSULE ORAL DAILY
Qty: 30 CAPSULE | Refills: 3 | Status: SHIPPED | OUTPATIENT
Start: 2023-01-12 | End: 2023-02-11

## 2023-01-12 RX ORDER — CEFAZOLIN SODIUM 2 G/50ML
2 SOLUTION INTRAVENOUS
Status: CANCELLED | OUTPATIENT
Start: 2023-01-12

## 2023-01-12 RX ORDER — CELECOXIB 200 MG/1
400 CAPSULE ORAL DAILY
COMMUNITY
Start: 2023-01-03

## 2023-01-12 NOTE — PROGRESS NOTES
CC:  Right shoulder arthritis severe        HPI:  Brandan Werner is a very pleasant 77 y.o. male with severe arthritis right shoulder for the past several years   Has been treated with injection and physical therapy in the past without long-term relief   He does have daily pain including pain at night   He would like to consider surgery for the right shoulder         PAST MEDICAL HISTORY:   Past Medical History:   Diagnosis Date    Anxiety     BPH (benign prostatic hyperplasia)     Cataract     Elevated PSA     Erectile dysfunction     Hyperlipidemia     Hypertension     Hypogonadism male     Kidney stone 5/20/2020    Obesity     PTSD (post-traumatic stress disorder)     Shoulder arthritis     Urinary tract infection      PAST SURGICAL HISTORY:   Past Surgical History:   Procedure Laterality Date    COLONOSCOPY N/A 11/26/2018    Procedure: COLONOSCOPY;  Surgeon: Germán Moss MD;  Location: [address]);  Service: Endoscopy;  Laterality: N/A;    COLONOSCOPY W/ BIOPSIES  2010    CYSTOSCOPY Left 11/17/2022    Procedure: CYSTOSCOPY;  Surgeon: Dannie Murray MD;  Location: [facility];  Service: Urology;  Laterality: Left;    EXTRACORPOREAL SHOCK WAVE LITHOTRIPSY Left 12/2/2022    Procedure: LITHOTRIPSY, ESWL [employer] ESWL machine, scheduling call [phone] 60 minutes;  Surgeon: Dannie Murray MD;  Location: [facility];  Service: Urology;  Laterality: Left;  Confirmed with nexmed 11/22 [facility] conf # [id number]    PENILE PROSTHESIS IMPLANT       FAMILY HISTORY:   Family History   Problem Relation Age of Onset    Cancer Mother         cancer    Cataracts Mother     Heart disease Father 62        M.I.    Stroke Brother 62    Amblyopia Neg Hx     Blindness Neg Hx     Glaucoma Neg Hx     Macular degeneration Neg Hx     Retinal detachment Neg Hx     Strabismus Neg Hx     Prostate cancer Neg Hx     Kidney disease Neg Hx      SOCIAL HISTORY:   Social History     Socioeconomic History    Marital  status:    Occupational History    Occupation:      Comment: self-employed   Tobacco Use    Smoking status: Former     Packs/day: 1.00     Years: 10.00     Pack years: 10.00     Types: Cigarettes     Quit date:      Years since quittin.0    Smokeless tobacco: Never   Substance and Sexual Activity    Alcohol use: No    Drug use: No    Sexual activity: Yes     Partners: Female     Social Determinants of Health     Financial Resource Strain: Low Risk     Difficulty of Paying Living Expenses: Not hard at all   Food Insecurity: No Food Insecurity    Worried About Running Out of Food in the Last Year: Never true    Ran Out of Food in the Last Year: Never true   Transportation Needs: No Transportation Needs    Lack of Transportation (Medical): No    Lack of Transportation (Non-Medical): No   Physical Activity: Unknown    Days of Exercise per Week: Patient refused    Minutes of Exercise per Session: Patient refused   Stress: Unknown    Feeling of Stress : Patient refused   Social Connections: Unknown    Frequency of Communication with Friends and Family: More than three times a week    Frequency of Social Gatherings with Friends and Family: More than three times a week    Attends Congregation Services: Patient refused    Active Member of Clubs or Organizations: Patient refused    Attends Club or Organization Meetings: Patient refused    Marital Status:    Housing Stability: Low Risk     Unable to Pay for Housing in the Last Year: No    Number of Places Lived in the Last Year: 1    Unstable Housing in the Last Year: No       MEDICATIONS:   Current Outpatient Medications:     ascorbic acid, vitamin C, (VITAMIN C) 250 MG tablet, Take 250 mg by mouth once daily., Disp: , Rfl:     atorvastatin (LIPITOR) 40 MG tablet, Take 20 mg by mouth once daily., Disp: , Rfl:     carbidopa-levodopa  mg (SINEMET)  mg per tablet, TAKE 1 TABLET BY MOUTH THREE TIMES A DAY FOR PARKINSON'S DISE** WITH  MEALSASE, Disp: , Rfl:     celecoxib (CELEBREX) 200 MG capsule, 400 mg., Disp: , Rfl:     ciprofloxacin HCl (CIPRO) 500 MG tablet, Take 1 tablet (500 mg total) by mouth 2 (two) times daily. for 7 days, Disp: 14 tablet, Rfl: 0    finasteride (PROSCAR) 5 mg tablet, Take 1 tablet (5 mg total) by mouth once daily., Disp: 30 tablet, Rfl: 11    FLUoxetine 20 MG capsule, 60 mg., Disp: , Rfl:     FLUoxetine 40 MG capsule, TAKE ONE CAPSULE BY MOUTH EVERY DAY FOR MENTAL HEALTH, Disp: , Rfl:     fluticasone (FLONASE) 50 mcg/actuation nasal spray, 2 sprays (100 mcg total) by Each Nare route once daily., Disp: 1 Bottle, Rfl: 0    folic acid/multivit-min/lutein (CENTRUM SILVER ORAL), Take by mouth., Disp: , Rfl:     garlic 1,000 mg Cap, Take by mouth., Disp: , Rfl:     GINGER ROOT XT-FENNEL SD XT ORAL, Take 550 mg by mouth., Disp: , Rfl:     glucosam/chond-msm1/C/agueda/bor (GLUCOSAMINE-CHOND-MSM COMPLEX ORAL), Take by mouth., Disp: , Rfl:     lisinopril (PRINIVIL,ZESTRIL) 5 MG tablet, Take 5 mg by mouth once daily., Disp: , Rfl:     mecobalamin (B12 ACTIVE ORAL), Take by mouth., Disp: , Rfl:     peg 400-propylene glycol 0.4-0.3 % Drop, INSTILL ONE DROP IN EACH EYE FOUR TIMES A DAY AS NEEDED FOR DRY EYES, Disp: , Rfl:     primidone (MYSOLINE) 50 MG Tab, Take 2 tablets (100 mg total) by mouth every evening., Disp: 60 tablet, Rfl: 11    psyllium (METAMUCIL) powder, Take 1 packet by mouth 3 (three) times daily., Disp: , Rfl:     tamsulosin (FLOMAX) 0.4 mg Cp24, Take 1 capsule (0.4 mg total) by mouth nightly., Disp: 30 capsule, Rfl: 11    traZODone (DESYREL) 100 MG tablet, Take 1 tablet by mouth nightly as needed., Disp: , Rfl:     triamcinolone acetonide 0.1% (KENALOG) 0.1 % cream, Apply topically 3 (three) times daily., Disp: 45 g, Rfl: 0    vitamin E 100 UNIT capsule, Take 100 Units by mouth every morning., Disp: , Rfl:     VOLTAREN 1 % Gel, Apply 4 g topically 4 (four) times daily as needed (pain and inflammation). , Disp: , Rfl:      celecoxib (CELEBREX) 200 MG capsule, Take 1 capsule (200 mg total) by mouth once daily., Disp: 30 capsule, Rfl: 3  ALLERGIES: Review of patient's allergies indicates:  No Known Allergies    Review of Systems:  Constitutional: no fever or chills  ENT: no nasal congestion or sore throat  Respiratory: no cough or shortness of breath  Cardiovascular: no chest pain or palpitations  Gastrointestinal: no nausea or vomiting, PUD, GERD, NSAID intolerance  Genitourinary: no hematuria or dysuria  Integument/Breast: no rash or pruritis  Hematologic/Lymphatic: no easy bruising or lymphadenopathy  Musculoskeletal: see HPI  Neurological: no seizures or tremors  Behavioral/Psych: no auditory or visual hallucinations      Physical Exam   Vitals:    01/12/23 0935   Weight: 90.7 kg (200 lb)   Height: 6' (1.829 m)   PainSc:   7   PainLoc: Shoulder       Constitutional: Oriented to person, place, and time. Appears well-developed and well-nourished.   HENT:   Head: Normocephalic and atraumatic.   Nose: Nose normal.   Eyes: No scleral icterus.   Neck: Normal range of motion.   Cardiovascular: Normal rate and regular rhythm.    Pulses:       Radial pulses are 2+ on the right side, and 2+ on the left side.   Pulmonary/Chest: Effort normal and breath sounds normal.   Abdominal: Soft.   Neurological: Alert and oriented to person, place, and time.   Skin: Skin is warm.   Psychiatric: Normal mood and affect.     MUSCULOSKELETAL UPPER EXTREMITY:  Examination right shoulder no tenderness no swelling  Range of motion limited due to pain  There is limited elevation and especially external rotation only 20°   Mild crepitation   Rotator cuff strength intact   Neurologic exam intact               Diagnostic Studies:  AP lateral x-ray right shoulder shows severe degenerative changes of the glenohumeral joint        Assessment:  Severe arthritis glenohumeral right shoulder    Plan:  Patient would like to set up surgery for right total shoulder  "arthroplasty  Risks and benefits of surgery explained   He will need a preop clearance from the [facility] and 1 or 2 night hospital stay he understands      The risks and benefits of surgery were discussed with the patient today and they understand.  The consent was signed in the office for surgery.      Ellis Richardson MD (Jay)  [facility]  Orthopedic Upper Extremity Surgery       "

## 2023-01-30 ENCOUNTER — ANESTHESIA (OUTPATIENT)
Dept: SURGERY | Facility: HOSPITAL | Age: 78
End: 2023-01-30
Payer: MEDICARE

## 2023-01-30 ENCOUNTER — ANESTHESIA EVENT (OUTPATIENT)
Dept: SURGERY | Facility: HOSPITAL | Age: 78
End: 2023-01-30
Payer: MEDICARE

## 2023-01-30 ENCOUNTER — HOSPITAL ENCOUNTER (OUTPATIENT)
Facility: HOSPITAL | Age: 78
Discharge: HOME | End: 2023-01-30
Attending: UROLOGY | Admitting: UROLOGY
Payer: MEDICARE

## 2023-01-30 VITALS
HEART RATE: 66 BPM | HEIGHT: 72 IN | BODY MASS INDEX: 28.44 KG/M2 | WEIGHT: 210 LBS | DIASTOLIC BLOOD PRESSURE: 82 MMHG | RESPIRATION RATE: 16 BRPM | OXYGEN SATURATION: 97 % | SYSTOLIC BLOOD PRESSURE: 172 MMHG | TEMPERATURE: 98 F

## 2023-01-30 DIAGNOSIS — M19.019 SHOULDER ARTHRITIS: ICD-10-CM

## 2023-01-30 DIAGNOSIS — I70.0 ATHEROSCLEROSIS OF AORTA: ICD-10-CM

## 2023-01-30 DIAGNOSIS — N20.0 KIDNEY STONE: Primary | ICD-10-CM

## 2023-01-30 PROCEDURE — 63600175 PHARM REV CODE 636 W HCPCS: Mod: HCNC | Performed by: NURSE ANESTHETIST, CERTIFIED REGISTERED

## 2023-01-30 PROCEDURE — 71000033 HC RECOVERY, INTIAL HOUR: Mod: HCNC | Performed by: UROLOGY

## 2023-01-30 PROCEDURE — 25000003 PHARM REV CODE 250: Mod: HCNC | Performed by: NURSE ANESTHETIST, CERTIFIED REGISTERED

## 2023-01-30 PROCEDURE — 50590 FRAGMENTING OF KIDNEY STONE: CPT | Mod: HCNC,58,LT, | Performed by: UROLOGY

## 2023-01-30 PROCEDURE — 36000705 HC OR TIME LEV I EA ADD 15 MIN: Mod: HCNC | Performed by: UROLOGY

## 2023-01-30 PROCEDURE — D9220A PRA ANESTHESIA: ICD-10-PCS | Mod: HCNC,ANES,, | Performed by: ANESTHESIOLOGY

## 2023-01-30 PROCEDURE — D9220A PRA ANESTHESIA: Mod: HCNC,ANES,, | Performed by: ANESTHESIOLOGY

## 2023-01-30 PROCEDURE — 71000015 HC POSTOP RECOV 1ST HR: Mod: HCNC | Performed by: UROLOGY

## 2023-01-30 PROCEDURE — 37000009 HC ANESTHESIA EA ADD 15 MINS: Mod: HCNC | Performed by: UROLOGY

## 2023-01-30 PROCEDURE — 50590 PR FRAGMENT KIDNEY STONE/ ESWL: ICD-10-PCS | Mod: HCNC,58,LT, | Performed by: UROLOGY

## 2023-01-30 PROCEDURE — 36000704 HC OR TIME LEV I 1ST 15 MIN: Mod: HCNC | Performed by: UROLOGY

## 2023-01-30 PROCEDURE — 37000008 HC ANESTHESIA 1ST 15 MINUTES: Mod: HCNC | Performed by: UROLOGY

## 2023-01-30 PROCEDURE — D9220A PRA ANESTHESIA: Mod: HCNC,CRNA,, | Performed by: NURSE ANESTHETIST, CERTIFIED REGISTERED

## 2023-01-30 PROCEDURE — D9220A PRA ANESTHESIA: ICD-10-PCS | Mod: HCNC,CRNA,, | Performed by: NURSE ANESTHETIST, CERTIFIED REGISTERED

## 2023-01-30 RX ORDER — CEFAZOLIN SODIUM 1 G/3ML
INJECTION, POWDER, FOR SOLUTION INTRAMUSCULAR; INTRAVENOUS
Status: DISCONTINUED | OUTPATIENT
Start: 2023-01-30 | End: 2023-01-30

## 2023-01-30 RX ORDER — ONDANSETRON 2 MG/ML
INJECTION INTRAMUSCULAR; INTRAVENOUS
Status: DISCONTINUED | OUTPATIENT
Start: 2023-01-30 | End: 2023-01-30

## 2023-01-30 RX ORDER — SODIUM CHLORIDE, SODIUM LACTATE, POTASSIUM CHLORIDE, CALCIUM CHLORIDE 600; 310; 30; 20 MG/100ML; MG/100ML; MG/100ML; MG/100ML
INJECTION, SOLUTION INTRAVENOUS CONTINUOUS PRN
Status: DISCONTINUED | OUTPATIENT
Start: 2023-01-30 | End: 2023-01-30

## 2023-01-30 RX ORDER — LIDOCAINE HCL/PF 100 MG/5ML
SYRINGE (ML) INTRAVENOUS
Status: DISCONTINUED | OUTPATIENT
Start: 2023-01-30 | End: 2023-01-30

## 2023-01-30 RX ORDER — MEPERIDINE HYDROCHLORIDE 50 MG/ML
12.5 INJECTION INTRAMUSCULAR; INTRAVENOUS; SUBCUTANEOUS ONCE AS NEEDED
Status: DISCONTINUED | OUTPATIENT
Start: 2023-01-30 | End: 2023-01-30 | Stop reason: HOSPADM

## 2023-01-30 RX ORDER — MORPHINE SULFATE 2 MG/ML
3 INJECTION, SOLUTION INTRAMUSCULAR; INTRAVENOUS
Status: DISCONTINUED | OUTPATIENT
Start: 2023-01-30 | End: 2023-01-30 | Stop reason: HOSPADM

## 2023-01-30 RX ORDER — PROPOFOL 10 MG/ML
VIAL (ML) INTRAVENOUS
Status: DISCONTINUED | OUTPATIENT
Start: 2023-01-30 | End: 2023-01-30

## 2023-01-30 RX ORDER — HYDROCODONE BITARTRATE AND ACETAMINOPHEN 5; 325 MG/1; MG/1
1 TABLET ORAL EVERY 4 HOURS PRN
Status: DISCONTINUED | OUTPATIENT
Start: 2023-01-30 | End: 2023-01-30 | Stop reason: HOSPADM

## 2023-01-30 RX ORDER — EPHEDRINE SULFATE 50 MG/ML
INJECTION, SOLUTION INTRAVENOUS
Status: DISCONTINUED | OUTPATIENT
Start: 2023-01-30 | End: 2023-01-30

## 2023-01-30 RX ORDER — TRAMADOL HYDROCHLORIDE 50 MG/1
50 TABLET ORAL EVERY 6 HOURS PRN
Qty: 12 TABLET | Refills: 0 | Status: SHIPPED | OUTPATIENT
Start: 2023-01-30 | End: 2023-02-02

## 2023-01-30 RX ORDER — ACETAMINOPHEN 325 MG/1
650 TABLET ORAL EVERY 4 HOURS PRN
Status: DISCONTINUED | OUTPATIENT
Start: 2023-01-30 | End: 2023-01-30 | Stop reason: HOSPADM

## 2023-01-30 RX ORDER — DEXAMETHASONE SODIUM PHOSPHATE 4 MG/ML
INJECTION, SOLUTION INTRA-ARTICULAR; INTRALESIONAL; INTRAMUSCULAR; INTRAVENOUS; SOFT TISSUE
Status: DISCONTINUED | OUTPATIENT
Start: 2023-01-30 | End: 2023-01-30

## 2023-01-30 RX ORDER — DOCUSATE SODIUM 100 MG/1
100 CAPSULE, LIQUID FILLED ORAL 2 TIMES DAILY
Qty: 14 CAPSULE | Refills: 0 | Status: SHIPPED | OUTPATIENT
Start: 2023-01-30 | End: 2023-02-06

## 2023-01-30 RX ORDER — PROMETHAZINE HYDROCHLORIDE 25 MG/1
25 TABLET ORAL EVERY 6 HOURS PRN
Status: DISCONTINUED | OUTPATIENT
Start: 2023-01-30 | End: 2023-01-30 | Stop reason: HOSPADM

## 2023-01-30 RX ORDER — ONDANSETRON 2 MG/ML
4 INJECTION INTRAMUSCULAR; INTRAVENOUS ONCE AS NEEDED
Status: DISCONTINUED | OUTPATIENT
Start: 2023-01-30 | End: 2023-01-30 | Stop reason: HOSPADM

## 2023-01-30 RX ORDER — ONDANSETRON 8 MG/1
8 TABLET, ORALLY DISINTEGRATING ORAL EVERY 8 HOURS PRN
Status: DISCONTINUED | OUTPATIENT
Start: 2023-01-30 | End: 2023-01-30 | Stop reason: HOSPADM

## 2023-01-30 RX ORDER — FENTANYL CITRATE 50 UG/ML
INJECTION, SOLUTION INTRAMUSCULAR; INTRAVENOUS
Status: DISCONTINUED | OUTPATIENT
Start: 2023-01-30 | End: 2023-01-30

## 2023-01-30 RX ORDER — SODIUM CHLORIDE 0.9 % (FLUSH) 0.9 %
3 SYRINGE (ML) INJECTION
Status: DISCONTINUED | OUTPATIENT
Start: 2023-01-30 | End: 2023-01-30 | Stop reason: HOSPADM

## 2023-01-30 RX ORDER — HYDROMORPHONE HYDROCHLORIDE 2 MG/ML
0.2 INJECTION, SOLUTION INTRAMUSCULAR; INTRAVENOUS; SUBCUTANEOUS EVERY 5 MIN PRN
Status: DISCONTINUED | OUTPATIENT
Start: 2023-01-30 | End: 2023-01-30 | Stop reason: HOSPADM

## 2023-01-30 RX ORDER — ACETAMINOPHEN 10 MG/ML
INJECTION, SOLUTION INTRAVENOUS
Status: DISCONTINUED | OUTPATIENT
Start: 2023-01-30 | End: 2023-01-30

## 2023-01-30 RX ADMIN — PROPOFOL 50 MG: 10 INJECTION, EMULSION INTRAVENOUS at 03:01

## 2023-01-30 RX ADMIN — SODIUM CHLORIDE, SODIUM LACTATE, POTASSIUM CHLORIDE, AND CALCIUM CHLORIDE: .6; .31; .03; .02 INJECTION, SOLUTION INTRAVENOUS at 09:01

## 2023-01-30 RX ADMIN — CEFAZOLIN 2 G: 330 INJECTION, POWDER, FOR SOLUTION INTRAMUSCULAR; INTRAVENOUS at 03:01

## 2023-01-30 RX ADMIN — LIDOCAINE HYDROCHLORIDE 100 MG: 20 INJECTION, SOLUTION INTRAVENOUS at 03:01

## 2023-01-30 RX ADMIN — FENTANYL CITRATE 25 MCG: 0.05 INJECTION, SOLUTION INTRAMUSCULAR; INTRAVENOUS at 03:01

## 2023-01-30 RX ADMIN — ACETAMINOPHEN 1000 MG: 10 INJECTION, SOLUTION INTRAVENOUS at 03:01

## 2023-01-30 RX ADMIN — EPHEDRINE SULFATE 5 MG: 50 INJECTION, SOLUTION INTRAMUSCULAR; INTRAVENOUS; SUBCUTANEOUS at 03:01

## 2023-01-30 RX ADMIN — DEXAMETHASONE SODIUM PHOSPHATE 8 MG: 4 INJECTION, SOLUTION INTRA-ARTICULAR; INTRALESIONAL; INTRAMUSCULAR; INTRAVENOUS; SOFT TISSUE at 03:01

## 2023-01-30 RX ADMIN — ONDANSETRON 8 MG: 2 INJECTION, SOLUTION INTRAMUSCULAR; INTRAVENOUS at 03:01

## 2023-01-30 RX ADMIN — PROPOFOL 150 MG: 10 INJECTION, EMULSION INTRAVENOUS at 03:01

## 2023-01-30 RX ADMIN — EPHEDRINE SULFATE 10 MG: 50 INJECTION, SOLUTION INTRAMUSCULAR; INTRAVENOUS; SUBCUTANEOUS at 03:01

## 2023-01-30 NOTE — ANESTHESIA PREPROCEDURE EVALUATION
01/30/2023  Brandan Werner is a 77 y.o., male.      Pre-op Assessment    I have reviewed the Patient Summary Reports.     I have reviewed the Nursing Notes.    I have reviewed the Medications.     Review of Systems  Anesthesia Hx:  Denies Family Hx of Anesthesia complications.   Denies Personal Hx of Anesthesia complications.        Patient Active Problem List   Diagnosis    Essential hypertension    Hyperlipidemia    Benign prostatic hyperplasia    Elevated PSA    Nuclear sclerosis    PTSD (post-traumatic stress disorder)    Shoulder arthritis    Ectatic aorta    Sensorineural hearing loss (SNHL) of both ears    Calcified granuloma of lung    Tremor    Chronic right shoulder pain    History of agent Orange exposure    Overweight (BMI 25.0-29.9)    Facet arthritis of cervical region    Kidney stone    Atherosclerosis of aorta    History of penile implant    Major depressive disorder    Adjustment disorder with depressed mood    Anemia    History of fall    Imbalance       Past Medical History:   Diagnosis Date    Anxiety     BPH (benign prostatic hyperplasia)     Cataract     Elevated PSA     Erectile dysfunction     Hyperlipidemia     Hypertension     Hypogonadism male     Kidney stone 5/20/2020    Obesity     PTSD (post-traumatic stress disorder)     Shoulder arthritis     Urinary tract infection        ECHO: No results found for this or any previous visit.      Body mass index is 28.48 kg/m².    Tobacco Use: Medium Risk    Smoking Tobacco Use: Former    Smokeless Tobacco Use: Never    Passive Exposure: Not on file       Social History     Substance and Sexual Activity   Drug Use No        Alcohol Use: Not At Risk    Frequency of Alcohol Consumption: Never    Average Number of Drinks: Patient does not drink    Frequency of Binge Drinking: Never       Review of  patient's allergies indicates:  No Known Allergies      Airway:  No value filed.     Physical Exam    Airway:  Mouth Opening: Normal  TM Distance: Normal          Anesthesia Plan  Type of Anesthesia, risks & benefits discussed:    Anesthesia Type: Gen Supraglottic Airway  Intra-op Monitoring Plan: Standard ASA Monitors  Post Op Pain Control Plan: multimodal analgesia  Induction:  IV  Informed Consent: Informed consent signed with the Patient and all parties understand the risks and agree with anesthesia plan.  All questions answered.   ASA Score: 3  Day of Surgery Review of History & Physical: H&P Update referred to the surgeon/provider.    Ready For Surgery From Anesthesia Perspective.     .

## 2023-01-30 NOTE — OP NOTE
Extracorporal Shock Wave Lithotripsy (ESWL)  OPERATIVE NOTE    Procedure date: 1/30/23    Preoperative Diagnosis:  left kidney stone    Postoperative Diagnosis: same    Procedure:  left extracorporal shock wave lithotripsy    Surgeon:  Dannie Murray MD    Anesthesia:  LMA  EBL:  0  Tubes and Drains:  none  Specimen(s):  none    Complications:  none    Indication:  76 yo M with left kidney stone.  He had left UPJ stone a long stent that was previously treated with extracorporeal shockwave lithotripsy.  Has a lower pole stone on x-ray and we decided to perform repeat lithotripsy of this lower pole separate stone    Findings:  left 1.2 cm LP stone appeared to fragment well    Description of Procedure:  The patient was identified and surgical site verification was performed prior to obtaining consent.  The patient was brought to the OR.  SCDs were placed on the bilateral lower extremities and were cycling during the entire procedure.  Fluoroscopy was done prior to anesthesia and the left lower pole stone was confirmed.  Adequate LMA anesthesia was obtained, and the patient was positioned supine on the table. A preoperative Time Out was performed addressing the anticipated surgical site, procedure, and safety precautions; everyone in the room agreed.  The patient received preoperative antibiotics.      After anesthesia was obtained, The stone was localized in the cross-hairs. The ESWL machine started to perform the shock wave lithotripsy.  With a slow ramp, the frequency was started at 60/minute and maximum was 120/minute.  Over a few hundred shocks the power was increased to a maximum of 7.  Intermittent fluoroscopy was used to confirm accurate shock wave delivery.  After a total of 2500 shocks, there appeared to be good fragmentation.  The patient was awakened and transferred to the recovery room in stable condition having tolerated the procedure well.      Dispositions:  follow up in 1 week for possible  cysto/stent removal with KUB prior.     Dannie Murray MD

## 2023-01-30 NOTE — PATIENT INSTRUCTIONS
Strain urine.  Bring any fragments passed to you follow up appointment.    Some blood in the urine is normal and expected.  Drink plenty of fluids.     Some pain is expected.      Call the clinic at [phone] or return to the emergency department if you  have persistent fever >101.4, severe bleeding that does not stop, pain not controlled with medications, severe nausea and vomiting limiting intake of liquids and solids, or any other concerns.      Obtain KUB (xray) prior to follow up.  Office will call to make appointment.

## 2023-01-30 NOTE — ANESTHESIA PROCEDURE NOTES
Intubation    Date/Time: 1/30/2023 3:23 PM  Performed by: Brinda Frey CRNA  Authorized by: BARAK Beltrán MD     Intubation:     Induction:  Intravenous    Intubated:  Postinduction    Mask Ventilation:  N/a    Attempts:  1    Attempted By:  CRNA    Difficult Airway Encountered?: No      Airway Device:  Supraglottic airway/LMA    Airway Device Size:  4.0    Style/Cuff Inflation:  Cuffed (inflated to minimal occlusive pressure)    Secured at:  The lips    Placement Verified By:  Capnometry    Complicating Factors:  None    Findings Post-Intubation:  BS equal bilateral and atraumatic/condition of teeth unchanged

## 2023-01-30 NOTE — PLAN OF CARE
Pacu discharge criteria met. Report called to Ya Chaun/ops. Transported to ops via stretcher,sr upx2

## 2023-01-30 NOTE — DISCHARGE SUMMARY
[facility] - Surgery (Hospital)  Discharge Note  Short Stay    Procedure(s) (LRB):  LITHOTRIPSY, ESWL [facility] ESWL machine, scheduling call [phone] 60 minutes (Left)      OUTCOME: Patient tolerated treatment/procedure well without complication and is now ready for discharge.    DISPOSITION: Home or Self Care    FINAL DIAGNOSIS:  left kidney stone    FOLLOWUP: In clinic    DISCHARGE INSTRUCTIONS:    Discharge Procedure Orders   X-Ray Abdomen AP 1 View   Standing Status: Future Standing Exp. Date: 07/30/23     Order Specific Question Answer Comments   Reason for Exam: f/u eswl for kidney stones    May the Radiologist modify the order per protocol to meet the clinical needs of the patient? Yes      Diet general     No dressing needed     Call MD for:  temperature >100.4     Call MD for:  persistent nausea and vomiting     Call MD for:  severe uncontrolled pain     Call MD for:   Order Comments: Strain urine.  Bring any fragments passed to you follow up appointment.    Some blood in the urine is normal and expected.  Drink plenty of fluids.     Some pain is expected.      Call the clinic at [phone] or return to the emergency department if you  have persistent fever >101.4, severe bleeding that does not stop, pain not controlled with medications, severe nausea and vomiting limiting intake of liquids and solids, or any other concerns.      Obtain KUB (xray) prior to follow up.  Office will call to make appointment.     CYSTOSCOPY W/ STENT REMOVAL   Standing Status: Future Standing Exp. Date: 01/30/24     Activity as tolerated        TIME SPENT ON DISCHARGE: 5 minutes

## 2023-01-30 NOTE — H&P
[facility])  Urology   H&P    Patient Name: Brandan Werner  MRN: [id number]      Subjective:     HPI:   Brandan Werner is a 77 y.o. male who presents with left kidney stone for repeat ESWL.  Not taking NSAIDs, thinners.  NPO.  Frequency from stent, no N/V/F/C.        Past Medical History:   Diagnosis Date    Anxiety     BPH (benign prostatic hyperplasia)     Cataract     Elevated PSA     Erectile dysfunction     Hyperlipidemia     Hypertension     Hypogonadism male     Kidney stone 2020    Obesity     PTSD (post-traumatic stress disorder)     Shoulder arthritis     Urinary tract infection        Past Surgical History:   Procedure Laterality Date    COLONOSCOPY N/A 2018    Procedure: COLONOSCOPY;  Surgeon: Germán Moss MD;  Location: [address]);  Service: Endoscopy;  Laterality: N/A;    COLONOSCOPY W/ 2010    CYSTOSCOPY Left 2022    Procedure: CYSTOSCOPY;  Surgeon: Dannie Murray MD;  Location: [facility];  Service: Urology;  Laterality: Left;    EXTRACORPOREAL SHOCK WAVE LITHOTRIPSY Left 2022    Procedure: LITHOTRIPSY, ESWL [employer] ESWL machine, scheduling call [phone] minutes;  Surgeon: Dannie Murray MD;  Location: [facility];  Service: Urology;  Laterality: Left;  Confirmed with [facility]  [facility] conf # [id number]    PENILE PROSTHESIS IMPLANT         Review of patient's allergies indicates:  No Known Allergies    Family History       Problem Relation (Age of Onset)    Cancer Mother    Cataracts Mother    Heart disease Father (62)    Stroke Brother (62)            Tobacco Use    Smoking status: Former     Packs/day: 1.00     Years: 10.00     Pack years: 10.00     Types: Cigarettes     Quit date:      Years since quittin.1    Smokeless tobacco: Never   Substance and Sexual Activity    Alcohol use: No    Drug use: No    Sexual activity: Yes     Partners: Female       Review of Systems   Constitutional:  Negative for  activity change, chills and fever.   Respiratory:  Negative for chest tightness and shortness of breath.    Cardiovascular:  Negative for chest pain and palpitations.   Gastrointestinal:  Negative for abdominal distention, abdominal pain, nausea and vomiting.   Genitourinary:  Positive for frequency. Negative for difficulty urinating, flank pain, hematuria, nocturia and urgency.   Musculoskeletal:  Negative for arthralgias and neck pain.   Skin:  Negative for wound.   Neurological:  Negative for dizziness.   Psychiatric/Behavioral:  Negative for confusion.      Objective:     Temp:  [99.1 °F (37.3 °C)] 99.1 °F (37.3 °C)  Pulse:  [55] 55  Resp:  [20] 20  SpO2:  [97 %] 97 %  BP: (136)/(75) 136/75       NAD  RRR  CTAB  ABD S/ND/NTTP       Ext: no edema      Significant Labs:  \    Results for orders placed or performed during the hospital encounter of 11/14/22 (from the past 2160 hour(s))   CT Urogram Abd Pelvis W WO    Impression    Left UPJ stone measuring 1.0 cm with moderately severe upstream hydronephrosis.  Additional nonobstructing left renal calculi.    Bilateral low-density renal cortical lesions, too small to characterize and possible cysts.    Urinary bladder is partially decompressed with enlarged heterogeneous prostate with central gland protrusion.  Associated mild lower perivesicular stranding.  Etiology is nonspecific to include sequela of chronic partial outlet obstruction with underlying inflammatory component not excluded.  Suggest correlation with PSA and subsequent assessment such as cystoscopy as warranted.    Few shotty perirectal and left iliac chain node measuring up to 0.7 cm which appear slightly more conspicuous from prior, though remain nonspecific.  Continued close attention at follow-up recommended at a minimum.    Additional findings as above.      Electronically signed by: Robby Burden  Date:    11/14/2022  Time:    13:20       Significant Imaging:  CT: I have reviewed all results  within the past 24 hours and my personal findings are:  as above      Assessment:     Problem Noted   Kidney Stone 5/20/2020    Left 1.5 cm UPJ and non obstructing left lower pole greater than 1 cm on CTU 11/2022 with moderate hydronephrosis     Unable to place left stent 11/17/22 due to large prostate, prosthesis and unable to find ureteral orifice.   Left PCN 11/18/22, internalized to stent 11/28  Left ESWL UPJ stone 12/2/22  KUB after with 1.8 cm left lower pole stone consistent with lower pole stone on CT urogram, no renal pelvis/gualberto stent stone seen.     Elevated Psa 3/11/2013    Biopsy 2013 ASAP 1 core, 2014 benign  PSA 8/22--8.2  Volume 183, PSAD 0.04       Benign Prostatic Hyperplasia     183 gm prostate on CTU 2022  Initial AUA SS (8/2022): 19/2  PVR 67     History of Penile Implant 5/20/2020     Plan:     Left ESWL  The risks, benefits, alteratives of the procedure were discussed with the patient.  The patient was given time for questions, all questions were answered.  Written, informed consent was obtained.        Thank you for your consult.     Dannie Murray MD  [facility]

## 2023-01-30 NOTE — TRANSFER OF CARE
Anesthesia Transfer of Care Note    Patient: Brandan Werner    Procedure(s) Performed: Procedure(s) (LRB):  LITHOTRIPSY, ESWL [employer] ESWL machine, scheduling call [phone] 60 minutes (Left)    Patient location: PACU    Anesthesia Type: general    Transport from OR: Transported from OR on 6-10 L/min O2 by face mask with adequate spontaneous ventilation    Post pain: adequate analgesia    Post assessment: no apparent anesthetic complications and tolerated procedure well    Post vital signs: stable    Level of consciousness: awake and responds to stimulation    Nausea/Vomiting: no nausea/vomiting    Complications: none    Transfer of care protocol was followed      Last vitals:   Visit Vitals  /75   Pulse (!) 55   Temp 37.3 °C (99.1 °F) (Skin)   Resp 20   Ht 6' (1.829 m)   Wt 95.3 kg (210 lb)   SpO2 97%   BMI 28.48 kg/m²

## 2023-01-30 NOTE — ANESTHESIA POSTPROCEDURE EVALUATION
Anesthesia Post Evaluation    Patient: Brandan Werner    Procedure(s) Performed: Procedure(s) (LRB):  LITHOTRIPSY, ESWL [employer] ESWL machine, scheduling call [phone] 60 minutes (Left)    Final Anesthesia Type: general      Patient location during evaluation: PACU  Patient participation: Yes- Able to Participate  Level of consciousness: awake and alert  Post-procedure vital signs: reviewed and stable  Pain management: adequate  Airway patency: patent    PONV status at discharge: No PONV  Anesthetic complications: no      Cardiovascular status: blood pressure returned to baseline  Respiratory status: unassisted, spontaneous ventilation and room air  Hydration status: euvolemic            Vitals Value Taken Time   /90 01/30/23 1635   Temp 36.5 °C (97.7 °F) 01/30/23 1635   Pulse 64 01/30/23 1638   Resp 11 01/30/23 1638   SpO2 98 % 01/30/23 1638   Vitals shown include unvalidated device data.      Event Time   Out of Recovery 16:35:00         Pain/Levi Score: Levi Score: 10 (1/30/2023  4:35 PM)

## 2023-02-02 ENCOUNTER — HOSPITAL ENCOUNTER (OUTPATIENT)
Dept: RADIOLOGY | Facility: HOSPITAL | Age: 78
Discharge: HOME | End: 2023-02-02
Attending: UROLOGY
Payer: MEDICARE

## 2023-02-02 DIAGNOSIS — N20.0 KIDNEY STONE: ICD-10-CM

## 2023-02-02 PROCEDURE — 74018 RADEX ABDOMEN 1 VIEW: CPT | Mod: 26,HCNC,, | Performed by: RADIOLOGY

## 2023-02-02 PROCEDURE — 74018 XR ABDOMEN AP 1 VIEW: ICD-10-PCS | Mod: 26,HCNC,, | Performed by: RADIOLOGY

## 2023-02-02 PROCEDURE — 74018 RADEX ABDOMEN 1 VIEW: CPT | Mod: TC,HCNC,FY

## 2023-02-07 ENCOUNTER — PROCEDURE VISIT (OUTPATIENT)
Dept: UROLOGY | Facility: CLINIC | Age: 78
End: 2023-02-07
Payer: MEDICARE

## 2023-02-07 VITALS
BODY MASS INDEX: 29.04 KG/M2 | HEIGHT: 72 IN | SYSTOLIC BLOOD PRESSURE: 120 MMHG | WEIGHT: 214.38 LBS | DIASTOLIC BLOOD PRESSURE: 82 MMHG | HEART RATE: 69 BPM

## 2023-02-07 DIAGNOSIS — N20.0 KIDNEY STONE: ICD-10-CM

## 2023-02-07 PROCEDURE — 99024 POSTOP FOLLOW-UP VISIT: CPT | Mod: HCNC,S$GLB,, | Performed by: UROLOGY

## 2023-02-07 PROCEDURE — 99024 PR POST-OP FOLLOW-UP VISIT: ICD-10-PCS | Mod: HCNC,S$GLB,, | Performed by: UROLOGY

## 2023-02-07 RX ORDER — CIPROFLOXACIN 500 MG/1
500 TABLET ORAL
Status: COMPLETED | OUTPATIENT
Start: 2023-02-07 | End: 2023-02-07

## 2023-02-07 RX ADMIN — CIPROFLOXACIN 500 MG: 500 TABLET ORAL at 01:02

## 2023-02-07 NOTE — PROGRESS NOTES
Subjective:       Patient ID: Brandan Werner is a 77 y.o. male.    Chief Complaint: No chief complaint on file.       This is a 77 y.o.  male patient with BPH, elevated PSA, kidney stone.  Past patient of Dr. Laboy last seen in 2020 for cystoscopy for microhematuria that was negative except large prostate.  H/o penile implant that still uses.  CTU in 2020 with 150 gm prostate no other findings.  Long h/o elevated PSA.    PSA 6.2 in 2013 and biopsy negative  Biopsy in 2013 with ASAP  PSA recently checked by PCP and was 8.2   Moderate/severe LUTs, AUA SS 19/2, PVR 67  Worsening frequency of urination on finasteride, tamsulosin.    UA showed microhematuria.  CTU done showed left 1 cm UPJ stone with moderate hydronephrosis 11/22 and left non obstructing stones.    Failed left stent, required PCN and antegrade stent  Left ESWL 12/2  Follow-up KUB shows resolution of UPJ stone continued lower pole left stone.  Repeat left LP stone ESWL 1/30/23, appeared to be good fragmentation.  KUB shows left dense stone left LP but still present.  Stent in place. Stent removed.              LAST PSA  Lab Results   Component Value Date    PSA 8.2 (H) 08/19/2022    PSA 7.0 (H) 03/02/2021    PSA 6.5 (H) 01/30/2020    PSA 3.0 11/02/2016    PSA 5.0 (H) 05/02/2014    PSA 6.2 (H) 10/18/2013    PSA 4.49 (H) 02/22/2013    PSA 4.1 (H) 05/09/2012    PSA 4.04 (H) 04/05/2012    PSA 2.8 08/31/2010    PSA 3.1 02/25/2009    PSA 2.2 09/06/2007    PSA 1.8 09/30/2006    PSA 1.7 12/28/2005    PSA 1.8 10/30/2004    PSADIAG 7.0 (H) 03/07/2019    PSADIAG 6.6 (H) 09/07/2018    PSADIAG 6.4 (H) 06/08/2018    PSADIAG 5.3 (H) 01/03/2018    PSADIAG 2.9 02/22/2016    PSADIAG 2.9 08/24/2015    PSADIAG 5.7 (H) 02/24/2015    PSADIAG 5.1 (H) 06/26/2014    PSADIAG 5.8 (H) 12/03/2013       Lab Results   Component Value Date    CREATININE 1.1 01/06/2023       ---  PMH/PSH/Medications/Allergies/Social history reviewed and as in chart.    Review of Systems    Constitutional:  Negative for activity change, chills and fever.   HENT:  Negative for congestion.    Respiratory:  Negative for cough, chest tightness and shortness of breath.    Cardiovascular:  Negative for chest pain and palpitations.   Gastrointestinal:  Negative for abdominal distention, abdominal pain, nausea and vomiting.   Genitourinary:  Positive for difficulty urinating. Negative for flank pain, frequency, hematuria, penile pain, scrotal swelling and testicular pain.   Musculoskeletal:  Negative for gait problem.     Objective:      Physical Exam  HENT:      Head: Atraumatic.   Pulmonary:      Effort: Pulmonary effort is normal.   Neurological:      General: No focal deficit present.      Mental Status: He is alert and oriented to person, place, and time.       Assessment:     Problem Noted   Kidney Stone 5/20/2020    Left 1.5 cm UPJ and non obstructing left lower pole greater than 1 cm on CTU 11/2022 with moderate hydronephrosis     Unable to place left stent 11/17/22 due to large prostate, prosthesis and unable to find ureteral orifice.   Left PCN 11/18/22, internalized to stent 11/28  Left ESWL UPJ stone 12/2/22  KUB after with 1.8 cm left lower pole stone consistent with lower pole stone on CT urogram, no renal pelvis/gualberto stent stone seen.  Left ESWL 1/30/23 good fragmentation     Elevated Psa 3/11/2013    Biopsy 2013 ASAP 1 core, 2014 benign  PSA 8/22--8.2  Volume 183, PSAD 0.04       Benign Prostatic Hyperplasia     183 gm prostate on CTU 2022  Initial AUA SS (8/2022): 19/2  PVR 67     History of Penile Implant 5/20/2020       Plan:     Discussed findings of KUB, recent ESWL  Appears that stone is less radiodense, discussed options including left URS/HLL/stent vs removal of left stent and observation.  It appears the UPJ stone is cleared.  Wishes to observe, stent removed  Follow up in 3 months with CT abd pelvis w/o contrast      Dannie Murray MD

## 2023-02-07 NOTE — PROCEDURES
CYSTOSCOPY W/ STENT REMOVAL    Date/Time: 2/7/2023 1:20 PM  Performed by: Dannie Murray MD  Authorized by: Dannie Murray MD   Preparation: Patient was prepped and draped in the usual sterile fashion.  Local anesthesia used: yes (lidocaine jelly)    Anesthesia:  Local anesthesia used: yes (lidocaine jelly)    Sedation:  Patient sedated: no    Patient tolerance: patient tolerated the procedure well with no immediate complications  Comments: Procedure details:    Patient prepped and draped in normal sterile fashion.  17F flexible cystoscope introduced.  There was some edema around the stent.  The stent was then grasped with grasping forceps and removed in its entirety.  It was located in a very odd position around the median lobe of the prostate.  Patient tolerated the procedure well.    Dannie Murray MD

## 2023-02-09 ENCOUNTER — OFFICE VISIT (OUTPATIENT)
Dept: OPTOMETRY | Facility: CLINIC | Age: 78
End: 2023-02-09
Payer: MEDICARE

## 2023-02-09 DIAGNOSIS — H25.13 NUCLEAR SCLEROSIS, BILATERAL: Primary | ICD-10-CM

## 2023-02-09 DIAGNOSIS — Z13.5 GLAUCOMA SCREENING: ICD-10-CM

## 2023-02-09 DIAGNOSIS — H52.4 PRESBYOPIA: ICD-10-CM

## 2023-02-09 DIAGNOSIS — Z00.00 ENCOUNTER FOR MEDICARE ANNUAL WELLNESS EXAM: ICD-10-CM

## 2023-02-09 PROCEDURE — 92004 COMPRE OPH EXAM NEW PT 1/>: CPT | Mod: HCNC,S$GLB,, | Performed by: OPTOMETRIST

## 2023-02-09 PROCEDURE — 1160F PR REVIEW ALL MEDS BY PRESCRIBER/CLIN PHARMACIST DOCUMENTED: ICD-10-PCS | Mod: HCNC,CPTII,S$GLB, | Performed by: OPTOMETRIST

## 2023-02-09 PROCEDURE — 99999 PR PBB SHADOW E&M-EST. PATIENT-LVL III: ICD-10-PCS | Mod: PBBFAC,HCNC,, | Performed by: OPTOMETRIST

## 2023-02-09 PROCEDURE — 1159F PR MEDICATION LIST DOCUMENTED IN MEDICAL RECORD: ICD-10-PCS | Mod: HCNC,CPTII,S$GLB, | Performed by: OPTOMETRIST

## 2023-02-09 PROCEDURE — 3288F PR FALLS RISK ASSESSMENT DOCUMENTED: ICD-10-PCS | Mod: HCNC,CPTII,S$GLB, | Performed by: OPTOMETRIST

## 2023-02-09 PROCEDURE — 3288F FALL RISK ASSESSMENT DOCD: CPT | Mod: HCNC,CPTII,S$GLB, | Performed by: OPTOMETRIST

## 2023-02-09 PROCEDURE — 92004 PR EYE EXAM, NEW PATIENT,COMPREHESV: ICD-10-PCS | Mod: HCNC,S$GLB,, | Performed by: OPTOMETRIST

## 2023-02-09 PROCEDURE — 1126F PR PAIN SEVERITY QUANTIFIED, NO PAIN PRESENT: ICD-10-PCS | Mod: HCNC,CPTII,S$GLB, | Performed by: OPTOMETRIST

## 2023-02-09 PROCEDURE — 1101F PR PT FALLS ASSESS DOC 0-1 FALLS W/OUT INJ PAST YR: ICD-10-PCS | Mod: HCNC,CPTII,S$GLB, | Performed by: OPTOMETRIST

## 2023-02-09 PROCEDURE — 92015 DETERMINE REFRACTIVE STATE: CPT | Mod: HCNC,S$GLB,, | Performed by: OPTOMETRIST

## 2023-02-09 PROCEDURE — 1101F PT FALLS ASSESS-DOCD LE1/YR: CPT | Mod: HCNC,CPTII,S$GLB, | Performed by: OPTOMETRIST

## 2023-02-09 PROCEDURE — 1159F MED LIST DOCD IN RCRD: CPT | Mod: HCNC,CPTII,S$GLB, | Performed by: OPTOMETRIST

## 2023-02-09 PROCEDURE — 99999 PR PBB SHADOW E&M-EST. PATIENT-LVL III: CPT | Mod: PBBFAC,HCNC,, | Performed by: OPTOMETRIST

## 2023-02-09 PROCEDURE — 1126F AMNT PAIN NOTED NONE PRSNT: CPT | Mod: HCNC,CPTII,S$GLB, | Performed by: OPTOMETRIST

## 2023-02-09 PROCEDURE — 92015 PR REFRACTION: ICD-10-PCS | Mod: HCNC,S$GLB,, | Performed by: OPTOMETRIST

## 2023-02-09 PROCEDURE — 1160F RVW MEDS BY RX/DR IN RCRD: CPT | Mod: HCNC,CPTII,S$GLB, | Performed by: OPTOMETRIST

## 2023-02-09 NOTE — PROGRESS NOTES
HPI     Concerns About Ocular Health            Comments: WALDEMAR: 1 year           Comments    Presents today for routine eye exam. Pt reports noticeable vision changes.   Pt reports he also have trouble with his prescription glasses.               Last edited by Lupe Cramer MA on 2/9/2023  8:31 AM.        ROS    Negative for: Constitutional, Gastrointestinal, Neurological, Skin,   Genitourinary, Musculoskeletal, HENT, Endocrine, Cardiovascular, Eyes,   Respiratory, Psychiatric, Allergic/Imm, Heme/Lymph  Last edited by Ad Hartman, OD on 2/9/2023  8:40 AM.        Assessment /Plan     For exam results, see Encounter Report.    Nuclear sclerosis, bilateral    Glaucoma screening    Presbyopia      Cat OU--discussed surgery, but pt wishes to wait.  Wants new spex Rx    PLAN:    Pt will call if still blur w new spex, and will get cat eval w Dr parsons.  Otherwise rtc 1 yr

## 2023-02-17 ENCOUNTER — TELEPHONE (OUTPATIENT)
Dept: ORTHOPEDICS | Facility: CLINIC | Age: 78
End: 2023-02-17
Payer: MEDICARE

## 2023-02-17 NOTE — TELEPHONE ENCOUNTER
Patient came into office asking if Dr Richardson did laser surgery. Patient was informed that he does not. Patient stated he will like to cancel his procedure.

## 2023-02-17 NOTE — TELEPHONE ENCOUNTER
----- Message from Douglas Cervantes sent at 2/16/2023  1:07 PM CST -----  Pt Requesting Call Back    Who called: Ivone, pt's wife  Who called for pt:  Best call back #: [phone]  Add notes: Ivone said she has a question concerning the pt's procedure

## 2023-02-22 ENCOUNTER — LAB VISIT (OUTPATIENT)
Dept: LAB | Facility: HOSPITAL | Age: 78
End: 2023-02-22
Attending: INTERNAL MEDICINE
Payer: MEDICARE

## 2023-02-22 DIAGNOSIS — I10 ESSENTIAL HYPERTENSION: Chronic | ICD-10-CM

## 2023-02-22 DIAGNOSIS — R97.20 ELEVATED PSA: ICD-10-CM

## 2023-02-22 DIAGNOSIS — E78.49 OTHER HYPERLIPIDEMIA: ICD-10-CM

## 2023-02-22 DIAGNOSIS — N40.0 BENIGN PROSTATIC HYPERPLASIA, UNSPECIFIED WHETHER LOWER URINARY TRACT SYMPTOMS PRESENT: ICD-10-CM

## 2023-02-22 LAB
ALBUMIN SERPL BCP-MCNC: 3.8 G/DL (ref 3.5–5.2)
ALP SERPL-CCNC: 40 U/L (ref 55–135)
ALT SERPL W/O P-5'-P-CCNC: 13 U/L (ref 10–44)
ANION GAP SERPL CALC-SCNC: 6 MMOL/L (ref 8–16)
AST SERPL-CCNC: 12 U/L (ref 10–40)
BASOPHILS # BLD AUTO: 0.05 K/UL (ref 0–0.2)
BASOPHILS NFR BLD: 0.8 % (ref 0–1.9)
BILIRUB SERPL-MCNC: 0.4 MG/DL (ref 0.1–1)
BUN SERPL-MCNC: 16 MG/DL (ref 8–23)
CALCIUM SERPL-MCNC: 8.8 MG/DL (ref 8.7–10.5)
CHLORIDE SERPL-SCNC: 106 MMOL/L (ref 95–110)
CO2 SERPL-SCNC: 26 MMOL/L (ref 23–29)
COMPLEXED PSA SERPL-MCNC: 4.9 NG/ML (ref 0–4)
CREAT SERPL-MCNC: 1.2 MG/DL (ref 0.5–1.4)
DIFFERENTIAL METHOD: ABNORMAL
EOSINOPHIL # BLD AUTO: 0.2 K/UL (ref 0–0.5)
EOSINOPHIL NFR BLD: 2.6 % (ref 0–8)
ERYTHROCYTE [DISTWIDTH] IN BLOOD BY AUTOMATED COUNT: 12.6 % (ref 11.5–14.5)
EST. GFR  (NO RACE VARIABLE): >60 ML/MIN/1.73 M^2
GLUCOSE SERPL-MCNC: 87 MG/DL (ref 70–110)
HCT VFR BLD AUTO: 39.2 % (ref 40–54)
HGB BLD-MCNC: 12.8 G/DL (ref 14–18)
IMM GRANULOCYTES # BLD AUTO: 0.02 K/UL (ref 0–0.04)
IMM GRANULOCYTES NFR BLD AUTO: 0.3 % (ref 0–0.5)
LYMPHOCYTES # BLD AUTO: 2.1 K/UL (ref 1–4.8)
LYMPHOCYTES NFR BLD: 32.3 % (ref 18–48)
MCH RBC QN AUTO: 31 PG (ref 27–31)
MCHC RBC AUTO-ENTMCNC: 32.7 G/DL (ref 32–36)
MCV RBC AUTO: 95 FL (ref 82–98)
MONOCYTES # BLD AUTO: 0.4 K/UL (ref 0.3–1)
MONOCYTES NFR BLD: 6.5 % (ref 4–15)
NEUTROPHILS # BLD AUTO: 3.7 K/UL (ref 1.8–7.7)
NEUTROPHILS NFR BLD: 57.5 % (ref 38–73)
NRBC BLD-RTO: 0 /100 WBC
PLATELET # BLD AUTO: 200 K/UL (ref 150–450)
PMV BLD AUTO: 9.9 FL (ref 9.2–12.9)
POTASSIUM SERPL-SCNC: 4.2 MMOL/L (ref 3.5–5.1)
PROT SERPL-MCNC: 6.3 G/DL (ref 6–8.4)
RBC # BLD AUTO: 4.13 M/UL (ref 4.6–6.2)
SODIUM SERPL-SCNC: 138 MMOL/L (ref 136–145)
WBC # BLD AUTO: 6.43 K/UL (ref 3.9–12.7)

## 2023-02-22 PROCEDURE — 85025 COMPLETE CBC W/AUTO DIFF WBC: CPT | Performed by: INTERNAL MEDICINE

## 2023-02-22 PROCEDURE — 80053 COMPREHEN METABOLIC PANEL: CPT | Performed by: INTERNAL MEDICINE

## 2023-02-22 PROCEDURE — 36415 COLL VENOUS BLD VENIPUNCTURE: CPT | Performed by: INTERNAL MEDICINE

## 2023-02-22 PROCEDURE — 84153 ASSAY OF PSA TOTAL: CPT | Performed by: INTERNAL MEDICINE

## 2023-02-27 ENCOUNTER — OFFICE VISIT (OUTPATIENT)
Dept: INTERNAL MEDICINE | Facility: CLINIC | Age: 78
End: 2023-02-27
Payer: MEDICARE

## 2023-02-27 VITALS
BODY MASS INDEX: 28.97 KG/M2 | HEART RATE: 68 BPM | SYSTOLIC BLOOD PRESSURE: 108 MMHG | TEMPERATURE: 97 F | HEIGHT: 72 IN | DIASTOLIC BLOOD PRESSURE: 70 MMHG | RESPIRATION RATE: 16 BRPM | WEIGHT: 213.88 LBS

## 2023-02-27 DIAGNOSIS — M19.019 SHOULDER ARTHRITIS: ICD-10-CM

## 2023-02-27 DIAGNOSIS — I10 ESSENTIAL HYPERTENSION: Primary | ICD-10-CM

## 2023-02-27 DIAGNOSIS — E78.49 OTHER HYPERLIPIDEMIA: ICD-10-CM

## 2023-02-27 DIAGNOSIS — N40.0 BENIGN PROSTATIC HYPERPLASIA, UNSPECIFIED WHETHER LOWER URINARY TRACT SYMPTOMS PRESENT: ICD-10-CM

## 2023-02-27 DIAGNOSIS — G20.A1 PARKINSON'S DISEASE: Chronic | ICD-10-CM

## 2023-02-27 PROCEDURE — 3288F PR FALLS RISK ASSESSMENT DOCUMENTED: ICD-10-PCS | Mod: CPTII,S$GLB,, | Performed by: INTERNAL MEDICINE

## 2023-02-27 PROCEDURE — 99999 PR PBB SHADOW E&M-EST. PATIENT-LVL IV: ICD-10-PCS | Mod: PBBFAC,,, | Performed by: INTERNAL MEDICINE

## 2023-02-27 PROCEDURE — 3078F PR MOST RECENT DIASTOLIC BLOOD PRESSURE < 80 MM HG: ICD-10-PCS | Mod: CPTII,S$GLB,, | Performed by: INTERNAL MEDICINE

## 2023-02-27 PROCEDURE — 1101F PT FALLS ASSESS-DOCD LE1/YR: CPT | Mod: CPTII,S$GLB,, | Performed by: INTERNAL MEDICINE

## 2023-02-27 PROCEDURE — 1101F PR PT FALLS ASSESS DOC 0-1 FALLS W/OUT INJ PAST YR: ICD-10-PCS | Mod: CPTII,S$GLB,, | Performed by: INTERNAL MEDICINE

## 2023-02-27 PROCEDURE — 3074F PR MOST RECENT SYSTOLIC BLOOD PRESSURE < 130 MM HG: ICD-10-PCS | Mod: CPTII,S$GLB,, | Performed by: INTERNAL MEDICINE

## 2023-02-27 PROCEDURE — 1159F MED LIST DOCD IN RCRD: CPT | Mod: CPTII,S$GLB,, | Performed by: INTERNAL MEDICINE

## 2023-02-27 PROCEDURE — 1125F AMNT PAIN NOTED PAIN PRSNT: CPT | Mod: CPTII,S$GLB,, | Performed by: INTERNAL MEDICINE

## 2023-02-27 PROCEDURE — 3078F DIAST BP <80 MM HG: CPT | Mod: CPTII,S$GLB,, | Performed by: INTERNAL MEDICINE

## 2023-02-27 PROCEDURE — 99214 PR OFFICE/OUTPT VISIT, EST, LEVL IV, 30-39 MIN: ICD-10-PCS | Mod: S$GLB,,, | Performed by: INTERNAL MEDICINE

## 2023-02-27 PROCEDURE — 1160F RVW MEDS BY RX/DR IN RCRD: CPT | Mod: CPTII,S$GLB,, | Performed by: INTERNAL MEDICINE

## 2023-02-27 PROCEDURE — 99214 OFFICE O/P EST MOD 30 MIN: CPT | Mod: S$GLB,,, | Performed by: INTERNAL MEDICINE

## 2023-02-27 PROCEDURE — 1160F PR REVIEW ALL MEDS BY PRESCRIBER/CLIN PHARMACIST DOCUMENTED: ICD-10-PCS | Mod: CPTII,S$GLB,, | Performed by: INTERNAL MEDICINE

## 2023-02-27 PROCEDURE — 3288F FALL RISK ASSESSMENT DOCD: CPT | Mod: CPTII,S$GLB,, | Performed by: INTERNAL MEDICINE

## 2023-02-27 PROCEDURE — 3074F SYST BP LT 130 MM HG: CPT | Mod: CPTII,S$GLB,, | Performed by: INTERNAL MEDICINE

## 2023-02-27 PROCEDURE — 1125F PR PAIN SEVERITY QUANTIFIED, PAIN PRESENT: ICD-10-PCS | Mod: CPTII,S$GLB,, | Performed by: INTERNAL MEDICINE

## 2023-02-27 PROCEDURE — 99999 PR PBB SHADOW E&M-EST. PATIENT-LVL IV: CPT | Mod: PBBFAC,,, | Performed by: INTERNAL MEDICINE

## 2023-02-27 PROCEDURE — 1159F PR MEDICATION LIST DOCUMENTED IN MEDICAL RECORD: ICD-10-PCS | Mod: CPTII,S$GLB,, | Performed by: INTERNAL MEDICINE

## 2023-02-27 NOTE — PROGRESS NOTES
Test showed she was at details never really Subjective:       Patient ID: Brandan Werner is a 77 y.o. male.    Chief Complaint: Hypertension (6 mos fol up w /labs.  [facility] on right [facility], pain at 8. Trying to get a dr to use laser. Wants private dr to do the surgery. ) and right shoulder pain    HPI  The patient presents for follow-up of medical conditions which include hypertension, chronic right shoulder pain due to arthritis, Parkinson's disease, elevated PSA level.  The patient states his impaired gait has improved following physical therapy.  He feels that his balance is better.  Surgery has been recommended for his right shoulder pain and arthritis.  He will be going to the [facility] to get a 2nd opinion.  The patient is tolerating his blood pressure medication well without side effects.  He denies having any abdominal pain.  He is not experienced hematuria recently.  No renal colic is described.    Review of Systems   Constitutional:  Negative for activity change, appetite change, fatigue and unexpected weight change.   HENT:  Negative for sinus pressure/congestion and sore throat.    Eyes:  Negative for visual disturbance.   Respiratory:  Negative for cough, chest tightness, shortness of breath and wheezing.    Cardiovascular:  Negative for chest pain, palpitations and leg swelling.   Gastrointestinal:  Negative for abdominal pain, blood in stool, nausea and vomiting.   Genitourinary:  Negative for dysuria, hematuria and urgency.   Musculoskeletal:  Positive for arthralgias and gait problem. Negative for back pain, joint swelling, myalgias and neck stiffness.   Integumentary:  Negative for color change and rash.   Neurological:  Positive for tremors. Negative for dizziness, syncope, weakness, light-headedness, numbness and headaches.   Psychiatric/Behavioral:  Negative for sleep disturbance.           Physical Exam  Vitals and nursing note reviewed.    Constitutional:       General: He is not in acute distress.     Appearance: Normal appearance. He is well-developed.   HENT:      Head: Normocephalic and atraumatic.   Eyes:      General: No scleral icterus.     Extraocular Movements: Extraocular movements intact.      Conjunctiva/sclera: Conjunctivae normal.   Neck:      Thyroid: No thyromegaly.      Vascular: No JVD.   Cardiovascular:      Rate and Rhythm: Normal rate and regular rhythm.      Heart sounds: Normal heart sounds. No murmur heard.    No friction rub. No gallop.   Pulmonary:      Effort: Pulmonary effort is normal. No respiratory distress.      Breath sounds: Normal breath sounds. No wheezing or rales.   Abdominal:      General: Bowel sounds are normal.      Palpations: Abdomen is soft. There is no mass.      Tenderness: There is no abdominal tenderness.   Musculoskeletal:         General: Tenderness present.      Cervical back: Normal range of motion and neck supple.      Right lower leg: No edema.      Left lower leg: No edema.      Comments: Right shoulder: decreased abduction and posterior rotation.   Lymphadenopathy:      Cervical: No cervical adenopathy.   Skin:     General: Skin is warm and dry.      Findings: No rash.   Neurological:      Mental Status: He is alert and oriented to person, place, and time.      Comments: Gait is normal.  Bilateral hand tremors are present.   Psychiatric:         Mood and Affect: Mood normal.         Behavior: Behavior normal.         Lab Visit on 02/22/2023   Component Date Value Ref Range Status    Sodium 02/22/2023 138  136 - 145 mmol/L Final    Potassium 02/22/2023 4.2  3.5 - 5.1 mmol/L Final    Chloride 02/22/2023 106  95 - 110 mmol/L Final    CO2 02/22/2023 26  23 - 29 mmol/L Final    Glucose 02/22/2023 87  70 - 110 mg/dL Final    BUN 02/22/2023 16  8 - 23 mg/dL Final    Creatinine 02/22/2023 1.2  0.5 - 1.4 mg/dL Final    Calcium 02/22/2023 8.8  8.7 - 10.5 mg/dL Final    Total Protein 02/22/2023 6.3  6.0  - 8.4 g/dL Final    Albumin 02/22/2023 3.8  3.5 - 5.2 g/dL Final    Total Bilirubin 02/22/2023 0.4  0.1 - 1.0 mg/dL Final    Comment: For infants and newborns, interpretation of results should be based  on gestational age, weight and in agreement with clinical  observations.    Premature Infant recommended reference ranges:  Up to 24 hours.............<8.0 mg/dL  Up to 48 hours............<12.0 mg/dL  3-5 days..................<15.0 mg/dL  6-29 days.................<15.0 mg/dL      Alkaline Phosphatase 02/22/2023 40 (L)  55 - 135 U/L Final    AST 02/22/2023 12  10 - 40 U/L Final    ALT 02/22/2023 13  10 - 44 U/L Final    Anion Gap 02/22/2023 6 (L)  8 - 16 mmol/L Final    eGFR 02/22/2023 >60  >60 mL/min/1.73 m^2 Final    WBC 02/22/2023 6.43  3.90 - 12.70 K/uL Final    RBC 02/22/2023 4.13 (L)  4.60 - 6.20 M/uL Final    Hemoglobin 02/22/2023 12.8 (L)  14.0 - 18.0 g/dL Final    Hematocrit 02/22/2023 39.2 (L)  40.0 - 54.0 % Final    MCV 02/22/2023 95  82 - 98 fL Final    MCH 02/22/2023 31.0  27.0 - 31.0 pg Final    MCHC 02/22/2023 32.7  32.0 - 36.0 g/dL Final    RDW 02/22/2023 12.6  11.5 - 14.5 % Final    Platelets 02/22/2023 200  150 - 450 K/uL Final    MPV 02/22/2023 9.9  9.2 - 12.9 fL Final    Immature Granulocytes 02/22/2023 0.3  0.0 - 0.5 % Final    Gran # (ANC) 02/22/2023 3.7  1.8 - 7.7 K/uL Final    Immature Grans (Abs) 02/22/2023 0.02  0.00 - 0.04 K/uL Final    Comment: Mild elevation in immature granulocytes is non specific and   can be seen in a variety of conditions including stress response,   acute inflammation, trauma and pregnancy. Correlation with other   laboratory and clinical findings is essential.      Lymph # 02/22/2023 2.1  1.0 - 4.8 K/uL Final    Mono # 02/22/2023 0.4  0.3 - 1.0 K/uL Final    Eos # 02/22/2023 0.2  0.0 - 0.5 K/uL Final    Baso # 02/22/2023 0.05  0.00 - 0.20 K/uL Final    nRBC 02/22/2023 0  0 /100 WBC Final    Gran % 02/22/2023 57.5  38.0 - 73.0 % Final    Lymph % 02/22/2023 32.3   18.0 - 48.0 % Final    Mono % 02/22/2023 6.5  4.0 - 15.0 % Final    Eosinophil % 02/22/2023 2.6  0.0 - 8.0 % Final    Basophil % 02/22/2023 0.8  0.0 - 1.9 % Final    Differential Method 02/22/2023 Automated   Final    PSA Diagnostic 02/22/2023 4.9 (H)  0.00 - 4.00 ng/mL Final    Comment: The testing method is a chemiluminescent microparticle immunoassay   manufactured by [employer] and performed on the [employer]   or   [employer] system. Values obtained with different assay manufacturers   for   methods may be different and cannot be used interchangeably.  PSA Expected levels:  Hormonal Therapy: <0.05 ng/ml  Prostatectomy: <0.01 ng/ml  Radiation Therapy: <1.00 ng/ml     Admission on 01/06/2023, Discharged on 01/06/2023   Component Date Value Ref Range Status    Specimen UA 01/06/2023 Urine, Clean Catch   Final    Color, UA 01/06/2023 Red (A)  Yellow, Straw, Amrita Final    Appearance, UA 01/06/2023 Cloudy (A)  Clear Final    pH, UA 01/06/2023 SEE COMMENT  5.0 - 8.0 Final    Color may interfere with results    Specific Gravity, UA 01/06/2023 SEE COMMENT  1.005 - 1.030 Final    Color may interfere with results    Protein, UA 01/06/2023 SEE COMMENT  Negative Final    Comment: Recommend a 24 hour urine protein or a urine   protein/creatinine ratio if globulin induced proteinuria is  clinically suspected.  Color may interfere with results      Glucose, UA 01/06/2023 SEE COMMENT  Negative Final    Color may interfere with results    Ketones, UA 01/06/2023 SEE COMMENT  Negative Final    Color may interfere with results    Bilirubin (UA) 01/06/2023 SEE COMMENT  Negative Final    Color may interfere with results    Occult Blood UA 01/06/2023 SEE COMMENT  Negative Final    Color may interfere with results    Nitrite, UA 01/06/2023 SEE COMMENT  Negative Final    Color may interfere with results    Urobilinogen, UA 01/06/2023 SEE COMMENT  <2.0 EU/dL Final    Color may interfere with results    Leukocytes, UA 01/06/2023  SEE COMMENT  Negative Final    Color may interfere with results    WBC 01/06/2023 10.44  3.90 - 12.70 K/uL Final    RBC 01/06/2023 4.04 (L)  4.60 - 6.20 M/uL Final    Hemoglobin 01/06/2023 12.8 (L)  14.0 - 18.0 g/dL Final    Hematocrit 01/06/2023 39.0 (L)  40.0 - 54.0 % Final    MCV 01/06/2023 97  82 - 98 fL Final    MCH 01/06/2023 31.7 (H)  27.0 - 31.0 pg Final    MCHC 01/06/2023 32.8  32.0 - 36.0 g/dL Final    RDW 01/06/2023 12.7  11.5 - 14.5 % Final    Platelets 01/06/2023 233  150 - 450 K/uL Final    MPV 01/06/2023 9.7  9.2 - 12.9 fL Final    Immature Granulocytes 01/06/2023 0.4  0.0 - 0.5 % Final    Gran # (ANC) 01/06/2023 6.2  1.8 - 7.7 K/uL Final    Immature Grans (Abs) 01/06/2023 0.04  0.00 - 0.04 K/uL Final    Comment: Mild elevation in immature granulocytes is non specific and   can be seen in a variety of conditions including stress response,   acute inflammation, trauma and pregnancy. Correlation with other   laboratory and clinical findings is essential.      Lymph # 01/06/2023 3.2  1.0 - 4.8 K/uL Final    Mono # 01/06/2023 0.7  0.3 - 1.0 K/uL Final    Eos # 01/06/2023 0.3  0.0 - 0.5 K/uL Final    Baso # 01/06/2023 0.07  0.00 - 0.20 K/uL Final    nRBC 01/06/2023 0  0 /100 WBC Final    Gran % 01/06/2023 59.5  38.0 - 73.0 % Final    Lymph % 01/06/2023 30.3  18.0 - 48.0 % Final    Mono % 01/06/2023 6.2  4.0 - 15.0 % Final    Eosinophil % 01/06/2023 2.9  0.0 - 8.0 % Final    Basophil % 01/06/2023 0.7  0.0 - 1.9 % Final    Differential Method 01/06/2023 Automated   Final    Sodium 01/06/2023 139  136 - 145 mmol/L Final    Potassium 01/06/2023 4.3  3.5 - 5.1 mmol/L Final    Chloride 01/06/2023 107  95 - 110 mmol/L Final    CO2 01/06/2023 23  23 - 29 mmol/L Final    Glucose 01/06/2023 78  70 - 110 mg/dL Final    BUN 01/06/2023 19  8 - 23 mg/dL Final    Creatinine 01/06/2023 1.1  0.5 - 1.4 mg/dL Final    Calcium 01/06/2023 8.9  8.7 - 10.5 mg/dL Final    Total Protein 01/06/2023 7.0  6.0 - 8.4 g/dL Final     Albumin 01/06/2023 3.8  3.5 - 5.2 g/dL Final    Total Bilirubin 01/06/2023 0.5  0.1 - 1.0 mg/dL Final    Comment: For infants and newborns, interpretation of results should be based  on gestational age, weight and in agreement with clinical  observations.    Premature Infant recommended reference ranges:  Up to 24 hours.............<8.0 mg/dL  Up to 48 hours............<12.0 mg/dL  3-5 days..................<15.0 mg/dL  6-29 days.................<15.0 mg/dL      Alkaline Phosphatase 01/06/2023 55  55 - 135 U/L Final    AST 01/06/2023 12  10 - 40 U/L Final    ALT 01/06/2023 9 (L)  10 - 44 U/L Final    Anion Gap 01/06/2023 9  8 - 16 mmol/L Final    eGFR 01/06/2023 >60  >60 mL/min/1.73 m^2 Final    Urine Culture, Routine 01/06/2023 No growth   Final    RBC, UA 01/06/2023 >100 (H)  0 - 4 /hpf Final    WBC, UA 01/06/2023 4  0 - 5 /hpf Final    Bacteria 01/06/2023 Occasional  None-Occ /hpf Final    Microscopic Comment 01/06/2023 SEE COMMENT   Final    Comment: Other formed elements not mentioned in the report are not   present in the microscopic examination.      Office Visit on 12/15/2022   Component Date Value Ref Range Status    Color, UA 12/15/2022 Yellow   Final    pH, UA 12/15/2022 5   Final    WBC, UA 12/15/2022 ++   Final    Nitrite, UA 12/15/2022 negative   Final    Protein, POC 12/15/2022 100   Final    Glucose, UA 12/15/2022 normal   Final    Ketones, UA 12/15/2022 negative   Final    Urobilinogen, UA 12/15/2022 normal   Final    Bilirubin, POC 12/15/2022 negative   Final    Blood, UA 12/15/2022 250   Final    Clarity, UA 12/15/2022 Slightly Cloudy   Final    Spec Grav UA 12/15/2022 1.020   Final    Urine Culture, Routine 12/15/2022  (A)   Final                    Value:SERRATIA MARCESCENS  10,000 - 49,999 cfu/ml  No other significant isolate         Assessment & Plan:      Brandan was seen today for hypertension, right shoulder pain.  Current therapy will be continued.  Laboratory studies will be repeated in 6  months.  The patient will also be following of with his orthopedic specialists as well as staff specialists at the [facility].    Diagnoses and all orders for this visit:    Essential hypertension    Other hyperlipidemia    Benign prostatic hyperplasia, unspecified whether lower urinary tract symptoms present    Shoulder arthritis    Parkinson's disease         Follow up in about 6 months (around 8/27/2023).     Km Chicas MD

## 2023-04-09 ENCOUNTER — HOSPITAL ENCOUNTER (EMERGENCY)
Facility: HOSPITAL | Age: 78
Discharge: HOME | End: 2023-04-09
Attending: EMERGENCY MEDICINE
Payer: MEDICARE

## 2023-04-09 VITALS
RESPIRATION RATE: 19 BRPM | BODY MASS INDEX: 28.75 KG/M2 | OXYGEN SATURATION: 100 % | TEMPERATURE: 99 F | DIASTOLIC BLOOD PRESSURE: 82 MMHG | HEART RATE: 81 BPM | SYSTOLIC BLOOD PRESSURE: 124 MMHG | WEIGHT: 212 LBS

## 2023-04-09 DIAGNOSIS — K29.00 ACUTE GASTRITIS WITHOUT HEMORRHAGE, UNSPECIFIED GASTRITIS TYPE: Primary | ICD-10-CM

## 2023-04-09 LAB
ALBUMIN SERPL BCP-MCNC: 3.9 G/DL (ref 3.5–5.2)
ALP SERPL-CCNC: 40 U/L (ref 55–135)
ALT SERPL W/O P-5'-P-CCNC: 10 U/L (ref 10–44)
ANION GAP SERPL CALC-SCNC: 9 MMOL/L (ref 8–16)
AST SERPL-CCNC: 9 U/L (ref 10–40)
BASOPHILS # BLD AUTO: 0.03 K/UL (ref 0–0.2)
BASOPHILS NFR BLD: 0.5 % (ref 0–1.9)
BILIRUB SERPL-MCNC: 0.4 MG/DL (ref 0.1–1)
BUN SERPL-MCNC: 13 MG/DL (ref 8–23)
CALCIUM SERPL-MCNC: 8.8 MG/DL (ref 8.7–10.5)
CHLORIDE SERPL-SCNC: 105 MMOL/L (ref 95–110)
CO2 SERPL-SCNC: 25 MMOL/L (ref 23–29)
CREAT SERPL-MCNC: 1 MG/DL (ref 0.5–1.4)
DIFFERENTIAL METHOD: ABNORMAL
EOSINOPHIL # BLD AUTO: 0.1 K/UL (ref 0–0.5)
EOSINOPHIL NFR BLD: 1.4 % (ref 0–8)
ERYTHROCYTE [DISTWIDTH] IN BLOOD BY AUTOMATED COUNT: 12.9 % (ref 11.5–14.5)
EST. GFR  (NO RACE VARIABLE): >60 ML/MIN/1.73 M^2
GLUCOSE SERPL-MCNC: 98 MG/DL (ref 70–110)
HCT VFR BLD AUTO: 40.9 % (ref 40–54)
HGB BLD-MCNC: 13.7 G/DL (ref 14–18)
IMM GRANULOCYTES # BLD AUTO: 0.02 K/UL (ref 0–0.04)
IMM GRANULOCYTES NFR BLD AUTO: 0.3 % (ref 0–0.5)
LIPASE SERPL-CCNC: 38 U/L (ref 4–60)
LYMPHOCYTES # BLD AUTO: 1.9 K/UL (ref 1–4.8)
LYMPHOCYTES NFR BLD: 28.4 % (ref 18–48)
MCH RBC QN AUTO: 31 PG (ref 27–31)
MCHC RBC AUTO-ENTMCNC: 33.5 G/DL (ref 32–36)
MCV RBC AUTO: 93 FL (ref 82–98)
MONOCYTES # BLD AUTO: 0.3 K/UL (ref 0.3–1)
MONOCYTES NFR BLD: 5.1 % (ref 4–15)
NEUTROPHILS # BLD AUTO: 4.3 K/UL (ref 1.8–7.7)
NEUTROPHILS NFR BLD: 64.3 % (ref 38–73)
NRBC BLD-RTO: 0 /100 WBC
PLATELET # BLD AUTO: 188 K/UL (ref 150–450)
PMV BLD AUTO: 9.8 FL (ref 9.2–12.9)
POTASSIUM SERPL-SCNC: 4.2 MMOL/L (ref 3.5–5.1)
PROT SERPL-MCNC: 6.3 G/DL (ref 6–8.4)
RBC # BLD AUTO: 4.42 M/UL (ref 4.6–6.2)
SODIUM SERPL-SCNC: 139 MMOL/L (ref 136–145)
WBC # BLD AUTO: 6.63 K/UL (ref 3.9–12.7)

## 2023-04-09 PROCEDURE — 80053 COMPREHEN METABOLIC PANEL: CPT | Mod: HCNC | Performed by: EMERGENCY MEDICINE

## 2023-04-09 PROCEDURE — 83690 ASSAY OF LIPASE: CPT | Mod: HCNC | Performed by: EMERGENCY MEDICINE

## 2023-04-09 PROCEDURE — 99283 EMERGENCY DEPT VISIT LOW MDM: CPT | Mod: HCNC

## 2023-04-09 PROCEDURE — 85025 COMPLETE CBC W/AUTO DIFF WBC: CPT | Mod: HCNC | Performed by: EMERGENCY MEDICINE

## 2023-04-09 PROCEDURE — 25000003 PHARM REV CODE 250: Mod: HCNC | Performed by: EMERGENCY MEDICINE

## 2023-04-09 RX ORDER — MAG HYDROX/ALUMINUM HYD/SIMETH 200-200-20
30 SUSPENSION, ORAL (FINAL DOSE FORM) ORAL
Status: COMPLETED | OUTPATIENT
Start: 2023-04-09 | End: 2023-04-09

## 2023-04-09 RX ORDER — SUCRALFATE 1 G/10ML
1 SUSPENSION ORAL
Status: COMPLETED | OUTPATIENT
Start: 2023-04-09 | End: 2023-04-09

## 2023-04-09 RX ORDER — PANTOPRAZOLE SODIUM 40 MG/1
40 TABLET, DELAYED RELEASE ORAL DAILY
Qty: 14 TABLET | Refills: 0 | Status: SHIPPED | OUTPATIENT
Start: 2023-04-09 | End: 2023-10-24

## 2023-04-09 RX ADMIN — SUCRALFATE 1 G: 1 SUSPENSION ORAL at 08:04

## 2023-04-09 RX ADMIN — ALUMINUM HYDROXIDE, MAGNESIUM HYDROXIDE, AND SIMETHICONE 30 ML: 200; 200; 20 SUSPENSION ORAL at 08:04

## 2023-04-09 NOTE — DISCHARGE INSTRUCTIONS
Began taken the prescribed medication (Protonix) daily.  Take an over-the-counter antacid like Maalox for episodes of breakthrough burning discomfort.

## 2023-04-09 NOTE — ED PROVIDER NOTES
Encounter Date: 4/9/2023    SCRIBE #1 NOTE: I, Nam High, am scribing for, and in the presence of,  Joshua Chen MD. I have scribed the following portions of the note - Other sections scribed: HPI, ROS.     History     Source of history:  Patient.    History obtained without limitations.      HPI:  The patient is a 77-year-old with the below past medical history who complains of intermittent burning epigastric pain for nine days.  The pain is moderately severe.  He has associated belching.  He has taken Pepto-Bismol without relief.  There are no exacerbating factors.  He denies fever, chills, nausea, vomiting, diarrhea, constipation, chest pain, shortness of breath.  He denies history of pancreatitis, liver/gallbladder issues, and GERD.      Review of patient's allergies indicates:  No Known Allergies  Past Medical History:   Diagnosis Date    Anxiety     BPH (benign prostatic hyperplasia)     Cataract     Elevated PSA     Erectile dysfunction     Hyperlipidemia     Hypertension     Hypogonadism male     Kidney stone 5/20/2020    Obesity     PTSD (post-traumatic stress disorder)     Shoulder arthritis     Urinary tract infection      Past Surgical History:   Procedure Laterality Date    COLONOSCOPY N/A 11/26/2018    Procedure: COLONOSCOPY;  Surgeon: Germán Moss MD;  Location: [address]);  Service: Endoscopy;  Laterality: N/A;    COLONOSCOPY W/ BIOPSIES  2010    CYSTOSCOPY Left 11/17/2022    Procedure: CYSTOSCOPY;  Surgeon: Dannie Murray MD;  Location: [facility];  Service: Urology;  Laterality: Left;    EXTRACORPOREAL SHOCK WAVE LITHOTRIPSY Left 12/2/2022    Procedure: LITHOTRIPSY, ESWL [facility] [place] ESWL machine, scheduling call [phone] 60 minutes;  Surgeon: Dannie Murray MD;  Location: [facility];  Service: Urology;  Laterality: Left;  Confirmed with [facility] 11/22 [facility] conf # [id number]    EXTRACORPOREAL SHOCK WAVE LITHOTRIPSY Left 1/30/2023    Procedure: LITHOTRIPSY, ESWL [facility]  [place] ESWL machine, scheduling call [phone] 60 minutes;  Surgeon: Dannie Murray MD;  Location: [facility];  Service: Urology;  Laterality: Left;  next [facility] conf #[id number]    PENILE PROSTHESIS IMPLANT       Family History   Problem Relation Age of Onset    Cancer Mother         cancer    Cataracts Mother     Heart disease Father 62        M.I.    Stroke Brother 62    Amblyopia Neg Hx     Blindness Neg Hx     Glaucoma Neg Hx     Macular degeneration Neg Hx     Retinal detachment Neg Hx     Strabismus Neg Hx     Prostate cancer Neg Hx     Kidney disease Neg Hx      Social History     Tobacco Use    Smoking status: Former     Packs/day: 1.00     Years: 10.00     Pack years: 10.00     Types: Cigarettes     Quit date:      Years since quittin.3    Smokeless tobacco: Never   Substance Use Topics    Alcohol use: No    Drug use: No     Review of Systems  See HPI.  Remainder of 10 point systems review negative.      Physical Exam     Initial Vitals [23 0731]   BP Pulse Resp Temp SpO2   128/84 84 20 98.7 °F (37.1 °C) 97 %      MAP       --         Physical Exam    Nursing note and vitals reviewed.  Constitutional: He is not diaphoretic. No distress.   HENT:   Mouth/Throat: Oropharynx is clear and moist.   Eyes: No scleral icterus.   Cardiovascular:  Normal rate, regular rhythm and normal heart sounds.     Exam reveals no gallop and no friction rub.       No murmur heard.  Pulmonary/Chest: Effort normal and breath sounds normal. No stridor. No respiratory distress. He has no decreased breath sounds. He has no wheezes. He has no rhonchi. He has no rales.   Abdominal: Abdomen is soft. Bowel sounds are normal. He exhibits no distension and no mass. There is abdominal tenderness in the epigastric area. There is no rebound and no guarding.     Neurological: He is alert and oriented to person, place, and time. GCS score is 15. GCS eye subscore is 4. GCS verbal subscore is 5. GCS motor subscore is 6.    Skin: Skin is warm and dry. No pallor.       ED Course   Procedures  Labs Reviewed   CBC W/ AUTO DIFFERENTIAL - Abnormal; Notable for the following components:       Result Value    RBC 4.42 (*)     Hemoglobin 13.7 (*)     All other components within normal limits   COMPREHENSIVE METABOLIC PANEL - Abnormal; Notable for the following components:    Alkaline Phosphatase 40 (*)     AST 9 (*)     All other components within normal limits   LIPASE          Imaging Results    None          Medications   aluminum-magnesium hydroxide-simethicone 200-200-20 mg/5 mL suspension 30 mL (30 mLs Oral Given 4/9/23 0816)   sucralfate 100 mg/mL suspension 1 g (1 g Oral Given 4/9/23 0816)     Medical Decision Making:   Differential Diagnosis:   Gastritis   Enteritis   Pancreatitis   Cholelithiasis   Choledocholithiasis   Cholecystitis   Clinical Tests:   Lab Tests: Reviewed    MDM:  This was an urgent evaluation.  Differential as above.  Also considered other conditions.  Labs were obtained and revealed normal white count, normal electrolytes, normal renal function, normal lipase, and normal liver studies.  His pain improved with Maalox and Carafate.  His presentation and workup are most consistent with gastritis.  He was prescribed a PPI at discharge and referred to Gastroenterology for follow-up.  He was advised to arrange close follow-up with his PCP as well.  Standard return precautions were given.        Scribe Attestation:   Scribe #1: I performed the above scribed service and the documentation accurately describes the services I performed. I attest to the accuracy of the note.                   Clinical Impression:   Final diagnoses:  [K29.00] Acute gastritis without hemorrhage, unspecified gastritis type (Primary)        ED Disposition Condition    Discharge Stable        Portions of this chart were completed by the scribe by interpretive transcription of statements made by the patient as a result of my questions at the  bedside. Other portions were completed by the scribe from statements made by me for direct transcription into the medical record. Following completion of the charting by the scribe, I made modifications for both correctness and proper phrasing.    ED Prescriptions       Medication Sig Dispense Start Date End Date Auth. Provider    pantoprazole (PROTONIX) 40 MG tablet Take 1 tablet (40 mg total) by mouth once daily. for 14 days 14 tablet 4/9/2023 4/23/2023 Joshua Chen III, MD          Follow-up Information       Follow up With Specialties Details Why Contact Info    Km Chicas MD Internal Medicine  Schedule a close ER follow-up visit with your primary care provider. [address] [facility] [id number]  [phone]      ER   Return to the ER for worsened symptoms or for any other concerns.              Joshua Chen III, MD  04/10/23 8905

## 2023-04-14 ENCOUNTER — OFFICE VISIT (OUTPATIENT)
Dept: GASTROENTEROLOGY | Facility: CLINIC | Age: 78
End: 2023-04-14
Payer: MEDICARE

## 2023-04-14 VITALS — BODY MASS INDEX: 28.66 KG/M2 | WEIGHT: 211.63 LBS | HEIGHT: 72 IN

## 2023-04-14 DIAGNOSIS — K29.00 ACUTE GASTRITIS WITHOUT HEMORRHAGE, UNSPECIFIED GASTRITIS TYPE: ICD-10-CM

## 2023-04-14 PROCEDURE — 99204 PR OFFICE/OUTPT VISIT, NEW, LEVL IV, 45-59 MIN: ICD-10-PCS | Mod: HCNC,S$GLB,,

## 2023-04-14 PROCEDURE — 3288F PR FALLS RISK ASSESSMENT DOCUMENTED: ICD-10-PCS | Mod: HCNC,CPTII,S$GLB,

## 2023-04-14 PROCEDURE — 99999 PR PBB SHADOW E&M-EST. PATIENT-LVL IV: CPT | Mod: PBBFAC,HCNC,,

## 2023-04-14 PROCEDURE — 1159F PR MEDICATION LIST DOCUMENTED IN MEDICAL RECORD: ICD-10-PCS | Mod: HCNC,CPTII,S$GLB,

## 2023-04-14 PROCEDURE — 99999 PR PBB SHADOW E&M-EST. PATIENT-LVL IV: ICD-10-PCS | Mod: PBBFAC,HCNC,,

## 2023-04-14 PROCEDURE — 1126F PR PAIN SEVERITY QUANTIFIED, NO PAIN PRESENT: ICD-10-PCS | Mod: HCNC,CPTII,S$GLB,

## 2023-04-14 PROCEDURE — 99204 OFFICE O/P NEW MOD 45 MIN: CPT | Mod: HCNC,S$GLB,,

## 2023-04-14 PROCEDURE — 1159F MED LIST DOCD IN RCRD: CPT | Mod: HCNC,CPTII,S$GLB,

## 2023-04-14 PROCEDURE — 1126F AMNT PAIN NOTED NONE PRSNT: CPT | Mod: HCNC,CPTII,S$GLB,

## 2023-04-14 PROCEDURE — 3288F FALL RISK ASSESSMENT DOCD: CPT | Mod: HCNC,CPTII,S$GLB,

## 2023-04-14 PROCEDURE — 1101F PT FALLS ASSESS-DOCD LE1/YR: CPT | Mod: HCNC,CPTII,S$GLB,

## 2023-04-14 PROCEDURE — 1101F PR PT FALLS ASSESS DOC 0-1 FALLS W/OUT INJ PAST YR: ICD-10-PCS | Mod: HCNC,CPTII,S$GLB,

## 2023-04-14 NOTE — PROGRESS NOTES
GASTROENTEROLOGY CLINIC NOTE    Reason for visit: The encounter diagnosis was Acute gastritis without hemorrhage, unspecified gastritis type.  Referring provider/PCP: Km Chicas MD    HPI:  Brandan Werner is a 77 y.o. male here today for ER f/u. He presented to the ER last week with epigastric pain, tx with GI cocktail and d/c on protonix. He reports pain started about a week prior after eating some spicy food, never went away which prompted him to go to ER. Pain was burning in nature. He states he no longer has the pain, taking pantoprazole daily. No breakthrough reflux noted, no dysphagia, no N/V, no weight loss, no change in appetite.     Prior Endoscopy:  EGD:  Colon: 2018 with Dr. Moss:    - One 4 mm polyp in the ascending colon, removed with a cold snare. Resected and retrieved.                - Repeat colonoscopy in 5 years for surveillance.      PATH: 1) Right colon polyp: biopsy of right colon, focus of early adenomatous transformation     (Portions of this note were dictated using voice recognition software and may contain dictation related errors in spelling/grammar/syntax not found on text review)    Review of Systems   Constitutional:  Negative for weight loss.   Gastrointestinal:  Negative for abdominal pain, heartburn, melena and nausea.     Past Medical History: has a past medical history of Anxiety, BPH (benign prostatic hyperplasia), Cataract, Elevated PSA, Erectile dysfunction, Hyperlipidemia, Hypertension, Hypogonadism male, Kidney stone, Obesity, PTSD (post-traumatic stress disorder), Shoulder arthritis, and Urinary tract infection.    Past Surgical History: has a past surgical history that includes Colonoscopy w/ biopsies (2010); Penile prosthesis implant; Colonoscopy (N/A, 11/26/2018); Cystoscopy (Left, 11/17/2022); Extracorporeal shock wave lithotripsy (Left, 12/2/2022); and Extracorporeal shock wave lithotripsy (Left, 1/30/2023).    Home medications:   Current Outpatient  Medications on File Prior to Visit   Medication Sig Dispense Refill    ascorbic acid, vitamin C, (VITAMIN C) 250 MG tablet Take 250 mg by mouth once daily.      atorvastatin (LIPITOR) 40 MG tablet Take 20 mg by mouth once daily.      carbidopa-levodopa  mg (SINEMET)  mg per tablet TAKE 1 TABLET BY MOUTH THREE TIMES A DAY FOR PARKINSON'S DISE** WITH MEALSASE      celecoxib (CELEBREX) 200 MG capsule Take 400 mg by mouth once daily.      FLUoxetine 20 MG capsule 60 mg.      FLUoxetine 40 MG capsule TAKE ONE CAPSULE BY MOUTH EVERY DAY FOR MENTAL HEALTH      folic acid/multivit-min/lutein (CENTRUM SILVER ORAL) Take by mouth.      garlic 1,000 mg Cap Take by mouth.      GINGER ROOT XT-FENNEL SD XT ORAL Take 550 mg by mouth.      glucosam/chond-msm1/C/agueda/bor (GLUCOSAMINE-CHOND-MSM COMPLEX ORAL) Take by mouth.      lisinopril (PRINIVIL,ZESTRIL) 5 MG tablet Take 5 mg by mouth once daily.      mecobalamin (B12 ACTIVE ORAL) Take by mouth.      pantoprazole (PROTONIX) 40 MG tablet Take 1 tablet (40 mg total) by mouth once daily. for 14 days 14 tablet 0    peg 400-propylene glycol 0.4-0.3 % Drop INSTILL ONE DROP IN EACH EYE FOUR TIMES A DAY AS NEEDED FOR DRY EYES      psyllium (METAMUCIL) powder Take 1 packet by mouth 3 (three) times daily.      traZODone (DESYREL) 100 MG tablet Take 1 tablet by mouth nightly as needed.      vitamin E 100 UNIT capsule Take 100 Units by mouth every morning.      VOLTAREN 1 % Gel Apply 4 g topically 4 (four) times daily as needed (pain and inflammation).       finasteride (PROSCAR) 5 mg tablet Take 1 tablet (5 mg total) by mouth once daily. (Patient not taking: Reported on 2/27/2023) 30 tablet 11    fluticasone (FLONASE) 50 mcg/actuation nasal spray 2 sprays (100 mcg total) by Each Nare route once daily. (Patient not taking: Reported on 2/27/2023) 1 Bottle 0    primidone (MYSOLINE) 50 MG Tab Take 2 tablets (100 mg total) by mouth every evening. 60 tablet 11    tamsulosin (FLOMAX) 0.4  mg Cp24 Take 1 capsule (0.4 mg total) by mouth nightly. (Patient not taking: Reported on 2/27/2023) 30 capsule 11    triamcinolone acetonide 0.1% (KENALOG) 0.1 % cream Apply topically 3 (three) times daily. (Patient not taking: Reported on 2/27/2023) 45 g 0     No current facility-administered medications on file prior to visit.       Vital signs:  Ht 6' (1.829 m)   Wt 96 kg (211 lb 10.3 oz)   BMI 28.70 kg/m²     Physical Exam  Constitutional:       Appearance: Normal appearance. He is normal weight.   Abdominal:      General: Abdomen is flat. There is no distension.      Palpations: Abdomen is soft.      Tenderness: There is no abdominal tenderness.   Neurological:      Mental Status: He is alert.       I have reviewed associated labs, imaging and notes.     Assessment:  1. Acute gastritis without hemorrhage, unspecified gastritis type    Presented to the ER last week with epigastric burning    Happened for about a week after eating spicy food   No N/V, no dysphagia, no change in appetite, no melena   Relieved with GI cocktail and protonix    Taking protonix daily    No longer symptomatic     Plan:     Continue protonix as previously prescribed   Consider EGD if symptoms return- no warning signs present    Due for CRC surveillance later this year    LENORE [facility] [place]

## 2023-05-01 ENCOUNTER — HOSPITAL ENCOUNTER (OUTPATIENT)
Dept: RADIOLOGY | Facility: HOSPITAL | Age: 78
Discharge: HOME | End: 2023-05-01
Attending: UROLOGY
Payer: MEDICARE

## 2023-05-01 DIAGNOSIS — N20.0 KIDNEY STONE: ICD-10-CM

## 2023-05-01 PROCEDURE — 74176 CT ABD & PELVIS W/O CONTRAST: CPT | Mod: 26,HCNC,, | Performed by: RADIOLOGY

## 2023-05-01 PROCEDURE — 74176 CT ABD & PELVIS W/O CONTRAST: CPT | Mod: TC,HCNC

## 2023-05-01 PROCEDURE — 74176 CT ABDOMEN PELVIS WITHOUT CONTRAST: ICD-10-PCS | Mod: 26,HCNC,, | Performed by: RADIOLOGY

## 2023-05-09 ENCOUNTER — LAB VISIT (OUTPATIENT)
Dept: LAB | Facility: HOSPITAL | Age: 78
End: 2023-05-09
Attending: UROLOGY
Payer: OTHER GOVERNMENT

## 2023-05-09 ENCOUNTER — OFFICE VISIT (OUTPATIENT)
Dept: UROLOGY | Facility: CLINIC | Age: 78
End: 2023-05-09
Payer: MEDICARE

## 2023-05-09 VITALS
HEART RATE: 72 BPM | BODY MASS INDEX: 28.89 KG/M2 | DIASTOLIC BLOOD PRESSURE: 89 MMHG | WEIGHT: 213.31 LBS | SYSTOLIC BLOOD PRESSURE: 136 MMHG | HEIGHT: 72 IN

## 2023-05-09 DIAGNOSIS — S37.002A INJURY OF LEFT KIDNEY, INITIAL ENCOUNTER: ICD-10-CM

## 2023-05-09 DIAGNOSIS — N20.0 KIDNEY STONES: Primary | ICD-10-CM

## 2023-05-09 DIAGNOSIS — R97.20 ELEVATED PSA: ICD-10-CM

## 2023-05-09 DIAGNOSIS — N40.0 BENIGN PROSTATIC HYPERPLASIA, UNSPECIFIED WHETHER LOWER URINARY TRACT SYMPTOMS PRESENT: ICD-10-CM

## 2023-05-09 LAB
POC RESIDUAL URINE VOLUME: 32 ML (ref 0–100)
PROSTATE SPECIFIC ANTIGEN, TOTAL: 6.5 NG/ML (ref 0–4)
PSA FREE MFR SERPL: 26 %
PSA FREE SERPL-MCNC: 1.69 NG/ML (ref 0–1.5)

## 2023-05-09 PROCEDURE — 3079F PR MOST RECENT DIASTOLIC BLOOD PRESSURE 80-89 MM HG: ICD-10-PCS | Mod: HCNC,CPTII,S$GLB, | Performed by: UROLOGY

## 2023-05-09 PROCEDURE — 3079F DIAST BP 80-89 MM HG: CPT | Mod: HCNC,CPTII,S$GLB, | Performed by: UROLOGY

## 2023-05-09 PROCEDURE — 51798 US URINE CAPACITY MEASURE: CPT | Mod: HCNC,S$GLB,, | Performed by: UROLOGY

## 2023-05-09 PROCEDURE — 99999 PR PBB SHADOW E&M-EST. PATIENT-LVL V: CPT | Mod: PBBFAC,HCNC,, | Performed by: UROLOGY

## 2023-05-09 PROCEDURE — 99214 OFFICE O/P EST MOD 30 MIN: CPT | Mod: HCNC,S$GLB,, | Performed by: UROLOGY

## 2023-05-09 PROCEDURE — 1126F PR PAIN SEVERITY QUANTIFIED, NO PAIN PRESENT: ICD-10-PCS | Mod: HCNC,CPTII,S$GLB, | Performed by: UROLOGY

## 2023-05-09 PROCEDURE — 1159F MED LIST DOCD IN RCRD: CPT | Mod: HCNC,CPTII,S$GLB, | Performed by: UROLOGY

## 2023-05-09 PROCEDURE — 1101F PR PT FALLS ASSESS DOC 0-1 FALLS W/OUT INJ PAST YR: ICD-10-PCS | Mod: HCNC,CPTII,S$GLB, | Performed by: UROLOGY

## 2023-05-09 PROCEDURE — 3075F SYST BP GE 130 - 139MM HG: CPT | Mod: HCNC,CPTII,S$GLB, | Performed by: UROLOGY

## 2023-05-09 PROCEDURE — 86900 BLOOD TYPING SEROLOGIC ABO: CPT | Mod: HCNC | Performed by: UROLOGY

## 2023-05-09 PROCEDURE — 1101F PT FALLS ASSESS-DOCD LE1/YR: CPT | Mod: HCNC,CPTII,S$GLB, | Performed by: UROLOGY

## 2023-05-09 PROCEDURE — 36415 COLL VENOUS BLD VENIPUNCTURE: CPT | Mod: HCNC | Performed by: UROLOGY

## 2023-05-09 PROCEDURE — 51798 POCT BLADDER SCAN: ICD-10-PCS | Mod: HCNC,S$GLB,, | Performed by: UROLOGY

## 2023-05-09 PROCEDURE — 3075F PR MOST RECENT SYSTOLIC BLOOD PRESS GE 130-139MM HG: ICD-10-PCS | Mod: HCNC,CPTII,S$GLB, | Performed by: UROLOGY

## 2023-05-09 PROCEDURE — 99999 PR PBB SHADOW E&M-EST. PATIENT-LVL V: ICD-10-PCS | Mod: PBBFAC,HCNC,, | Performed by: UROLOGY

## 2023-05-09 PROCEDURE — 3288F PR FALLS RISK ASSESSMENT DOCUMENTED: ICD-10-PCS | Mod: HCNC,CPTII,S$GLB, | Performed by: UROLOGY

## 2023-05-09 PROCEDURE — 1159F PR MEDICATION LIST DOCUMENTED IN MEDICAL RECORD: ICD-10-PCS | Mod: HCNC,CPTII,S$GLB, | Performed by: UROLOGY

## 2023-05-09 PROCEDURE — 3288F FALL RISK ASSESSMENT DOCD: CPT | Mod: HCNC,CPTII,S$GLB, | Performed by: UROLOGY

## 2023-05-09 PROCEDURE — 99214 PR OFFICE/OUTPT VISIT, EST, LEVL IV, 30-39 MIN: ICD-10-PCS | Mod: HCNC,S$GLB,, | Performed by: UROLOGY

## 2023-05-09 PROCEDURE — 1126F AMNT PAIN NOTED NONE PRSNT: CPT | Mod: HCNC,CPTII,S$GLB, | Performed by: UROLOGY

## 2023-05-09 PROCEDURE — 84153 ASSAY OF PSA TOTAL: CPT | Mod: HCNC | Performed by: UROLOGY

## 2023-05-09 RX ORDER — LIDOCAINE HYDROCHLORIDE 10 MG/ML
1 INJECTION, SOLUTION EPIDURAL; INFILTRATION; INTRACAUDAL; PERINEURAL ONCE
Status: CANCELLED | OUTPATIENT
Start: 2023-05-09 | End: 2023-05-09

## 2023-05-09 RX ORDER — ACETAMINOPHEN 500 MG
1000 TABLET ORAL
Status: CANCELLED | OUTPATIENT
Start: 2023-05-09

## 2023-05-09 RX ORDER — CEFAZOLIN SODIUM 2 G/50ML
2 SOLUTION INTRAVENOUS
Status: CANCELLED | OUTPATIENT
Start: 2023-05-09

## 2023-05-09 NOTE — Clinical Note
Needs left PCNL, any way to have you place NU catheter early AM of 6/19/23 and PCNL to follow that PM?

## 2023-05-09 NOTE — PROGRESS NOTES
Subjective:       Patient ID: Brandan Werner is a 77 y.o. male.    Chief Complaint: Follow-up (3 month)       This is a 77 y.o.  male patient with BPH, elevated PSA, kidney stone.  Past patient of Dr. Laboy last seen in 2020 for cystoscopy for microhematuria that was negative except large prostate.  H/o penile implant that still uses.  CTU in 2020 with 150 gm prostate no other findings.  Long h/o elevated PSA.    PSA 6.2 in 2013 and biopsy negative  Biopsy in 2013 with ASAP  PSA recently checked by PCP and was 8.2   Moderate/severe LUTs, AUA SS 19/2, PVR 67  Worsening frequency of urination on finasteride, tamsulosin.    UA showed microhematuria.  CTU done showed left 1 cm UPJ stone with moderate hydronephrosis 11/22 and left non obstructing stones.    Failed left stent, required PCN and antegrade stent  Left ESWL 12/2  Follow-up KUB shows resolution of UPJ stone continued lower pole left stone.  Repeat left LP stone ESWL 1/30/23, appeared to be good fragmentation.  KUB shows left dense stone left LP but still present.  Stent in place. Stent removed 2/7/23.   CT 5/1/23: left enlarging LP stone now 2.3 cm, mild left pelviectasis, read as possible lesion, on review with radiologist small stone near UPJ and mild pelviectasis with pelvis thickening no obvious mass.    5/9/23: intermittent left sided pain, still LUTs from BPH but not severe.             LAST PSA  Lab Results   Component Value Date    PSA 8.2 (H) 08/19/2022    PSA 7.0 (H) 03/02/2021    PSA 6.5 (H) 01/30/2020    PSA 3.0 11/02/2016    PSA 5.0 (H) 05/02/2014    PSA 6.2 (H) 10/18/2013    PSA 4.49 (H) 02/22/2013    PSA 4.1 (H) 05/09/2012    PSA 4.04 (H) 04/05/2012    PSA 2.8 08/31/2010    PSA 3.1 02/25/2009    PSA 2.2 09/06/2007    PSA 1.8 09/30/2006    PSA 1.7 12/28/2005    PSA 1.8 10/30/2004    PSADIAG 4.9 (H) 02/22/2023    PSADIAG 7.0 (H) 03/07/2019    PSADIAG 6.6 (H) 09/07/2018    PSADIAG 6.4 (H) 06/08/2018    PSADIAG 5.3 (H) 01/03/2018    PSADIAG 2.9  02/22/2016    PSADIAG 2.9 08/24/2015    PSADIAG 5.7 (H) 02/24/2015    PSADIAG 5.1 (H) 06/26/2014    PSADIAG 5.8 (H) 12/03/2013       Lab Results   Component Value Date    CREATININE 1.0 04/09/2023       ---  PMH/PSH/Medications/Allergies/Social history reviewed and as in chart.    Review of Systems   Constitutional:  Negative for activity change, chills and fever.   HENT:  Negative for congestion.    Respiratory:  Negative for cough, chest tightness and shortness of breath.    Cardiovascular:  Negative for chest pain and palpitations.   Gastrointestinal:  Negative for abdominal distention, abdominal pain, nausea and vomiting.   Genitourinary:  Positive for difficulty urinating and flank pain. Negative for frequency, hematuria, penile pain, scrotal swelling and testicular pain.   Musculoskeletal:  Negative for gait problem.     Objective:      Physical Exam  HENT:      Head: Atraumatic.   Pulmonary:      Effort: Pulmonary effort is normal.   Neurological:      General: No focal deficit present.      Mental Status: He is alert and oriented to person, place, and time.       Assessment:     Problem Noted   Kidney Stones 5/20/2020    Left 1.5 cm UPJ and non obstructing left lower pole greater than 1 cm on CTU 11/2022 with moderate hydronephrosis     Unable to place left stent 11/17/22 due to large prostate, prosthesis and unable to find ureteral orifice.   Left PCN 11/18/22, internalized to stent 11/28  Left ESWL UPJ stone 12/2/22  KUB after with 1.8 cm left lower pole stone consistent with lower pole stone on CT urogram, no renal pelvis/gualberto stent stone seen.  Left ESWL 1/30/23 good fragmentation  KUB shows continued LLP stone  CT 5/23: left enlarging LP stone 2.3 cm mild pelviectasis, small stone fragment at UPJ       Elevated Psa 3/11/2013    Biopsy 2013 ASAP 1 core, 2014 benign  PSA 8/22--8.2  Volume 183, PSAD 0.04         Benign Prostatic Hyperplasia     183 gm prostate on CTU 2022  Initial AUA SS (8/2022):  19/2  PVR 67       History of Penile Implant 5/20/2020       Plan:     Repeat PSA  Discussed options for enlarging left LP stone, resistant to past ESWL:  observation, staged/serial URS, left PCNL.  Wishes to proceed with PCNL in effort to clear stone with one procedure  Plan for surgery 6/19/23  Follow up 2 weeks prior for urine culture.   Will confirm with IR before scheduling.       I have explained the indication, risks, benefits, and alternatives of the procedure in detail.  The patient voices understanding and all questions have been answered.   Risks including but not limited to bleeding, infection, injury to the lung, liver, spleen, colon, need for additional procedures, incomplete removal of stone, arteriovenous malformation, pseudoaneurysm, hydrothorax or pneumothorax, urinoma, ureteral injury or perforation, DVT, PE, heart attack, stroke, and death were discussed with the patient in depth.  The patient agrees to proceed as planned with left PCNL.    Dannie Murray MD

## 2023-05-16 DIAGNOSIS — N20.0 KIDNEY STONE: Primary | ICD-10-CM

## 2023-05-31 ENCOUNTER — TELEPHONE (OUTPATIENT)
Dept: UROLOGY | Facility: CLINIC | Age: 78
End: 2023-05-31
Payer: OTHER GOVERNMENT

## 2023-05-31 NOTE — TELEPHONE ENCOUNTER
----- Message from Jessica Maier sent at 5/31/2023 12:07 PM CDT -----  .Type:  Needs Medical Advice    Who Called: pt    Would the patient rather a call back or a response via [facility]? Call back  Best Call Back Number: [phone]  Additional Information:     Pt would like a call back concerning procedure on 6/19

## 2023-06-15 ENCOUNTER — TELEPHONE (OUTPATIENT)
Dept: UROLOGY | Facility: CLINIC | Age: 78
End: 2023-06-15
Payer: OTHER GOVERNMENT

## 2023-06-15 NOTE — TELEPHONE ENCOUNTER
I called the patient to remind him to stop taking his celebrex today in preparation for surgery Monday 6/19. He verbalizes understanding of this. He asked about an arrival time for Monday for his IR procedure; I sent a message to the IR schedulers asking them to reach out to patient.

## 2023-06-16 ENCOUNTER — OFFICE VISIT (OUTPATIENT)
Dept: URGENT CARE | Facility: CLINIC | Age: 78
End: 2023-06-16
Payer: MEDICARE

## 2023-06-16 ENCOUNTER — TELEPHONE (OUTPATIENT)
Dept: UROLOGY | Facility: CLINIC | Age: 78
End: 2023-06-16
Payer: OTHER GOVERNMENT

## 2023-06-16 VITALS
WEIGHT: 213 LBS | SYSTOLIC BLOOD PRESSURE: 121 MMHG | TEMPERATURE: 99 F | HEIGHT: 72 IN | HEART RATE: 89 BPM | OXYGEN SATURATION: 96 % | BODY MASS INDEX: 28.85 KG/M2 | RESPIRATION RATE: 14 BRPM | DIASTOLIC BLOOD PRESSURE: 78 MMHG

## 2023-06-16 DIAGNOSIS — U07.1 COVID-19 VIRUS DETECTED: ICD-10-CM

## 2023-06-16 DIAGNOSIS — U07.1 COVID-19 VIRUS INFECTION: Primary | ICD-10-CM

## 2023-06-16 DIAGNOSIS — R05.9 COUGH, UNSPECIFIED TYPE: ICD-10-CM

## 2023-06-16 DIAGNOSIS — R21 RASH: ICD-10-CM

## 2023-06-16 LAB
CTP QC/QA: YES
SARS-COV-2 AG RESP QL IA.RAPID: POSITIVE

## 2023-06-16 PROCEDURE — 99214 OFFICE O/P EST MOD 30 MIN: CPT | Mod: S$GLB,,, | Performed by: NURSE PRACTITIONER

## 2023-06-16 PROCEDURE — 99214 PR OFFICE/OUTPT VISIT, EST, LEVL IV, 30-39 MIN: ICD-10-PCS | Mod: S$GLB,,, | Performed by: NURSE PRACTITIONER

## 2023-06-16 PROCEDURE — 87811 SARS CORONAVIRUS 2 ANTIGEN POCT, MANUAL READ: ICD-10-PCS | Mod: QW,S$GLB,, | Performed by: NURSE PRACTITIONER

## 2023-06-16 PROCEDURE — 87811 SARS-COV-2 COVID19 W/OPTIC: CPT | Mod: QW,S$GLB,, | Performed by: NURSE PRACTITIONER

## 2023-06-16 RX ORDER — BENZONATATE 100 MG/1
100 CAPSULE ORAL 3 TIMES DAILY PRN
Qty: 30 CAPSULE | Refills: 0 | Status: SHIPPED | OUTPATIENT
Start: 2023-06-16 | End: 2023-06-26

## 2023-06-16 RX ORDER — FLUTICASONE PROPIONATE 50 MCG
2 SPRAY, SUSPENSION (ML) NASAL DAILY
Qty: 11.1 ML | Refills: 0 | Status: SHIPPED | OUTPATIENT
Start: 2023-06-16 | End: 2023-07-16

## 2023-06-16 RX ORDER — CETIRIZINE HYDROCHLORIDE 10 MG/1
10 TABLET ORAL DAILY
Qty: 30 TABLET | Refills: 0 | Status: SHIPPED | OUTPATIENT
Start: 2023-06-16 | End: 2023-10-24

## 2023-06-16 RX ORDER — TRIAMCINOLONE ACETONIDE 1 MG/G
CREAM TOPICAL 2 TIMES DAILY
Qty: 45 G | Refills: 0 | Status: SHIPPED | OUTPATIENT
Start: 2023-06-16 | End: 2023-06-23

## 2023-06-16 NOTE — TELEPHONE ENCOUNTER
Patient called in regards to canceling his surgery due to having COVID-19.  did speak to the patient about canceling.

## 2023-06-16 NOTE — TELEPHONE ENCOUNTER
----- Message from Chandrika Mejia sent at 6/16/2023  1:12 PM CDT -----  Contact: pt  Type:  Call back     Who Called:pt wife     Does the patient know what this is regarding?:cancel procedure   Would the patient rather a call back or a response via [facility]? Call   Best Call Back Number:[phone]  Additional Information:     Pt wife stated pt needs to cancel his procedure on 06/19 due to Covid

## 2023-06-16 NOTE — PROGRESS NOTES
Subjective:      Patient ID: Brandan Werner is a 77 y.o. male.    Vitals:  height is 6' (1.829 m) and weight is 96.6 kg (213 lb). His oral temperature is 98.7 °F (37.1 °C). His blood pressure is 121/78 and his pulse is 89. His respiration is 14 and oxygen saturation is 96%.     Chief Complaint: Cough    76yo male pt reports dry cough and runny nose that started yesterday.  Denies sore throat or ear pain.  Denies fever/chills.  Denies n/v/d, denies abd pain.  Denies chest pain, wheezing, or SOB.  Denies known sick contacts.  Reports receiving both COVID and flu vaccinations.  Denies attempting any medications or treatments for symptoms.    Reports that he is also experiencing a rash to his abdomen, reports small, red, itchy bumps that appeared 2-3 days ago.  Denies pain to area, denies spreading of rash.  Denies swelling to face/mouth/throat, denies difficulty breathing or swallowing, denies SOB.  Denies bleeding or drainage, denies swelling.    Cough  This is a new problem. The current episode started yesterday. The problem has been unchanged. The problem occurs hourly. The cough is Non-productive. Associated symptoms include postnasal drip, a rash and rhinorrhea. Pertinent negatives include no chest pain, chills, ear pain, fever, headaches, nasal congestion, sore throat, shortness of breath or wheezing. Nothing aggravates the symptoms. Treatments tried: Theraflu. The treatment provided moderate relief. There is no history of asthma, bronchitis, COPD, emphysema, environmental allergies or pneumonia.     Constitution: Negative for chills and fever.   HENT:  Positive for postnasal drip. Negative for ear pain, congestion and sore throat.    Cardiovascular:  Negative for chest pain.   Respiratory:  Positive for cough. Negative for chest tightness, sputum production, shortness of breath and wheezing.    Gastrointestinal:  Negative for abdominal pain, nausea, vomiting and diarrhea.   Skin:  Positive for rash. Negative  for wound, abrasion, laceration, lesion, skin thickening/induration, erythema, bruising and abscess.   Allergic/Immunologic: Negative for environmental allergies.   Neurological:  Negative for headaches.    Objective:     Physical Exam   Constitutional: He is oriented to person, place, and time. He appears well-developed. He is cooperative.  Non-toxic appearance. He does not appear ill. No distress.   HENT:   Head: Normocephalic and atraumatic.   Ears:   Right Ear: Hearing, tympanic membrane, external ear and ear canal normal. Tympanic membrane is not erythematous, not retracted and not bulging. No middle ear effusion.   Left Ear: Hearing, tympanic membrane, external ear and ear canal normal. Tympanic membrane is not erythematous, not retracted and not bulging.  No middle ear effusion.   Nose: Nose normal. No mucosal edema, rhinorrhea, purulent discharge or nasal deformity. No epistaxis. Right sinus exhibits no maxillary sinus tenderness and no frontal sinus tenderness. Left sinus exhibits no maxillary sinus tenderness and no frontal sinus tenderness.   Mouth/Throat: Uvula is midline, oropharynx is clear and moist and mucous membranes are normal. No trismus in the jaw. Normal dentition. No uvula swelling. No oropharyngeal exudate, posterior oropharyngeal edema or posterior oropharyngeal erythema. Tonsils are 1+ on the right. Tonsils are 1+ on the left. No tonsillar exudate.   Eyes: Conjunctivae and lids are normal. No scleral icterus.   Neck: Trachea normal and phonation normal. Neck supple. No edema present. No erythema present. No neck rigidity present.   Cardiovascular: Normal rate, regular rhythm, normal heart sounds and normal pulses.   Pulmonary/Chest: Effort normal and breath sounds normal. No accessory muscle usage or stridor. No tachypnea. No respiratory distress. He has no decreased breath sounds. He has no wheezes. He has no rhonchi. He has no rales.   Dry cough witnessed during exam.         Comments:  Dry cough witnessed during exam.    Abdominal: Normal appearance.   Musculoskeletal: Normal range of motion.         General: No deformity. Normal range of motion.   Lymphadenopathy:        Head (right side): No submandibular adenopathy present.        Head (left side): No submandibular adenopathy present.     He has no cervical adenopathy.   Neurological: He is alert and oriented to person, place, and time. He exhibits normal muscle tone. Coordination normal.   Skin: Skin is warm, dry, intact, not diaphoretic, not pale, rash and papular. No erythema        Psychiatric: His speech is normal and behavior is normal. Judgment and thought content normal.   Nursing note and vitals reviewed.    Results for orders placed or performed in visit on 06/16/23   SARS Coronavirus 2 Antigen, POCT Manual Read   Result Value Ref Range    SARS Coronavirus 2 Antigen Positive (A) Negative     Acceptable Yes              Assessment:     1. COVID-19 virus infection    2. Cough, unspecified type    3. Rash        Plan:     COVID-19 Risk Score:  4    Discussed antiviral treatment with pt, unable to take Paxlovid due to numerous interactions with Parkinson's medications.  Pt declined alternative antiviral medications at this time.    Provided education on OTC symptomatic treatment (Claritin/Zyrtec with Flonase nasal spray for sinus congestion and cough and Tylenol/ibuprofen for pain, if needed).  Provided education on CDC recommendations for isolation/masking and for return/ER precautions.  Pt verbalized understanding and agreed to treatment plan.      COVID-19 virus infection  -     cetirizine (ZYRTEC) 10 MG tablet; Take 1 tablet (10 mg total) by mouth once daily.  Dispense: 30 tablet; Refill: 0  -     fluticasone propionate (FLONASE) 50 mcg/actuation nasal spray; 2 sprays (100 mcg total) by Each Nostril route once daily.  Dispense: 11.1 mL; Refill: 0    Cough, unspecified type  -     SARS Coronavirus 2 Antigen, POCT Manual  "Read  -     benzonatate (TESSALON) 100 MG capsule; Take 1 capsule (100 mg total) by mouth 3 (three) times daily as needed for Cough.  Dispense: 30 capsule; Refill: 0    Rash  -     triamcinolone acetonide 0.1% (KENALOG) 0.1 % cream; Apply topically 2 (two) times daily. for 7 days  Dispense: 45 g; Refill: 0      Patient Instructions   You have tested positive for COVID-19 today.      ISOLATION  If you tested positive and do not have symptoms, you must isolate for 5 days starting on the day of the positive test.    If you tested positive and have symptoms, you must isolate for 5 days starting on the day of the first symptoms, not the day of the positive test.     This is the most important part, both the CDC and the [facility] emphasize that you do not test out of isolation.     After 5 days, if your symptoms have improved and you have not had fever on day 5, you can return to the community on day 6- NO TESTING REQUIRED!      In fact, we do not re-test if you were positive in the last 90 days.    After your 5 days of isolation are completed, the CDC recommends strict mask use for the first 5 days that you come out of isolation.    -----    If your condition worsens or fails to improve, we recommend that you receive another evaluation at the ER immediately contact your PCP to discuss your concerns, or return here.  You must understand that you've received an urgent care treatment only, and that you may be released before all your medical problems are known or treated.  You, the patient, will arrange for follow-up care as instructed.     If we discussed that I think your illness is viral, it will not respond to antibiotics and will last 10-14 days.  If we discussed "wait and see" antibiotics, and if over the next few days the symptoms worsen, start the antibiotics I have given you.     If you are female and on birth control pills and do take the antibiotics, use additional methods to prevent pregnancy while on the antibiotics " "and for one cycle after.     Flonase (fluticasone) is a nasal spray which is available over the counter and may help with your symptoms.  Zyrtec D, Claritin D, or Allegra D can also help with symptoms of congestion and drainage.  If you have hypertension, avoid using the "D" which is the decongestant formula.    If you just have drainage, you can take plain Zyrtec, Claritin, or Allegra.  If you just have a congested feeling, you can take pseudoephedrine (unless you have high blood pressure), which you have to sign for behind the counter.  Do not buy phenylephrine OTC, as it is not effective.    Rest and fluids are also important.  Tylenol or ibuprofen can also be used as directed for pain, unless you have an allergy to them or medical condition (such as stomach ulcers, kidney or liver disease, or use blood thinners, etc.) for which you should not be taking these type of medications.     If you are flying in the next few days, Afrin nose drops for the airplane flight upon take off and landing may help.  Other than at those times, refrain from using Afrin.     If you were prescribed a narcotic or sedating cough medicine, do not drive or operate heavy machinery while taking these medications.                       "

## 2023-06-19 ENCOUNTER — PATIENT MESSAGE (OUTPATIENT)
Dept: RESEARCH | Facility: HOSPITAL | Age: 78
End: 2023-06-19
Payer: OTHER GOVERNMENT

## 2023-07-10 ENCOUNTER — PATIENT MESSAGE (OUTPATIENT)
Dept: UROLOGY | Facility: CLINIC | Age: 78
End: 2023-07-10
Payer: OTHER GOVERNMENT

## 2023-07-14 ENCOUNTER — TELEPHONE (OUTPATIENT)
Dept: UROLOGY | Facility: CLINIC | Age: 78
End: 2023-07-14
Payer: OTHER GOVERNMENT

## 2023-07-14 NOTE — TELEPHONE ENCOUNTER
----- Message from Jessica Maier sent at 7/14/2023 11:57 AM CDT -----  .Type:  Needs Medical Advice    Who Called: pt wife Srikanth    Would the patient rather a call back or a response via [facility]? Call back  Best Call Back Number:   Additional Information:     Pt would like a call back to reschedule procedure

## 2023-07-14 NOTE — TELEPHONE ENCOUNTER
"I returned the patient call and spoke to the wife (Ivone) on rescheduling his surgery. Per : "Are you available to do stone procedure 8/14/23 in PM" which was sent on 07/10. Patient wife stated that's a great date for him. I voiced that I will forward this message to  to schedule the appointment.  "

## 2023-07-21 DIAGNOSIS — M25.511 CHRONIC RIGHT SHOULDER PAIN: Primary | ICD-10-CM

## 2023-07-21 DIAGNOSIS — G89.29 CHRONIC RIGHT SHOULDER PAIN: Primary | ICD-10-CM

## 2023-07-31 NOTE — PROGRESS NOTES
Subjective:       Patient ID: Brandan Werner is a 77 y.o. male.    Chief Complaint: No chief complaint on file.       This is a 77 y.o.  male patient with BPH, elevated PSA, kidney stone.  Past patient of Dr. Laboy last seen in 2020 for cystoscopy for microhematuria that was negative except large prostate.  H/o penile implant that still uses.  CTU in 2020 with 150 gm prostate no other findings.  Long h/o elevated PSA.    PSA 6.2 in 2013 and biopsy negative  Biopsy in 2013 with ASAP  PSA recently checked by PCP and was 8.2   Moderate/severe LUTs, AUA SS 19/2, PVR 67  Worsening frequency of urination on finasteride, tamsulosin.    UA showed microhematuria.  CTU done showed left 1 cm UPJ stone with moderate hydronephrosis 11/22 and left non obstructing stones.    Failed left stent, required PCN and antegrade stent  Left ESWL 12/2  Follow-up KUB shows resolution of UPJ stone continued lower pole left stone.  Repeat left LP stone ESWL 1/30/23, appeared to be good fragmentation.  KUB shows left dense stone left LP but still present.  Stent in place. Stent removed 2/7/23.   CT 5/1/23: left enlarging LP stone now 2.3 cm, mild left pelviectasis, read as possible lesion, on review with radiologist small stone near UPJ and mild pelviectasis with pelvis thickening no obvious mass.    Intermittent left sided pain, still LUTs from BPH but not severe.    Scheduled for PCNL upcoming.            LAST PSA  Lab Results   Component Value Date    PSA 8.2 (H) 08/19/2022    PSA 7.0 (H) 03/02/2021    PSA 6.5 (H) 01/30/2020    PSA 3.0 11/02/2016    PSA 5.0 (H) 05/02/2014    PSA 6.2 (H) 10/18/2013    PSA 4.49 (H) 02/22/2013    PSA 4.1 (H) 05/09/2012    PSA 4.04 (H) 04/05/2012    PSA 2.8 08/31/2010    PSA 3.1 02/25/2009    PSA 2.2 09/06/2007    PSA 1.8 09/30/2006    PSA 1.7 12/28/2005    PSA 1.8 10/30/2004    PSADIAG 4.9 (H) 02/22/2023    PSADIAG 7.0 (H) 03/07/2019    PSADIAG 6.6 (H) 09/07/2018    PSADIAG 6.4 (H) 06/08/2018    PSADIAG 5.3  (H) 01/03/2018    PSADIAG 2.9 02/22/2016    PSADIAG 2.9 08/24/2015    PSADIAG 5.7 (H) 02/24/2015    PSADIAG 5.1 (H) 06/26/2014    PSADIAG 5.8 (H) 12/03/2013    PSATOTAL 6.5 (H) 05/09/2023    PSAFREE 1.69 (H) 05/09/2023       Lab Results   Component Value Date    CREATININE 1.0 04/09/2023       ---  PMH/PSH/Medications/Allergies/Social history reviewed and as in chart.    Review of Systems   Constitutional:  Negative for activity change, chills and fever.   HENT:  Negative for congestion.    Respiratory:  Negative for cough, chest tightness and shortness of breath.    Cardiovascular:  Negative for chest pain and palpitations.   Gastrointestinal:  Negative for abdominal distention, abdominal pain, nausea and vomiting.   Genitourinary:  Positive for difficulty urinating and flank pain. Negative for frequency, hematuria, penile pain, scrotal swelling and testicular pain.   Musculoskeletal:  Negative for gait problem.       Objective:      Physical Exam  HENT:      Head: Atraumatic.   Pulmonary:      Effort: Pulmonary effort is normal.   Neurological:      General: No focal deficit present.      Mental Status: He is alert and oriented to person, place, and time.         Assessment:     Problem Noted   Kidney Stones 5/20/2020    Left 1.5 cm UPJ and non obstructing left lower pole greater than 1 cm on CTU 11/2022 with moderate hydronephrosis     Unable to place left stent 11/17/22 due to large prostate, prosthesis and unable to find ureteral orifice.   Left PCN 11/18/22, internalized to stent 11/28  Left ESWL UPJ stone 12/2/22  KUB after with 1.8 cm left lower pole stone consistent with lower pole stone on CT urogram, no renal pelvis/gualberto stent stone seen.  Left ESWL 1/30/23 good fragmentation  KUB shows continued LLP stone  CT 5/23: left enlarging LP stone 2.3 cm mild pelviectasis, small stone fragment at UPJ     Elevated Psa 3/11/2013    Biopsy 2013 ASAP 1 core, 2014 benign  PSA 8/22--8.2  Volume 183, PSAD 0.04        Benign Prostatic Hyperplasia     183 gm prostate on CTU 2022  Initial AUA SS (8/2022): 19/2  PVR 67     History of Penile Implant 5/20/2020       Plan:     Plan for PCNL as scheduled  Urine culture      I have explained the indication, risks, benefits, and alternatives of the procedure in detail.  The patient voices understanding and all questions have been answered.   Risks including but not limited to bleeding, infection, injury to the lung, liver, spleen, colon, need for additional procedures, incomplete removal of stone, arteriovenous malformation, pseudoaneurysm, hydrothorax or pneumothorax, urinoma, ureteral injury or perforation, DVT, PE, heart attack, stroke, and death were discussed with the patient in depth.  The patient agrees to proceed as planned with left PCNL.    Dannie Murray MD

## 2023-08-01 ENCOUNTER — OFFICE VISIT (OUTPATIENT)
Dept: UROLOGY | Facility: CLINIC | Age: 78
End: 2023-08-01
Payer: OTHER GOVERNMENT

## 2023-08-01 VITALS
HEIGHT: 72 IN | WEIGHT: 205.94 LBS | SYSTOLIC BLOOD PRESSURE: 97 MMHG | BODY MASS INDEX: 27.89 KG/M2 | HEART RATE: 101 BPM | DIASTOLIC BLOOD PRESSURE: 64 MMHG

## 2023-08-01 DIAGNOSIS — N20.0 KIDNEY STONES: Primary | ICD-10-CM

## 2023-08-01 LAB
BILIRUB SERPL-MCNC: NEGATIVE MG/DL
BLOOD URINE, POC: NEGATIVE
CLARITY, POC UA: CLEAR
COLOR, POC UA: ABNORMAL
GLUCOSE UR QL STRIP: NORMAL
KETONES UR QL STRIP: NEGATIVE
LEUKOCYTE ESTERASE URINE, POC: NEGATIVE
NITRITE, POC UA: NEGATIVE
PH, POC UA: 5
PROTEIN, POC: 30
SPECIFIC GRAVITY, POC UA: 1.03
UROBILINOGEN, POC UA: NORMAL

## 2023-08-01 PROCEDURE — 99999 PR PBB SHADOW E&M-EST. PATIENT-LVL IV: ICD-10-PCS | Mod: PBBFAC,,, | Performed by: UROLOGY

## 2023-08-01 PROCEDURE — 87086 URINE CULTURE/COLONY COUNT: CPT | Performed by: UROLOGY

## 2023-08-01 PROCEDURE — 87088 URINE BACTERIA CULTURE: CPT | Performed by: UROLOGY

## 2023-08-01 PROCEDURE — 99999PBSHW POCT URINE DIPSTICK WITHOUT MICROSCOPE: Mod: PBBFAC,,,

## 2023-08-01 PROCEDURE — 99213 PR OFFICE/OUTPT VISIT, EST, LEVL III, 20-29 MIN: ICD-10-PCS | Mod: S$PBB,,, | Performed by: UROLOGY

## 2023-08-01 PROCEDURE — 87186 SC STD MICRODIL/AGAR DIL: CPT | Performed by: UROLOGY

## 2023-08-01 PROCEDURE — 99999PBSHW POCT URINE DIPSTICK WITHOUT MICROSCOPE: ICD-10-PCS | Mod: PBBFAC,,,

## 2023-08-01 PROCEDURE — 87077 CULTURE AEROBIC IDENTIFY: CPT | Performed by: UROLOGY

## 2023-08-01 PROCEDURE — 99214 OFFICE O/P EST MOD 30 MIN: CPT | Mod: PBBFAC,PO | Performed by: UROLOGY

## 2023-08-01 PROCEDURE — 99999 PR PBB SHADOW E&M-EST. PATIENT-LVL IV: CPT | Mod: PBBFAC,,, | Performed by: UROLOGY

## 2023-08-01 PROCEDURE — 81002 URINALYSIS NONAUTO W/O SCOPE: CPT | Mod: PBBFAC,PO | Performed by: UROLOGY

## 2023-08-01 PROCEDURE — 99213 OFFICE O/P EST LOW 20 MIN: CPT | Mod: S$PBB,,, | Performed by: UROLOGY

## 2023-08-02 ENCOUNTER — HOSPITAL ENCOUNTER (OUTPATIENT)
Dept: RADIOLOGY | Facility: HOSPITAL | Age: 78
Discharge: HOME | End: 2023-08-02
Attending: ORTHOPAEDIC SURGERY
Payer: OTHER GOVERNMENT

## 2023-08-02 ENCOUNTER — OFFICE VISIT (OUTPATIENT)
Dept: ORTHOPEDICS | Facility: CLINIC | Age: 78
End: 2023-08-02
Payer: OTHER GOVERNMENT

## 2023-08-02 VITALS
HEIGHT: 72 IN | DIASTOLIC BLOOD PRESSURE: 78 MMHG | HEART RATE: 87 BPM | BODY MASS INDEX: 28.4 KG/M2 | SYSTOLIC BLOOD PRESSURE: 119 MMHG | WEIGHT: 209.69 LBS

## 2023-08-02 DIAGNOSIS — M19.011 OSTEOARTHRITIS OF GLENOHUMERAL JOINT, RIGHT: Primary | ICD-10-CM

## 2023-08-02 DIAGNOSIS — M25.511 CHRONIC RIGHT SHOULDER PAIN: ICD-10-CM

## 2023-08-02 DIAGNOSIS — G89.29 CHRONIC RIGHT SHOULDER PAIN: ICD-10-CM

## 2023-08-02 PROCEDURE — 73030 XR SHOULDER COMPLETE 2 OR MORE VIEWS RIGHT: ICD-10-PCS | Mod: 26,RT,, | Performed by: RADIOLOGY

## 2023-08-02 PROCEDURE — 73030 X-RAY EXAM OF SHOULDER: CPT | Mod: 26,RT,, | Performed by: RADIOLOGY

## 2023-08-02 PROCEDURE — 99215 OFFICE O/P EST HI 40 MIN: CPT | Mod: PBBFAC,PN | Performed by: ORTHOPAEDIC SURGERY

## 2023-08-02 PROCEDURE — 99999 PR PBB SHADOW E&M-EST. PATIENT-LVL V: CPT | Mod: PBBFAC,,, | Performed by: ORTHOPAEDIC SURGERY

## 2023-08-02 PROCEDURE — 99999 PR PBB SHADOW E&M-EST. PATIENT-LVL V: ICD-10-PCS | Mod: PBBFAC,,, | Performed by: ORTHOPAEDIC SURGERY

## 2023-08-02 PROCEDURE — 73030 X-RAY EXAM OF SHOULDER: CPT | Mod: TC,PN,RT

## 2023-08-02 PROCEDURE — 99204 PR OFFICE/OUTPT VISIT, NEW, LEVL IV, 45-59 MIN: ICD-10-PCS | Mod: S$PBB,,, | Performed by: ORTHOPAEDIC SURGERY

## 2023-08-02 PROCEDURE — 99204 OFFICE O/P NEW MOD 45 MIN: CPT | Mod: S$PBB,,, | Performed by: ORTHOPAEDIC SURGERY

## 2023-08-02 RX ORDER — SODIUM CHLORIDE 9 MG/ML
INJECTION, SOLUTION INTRAVENOUS CONTINUOUS
Status: CANCELLED | OUTPATIENT
Start: 2023-08-02

## 2023-08-02 RX ORDER — MUPIROCIN 20 MG/G
OINTMENT TOPICAL
Status: CANCELLED | OUTPATIENT
Start: 2023-08-02

## 2023-08-02 RX ORDER — CEFAZOLIN SODIUM 2 G/50ML
2 SOLUTION INTRAVENOUS
Status: CANCELLED | OUTPATIENT
Start: 2023-08-02

## 2023-08-02 NOTE — PROGRESS NOTES
Patient ID:   Brandan Werner is a 77 y.o. male.    Chief Complaint:   Right shoulder pain    HPI:   Mr. Werner he is a 77-year-old right-hand dominant male with a longstanding history of right shoulder pain.  He reports a pain level of 7/10.  The pain is worse with activity.  Past treatment has included physical therapy, pain medication, anti-inflammatories, and corticosteroid injections.    Medications:    Current Outpatient Medications:     ascorbic acid, vitamin C, (VITAMIN C) 250 MG tablet, Take 250 mg by mouth once daily., Disp: , Rfl:     atorvastatin (LIPITOR) 40 MG tablet, Take 20 mg by mouth once daily., Disp: , Rfl:     carbidopa-levodopa  mg (SINEMET)  mg per tablet, TAKE 1 TABLET BY MOUTH THREE TIMES A DAY FOR PARKINSON'S DISE** WITH MEALSASE, Disp: , Rfl:     celecoxib (CELEBREX) 200 MG capsule, Take 400 mg by mouth once daily., Disp: , Rfl:     cetirizine (ZYRTEC) 10 MG tablet, Take 1 tablet (10 mg total) by mouth once daily., Disp: 30 tablet, Rfl: 0    finasteride (PROSCAR) 5 mg tablet, Take 1 tablet (5 mg total) by mouth once daily., Disp: 30 tablet, Rfl: 11    FLUoxetine 20 MG capsule, 60 mg., Disp: , Rfl:     FLUoxetine 40 MG capsule, TAKE ONE CAPSULE BY MOUTH EVERY DAY FOR MENTAL HEALTH, Disp: , Rfl:     fluticasone (FLONASE) 50 mcg/actuation nasal spray, 2 sprays (100 mcg total) by Each Nare route once daily., Disp: 1 Bottle, Rfl: 0    folic acid/multivit-min/lutein (CENTRUM SILVER ORAL), Take by mouth., Disp: , Rfl:     garlic 1,000 mg Cap, Take by mouth., Disp: , Rfl:     ANA ROOT XT-FENNEL SD XT ORAL, Take 550 mg by mouth., Disp: , Rfl:     glucosam/chond-msm1/C/agueda/bor (GLUCOSAMINE-CHOND-MSM COMPLEX ORAL), Take by mouth., Disp: , Rfl:     lisinopril (PRINIVIL,ZESTRIL) 5 MG tablet, Take 5 mg by mouth once daily., Disp: , Rfl:     mecobalamin (B12 ACTIVE ORAL), Take by mouth., Disp: , Rfl:     pantoprazole (PROTONIX) 40 MG tablet, Take 1 tablet (40 mg total) by mouth once  daily. for 14 days, Disp: 14 tablet, Rfl: 0    peg 400-propylene glycol 0.4-0.3 % Drop, INSTILL ONE DROP IN EACH EYE FOUR TIMES A DAY AS NEEDED FOR DRY EYES, Disp: , Rfl:     primidone (MYSOLINE) 50 MG Tab, Take 2 tablets (100 mg total) by mouth every evening., Disp: 60 tablet, Rfl: 11    psyllium (METAMUCIL) powder, Take 1 packet by mouth 3 (three) times daily., Disp: , Rfl:     tamsulosin (FLOMAX) 0.4 mg Cp24, Take 1 capsule (0.4 mg total) by mouth nightly., Disp: 30 capsule, Rfl: 11    traZODone (DESYREL) 100 MG tablet, Take 1 tablet by mouth nightly as needed., Disp: , Rfl:     triamcinolone acetonide 0.1% (KENALOG) 0.1 % cream, Apply topically 3 (three) times daily., Disp: 45 g, Rfl: 0    vitamin E 100 UNIT capsule, Take 100 Units by mouth every morning., Disp: , Rfl:     VOLTAREN 1 % Gel, Apply 4 g topically 4 (four) times daily as needed (pain and inflammation). , Disp: , Rfl:     Allergies:  Review of patient's allergies indicates:  No Known Allergies    Past Medical History:  Past Medical History:   Diagnosis Date    Anxiety     BPH (benign prostatic hyperplasia)     Cataract     Elevated PSA     Erectile dysfunction     Hyperlipidemia     Hypertension     Hypogonadism male     Kidney stone 5/20/2020    Obesity     PTSD (post-traumatic stress disorder)     Shoulder arthritis     Urinary tract infection         Past Surgical History:  Past Surgical History:   Procedure Laterality Date    COLONOSCOPY N/A 11/26/2018    Procedure: COLONOSCOPY;  Surgeon: Germán Moss MD;  Location: [address]);  Service: Endoscopy;  Laterality: N/A;    COLONOSCOPY W/ BIOPSIES  2010    CYSTOSCOPY Left 11/17/2022    Procedure: CYSTOSCOPY;  Surgeon: Dannie Murray MD;  Location: [facility];  Service: Urology;  Laterality: Left;    EXTRACORPOREAL SHOCK WAVE LITHOTRIPSY Left 12/2/2022    Procedure: LITHOTRIPSY, [facility] [employer] [facility], scheduling call [phone] 60 minutes;  Surgeon: Dannie PRADHAN  MD Trevor;  Location: [facility] OR;  Service: Urology;  Laterality: Left;  Confirmed with [facility]  [facility] conf # [id number]    EXTRACORPOREAL SHOCK WAVE LITHOTRIPSY Left 2023    Procedure: LITHOTRIPSY, ESWL [facility] [place] ESWL machine, scheduling call [phone] minutes;  Surgeon: Dannie Murray MD;  Location: [facility] OR;  Service: Urology;  Laterality: Left;  next [facility] conf #[id number]    PENILE PROSTHESIS IMPLANT         Social History:  Social History     Occupational History    Occupation:      Comment: self-employed   Tobacco Use    Smoking status: Former     Current packs/day: 0.00     Average packs/day: 1 pack/day for 10.0 years (10.0 ttl pk-yrs)     Types: Cigarettes     Start date:      Quit date:      Years since quittin.6    Smokeless tobacco: Never   Substance and Sexual Activity    Alcohol use: No    Drug use: No    Sexual activity: Yes     Partners: Female       Family History:  Family History   Problem Relation Age of Onset    Cancer Mother         cancer    Cataracts Mother     Heart disease Father 62        M.I.    Stroke Brother 62    Amblyopia Neg Hx     Blindness Neg Hx     Glaucoma Neg Hx     Macular degeneration Neg Hx     Retinal detachment Neg Hx     Strabismus Neg Hx     Prostate cancer Neg Hx     Kidney disease Neg Hx         ROS:  Review of Systems   Musculoskeletal:  Positive for arthritis, joint pain, myalgias and stiffness.   All other systems reviewed and are negative.      Vitals:  There were no vitals taken for this visit.    Physical Examination:  Comprehensive Orthopaedic Musculoskeletal Exam    General      Constitutional: appears stated age, well-developed and well-nourished    Scleral icterus: no    Labored breathing: no    Psychiatric: normal mood and affect and no acute distress    Neurological: alert and oriented x3    Skin: intact    Lymphadenopathy: none     Ortho Exam   Right shoulder exam:  No visible deformity.    Range of motion is  significantly limited with elevation 110, external rotate at the side to neutral, internal rotation to the hip.  Rotator cuff strength exam reveals 5/5 elevation, external rotation, and internal rotation.    Positive crepitus.    Imaging:  I have ordered and independently reviewed the following imaging studies performed at [facility] today    X-ray Shoulder 2 or More Views Right  Narrative: EXAMINATION:  XR SHOULDER COMPLETE 2 OR MORE VIEWS RIGHT    CLINICAL HISTORY:  Pain in right shoulder    TECHNIQUE:  Two or three views of the right shoulder were performed.    COMPARISON:  06/22/2020    FINDINGS:  Above severe degenerative changes seen at the glenohumeral joint space with bony of spurring arising from the or mole head.  Degenerative changes seen at the acromioclavicular joint and there is little soft tissue calcification seen above the humeral head.  Impression: See above    Electronically signed by: Gabe Conte MD  Date:    08/02/2023  Time:    15:01    Assessment:  1. Osteoarthritis of glenohumeral joint, right      Plan:  I have reviewed the clinical and radiographic findings with the patient in detail.  He is interested in surgical intervention.  I have discussed the option of right total shoulder arthroplasty.  I reviewed the risks, benefits, and alternatives.  He wishes to proceed on September 14, 2023.    I have recommended a preoperative CT scan for surgical planning.     No follow-ups on file.

## 2023-08-03 RX ORDER — CIPROFLOXACIN 500 MG/1
500 TABLET ORAL 2 TIMES DAILY
Qty: 14 TABLET | Refills: 0 | Status: SHIPPED | OUTPATIENT
Start: 2023-08-03 | End: 2023-08-10

## 2023-08-03 NOTE — PROGRESS NOTES
Patient pending urologic procedure with Dr. Murray with positive urine culture.    Abx prescribed.    Please contact patient and let him know to pick them up and start ASAP

## 2023-08-04 ENCOUNTER — TELEPHONE (OUTPATIENT)
Dept: PREADMISSION TESTING | Facility: HOSPITAL | Age: 78
End: 2023-08-04
Payer: OTHER GOVERNMENT

## 2023-08-04 ENCOUNTER — TELEPHONE (OUTPATIENT)
Dept: UROLOGY | Facility: CLINIC | Age: 78
End: 2023-08-04
Payer: OTHER GOVERNMENT

## 2023-08-04 DIAGNOSIS — Z01.818 PRE-OP EVALUATION: Primary | ICD-10-CM

## 2023-08-04 NOTE — TELEPHONE ENCOUNTER
Spoke with pt and advised to him that urine culture came back positive and that provider sent in abx to pharmacy. Pt voiced his understanding

## 2023-08-05 LAB — BACTERIA UR CULT: ABNORMAL

## 2023-08-07 DIAGNOSIS — N20.0 KIDNEY STONE: Primary | ICD-10-CM

## 2023-08-08 ENCOUNTER — TELEPHONE (OUTPATIENT)
Dept: UROLOGY | Facility: CLINIC | Age: 78
End: 2023-08-08
Payer: OTHER GOVERNMENT

## 2023-08-08 ENCOUNTER — TELEPHONE (OUTPATIENT)
Dept: ADMINISTRATIVE | Facility: CLINIC | Age: 78
End: 2023-08-08
Payer: OTHER GOVERNMENT

## 2023-08-08 NOTE — TELEPHONE ENCOUNTER
Patient wife called to see if  accept [facility] insurance. Wife voiced that since he's coming in tomorrow they will ask.

## 2023-08-08 NOTE — TELEPHONE ENCOUNTER
----- Message from Juliana Hernandez sent at 8/8/2023  2:18 PM CDT -----  Type:  Needs Medical Advice    Who Called:  pt's wife Naidazano  Symptoms (please be specific):    How long has patient had these symptoms:    Pharmacy name and phone #:    Would the patient rather a call back or a response via [facility]?   Best Call Back Number: [phone]  Additional Information: pt has procedure sched for 8/14/23 and has questions

## 2023-08-10 ENCOUNTER — CLINICAL SUPPORT (OUTPATIENT)
Dept: LAB | Facility: HOSPITAL | Age: 78
End: 2023-08-10
Attending: ORTHOPAEDIC SURGERY
Payer: OTHER GOVERNMENT

## 2023-08-10 ENCOUNTER — OFFICE VISIT (OUTPATIENT)
Dept: UROLOGY | Facility: CLINIC | Age: 78
End: 2023-08-10
Payer: OTHER GOVERNMENT

## 2023-08-10 VITALS — HEIGHT: 72 IN | BODY MASS INDEX: 27.86 KG/M2 | WEIGHT: 205.69 LBS

## 2023-08-10 DIAGNOSIS — Z01.818 PRE-OP EVALUATION: ICD-10-CM

## 2023-08-10 DIAGNOSIS — N20.0 KIDNEY STONES: Primary | ICD-10-CM

## 2023-08-10 LAB
ANION GAP SERPL CALC-SCNC: 5 MMOL/L (ref 8–16)
BASOPHILS # BLD AUTO: 0.04 K/UL (ref 0–0.2)
BASOPHILS NFR BLD: 0.5 % (ref 0–1.9)
BUN SERPL-MCNC: 18 MG/DL (ref 8–23)
CALCIUM SERPL-MCNC: 8.9 MG/DL (ref 8.7–10.5)
CHLORIDE SERPL-SCNC: 103 MMOL/L (ref 95–110)
CO2 SERPL-SCNC: 29 MMOL/L (ref 23–29)
CREAT SERPL-MCNC: 1.1 MG/DL (ref 0.5–1.4)
DIFFERENTIAL METHOD: ABNORMAL
EOSINOPHIL # BLD AUTO: 0.1 K/UL (ref 0–0.5)
EOSINOPHIL NFR BLD: 1.2 % (ref 0–8)
ERYTHROCYTE [DISTWIDTH] IN BLOOD BY AUTOMATED COUNT: 13.2 % (ref 11.5–14.5)
EST. GFR  (NO RACE VARIABLE): >60 ML/MIN/1.73 M^2
GLUCOSE SERPL-MCNC: 75 MG/DL (ref 70–110)
HCT VFR BLD AUTO: 40.1 % (ref 40–54)
HGB BLD-MCNC: 13.1 G/DL (ref 14–18)
IMM GRANULOCYTES # BLD AUTO: 0.02 K/UL (ref 0–0.04)
IMM GRANULOCYTES NFR BLD AUTO: 0.3 % (ref 0–0.5)
LYMPHOCYTES # BLD AUTO: 2.1 K/UL (ref 1–4.8)
LYMPHOCYTES NFR BLD: 27.4 % (ref 18–48)
MCH RBC QN AUTO: 31.1 PG (ref 27–31)
MCHC RBC AUTO-ENTMCNC: 32.7 G/DL (ref 32–36)
MCV RBC AUTO: 95 FL (ref 82–98)
MONOCYTES # BLD AUTO: 0.5 K/UL (ref 0.3–1)
MONOCYTES NFR BLD: 6.1 % (ref 4–15)
NEUTROPHILS # BLD AUTO: 4.8 K/UL (ref 1.8–7.7)
NEUTROPHILS NFR BLD: 64.5 % (ref 38–73)
NRBC BLD-RTO: 0 /100 WBC
PLATELET # BLD AUTO: 211 K/UL (ref 150–450)
PMV BLD AUTO: 10.7 FL (ref 9.2–12.9)
POTASSIUM SERPL-SCNC: 4.1 MMOL/L (ref 3.5–5.1)
RBC # BLD AUTO: 4.21 M/UL (ref 4.6–6.2)
SODIUM SERPL-SCNC: 137 MMOL/L (ref 136–145)
WBC # BLD AUTO: 7.51 K/UL (ref 3.9–12.7)

## 2023-08-10 PROCEDURE — 99999 PR PBB SHADOW E&M-EST. PATIENT-LVL IV: CPT | Mod: PBBFAC,,, | Performed by: UROLOGY

## 2023-08-10 PROCEDURE — 80048 BASIC METABOLIC PNL TOTAL CA: CPT | Performed by: ORTHOPAEDIC SURGERY

## 2023-08-10 PROCEDURE — 93005 ELECTROCARDIOGRAM TRACING: CPT

## 2023-08-10 PROCEDURE — 93010 ELECTROCARDIOGRAM REPORT: CPT | Mod: ,,, | Performed by: INTERNAL MEDICINE

## 2023-08-10 PROCEDURE — 99214 OFFICE O/P EST MOD 30 MIN: CPT | Mod: PBBFAC,PO | Performed by: UROLOGY

## 2023-08-10 PROCEDURE — 99212 OFFICE O/P EST SF 10 MIN: CPT | Mod: S$GLB,,, | Performed by: UROLOGY

## 2023-08-10 PROCEDURE — 87086 URINE CULTURE/COLONY COUNT: CPT | Performed by: STUDENT IN AN ORGANIZED HEALTH CARE EDUCATION/TRAINING PROGRAM

## 2023-08-10 PROCEDURE — 99212 PR OFFICE/OUTPT VISIT, EST, LEVL II, 10-19 MIN: ICD-10-PCS | Mod: S$GLB,,, | Performed by: UROLOGY

## 2023-08-10 PROCEDURE — 99999 PR PBB SHADOW E&M-EST. PATIENT-LVL IV: ICD-10-PCS | Mod: PBBFAC,,, | Performed by: UROLOGY

## 2023-08-10 PROCEDURE — 85025 COMPLETE CBC W/AUTO DIFF WBC: CPT | Performed by: ORTHOPAEDIC SURGERY

## 2023-08-10 PROCEDURE — 93010 EKG 12-LEAD: ICD-10-PCS | Mod: ,,, | Performed by: INTERNAL MEDICINE

## 2023-08-10 PROCEDURE — 36415 COLL VENOUS BLD VENIPUNCTURE: CPT | Performed by: ORTHOPAEDIC SURGERY

## 2023-08-10 NOTE — PROGRESS NOTES
Subjective:       Patient ID: Brandan Werner is a 77 y.o. male.    Chief Complaint: Follow-up       This is a 77 y.o.  male patient with BPH, elevated PSA, kidney stone.  Past patient of Dr. Laboy last seen in 2020 for cystoscopy for microhematuria that was negative except large prostate.  H/o penile implant that still uses.  CTU in 2020 with 150 gm prostate no other findings.  Long h/o elevated PSA.    PSA 6.2 in 2013 and biopsy negative  Biopsy in 2013 with ASAP  PSA recently checked by PCP and was 8.2   Moderate/severe LUTs, AUA SS 19/2, PVR 67  Worsening frequency of urination on finasteride, tamsulosin.    UA showed microhematuria.  CTU done showed left 1 cm UPJ stone with moderate hydronephrosis 11/22 and left non obstructing stones.    Failed left stent, required PCN and antegrade stent  Left ESWL 12/2  Follow-up KUB shows resolution of UPJ stone continued lower pole left stone.  Repeat left LP stone ESWL 1/30/23, appeared to be good fragmentation.  KUB shows left dense stone left LP but still present.  Stent in place. Stent removed 2/7/23.   CT 5/1/23: left enlarging LP stone now 2.3 cm, mild left pelviectasis, read as possible lesion, on review with radiologist small stone near UPJ and mild pelviectasis with pelvis thickening no obvious mass.    Intermittent left sided pain, still LUTs from BPH but not severe.        Scheduled for PCNL 8/14/23.Urine culture 8/1/23 with 10-49k with E Coli, taking cipro.  Here for repeat culture      LAST PSA  Lab Results   Component Value Date    PSA 8.2 (H) 08/19/2022    PSA 7.0 (H) 03/02/2021    PSA 6.5 (H) 01/30/2020    PSA 3.0 11/02/2016    PSA 5.0 (H) 05/02/2014    PSA 6.2 (H) 10/18/2013    PSA 4.49 (H) 02/22/2013    PSA 4.1 (H) 05/09/2012    PSA 4.04 (H) 04/05/2012    PSA 2.8 08/31/2010    PSA 3.1 02/25/2009    PSA 2.2 09/06/2007    PSA 1.8 09/30/2006    PSA 1.7 12/28/2005    PSA 1.8 10/30/2004    PSADIAG 4.9 (H) 02/22/2023    PSADIAG 7.0 (H) 03/07/2019    PSADIAG  6.6 (H) 09/07/2018    PSADIAG 6.4 (H) 06/08/2018    PSADIAG 5.3 (H) 01/03/2018    PSADIAG 2.9 02/22/2016    PSADIAG 2.9 08/24/2015    PSADIAG 5.7 (H) 02/24/2015    PSADIAG 5.1 (H) 06/26/2014    PSADIAG 5.8 (H) 12/03/2013    PSATOTAL 6.5 (H) 05/09/2023    PSAFREE 1.69 (H) 05/09/2023       Lab Results   Component Value Date    CREATININE 1.0 04/09/2023       ---  PMH/PSH/Medications/Allergies/Social history reviewed and as in chart.    Review of Systems   Constitutional:  Negative for activity change, chills and fever.   HENT:  Negative for congestion.    Respiratory:  Negative for cough, chest tightness and shortness of breath.    Cardiovascular:  Negative for chest pain and palpitations.   Gastrointestinal:  Negative for abdominal distention, abdominal pain, nausea and vomiting.   Genitourinary:  Positive for difficulty urinating and flank pain. Negative for frequency, hematuria, penile pain, scrotal swelling and testicular pain.   Musculoskeletal:  Negative for gait problem.       Objective:      Physical Exam  HENT:      Head: Atraumatic.   Pulmonary:      Effort: Pulmonary effort is normal.   Neurological:      General: No focal deficit present.      Mental Status: He is alert and oriented to person, place, and time.         Assessment:     Problem Noted   Kidney Stones 5/20/2020    Left 1.5 cm UPJ and non obstructing left lower pole greater than 1 cm on CTU 11/2022 with moderate hydronephrosis     Unable to place left stent 11/17/22 due to large prostate, prosthesis and unable to find ureteral orifice.   Left PCN 11/18/22, internalized to stent 11/28  Left ESWL UPJ stone 12/2/22  KUB after with 1.8 cm left lower pole stone consistent with lower pole stone on CT urogram, no renal pelvis/gualberto stent stone seen.  Left ESWL 1/30/23 good fragmentation  KUB shows continued LLP stone  CT 5/23: left enlarging LP stone 2.3 cm mild pelviectasis, small stone fragment at UPJ     Elevated Psa 3/11/2013    Biopsy 2013 ASAP 1  core, 2014 benign  PSA 8/22--8.2  Volume 183, PSAD 0.04       Benign Prostatic Hyperplasia     183 gm prostate on CTU 2022  Initial AUA SS (8/2022): 19/2  PVR 67     History of Penile Implant 5/20/2020         Plan:     Plan for PCNL as scheduled  Continue Antibiotics  Repeat urine culture today      I have explained the indication, risks, benefits, and alternatives of the procedure in detail.  The patient voices understanding and all questions have been answered.   Risks including but not limited to bleeding, infection, injury to the lung, liver, spleen, colon, need for additional procedures, incomplete removal of stone, arteriovenous malformation, pseudoaneurysm, hydrothorax or pneumothorax, urinoma, ureteral injury or perforation, DVT, PE, heart attack, stroke, and death were discussed with the patient in depth.  The patient agrees to proceed as planned with left PCNL.    Dannie Murray MD

## 2023-08-11 ENCOUNTER — TELEPHONE (OUTPATIENT)
Dept: UROLOGY | Facility: CLINIC | Age: 78
End: 2023-08-11
Payer: OTHER GOVERNMENT

## 2023-08-11 LAB — BACTERIA UR CULT: NORMAL

## 2023-08-11 NOTE — TELEPHONE ENCOUNTER
I called the patient, notified him that [employer] paperwork complete, he stated he would pick it up Monday 8/14, I left it at the  with his name on it.

## 2023-08-14 ENCOUNTER — HOSPITAL ENCOUNTER (OUTPATIENT)
Facility: HOSPITAL | Age: 78
LOS: 1 days | Discharge: HOME | End: 2023-08-14
Attending: UROLOGY | Admitting: UROLOGY
Payer: MEDICARE

## 2023-08-14 ENCOUNTER — ANESTHESIA (OUTPATIENT)
Dept: SURGERY | Facility: HOSPITAL | Age: 78
End: 2023-08-14
Payer: MEDICARE

## 2023-08-14 ENCOUNTER — ANESTHESIA EVENT (OUTPATIENT)
Dept: SURGERY | Facility: HOSPITAL | Age: 78
End: 2023-08-14
Payer: MEDICARE

## 2023-08-14 VITALS
RESPIRATION RATE: 17 BRPM | DIASTOLIC BLOOD PRESSURE: 74 MMHG | SYSTOLIC BLOOD PRESSURE: 159 MMHG | TEMPERATURE: 98 F | OXYGEN SATURATION: 96 % | HEART RATE: 55 BPM | WEIGHT: 200 LBS | HEIGHT: 72 IN | BODY MASS INDEX: 27.09 KG/M2

## 2023-08-14 DIAGNOSIS — N20.0 KIDNEY STONE: Primary | ICD-10-CM

## 2023-08-14 DIAGNOSIS — N20.0 KIDNEY STONES: ICD-10-CM

## 2023-08-14 LAB
ANION GAP SERPL CALC-SCNC: 6 MMOL/L (ref 8–16)
BASOPHILS # BLD AUTO: 0.04 K/UL (ref 0–0.2)
BASOPHILS NFR BLD: 0.6 % (ref 0–1.9)
BUN SERPL-MCNC: 17 MG/DL (ref 8–23)
CALCIUM SERPL-MCNC: 8.9 MG/DL (ref 8.7–10.5)
CHLORIDE SERPL-SCNC: 105 MMOL/L (ref 95–110)
CO2 SERPL-SCNC: 28 MMOL/L (ref 23–29)
CREAT SERPL-MCNC: 1.1 MG/DL (ref 0.5–1.4)
DIFFERENTIAL METHOD: ABNORMAL
EOSINOPHIL # BLD AUTO: 0.1 K/UL (ref 0–0.5)
EOSINOPHIL NFR BLD: 2 % (ref 0–8)
ERYTHROCYTE [DISTWIDTH] IN BLOOD BY AUTOMATED COUNT: 13.2 % (ref 11.5–14.5)
EST. GFR  (NO RACE VARIABLE): >60 ML/MIN/1.73 M^2
GLUCOSE SERPL-MCNC: 86 MG/DL (ref 70–110)
HCT VFR BLD AUTO: 41.2 % (ref 40–54)
HGB BLD-MCNC: 13.4 G/DL (ref 14–18)
IMM GRANULOCYTES # BLD AUTO: 0.02 K/UL (ref 0–0.04)
IMM GRANULOCYTES NFR BLD AUTO: 0.3 % (ref 0–0.5)
INR PPP: 0.9 (ref 0.8–1.2)
LYMPHOCYTES # BLD AUTO: 2 K/UL (ref 1–4.8)
LYMPHOCYTES NFR BLD: 30.9 % (ref 18–48)
MCH RBC QN AUTO: 30.8 PG (ref 27–31)
MCHC RBC AUTO-ENTMCNC: 32.5 G/DL (ref 32–36)
MCV RBC AUTO: 95 FL (ref 82–98)
MONOCYTES # BLD AUTO: 0.4 K/UL (ref 0.3–1)
MONOCYTES NFR BLD: 6.1 % (ref 4–15)
NEUTROPHILS # BLD AUTO: 4 K/UL (ref 1.8–7.7)
NEUTROPHILS NFR BLD: 60.1 % (ref 38–73)
NRBC BLD-RTO: 0 /100 WBC
PLATELET # BLD AUTO: 192 K/UL (ref 150–450)
PMV BLD AUTO: 10 FL (ref 9.2–12.9)
POTASSIUM SERPL-SCNC: 4.2 MMOL/L (ref 3.5–5.1)
PROTHROMBIN TIME: 10.2 SEC (ref 9–12.5)
RBC # BLD AUTO: 4.35 M/UL (ref 4.6–6.2)
SODIUM SERPL-SCNC: 139 MMOL/L (ref 136–145)
WBC # BLD AUTO: 6.61 K/UL (ref 3.9–12.7)

## 2023-08-14 PROCEDURE — 74420 PR  X-RAY RETROGRADE PYELOGRAM: ICD-10-PCS | Mod: 26,,, | Performed by: UROLOGY

## 2023-08-14 PROCEDURE — 27201423 OPTIME MED/SURG SUP & DEVICES STERILE SUPPLY: Mod: HCNC | Performed by: UROLOGY

## 2023-08-14 PROCEDURE — 85610 PROTHROMBIN TIME: CPT | Performed by: UROLOGY

## 2023-08-14 PROCEDURE — 63600175 PHARM REV CODE 636 W HCPCS: Performed by: STUDENT IN AN ORGANIZED HEALTH CARE EDUCATION/TRAINING PROGRAM

## 2023-08-14 PROCEDURE — 71000033 HC RECOVERY, INTIAL HOUR: Mod: HCNC | Performed by: UROLOGY

## 2023-08-14 PROCEDURE — C1758 CATHETER, URETERAL: HCPCS | Mod: HCNC | Performed by: UROLOGY

## 2023-08-14 PROCEDURE — 25000003 PHARM REV CODE 250: Performed by: STUDENT IN AN ORGANIZED HEALTH CARE EDUCATION/TRAINING PROGRAM

## 2023-08-14 PROCEDURE — 80048 BASIC METABOLIC PNL TOTAL CA: CPT | Performed by: UROLOGY

## 2023-08-14 PROCEDURE — 74420 UROGRAPHY RTRGR +-KUB: CPT | Mod: 26,,, | Performed by: UROLOGY

## 2023-08-14 PROCEDURE — C2617 STENT, NON-COR, TEM W/O DEL: HCPCS | Mod: HCNC | Performed by: UROLOGY

## 2023-08-14 PROCEDURE — C1894 INTRO/SHEATH, NON-LASER: HCPCS | Mod: HCNC | Performed by: UROLOGY

## 2023-08-14 PROCEDURE — C1769 GUIDE WIRE: HCPCS | Mod: HCNC | Performed by: UROLOGY

## 2023-08-14 PROCEDURE — 71000015 HC POSTOP RECOV 1ST HR: Mod: HCNC | Performed by: UROLOGY

## 2023-08-14 PROCEDURE — 36000708 HC OR TIME LEV III 1ST 15 MIN: Mod: HCNC | Performed by: UROLOGY

## 2023-08-14 PROCEDURE — D9220A PRA ANESTHESIA: Mod: ANES,,, | Performed by: ANESTHESIOLOGY

## 2023-08-14 PROCEDURE — 85025 COMPLETE CBC W/AUTO DIFF WBC: CPT | Performed by: UROLOGY

## 2023-08-14 PROCEDURE — 25000003 PHARM REV CODE 250: Performed by: UROLOGY

## 2023-08-14 PROCEDURE — D9220A PRA ANESTHESIA: Mod: CRNA,,, | Performed by: STUDENT IN AN ORGANIZED HEALTH CARE EDUCATION/TRAINING PROGRAM

## 2023-08-14 PROCEDURE — 52352 PR CYSTO/URETERO/PYELOSCOPY, CALCULUS TX: ICD-10-PCS | Mod: LT,,, | Performed by: UROLOGY

## 2023-08-14 PROCEDURE — 37000009 HC ANESTHESIA EA ADD 15 MINS: Mod: HCNC | Performed by: UROLOGY

## 2023-08-14 PROCEDURE — 36000709 HC OR TIME LEV III EA ADD 15 MIN: Mod: HCNC | Performed by: UROLOGY

## 2023-08-14 PROCEDURE — 63600175 PHARM REV CODE 636 W HCPCS: Performed by: UROLOGY

## 2023-08-14 PROCEDURE — 82365 CALCULUS SPECTROSCOPY: CPT | Mod: HCNC | Performed by: UROLOGY

## 2023-08-14 PROCEDURE — 71000016 HC POSTOP RECOV ADDL HR: Mod: HCNC | Performed by: UROLOGY

## 2023-08-14 PROCEDURE — 52352 CYSTOURETERO W/STONE REMOVE: CPT | Mod: LT,,, | Performed by: UROLOGY

## 2023-08-14 PROCEDURE — 36415 COLL VENOUS BLD VENIPUNCTURE: CPT | Performed by: UROLOGY

## 2023-08-14 PROCEDURE — 52332 PR CYSTOSCOPY,INSERT URETERAL STENT: ICD-10-PCS | Mod: 51,LT,, | Performed by: UROLOGY

## 2023-08-14 PROCEDURE — 25500020 PHARM REV CODE 255: Performed by: UROLOGY

## 2023-08-14 PROCEDURE — D9220A PRA ANESTHESIA: ICD-10-PCS | Mod: CRNA,,, | Performed by: STUDENT IN AN ORGANIZED HEALTH CARE EDUCATION/TRAINING PROGRAM

## 2023-08-14 PROCEDURE — 52332 CYSTOSCOPY AND TREATMENT: CPT | Mod: 51,LT,, | Performed by: UROLOGY

## 2023-08-14 PROCEDURE — 37000008 HC ANESTHESIA 1ST 15 MINUTES: Mod: HCNC | Performed by: UROLOGY

## 2023-08-14 PROCEDURE — D9220A PRA ANESTHESIA: ICD-10-PCS | Mod: ANES,,, | Performed by: ANESTHESIOLOGY

## 2023-08-14 DEVICE — STENT URETERAL UNIV 6FR 26CM: Type: IMPLANTABLE DEVICE | Site: URETER | Status: FUNCTIONAL

## 2023-08-14 RX ORDER — PHENAZOPYRIDINE HYDROCHLORIDE 200 MG/1
200 TABLET, FILM COATED ORAL 3 TIMES DAILY PRN
Qty: 42 TABLET | Refills: 0 | Status: SHIPPED | OUTPATIENT
Start: 2023-08-14 | End: 2023-08-28

## 2023-08-14 RX ORDER — CEFAZOLIN SODIUM 2 G/50ML
2 SOLUTION INTRAVENOUS
Status: COMPLETED | OUTPATIENT
Start: 2023-08-14 | End: 2023-08-14

## 2023-08-14 RX ORDER — DEXAMETHASONE SODIUM PHOSPHATE 4 MG/ML
INJECTION, SOLUTION INTRA-ARTICULAR; INTRALESIONAL; INTRAMUSCULAR; INTRAVENOUS; SOFT TISSUE
Status: DISCONTINUED | OUTPATIENT
Start: 2023-08-14 | End: 2023-08-14

## 2023-08-14 RX ORDER — LIDOCAINE HYDROCHLORIDE 20 MG/ML
INJECTION INTRAVENOUS
Status: DISCONTINUED | OUTPATIENT
Start: 2023-08-14 | End: 2023-08-14

## 2023-08-14 RX ORDER — OXYCODONE HYDROCHLORIDE 5 MG/1
5 TABLET ORAL EVERY 6 HOURS PRN
Qty: 5 TABLET | Refills: 0 | Status: ON HOLD | OUTPATIENT
Start: 2023-08-14 | End: 2023-09-14 | Stop reason: HOSPADM

## 2023-08-14 RX ORDER — PHENYLEPHRINE HYDROCHLORIDE 10 MG/ML
INJECTION INTRAVENOUS
Status: DISCONTINUED | OUTPATIENT
Start: 2023-08-14 | End: 2023-08-14

## 2023-08-14 RX ORDER — ACETAMINOPHEN 500 MG
1000 TABLET ORAL
Status: DISCONTINUED | OUTPATIENT
Start: 2023-08-14 | End: 2023-08-14 | Stop reason: HOSPADM

## 2023-08-14 RX ORDER — CEFAZOLIN SODIUM 2 G/50ML
2 SOLUTION INTRAVENOUS ONCE
Status: COMPLETED | OUTPATIENT
Start: 2023-08-14 | End: 2023-08-14

## 2023-08-14 RX ORDER — ONDANSETRON 2 MG/ML
INJECTION INTRAMUSCULAR; INTRAVENOUS
Status: DISCONTINUED | OUTPATIENT
Start: 2023-08-14 | End: 2023-08-14

## 2023-08-14 RX ORDER — ROCURONIUM BROMIDE 10 MG/ML
INJECTION, SOLUTION INTRAVENOUS
Status: DISCONTINUED | OUTPATIENT
Start: 2023-08-14 | End: 2023-08-14

## 2023-08-14 RX ORDER — PROCHLORPERAZINE EDISYLATE 5 MG/ML
5 INJECTION INTRAMUSCULAR; INTRAVENOUS EVERY 30 MIN PRN
Status: DISCONTINUED | OUTPATIENT
Start: 2023-08-14 | End: 2023-08-14 | Stop reason: HOSPADM

## 2023-08-14 RX ORDER — LIDOCAINE HYDROCHLORIDE 10 MG/ML
1 INJECTION, SOLUTION EPIDURAL; INFILTRATION; INTRACAUDAL; PERINEURAL ONCE
Status: DISCONTINUED | OUTPATIENT
Start: 2023-08-14 | End: 2023-08-14 | Stop reason: HOSPADM

## 2023-08-14 RX ORDER — MIDAZOLAM HYDROCHLORIDE 1 MG/ML
INJECTION INTRAMUSCULAR; INTRAVENOUS
Status: COMPLETED | OUTPATIENT
Start: 2023-08-14 | End: 2023-08-14

## 2023-08-14 RX ORDER — DEXMEDETOMIDINE HYDROCHLORIDE 100 UG/ML
INJECTION, SOLUTION INTRAVENOUS
Status: DISCONTINUED | OUTPATIENT
Start: 2023-08-14 | End: 2023-08-14

## 2023-08-14 RX ORDER — PROPOFOL 10 MG/ML
VIAL (ML) INTRAVENOUS
Status: DISCONTINUED | OUTPATIENT
Start: 2023-08-14 | End: 2023-08-14

## 2023-08-14 RX ORDER — FENTANYL CITRATE 50 UG/ML
INJECTION, SOLUTION INTRAMUSCULAR; INTRAVENOUS
Status: DISCONTINUED | OUTPATIENT
Start: 2023-08-14 | End: 2023-08-14

## 2023-08-14 RX ORDER — OXYCODONE HYDROCHLORIDE 5 MG/1
5 TABLET ORAL ONCE
Status: COMPLETED | OUTPATIENT
Start: 2023-08-14 | End: 2023-08-14

## 2023-08-14 RX ORDER — HYDROMORPHONE HYDROCHLORIDE 2 MG/ML
0.2 INJECTION, SOLUTION INTRAMUSCULAR; INTRAVENOUS; SUBCUTANEOUS EVERY 5 MIN PRN
Status: DISCONTINUED | OUTPATIENT
Start: 2023-08-14 | End: 2023-08-14 | Stop reason: HOSPADM

## 2023-08-14 RX ORDER — FENTANYL CITRATE 50 UG/ML
INJECTION, SOLUTION INTRAMUSCULAR; INTRAVENOUS
Status: COMPLETED | OUTPATIENT
Start: 2023-08-14 | End: 2023-08-14

## 2023-08-14 RX ADMIN — PROPOFOL 200 MG: 10 INJECTION, EMULSION INTRAVENOUS at 12:08

## 2023-08-14 RX ADMIN — FENTANYL CITRATE 50 MCG: 50 INJECTION, SOLUTION INTRAMUSCULAR; INTRAVENOUS at 12:08

## 2023-08-14 RX ADMIN — FENTANYL CITRATE 50 MCG: 50 INJECTION, SOLUTION INTRAMUSCULAR; INTRAVENOUS at 09:08

## 2023-08-14 RX ADMIN — DEXAMETHASONE SODIUM PHOSPHATE 8 MG: 4 INJECTION, SOLUTION INTRA-ARTICULAR; INTRALESIONAL; INTRAMUSCULAR; INTRAVENOUS; SOFT TISSUE at 01:08

## 2023-08-14 RX ADMIN — GENTAMICIN SULFATE 127.95 MG: 40 INJECTION, SOLUTION INTRAMUSCULAR; INTRAVENOUS at 12:08

## 2023-08-14 RX ADMIN — CEFAZOLIN SODIUM 2 G: 2 SOLUTION INTRAVENOUS at 08:08

## 2023-08-14 RX ADMIN — HYDROMORPHONE HYDROCHLORIDE 0.2 MG: 2 INJECTION, SOLUTION INTRAMUSCULAR; INTRAVENOUS; SUBCUTANEOUS at 03:08

## 2023-08-14 RX ADMIN — FENTANYL CITRATE 50 MCG: 50 INJECTION, SOLUTION INTRAMUSCULAR; INTRAVENOUS at 08:08

## 2023-08-14 RX ADMIN — SODIUM CHLORIDE, SODIUM LACTATE, POTASSIUM CHLORIDE, AND CALCIUM CHLORIDE: .6; .31; .03; .02 INJECTION, SOLUTION INTRAVENOUS at 12:08

## 2023-08-14 RX ADMIN — ROCURONIUM BROMIDE 50 MG: 10 INJECTION, SOLUTION INTRAVENOUS at 12:08

## 2023-08-14 RX ADMIN — ROCURONIUM BROMIDE 25 MG: 10 INJECTION, SOLUTION INTRAVENOUS at 02:08

## 2023-08-14 RX ADMIN — MIDAZOLAM HYDROCHLORIDE 1 MG: 1 INJECTION INTRAMUSCULAR; INTRAVENOUS at 08:08

## 2023-08-14 RX ADMIN — SUGAMMADEX 200 MG: 100 INJECTION, SOLUTION INTRAVENOUS at 02:08

## 2023-08-14 RX ADMIN — PHENYLEPHRINE HYDROCHLORIDE 200 MCG: 10 INJECTION INTRAVENOUS at 01:08

## 2023-08-14 RX ADMIN — GLYCOPYRROLATE 0.2 MG: 0.2 INJECTION, SOLUTION INTRAMUSCULAR; INTRAVITREAL at 12:08

## 2023-08-14 RX ADMIN — LIDOCAINE HYDROCHLORIDE 100 MG: 20 INJECTION, SOLUTION INTRAVENOUS at 12:08

## 2023-08-14 RX ADMIN — DEXMEDETOMIDINE HCL 10 MCG: 100 INJECTION INTRAVENOUS at 12:08

## 2023-08-14 RX ADMIN — ONDANSETRON 4 MG: 2 INJECTION, SOLUTION INTRAMUSCULAR; INTRAVENOUS at 12:08

## 2023-08-14 RX ADMIN — OXYCODONE HYDROCHLORIDE 5 MG: 5 TABLET ORAL at 03:08

## 2023-08-14 RX ADMIN — MIDAZOLAM HYDROCHLORIDE 1 MG: 1 INJECTION INTRAMUSCULAR; INTRAVENOUS at 09:08

## 2023-08-14 RX ADMIN — CEFAZOLIN SODIUM 2 G: 2 SOLUTION INTRAVENOUS at 12:08

## 2023-08-14 RX ADMIN — PHENYLEPHRINE HYDROCHLORIDE 100 MCG: 10 INJECTION INTRAVENOUS at 01:08

## 2023-08-14 NOTE — ANESTHESIA POSTPROCEDURE EVALUATION
Anesthesia Post Evaluation    Patient: Brandan Werner    Procedure(s) Performed: Procedure(s) (LRB):  CYSTOSCOPY, LEFT RETROGRADE PYELOGRAM, LEFT URETEROSCOPY WITH STONE BASKET EXTRACTION, PLACEMENT OF JJ STENT, LEFT NEPHROSTOMY TUBE REMOVAL (Left)    Final Anesthesia Type: general      Patient location during evaluation: PACU  Patient participation: Yes- Able to Participate  Level of consciousness: awake and alert  Post-procedure vital signs: reviewed and stable  Pain management: adequate  Airway patency: patent    PONV status at discharge: No PONV  Anesthetic complications: no      Cardiovascular status: blood pressure returned to baseline  Respiratory status: unassisted  Hydration status: euvolemic            Vitals Value Taken Time   /82 08/14/23 1530   Temp 36.7 °C (98 °F) 08/14/23 1444   Pulse 52 08/14/23 1530   Resp 19 08/14/23 1530   SpO2 95 % 08/14/23 1530         Event Time   Out of Recovery 15:30:26         Pain/Levi Score: Pain Rating Prior to Med Admin: 6 (8/14/2023  3:17 PM)  Levi Score: 10 (8/14/2023  3:30 PM)

## 2023-08-14 NOTE — DISCHARGE SUMMARY
[facility] - [facility] (Hospital)  Discharge Note  Short Stay    Procedure(s) (LRB):  CYSTOSCOPY, LEFT RETROGRADE PYELOGRAM, LEFT URETEROSCOPY WITH STONE BASKET EXTRACTION, PLACEMENT OF JJ STENT, LEFT NEPHROSTOMY TUBE REMOVAL (Left)      OUTCOME: Patient tolerated treatment/procedure well without complication and is now ready for discharge.    DISPOSITION: Home or Self Care    FINAL DIAGNOSIS:  Kidney stones    FOLLOWUP: In clinic for stent removal    DISCHARGE INSTRUCTIONS:    Discharge Procedure Orders   CT Abdomen Pelvis  Without Contrast   Standing Status: Future Standing Exp. Date: 08/14/24     Order Specific Question Answer Comments   Oral/Rectal Contrast instructions: NO Oral Contrast      Notify your health care provider if you experience any of the following:  temperature >100.4     Notify your health care provider if you experience any of the following:  persistent nausea and vomiting or diarrhea     Notify your health care provider if you experience any of the following:  severe uncontrolled pain     Notify your health care provider if you experience any of the following:  redness, tenderness, or signs of infection (pain, swelling, redness, odor or green/yellow discharge around incision site)     Notify your health care provider if you experience any of the following:  worsening rash     Notify your health care provider if you experience any of the following:  persistent dizziness, light-headedness, or visual disturbances     CYSTOSCOPY W/ STENT REMOVAL   Standing Status: Future Standing Exp. Date: 08/14/24        TIME SPENT ON DISCHARGE: 15 minutes

## 2023-08-14 NOTE — ANESTHESIA PROCEDURE NOTES
Intubation    Date/Time: 8/14/2023 12:56 PM    Performed by: John Benton CRNA  Authorized by: Cornell Prather MD    Intubation:     Induction:  Intravenous    Intubated:  Postinduction    Mask Ventilation:  Easy with oral airway    Attempts:  1    Attempted By:  CRNA    Method of Intubation:  Direct    Blade:  Chaves 2    Laryngeal View Grade: Grade I - full view of cords      Difficult Airway Encountered?: No      Complications:  None    Airway Device:  Oral endotracheal tube    Airway Device Size:  8.0    Style/Cuff Inflation:  Cuffed (inflated to minimal occlusive pressure)    Inflation Amount (mL):  7    Tube secured:  24    Secured at:  The teeth    Placement Verified By:  Capnometry and Revisualization with laryngoscopy    Complicating Factors:  None    Findings Post-Intubation:  BS equal bilateral and atraumatic/condition of teeth unchanged

## 2023-08-14 NOTE — H&P
Radiology History & Physical      SUBJECTIVE:     Chief Complaint: LEft nephrolithiasis    History of Present Illness:  Brandan Werner is a 77 y.o. male who presents for PCNL. IR to obtain access prior to PCNL.     Past Medical History:   Diagnosis Date    Anxiety     BPH (benign prostatic hyperplasia)     Cataract     Elevated PSA     Erectile dysfunction     Hyperlipidemia     Hypertension     Hypogonadism male     Kidney stone 5/20/2020    Obesity     PTSD (post-traumatic stress disorder)     Shoulder arthritis     Urinary tract infection      Past Surgical History:   Procedure Laterality Date    COLONOSCOPY N/A 11/26/2018    Procedure: COLONOSCOPY;  Surgeon: Germán Moss MD;  Location: [address]);  Service: Endoscopy;  Laterality: N/A;    COLONOSCOPY W/ BIOPSIES  2010    CYSTOSCOPY Left 11/17/2022    Procedure: CYSTOSCOPY;  Surgeon: Dannie Murray MD;  Location: [facility];  Service: Urology;  Laterality: Left;    EXTRACORPOREAL SHOCK WAVE LITHOTRIPSY Left 12/2/2022    Procedure: LITHOTRIPSY, ESWL [facility] [place] ESWL machine, scheduling call [phone] 60 minutes;  Surgeon: Dannie Murray MD;  Location: [facility];  Service: Urology;  Laterality: Left;  Confirmed with [facility] 11/22 [facility] conf # [id number]    EXTRACORPOREAL SHOCK WAVE LITHOTRIPSY Left 1/30/2023    Procedure: LITHOTRIPSY, ESWL [facility] [place] ESWL machine, scheduling call [phone] 60 minutes;  Surgeon: Dannie Murray MD;  Location: [facility];  Service: Urology;  Laterality: Left;  next [facility] conf #[id number]    PENILE PROSTHESIS IMPLANT         Home Meds:   Prior to Admission medications    Medication Sig Start Date End Date Taking? Authorizing Provider   ascorbic acid, vitamin C, (VITAMIN C) 250 MG tablet Take 250 mg by mouth once daily.    Provider, Historical   atorvastatin (LIPITOR) 40 MG tablet Take 20 mg by mouth once daily.    Provider, Historical   carbidopa-levodopa  mg (SINEMET)  mg per  tablet TAKE 1 TABLET BY MOUTH THREE TIMES A DAY FOR PARKINSON'S DISE** WITH MEALSASE 10/18/22   Provider, Historical   celecoxib (CELEBREX) 200 MG capsule Take 400 mg by mouth once daily. 1/3/23   Provider, Historical   cetirizine (ZYRTEC) 10 MG tablet Take 1 tablet (10 mg total) by mouth once daily. 6/16/23 7/16/23  Olivia Henderson, LENORE   finasteride (PROSCAR) 5 mg tablet Take 1 tablet (5 mg total) by mouth once daily. 8/30/22 8/25/23  Dannie Murray MD   FLUoxetine 20 MG capsule 60 mg. 11/9/22   Provider, Historical   FLUoxetine 40 MG capsule TAKE ONE CAPSULE BY MOUTH EVERY DAY FOR MENTAL HEALTH 11/15/21   Provider, Historical   fluticasone (FLONASE) 50 mcg/actuation nasal spray 2 sprays (100 mcg total) by Each Nare route once daily. 4/9/18   Viki Saenz MD   folic acid/multivit-min/lutein (CENTRUM SILVER ORAL) Take by mouth.    Provider, Historical   garlic 1,000 mg Cap Take by mouth.    Provider, Historical   GINGER ROOT XT-FENNEL SD XT ORAL Take 550 mg by mouth.    Provider, Historical   glucosam/chond-msm1/C/agueda/bor (GLUCOSAMINE-CHOND-MSM COMPLEX ORAL) Take by mouth.    Provider, Historical   lisinopril (PRINIVIL,ZESTRIL) 5 MG tablet Take 5 mg by mouth once daily.    Provider, Historical   mecobalamin (B12 ACTIVE ORAL) Take by mouth.    Provider, Historical   pantoprazole (PROTONIX) 40 MG tablet Take 1 tablet (40 mg total) by mouth once daily. for 14 days 4/9/23 4/23/23  Joshua Chen III, MD   peg 400-propylene glycol 0.4-0.3 % Drop INSTILL ONE DROP IN EACH EYE FOUR TIMES A DAY AS NEEDED FOR DRY EYES 1/28/21   Provider, Historical   primidone (MYSOLINE) 50 MG Tab Take 2 tablets (100 mg total) by mouth every evening. 3/30/21 1/12/23  Briana Murillo MD   psyllium (METAMUCIL) powder Take 1 packet by mouth 3 (three) times daily.    Provider, Historical   tamsulosin (FLOMAX) 0.4 mg Cp24 Take 1 capsule (0.4 mg total) by mouth nightly. 1/3/18   Jeremy Dao MD   traZODone (DESYREL) 100 MG  tablet Take 1 tablet by mouth nightly as needed. 5/16/22   Provider, Historical   triamcinolone acetonide 0.1% (KENALOG) 0.1 % cream Apply topically 3 (three) times daily. 5/25/20   Km Chicas MD   vitamin E 100 UNIT capsule Take 100 Units by mouth every morning.    Provider, Historical   VOLTAREN 1 % Gel Apply 4 g topically 4 (four) times daily as needed (pain and inflammation).  10/2/17   Provider, Historical     Anticoagulants/Antiplatelets: no anticoagulation    Allergies: Review of patient's allergies indicates:  No Known Allergies  Sedation History:  no adverse reactions    Review of Systems:   Hematological: no known coagulopathies  Respiratory: no shortness of breath  Cardiovascular: no chest pain  Gastrointestinal: no abdominal pain  Genito-Urinary: no dysuria  Musculoskeletal: negative  Neurological: no TIA or stroke symptoms         OBJECTIVE:     Vital Signs (Most Recent)  Temp: 98.8 °F (37.1 °C) (08/14/23 0728)  Pulse: (!) 53 (08/14/23 0728)  Resp: 16 (08/14/23 0728)  BP: (!) 179/80 (08/14/23 0728)  SpO2: 98 % (08/14/23 0728)    Physical Exam:  ASA: 2  Mallampati: per anesthesia    General: no acute distress  Mental Status: alert and oriented to person, place and time  HEENT: normocephalic, atraumatic  Chest: unlabored breathing  Heart: regular heart rate  Abdomen: nondistended  Extremity: moves all extremities    Laboratory  Lab Results   Component Value Date    INR 0.9 08/14/2023       Lab Results   Component Value Date    WBC 6.61 08/14/2023    HGB 13.4 (L) 08/14/2023    HCT 41.2 08/14/2023    MCV 95 08/14/2023     08/14/2023      Lab Results   Component Value Date    GLU 86 08/14/2023     08/14/2023    K 4.2 08/14/2023     08/14/2023    CO2 28 08/14/2023    BUN 17 08/14/2023    CREATININE 1.1 08/14/2023    CALCIUM 8.9 08/14/2023    ALT 10 04/09/2023    AST 9 (L) 04/09/2023    ALBUMIN 3.9 04/09/2023    BILITOT 0.4 04/09/2023    BILIDIR 0.2 05/21/2008       ASSESSMENT/PLAN:  "    Sedation Plan: moderate  Patient will undergo  access for PCNL.    Jeremy Reynolds MD ([facility])  Interventional Radiology          "

## 2023-08-14 NOTE — PROCEDURES
"  Pre Op Diagnosis: Nephrolithiasis  Post Op Diagnosis: Same    Procedure: Nephrosotmy tube placement    Procedure performed by: Gail    Written Informed Consent Obtained: Yes  Specimen Removed: NO  Estimated Blood Loss: Minimal    Findings:   Successful placement of 8 fr nephrostomy tube. Multiple attempts were made to place a wire into the ureter and bladder however all attempts were unsuccessful. Patient to go to OR for possible PCNL.    Patient tolerated procedure well.    Jeremy Reynolds MD ([facility])  Interventional Radiology  [phone]      "

## 2023-08-14 NOTE — OP NOTE
[place] - Surgery ([facility])  [facility]  Operative Note    Date: 08/14/2023    Pre-Op Diagnosis: Left renal stone    Post-Op Diagnosis: Left renal matrix like stone    Procedure(s) Performed:   1. Left ureteroscopy with pyeloscopy  2. Stone basket extraction  3. Cystoscopy  4. Left ureteral stent placement  5. Left nephrostomy tube removal  6. Left retrograde pyelogram    Specimen(s):  Left renal stone fragments for analysis    Staff Surgeon: Dannie Murray MD    Assistant Surgeon: Kirill Rojas MD     Circulator: Sammi Katz RN; Breonna Johnson RN  Scrub Person: Makayla Ramsey     Anesthesia: General endotracheal anesthesia    Indications: Brandan Werner is a 77 y.o. male with a left partial staghorn lower pole stone presenting for definitive stone management. He is not pre-stented. He underwent left nephrostomy tube placement by IR today, who was unable to advance a wire through the UPJ. We will proceed with left ureteroscopy, pyeloscopy, possible laser lithotripsy with staged same day PCNL.    Findings:  1. Stone radiolucent on  film.  2. Soft, matrix stone present within renal pelvis and lower pole; basket extracted all visible fragments  3. Left nephrostomy tube removed under direct vision  4. Left ureteral stent placement  5. Large prostate with significant intravesical component making it difficult to identify ureteral orifices  6. Left retrograde pyelogram with no filling defects. All calyces visualized and confirmed with fluoroscopy.    Estimated Blood Loss: Minimal    Drains:   1. Left 6 Fr x 26 cm JJ ureteral stent without strings    Procedure in detail:  After risks, benefits, and possible complications were explained, the patient elected to undergo the procedure and informed consent was obtained. All questions were answered in the gualberto-operative area. The patient was transferred to the cystoscopy suite and placed in the supine position. SCDs were applied and working.  Anesthesia was administered. The patient was then placed in the dorsal lithotomy position and prepped and draped in the usual sterile fashion. Time out was performed, and gualberto-procedural antibiotics were confirmed.     The stone was radiolucent on  film and the left nephrostomy tube was present within the left kidney pelvis. A rigid cystoscope in a 22 Fr sheath was introduced into the patient's urethra. This passed easily. The entire urethra was visualized which showed no strictures or masses. The patient has an enlarged prostate with a significant intravesical component, making identification of the bilateral ureteral orifices challenging. Cystoscopy revealed the ureteral orifices in the normal anatomic location bilaterally, more medial than anticipated. There were no bladder tumors, no bladder stones and multiple small diverticula seen.    An angled glide wire was passed up the left ureteral orifice using a 5 Fr open-tipped ureteral catheter as a buttress and up into the kidney. This passed easily and placement was confirmed fluoroscopically. The cystoscope was removed, keeping the wire in place.    A dual-lumen ureteral catheter was passed over the glide wire. A retrograde pyelogram was then performed via the second working channel. This showed contrast passing into the kidney, and confirmed hydronephrosis with a filling defect at the UPJ. The glide wire was removed and replaced with a super stiff and motion wire via each working channel. The dual lumen ureteral catheter was then removed, leaving both wires in place.    A 12/14 Fr 45 cm ureteral access sheath was advanced over the super stiff wire to the level of the renal pelvis under continuous fluoroscopy. This passed easily with minimal resistance. The inner dilator and wire were removed, keeping the outer sheath in place. An 8 Fr flexible ureteroscope was then advanced through the access sheath and into the kidney.    Formal pyeloscopy was performed and  we immediately encountered soft, matrix stone within the renal pelvis and lower pole. An Ncircle basket was inserted per the scope. Stone matrix fragments were then removed and passed off the sterile field for analysis. Systematic pyeloscopy was repeated and no additional stone fragments were visualized. Retrograde pyelogram was repeated with via the working channel of the flexible ureteroscope to confirm that all available calyces were inspected upon repeat pyeloscopy. The flexible ureteroscope and ureteral access sheath were removed, keeping the motion wire in place.    The cystoscope was backloaded over the wire and advanced into the bladder. We then passed a 6 Fr x 26 cm JJ ureteral stent without strings over the wire to the level of the renal pelvis under direct vision as well as flouroscopy. The wire was removed. A 180 degree coil was observed in the renal pelvis as well as the bladder using fluoroscopy. A 180 degree coil was also seen using direct visualization in the bladder.    The bladder was drained and the cystoscope removed. The patient tolerated the procedure well and was transferred to the recovery room in stable condition.    Disposition:  The patient will be discharged home from PACU. He follow up with Dr. Murray  in 2 weeks with a CT renal stone study prior. Ureteral stent will be removed in clinic at that time.    Kirill Rojas MD     I was present and scrubbed for the entirety of the procedure.  I agree with the operative note and have edited as needed.    Dannie Murray MD

## 2023-08-14 NOTE — INTERVAL H&P NOTE
The patient has been examined and the H&P has been reviewed:    I concur with the findings and no changes have occurred since H&P was written.    Anesthesia/Surgery risks, benefits and alternative options discussed and understood by patient/family.    Problem Noted   Kidney Stones 5/20/2020    Left 1.5 cm UPJ and non obstructing left lower pole greater than 1 cm on CTU 11/2022 with moderate hydronephrosis     Unable to place left stent 11/17/22 due to large prostate, prosthesis and unable to find ureteral orifice.   Left PCN 11/18/22, internalized to stent 11/28  Left ESWL UPJ stone 12/2/22  KUB after with 1.8 cm left lower pole stone consistent with lower pole stone on CT urogram, no renal pelvis/gualberto stent stone seen.  Left ESWL 1/30/23 good fragmentation  KUB shows continued LLP stone  CT 5/23: left enlarging LP stone 2.3 cm mild pelviectasis, small stone fragment at UPJ     Elevated Psa 3/11/2013    Biopsy 2013 ASAP 1 core, 2014 benign  PSA 8/22--8.2  Volume 183, PSAD 0.04       Benign Prostatic Hyperplasia     183 gm prostate on CTU 2022  Initial AUA SS (8/2022): 19/2  PVR 67     History of Penile Implant 5/20/2020     OR for left PCNL, indicated procedures.  The risks, benefits, alteratives of the procedure were discussed with the patient.  The patient was given time for questions, all questions were answered.  Written, informed consent was obtained.      Dannie Murray MD

## 2023-08-14 NOTE — PATIENT INSTRUCTIONS
Post Ureteroscopy Instructions  Do not strain to have a bowel movement  No strenuous exercise x 7 days  No driving while you are on narcotic pain medications or if you have a Dior catheter    You can expect:  To pass stone fragments if you had a stone procedure  Have pain when you void from your stent if you have a stent in place  See blood in your urine if you have a stent in place    If you have a catheter, please return to the ER if your catheter stops draining or you are having abdominal pain.    Call the doctor if:  Temperature is greater than 101F  Persistent vomiting and inability to keep food down  Inability to void if you do not have a catheter    You will return to clinic in approximately 2 weeks for stent removal. You should have a CT scan before coming to clinic.     Take Flomax (Tamsulosin) 0.4 mg once per day while your stent is in place.  You may take pyridium 200 mg up to 3 times per day for bladder irritation from the stent.    If you have any questions or concerns during normal business hours, please call the urology clinic of your surgery.  If you are having an emergency after 5 pm or weekends, you may call [facility] ask them to page the urologist on call.

## 2023-08-14 NOTE — TRANSFER OF CARE
Anesthesia Transfer of Care Note    Patient: Brandan Werner    Procedure(s) Performed: Procedure(s) (LRB):  CYSTOSCOPY, LEFT RETROGRADE PYELOGRAM, LEFT URETEROSCOPY WITH STONE BASKET EXTRACTION, PLACEMENT OF JJ STENT, LEFT NEPHROSTOMY TUBE REMOVAL (Left)    Patient location: PACU    Anesthesia Type: general    Transport from OR: Transported from OR on 6-10 L/min O2 by face mask with adequate spontaneous ventilation    Post pain: adequate analgesia    Post assessment: no apparent anesthetic complications and tolerated procedure well    Post vital signs: stable    Level of consciousness: awake and responds to stimulation    Nausea/Vomiting: no nausea/vomiting    Complications: none    Transfer of care protocol was followed      Last vitals:   Visit Vitals  /76   Pulse (!) 50   Temp 36.7 °C (98 °F)   Resp 14   Ht 6' (1.829 m)   Wt 90.7 kg (200 lb)   SpO2 (!) 93%   BMI 27.12 kg/m²

## 2023-08-14 NOTE — ANESTHESIA PREPROCEDURE EVALUATION
[facility]  Anesthesia Pre-Operative Evaluation         Patient Name: Brandan Werner  YOB: 1945  MRN: [id number]    SUBJECTIVE:     Pre-operative evaluation for Procedure(s) (LRB):  NEPHROLITHOTOMY, PERCUTANEOUS (Left)     08/14/2023    Brandan Werner is a 77 y.o. male w/ a significant PMHx of HTN, HLD, depression, Parkinson's dz, and kidney stone who presents for the above procedure.      LDA: None documented.    Prev airway: None documented.     Drips: None documented.    Patient Active Problem List   Diagnosis    Essential hypertension    Hyperlipidemia    Benign prostatic hyperplasia    Elevated PSA    Nuclear sclerosis    PTSD (post-traumatic stress disorder)    Shoulder arthritis    Ectatic aorta    Sensorineural hearing loss (SNHL) of both ears    Calcified granuloma of lung    Tremor    Chronic right shoulder pain    History of agent Orange exposure    Overweight (BMI 25.0-29.9)    Facet arthritis of cervical region    Kidney stones    Atherosclerosis of aorta    History of penile implant    Major depressive disorder    Adjustment disorder with depressed mood    Anemia    History of fall    Imbalance    Parkinson's disease       Review of patient's allergies indicates:  No Known Allergies    Current Inpatient Medications:   LIDOcaine (PF) 10 mg/ml (1%)  1 mL Intradermal Once       No current facility-administered medications on file prior to encounter.     Current Outpatient Medications on File Prior to Encounter   Medication Sig Dispense Refill    ascorbic acid, vitamin C, (VITAMIN C) 250 MG tablet Take 250 mg by mouth once daily.      atorvastatin (LIPITOR) 40 MG tablet Take 20 mg by mouth once daily.      carbidopa-levodopa  mg (SINEMET)  mg per tablet TAKE 1 TABLET BY MOUTH THREE TIMES A DAY FOR PARKINSON'S DISE** WITH MEALSASE      celecoxib (CELEBREX) 200 MG capsule Take 400 mg by mouth once daily.      finasteride (PROSCAR) 5 mg  tablet Take 1 tablet (5 mg total) by mouth once daily. 30 tablet 11    FLUoxetine 20 MG capsule 60 mg.      FLUoxetine 40 MG capsule TAKE ONE CAPSULE BY MOUTH EVERY DAY FOR MENTAL HEALTH      fluticasone (FLONASE) 50 mcg/actuation nasal spray 2 sprays (100 mcg total) by Each Nare route once daily. 1 Bottle 0    folic acid/multivit-min/lutein (CENTRUM SILVER ORAL) Take by mouth.      garlic 1,000 mg Cap Take by mouth.      GINGER ROOT XT-FENNEL SD XT ORAL Take 550 mg by mouth.      glucosam/chond-msm1/C/agueda/bor (GLUCOSAMINE-CHOND-MSM COMPLEX ORAL) Take by mouth.      lisinopril (PRINIVIL,ZESTRIL) 5 MG tablet Take 5 mg by mouth once daily.      mecobalamin (B12 ACTIVE ORAL) Take by mouth.      pantoprazole (PROTONIX) 40 MG tablet Take 1 tablet (40 mg total) by mouth once daily. for 14 days 14 tablet 0    peg 400-propylene glycol 0.4-0.3 % Drop INSTILL ONE DROP IN EACH EYE FOUR TIMES A DAY AS NEEDED FOR DRY EYES      primidone (MYSOLINE) 50 MG Tab Take 2 tablets (100 mg total) by mouth every evening. 60 tablet 11    psyllium (METAMUCIL) powder Take 1 packet by mouth 3 (three) times daily.      tamsulosin (FLOMAX) 0.4 mg Cp24 Take 1 capsule (0.4 mg total) by mouth nightly. 30 capsule 11    traZODone (DESYREL) 100 MG tablet Take 1 tablet by mouth nightly as needed.      triamcinolone acetonide 0.1% (KENALOG) 0.1 % cream Apply topically 3 (three) times daily. 45 g 0    vitamin E 100 UNIT capsule Take 100 Units by mouth every morning.      VOLTAREN 1 % Gel Apply 4 g topically 4 (four) times daily as needed (pain and inflammation).          Past Surgical History:   Procedure Laterality Date    COLONOSCOPY N/A 11/26/2018    Procedure: COLONOSCOPY;  Surgeon: Germán Moss MD;  Location: [address]);  Service: Endoscopy;  Laterality: N/A;    COLONOSCOPY W/ BIOPSIES  2010    CYSTOSCOPY Left 11/17/2022    Procedure: CYSTOSCOPY;  Surgeon: Dannie Murray MD;  Location: [facility];  Service:  Urology;  Laterality: Left;    EXTRACORPOREAL SHOCK WAVE LITHOTRIPSY Left 2022    Procedure: LITHOTRIPSY, ESWL [facility] [place] ESWL machine, scheduling call [phone] 60 minutes;  Surgeon: Dannie Murray MD;  Location: [facility] OR;  Service: Urology;  Laterality: Left;  Confirmed with [facility]  [facility] conf # [id number]    EXTRACORPOREAL SHOCK WAVE LITHOTRIPSY Left 2023    Procedure: LITHOTRIPSY, ESWL [facility] [place] ESWL machine, scheduling call [phone] 60 minutes;  Surgeon: Dannie Murray MD;  Location: [facility] OR;  Service: Urology;  Laterality: Left;  [facility] conf #[id number]    PENILE PROSTHESIS IMPLANT         Social History     Socioeconomic History    Marital status:    Occupational History    Occupation:      Comment: self-employed   Tobacco Use    Smoking status: Former     Current packs/day: 0.00     Average packs/day: 1 pack/day for 10.0 years (10.0 ttl pk-yrs)     Types: Cigarettes     Start date:      Quit date:      Years since quittin.6    Smokeless tobacco: Never   Substance and Sexual Activity    Alcohol use: No    Drug use: No    Sexual activity: Yes     Partners: Female     Social Determinants of Health     Financial Resource Strain: Low Risk  (2022)    Overall Financial Resource Strain (CARDIA)     Difficulty of Paying Living Expenses: Not hard at all   Food Insecurity: No Food Insecurity (2022)    Hunger Vital Sign     Worried About Running Out of Food in the Last Year: Never true     Ran Out of Food in the Last Year: Never true   Transportation Needs: No Transportation Needs (2022)    PRAPARE - Transportation     Lack of Transportation (Medical): No     Lack of Transportation (Non-Medical): No   Physical Activity: Unknown (2022)    Exercise Vital Sign     Days of Exercise per Week: Patient refused     Minutes of Exercise per Session: Patient refused   Stress: Unknown (2022)    [place] citizen  [place] [employer] - Occupational Stress Questionnaire     Feeling of Stress : Patient refused   Social Connections: Unknown (11/18/2022)    Social Connection and Isolation Panel [NHANES]     Frequency of Communication with Friends and Family: More than three times a week     Frequency of Social Gatherings with Friends and Family: More than three times a week     Attends Evangelical Services: Patient refused     Active Member of Clubs or Organizations: Patient refused     Attends Club or Organization Meetings: Patient refused     Marital Status:    Housing Stability: Unknown (11/18/2022)    Housing Stability Vital Sign     Unable to Pay for Housing in the Last Year: No     Unstable Housing in the Last Year: No       OBJECTIVE:     Vital Signs Range (Last 24H):  Temp:  [37.1 °C (98.8 °F)]   Pulse:  [53]   Resp:  [16]   BP: (179)/(80)   SpO2:  [98 %]       CBC:   Recent Labs     08/14/23  0707   WBC 6.61   RBC 4.35*   HGB 13.4*   HCT 41.2      MCV 95   MCH 30.8   MCHC 32.5       CMP:   Recent Labs     08/14/23  0707      K 4.2      CO2 28   BUN 17   CREATININE 1.1   GLU 86   CALCIUM 8.9       INR:  Recent Labs     08/14/23  0707   INR 0.9       Diagnostic Studies: No relevant studies.    EKG: No recent studies available.    2D ECHO:  No results found for this or any previous visit.      ASSESSMENT/PLAN:           Pre-op Assessment    I have reviewed the Patient Summary Reports.    I have reviewed the NPO Status.   I have reviewed the Medications.     Review of Systems  Anesthesia Hx:  No problems with previous Anesthesia    Hematology/Oncology:  Hematology Normal   Oncology Normal     EENT/Dental:EENT/Dental Normal   Cardiovascular:   Hypertension    Pulmonary:  Pulmonary Normal    Renal/:   Chronic Renal Disease    Hepatic/GI:  Hepatic/GI Normal    Neurological:  Neurology Normal    Endocrine:  Endocrine Normal    Psych:   Psychiatric History          Physical  Exam  General: Well nourished, Alert and Cooperative    Airway:  Mallampati: III / II  Mouth Opening: Normal  TM Distance: Normal  Tongue: Normal  Neck ROM: Normal ROM    Chest/Lungs:  Normal Respiratory Rate    Heart:  Rate: Normal        Anesthesia Plan  Type of Anesthesia, risks & benefits discussed:    Anesthesia Type: Gen ETT  Intra-op Monitoring Plan: Standard ASA Monitors  Post Op Pain Control Plan: multimodal analgesia  Induction:  IV  Airway Plan: Direct and Video, Post-Induction  Informed Consent: Informed consent signed with the Patient and all parties understand the risks and agree with anesthesia plan.  All questions answered.   ASA Score: 3  Day of Surgery Review of History & Physical: H&P Update referred to the surgeon/provider.    Ready For Surgery From Anesthesia Perspective.     .

## 2023-08-17 ENCOUNTER — PES CALL (OUTPATIENT)
Dept: ADMINISTRATIVE | Facility: CLINIC | Age: 78
End: 2023-08-17
Payer: OTHER GOVERNMENT

## 2023-08-21 LAB
COMPN STONE: NORMAL
LABORATORY COMMENT REPORT: NORMAL
SPECIMEN SOURCE: NORMAL
STONE ANALYSIS IR-IMP: NORMAL

## 2023-08-22 ENCOUNTER — LAB VISIT (OUTPATIENT)
Dept: LAB | Facility: HOSPITAL | Age: 78
End: 2023-08-22
Attending: INTERNAL MEDICINE
Payer: MEDICARE

## 2023-08-22 DIAGNOSIS — N40.0 BENIGN PROSTATIC HYPERPLASIA, UNSPECIFIED WHETHER LOWER URINARY TRACT SYMPTOMS PRESENT: ICD-10-CM

## 2023-08-22 DIAGNOSIS — E78.49 OTHER HYPERLIPIDEMIA: ICD-10-CM

## 2023-08-22 DIAGNOSIS — I10 ESSENTIAL HYPERTENSION: ICD-10-CM

## 2023-08-22 LAB
ALBUMIN SERPL BCP-MCNC: 3.7 G/DL (ref 3.5–5.2)
ALP SERPL-CCNC: 45 U/L (ref 55–135)
ALT SERPL W/O P-5'-P-CCNC: 5 U/L (ref 10–44)
ANION GAP SERPL CALC-SCNC: 9 MMOL/L (ref 8–16)
AST SERPL-CCNC: 9 U/L (ref 10–40)
BASOPHILS # BLD AUTO: 0.04 K/UL (ref 0–0.2)
BASOPHILS NFR BLD: 0.6 % (ref 0–1.9)
BILIRUB SERPL-MCNC: 0.5 MG/DL (ref 0.1–1)
BUN SERPL-MCNC: 19 MG/DL (ref 8–23)
CALCIUM SERPL-MCNC: 9 MG/DL (ref 8.7–10.5)
CHLORIDE SERPL-SCNC: 105 MMOL/L (ref 95–110)
CO2 SERPL-SCNC: 24 MMOL/L (ref 23–29)
COMPLEXED PSA SERPL-MCNC: 10.5 NG/ML (ref 0–4)
CREAT SERPL-MCNC: 1.1 MG/DL (ref 0.5–1.4)
DIFFERENTIAL METHOD: ABNORMAL
EOSINOPHIL # BLD AUTO: 0.2 K/UL (ref 0–0.5)
EOSINOPHIL NFR BLD: 3.3 % (ref 0–8)
ERYTHROCYTE [DISTWIDTH] IN BLOOD BY AUTOMATED COUNT: 13.1 % (ref 11.5–14.5)
EST. GFR  (NO RACE VARIABLE): >60 ML/MIN/1.73 M^2
GLUCOSE SERPL-MCNC: 117 MG/DL (ref 70–110)
HCT VFR BLD AUTO: 42.2 % (ref 40–54)
HGB BLD-MCNC: 13.6 G/DL (ref 14–18)
IMM GRANULOCYTES # BLD AUTO: 0.03 K/UL (ref 0–0.04)
IMM GRANULOCYTES NFR BLD AUTO: 0.5 % (ref 0–0.5)
LYMPHOCYTES # BLD AUTO: 1.6 K/UL (ref 1–4.8)
LYMPHOCYTES NFR BLD: 24.4 % (ref 18–48)
MCH RBC QN AUTO: 31.2 PG (ref 27–31)
MCHC RBC AUTO-ENTMCNC: 32.2 G/DL (ref 32–36)
MCV RBC AUTO: 97 FL (ref 82–98)
MONOCYTES # BLD AUTO: 0.4 K/UL (ref 0.3–1)
MONOCYTES NFR BLD: 5.4 % (ref 4–15)
NEUTROPHILS # BLD AUTO: 4.2 K/UL (ref 1.8–7.7)
NEUTROPHILS NFR BLD: 65.8 % (ref 38–73)
NRBC BLD-RTO: 0 /100 WBC
PLATELET # BLD AUTO: 219 K/UL (ref 150–450)
PMV BLD AUTO: 9.8 FL (ref 9.2–12.9)
POTASSIUM SERPL-SCNC: 4.3 MMOL/L (ref 3.5–5.1)
PROT SERPL-MCNC: 6.4 G/DL (ref 6–8.4)
RBC # BLD AUTO: 4.36 M/UL (ref 4.6–6.2)
SODIUM SERPL-SCNC: 138 MMOL/L (ref 136–145)
WBC # BLD AUTO: 6.43 K/UL (ref 3.9–12.7)

## 2023-08-22 PROCEDURE — 84153 ASSAY OF PSA TOTAL: CPT | Performed by: INTERNAL MEDICINE

## 2023-08-22 PROCEDURE — 80053 COMPREHEN METABOLIC PANEL: CPT | Performed by: INTERNAL MEDICINE

## 2023-08-22 PROCEDURE — 36415 COLL VENOUS BLD VENIPUNCTURE: CPT | Performed by: INTERNAL MEDICINE

## 2023-08-22 PROCEDURE — 85025 COMPLETE CBC W/AUTO DIFF WBC: CPT | Performed by: INTERNAL MEDICINE

## 2023-08-28 ENCOUNTER — OFFICE VISIT (OUTPATIENT)
Dept: INTERNAL MEDICINE | Facility: CLINIC | Age: 78
End: 2023-08-28
Payer: MEDICARE

## 2023-08-28 ENCOUNTER — HOSPITAL ENCOUNTER (OUTPATIENT)
Dept: RADIOLOGY | Facility: HOSPITAL | Age: 78
Discharge: HOME | End: 2023-08-28
Attending: UROLOGY
Payer: OTHER GOVERNMENT

## 2023-08-28 VITALS
WEIGHT: 207.25 LBS | TEMPERATURE: 97 F | SYSTOLIC BLOOD PRESSURE: 102 MMHG | BODY MASS INDEX: 28.07 KG/M2 | HEART RATE: 68 BPM | RESPIRATION RATE: 16 BRPM | HEIGHT: 72 IN | DIASTOLIC BLOOD PRESSURE: 70 MMHG

## 2023-08-28 DIAGNOSIS — N20.0 KIDNEY STONES: ICD-10-CM

## 2023-08-28 DIAGNOSIS — N40.0 BENIGN PROSTATIC HYPERPLASIA, UNSPECIFIED WHETHER LOWER URINARY TRACT SYMPTOMS PRESENT: ICD-10-CM

## 2023-08-28 DIAGNOSIS — J84.10 CALCIFIED GRANULOMA OF LUNG: ICD-10-CM

## 2023-08-28 DIAGNOSIS — Z01.818 PREOP EXAM FOR INTERNAL MEDICINE: Primary | ICD-10-CM

## 2023-08-28 DIAGNOSIS — E78.49 OTHER HYPERLIPIDEMIA: ICD-10-CM

## 2023-08-28 DIAGNOSIS — R94.31 ABNORMAL EKG: ICD-10-CM

## 2023-08-28 DIAGNOSIS — F33.42 MAJOR DEPRESSIVE DISORDER, RECURRENT, IN FULL REMISSION: ICD-10-CM

## 2023-08-28 DIAGNOSIS — M19.019 SHOULDER ARTHRITIS: ICD-10-CM

## 2023-08-28 DIAGNOSIS — I10 ESSENTIAL HYPERTENSION: ICD-10-CM

## 2023-08-28 PROCEDURE — 74176 CT ABDOMEN PELVIS WITHOUT CONTRAST: ICD-10-PCS | Mod: 26,,, | Performed by: RADIOLOGY

## 2023-08-28 PROCEDURE — 99999 PR PBB SHADOW E&M-EST. PATIENT-LVL V: CPT | Mod: PBBFAC,HCNC,, | Performed by: INTERNAL MEDICINE

## 2023-08-28 PROCEDURE — 74176 CT ABD & PELVIS W/O CONTRAST: CPT | Mod: 26,,, | Performed by: RADIOLOGY

## 2023-08-28 PROCEDURE — 99215 OFFICE O/P EST HI 40 MIN: CPT | Mod: HCNC,S$GLB,, | Performed by: INTERNAL MEDICINE

## 2023-08-28 PROCEDURE — 99215 OFFICE O/P EST HI 40 MIN: CPT | Mod: PBBFAC,PO,25 | Performed by: INTERNAL MEDICINE

## 2023-08-28 PROCEDURE — 74176 CT ABD & PELVIS W/O CONTRAST: CPT | Mod: TC

## 2023-08-28 PROCEDURE — 99215 PR OFFICE/OUTPT VISIT, EST, LEVL V, 40-54 MIN: ICD-10-PCS | Mod: HCNC,S$GLB,, | Performed by: INTERNAL MEDICINE

## 2023-08-28 PROCEDURE — 99999 PR PBB SHADOW E&M-EST. PATIENT-LVL V: ICD-10-PCS | Mod: PBBFAC,HCNC,, | Performed by: INTERNAL MEDICINE

## 2023-08-28 NOTE — PROGRESS NOTES
Subjective:       Patient ID: Brandan Werner is a 78 y.o. male.    Chief Complaint: Hypertension (6 mos w/labs  has shoulder / right  surgery sched approx 9/14 at [facility]. Need preop? )    HPI  The patient presents for preop medical evaluation prior to shoulder surgery planned for 09/14/2023 by his orthopedic specialist Dr. Gabe Segura.  The patient does not have a history of anesthesia allergies or complications.  Past surgeries have included penile implant surgery, left extracorporeal shockwave lithotripsy for kidney stones.  The patient also has had colonoscopy and cystoscopy without complications.  Active medical conditions also include hypertension, hyperlipidemia, and Parkinson's disease.  There is no history of diabetes mellitus, seizure disorder, or coronary artery disease.  The patient was noted to have abnormal EKG on recent testing.  He denies having any exertional chest pain or dyspnea.  He feels his exercise tolerance has remained stable.  He is able to climb stairs without dyspnea or chest discomfort.    Review of Systems   Constitutional:  Negative for activity change, appetite change, fatigue and unexpected weight change.   HENT:  Negative for sinus pressure/congestion and sore throat.    Eyes:  Negative for visual disturbance.   Respiratory:  Negative for cough, chest tightness, shortness of breath and wheezing.    Cardiovascular:  Negative for chest pain, palpitations and leg swelling.   Gastrointestinal:  Negative for abdominal pain, blood in stool, nausea and vomiting.   Genitourinary:  Negative for dysuria, hematuria and urgency.   Musculoskeletal:  Positive for arthralgias, back pain and gait problem. Negative for joint swelling, myalgias and neck stiffness.   Integumentary:  Negative for color change and rash.   Neurological:  Negative for dizziness, syncope, weakness, light-headedness, numbness and headaches.   Psychiatric/Behavioral:  Negative for sleep disturbance.              Physical Exam  Vitals and nursing note reviewed.   Constitutional:       General: He is not in acute distress.     Appearance: Normal appearance. He is well-developed.   HENT:      Head: Normocephalic and atraumatic.   Eyes:      General: No scleral icterus.     Extraocular Movements: Extraocular movements intact.      Conjunctiva/sclera: Conjunctivae normal.   Neck:      Thyroid: No thyromegaly.      Vascular: No JVD.   Cardiovascular:      Rate and Rhythm: Normal rate and regular rhythm.      Heart sounds: Normal heart sounds. No murmur heard.     No friction rub. No gallop.   Pulmonary:      Effort: Pulmonary effort is normal. No respiratory distress.      Breath sounds: Normal breath sounds. No wheezing or rales.   Abdominal:      General: Bowel sounds are normal.      Palpations: Abdomen is soft. There is no mass.      Tenderness: There is no abdominal tenderness.   Musculoskeletal:         General: No tenderness.      Cervical back: Normal range of motion and neck supple.      Right lower leg: No edema.      Left lower leg: No edema.      Comments: Right shoulder:  The patient is unable to abduct or rotate the shoulder joint.    Left shoulder:  Range of motion is intact.   Lymphadenopathy:      Cervical: No cervical adenopathy.   Skin:     General: Skin is warm and dry.      Findings: No rash.   Neurological:      Mental Status: He is alert and oriented to person, place, and time.      Comments: Gait is normal.  Bilateral hand tremors are present.   Psychiatric:         Mood and Affect: Mood normal.         Behavior: Behavior normal.         Thought Content: Thought content normal.         Judgment: Judgment normal.           Lab Visit on 08/22/2023   Component Date Value Ref Range Status    Sodium 08/22/2023 138  136 - 145 mmol/L Final    Potassium 08/22/2023 4.3  3.5 - 5.1 mmol/L Final    Chloride 08/22/2023 105  95 - 110 mmol/L Final    CO2 08/22/2023 24  23 - 29 mmol/L Final    Glucose 08/22/2023 117 (H)   70 - 110 mg/dL Final    BUN 08/22/2023 19  8 - 23 mg/dL Final    Creatinine 08/22/2023 1.1  0.5 - 1.4 mg/dL Final    Calcium 08/22/2023 9.0  8.7 - 10.5 mg/dL Final    Total Protein 08/22/2023 6.4  6.0 - 8.4 g/dL Final    Albumin 08/22/2023 3.7  3.5 - 5.2 g/dL Final    Total Bilirubin 08/22/2023 0.5  0.1 - 1.0 mg/dL Final    Comment: For infants and newborns, interpretation of results should be based  on gestational age, weight and in agreement with clinical  observations.    Premature Infant recommended reference ranges:  Up to 24 hours.............<8.0 mg/dL  Up to 48 hours............<12.0 mg/dL  3-5 days..................<15.0 mg/dL  6-29 days.................<15.0 mg/dL      Alkaline Phosphatase 08/22/2023 45 (L)  55 - 135 U/L Final    AST 08/22/2023 9 (L)  10 - 40 U/L Final    ALT 08/22/2023 5 (L)  10 - 44 U/L Final    eGFR 08/22/2023 >60  >60 mL/min/1.73 m^2 Final    Anion Gap 08/22/2023 9  8 - 16 mmol/L Final    WBC 08/22/2023 6.43  3.90 - 12.70 K/uL Final    RBC 08/22/2023 4.36 (L)  4.60 - 6.20 M/uL Final    Hemoglobin 08/22/2023 13.6 (L)  14.0 - 18.0 g/dL Final    Hematocrit 08/22/2023 42.2  40.0 - 54.0 % Final    MCV 08/22/2023 97  82 - 98 fL Final    MCH 08/22/2023 31.2 (H)  27.0 - 31.0 pg Final    MCHC 08/22/2023 32.2  32.0 - 36.0 g/dL Final    RDW 08/22/2023 13.1  11.5 - 14.5 % Final    Platelets 08/22/2023 219  150 - 450 K/uL Final    MPV 08/22/2023 9.8  9.2 - 12.9 fL Final    Immature Granulocytes 08/22/2023 0.5  0.0 - 0.5 % Final    Gran # (ANC) 08/22/2023 4.2  1.8 - 7.7 K/uL Final    Immature Grans (Abs) 08/22/2023 0.03  0.00 - 0.04 K/uL Final    Comment: Mild elevation in immature granulocytes is non specific and   can be seen in a variety of conditions including stress response,   acute inflammation, trauma and pregnancy. Correlation with other   laboratory and clinical findings is essential.      Lymph # 08/22/2023 1.6  1.0 - 4.8 K/uL Final    Mono # 08/22/2023 0.4  0.3 - 1.0 K/uL Final    Eos #  08/22/2023 0.2  0.0 - 0.5 K/uL Final    Baso # 08/22/2023 0.04  0.00 - 0.20 K/uL Final    nRBC 08/22/2023 0  0 /100 WBC Final    Gran % 08/22/2023 65.8  38.0 - 73.0 % Final    Lymph % 08/22/2023 24.4  18.0 - 48.0 % Final    Mono % 08/22/2023 5.4  4.0 - 15.0 % Final    Eosinophil % 08/22/2023 3.3  0.0 - 8.0 % Final    Basophil % 08/22/2023 0.6  0.0 - 1.9 % Final    Differential Method 08/22/2023 Automated   Final    PSA Diagnostic 08/22/2023 10.5 (H)  0.00 - 4.00 ng/mL Final    Comment: The testing method is a chemiluminescent microparticle immunoassay   manufactured by [employer] and performed on the [employer]   or   [employer] system. Values obtained with different assay manufacturers   for   methods may be different and cannot be used interchangeably.  PSA Expected levels:  Hormonal Therapy: <0.05 ng/ml  Prostatectomy: <0.01 ng/ml  Radiation Therapy: <1.00 ng/ml       Results for orders placed or performed in visit on 08/10/23   EKG 12-lead    Collection Time: 08/10/23 12:53 PM    Narrative    Test Reason : Z01.818,    Vent. Rate : 059 BPM     Atrial Rate : 059 BPM     P-R Int : 164 ms          QRS Dur : 108 ms      QT Int : 442 ms       P-R-T Axes : 041 004 025 degrees     QTc Int : 437 ms    Sinus bradycardia with Premature supraventricular complexes  Incomplete right bundle branch block  Cannot rule out Anterior infarct ,age undetermined  Abnormal ECG  When compared with ECG of 17-NOV-2022 12:14,  Premature supraventricular complexes are now Present  Minimal criteria for Anterior infarct are now Present  Confirmed by Shoaib BROOKE MD, Nirmal FLAHERTY ([id number]) on 8/10/2023 4:02:50 PM    Referred By: LAI GONZALEZ           Confirmed By:Nirmal Cramer III, MD      Assessment & Plan:      Brandan was seen today for hypertension and preop medical evaluation.  A pharmacologic stress ECHO will be obtained as part of his preop assessment particularly in view of recent EKG changes.  Blood test results are within  acceptable range.  He has been counseled regarding the likely presence of prediabetes.  Exercise and weight loss have been recommended his primary therapy.    Current therapy will be continued for management of hypertension, Parkinson's disease, depression, and hyperlipidemia.    Diagnoses and all orders for this visit:    Preop exam for internal medicine  -     Stress Echo Which stress agent will be used? Pharmacological; Color Flow Doppler? No; Future    Shoulder arthritis    Essential hypertension    Other hyperlipidemia    Major depressive disorder, recurrent, in full remission    Calcified granuloma of lung    Benign prostatic hyperplasia, unspecified whether lower urinary tract symptoms present    Kidney stones    Abnormal EKG  -     Stress Echo Which stress agent will be used? Pharmacological; Color Flow Doppler? No; Future    Parkinson's disease      Follow up in about 4 months (around 12/28/2023).     Km Chicas MD

## 2023-09-06 ENCOUNTER — PATIENT MESSAGE (OUTPATIENT)
Dept: ANESTHESIOLOGY | Facility: HOSPITAL | Age: 78
End: 2023-09-06
Payer: OTHER GOVERNMENT

## 2023-09-06 ENCOUNTER — ANESTHESIA EVENT (OUTPATIENT)
Dept: SURGERY | Facility: HOSPITAL | Age: 78
End: 2023-09-06
Payer: OTHER GOVERNMENT

## 2023-09-06 ENCOUNTER — HOSPITAL ENCOUNTER (OUTPATIENT)
Dept: PREADMISSION TESTING | Facility: HOSPITAL | Age: 78
Discharge: HOME | End: 2023-09-06
Attending: NURSE PRACTITIONER
Payer: MEDICARE

## 2023-09-06 NOTE — ANESTHESIA PREPROCEDURE EVALUATION
"                                                                                                             09/06/2023  Brandan Werner is a 78 y.o., male scheduled for ARTHROPLASTY, SHOULDER (Right: Shoulder) 9/14/23.    Per internal medicine Dr. Chicas, "    Past Medical History:   Diagnosis Date    Anxiety     BPH (benign prostatic hyperplasia)     Cataract     Elevated PSA     Erectile dysfunction     Hyperlipidemia     Hypertension     Hypogonadism male     Kidney stone 5/20/2020    Obesity     PTSD (post-traumatic stress disorder)     Shoulder arthritis     Urinary tract infection      Past Surgical History:   Procedure Laterality Date    COLONOSCOPY N/A 11/26/2018    Procedure: COLONOSCOPY;  Surgeon: Germán Moss MD;  Location: [address]);  Service: Endoscopy;  Laterality: N/A;    COLONOSCOPY W/ BIOPSIES  2010    CYSTOSCOPY Left 11/17/2022    Procedure: CYSTOSCOPY;  Surgeon: Dannie Murray MD;  Location: [facility];  Service: Urology;  Laterality: Left;    EXTRACORPOREAL SHOCK WAVE LITHOTRIPSY Left 12/2/2022    Procedure: LITHOTRIPSY, ESWL [employer] [place] ESWL machine, scheduling call [phone] 60 minutes;  Surgeon: Dannie Murray MD;  Location: [facility];  Service: Urology;  Laterality: Left;  Confirmed with nexmed 11/22 [facility] conf # [id number]    EXTRACORPOREAL SHOCK WAVE LITHOTRIPSY Left 1/30/2023    Procedure: LITHOTRIPSY, ESWL [employer] [place] ESWL machine, scheduling call [phone] 60 minutes;  Surgeon: Dannie Murray MD;  Location: [facility] OR;  Service: Urology;  Laterality: Left;  [facility] conf #[id number]    PENILE PROSTHESIS IMPLANT      PERCUTANEOUS NEPHROLITHOTOMY Left 8/14/2023    Procedure: CYSTOSCOPY, LEFT RETROGRADE PYELOGRAM, LEFT URETEROSCOPY WITH STONE BASKET EXTRACTION, PLACEMENT OF JJ STENT, LEFT NEPHROSTOMY TUBE REMOVAL;  Surgeon: Dannie Murray MD;  Location: [facility];  Service: Urology;  Laterality: Left;  IR to place ureteral " catheter prior, start time 1200  Cysto tower, flexible cysto/ureteroscopes, Laser in room, lithoclast in room, fluoroscopy with Tony to     Review of patient's allergies indicates:  No Known Allergies  Current Outpatient Medications   Medication Instructions    ascorbic acid (vitamin C) (VITAMIN C) 250 mg, Oral, Daily    atorvastatin (LIPITOR) 20 mg, Oral, Daily    carbidopa-levodopa  mg (SINEMET)  mg per tablet TAKE 1 TABLET BY MOUTH THREE TIMES A DAY FOR PARKINSON'S DISE** WITH MEALSASE    celecoxib (CELEBREX) 400 mg, Oral, Daily    cetirizine (ZYRTEC) 10 mg, Oral, Daily    finasteride (PROSCAR) 5 mg, Oral, Daily    FLUoxetine 40 MG capsule TAKE ONE CAPSULE BY MOUTH EVERY DAY FOR MENTAL HEALTH    FLUoxetine 60 mg    fluticasone propionate (FLONASE) 100 mcg, Each Nostril, Daily    folic acid/multivit-min/lutein (CENTRUM SILVER ORAL) Oral    garlic 1,000 mg Cap Oral    GINGER ROOT XT-FENNEL SD XT ORAL 550 mg, Oral    glucosam/chond-msm1/C/agueda/bor (GLUCOSAMINE-CHOND-MSM COMPLEX ORAL) Oral    lisinopriL (PRINIVIL,ZESTRIL) 5 mg, Oral, Daily    mecobalamin (B12 ACTIVE ORAL) Oral    oxyCODONE (ROXICODONE) 5 mg, Oral, Every 6 hours PRN    pantoprazole (PROTONIX) 40 mg, Oral, Daily    peg 400-propylene glycol 0.4-0.3 % Drop INSTILL ONE DROP IN EACH EYE FOUR TIMES A DAY AS NEEDED FOR DRY EYES    primidone (MYSOLINE) 100 mg, Oral, Nightly    psyllium (METAMUCIL) powder 1 packet, Oral, 3 times daily    tamsulosin (FLOMAX) 0.4 mg, Oral, Nightly    traZODone (DESYREL) 100 MG tablet 1 tablet, Oral, Nightly PRN    triamcinolone acetonide 0.1% (KENALOG) 0.1 % cream Topical (Top), 3 times daily    vitamin E 100 Units, Oral, Every morning    VOLTAREN 4 g, Topical (Top), 4 times daily PRN       Pre-op Assessment    I have reviewed the Patient Summary Reports.     I have reviewed the Nursing Notes.    I have reviewed the Medications.     Review of Systems  Anesthesia Hx:  No problems with previous  Anesthesia   Denies Personal Hx of Anesthesia complications.   Social:  No Alcohol Use, Former Smoker    Hematology/Oncology:         -- Anemia:   Cardiovascular:   Exercise tolerance: good Denies Pacemaker. Hypertension, well controlled   Denies Angina. hyperlipidemia    Pulmonary:   Denies Shortness of breath.    Renal/:   renal calculi BPH    Hepatic/GI:   GERD, well controlled    Musculoskeletal:   Arthritis     Neurological:  Neurology Normal Denies TIA.  Denies CVA. Denies Seizures.  Movement Disorder Dx, Parkinson's Disease, tremor   Psych:   Psychiatric History depression          Physical Exam  General: Cooperative and Oriented    Airway:  Neck ROM: Normal ROM    Dental:  Intact      Lab Results   Component Value Date    WBC 6.43 08/22/2023    HGB 13.6 (L) 08/22/2023    HCT 42.2 08/22/2023     08/22/2023    CHOL 136 08/19/2022    TRIG 33 08/19/2022    HDL 64 08/19/2022    ALT 5 (L) 08/22/2023    AST 9 (L) 08/22/2023     08/22/2023    K 4.3 08/22/2023     08/22/2023    CREATININE 1.1 08/22/2023    BUN 19 08/22/2023    CO2 24 08/22/2023    TSH 1.327 08/19/2022    PSA 8.2 (H) 08/19/2022    INR 0.9 08/14/2023    HGBA1C 5.6 11/25/2019     Results for orders placed or performed in visit on 08/10/23   EKG 12-lead    Collection Time: 08/10/23 12:53 PM    Narrative    Test Reason : Z01.818,    Vent. Rate : 059 BPM     Atrial Rate : 059 BPM     P-R Int : 164 ms          QRS Dur : 108 ms      QT Int : 442 ms       P-R-T Axes : 041 004 025 degrees     QTc Int : 437 ms    Sinus bradycardia with Premature supraventricular complexes  Incomplete right bundle branch block  Cannot rule out Anterior infarct ,age undetermined  Abnormal ECG  When compared with ECG of 17-NOV-2022 12:14,  Premature supraventricular complexes are now Present  Minimal criteria for Anterior infarct are now Present  Confirmed by Nirmal Cramer III, MD ([id number]) on 8/10/2023 4:02:50 PM    Referred By: LAI GONZALEZ            Confirmed By:Nirmal Cramer III, MD     Stress test 9/7/23:      Stress Protocol: The patient reported no symptoms during the stress test. The test was stopped because the end of the protocol was reached.    Baseline ECG: The Baseline ECG reveals sinus rhythm.    Stress ECG: There is 1.0 mm of downward-sloping ST segment depression in the lateral leads (I, aVL, V5 and V6) noted during stress. During stress, SVTs are noted. There is normal blood pressure response with stress.    ECG Conclusion: The ECG portion of the study is positive for ischemia.    Post-stress Impression: The study is negative with no echocardiographic evidence of stress induced ischemia.      Anesthesia Plan  Type of Anesthesia, risks & benefits discussed:    Anesthesia Type: Regional, Gen ETT  Intra-op Monitoring Plan: Standard ASA Monitors  Post Op Pain Control Plan: multimodal analgesia and peripheral nerve block  Induction:  IV  Airway Plan: Direct  Informed Consent: Informed consent signed with the Patient and all parties understand the risks and agree with anesthesia plan.  All questions answered.   ASA Score: 3  Day of Surgery Review of History & Physical: H&P Update referred to the surgeon/provider.  Anesthesia Plan Notes: Anesthesia consent to be signed prior to surgery 9/14/23      Ready For Surgery From Anesthesia Perspective.     .

## 2023-09-07 ENCOUNTER — TELEPHONE (OUTPATIENT)
Dept: UROLOGY | Facility: CLINIC | Age: 78
End: 2023-09-07
Payer: OTHER GOVERNMENT

## 2023-09-07 ENCOUNTER — HOSPITAL ENCOUNTER (OUTPATIENT)
Dept: CARDIOLOGY | Facility: HOSPITAL | Age: 78
Discharge: HOME | End: 2023-09-07
Attending: INTERNAL MEDICINE
Payer: OTHER GOVERNMENT

## 2023-09-07 VITALS — HEIGHT: 72 IN | BODY MASS INDEX: 28.44 KG/M2 | WEIGHT: 210 LBS

## 2023-09-07 DIAGNOSIS — R94.31 ABNORMAL EKG: ICD-10-CM

## 2023-09-07 DIAGNOSIS — Z01.818 PREOP EXAM FOR INTERNAL MEDICINE: ICD-10-CM

## 2023-09-07 PROCEDURE — 93351 STRESS ECHO (CUPID ONLY): ICD-10-PCS | Mod: 26,,, | Performed by: INTERNAL MEDICINE

## 2023-09-07 PROCEDURE — 93351 STRESS TTE COMPLETE: CPT | Mod: 26,,, | Performed by: INTERNAL MEDICINE

## 2023-09-07 PROCEDURE — 93351 STRESS TTE COMPLETE: CPT

## 2023-09-07 PROCEDURE — 63600175 PHARM REV CODE 636 W HCPCS: Performed by: INTERNAL MEDICINE

## 2023-09-07 RX ORDER — DOBUTAMINE HYDROCHLORIDE 400 MG/100ML
0-20 INJECTION INTRAVENOUS CONTINUOUS
Status: DISCONTINUED | OUTPATIENT
Start: 2023-09-07 | End: 2023-09-07

## 2023-09-07 RX ORDER — DOBUTAMINE HYDROCHLORIDE 400 MG/100ML
10 INJECTION, SOLUTION INTRAVENOUS ONCE
Status: COMPLETED | OUTPATIENT
Start: 2023-09-07 | End: 2023-09-07

## 2023-09-07 RX ADMIN — DOBUTAMINE 10 MCG/KG/MIN: 12.5 INJECTION, SOLUTION, CONCENTRATE INTRAVENOUS at 03:09

## 2023-09-07 NOTE — TELEPHONE ENCOUNTER
----- Message from Douglas Cervantes sent at 9/6/2023  4:41 PM CDT -----  Pt Requesting to Schedule an Appointment     Pt is requesting to schedule an appointment that our scheduling dept cannot schedule.    Who called: pt  Best call back #: [phone]  When pt wants appt: pt said 3 weeks from now  Reason for appt: to get stint removed   Additional notes:

## 2023-09-07 NOTE — PLAN OF CARE
Pt on table ready for test. Allergies/ history confirmed. Connected to monitor EKG and NIBP. . Pt denies hx of glaucoma.     Jimbo NP at bedside. Obtaining consent. Pt verbalizes understanding of procedure.     309 pm Test started per protocol. Dobutamine initiated @ 10 mcg/kg/min to increase HR. Pt tolerating well. See EKG sheet for VS.     312 pm Dobutamine increased to 20 mcg/kg/min to increase HR. Pt tolerating well.     3:15 pm Dobutamine increased to 30 mcg/kg/min to increase HR. Pt tolerating well.     3:16 pm Target HR obtained.     3:16 pm Test phase complete, dobutamine off, NS up at rapid rate for recovery phase.     3:22 pm Recovery phase complete. Pt states feels normal, no distress, NS dc'd, 100 ml infused, IV dc'd. Pt released to Cardio.

## 2023-09-07 NOTE — TELEPHONE ENCOUNTER
I reached out to the patient and gave him our next available for his stent removal which is Wednesday October 4th at 8 am. Patient confirmed.

## 2023-09-08 LAB
BSA FOR ECHO PROCEDURE: 2.2 M2
CV STRESS BASE HR: 57 BPM
DIASTOLIC BLOOD PRESSURE: 71 MMHG
EJECTION FRACTION: 60 %
OHS CV CPX 1 MINUTE RECOVERY HEART RATE: 115 BPM
OHS CV CPX 85 PERCENT MAX PREDICTED HEART RATE MALE: 121
OHS CV CPX MAX PREDICTED HEART RATE: 142
OHS CV CPX PATIENT IS FEMALE: 0
OHS CV CPX PATIENT IS MALE: 1
OHS CV CPX PEAK DIASTOLIC BLOOD PRESSURE: 66 MMHG
OHS CV CPX PEAK HEAR RATE: 196 BPM
OHS CV CPX PEAK RATE PRESSURE PRODUCT: NORMAL
OHS CV CPX PEAK SYSTOLIC BLOOD PRESSURE: 165 MMHG
OHS CV CPX PERCENT MAX PREDICTED HEART RATE ACHIEVED: 138
OHS CV CPX RATE PRESSURE PRODUCT PRESENTING: 6726
STRESS ECHO POST EXERCISE DUR MIN: 7 MINUTES
STRESS ECHO POST EXERCISE DUR SEC: 47 SECONDS
STRESS ST DEPRESSION: 1 MM
SYSTOLIC BLOOD PRESSURE: 118 MMHG

## 2023-09-11 ENCOUNTER — TELEPHONE (OUTPATIENT)
Dept: UROLOGY | Facility: CLINIC | Age: 78
End: 2023-09-11
Payer: OTHER GOVERNMENT

## 2023-09-11 NOTE — TELEPHONE ENCOUNTER
The patient spouse (Ivone) called asking if it was okay for the patient to continue surgery for his shoulder even though the stent is in place. I voiced that it should be okay but to confirm reach out the the physician who's doing the surgery for the okay. The spouse is aware.

## 2023-09-11 NOTE — TELEPHONE ENCOUNTER
----- Message from Lawson Cramer sent at 9/11/2023  1:45 PM CDT -----  Contact: sposue  Type:  Needs Medical Advice    Who Called: spouse   Would the patient rather a call back or a response via [facility]? call  Best Call Back Number: [phone]  Additional Information: =  Spouse requesting a call regarding if pt  can still have procedure with the stent

## 2023-09-14 ENCOUNTER — HOSPITAL ENCOUNTER (OUTPATIENT)
Facility: HOSPITAL | Age: 78
Discharge: HOME | End: 2023-09-15
Attending: ORTHOPAEDIC SURGERY | Admitting: ORTHOPAEDIC SURGERY
Payer: OTHER GOVERNMENT

## 2023-09-14 ENCOUNTER — ANESTHESIA (OUTPATIENT)
Dept: SURGERY | Facility: HOSPITAL | Age: 78
End: 2023-09-14
Payer: OTHER GOVERNMENT

## 2023-09-14 VITALS — OXYGEN SATURATION: 85 % | DIASTOLIC BLOOD PRESSURE: 97 MMHG | HEART RATE: 63 BPM | SYSTOLIC BLOOD PRESSURE: 170 MMHG

## 2023-09-14 DIAGNOSIS — M19.011 OSTEOARTHRITIS OF GLENOHUMERAL JOINT, RIGHT: ICD-10-CM

## 2023-09-14 PROCEDURE — D9220A PRA ANESTHESIA: ICD-10-PCS | Mod: CRNA,,, | Performed by: NURSE ANESTHETIST, CERTIFIED REGISTERED

## 2023-09-14 PROCEDURE — 71000039 HC RECOVERY, EACH ADD'L HOUR: Performed by: ORTHOPAEDIC SURGERY

## 2023-09-14 PROCEDURE — 25000003 PHARM REV CODE 250: Performed by: STUDENT IN AN ORGANIZED HEALTH CARE EDUCATION/TRAINING PROGRAM

## 2023-09-14 PROCEDURE — 51798 US URINE CAPACITY MEASURE: CPT

## 2023-09-14 PROCEDURE — 71000033 HC RECOVERY, INTIAL HOUR: Performed by: ORTHOPAEDIC SURGERY

## 2023-09-14 PROCEDURE — D9220A PRA ANESTHESIA: Mod: CRNA,,, | Performed by: NURSE ANESTHETIST, CERTIFIED REGISTERED

## 2023-09-14 PROCEDURE — D9220A PRA ANESTHESIA: ICD-10-PCS | Mod: ANES,,, | Performed by: ANESTHESIOLOGY

## 2023-09-14 PROCEDURE — 23472 PR RECONSTR TOTAL SHOULDER IMPLANT: ICD-10-PCS | Mod: RT,,, | Performed by: ORTHOPAEDIC SURGERY

## 2023-09-14 PROCEDURE — 63600175 PHARM REV CODE 636 W HCPCS: Performed by: ANESTHESIOLOGY

## 2023-09-14 PROCEDURE — C1713 ANCHOR/SCREW BN/BN,TIS/BN: HCPCS | Performed by: ORTHOPAEDIC SURGERY

## 2023-09-14 PROCEDURE — C1776 JOINT DEVICE (IMPLANTABLE): HCPCS | Performed by: ORTHOPAEDIC SURGERY

## 2023-09-14 PROCEDURE — 25000003 PHARM REV CODE 250: Performed by: NURSE ANESTHETIST, CERTIFIED REGISTERED

## 2023-09-14 PROCEDURE — 27201423 OPTIME MED/SURG SUP & DEVICES STERILE SUPPLY: Performed by: ORTHOPAEDIC SURGERY

## 2023-09-14 PROCEDURE — 25000003 PHARM REV CODE 250: Performed by: ORTHOPAEDIC SURGERY

## 2023-09-14 PROCEDURE — 64415 R IS SS: ICD-10-PCS | Mod: 59,RT,, | Performed by: ANESTHESIOLOGY

## 2023-09-14 PROCEDURE — 64415 NJX AA&/STRD BRCH PLXS IMG: CPT | Mod: 59,RT | Performed by: ANESTHESIOLOGY

## 2023-09-14 PROCEDURE — 63600175 PHARM REV CODE 636 W HCPCS: Performed by: STUDENT IN AN ORGANIZED HEALTH CARE EDUCATION/TRAINING PROGRAM

## 2023-09-14 PROCEDURE — D9220A PRA ANESTHESIA: Mod: ANES,,, | Performed by: ANESTHESIOLOGY

## 2023-09-14 PROCEDURE — 36000710: Performed by: ORTHOPAEDIC SURGERY

## 2023-09-14 PROCEDURE — C1769 GUIDE WIRE: HCPCS | Performed by: ORTHOPAEDIC SURGERY

## 2023-09-14 PROCEDURE — 23472 RECONSTRUCT SHOULDER JOINT: CPT | Mod: RT,,, | Performed by: ORTHOPAEDIC SURGERY

## 2023-09-14 PROCEDURE — 36000711: Performed by: ORTHOPAEDIC SURGERY

## 2023-09-14 PROCEDURE — 37000009 HC ANESTHESIA EA ADD 15 MINS: Performed by: ORTHOPAEDIC SURGERY

## 2023-09-14 PROCEDURE — 63600175 PHARM REV CODE 636 W HCPCS: Performed by: NURSE ANESTHETIST, CERTIFIED REGISTERED

## 2023-09-14 PROCEDURE — 37000008 HC ANESTHESIA 1ST 15 MINUTES: Performed by: ORTHOPAEDIC SURGERY

## 2023-09-14 PROCEDURE — 94799 UNLISTED PULMONARY SVC/PX: CPT

## 2023-09-14 DEVICE — CEMENT BONE SMPLX HV GENTMYCN: Type: IMPLANTABLE DEVICE | Site: SHOULDER | Status: FUNCTIONAL

## 2023-09-14 DEVICE — IMPLANTABLE DEVICE: Type: IMPLANTABLE DEVICE | Site: SHOULDER | Status: FUNCTIONAL

## 2023-09-14 RX ORDER — BUPIVACAINE HYDROCHLORIDE 2.5 MG/ML
INJECTION, SOLUTION EPIDURAL; INFILTRATION; INTRACAUDAL
Status: COMPLETED | OUTPATIENT
Start: 2023-09-14 | End: 2023-09-14

## 2023-09-14 RX ORDER — ROCURONIUM BROMIDE 10 MG/ML
INJECTION, SOLUTION INTRAVENOUS
Status: DISCONTINUED | OUTPATIENT
Start: 2023-09-14 | End: 2023-09-14

## 2023-09-14 RX ORDER — ASPIRIN 81 MG/1
81 TABLET ORAL DAILY
Status: DISCONTINUED | OUTPATIENT
Start: 2023-09-15 | End: 2023-09-15 | Stop reason: HOSPADM

## 2023-09-14 RX ORDER — MUPIROCIN 20 MG/G
OINTMENT TOPICAL
Status: DISCONTINUED | OUTPATIENT
Start: 2023-09-14 | End: 2023-09-14 | Stop reason: HOSPADM

## 2023-09-14 RX ORDER — CEFAZOLIN SODIUM 2 G/50ML
2 SOLUTION INTRAVENOUS
Status: COMPLETED | OUTPATIENT
Start: 2023-09-14 | End: 2023-09-15

## 2023-09-14 RX ORDER — ONDANSETRON 2 MG/ML
4 INJECTION INTRAMUSCULAR; INTRAVENOUS EVERY 12 HOURS PRN
Status: DISCONTINUED | OUTPATIENT
Start: 2023-09-14 | End: 2023-09-15 | Stop reason: HOSPADM

## 2023-09-14 RX ORDER — FENTANYL CITRATE 50 UG/ML
INJECTION, SOLUTION INTRAMUSCULAR; INTRAVENOUS
Status: DISCONTINUED | OUTPATIENT
Start: 2023-09-14 | End: 2023-09-14

## 2023-09-14 RX ORDER — SENNOSIDES 8.6 MG/1
8.6 TABLET ORAL 2 TIMES DAILY
Status: DISCONTINUED | OUTPATIENT
Start: 2023-09-14 | End: 2023-09-15 | Stop reason: HOSPADM

## 2023-09-14 RX ORDER — OXYCODONE AND ACETAMINOPHEN 5; 325 MG/1; MG/1
1 TABLET ORAL EVERY 4 HOURS PRN
Qty: 28 TABLET | Refills: 0 | Status: SHIPPED | OUTPATIENT
Start: 2023-09-14

## 2023-09-14 RX ORDER — SODIUM CHLORIDE, SODIUM LACTATE, POTASSIUM CHLORIDE, CALCIUM CHLORIDE 600; 310; 30; 20 MG/100ML; MG/100ML; MG/100ML; MG/100ML
INJECTION, SOLUTION INTRAVENOUS CONTINUOUS
Status: ACTIVE | OUTPATIENT
Start: 2023-09-14

## 2023-09-14 RX ORDER — LIDOCAINE HYDROCHLORIDE 20 MG/ML
INJECTION INTRAVENOUS
Status: DISCONTINUED | OUTPATIENT
Start: 2023-09-14 | End: 2023-09-14

## 2023-09-14 RX ORDER — SODIUM CHLORIDE, SODIUM LACTATE, POTASSIUM CHLORIDE, CALCIUM CHLORIDE 600; 310; 30; 20 MG/100ML; MG/100ML; MG/100ML; MG/100ML
INJECTION, SOLUTION INTRAVENOUS CONTINUOUS PRN
Status: DISCONTINUED | OUTPATIENT
Start: 2023-09-14 | End: 2023-09-14

## 2023-09-14 RX ORDER — OXYCODONE HYDROCHLORIDE 5 MG/1
5 TABLET ORAL EVERY 6 HOURS PRN
Status: DISCONTINUED | OUTPATIENT
Start: 2023-09-14 | End: 2023-09-15 | Stop reason: HOSPADM

## 2023-09-14 RX ORDER — TRANEXAMIC ACID 100 MG/ML
INJECTION, SOLUTION INTRAVENOUS
Status: DISCONTINUED | OUTPATIENT
Start: 2023-09-14 | End: 2023-09-14

## 2023-09-14 RX ORDER — FAMOTIDINE 20 MG/1
20 TABLET, FILM COATED ORAL 2 TIMES DAILY
Status: DISCONTINUED | OUTPATIENT
Start: 2023-09-14 | End: 2023-09-15 | Stop reason: HOSPADM

## 2023-09-14 RX ORDER — PRIMIDONE 50 MG/1
100 TABLET ORAL NIGHTLY
Status: DISCONTINUED | OUTPATIENT
Start: 2023-09-14 | End: 2023-09-15 | Stop reason: HOSPADM

## 2023-09-14 RX ORDER — CARBIDOPA AND LEVODOPA 25; 100 MG/1; MG/1
1 TABLET ORAL 3 TIMES DAILY
Status: DISCONTINUED | OUTPATIENT
Start: 2023-09-15 | End: 2023-09-15 | Stop reason: HOSPADM

## 2023-09-14 RX ORDER — ONDANSETRON 4 MG/1
4 TABLET, FILM COATED ORAL EVERY 8 HOURS PRN
Qty: 28 TABLET | Refills: 0 | Status: SHIPPED | OUTPATIENT
Start: 2023-09-14 | End: 2023-10-24

## 2023-09-14 RX ORDER — LISINOPRIL 5 MG/1
5 TABLET ORAL DAILY
Status: DISCONTINUED | OUTPATIENT
Start: 2023-09-15 | End: 2023-09-15 | Stop reason: HOSPADM

## 2023-09-14 RX ORDER — TRANEXAMIC ACID 10 MG/ML
1000 INJECTION, SOLUTION INTRAVENOUS
Status: DISCONTINUED | OUTPATIENT
Start: 2023-09-14 | End: 2023-09-14 | Stop reason: HOSPADM

## 2023-09-14 RX ORDER — FENTANYL CITRATE 50 UG/ML
25 INJECTION, SOLUTION INTRAMUSCULAR; INTRAVENOUS EVERY 5 MIN PRN
Status: DISCONTINUED | OUTPATIENT
Start: 2023-09-14 | End: 2023-09-14 | Stop reason: HOSPADM

## 2023-09-14 RX ORDER — CEFAZOLIN SODIUM 2 G/50ML
2 SOLUTION INTRAVENOUS
Status: DISCONTINUED | OUTPATIENT
Start: 2023-09-14 | End: 2023-09-14 | Stop reason: HOSPADM

## 2023-09-14 RX ORDER — CEFAZOLIN SODIUM 1 G/3ML
INJECTION, POWDER, FOR SOLUTION INTRAMUSCULAR; INTRAVENOUS
Status: DISCONTINUED | OUTPATIENT
Start: 2023-09-14 | End: 2023-09-14

## 2023-09-14 RX ORDER — ONDANSETRON 2 MG/ML
4 INJECTION INTRAMUSCULAR; INTRAVENOUS DAILY PRN
Status: DISCONTINUED | OUTPATIENT
Start: 2023-09-14 | End: 2023-09-14 | Stop reason: HOSPADM

## 2023-09-14 RX ORDER — ACETAMINOPHEN 325 MG/1
650 TABLET ORAL
Status: DISCONTINUED | OUTPATIENT
Start: 2023-09-14 | End: 2023-09-15 | Stop reason: HOSPADM

## 2023-09-14 RX ORDER — ATORVASTATIN CALCIUM 20 MG/1
20 TABLET, FILM COATED ORAL DAILY
Status: DISCONTINUED | OUTPATIENT
Start: 2023-09-15 | End: 2023-09-15 | Stop reason: HOSPADM

## 2023-09-14 RX ORDER — DEXAMETHASONE SODIUM PHOSPHATE 4 MG/ML
INJECTION, SOLUTION INTRA-ARTICULAR; INTRALESIONAL; INTRAMUSCULAR; INTRAVENOUS; SOFT TISSUE
Status: DISCONTINUED | OUTPATIENT
Start: 2023-09-14 | End: 2023-09-14

## 2023-09-14 RX ORDER — OXYCODONE HYDROCHLORIDE 5 MG/1
5 TABLET ORAL
Status: DISCONTINUED | OUTPATIENT
Start: 2023-09-14 | End: 2023-09-14 | Stop reason: HOSPADM

## 2023-09-14 RX ORDER — ONDANSETRON HYDROCHLORIDE 2 MG/ML
INJECTION, SOLUTION INTRAMUSCULAR; INTRAVENOUS
Status: DISCONTINUED | OUTPATIENT
Start: 2023-09-14 | End: 2023-09-14

## 2023-09-14 RX ORDER — PROPOFOL 10 MG/ML
VIAL (ML) INTRAVENOUS
Status: DISCONTINUED | OUTPATIENT
Start: 2023-09-14 | End: 2023-09-14

## 2023-09-14 RX ORDER — LIDOCAINE HYDROCHLORIDE 10 MG/ML
1 INJECTION, SOLUTION EPIDURAL; INFILTRATION; INTRACAUDAL; PERINEURAL ONCE
Status: ACTIVE | OUTPATIENT
Start: 2023-09-14

## 2023-09-14 RX ORDER — CELECOXIB 100 MG/1
200 CAPSULE ORAL 2 TIMES DAILY
Status: DISCONTINUED | OUTPATIENT
Start: 2023-09-14 | End: 2023-09-15 | Stop reason: HOSPADM

## 2023-09-14 RX ORDER — SODIUM CHLORIDE 9 MG/ML
INJECTION, SOLUTION INTRAVENOUS CONTINUOUS
Status: DISCONTINUED | OUTPATIENT
Start: 2023-09-14 | End: 2023-09-15 | Stop reason: HOSPADM

## 2023-09-14 RX ORDER — VASOPRESSIN 20 [USP'U]/ML
INJECTION, SOLUTION INTRAMUSCULAR; SUBCUTANEOUS
Status: DISCONTINUED | OUTPATIENT
Start: 2023-09-14 | End: 2023-09-14

## 2023-09-14 RX ORDER — TAMSULOSIN HYDROCHLORIDE 0.4 MG/1
0.4 CAPSULE ORAL NIGHTLY
Status: DISCONTINUED | OUTPATIENT
Start: 2023-09-14 | End: 2023-09-15 | Stop reason: HOSPADM

## 2023-09-14 RX ORDER — BISACODYL 10 MG
10 SUPPOSITORY, RECTAL RECTAL 2 TIMES DAILY PRN
Status: DISCONTINUED | OUTPATIENT
Start: 2023-09-14 | End: 2023-09-15 | Stop reason: HOSPADM

## 2023-09-14 RX ADMIN — ONDANSETRON 8 MG: 2 INJECTION, SOLUTION INTRAMUSCULAR; INTRAVENOUS at 02:09

## 2023-09-14 RX ADMIN — TRANEXAMIC ACID 1000 MG: 100 INJECTION, SOLUTION INTRAVENOUS at 01:09

## 2023-09-14 RX ADMIN — VASOPRESSIN 1 UNITS: 20 INJECTION, SOLUTION INTRAMUSCULAR; SUBCUTANEOUS at 12:09

## 2023-09-14 RX ADMIN — PRIMIDONE 100 MG: 50 TABLET ORAL at 08:09

## 2023-09-14 RX ADMIN — ROCURONIUM BROMIDE 50 MG: 10 INJECTION, SOLUTION INTRAVENOUS at 11:09

## 2023-09-14 RX ADMIN — BUPIVACAINE HYDROCHLORIDE 20 ML: 2.5 INJECTION, SOLUTION EPIDURAL; INFILTRATION; INTRACAUDAL; PERINEURAL at 11:09

## 2023-09-14 RX ADMIN — GLYCOPYRROLATE 0.2 MG: 0.2 INJECTION, SOLUTION INTRAMUSCULAR; INTRAVITREAL at 12:09

## 2023-09-14 RX ADMIN — LIDOCAINE HYDROCHLORIDE 100 MG: 20 INJECTION, SOLUTION INTRAVENOUS at 11:09

## 2023-09-14 RX ADMIN — SODIUM CHLORIDE: 9 INJECTION, SOLUTION INTRAVENOUS at 06:09

## 2023-09-14 RX ADMIN — SENNOSIDES 8.6 MG: 8.6 TABLET, FILM COATED ORAL at 08:09

## 2023-09-14 RX ADMIN — FAMOTIDINE 20 MG: 20 TABLET ORAL at 08:09

## 2023-09-14 RX ADMIN — SODIUM CHLORIDE, SODIUM LACTATE, POTASSIUM CHLORIDE, AND CALCIUM CHLORIDE: .6; .31; .03; .02 INJECTION, SOLUTION INTRAVENOUS at 11:09

## 2023-09-14 RX ADMIN — TAMSULOSIN HYDROCHLORIDE 0.4 MG: 0.4 CAPSULE ORAL at 08:09

## 2023-09-14 RX ADMIN — DEXAMETHASONE SODIUM PHOSPHATE 8 MG: 4 INJECTION, SOLUTION INTRA-ARTICULAR; INTRALESIONAL; INTRAMUSCULAR; INTRAVENOUS; SOFT TISSUE at 11:09

## 2023-09-14 RX ADMIN — VASOPRESSIN 0.5 UNITS: 20 INJECTION, SOLUTION INTRAMUSCULAR; SUBCUTANEOUS at 01:09

## 2023-09-14 RX ADMIN — VASOPRESSIN 0.5 UNITS: 20 INJECTION, SOLUTION INTRAMUSCULAR; SUBCUTANEOUS at 02:09

## 2023-09-14 RX ADMIN — CELECOXIB 200 MG: 100 CAPSULE ORAL at 08:09

## 2023-09-14 RX ADMIN — MUPIROCIN: 20 OINTMENT TOPICAL at 10:09

## 2023-09-14 RX ADMIN — ROCURONIUM BROMIDE 10 MG: 10 INJECTION, SOLUTION INTRAVENOUS at 12:09

## 2023-09-14 RX ADMIN — FENTANYL CITRATE 100 MCG: 0.05 INJECTION, SOLUTION INTRAMUSCULAR; INTRAVENOUS at 11:09

## 2023-09-14 RX ADMIN — VASOPRESSIN 0.5 UNITS: 20 INJECTION, SOLUTION INTRAMUSCULAR; SUBCUTANEOUS at 12:09

## 2023-09-14 RX ADMIN — PROPOFOL 100 MG: 10 INJECTION, EMULSION INTRAVENOUS at 11:09

## 2023-09-14 RX ADMIN — CEFAZOLIN SODIUM 2 G: 2 SOLUTION INTRAVENOUS at 08:09

## 2023-09-14 RX ADMIN — PROPOFOL 50 MG: 10 INJECTION, EMULSION INTRAVENOUS at 11:09

## 2023-09-14 RX ADMIN — LIDOCAINE HYDROCHLORIDE 100 MG: 20 INJECTION, SOLUTION INTRAVENOUS at 02:09

## 2023-09-14 RX ADMIN — SUGAMMADEX 200 MG: 100 INJECTION, SOLUTION INTRAVENOUS at 02:09

## 2023-09-14 RX ADMIN — ACETAMINOPHEN 650 MG: 325 TABLET ORAL at 08:09

## 2023-09-14 RX ADMIN — TRANEXAMIC ACID 1000 MG: 100 INJECTION, SOLUTION INTRAVENOUS at 11:09

## 2023-09-14 RX ADMIN — PHENYLEPHRINE HYDROCHLORIDE 0.1 MCG/KG/MIN: 10 INJECTION INTRAVENOUS at 12:09

## 2023-09-14 RX ADMIN — ROCURONIUM BROMIDE 20 MG: 10 INJECTION, SOLUTION INTRAVENOUS at 11:09

## 2023-09-14 RX ADMIN — CEFAZOLIN 2 G: 330 INJECTION, POWDER, FOR SOLUTION INTRAMUSCULAR; INTRAVENOUS at 11:09

## 2023-09-14 NOTE — ANESTHESIA PROCEDURE NOTES
Intubation    Date/Time: 9/14/2023 11:34 AM    Performed by: Lombard, Jeffrey C., CRNA  Authorized by: Suzanne Louis MD    Intubation:     Induction:  Intravenous    Intubated:  Postinduction    Mask Ventilation:  Easy with oral airway    Attempts:  1    Attempted By:  Student (Alpa Santo)    Method of Intubation:  Direct    Blade:  Chaves 2    Laryngeal View Grade: Grade I - full view of cords      Difficult Airway Encountered?: No      Complications:  None    Airway Device:  Oral endotracheal tube    Airway Device Size:  8.0    Tube secured:  23    Secured at:  The lips    Placement Verified By:  Capnometry    Complicating Factors:  None    Findings Post-Intubation:  BS equal bilateral and atraumatic/condition of teeth unchanged

## 2023-09-14 NOTE — H&P
H&P completed on 9/14 has been reviewed, the patient has been examined and:  I concur with the findings and no changes have occurred since H&P was written.    There are no hospital problems to display for this patient.      Dannie Barnett  Patient ID:   Brandan Werner is a 77 y.o. male.     Chief Complaint:   Right shoulder pain     HPI:   Mr. Werner he is a 77-year-old right-hand dominant male with a longstanding history of right shoulder pain.  He reports a pain level of 7/10.  The pain is worse with activity.  Past treatment has included physical therapy, pain medication, anti-inflammatories, and corticosteroid injections.     Medications:     Current Outpatient Medications:     ascorbic acid, vitamin C, (VITAMIN C) 250 MG tablet, Take 250 mg by mouth once daily., Disp: , Rfl:     atorvastatin (LIPITOR) 40 MG tablet, Take 20 mg by mouth once daily., Disp: , Rfl:     carbidopa-levodopa  mg (SINEMET)  mg per tablet, TAKE 1 TABLET BY MOUTH THREE TIMES A DAY FOR PARKINSON'S DISE** WITH MEALSASE, Disp: , Rfl:     celecoxib (CELEBREX) 200 MG capsule, Take 400 mg by mouth once daily., Disp: , Rfl:     cetirizine (ZYRTEC) 10 MG tablet, Take 1 tablet (10 mg total) by mouth once daily., Disp: 30 tablet, Rfl: 0    finasteride (PROSCAR) 5 mg tablet, Take 1 tablet (5 mg total) by mouth once daily., Disp: 30 tablet, Rfl: 11    FLUoxetine 20 MG capsule, 60 mg., Disp: , Rfl:     FLUoxetine 40 MG capsule, TAKE ONE CAPSULE BY MOUTH EVERY DAY FOR MENTAL HEALTH, Disp: , Rfl:     fluticasone (FLONASE) 50 mcg/actuation nasal spray, 2 sprays (100 mcg total) by Each Nare route once daily., Disp: 1 Bottle, Rfl: 0    folic acid/multivit-min/lutein (CENTRUM SILVER ORAL), Take by mouth., Disp: , Rfl:     garlic 1,000 mg Cap, Take by mouth., Disp: , Rfl:     ANA ROOT XT-FENNEL SD XT ORAL, Take 550 mg by mouth., Disp: , Rfl:     glucosam/chond-msm1/C/agueda/bor (GLUCOSAMINE-CHOND-MSM COMPLEX ORAL), Take by mouth., Disp: , Rfl:      lisinopril (PRINIVIL,ZESTRIL) 5 MG tablet, Take 5 mg by mouth once daily., Disp: , Rfl:     mecobalamin (B12 ACTIVE ORAL), Take by mouth., Disp: , Rfl:     pantoprazole (PROTONIX) 40 MG tablet, Take 1 tablet (40 mg total) by mouth once daily. for 14 days, Disp: 14 tablet, Rfl: 0    peg 400-propylene glycol 0.4-0.3 % Drop, INSTILL ONE DROP IN EACH EYE FOUR TIMES A DAY AS NEEDED FOR DRY EYES, Disp: , Rfl:     primidone (MYSOLINE) 50 MG Tab, Take 2 tablets (100 mg total) by mouth every evening., Disp: 60 tablet, Rfl: 11    psyllium (METAMUCIL) powder, Take 1 packet by mouth 3 (three) times daily., Disp: , Rfl:     tamsulosin (FLOMAX) 0.4 mg Cp24, Take 1 capsule (0.4 mg total) by mouth nightly., Disp: 30 capsule, Rfl: 11    traZODone (DESYREL) 100 MG tablet, Take 1 tablet by mouth nightly as needed., Disp: , Rfl:     triamcinolone acetonide 0.1% (KENALOG) 0.1 % cream, Apply topically 3 (three) times daily., Disp: 45 g, Rfl: 0    vitamin E 100 UNIT capsule, Take 100 Units by mouth every morning., Disp: , Rfl:     VOLTAREN 1 % Gel, Apply 4 g topically 4 (four) times daily as needed (pain and inflammation). , Disp: , Rfl:      Allergies:  Review of patient's allergies indicates:  No Known Allergies     Past Medical History:       Past Medical History:   Diagnosis Date    Anxiety      BPH (benign prostatic hyperplasia)      Cataract      Elevated PSA      Erectile dysfunction      Hyperlipidemia      Hypertension      Hypogonadism male      Kidney stone 5/20/2020    Obesity      PTSD (post-traumatic stress disorder)      Shoulder arthritis      Urinary tract infection           Past Surgical History:        Past Surgical History:   Procedure Laterality Date    COLONOSCOPY N/A 11/26/2018     Procedure: COLONOSCOPY;  Surgeon: Germán Moss MD;  Location: [address];  Service: Endoscopy;  Laterality: N/A;    COLONOSCOPY W/ BIOPSIES   2010    CYSTOSCOPY Left 11/17/2022     Procedure: CYSTOSCOPY;  Surgeon:  Dannie Murray MD;  Location: [facility] OR;  Service: Urology;  Laterality: Left;    EXTRACORPOREAL SHOCK WAVE LITHOTRIPSY Left 2022     Procedure: LITHOTRIPSY, ESWL [employer] [place] ESWL machine, scheduling call [phone] 60 minutes;  Surgeon: Dannie Murray MD;  Location: [facility] OR;  Service: Urology;  Laterality: Left;  Confirmed with nexmed  [facility] conf # [id number]    EXTRACORPOREAL SHOCK WAVE LITHOTRIPSY Left 2023     Procedure: LITHOTRIPSY, ESWL [employer] [place] ESWL machine, scheduling call [phone] 60 minutes;  Surgeon: Dannie Murray MD;  Location: [facility] OR;  Service: Urology;  Laterality: Left;  next [facility] conf #[id number]    PENILE PROSTHESIS IMPLANT             Social History:  Social History            Occupational History    Occupation:        Comment: self-employed   Tobacco Use    Smoking status: Former       Current packs/day: 0.00       Average packs/day: 1 pack/day for 10.0 years (10.0 ttl pk-yrs)       Types: Cigarettes       Start date:        Quit date:        Years since quittin.6    Smokeless tobacco: Never   Substance and Sexual Activity    Alcohol use: No    Drug use: No    Sexual activity: Yes       Partners: Female         Family History:        Family History   Problem Relation Age of Onset    Cancer Mother           cancer    Cataracts Mother      Heart disease Father 62         M.I.    Stroke Brother 62    Amblyopia Neg Hx      Blindness Neg Hx      Glaucoma Neg Hx      Macular degeneration Neg Hx      Retinal detachment Neg Hx      Strabismus Neg Hx      Prostate cancer Neg Hx      Kidney disease Neg Hx           ROS:  Review of Systems   Musculoskeletal:  Positive for arthritis, joint pain, myalgias and stiffness.   All other systems reviewed and are negative.        Vitals:  There were no vitals taken for this visit.     Physical Examination:  Comprehensive Orthopaedic Musculoskeletal Exam     General      Constitutional:  appears stated age, well-developed and well-nourished    Scleral icterus: no    Labored breathing: no    Psychiatric: normal mood and affect and no acute distress    Neurological: alert and oriented x3    Skin: intact    Lymphadenopathy: none     Ortho Exam   Right shoulder exam:  No visible deformity.    Range of motion is significantly limited with elevation 110, external rotate at the side to neutral, internal rotation to the hip.  Rotator cuff strength exam reveals 5/5 elevation, external rotation, and internal rotation.    Positive crepitus.     Imaging:  I have ordered and independently reviewed the following imaging studies performed at [facility] today     X-ray Shoulder 2 or More Views Right  Narrative: EXAMINATION:  XR SHOULDER COMPLETE 2 OR MORE VIEWS RIGHT     CLINICAL HISTORY:  Pain in right shoulder     TECHNIQUE:  Two or three views of the right shoulder were performed.     COMPARISON:  06/22/2020     FINDINGS:  Above severe degenerative changes seen at the glenohumeral joint space with bony of spurring arising from the or mole head.  Degenerative changes seen at the acromioclavicular joint and there is little soft tissue calcification seen above the humeral head.  Impression: See above     Electronically signed by:         Gabe Conte MD  Date:                                        08/02/2023  Time:                                       15:01     Assessment:  1. Osteoarthritis of glenohumeral joint, right       Plan:  I have reviewed the clinical and radiographic findings with the patient in detail.  He is interested in surgical intervention.  I have discussed the option of right total shoulder arthroplasty.  I reviewed the risks, benefits, and alternatives.  He wishes to proceed on September 14, 2023.       I have reviewed the H&P. There are no interval changes to report.

## 2023-09-14 NOTE — ANESTHESIA PROCEDURE NOTES
R IS SS    Patient location during procedure: pre-op   Block not for primary anesthetic.  Reason for block: at surgeon's request and post-op pain management   Post-op Pain Location: R shoulder pain   Start time: 9/14/2023 10:59 AM  Timeout: 9/14/2023 10:55 AM   End time: 9/14/2023 11:08 AM    Staffing  Authorizing Provider: Suzanne Louis MD  Performing Provider: Suzanne Louis MD    Staffing  Performed by: Suzanne Louis MD  Authorized by: Suzanne Louis MD    Preanesthetic Checklist  Completed: patient identified, IV checked, site marked, risks and benefits discussed, surgical consent, monitors and equipment checked, pre-op evaluation and timeout performed  Peripheral Block  Patient position: sitting  Prep: ChloraPrep  Patient monitoring: heart rate, cardiac monitor, continuous pulse ox, continuous capnometry and frequent blood pressure checks  Block type: interscalene  Laterality: right  Injection technique: single shot  Needle  Needle type: Stimuplex   Needle gauge: 22 G  Needle length: 1.5 in  Needle localization: anatomical landmarks and ultrasound guidance  Needle insertion depth: 2.5 cm   -ultrasound image captured on disc.  Assessment  Injection assessment: negative aspiration, negative parasthesia and local visualized surrounding nerve  Paresthesia pain: none  Heart rate change: no  Slow fractionated injection: yes  Pain Tolerance: comfortable throughout block and no complaints  Medications:    Medications: bupivacaine (pf) (MARCAINE) injection 0.25% - Perineural   20 mL - 9/14/2023 11:05:00 AM    Additional Notes  VSS.  [facility] RN monitoring vitals throughout procedure.  Patient tolerated procedure well.   20 cc 0.25% bupiv with 1: 400 k epi

## 2023-09-14 NOTE — PROGRESS NOTES
Pt arrived to unit. Introduced self as VN for this shift. Admission questions completed by VN. Educated pt on VTE risk, safety precautions, and VN's role in pt care. Opportunity given for pt's questions. All questions answered.      09/14/23 1825   Admission   Initial VN Admission Questions Complete   Communication Issues? None   Shift   Virtual Nurse - Patient Verbalized Approval Of Camera Use   Type of Frequent Check   Type Other (see comments)  (Admission)   Safety/Activity   Patient Rounds bed in low position;placement of personal items at bedside;call light in patient/parent reach;visualized patient   Safety Promotion/Fall Prevention assistive device/personal item within reach;side rails raised x 2;instructed to call staff for mobility;Fall Risk reviewed with patient/family   Positioning   Body Position sitting up in bed   Head of Bed (HOB) Positioning HOB at 60-90 degrees   Pain/Comfort/Sleep   Preferred Pain Scale number (Numeric Rating Pain Scale)   Pain Rating (0-10): Rest 0

## 2023-09-14 NOTE — ANESTHESIA POSTPROCEDURE EVALUATION
Anesthesia Post Evaluation    Patient: Brandan Werner    Procedure(s) Performed: Procedure(s) (LRB):  ARTHROPLASTY, SHOULDER (Right)    Final Anesthesia Type: general      Patient location during evaluation: PACU  Patient participation: Yes- Able to Participate  Level of consciousness: awake and alert, oriented and awake  Post-procedure vital signs: reviewed and stable  Pain management: adequate  Airway patency: patent  COLEEN mitigation strategies: Multimodal analgesia, Postoperative administration of CPAP, nasopharyngeal airway, or oral appliance in the postanesthesia care unit (PACU), Extubation and recovery carried out in lateral, semiupright, or other nonsupine position and Verification of full reversal of neuromuscular block  PONV status at discharge: No PONV  Anesthetic complications: no      Cardiovascular status: blood pressure returned to baseline and hemodynamically stable  Respiratory status: unassisted and spontaneous ventilation  Hydration status: euvolemic  Follow-up not needed.          Vitals Value Taken Time   /61 09/14/23 1455   Temp 36.7 °C (98 °F) 09/14/23 1445   Pulse 54 09/14/23 1458   Resp 16 09/14/23 1458   SpO2 100 % 09/14/23 1458   Vitals shown include unvalidated device data.      No case tracking events are documented in the log.      Pain/Levi Score: Levi Score: 10 (9/14/2023  2:50 PM)

## 2023-09-14 NOTE — TRANSFER OF CARE
Anesthesia Transfer of Care Note    Patient: Brandan Werner    Procedure(s) Performed: Procedure(s) (LRB):  ARTHROPLASTY, SHOULDER (Right)    Patient location: PACU    Anesthesia Type: general    Transport from OR: Transported from OR on 6-10 L/min O2 by face mask with adequate spontaneous ventilation    Post pain: adequate analgesia    Post assessment: no apparent anesthetic complications and tolerated procedure well    Post vital signs: stable    Level of consciousness: awake, alert and oriented    Nausea/Vomiting: no nausea/vomiting    Complications: none    Transfer of care protocol was followed      Last vitals:   Visit Vitals  /77   Pulse (!) 56   Temp 36.9 °C (98.4 °F) (Skin)   Resp 18   Ht 6' (1.829 m)   Wt 95.3 kg (210 lb)   SpO2 98%   BMI 28.48 kg/m²

## 2023-09-15 VITALS
RESPIRATION RATE: 18 BRPM | HEIGHT: 72 IN | HEART RATE: 65 BPM | BODY MASS INDEX: 29.21 KG/M2 | DIASTOLIC BLOOD PRESSURE: 54 MMHG | SYSTOLIC BLOOD PRESSURE: 97 MMHG | TEMPERATURE: 98 F | WEIGHT: 215.63 LBS | OXYGEN SATURATION: 95 %

## 2023-09-15 PROBLEM — R33.9 URINARY RETENTION: Status: ACTIVE | Noted: 2023-09-15

## 2023-09-15 LAB
POCT GLUCOSE: 105 MG/DL (ref 70–110)
POCT GLUCOSE: 114 MG/DL (ref 70–110)

## 2023-09-15 PROCEDURE — 97165 OT EVAL LOW COMPLEX 30 MIN: CPT

## 2023-09-15 PROCEDURE — 99214 PR OFFICE/OUTPT VISIT, EST, LEVL IV, 30-39 MIN: ICD-10-PCS | Mod: ,,, | Performed by: UROLOGY

## 2023-09-15 PROCEDURE — 94761 N-INVAS EAR/PLS OXIMETRY MLT: CPT

## 2023-09-15 PROCEDURE — 99900035 HC TECH TIME PER 15 MIN (STAT)

## 2023-09-15 PROCEDURE — 99214 OFFICE O/P EST MOD 30 MIN: CPT | Mod: ,,, | Performed by: UROLOGY

## 2023-09-15 PROCEDURE — 97530 THERAPEUTIC ACTIVITIES: CPT

## 2023-09-15 PROCEDURE — 51701 INSERT BLADDER CATHETER: CPT

## 2023-09-15 PROCEDURE — 25000003 PHARM REV CODE 250: Performed by: STUDENT IN AN ORGANIZED HEALTH CARE EDUCATION/TRAINING PROGRAM

## 2023-09-15 PROCEDURE — 97535 SELF CARE MNGMENT TRAINING: CPT

## 2023-09-15 PROCEDURE — 94799 UNLISTED PULMONARY SVC/PX: CPT

## 2023-09-15 PROCEDURE — 51798 US URINE CAPACITY MEASURE: CPT

## 2023-09-15 PROCEDURE — 63600175 PHARM REV CODE 636 W HCPCS: Performed by: STUDENT IN AN ORGANIZED HEALTH CARE EDUCATION/TRAINING PROGRAM

## 2023-09-15 RX ADMIN — ATORVASTATIN CALCIUM 20 MG: 20 TABLET, FILM COATED ORAL at 08:09

## 2023-09-15 RX ADMIN — FAMOTIDINE 20 MG: 20 TABLET ORAL at 08:09

## 2023-09-15 RX ADMIN — ACETAMINOPHEN 650 MG: 325 TABLET ORAL at 08:09

## 2023-09-15 RX ADMIN — CEFAZOLIN SODIUM 2 G: 2 SOLUTION INTRAVENOUS at 03:09

## 2023-09-15 RX ADMIN — ACETAMINOPHEN 650 MG: 325 TABLET ORAL at 04:09

## 2023-09-15 RX ADMIN — LISINOPRIL 5 MG: 5 TABLET ORAL at 08:09

## 2023-09-15 RX ADMIN — SENNOSIDES 8.6 MG: 8.6 TABLET, FILM COATED ORAL at 08:09

## 2023-09-15 RX ADMIN — OXYCODONE HYDROCHLORIDE 5 MG: 5 TABLET ORAL at 03:09

## 2023-09-15 RX ADMIN — ASPIRIN 81 MG: 81 TABLET, COATED ORAL at 08:09

## 2023-09-15 RX ADMIN — CELECOXIB 200 MG: 100 CAPSULE ORAL at 08:09

## 2023-09-15 RX ADMIN — CARBIDOPA AND LEVODOPA 1 TABLET: 25; 100 TABLET ORAL at 08:09

## 2023-09-15 RX ADMIN — CARBIDOPA AND LEVODOPA 1 TABLET: 25; 100 TABLET ORAL at 04:09

## 2023-09-15 NOTE — CONSULTS
[facility] Surg  Urology  Consult Note    Patient Name: Brandan Werner  MRN: [id number]  Admission Date: 9/14/2023  Hospital Length of Stay: 0   Code Status: Prior   Attending Provider: Gabe Manning MD   Consulting Provider: Fernandez Oliveira MD  Primary Care Physician: Km Chicas MD  Principal Problem:<principal problem not specified>    Inpatient consult to Urology  Consult performed by: Fernandez Oliveira MD  Consult ordered by: Dannie Barnett MD          Subjective:     HPI:  Brandan Werner is a 79 yo M with a history of BPH, elevated PSA, kidney stones, HTN, HLD presently admitted after R shoulder arthroplasty. Urology was consulted for urinary retention.     Patient with a history of an enlarge prostate, 150 cc by CTU. Prior history of elevated PSA, biopsy with ASAP in 2013. Moderate/severe LUTs, AUA SS 19/2, PVR 67. Worsening frequency of urination on finasteride, tamsulosin. Also has a history of stones, s/p PCNL with Dr. Murray in 8/2023. Difficulty urinating after surgery, PVR bladder scan >700.     On assessment, patient is AF, HDS. No cross sectional imaging available for review. Patient with a recent PCNL, needs f/u for stent removal.          Past Medical History:   Diagnosis Date    Anxiety     BPH (benign prostatic hyperplasia)     Cataract     Elevated PSA     Erectile dysfunction     Hyperlipidemia     Hypertension     Hypogonadism male     Kidney stone 5/20/2020    Obesity     PTSD (post-traumatic stress disorder)     Shoulder arthritis     Urinary tract infection        Past Surgical History:   Procedure Laterality Date    COLONOSCOPY N/A 11/26/2018    Procedure: COLONOSCOPY;  Surgeon: Germán Moss MD;  Location: [address]);  Service: Endoscopy;  Laterality: N/A;    COLONOSCOPY W/ BIOPSIES  2010    CYSTOSCOPY Left 11/17/2022    Procedure: CYSTOSCOPY;  Surgeon: Dannie Murray MD;  Location: [facility];  Service: Urology;  Laterality: Left;    EXTRACORPOREAL SHOCK  WAVE LITHOTRIPSY Left 2022    Procedure: LITHOTRIPSY, ESWL [employer] [place] ESWL machine, scheduling call [phone] minutes;  Surgeon: Dannie Murray MD;  Location: [facility] OR;  Service: Urology;  Laterality: Left;  Confirmed with [facility]  [facility] conf # [id number]    EXTRACORPOREAL SHOCK WAVE LITHOTRIPSY Left 2023    Procedure: LITHOTRIPSY, ESWL [employer] [place] ESWL machine, scheduling call [phone] 60 minutes;  Surgeon: Dannie Murray MD;  Location: [facility] OR;  Service: Urology;  Laterality: Left;  next [facility] conf #[id number]    PENILE PROSTHESIS IMPLANT      PERCUTANEOUS NEPHROLITHOTOMY Left 2023    Procedure: CYSTOSCOPY, LEFT RETROGRADE PYELOGRAM, LEFT URETEROSCOPY WITH STONE BASKET EXTRACTION, PLACEMENT OF JJ STENT, LEFT NEPHROSTOMY TUBE REMOVAL;  Surgeon: Dannie Murray MD;  Location: [facility] OR;  Service: Urology;  Laterality: Left;  IR to place ureteral catheter prior, start time 1200  Cysto tower, flexible cysto/ureteroscopes, Laser in room, lithoclast in room, fluoroscopy with Tony to       Review of patient's allergies indicates:  No Known Allergies    Family History       Problem Relation (Age of Onset)    Cancer Mother    Cataracts Mother    Heart disease Father (62)    Stroke Brother (62)            Tobacco Use    Smoking status: Former     Current packs/day: 0.00     Average packs/day: 1 pack/day for 10.0 years (10.0 ttl pk-yrs)     Types: Cigarettes     Start date:      Quit date:      Years since quittin.7    Smokeless tobacco: Never   Substance and Sexual Activity    Alcohol use: No    Drug use: No    Sexual activity: Yes     Partners: Female       Review of Systems   Constitutional:  Negative for activity change, chills and fever.   Respiratory:  Negative for shortness of breath.    Cardiovascular:  Negative for chest pain.   Gastrointestinal:  Negative for abdominal pain, diarrhea, nausea and vomiting.   Genitourinary:  Positive for difficulty  "urinating.   Neurological:  Negative for dizziness and weakness.       Objective:     Temp:  [98 °F (36.7 °C)-99.9 °F (37.7 °C)] 98.2 °F (36.8 °C)  Pulse:  [52-81] 70  Resp:  [11-22] 19  SpO2:  [85 %-100 %] 95 %  BP: (133-188)/() 160/86  Weight: 97.8 kg (215 lb 9.8 oz)  Body mass index is 29.24 kg/m².      Bladder Scan Volume (mL): 748 mL (09/15/23 1138)    Drains       Drain  Duration                  Nephrostomy 08/14/23 0948 Left 8 Fr. 32 days         Closed/Suction Drain 09/14/23 1354 Tube - 1 Right;Anterior Shoulder Accordion 10 Fr. <1 day                     Physical Exam  Vitals reviewed.   Constitutional:       General: He is not in acute distress.     Appearance: He is well-developed. He is not diaphoretic.   HENT:      Head: Normocephalic and atraumatic.   Eyes:      General: No scleral icterus.  Cardiovascular:      Rate and Rhythm: Normal rate.   Pulmonary:      Effort: Pulmonary effort is normal. No respiratory distress.      Breath sounds: No stridor.   Abdominal:      General: There is no distension.      Palpations: Abdomen is soft.      Tenderness: There is no abdominal tenderness.   Genitourinary:     Comments: Dior catheter in place draining clear yellow urine  Musculoskeletal:      Cervical back: Normal range of motion.   Skin:     General: Skin is warm and dry.   Neurological:      Mental Status: He is alert.   Psychiatric:         Behavior: Behavior normal.          Significant Labs:    BMP:  No results for input(s): "NA", "K", "CL", "CO2", "BUN", "CREATININE", "LABGLOM", "GLUCOSE", "CALCIUM" in the last 168 hours.    CBC:  No results for input(s): "WBC", "HGB", "HCT", "PLT" in the last 168 hours.    Blood Culture: No results for input(s): "LABBLOO" in the last 168 hours.  Urine Culture: No results for input(s): "LABURIN" in the last 168 hours.  Urine Studies: No results for input(s): "COLORU", "APPEARANCEUA", "PHUR", "SPECGRAV", "PROTEINUA", "GLUCUA", "KETONESU", "BILIRUBINUA", " ""OCCULTUA", "NITRITE", "UROBILINOGEN", "LEUKOCYTESUR", "RBCUA", "WBCUA", "BACTERIA", "SQUAMEPITHEL", "HYALINECASTS" in the last 168 hours.    Invalid input(s): "JACKSONR"    Significant Imaging:  All pertinent imaging results/findings from the past 24 hours have been reviewed.                      Assessment and Plan:     Urinary retention  Brandan Werner is a 79 yo M with a history of BPH, elevated PSA, kidney stones, HTN, HLD presently admitted after R shoulder arthroplasty. Urology was consulted for urinary retention.   - 18 Fr coude placed without issue  - recommend maintain ludwig on discharge   - continue flomax on discharge  - will arrange follow up in one week for catheter removal, stent removal        VTE Risk Mitigation (From admission, onward)           Ordered     Place sequential compression device  Until discontinued         09/14/23 1936     Place JOSE DANIEL hose  Until discontinued         09/14/23 1936                    Fernandez Oliveira MD  Urology  [facility] Surg  "

## 2023-09-15 NOTE — HPI
Brandan Werner is a 77 yo M with a history of BPH, elevated PSA, kidney stones, HTN, HLD presently admitted after R shoulder arthroplasty. Urology was consulted for urinary retention.     Patient with a history of an enlarge prostate, >150 cc by CTU. Prior history of elevated PSA, biopsy with ASAP in 2013. Moderate/severe LUTs, AUA SS 19/2, PVR 67. Worsening frequency of urination on finasteride, tamsulosin. Also has a history of stones, s/p PCNL with Dr. Murray in 8/2023. Difficulty urinating after surgery, PVR bladder scan >700.     On assessment, patient is AF, HDS. No cross sectional imaging available for review. Patient with a recent PCNL, needs f/u for stent removal.

## 2023-09-15 NOTE — PROGRESS NOTES
"[facility] Progress Note    Surgeries:  R TSA     S:  Patient having difficulty voiding overnight.  Otherwise no acute events overnight.  Dressing is clean and dry.  Drain with minimal output was removed this morning.  Patient denies any paresthesias in the shoulder or in the right upper extremity.  Pain relatively well controlled.  He is not yet mobilized much.    O:   Vitals:    09/15/23 0430   BP: (!) 145/79   Pulse: 72   Resp: 19   Temp: 99.9 °F (37.7 °C)     No results for input(s): "WBC", "HGB", "HCT", "PLT" in the last 72 hours.  No results for input(s): "NA", "K", "CL", "CO2", "HCO3C", "BUN", "LABCREA", "GLU" in the last 72 hours.  No results for input(s): "ESR", "CRP" in the last 72 hours.    PE:  Gen: A+Ox3, NAD  Card: RRR by RP  Lungs: nonlabored breathing, symmetric chest rise  Abd: S/NT/ND  Right upper extremity   Right upper extremity and shoulder immobilizer  Dressings clean dry and intact   Drain with minimal sanguinous output   Neurovascularly intact 2+ radial pulse, and PIN ain ulnar nerve axillary nerve all intact to sensory and motor    A/P:  78-year-old male status post right total shoulder arthroplasty having some urinary retention.  -will monitor patient's urinary status this a.m., should be ambulating and should have Flomax to help void  -maintain shoulder immobilizer   -nonweightbearing right upper extremity  -discharge pending patient's ability to void      Jordan Burnette MD  [facility]    "

## 2023-09-15 NOTE — PROGRESS NOTES
Discharge orders noted. Additional clinical references attached. Patient's discharge instructions given by bedside RN and reviewed via this VN.  Education provided on new medication, diagnosis, and follow-up appointments to patient and his family.Teach back method used. Patient and his family verbalized understanding. All questions answered. Transport to [facility] requested. Floor nurse notified.    09/15/23 0807   AVS Confirmation   Discharge instructions and AVS given to and reviewed with patient and/or significant other. Yes

## 2023-09-15 NOTE — PLAN OF CARE
Problem: Occupational Therapy  Goal: Occupational Therapy Goal  Description: Goals to be met by: 10/15/23     Patient will increase functional independence with ADLs by performing:    UE Dressing with Modified [place].  LE Dressing with Modified [place].  Grooming while standing with Modified [place].  Toileting from toilet with Modified [place] for hygiene and clothing management.   Supine to sit with Modified [place].  Step transfer with Modified [place]  Toilet transfer to toilet with Modified [place].    Outcome: Ongoing, Progressing     Pt would benefit from cont OT services in order to maximize functional independence. Recommending HHOT/PT at d/c per pt and spouse request. Pt and spouse how increased concerns regarding catheter care and ADL assist post surgery. Educated on adaptive dsg and dsg with catheter cath; donning/doffing sling. Will progress pt as able.

## 2023-09-15 NOTE — ASSESSMENT & PLAN NOTE
Brandan Werner is a 77 yo M with a history of BPH, elevated PSA, kidney stones, HTN, HLD presently admitted after R shoulder arthroplasty. Urology was consulted for urinary retention.   - recommend discharge with ludwig catheter  - continue flomax on discharge  - will arrange follow up in one week for catheter removal, stent removal

## 2023-09-15 NOTE — PLAN OF CARE
Pt AAOx3. Medications administered per order. No difficulty swallowing. RUE immobilized in a sling, dressing CDI, accordian drain to site.     0020: Pt able to void, however, complaining of bladder pain. Bladder scan showed >500. Straight cath completed; 850cc urine output.     Pt able to ambulate with assistance to bedside commode. Pt denies pain, nausea, or discomfort. Encouraged to call with questions/concerns/assistance. Safety.     0530: Pt complaining of bladder pain. Bladder scan showed 550cc. Straight cath using a coude, 1000cc clear yellow urine. Due to void by 1130a.

## 2023-09-15 NOTE — SUBJECTIVE & OBJECTIVE
Past Medical History:   Diagnosis Date    Anxiety     BPH (benign prostatic hyperplasia)     Cataract     Elevated PSA     Erectile dysfunction     Hyperlipidemia     Hypertension     Hypogonadism male     Kidney stone 5/20/2020    Obesity     PTSD (post-traumatic stress disorder)     Shoulder arthritis     Urinary tract infection        Past Surgical History:   Procedure Laterality Date    COLONOSCOPY N/A 11/26/2018    Procedure: COLONOSCOPY;  Surgeon: Germán Moss MD;  Location: [address]);  Service: Endoscopy;  Laterality: N/A;    COLONOSCOPY W/ BIOPSIES  2010    CYSTOSCOPY Left 11/17/2022    Procedure: CYSTOSCOPY;  Surgeon: Dannie Murray MD;  Location: [facility];  Service: Urology;  Laterality: Left;    EXTRACORPOREAL SHOCK WAVE LITHOTRIPSY Left 12/2/2022    Procedure: LITHOTRIPSY, ESWL [employer] [place] ESWL machine, scheduling call [phone] 60 minutes;  Surgeon: Dannie Murray MD;  Location: [facility];  Service: Urology;  Laterality: Left;  [facility] # [id number]    EXTRACORPOREAL SHOCK WAVE LITHOTRIPSY Left 1/30/2023    Procedure: LITHOTRIPSY, ESWL [employer] [place] ESWL machine, scheduling call [phone] 60 minutes;  Surgeon: Dannie Murray MD;  Location: [facility];  Service: Urology;  Laterality: Left;  [facility] conf #[id number]    PENILE PROSTHESIS IMPLANT      PERCUTANEOUS NEPHROLITHOTOMY Left 8/14/2023    Procedure: CYSTOSCOPY, LEFT RETROGRADE PYELOGRAM, LEFT URETEROSCOPY WITH STONE BASKET EXTRACTION, PLACEMENT OF JJ STENT, LEFT NEPHROSTOMY TUBE REMOVAL;  Surgeon: Dannie Murray MD;  Location: [facility];  Service: Urology;  Laterality: Left;  IR to place ureteral catheter prior, start time 1200  Cysto tower, flexible cysto/ureteroscopes, Laser in room, lithoclast in room, fluoroscopy with Tony to       Review of patient's allergies indicates:  No Known Allergies    Family History       Problem Relation (Age of Onset)    Cancer Mother     Cataracts Mother    Heart disease Father (62)    Stroke Brother (62)            Tobacco Use    Smoking status: Former     Current packs/day: 0.00     Average packs/day: 1 pack/day for 10.0 years (10.0 ttl pk-yrs)     Types: Cigarettes     Start date:      Quit date:      Years since quittin.7    Smokeless tobacco: Never   Substance and Sexual Activity    Alcohol use: No    Drug use: No    Sexual activity: Yes     Partners: Female       Review of Systems   Constitutional:  Negative for activity change, chills and fever.   Respiratory:  Negative for shortness of breath.    Cardiovascular:  Negative for chest pain.   Gastrointestinal:  Negative for abdominal pain, diarrhea, nausea and vomiting.   Genitourinary:  Positive for difficulty urinating.   Neurological:  Negative for dizziness and weakness.       Objective:     Temp:  [98 °F (36.7 °C)-99.9 °F (37.7 °C)] 98.2 °F (36.8 °C)  Pulse:  [52-81] 70  Resp:  [11-22] 19  SpO2:  [85 %-100 %] 95 %  BP: (133-188)/() 160/86  Weight: 97.8 kg (215 lb 9.8 oz)  Body mass index is 29.24 kg/m².      Bladder Scan Volume (mL): 748 mL (09/15/23 1138)    Drains       Drain  Duration                  Nephrostomy 23 0948 Left 8 Fr. 32 days         Closed/Suction Drain 23 1354 Tube - 1 Right;Anterior Shoulder Accordion 10 Fr. <1 day                     Physical Exam  Vitals reviewed.   Constitutional:       General: He is not in acute distress.     Appearance: He is well-developed. He is not diaphoretic.   HENT:      Head: Normocephalic and atraumatic.   Eyes:      General: No scleral icterus.  Cardiovascular:      Rate and Rhythm: Normal rate.   Pulmonary:      Effort: Pulmonary effort is normal. No respiratory distress.      Breath sounds: No stridor.   Abdominal:      General: There is no distension.      Palpations: Abdomen is soft.      Tenderness: There is no abdominal tenderness.   Genitourinary:     Comments: Dior catheter in place draining clear  "yellow urine  IPP in place  Musculoskeletal:      Cervical back: Normal range of motion.   Skin:     General: Skin is warm and dry.   Neurological:      Mental Status: He is alert.   Psychiatric:         Behavior: Behavior normal.          Significant Labs:    BMP:  No results for input(s): "NA", "K", "CL", "CO2", "BUN", "CREATININE", "LABGLOM", "GLUCOSE", "CALCIUM" in the last 168 hours.    CBC:  No results for input(s): "WBC", "HGB", "HCT", "PLT" in the last 168 hours.    Blood Culture: No results for input(s): "LABBLOO" in the last 168 hours.  Urine Culture: No results for input(s): "LABURIN" in the last 168 hours.  Urine Studies: No results for input(s): "COLORU", "APPEARANCEUA", "PHUR", "SPECGRAV", "PROTEINUA", "GLUCUA", "KETONESU", "BILIRUBINUA", "OCCULTUA", "NITRITE", "UROBILINOGEN", "LEUKOCYTESUR", "RBCUA", "WBCUA", "BACTERIA", "SQUAMEPITHEL", "HYALINECASTS" in the last 168 hours.    Invalid input(s): "WRIGHTSUR"    Significant Imaging:  All pertinent imaging results/findings from the past 24 hours have been reviewed.                  "

## 2023-09-15 NOTE — PT/OT/SLP EVAL
Occupational Therapy   Evaluation/Treatment     Name: Brandan Werner  MRN: [id number]  Admitting Diagnosis: <principal problem not specified>  Recent Surgery: Procedure(s) (LRB):  ARTHROPLASTY, SHOULDER (Right) 1 Day Post-Op    Recommendations:     Discharge Recommendations: home health PT, home health OT  Discharge Equipment Recommendations:  shower chair, cane, straight  Barriers to discharge:  Decreased caregiver support    Assessment:     Brandan Werner is a 78 y.o. male with a medical diagnosis of <principal problem not specified>.  He presents with The encounter diagnosis was Osteoarthritis of glenohumeral joint, right.  . Performance deficits affecting function: weakness, impaired endurance, impaired self care skills, impaired functional mobility, gait instability, impaired balance, pain, edema, decreased safety awareness, decreased upper extremity function, orthopedic precautions, decreased coordination, decreased ROM.      Pt would benefit from cont OT services in order to maximize functional independence. Recommending HHOT/PT at d/c per pt and spouse request. Pt and spouse how increased concerns regarding catheter care and ADL assist post surgery. Educated on adaptive dsg and dsg with catheter cath; donning/doffing sling. Will progress pt as able.     Rehab Prognosis: Good; patient would benefit from acute skilled OT services to address these deficits and reach maximum level of function.       Plan:     Patient to be seen 5 x/week to address the above listed problems via self-care/home management, therapeutic activities, therapeutic exercises  Plan of Care Expires: 10/15/23  Plan of Care Reviewed with: patient, spouse, family    Subjective     Chief Complaint: pt reporting pain in shoulder; pt and spouse concerned with going home with catheter   Patient/Family Comments/goals: stay in hospital     Occupational Profile:  Living Environment: with spouse and friend in [facility], no steps to enter, WIS with built in  "seat   Previous level of function: independent/does not use DME; drives   Equipment Used at Home: none  Assistance upon Discharge: from spouse, however, spouse reports limited ability to care for pt 2/2 memory deficits and physical abilities; friend reports that she is not able to assist either    Pain/Comfort:  Pain Rating 1:  ("it hurts")  Location 1: shoulder  Pain Addressed 1: Reposition, Distraction, Cessation of Activity, Nurse notified  Pain Rating Post-Intervention 1:  (did not rate)    Patients cultural, spiritual, Evangelical conflicts given the current situation:      Objective:     Communicated with: nsvinita prior to session.  Patient found supine with   upon OT entry to room.    General Precautions: Standard, fall  Orthopedic Precautions: RUE non weight bearing (no clarification of ROM performance; kept in sling)  Braces: Shoulder abduction brace  Respiratory Status: Room air    Occupational Performance:    Bed Mobility:    Patient completed Scooting/Bridging with contact guard assistance  Patient completed Supine to Sit with minimum assistance  Patient completed Sit to Supine with contact guard assistance    Functional Mobility/Transfers:  Patient completed Sit <> Stand Transfer with stand by assistance and contact guard assistance  with  no assistive device   Functional Mobility: SBa- CGA with abrupt movement changes     Activities of Daily Living:  Upper Body Dressing: maximal assistance jabari gown as robe   Toileting: total assistance catheter 2/2 urinary retention     Cognitive/Visual Perceptual:  WFL orientation  Impaired insight and safety awareness  Pt reports issues with memory recall     Physical Exam:  Balance:    -       WFL seated; fair + standing   Postural examination/scapula alignment:    -       Rounded shoulders  -       Forward head  Skin integrity: Wound R shoulder surgery   Dominant hand:    -       right  Upper Extremity Range of Motion:   RUE ROM not performed excluding WFL wrist and " digits; RUE kept in sling; LUE WFL   Upper Extremity Strength:  LUE WFL ; Wrist and digits WFL R   Strength:  WFL B   Fine Motor Coordination:  WFL     AMPAC 6 Click ADL:  AMPAC Total Score: 14    Treatment & Education:  Pt educated on NWB status as well as UE to remain in sling   Educated pt and spouse, friend on adaptive UBD, LBD with catheter, donning/doffing sling   Functional mobility in room and hallway without AD   Discussed home safety and safety with positioning of UE    Patient left supine with all lines intact, call button in reach, bed alarm on, nsg notified, and family  present    GOALS:   Multidisciplinary Problems       Occupational Therapy Goals          Problem: Occupational Therapy    Goal Priority Disciplines Outcome Interventions   Occupational Therapy Goal     OT, PT/OT Ongoing, Progressing    Description: Goals to be met by: 10/15/23     Patient will increase functional independence with ADLs by performing:    UE Dressing with Modified [place].  LE Dressing with Modified [place].  Grooming while standing with Modified [place].  Toileting from toilet with Modified [place] for hygiene and clothing management.   Supine to sit with Modified [place].  Step transfer with Modified [place]  Toilet transfer to toilet with Modified [place].                         History:     Past Medical History:   Diagnosis Date    Anxiety     BPH (benign prostatic hyperplasia)     Cataract     Elevated PSA     Erectile dysfunction     Hyperlipidemia     Hypertension     Hypogonadism male     Kidney stone 5/20/2020    Obesity     PTSD (post-traumatic stress disorder)     Shoulder arthritis     Urinary tract infection          Past Surgical History:   Procedure Laterality Date    COLONOSCOPY N/A 11/26/2018    Procedure: COLONOSCOPY;  Surgeon: Germán Moss MD;  Location: [address]);  Service: Endoscopy;  Laterality: N/A;    COLONOSCOPY W/ BIOPSIES  2010    CYSTOSCOPY  Left 11/17/2022    Procedure: CYSTOSCOPY;  Surgeon: Dannie Murray MD;  Location: [facility] OR;  Service: Urology;  Laterality: Left;    EXTRACORPOREAL SHOCK WAVE LITHOTRIPSY Left 12/2/2022    Procedure: LITHOTRIPSY, ESWL [facility] [place] ESWL machine, scheduling call [phone] 60 minutes;  Surgeon: Dannie Murray MD;  Location: [facility] OR;  Service: Urology;  Laterality: Left;  Confirmed with [facility] 11/22 [facility] conf # [id number]    EXTRACORPOREAL SHOCK WAVE LITHOTRIPSY Left 1/30/2023    Procedure: LITHOTRIPSY, ESWL [facility] [place] ESWL machine, scheduling call [phone] 60 minutes;  Surgeon: Dannie Murray MD;  Location: [facility] OR;  Service: Urology;  Laterality: Left;  [facility] conf #[id number]    PENILE PROSTHESIS IMPLANT      PERCUTANEOUS NEPHROLITHOTOMY Left 8/14/2023    Procedure: CYSTOSCOPY, LEFT RETROGRADE PYELOGRAM, LEFT URETEROSCOPY WITH STONE BASKET EXTRACTION, PLACEMENT OF JJ STENT, LEFT NEPHROSTOMY TUBE REMOVAL;  Surgeon: Dannie Murray MD;  Location: [facility] OR;  Service: Urology;  Laterality: Left;  IR to place ureteral catheter prior, start time 1200  Cysto tower, flexible cysto/ureteroscopes, Laser in room, lithoclast in room, fluoroscopy with Tony to       Time Tracking:     OT Date of Treatment: 09/15/23  OT Start Time: 1504  OT Stop Time: 1542  OT Total Time (min): 38 min    Billable Minutes:Evaluation 10  Self Care/Home Management 18  Therapeutic Activity 10    9/15/2023

## 2023-09-15 NOTE — PLAN OF CARE
SOCIAL WORK DISCHARGE PLANNING ASSESSMENT    Sw completed discharge planning assessment with pt. Pt was easily engaged and education on the role of  was provided. Pt reported he lives with his wife Ivone and children. Pt stated he as no DME and is not current with  services. Pt drives himself to doctor appointments and Ivone will provide transportation to family home following discharge. Pt was encouraged to call with any questions or concerns. Pt verbalized understanding.     Future Appointments   Date Time Provider Department Center   10/4/2023  8:00 AM Dannie Murray MD [facility] UROLOGY [place] [facility]   1/29/2024 10:30 AM Km Chicas MD [facility] IM [place]        Patient Active Problem List   Diagnosis    Essential hypertension    Hyperlipidemia    Benign prostatic hyperplasia    Elevated PSA    Nuclear sclerosis    PTSD (post-traumatic stress disorder)    Shoulder arthritis    Ectatic aorta    Sensorineural hearing loss (SNHL) of both ears    Calcified granuloma of lung    Tremor    Chronic right shoulder pain    History of agent Orange exposure    Overweight (BMI 25.0-29.9)    Facet arthritis of cervical region    Kidney stones    Atherosclerosis of aorta    History of penile implant    Major depressive disorder    Adjustment disorder with depressed mood    Anemia    History of fall    Imbalance    Parkinson's disease    Major depressive disorder, recurrent, in full remission        09/15/23 0912   Discharge Assessment   Assessment Type Discharge Planning Assessment   Confirmed/corrected address, phone number and insurance Yes   Confirmed Demographics Correct on Facesheet   Source of Information patient   Communicated MANDA with patient/caregiver Date not available/Unable to determine   People in Home spouse;child(martina), adult   Facility Arrived From: home   Do you expect to return to your current living situation? Yes   Do you have help at home or someone to help you manage your care  at home? Yes   Who are your caregiver(s) and their phone number(s)? pt's wife Ivone Werner [phone]   Prior to hospitilization cognitive status: Alert/Oriented   Current cognitive status: Alert/Oriented   Readmission within 30 days? No   Patient currently being followed by outpatient case management? No   Do you currently have service(s) that help you manage your care at home? No   Do you take prescription medications? Yes   Do you have prescription coverage? Yes   Coverage [facility]   Do you have any problems affording any of your prescribed medications? No   Is the patient taking medications as prescribed? yes   Who is going to help you get home at discharge? pt's wife Ivone [phone]   How do you get to doctors appointments? car, drives self   Are you on dialysis? No   Do you take coumadin? No   DME Needed Upon Discharge    (TBD)   Discharge Plan discussed with: Patient   Discharge Plan A Home with family   OTHER   Name(s) of People in Home pt's wife Ivone [phone] and pt's children

## 2023-09-15 NOTE — PLAN OF CARE
D/c orders noted, no DME.     Pt's wife will provide transportation to family home following discharge.     Sw requested urology follow up.    4:00p.m. Pt's wife is requesting HH services upon discharge. Sw faxed [facility] form to [phone] so therefore pt can HH services upon discharge.    Pt is cleared to go from CM standpoint.     Future Appointments   Date Time Provider Department Center   10/4/2023  8:00 AM Dannie Murray MD [facility] UROLOGY [place] [facility]   1/29/2024 10:30 AM Km Chicas MD [facility] IM [place]         09/15/23 1157   Final Note   Assessment Type Final Discharge Note   Anticipated Discharge Disposition Home   What phone number can be called within the next 1-3 days to see how you are doing after discharge? [phone]   Post-Acute Status   Coverage [facility]   Discharge Delays None known at this time

## 2023-09-15 NOTE — PLAN OF CARE
Patient on room air with documented SpO2 and in no apparent distress. Will continue to monitor.    Attending Only

## 2023-09-16 NOTE — OP NOTE
Facility:  [facility]    Date of Surgery:  9/14/23    Surgeon:  Gabe Manning MD    First Assistant:  Dannie Barnett MD    Second Assistant:  Jordan Burnette MD    Anesthesia:  GETA + Interscalene    Pre-operative Diagnosis:  Right glenohumeral osteoarthritis    Indication:  Improve pain    Post-operative Diagnosis:  Right glenohumeral osteoarthritis    Procedure:  Right anatomic primary total shoulder arthroplasty    Implants:  Justus/Tornier Ascend Flex 7A standard PTC humeral stem  Justus/Tornier PerFORM Cortiloc Pegged Glenoid Size XL40  Justus/Tornier 54 mm x 18 mm low offset humeral head    The patient was identified in the pre-operative holding area. The correct extremity was marked and written informed consent was verified. Interscalene block was performed by the anesthesia team. The patient was transferred to the OR. The patient was placed on the OR table. General anesthesia was administered. Patient was positioned into a beach chair position. The head was secured with the neck in a neutral position. Bony prominences were well-padded. Bilateral JOSE DANIEL hoses and SCDs were applied. The shoulder was wiped down with hydrogen peroxide. The operative extremity was prepped and draped in the usual sterile fashion using chloroprep. A surgical timeout was performed to verify the correct extremely, administration of IV antibiotics withing 30 minutes of surgery start time, and administration of 1 gram TXA IVPB.     A deltopectoral approach was performed. Cephalic vein identified and retracted laterally. A harding elevator was used to release subdeltoid and subacromial adhesions. A Brown retractor was placed beneath the deltoid. The clavipectoral fascia was divided just lateral to the conjoint tendon. A deep narrow Puente was placed beneath the conjoint tendon. The upper 1/3 of the pectoralis major tendon was divided to improve exposure. The long head of the biceps was identified. The sheath  was opened proximally to the rotator interval and to the upper portion of the pectoralis major tendon. The long head of the biceps tendon was tenodesed to the upper pectoralis major tendon using multiple 0-vicryl sutures. The long head of the biceps was divided just above the tenodesis site. The subscapularis was identified and tagged with multiple 0-vicryl sutures. The upper and lower borders of the subscapularis were clearly identified. The three sisters at the inferior border of the subscapularis were suture ligated medially and laterally using a 0-vicryl stitch. A subscapularis tenotomy was made approximately 1 cm medial to the lesser tuberosity. The humeral head was dislocated with extension, adduction, and external rotation of the extremity. The capsule was dissected off the humeral neck, carefully getting on bone and staying on bone.     The humeral head demonstrated complete loss of articular cartilage. Peripheral osteophytes were removed with a rongeur. A starting awl was positioned down the middle of the humeral canal. The humeral osteotomy was performed in a freehand manner by outlining the capsular attachments to the anatomic neck. The resected head was placed on the back table and measured. The maximal humeral canal diameter was determined by using progressively larger sounders, placed in 30 degrees of retroversion. The size 8 sounder had a tight fit. A guided punch was used to remove medial metaphyseal bone. The sounder was removed and the humerus was broached in 30 degrees of retroversion until there was excellent press-fit. A size 7 broach demonstrated excellent press-fit. The A inclination best matched the neck cut. A calcar reamer was used to flatten the humeral cut. A humeral cut protector was secured to the broach.     A Fakuda retractor was placed behind the posterior lip of the glenoid to retract the humerus posteriorly. Circumferential exposure of the bony glenoid was achieved by excising any  remaining labrum. The remaining portion of the long head of the biceps was excised. A Batman retractor was positioned along the anterior glenoid. A transverse and longitudinal line was drawn on the face of the glenoid using Bovie to identify the center of the glenoid. An Extra-Large guide fit on the glenoid face very nicely. Guide pin was placed through the guide into the center of the glenoid in a perpendicular manner. The glenoid face was reamed until there was punctate bleeding and a positive Paprika sign. The hard subchondral bone was preserved. The central hole for the central post was drilled over the guide wire. The glenoid pegged guide was used to drill the three peg holes. A trial glenoid was placed and determined to be appropriately positioned and well-seated. The trial was removed. A 2nd dose of 1 g TXA IVPB was given. The glenoid was copiously irrigated with a Pulsavac. The peg holes were dried with Raytec sponges. The real glenoid component was cemented in place and excess cement was removed. After cement cure, the humerus was exposed.     A size 54 mm x 18 mm low offset head matched the neck cut and resected head most closely. The shoulder was reduced and trialed. In neutral position, the head faced the glenoid and had approximately a 50% anterior-posterior shuck. ROM was excellent. The trial humeral components were removed. The real humeral components were opened and assembled to match the trial components. The humeral canal was irrigated thoroughly with Pulsavac. The real humeral components were impacted in place in a press-fit manner. The shoulder was reduced.     The glenohumeral joint was irrigated with Pulsavac. A three sided release of the subscapularis tendon was performed to enable a tension free repair. Three No. 2 Fiberwire sutures inn a figure-of-8 fashion was used to repair the subscapularis. The repair was anatomic and tension free to 20-30 degrees of external rotation.     A medium  Hemovac drain was placed in the deltopectoral interval and brought out superiorly. The deltopectoral interval was closed with multiple 0-vicryl sutures deep to the cephalic vein. The subcutaneous layer was closed with 2-0 vicryl in an interrupted buried fashion. The skin was closed with a 3-0 subcuticular running monocryl, Dermabond, and Steri-Strips. 1 g TXA IVPB was given at the time of closure.    A sterile dressing was placed. A sling was placed. The patient was awakened and transferred to the PACU in stable condition.     EBL:  50 cc    Drains:  Hemovac x 1    Complications:  None known

## 2023-09-18 NOTE — DISCHARGE SUMMARY
[facility] Surg  Orthopedics  Discharge Summary      Patient Name: Brandan Werner  MRN: [id number]  Admission Date: 9/14/2023  Hospital Length of Stay: 0 days  Discharge Date and Time: 9/15/2023  6:17 PM  Attending Physician: No att. providers found   Discharging Provider: Leisa Barnett MD  Primary Care Provider: Km Chicas MD    HPI: 78M with R glenohumeral arthritis    Procedure(s) (LRB):  ARTHROPLASTY, SHOULDER (Right)      Hospital Course: s/p R TSA. Tolerated procedure well. Pain controlled. Ambulated safely. Difficulty urinating. Urology consulted recommending keeping ludwig and to be seen in clinic this coming week.     Consults (From admission, onward)          Status Ordering Provider     Inpatient consult to Urology  Once        Provider:  (Not yet assigned)    LEISA Deshpande            Significant Diagnostic Studies:     Pending Diagnostic Studies:       None          Final Active Diagnoses:    Diagnosis Date Noted POA    Urinary retention [R33.9] 09/15/2023 No      Problems Resolved During this Admission:      Discharged Condition: good    Disposition: Home or Self Care    Follow Up:    Patient Instructions:      Weight bearing restrictions (specify):   Order Comments: nwb     Medications:  Reconciled Home Medications:      Medication List        START taking these medications      ondansetron 4 MG tablet  Commonly known as: ZOFRAN  Take 1 tablet (4 mg total) by mouth every 8 (eight) hours as needed for Nausea.     oxyCODONE-acetaminophen 5-325 mg per tablet  Commonly known as: PERCOCET  Take 1 tablet by mouth every 4 (four) hours as needed for Pain.            CONTINUE taking these medications      ascorbic acid (vitamin C) 250 MG tablet  Commonly known as: VITAMIN C  Take 250 mg by mouth once daily.     atorvastatin 40 MG tablet  Commonly known as: LIPITOR  Take 20 mg by mouth once daily.     carbidopa-levodopa  mg  mg per tablet  Commonly known as: SINEMET  TAKE 1 TABLET  BY MOUTH THREE TIMES A DAY FOR PARKINSON'S DISE** WITH MEALSASE     celecoxib 200 MG capsule  Commonly known as: CeleBREX  Take 400 mg by mouth once daily.     cetirizine 10 MG tablet  Commonly known as: ZYRTEC  Take 1 tablet (10 mg total) by mouth once daily.     lisinopriL 5 MG tablet  Commonly known as: PRINIVIL,ZESTRIL  Take 5 mg by mouth once daily.     pantoprazole 40 MG tablet  Commonly known as: PROTONIX  Take 1 tablet (40 mg total) by mouth once daily. for 14 days     primidone 50 MG Tab  Commonly known as: MYSOLINE  Take 2 tablets (100 mg total) by mouth every evening.     tamsulosin 0.4 mg Cap  Commonly known as: FLOMAX  Take 1 capsule (0.4 mg total) by mouth nightly.            STOP taking these medications      oxyCODONE 5 MG immediate release tablet  Commonly known as: ROXICODONE              Dannie Barnett MD  Orthopedics  [facility]    I have reviewed the notes, assessments, and/or procedures performed by Dr. Barnett, I concur with her/his documentation of Brandan Werner.

## 2023-09-19 ENCOUNTER — TELEPHONE (OUTPATIENT)
Dept: ORTHOPEDICS | Facility: CLINIC | Age: 78
End: 2023-09-19
Payer: OTHER GOVERNMENT

## 2023-09-19 ENCOUNTER — TELEPHONE (OUTPATIENT)
Dept: INTERNAL MEDICINE | Facility: CLINIC | Age: 78
End: 2023-09-19
Payer: OTHER GOVERNMENT

## 2023-09-19 ENCOUNTER — TELEPHONE (OUTPATIENT)
Dept: UROLOGY | Facility: CLINIC | Age: 78
End: 2023-09-19
Payer: OTHER GOVERNMENT

## 2023-09-19 NOTE — TELEPHONE ENCOUNTER
----- Message from Tali Dykes MA sent at 9/19/2023 11:57 AM CDT -----  Contact: [phone]    ----- Message -----  From: Janae Alatorre  Sent: 9/19/2023  11:18 AM CDT  To: Juan Francisco MONK Staff    [email or web address]     Patient is calling for Medical Advice regarding: Swollen feet    How long has patient had these symptoms: Yesterday    Pharm[facility] and phone#:   [facility] [place] [facility] [address]  [address]  [place] [address]  Phone: [phone] Fax: [phone]       Would like response via [employer]:  Phone call     Comments:

## 2023-09-19 NOTE — TELEPHONE ENCOUNTER
I spoke to the wife regarding the patient experiencing a little blood in his urine. The wife stated that a catheter was placed Thursday and a little blood in his urine was seen on Friday. I voiced that the blood in his urine could be from either the insertion from the catheter or maybe a pull. Patient wife is aware and will contact his PCP for the swelling of his foot.

## 2023-09-19 NOTE — TELEPHONE ENCOUNTER
----- Message from Ruby Webb sent at 9/19/2023 10:37 AM CDT -----  Regarding: advice  Contact: [phone]/leonidas Hines  Type:  Needs Medical Advice    Who Called:  pt wife Flora  Symptoms (please be specific):  blood in his urine and both feet are swollen  How long has patient had these symptoms:   yesterday   Pharmacy name and phone #:   [address] [facility]  Would the patient rather a call back or a response via [facility]?  Call   Best Call Back Number:  [phone]/leonidas Hines  Additional Information:

## 2023-09-19 NOTE — TELEPHONE ENCOUNTER
----- Message from Ruby Webb sent at 9/19/2023 10:32 AM CDT -----  Regarding: Post op  Contact: [phone]/wife Flora  Patient is requesting a call back regarding scheduling his Post Op appt. Pt has not been contacted by any home health agency as well.   Would the patient rather a call back or a response via [facility]?  Call   Best Call Back Number:  [phone]/wife Flora   Additional Information:

## 2023-09-21 NOTE — PLAN OF CARE
[facility]      HOME HEALTH ORDERS  FACE TO FACE ENCOUNTER    Patient Name: Brandan Werner  YOB: 1945    PCP: Km Chicas MD   PCP Address: [address] / [place] [address]  PCP Phone Number: [phone]  PCP Fax: [phone]    Encounter Date: 8/2/23    Admit to Home Health    Diagnoses:  Active Hospital Problems    Diagnosis  POA    Urinary retention [R33.9]  No      Resolved Hospital Problems   No resolved problems to display.       Follow Up Appointments:  Future Appointments   Date Time Provider Department Center   10/3/2023 10:15 AM Gabe Manning MD [id number] [place] [facility]   10/4/2023  8:00 AM Dannie Murray MD [facility] UROLOGY [place] [facility]   1/29/2024 10:30 AM Km Chicas MD [facility]       Allergies:Review of patient's allergies indicates:  No Known Allergies    Medications: Review discharge medications with patient and family and provide education.      I have seen and examined this patient within the last 30 days. My clinical findings that support the need for the home health skilled services and home bound status are the following:no   Weakness/numbness causing balance and gait disturbance due to Surgery making it taxing to leave home.     Diet:   regular diet    Referrals/ Consults  Physical Therapy to evaluate and treat. Evaluate for home safety and equipment needs; Establish/upgrade home exercise program. Perform / instruct on therapeutic exercises, gait training, transfer training, and Range of Motion.    Activities: NWB to RUE in sling until evaluated in clinic       Nursing:   Agency to admit patient within 24 hours of hospital discharge unless specified on physician order or at patient request    SN to complete comprehensive assessment including routine vital signs. Instruct on disease process and s/s of complications to report to MD. Review/verify medication list sent home with the patient at time of discharge  and instruct  patient/caregiver as needed. Frequency may be adjusted depending on start of care date.     Skilled nurse to perform up to 3 visits PRN for symptoms related to diagnosis        Ok to schedule additional visits based on staff availability and patient request on consecutive days within the home health episode.      Home Health Aide:  Nursing Twice weekly, Physical Therapy Three times weekly, Occupational Therapy Three times weekly    Wound Care Orders  no    I certify that this patient is confined to his home and needs physical therapy.    Dr Gabe Barnett  9/21/23

## 2023-09-25 DIAGNOSIS — Z96.611 STATUS POST TOTAL SHOULDER REPLACEMENT, RIGHT: Primary | ICD-10-CM

## 2023-09-26 ENCOUNTER — TELEPHONE (OUTPATIENT)
Dept: ORTHOPEDICS | Facility: CLINIC | Age: 78
End: 2023-09-26
Payer: OTHER GOVERNMENT

## 2023-09-26 NOTE — TELEPHONE ENCOUNTER
----- Message from Erica Hall sent at 9/26/2023  1:56 PM CDT -----  Regarding: Michela [phone]  Contact: Michela [phone]  [facility]     Calling to talk to nurse in regards to patients orders for treatment for today on the right shoulder.

## 2023-10-03 ENCOUNTER — OFFICE VISIT (OUTPATIENT)
Dept: ORTHOPEDICS | Facility: CLINIC | Age: 78
End: 2023-10-03
Payer: MEDICARE

## 2023-10-03 VITALS
HEART RATE: 83 BPM | SYSTOLIC BLOOD PRESSURE: 112 MMHG | HEIGHT: 72 IN | DIASTOLIC BLOOD PRESSURE: 75 MMHG | WEIGHT: 215.63 LBS | BODY MASS INDEX: 29.21 KG/M2

## 2023-10-03 DIAGNOSIS — Z96.611 STATUS POST TOTAL REPLACEMENT OF RIGHT SHOULDER: Primary | ICD-10-CM

## 2023-10-03 PROCEDURE — 1101F PT FALLS ASSESS-DOCD LE1/YR: CPT | Mod: CPTII,S$GLB,, | Performed by: ORTHOPAEDIC SURGERY

## 2023-10-03 PROCEDURE — 99024 PR POST-OP FOLLOW-UP VISIT: ICD-10-PCS | Mod: S$GLB,,, | Performed by: ORTHOPAEDIC SURGERY

## 2023-10-03 PROCEDURE — 3078F DIAST BP <80 MM HG: CPT | Mod: CPTII,S$GLB,, | Performed by: ORTHOPAEDIC SURGERY

## 2023-10-03 PROCEDURE — 3288F FALL RISK ASSESSMENT DOCD: CPT | Mod: CPTII,S$GLB,, | Performed by: ORTHOPAEDIC SURGERY

## 2023-10-03 PROCEDURE — 3074F SYST BP LT 130 MM HG: CPT | Mod: CPTII,S$GLB,, | Performed by: ORTHOPAEDIC SURGERY

## 2023-10-03 PROCEDURE — 1125F AMNT PAIN NOTED PAIN PRSNT: CPT | Mod: CPTII,S$GLB,, | Performed by: ORTHOPAEDIC SURGERY

## 2023-10-03 PROCEDURE — 3074F PR MOST RECENT SYSTOLIC BLOOD PRESSURE < 130 MM HG: ICD-10-PCS | Mod: CPTII,S$GLB,, | Performed by: ORTHOPAEDIC SURGERY

## 2023-10-03 PROCEDURE — 1101F PR PT FALLS ASSESS DOC 0-1 FALLS W/OUT INJ PAST YR: ICD-10-PCS | Mod: CPTII,S$GLB,, | Performed by: ORTHOPAEDIC SURGERY

## 2023-10-03 PROCEDURE — 1159F PR MEDICATION LIST DOCUMENTED IN MEDICAL RECORD: ICD-10-PCS | Mod: CPTII,S$GLB,, | Performed by: ORTHOPAEDIC SURGERY

## 2023-10-03 PROCEDURE — 1160F RVW MEDS BY RX/DR IN RCRD: CPT | Mod: CPTII,S$GLB,, | Performed by: ORTHOPAEDIC SURGERY

## 2023-10-03 PROCEDURE — 1160F PR REVIEW ALL MEDS BY PRESCRIBER/CLIN PHARMACIST DOCUMENTED: ICD-10-PCS | Mod: CPTII,S$GLB,, | Performed by: ORTHOPAEDIC SURGERY

## 2023-10-03 PROCEDURE — 99999 PR PBB SHADOW E&M-EST. PATIENT-LVL III: CPT | Mod: PBBFAC,,, | Performed by: ORTHOPAEDIC SURGERY

## 2023-10-03 PROCEDURE — 1125F PR PAIN SEVERITY QUANTIFIED, PAIN PRESENT: ICD-10-PCS | Mod: CPTII,S$GLB,, | Performed by: ORTHOPAEDIC SURGERY

## 2023-10-03 PROCEDURE — 3288F PR FALLS RISK ASSESSMENT DOCUMENTED: ICD-10-PCS | Mod: CPTII,S$GLB,, | Performed by: ORTHOPAEDIC SURGERY

## 2023-10-03 PROCEDURE — 99024 POSTOP FOLLOW-UP VISIT: CPT | Mod: S$GLB,,, | Performed by: ORTHOPAEDIC SURGERY

## 2023-10-03 PROCEDURE — 1159F MED LIST DOCD IN RCRD: CPT | Mod: CPTII,S$GLB,, | Performed by: ORTHOPAEDIC SURGERY

## 2023-10-03 PROCEDURE — 99999 PR PBB SHADOW E&M-EST. PATIENT-LVL III: ICD-10-PCS | Mod: PBBFAC,,, | Performed by: ORTHOPAEDIC SURGERY

## 2023-10-03 PROCEDURE — 3078F PR MOST RECENT DIASTOLIC BLOOD PRESSURE < 80 MM HG: ICD-10-PCS | Mod: CPTII,S$GLB,, | Performed by: ORTHOPAEDIC SURGERY

## 2023-10-03 RX ORDER — POLYVINYL ALCOHOL 14 MG/ML
SOLUTION/ DROPS OPHTHALMIC
COMMUNITY
Start: 2023-08-25

## 2023-10-03 RX ORDER — FLUOXETINE HYDROCHLORIDE 20 MG/1
60 CAPSULE ORAL
COMMUNITY
Start: 2023-04-11

## 2023-10-03 RX ORDER — TRAZODONE HYDROCHLORIDE 100 MG/1
100 TABLET ORAL
COMMUNITY
Start: 2023-07-05

## 2023-10-03 NOTE — PROGRESS NOTES
Patient ID:   Brandan Werner is a 78 y.o. male.    Chief Complaint:   Surgical aftercare status post right total shoulder arthroplasty    HPI:   Patient is returning today for his 1st postoperative visit.  Pain level is 3/10.  He feels like he is doing well.  He currently has an indwelling Dior catheter in place due to postoperative urinary retention.    Medications:    Current Outpatient Medications:     ascorbic acid, vitamin C, (VITAMIN C) 250 MG tablet, Take 250 mg by mouth once daily., Disp: , Rfl:     atorvastatin (LIPITOR) 40 MG tablet, Take 20 mg by mouth once daily., Disp: , Rfl:     carbidopa-levodopa  mg (SINEMET)  mg per tablet, TAKE 1 TABLET BY MOUTH THREE TIMES A DAY FOR PARKINSON'S DISE** WITH MEALSASE, Disp: , Rfl:     celecoxib (CELEBREX) 200 MG capsule, Take 400 mg by mouth once daily., Disp: , Rfl:     FLUoxetine 20 MG capsule, 60 mg., Disp: , Rfl:     lisinopril (PRINIVIL,ZESTRIL) 5 MG tablet, Take 5 mg by mouth once daily., Disp: , Rfl:     ondansetron (ZOFRAN) 4 MG tablet, Take 1 tablet (4 mg total) by mouth every 8 (eight) hours as needed for Nausea., Disp: 28 tablet, Rfl: 0    oxyCODONE-acetaminophen (PERCOCET) 5-325 mg per tablet, Take 1 tablet by mouth every 4 (four) hours as needed for Pain., Disp: 28 tablet, Rfl: 0    polyvinyl alcohol, artificial tears, (LIQUIFILM TEARS) 1.4 % ophthalmic solution, INSTILL ONE DROP IN EACH EYE FOUR TIMES A DAY FOR DRY EYES, Disp: , Rfl:     tamsulosin (FLOMAX) 0.4 mg Cp24, Take 1 capsule (0.4 mg total) by mouth nightly., Disp: 30 capsule, Rfl: 11    traZODone (DESYREL) 100 MG tablet, 100 mg., Disp: , Rfl:     cetirizine (ZYRTEC) 10 MG tablet, Take 1 tablet (10 mg total) by mouth once daily., Disp: 30 tablet, Rfl: 0    pantoprazole (PROTONIX) 40 MG tablet, Take 1 tablet (40 mg total) by mouth once daily. for 14 days, Disp: 14 tablet, Rfl: 0    primidone (MYSOLINE) 50 MG Tab, Take 2 tablets (100 mg total) by mouth every evening., Disp: 60  tablet, Rfl: 11  No current facility-administered medications for this visit.    Facility-Administered Medications Ordered in Other Visits:     lactated ringers infusion, , Intravenous, Continuous, Maureen Solomon DNP    LIDOcaine (PF) 10 mg/ml (1%) injection 10 mg, 1 mL, Intradermal, Once, Maureen Solomon DNP    Allergies:  Review of patient's allergies indicates:  No Known Allergies    Vitals:  /75   Pulse 83   Ht 6' (1.829 m)   Wt 97.8 kg (215 lb 9.8 oz)   BMI 29.24 kg/m²     Physical Examination:  Ortho Exam   Right shoulder exam:   Skin incision is clean, dry, and intact.  No cellulitis.    Assessment:  1. Status post total replacement of right shoulder      Plan:  Patient will continue with physical therapy with the emphasis on his range of motion.  At the present time he should continue with pendulums, passive supine elevation to 90, and passive supine external rotation neutral.  I would like to see him back in 1 month at which time we will get an x-ray of the right shoulder.    In regards to the indwelling Dior catheter, he is scheduled to see Urology tomorrow.  I assume that the catheter will be removed tomorrow.       No follow-ups on file.

## 2023-10-04 ENCOUNTER — PROCEDURE VISIT (OUTPATIENT)
Dept: UROLOGY | Facility: CLINIC | Age: 78
End: 2023-10-04
Payer: MEDICARE

## 2023-10-04 VITALS
DIASTOLIC BLOOD PRESSURE: 86 MMHG | HEART RATE: 76 BPM | SYSTOLIC BLOOD PRESSURE: 136 MMHG | HEIGHT: 72 IN | WEIGHT: 199.5 LBS | BODY MASS INDEX: 27.02 KG/M2

## 2023-10-04 DIAGNOSIS — N20.0 KIDNEY STONES: ICD-10-CM

## 2023-10-04 DIAGNOSIS — N40.0 BENIGN PROSTATIC HYPERPLASIA, UNSPECIFIED WHETHER LOWER URINARY TRACT SYMPTOMS PRESENT: Primary | ICD-10-CM

## 2023-10-04 PROCEDURE — 99213 PR OFFICE/OUTPT VISIT, EST, LEVL III, 20-29 MIN: ICD-10-PCS | Mod: 25,S$GLB,, | Performed by: UROLOGY

## 2023-10-04 PROCEDURE — 99213 OFFICE O/P EST LOW 20 MIN: CPT | Mod: 25,S$GLB,, | Performed by: UROLOGY

## 2023-10-04 RX ORDER — SULFAMETHOXAZOLE AND TRIMETHOPRIM 800; 160 MG/1; MG/1
1 TABLET ORAL
Status: COMPLETED | OUTPATIENT
Start: 2023-10-04 | End: 2023-10-04

## 2023-10-04 RX ADMIN — SULFAMETHOXAZOLE AND TRIMETHOPRIM 1 TABLET: 800; 160 TABLET ORAL at 09:10

## 2023-10-04 NOTE — PROCEDURES
CYSTOSCOPY W/ STENT REMOVAL    Date/Time: 10/4/2023 8:00 AM    Performed by: Dannie Murray MD  Authorized by: Dannie Murray MD  Preparation: Patient was prepped and draped in the usual sterile fashion.  Local anesthesia used: yes    Anesthesia:  Local anesthesia used: yes  Local Anesthetic: lidocaine/prilocaine emulsion    Sedation:  Patient sedated: no    Patient tolerance: patient tolerated the procedure well with no immediate complications  Comments: Procedure details:    Patient prepped and draped in normal sterile fashion.  17F flexible cystoscope introduced.  There was some edema around the stent.  The stent was then grasped with grasping forceps and removed in its entirety.  Patient tolerated the procedure well.    Dannie Murray MD

## 2023-10-04 NOTE — PROGRESS NOTES
Subjective:       Patient ID: Brandan Werner is a 78 y.o. male.    Chief Complaint: No chief complaint on file.       This is a 78 y.o.  male patient with BPH, elevated PSA, kidney stone.  Past patient of Dr. Laboy last seen in 2020 for cystoscopy for microhematuria that was negative except large prostate.  H/o penile implant that still uses.  CTU in 2020 with 150 gm prostate no other findings.  Long h/o elevated PSA.    PSA 6.2 in 2013 and biopsy negative  Biopsy in 2013 with ASAP  PSA recently checked by PCP and was 8.2   Moderate/severe LUTs, AUA SS 19/2, PVR 67  Worsening frequency of urination on finasteride, tamsulosin.    UA showed microhematuria.  CTU done showed left 1 cm UPJ stone with moderate hydronephrosis 11/22 and left non obstructing stones.    Failed left stent, required PCN and antegrade stent  Left ESWL 12/2  Follow-up KUB shows resolution of UPJ stone continued lower pole left stone.  Repeat left LP stone ESWL 1/30/23, appeared to be good fragmentation.  KUB shows left dense stone left LP but still present.  Stent in place. Stent removed 2/7/23.   CT 5/1/23: left enlarging LP stone now 2.3 cm, mild left pelviectasis, read as possible lesion, on review with radiologist small stone near UPJ and mild pelviectasis with pelvis thickening no obvious mass.    Intermittent left sided pain, still LUTs from BPH but not as severe as prior.        Attempted left PCNL 8/14/23--IR could not get access down ureter, taken to cystoscopy suite, ureteroscopy done showing access between two calyces (high risk bleeding to dilate), survey of kidney for some time unable to locate dominant stone some stone fragmented in lower pole, thus access removed and stent placed.    Follow up CT 9/23 with left LP 1.1 cm stone.  Minimal pain.  Had shoulder surgery 9/14/23 and retention after, ludwig in place.           LAST PSA  Lab Results   Component Value Date    PSA 8.2 (H) 08/19/2022    PSA 7.0 (H) 03/02/2021    PSA 6.5 (H)  01/30/2020    PSA 3.0 11/02/2016    PSA 5.0 (H) 05/02/2014    PSA 6.2 (H) 10/18/2013    PSA 4.49 (H) 02/22/2013    PSA 4.1 (H) 05/09/2012    PSA 4.04 (H) 04/05/2012    PSA 2.8 08/31/2010    PSA 3.1 02/25/2009    PSA 2.2 09/06/2007    PSA 1.8 09/30/2006    PSA 1.7 12/28/2005    PSA 1.8 10/30/2004    PSADIAG 10.5 (H) 08/22/2023    PSADIAG 4.9 (H) 02/22/2023    PSADIAG 7.0 (H) 03/07/2019    PSADIAG 6.6 (H) 09/07/2018    PSADIAG 6.4 (H) 06/08/2018    PSADIAG 5.3 (H) 01/03/2018    PSADIAG 2.9 02/22/2016    PSADIAG 2.9 08/24/2015    PSADIAG 5.7 (H) 02/24/2015    PSADIAG 5.1 (H) 06/26/2014    PSADIAG 5.8 (H) 12/03/2013    PSATOTAL 6.5 (H) 05/09/2023    PSAFREE 1.69 (H) 05/09/2023       Lab Results   Component Value Date    CREATININE 1.1 08/22/2023       ---  PMH/PSH/Medications/Allergies/Social history reviewed and as in chart.    Review of Systems   Constitutional:  Negative for activity change, chills and fever.   HENT:  Negative for congestion.    Respiratory:  Negative for cough, chest tightness and shortness of breath.    Cardiovascular:  Negative for chest pain and palpitations.   Gastrointestinal:  Negative for abdominal distention, abdominal pain, nausea and vomiting.   Genitourinary:  Positive for difficulty urinating and flank pain. Negative for frequency, hematuria, penile pain, scrotal swelling and testicular pain.   Musculoskeletal:  Negative for gait problem.       Objective:      Physical Exam  HENT:      Head: Atraumatic.   Pulmonary:      Effort: Pulmonary effort is normal.   Neurological:      General: No focal deficit present.      Mental Status: He is alert and oriented to person, place, and time.         Assessment:     Problem Noted   Kidney Stones 5/20/2020    Left 1.5 cm UPJ and non obstructing left lower pole greater than 1 cm on CTU 11/2022 with moderate hydronephrosis     Unable to place left stent 11/17/22 due to large prostate, prosthesis and unable to find ureteral orifice.   Left PCN  11/18/22, internalized to stent 11/28  Left ESWL UPJ stone 12/2/22  KUB after with 1.8 cm left lower pole stone consistent with lower pole stone on CT urogram, no renal pelvis/gualberto stent stone seen.  Left ESWL 1/30/23 good fragmentation  KUB shows continued LLP stone  CT 5/23: left enlarging LP stone 2.3 cm mild pelviectasis, small stone fragment at UPJ  Attempted left PCNL 8/14/23--IR could not get access down ureter, taken to cystoscopy suite, ureteroscopy done showing access between two calyx, survey of kidney for some time unable to locate dominant stone some stone fragmented in lower pole     Elevated Psa 3/11/2013    Biopsy 2013 ASAP 1 core, 2014 benign  PSA 8/22--8.2  Volume 183, PSAD 0.04       Benign Prostatic Hyperplasia     183 gm prostate on CTU 2022  Initial AUA SS (8/2022): 19/2  PVR 67     History of Penile Implant 5/20/2020         Plan:     CT reviewed in detail, LP stone was not able to be seen with ureteroscope, access from PCN removed given poor location/risk of bleed and unclear if would allowed stone to be visualized.  Possible stone in calyx that can't ID or access.  Some stone broken up and now burden smaller. Options discussed: observation, partial nephrectomy, repeat ESWL unclear if would clear fragments, attempted repeat URS v PCNL.  Recommend observation.  Agrees, stent removed  Regarding enlarged prostate and IPP, would recommend consider HoLEP vs observation.  Wishes to observe, minimal LUTs at current and retention only after procedure recently.    Void trial, follow up in unable to void  Follow up in 1 month with renal US prior    Dannie Murray MD

## 2023-10-06 ENCOUNTER — TELEPHONE (OUTPATIENT)
Dept: UROLOGY | Facility: CLINIC | Age: 78
End: 2023-10-06
Payer: OTHER GOVERNMENT

## 2023-10-06 NOTE — TELEPHONE ENCOUNTER
----- Message from Douglas Cervantes sent at 10/6/2023  4:03 PM CDT -----  Pt Requesting Call Back    Who called: pt's wife  Who called for pt:  Best call back #: [phone] or   Add notes: in regards to pt's surgery for his kidney stones system; says pt's been having trouble urinating

## 2023-10-06 NOTE — TELEPHONE ENCOUNTER
Patient reports frequency with urination every 5-10 min. Patient wears disposable briefs at night and reports briefs being very wet in the am. Patient has recent hx of urinary retention, had Dior cath removed on 10/3/23. He reports this frequency began that same day. He denies fevers. I advised him to go to the ED due to concerns about UTI and acute retention.

## 2023-10-07 ENCOUNTER — HOSPITAL ENCOUNTER (EMERGENCY)
Facility: HOSPITAL | Age: 78
Discharge: HOME | End: 2023-10-07
Attending: EMERGENCY MEDICINE
Payer: MEDICARE

## 2023-10-07 VITALS
HEART RATE: 68 BPM | SYSTOLIC BLOOD PRESSURE: 110 MMHG | WEIGHT: 195 LBS | OXYGEN SATURATION: 99 % | DIASTOLIC BLOOD PRESSURE: 62 MMHG | BODY MASS INDEX: 26.45 KG/M2 | TEMPERATURE: 99 F | RESPIRATION RATE: 18 BRPM

## 2023-10-07 DIAGNOSIS — N39.0 URINARY TRACT INFECTION WITHOUT HEMATURIA, SITE UNSPECIFIED: Primary | ICD-10-CM

## 2023-10-07 LAB
BILIRUB UR QL STRIP: NEGATIVE
CLARITY UR: ABNORMAL
COLOR UR: YELLOW
GLUCOSE UR QL STRIP: NEGATIVE
HGB UR QL STRIP: ABNORMAL
KETONES UR QL STRIP: NEGATIVE
LEUKOCYTE ESTERASE UR QL STRIP: ABNORMAL
MICROSCOPIC COMMENT: ABNORMAL
NITRITE UR QL STRIP: NEGATIVE
PH UR STRIP: 6 [PH] (ref 5–8)
PROT UR QL STRIP: ABNORMAL
RBC #/AREA URNS HPF: 8 /HPF (ref 0–4)
SP GR UR STRIP: 1.01 (ref 1–1.03)
SQUAMOUS #/AREA URNS HPF: 0 /HPF
URN SPEC COLLECT METH UR: ABNORMAL
UROBILINOGEN UR STRIP-ACNC: NEGATIVE EU/DL
WBC #/AREA URNS HPF: >100 /HPF (ref 0–5)

## 2023-10-07 PROCEDURE — 87186 SC STD MICRODIL/AGAR DIL: CPT | Mod: HCNC | Performed by: EMERGENCY MEDICINE

## 2023-10-07 PROCEDURE — 96365 THER/PROPH/DIAG IV INF INIT: CPT | Mod: HCNC

## 2023-10-07 PROCEDURE — 99284 EMERGENCY DEPT VISIT MOD MDM: CPT | Mod: 25,HCNC

## 2023-10-07 PROCEDURE — 87077 CULTURE AEROBIC IDENTIFY: CPT | Mod: HCNC | Performed by: EMERGENCY MEDICINE

## 2023-10-07 PROCEDURE — 87088 URINE BACTERIA CULTURE: CPT | Mod: HCNC | Performed by: EMERGENCY MEDICINE

## 2023-10-07 PROCEDURE — 87086 URINE CULTURE/COLONY COUNT: CPT | Mod: HCNC | Performed by: EMERGENCY MEDICINE

## 2023-10-07 PROCEDURE — 63600175 PHARM REV CODE 636 W HCPCS: Mod: HCNC | Performed by: EMERGENCY MEDICINE

## 2023-10-07 PROCEDURE — 25000003 PHARM REV CODE 250: Mod: HCNC | Performed by: EMERGENCY MEDICINE

## 2023-10-07 PROCEDURE — 81000 URINALYSIS NONAUTO W/SCOPE: CPT | Mod: HCNC | Performed by: EMERGENCY MEDICINE

## 2023-10-07 RX ORDER — CEPHALEXIN 500 MG/1
500 CAPSULE ORAL EVERY 12 HOURS
Qty: 14 CAPSULE | Refills: 0 | Status: SHIPPED | OUTPATIENT
Start: 2023-10-07 | End: 2023-10-14

## 2023-10-07 RX ADMIN — CEFTRIAXONE 1 G: 1 INJECTION, POWDER, FOR SOLUTION INTRAMUSCULAR; INTRAVENOUS at 04:10

## 2023-10-07 NOTE — ED PROVIDER NOTES
"Encounter Date: 10/7/2023       History     Chief Complaint   Patient presents with    Dysuria     Pt states that he is having difficulty urinating, he fills up his pull up at night however during the day he dribbles.      Patient is a 70-year-old male who presents ED with complaint of decreased urine output.  Patient recently had right shoulder surgery.  He had a Dior placed that was recently removed approximate 3-4 days ago.  Since that time patient reports increased urinary "dribbling" during today with self-reported normal urination into his adult diaper at night.  He denies any dysuria, hematuria, abdominal pain or back pain.  He does have history of BPH.  Denies any new medications that might be causing his symptoms.  His urologist is Dr. Murray.       Review of patient's allergies indicates:  No Known Allergies  Past Medical History:   Diagnosis Date    Anxiety     BPH (benign prostatic hyperplasia)     Cataract     Elevated PSA     Erectile dysfunction     Hyperlipidemia     Hypertension     Hypogonadism male     Kidney stone 5/20/2020    Obesity     PTSD (post-traumatic stress disorder)     Shoulder arthritis     Urinary tract infection      Past Surgical History:   Procedure Laterality Date    ARTHROPLASTY OF SHOULDER Right 9/14/2023    Procedure: ARTHROPLASTY, SHOULDER;  Surgeon: Gabe Manning MD;  Location: [facility] OR;  Service: Orthopedics;  Laterality: Right;  Ayaan david notified cc    COLONOSCOPY N/A 11/26/2018    Procedure: COLONOSCOPY;  Surgeon: Germán Moss MD;  Location: [facility] ([address] [place] [address]);  Service: Endoscopy;  Laterality: N/A;    COLONOSCOPY W/ BIOPSIES  2010    CYSTOSCOPY Left 11/17/2022    Procedure: CYSTOSCOPY;  Surgeon: Dannie Murray MD;  Location: [facility] OR;  Service: Urology;  Laterality: Left;    EXTRACORPOREAL SHOCK WAVE LITHOTRIPSY Left 12/2/2022    Procedure: LITHOTRIPSY, ESWL [place] ESWL machine, scheduling call [phone] minutes;  " Surgeon: Dannie Murray MD;  Location: [facility] OR;  Service: Urology;  Laterality: Left;  Confirmed with [facility]  [facility] conf # [id number]    EXTRACORPOREAL SHOCK WAVE LITHOTRIPSY Left 2023    Procedure: LITHOTRIPSY, ESWL [facility] [place] ESWL machine, scheduling call [phone] minutes;  Surgeon: Dannie Murray MD;  Location: [facility] OR;  Service: Urology;  Laterality: Left;  next [facility] conf #[id number]    PENILE PROSTHESIS IMPLANT      PERCUTANEOUS NEPHROLITHOTOMY Left 2023    Procedure: CYSTOSCOPY, LEFT RETROGRADE PYELOGRAM, LEFT URETEROSCOPY WITH STONE BASKET EXTRACTION, PLACEMENT OF JJ STENT, LEFT NEPHROSTOMY TUBE REMOVAL;  Surgeon: Dannie Murray MD;  Location: [facility] OR;  Service: Urology;  Laterality: Left;  IR to place ureteral catheter prior, start time 1200  Cysto tower, flexible cysto/ureteroscopes, Laser in room, lithoclast in room, fluoroscopy with Tony to     Family History   Problem Relation Age of Onset    Cancer Mother         cancer    Cataracts Mother     Heart disease Father 62        M.I.    Stroke Brother 62    Amblyopia Neg Hx     Blindness Neg Hx     Glaucoma Neg Hx     Macular degeneration Neg Hx     Retinal detachment Neg Hx     Strabismus Neg Hx     Prostate cancer Neg Hx     Kidney disease Neg Hx      Social History     Tobacco Use    Smoking status: Former     Current packs/day: 0.00     Average packs/day: 1 pack/day for 10.0 years (10.0 ttl pk-yrs)     Types: Cigarettes     Start date:      Quit date:      Years since quittin.7    Smokeless tobacco: Never   Substance Use Topics    Alcohol use: No    Drug use: No     Review of Systems   Constitutional:  Negative for chills and fever.   Respiratory:  Negative for chest tightness and shortness of breath.    Cardiovascular:  Negative for chest pain, palpitations and leg swelling.   Gastrointestinal:  Negative for abdominal pain, nausea and vomiting.   Genitourinary:  Positive for frequency. Negative for  decreased urine volume, difficulty urinating, dysuria, flank pain, hematuria, scrotal swelling, testicular pain and urgency.   Musculoskeletal:  Negative for back pain and joint swelling.   Psychiatric/Behavioral:  Negative for agitation and confusion.        Physical Exam     Initial Vitals [10/07/23 1325]   BP Pulse Resp Temp SpO2   110/62 68 18 98.5 °F (36.9 °C) 99 %      MAP       --         Physical Exam    Nursing note and vitals reviewed.  Constitutional: He appears well-developed and well-nourished.   HENT:   Head: Normocephalic and atraumatic.   Right Ear: External ear normal.   Left Ear: External ear normal.   Eyes: EOM are normal. Pupils are equal, round, and reactive to light.   Neck: No thyromegaly present.   Normal range of motion.  Cardiovascular:  Normal rate, regular rhythm, normal heart sounds and intact distal pulses.           Pulmonary/Chest: Breath sounds normal.   Abdominal: Abdomen is soft.   Musculoskeletal:         General: Normal range of motion.      Cervical back: Normal range of motion.     Neurological: He is alert and oriented to person, place, and time. He has normal strength. GCS score is 15. GCS eye subscore is 4. GCS verbal subscore is 5. GCS motor subscore is 6.   Skin: Capillary refill takes less than 2 seconds.         ED Course   Procedures  Labs Reviewed   URINALYSIS, REFLEX TO URINE CULTURE - Abnormal; Notable for the following components:       Result Value    Appearance, UA Hazy (*)     Protein, UA Trace (*)     Occult Blood UA Trace (*)     Leukocytes, UA 3+ (*)     All other components within normal limits    Narrative:     Specimen Source->Urine   URINALYSIS MICROSCOPIC - Abnormal; Notable for the following components:    RBC, UA 8 (*)     WBC, UA >100 (*)     All other components within normal limits    Narrative:     Specimen Source->Urine   CULTURE, URINE          Imaging Results    None          Medications   cefTRIAXone (ROCEPHIN) 1 g in dextrose 5 % in water (D5W)  100 mL IVPB (MB+) (0 g Intravenous Stopped 10/7/23 1717)     Medical Decision Making  See HPI     Amount and/or Complexity of Data Reviewed  Labs:  Decision-making details documented in ED Course.    Risk  Prescription drug management.               ED Course as of 10/07/23 1813   Sat Oct 07, 2023   1606 Leukocytes, UA(!): 3+  Reviewed by self - abnormal  [LC]   1606 WBC, UA(!): >100  Reviewed by self - abnormal; concerning for infectious process.  [LC]   1708 Concern for possible UTI - will administer IV Rocephin in the ED.  [LC]      ED Course User Index  [LC] Kingslye Pittman MD               Medical Decision Making:   Initial Assessment:   See HPI   Differential Diagnosis:   Pyelonephritis, UTI, BPH, hematuria, bladder carcinoma   Clinical Tests:   Lab Tests: Ordered and Reviewed  ED Management:  - UA consistent with infection; pt able to urinate in the ED without difficulty  - pt administered IV ceftriaxone in ED  - pt not septic, toxic appearing; he is in no acute distress  - pt comfortable with plan for discharge at this time, OP treatment as indicated above  - No further intervention is indicated at this time after having taken into account the patient's history, physical exam findings, and empirical and objective data obtained during the patient's emergency department workup.   - The patient is at low risk for an emergent medical condition at this time, and I am of the belief that that it is safe to discharge the patient from the emergency department.   - The patient is instructed to follow up as outpatient as indicated on the discharge paperwork.    - I have discussed the specifics of the workup with the patient and the patient has verbalized understanding of the details of the workup, the diagnosis, the treatment plan, and the need for outpatient follow-up.    - Although the patient has no emergent etiology today this does not preclude the development of an emergent condition so, in addition, I have  advised the patient that they can return to the ED and/or activate EMS at any time with worsening of their symptoms, change of their symptoms, or with any other medical complaint.    - The patient remained comfortable and stable during their visit in the ED.    - Discharge and follow-up instructions discussed with the patient who expressed understanding and willingness to comply with my recommendations.  - Results of all emergency department tests  discussed thoroughly with patient; all patient questions answered; pt in agreement with plan  - Pt instructed to follow up with PCP in 2-3 days for recheck of today's complaints  - Pt given strict emergency department return precautions for any new or worsening of symptoms  - Pt discharged from the emergency department in stable condition, in no acute distress          Clinical Impression:   Final diagnoses:  [N39.0] Urinary tract infection without hematuria, site unspecified (Primary)        ED Disposition Condition    Discharge Stable          ED Prescriptions       Medication Sig Dispense Start Date End Date Auth. Provider    cephALEXin (KEFLEX) 500 MG capsule Take 1 capsule (500 mg total) by mouth every 12 (twelve) hours. for 7 days 14 capsule 10/7/2023 10/14/2023 Kingsley Pittman MD          Follow-up Information       Follow up With Specialties Details Why Contact Info    Km Chicas MD Internal Medicine Schedule an appointment as soon as possible for a visit   [address] [facility] [address]  [phone]      Dannie Murray MD Urology Schedule an appointment as soon as possible for a visit   [address]  [address]  [facility] [address]  [phone]               Kingsley Pittman MD  10/07/23 0787

## 2023-10-09 ENCOUNTER — TELEPHONE (OUTPATIENT)
Dept: UROLOGY | Facility: CLINIC | Age: 78
End: 2023-10-09
Payer: OTHER GOVERNMENT

## 2023-10-09 NOTE — TELEPHONE ENCOUNTER
----- Message from Leonela Villanueva sent at 10/9/2023 12:59 PM CDT -----  Type:  Needs Medical Advice    Who Called: pt    Would the patient rather a call back or a response via [facility]? call  Best Call Back Number: [phone]  Additional Information: pt informing office back in hospital would like a call back

## 2023-10-09 NOTE — TELEPHONE ENCOUNTER
Spoke with patient's wife, visited ED ([facility]) due to urinary issues.  Concern for UTI, was given Rocephin via IV.  Next day, urinary issues, went to [facility] ED-urinary retention, ludwig placed.  Bowel movements daily per wife, she states the only issue is urine.  Informed wife Dr Murray gave recommendations during stent removal in regards to enlarged prostate, but patient deferred at the time.  She stated she will start attending visits with him out of concerns of hearing problems.  Appointment scheduled for possible voiding trial within the next week, wife wishes to keep ludwig in to avoid repeated ER visits.   yes...

## 2023-10-09 NOTE — TELEPHONE ENCOUNTER
----- Message from Lawson Cramer sent at 10/9/2023  3:58 PM CDT -----  Type:  Patient Returning Call    Who Called:spouse   Who Left Message for Patient:  Does the patient know what this is regarding?:care/UTI  Would the patient rather a call back or a response via [facility]? call  Best Call Back Number:[phone]  Additional Information:

## 2023-10-10 LAB — BACTERIA UR CULT: ABNORMAL

## 2023-10-17 ENCOUNTER — OFFICE VISIT (OUTPATIENT)
Dept: UROLOGY | Facility: CLINIC | Age: 78
End: 2023-10-17
Payer: OTHER GOVERNMENT

## 2023-10-17 VITALS
HEIGHT: 72 IN | DIASTOLIC BLOOD PRESSURE: 67 MMHG | SYSTOLIC BLOOD PRESSURE: 103 MMHG | HEART RATE: 80 BPM | WEIGHT: 195.13 LBS | BODY MASS INDEX: 26.43 KG/M2

## 2023-10-17 DIAGNOSIS — R33.8 BENIGN PROSTATIC HYPERPLASIA WITH URINARY RETENTION: Primary | ICD-10-CM

## 2023-10-17 DIAGNOSIS — N40.1 BENIGN PROSTATIC HYPERPLASIA WITH URINARY RETENTION: Primary | ICD-10-CM

## 2023-10-17 DIAGNOSIS — S37.829A INJURY OF PROSTATE, INITIAL ENCOUNTER: ICD-10-CM

## 2023-10-17 DIAGNOSIS — Z96.0 HISTORY OF PENILE IMPLANT: ICD-10-CM

## 2023-10-17 PROCEDURE — 99999 PR PBB SHADOW E&M-EST. PATIENT-LVL III: CPT | Mod: PBBFAC,HCNC,, | Performed by: UROLOGY

## 2023-10-17 PROCEDURE — 99214 OFFICE O/P EST MOD 30 MIN: CPT | Mod: S$PBB,HCNC,, | Performed by: UROLOGY

## 2023-10-17 PROCEDURE — 99213 OFFICE O/P EST LOW 20 MIN: CPT | Mod: PBBFAC,HCNC,PO | Performed by: UROLOGY

## 2023-10-17 PROCEDURE — 99999 PR PBB SHADOW E&M-EST. PATIENT-LVL III: ICD-10-PCS | Mod: PBBFAC,HCNC,, | Performed by: UROLOGY

## 2023-10-17 PROCEDURE — 99214 PR OFFICE/OUTPT VISIT, EST, LEVL IV, 30-39 MIN: ICD-10-PCS | Mod: S$PBB,HCNC,, | Performed by: UROLOGY

## 2023-10-17 RX ORDER — ACETAMINOPHEN 500 MG
1000 TABLET ORAL
Status: CANCELLED | OUTPATIENT
Start: 2023-10-17

## 2023-10-17 RX ORDER — CEFAZOLIN SODIUM 2 G/50ML
2 SOLUTION INTRAVENOUS
Status: CANCELLED | OUTPATIENT
Start: 2023-10-17

## 2023-10-17 RX ORDER — LIDOCAINE HYDROCHLORIDE 10 MG/ML
1 INJECTION, SOLUTION EPIDURAL; INFILTRATION; INTRACAUDAL; PERINEURAL ONCE
Status: CANCELLED | OUTPATIENT
Start: 2023-10-17 | End: 2023-10-17

## 2023-10-17 RX ORDER — HEPARIN SODIUM 5000 [USP'U]/ML
5000 INJECTION, SOLUTION INTRAVENOUS; SUBCUTANEOUS
Status: CANCELLED | OUTPATIENT
Start: 2023-10-17

## 2023-10-17 NOTE — PROGRESS NOTES
Subjective:       Patient ID: Brandan Werner is a 78 y.o. male.    Chief Complaint: Follow-up       This is a 78 y.o.  male patient with BPH, elevated PSA, kidney stone.    Past patient of Dr. Laboy last seen in 2020 for cystoscopy for microhematuria that was negative except large prostate.  H/o penile implant that still uses.  CTU in 2020 with 150 gm prostate no other findings.  Long h/o elevated PSA.    PSA 6.2 in 2013 and biopsy negative  Biopsy in 2013 with ASAP  PSA recently checked by PCP and was 8.2   Moderate/severe LUTs, AUA SS 19/2, PVR 67  Worsening frequency of urination on finasteride, tamsulosin.    UA showed microhematuria.  CTU done showed left 1 cm UPJ stone with moderate hydronephrosis 11/22 and left non obstructing stones.    Failed left stent, required PCN and antegrade stent  Left ESWL 12/2  Follow-up KUB shows resolution of UPJ stone continued lower pole left stone.  Repeat left LP stone ESWL 1/30/23, appeared to be good fragmentation.  KUB shows left dense stone left LP but still present.  Stent in place. Stent removed 2/7/23.   CT 5/1/23: left enlarging LP stone now 2.3 cm, mild left pelviectasis, read as possible lesion, on review with radiologist small stone near UPJ and mild pelviectasis with pelvis thickening no obvious mass.    Intermittent left sided pain, still LUTs from BPH but not as severe as prior.        Attempted left PCNL 8/14/23--IR could not get access down ureter, taken to cystoscopy suite, ureteroscopy done showing access between two calyces (high risk bleeding to dilate), survey of kidney for some time unable to locate dominant stone some stone fragmented in lower pole, thus access removed and stent placed.    Follow up CT 9/23 with left LP 1.1 cm stone.  Minimal pain.  Had shoulder surgery 9/14/23 and retention after, ludwig in place.   Passed void trial but then recurrent retention.  Has Ludwig in place.  Wishes to have intervention for enlarged prostate.  Of note, does  have inflatable penile prosthesis that he does not use but reports functions.          LAST PSA  Lab Results   Component Value Date    PSA 8.2 (H) 08/19/2022    PSA 7.0 (H) 03/02/2021    PSA 6.5 (H) 01/30/2020    PSA 3.0 11/02/2016    PSA 5.0 (H) 05/02/2014    PSA 6.2 (H) 10/18/2013    PSA 4.49 (H) 02/22/2013    PSA 4.1 (H) 05/09/2012    PSA 4.04 (H) 04/05/2012    PSA 2.8 08/31/2010    PSA 3.1 02/25/2009    PSA 2.2 09/06/2007    PSA 1.8 09/30/2006    PSA 1.7 12/28/2005    PSA 1.8 10/30/2004    PSADIAG 10.5 (H) 08/22/2023    PSADIAG 4.9 (H) 02/22/2023    PSADIAG 7.0 (H) 03/07/2019    PSADIAG 6.6 (H) 09/07/2018    PSADIAG 6.4 (H) 06/08/2018    PSADIAG 5.3 (H) 01/03/2018    PSADIAG 2.9 02/22/2016    PSADIAG 2.9 08/24/2015    PSADIAG 5.7 (H) 02/24/2015    PSADIAG 5.1 (H) 06/26/2014    PSADIAG 5.8 (H) 12/03/2013    PSATOTAL 6.5 (H) 05/09/2023    PSAFREE 1.69 (H) 05/09/2023       Lab Results   Component Value Date    CREATININE 1.1 08/22/2023       ---  PMH/PSH/Medications/Allergies/Social history reviewed and as in chart.    Review of Systems   Constitutional:  Negative for activity change, chills and fever.   HENT:  Negative for congestion.    Respiratory:  Negative for cough, chest tightness and shortness of breath.    Cardiovascular:  Negative for chest pain and palpitations.   Gastrointestinal:  Negative for abdominal distention, abdominal pain, nausea and vomiting.   Genitourinary:  Positive for difficulty urinating and flank pain. Negative for frequency, hematuria, penile pain, scrotal swelling and testicular pain.   Musculoskeletal:  Negative for gait problem.       Objective:      Physical Exam  HENT:      Head: Atraumatic.   Pulmonary:      Effort: Pulmonary effort is normal.   Neurological:      General: No focal deficit present.      Mental Status: He is alert and oriented to person, place, and time.         Assessment:     Problem Noted   Kidney Stones 5/20/2020    Left 1.5 cm UPJ and non obstructing left  lower pole greater than 1 cm on CTU 11/2022 with moderate hydronephrosis     Unable to place left stent 11/17/22 due to large prostate, prosthesis and unable to find ureteral orifice.   Left PCN 11/18/22, internalized to stent 11/28  Left ESWL UPJ stone 12/2/22  KUB after with 1.8 cm left lower pole stone consistent with lower pole stone on CT urogram, no renal pelvis/gualberto stent stone seen.  Left ESWL 1/30/23 good fragmentation  KUB shows continued LLP stone  CT 5/23: left enlarging LP stone 2.3 cm mild pelviectasis, small stone fragment at UPJ  Attempted left PCNL 8/14/23--IR could not get access down ureter, taken to cystoscopy suite, ureteroscopy done showing access between two calyx, survey of kidney for some time unable to locate dominant stone some stone fragmented in lower pole     Elevated Psa 3/11/2013    Biopsy 2013 ASAP 1 core, 2014 benign  PSA 8/22--8.2  Volume 183, PSAD 0.04       Benign Prostatic Hyperplasia With Urinary Retention     183 gm prostate on CTU 2022  Initial AUA SS (8/2022): 19/2  PVR 67  Recurrent retention 10/23.     History of Penile Implant 5/20/2020         Plan:     Now recurrent retention, known very large prostate.  Discussed options including holmium laser enucleation of prostate versus robotic simple prostatectomy versus open simple prostatectomy.  His inflatable penile prosthesis complicates this.  Discussed would recommend explantation of prosthesis and all components if undergoing robotic suprapubic prostatectomy given risk of colonization of the bladder and possible device infection after. I will discuss with colleagues regarding feasibility of holmium laser enucleation of the prostate and possibly salvaging his IPP realizing there is increased risk of erosion with catheter placement and this intervention.  At current he is leaning towards RASP and IPP removal, will plan follow up in 1 week for cath change and culture.   2.  Plan for surgery 11/6/23 pending above discussion.       Dannie Murray MD

## 2023-10-21 ENCOUNTER — HOSPITAL ENCOUNTER (EMERGENCY)
Facility: HOSPITAL | Age: 78
Discharge: HOME | End: 2023-10-22
Attending: EMERGENCY MEDICINE
Payer: OTHER GOVERNMENT

## 2023-10-21 DIAGNOSIS — Z46.6 ENCOUNTER FOR FOLEY CATHETER REPLACEMENT: Primary | ICD-10-CM

## 2023-10-21 DIAGNOSIS — N39.0 URINARY TRACT INFECTION ASSOCIATED WITH INDWELLING URETHRAL CATHETER, INITIAL ENCOUNTER: ICD-10-CM

## 2023-10-21 DIAGNOSIS — T83.511A URINARY TRACT INFECTION ASSOCIATED WITH INDWELLING URETHRAL CATHETER, INITIAL ENCOUNTER: ICD-10-CM

## 2023-10-21 LAB
BACTERIA #/AREA URNS HPF: ABNORMAL /HPF
BILIRUB UR QL STRIP: NEGATIVE
CLARITY UR: ABNORMAL
COLOR UR: YELLOW
GLUCOSE UR QL STRIP: NEGATIVE
HGB UR QL STRIP: ABNORMAL
HYALINE CASTS #/AREA URNS LPF: 0 /LPF
KETONES UR QL STRIP: NEGATIVE
LEUKOCYTE ESTERASE UR QL STRIP: ABNORMAL
MICROSCOPIC COMMENT: ABNORMAL
NITRITE UR QL STRIP: NEGATIVE
PH UR STRIP: 7 [PH] (ref 5–8)
PROT UR QL STRIP: ABNORMAL
RBC #/AREA URNS HPF: 68 /HPF (ref 0–4)
SP GR UR STRIP: 1.01 (ref 1–1.03)
SQUAMOUS #/AREA URNS HPF: 0 /HPF
UNIDENT CRYS URNS QL MICRO: 4
URN SPEC COLLECT METH UR: ABNORMAL
UROBILINOGEN UR STRIP-ACNC: NEGATIVE EU/DL
WBC #/AREA URNS HPF: >100 /HPF (ref 0–5)
YEAST URNS QL MICRO: ABNORMAL

## 2023-10-21 PROCEDURE — 87186 SC STD MICRODIL/AGAR DIL: CPT | Performed by: EMERGENCY MEDICINE

## 2023-10-21 PROCEDURE — 87088 URINE BACTERIA CULTURE: CPT | Performed by: EMERGENCY MEDICINE

## 2023-10-21 PROCEDURE — 87077 CULTURE AEROBIC IDENTIFY: CPT | Performed by: EMERGENCY MEDICINE

## 2023-10-21 PROCEDURE — 81000 URINALYSIS NONAUTO W/SCOPE: CPT | Performed by: EMERGENCY MEDICINE

## 2023-10-21 PROCEDURE — 87086 URINE CULTURE/COLONY COUNT: CPT | Performed by: EMERGENCY MEDICINE

## 2023-10-21 PROCEDURE — 99283 EMERGENCY DEPT VISIT LOW MDM: CPT

## 2023-10-22 VITALS
OXYGEN SATURATION: 98 % | BODY MASS INDEX: 26.45 KG/M2 | SYSTOLIC BLOOD PRESSURE: 136 MMHG | HEART RATE: 74 BPM | WEIGHT: 195 LBS | TEMPERATURE: 98 F | RESPIRATION RATE: 16 BRPM | DIASTOLIC BLOOD PRESSURE: 74 MMHG

## 2023-10-22 RX ORDER — CEPHALEXIN 500 MG/1
500 CAPSULE ORAL 4 TIMES DAILY
Qty: 20 CAPSULE | Refills: 0 | Status: SHIPPED | OUTPATIENT
Start: 2023-10-22 | End: 2023-10-24 | Stop reason: ALTCHOICE

## 2023-10-22 NOTE — ED PROVIDER NOTES
Encounter Date: 10/21/2023       History     Chief Complaint   Patient presents with    Indwelling catheter problem     Indwelling catheter placed 2 weeks ago. Started urinating around catheter tonight. Denies pain.      Patient is a 78-year-old male who presents to the ED with complaint of Dior problem.  Patient does have history of BPH and recently had a Dior placed approximately 2 weeks ago.  Says tonight he noticed that he was urinating around the Dior with some difficulty urinating as well.  Denies any abdominal pain nausea vomiting fevers suprapubic pain hematuria or other complaints.      Review of patient's allergies indicates:  No Known Allergies  Past Medical History:   Diagnosis Date    Anxiety     BPH (benign prostatic hyperplasia)     Cataract     Elevated PSA     Erectile dysfunction     Hyperlipidemia     Hypertension     Hypogonadism male     Kidney stone 5/20/2020    Obesity     PTSD (post-traumatic stress disorder)     Shoulder arthritis     Urinary tract infection      Past Surgical History:   Procedure Laterality Date    ARTHROPLASTY OF SHOULDER Right 9/14/2023    Procedure: ARTHROPLASTY, SHOULDER;  Surgeon: Gabe Manning MD;  Location: [facility];  Service: Orthopedics;  Laterality: Right;  Ayaan david notified cc    COLONOSCOPY N/A 11/26/2018    Procedure: COLONOSCOPY;  Surgeon: Germán Moss MD;  Location: [facility] ([address] [place] [address]);  Service: Endoscopy;  Laterality: N/A;    COLONOSCOPY W/ BIOPSIES  2010    CYSTOSCOPY Left 11/17/2022    Procedure: CYSTOSCOPY;  Surgeon: Dannie Murray MD;  Location: [facility] OR;  Service: Urology;  Laterality: Left;    EXTRACORPOREAL SHOCK WAVE LITHOTRIPSY Left 12/2/2022    Procedure: LITHOTRIPSY, ESWL [facility] [place] ESWL machine, scheduling call [phone] 60 minutes;  Surgeon: Dannie Murray MD;  Location: [facility];  Service: Urology;  Laterality: Left;  Confirmed with [facility] 11/22 [facility] conf # [id number]    EXTRACORPOREAL SHOCK WAVE  LITHOTRIPSY Left 2023    Procedure: LITHOTRIPSY, ESWL [facility] ESWL machine, scheduling call [phone] 60 minutes;  Surgeon: Dannie Murray MD;  Location: [facility] OR;  Service: Urology;  Laterality: Left;  next [facility] conf #[id number]    PENILE PROSTHESIS IMPLANT      PERCUTANEOUS NEPHROLITHOTOMY Left 2023    Procedure: CYSTOSCOPY, LEFT RETROGRADE PYELOGRAM, LEFT URETEROSCOPY WITH STONE BASKET EXTRACTION, PLACEMENT OF JJ STENT, LEFT NEPHROSTOMY TUBE REMOVAL;  Surgeon: Dannie Murray MD;  Location: [facility] OR;  Service: Urology;  Laterality: Left;  IR to place ureteral catheter prior, start time 1200  Cysto tower, flexible cysto/ureteroscopes, Laser in room, lithoclast in room, fluoroscopy with Tony to     Family History   Problem Relation Age of Onset    Cancer Mother         cancer    Cataracts Mother     Heart disease Father 62        M.I.    Stroke Brother 62    Amblyopia Neg Hx     Blindness Neg Hx     Glaucoma Neg Hx     Macular degeneration Neg Hx     Retinal detachment Neg Hx     Strabismus Neg Hx     Prostate cancer Neg Hx     Kidney disease Neg Hx      Social History     Tobacco Use    Smoking status: Former     Current packs/day: 0.00     Average packs/day: 1 pack/day for 10.0 years (10.0 ttl pk-yrs)     Types: Cigarettes     Start date:      Quit date:      Years since quittin.8    Smokeless tobacco: Never   Substance Use Topics    Alcohol use: No    Drug use: No     Review of Systems   Constitutional:  Negative for chills and fever.   Cardiovascular:  Negative for chest pain, palpitations and leg swelling.   Gastrointestinal:  Negative for abdominal pain, nausea and vomiting.   Genitourinary:  Positive for difficulty urinating. Negative for dysuria, frequency, genital sores and hematuria.   Psychiatric/Behavioral:  Negative for agitation and confusion.        Physical Exam     Initial Vitals [10/21/23 2209]   BP Pulse Resp Temp SpO2   133/72 76 18 98.2 °F (36.8 °C)  98 %      MAP       --         Physical Exam    Nursing note and vitals reviewed.  Constitutional: He appears well-developed and well-nourished.   HENT:   Head: Normocephalic and atraumatic.   Eyes: Pupils are equal, round, and reactive to light.   Neck: Neck supple.   Normal range of motion.  Pulmonary/Chest: Breath sounds normal.   Abdominal: Bowel sounds are normal.   Genitourinary:    Genitourinary Comments: Indwelling Dior in place   Gunter yellow urine in bag      Musculoskeletal:         General: Normal range of motion.      Cervical back: Normal range of motion and neck supple.     Neurological: He is alert and oriented to person, place, and time. He has normal strength.   Skin: Skin is warm and dry.         ED Course   Procedures  Labs Reviewed   URINALYSIS, REFLEX TO URINE CULTURE - Abnormal; Notable for the following components:       Result Value    Appearance, UA Hazy (*)     Protein, UA 1+ (*)     Occult Blood UA 2+ (*)     Leukocytes, UA 3+ (*)     All other components within normal limits    Narrative:     Specimen Source->Urine   URINALYSIS MICROSCOPIC - Abnormal; Notable for the following components:    RBC, UA 68 (*)     WBC, UA >100 (*)     Yeast, UA Occasional (*)     All other components within normal limits    Narrative:     Specimen Source->Urine   CULTURE, URINE          Imaging Results    None          Medications - No data to display  Medical Decision Making  Amount and/or Complexity of Data Reviewed  Labs:  Decision-making details documented in ED Course.    Risk  Prescription drug management.               ED Course as of 10/22/23 0052   Sat Oct 21, 2023   2341 Leukocytes, UA(!): 3+  Review by self - WNL  [LC]   2342 WBC, UA(!): >100 [LC]      ED Course User Index  [LC] Kingsley Pittman MD               Medical Decision Making:   Initial Assessment:   See HPI   Differential Diagnosis:   Dior malfunction, UTI, BPH, urinary retention   ED Management:   - Dior replaced in ED  - UA  with findings concerning for infection  - will discharge to home with rx for cephalexin  - pt to f/u with urology as an OP  - - No further intervention is indicated at this time after having taken into account the patient's history, physical exam findings, and empirical and objective data obtained during the patient's emergency department workup.   - The patient is at low risk for an emergent medical condition at this time, and I am of the belief that that it is safe to discharge the patient from the emergency department.   - The patient is instructed to follow up as outpatient as indicated on the discharge paperwork.    - I have discussed the specifics of the workup with the patient and the patient has verbalized understanding of the details of the workup, the diagnosis, the treatment plan, and the need for outpatient follow-up.    - Although the patient has no emergent etiology today this does not preclude the development of an emergent condition so, in addition, I have advised the patient that they can return to the ED and/or activate EMS at any time with worsening of their symptoms, change of their symptoms, or with any other medical complaint.    - The patient remained comfortable and stable during their visit in the ED.    - Discharge and follow-up instructions discussed with the patient who expressed understanding and willingness to comply with my recommendations.  - Results of all emergency department tests  discussed thoroughly with patient; all patient questions answered; pt in agreement with plan  - Pt instructed to follow up with PCP in 2-3 days for recheck of today's complaints  - Pt given strict emergency department return precautions for any new or worsening of symptoms  - Pt discharged from the emergency department in stable condition, in no acute distress         Clinical Impression:   Final diagnoses:  [Z46.6] Encounter for Dior catheter replacement (Primary)  [T83.511A, N39.0] Urinary tract  infection associated with indwelling urethral catheter, initial encounter        ED Disposition Condition    Discharge Stable          ED Prescriptions       Medication Sig Dispense Start Date End Date Auth. Provider    cephALEXin (KEFLEX) 500 MG capsule Take 1 capsule (500 mg total) by mouth 4 (four) times daily. for 5 days 20 capsule 10/22/2023 10/27/2023 Kingsley Pittman MD          Follow-up Information    None          Kingsley Pittman MD  10/22/23 0055

## 2023-10-23 LAB — BACTERIA UR CULT: ABNORMAL

## 2023-10-24 ENCOUNTER — ANESTHESIA EVENT (OUTPATIENT)
Dept: SURGERY | Facility: HOSPITAL | Age: 78
End: 2023-10-24
Payer: OTHER GOVERNMENT

## 2023-10-24 ENCOUNTER — OFFICE VISIT (OUTPATIENT)
Dept: UROLOGY | Facility: CLINIC | Age: 78
End: 2023-10-24
Payer: MEDICARE

## 2023-10-24 ENCOUNTER — HOSPITAL ENCOUNTER (OUTPATIENT)
Dept: PREADMISSION TESTING | Facility: HOSPITAL | Age: 78
Discharge: HOME | End: 2023-10-24
Attending: NURSE PRACTITIONER
Payer: MEDICARE

## 2023-10-24 ENCOUNTER — CLINICAL SUPPORT (OUTPATIENT)
Dept: REHABILITATION | Facility: HOSPITAL | Age: 78
End: 2023-10-24
Attending: ORTHOPAEDIC SURGERY
Payer: OTHER GOVERNMENT

## 2023-10-24 VITALS
HEIGHT: 72 IN | DIASTOLIC BLOOD PRESSURE: 83 MMHG | HEART RATE: 82 BPM | SYSTOLIC BLOOD PRESSURE: 124 MMHG | BODY MASS INDEX: 26.43 KG/M2 | WEIGHT: 195.13 LBS

## 2023-10-24 DIAGNOSIS — M25.60 STIFFNESS DUE TO IMMOBILITY: ICD-10-CM

## 2023-10-24 DIAGNOSIS — R53.1 WEAKNESS: ICD-10-CM

## 2023-10-24 DIAGNOSIS — Z96.611 STATUS POST TOTAL SHOULDER REPLACEMENT, RIGHT: ICD-10-CM

## 2023-10-24 DIAGNOSIS — M79.601 PAIN OF RIGHT UPPER EXTREMITY: ICD-10-CM

## 2023-10-24 DIAGNOSIS — Z74.09 STIFFNESS DUE TO IMMOBILITY: ICD-10-CM

## 2023-10-24 DIAGNOSIS — R33.8 BENIGN PROSTATIC HYPERPLASIA WITH URINARY RETENTION: Primary | ICD-10-CM

## 2023-10-24 DIAGNOSIS — N40.1 BENIGN PROSTATIC HYPERPLASIA WITH URINARY RETENTION: Primary | ICD-10-CM

## 2023-10-24 PROCEDURE — 99212 OFFICE O/P EST SF 10 MIN: CPT | Mod: PBBFAC,HCNC,PO | Performed by: UROLOGY

## 2023-10-24 PROCEDURE — 99213 OFFICE O/P EST LOW 20 MIN: CPT | Mod: HCNC,S$GLB,, | Performed by: UROLOGY

## 2023-10-24 PROCEDURE — 97165 OT EVAL LOW COMPLEX 30 MIN: CPT | Mod: PN

## 2023-10-24 PROCEDURE — 99999 PR PBB SHADOW E&M-EST. PATIENT-LVL II: ICD-10-PCS | Mod: PBBFAC,HCNC,, | Performed by: UROLOGY

## 2023-10-24 PROCEDURE — 99213 PR OFFICE/OUTPT VISIT, EST, LEVL III, 20-29 MIN: ICD-10-PCS | Mod: HCNC,S$GLB,, | Performed by: UROLOGY

## 2023-10-24 PROCEDURE — 97110 THERAPEUTIC EXERCISES: CPT | Mod: PN

## 2023-10-24 PROCEDURE — 99999 PR PBB SHADOW E&M-EST. PATIENT-LVL II: CPT | Mod: PBBFAC,HCNC,, | Performed by: UROLOGY

## 2023-10-24 RX ORDER — AMPICILLIN 500 MG/1
500 CAPSULE ORAL 4 TIMES DAILY
Qty: 56 CAPSULE | Refills: 0 | Status: SHIPPED | OUTPATIENT
Start: 2023-10-24 | End: 2023-11-07

## 2023-10-24 NOTE — PROGRESS NOTES
Subjective:       Patient ID: Brandan Werner is a 78 y.o. male.    Chief Complaint: No chief complaint on file.       This is a 78 y.o.  male patient with BPH, elevated PSA, kidney stone.    Past patient of Dr. Laboy last seen in 2020 for cystoscopy for microhematuria that was negative except large prostate.  H/o penile implant that still uses.  CTU in 2020 with 150 gm prostate no other findings.  Long h/o elevated PSA.    PSA 6.2 in 2013 and biopsy negative  Biopsy in 2013 with ASAP  PSA recently checked by PCP and was 8.2   Moderate/severe LUTs, AUA SS 19/2, PVR 67  Worsening frequency of urination on finasteride, tamsulosin.    UA showed microhematuria.  CTU done showed left 1 cm UPJ stone with moderate hydronephrosis 11/22 and left non obstructing stones.    Failed left stent, required PCN and antegrade stent  Left ESWL 12/2  Follow-up KUB shows resolution of UPJ stone continued lower pole left stone.  Repeat left LP stone ESWL 1/30/23, appeared to be good fragmentation.  KUB shows left dense stone left LP but still present.  Stent in place. Stent removed 2/7/23.   CT 5/1/23: left enlarging LP stone now 2.3 cm, mild left pelviectasis, read as possible lesion, on review with radiologist small stone near UPJ and mild pelviectasis with pelvis thickening no obvious mass.    Intermittent left sided pain, still LUTs from BPH but not as severe as prior.        Attempted left PCNL 8/14/23--IR could not get access down ureter, taken to cystoscopy suite, ureteroscopy done showing access between two calyces (high risk bleeding to dilate), survey of kidney for some time unable to locate dominant stone some stone fragmented in lower pole, thus access removed and stent placed.    Follow up CT 9/23 with left LP 1.1 cm stone.  Minimal pain.  Had shoulder surgery 9/14/23 and retention after, ludwig in place.   Passed void trial but then recurrent retention.  Has Ludwig in place.  Wishes to have intervention for enlarged  prostate.  Of note, does have inflatable penile prosthesis that he does not use but reports functions.  Cath changed for leaking 10/22/23, urine culture with enterococcus has been on keflex.           LAST PSA  Lab Results   Component Value Date    PSA 8.2 (H) 08/19/2022    PSA 7.0 (H) 03/02/2021    PSA 6.5 (H) 01/30/2020    PSA 3.0 11/02/2016    PSA 5.0 (H) 05/02/2014    PSA 6.2 (H) 10/18/2013    PSA 4.49 (H) 02/22/2013    PSA 4.1 (H) 05/09/2012    PSA 4.04 (H) 04/05/2012    PSA 2.8 08/31/2010    PSA 3.1 02/25/2009    PSA 2.2 09/06/2007    PSA 1.8 09/30/2006    PSA 1.7 12/28/2005    PSA 1.8 10/30/2004    PSADIAG 10.5 (H) 08/22/2023    PSADIAG 4.9 (H) 02/22/2023    PSADIAG 7.0 (H) 03/07/2019    PSADIAG 6.6 (H) 09/07/2018    PSADIAG 6.4 (H) 06/08/2018    PSADIAG 5.3 (H) 01/03/2018    PSADIAG 2.9 02/22/2016    PSADIAG 2.9 08/24/2015    PSADIAG 5.7 (H) 02/24/2015    PSADIAG 5.1 (H) 06/26/2014    PSADIAG 5.8 (H) 12/03/2013    PSATOTAL 6.5 (H) 05/09/2023    PSAFREE 1.69 (H) 05/09/2023       Lab Results   Component Value Date    CREATININE 1.1 08/22/2023       ---  PMH/PSH/Medications/Allergies/Social history reviewed and as in chart.    Review of Systems   Constitutional:  Negative for activity change, chills and fever.   HENT:  Negative for congestion.    Respiratory:  Negative for cough, chest tightness and shortness of breath.    Cardiovascular:  Negative for chest pain and palpitations.   Gastrointestinal:  Negative for abdominal distention, abdominal pain, nausea and vomiting.   Genitourinary:  Positive for difficulty urinating and flank pain. Negative for frequency, hematuria, penile pain, scrotal swelling and testicular pain.   Musculoskeletal:  Negative for gait problem.       Objective:      Physical Exam  HENT:      Head: Atraumatic.   Pulmonary:      Effort: Pulmonary effort is normal.   Neurological:      General: No focal deficit present.      Mental Status: He is alert and oriented to person, place, and  time.         Assessment:     Problem Noted   Kidney Stones 5/20/2020    Left 1.5 cm UPJ and non obstructing left lower pole greater than 1 cm on CTU 11/2022 with moderate hydronephrosis     Unable to place left stent 11/17/22 due to large prostate, prosthesis and unable to find ureteral orifice.   Left PCN 11/18/22, internalized to stent 11/28  Left ESWL UPJ stone 12/2/22  KUB after with 1.8 cm left lower pole stone consistent with lower pole stone on CT urogram, no renal pelvis/gualberto stent stone seen.  Left ESWL 1/30/23 good fragmentation  KUB shows continued LLP stone  CT 5/23: left enlarging LP stone 2.3 cm mild pelviectasis, small stone fragment at UPJ  Attempted left PCNL 8/14/23--IR could not get access down ureter, taken to cystoscopy suite, ureteroscopy done showing access between two calyx, survey of kidney for some time unable to locate dominant stone some stone fragmented in lower pole     Elevated Psa 3/11/2013    Biopsy 2013 ASAP 1 core, 2014 benign  PSA 8/22--8.2  Volume 183, PSAD 0.04       Benign Prostatic Hyperplasia With Urinary Retention     183 gm prostate on CTU 2022  Initial AUA SS (8/2022): 19/2  PVR 67  Recurrent retention 10/23.     History of Penile Implant 5/20/2020         Plan:     Now recurrent retention, known very large prostate.  Discussed options including holmium laser enucleation of prostate versus robotic simple prostatectomy versus open simple prostatectomy.  His inflatable penile prosthesis complicates this.  Discussed would recommend explantation of prosthesis and all components if undergoing robotic suprapubic prostatectomy given risk of colonization of the bladder and possible device infection after.  Plan RASP and IPP removal, will plan follow up in 1 week for cath change and culture.   2.  Change antibiotic to ampicillin, repeat culture next week    Dannie Murray MD

## 2023-10-24 NOTE — PRE-PROCEDURE INSTRUCTIONS
Wife    Allergies, medical, surgical, family and psychosocial histories reviewed with patient. Periop plan of care reviewed. Preop instructions given, including medications to take and to hold. Hibiclens soap and instructions on use given. Time allotted for questions to be addressed.  Patient verbalized understanding.    Arrival time 0645.

## 2023-10-24 NOTE — DISCHARGE INSTRUCTIONS
Your surgery is scheduled for 11/6/23.    Please report to [facility] on the 1st Floor at 0645 a.m.    THIS TIME IS SUBJECT TO CHANGE.  YOU WILL RECEIVE A PHONE CALL THE DAY BEFORE SURGERY BY 3:30 PM TO CONFIRM YOUR TIME OF ARRIVAL.  IF YOU HAVE NOT RECEIVED A PHONE CALL BY 3:30 PM THE DAY BEFORE YOUR SURGERY PLEASE CALL [phone]     INSTRUCTIONS IMPORTANT!!!  ¨ Do not eat or drink after 12 midnight-including water, candy, gum, & mints. OK to brush teeth.      ____  Proceed to [facility] on *** for additional testing.        ____  Do not wear makeup, including mascara.  ____  No powder, lotions or creams to surgical area.  ____  Please remove all jewelry, including piercings and leave at home.  ____  No money or valuables needed. Please leave at home.  ____  Please bring any documents given by your doctor.  ____  If going home the same day, arrange for a ride home. You will not be able to             drive if Anesthesia was used.  ____  Children under 18 years require a parent / guardian present the entire time             they are in surgery / recovery.  ____  Wear loose fitting clothing. Allow for dressings, bandages.  ____  Stop Aspirin, Ibuprofen, Motrin, Aleve, Goody's/BC powders, Excedrine and Naproxen (NSAIDS) at least 3-5 days before surgery, unless otherwise instructed by your doctor, or the nurse.   You MAY use Tylenol/acetaminophen until day of surgery.  ____  Wash the surgical area with Hibiclens or Dial Antibacterial bar soap the night before surgery, and again the             morning of surgery.  Be sure to rinse hibiclens or Dial Antibacterial bar soap off completely (if instructed by   nurse).  ____  If you take diabetic medication including Metformin, Glimepiride, Glipizide, Glyburide, Byetta, Januvia, Actos, do not take am of surgery unless instructed by Doctor.  ____ Hold Invokana, Farxiga, and Jardiance for 3 days prior to surgery.   ____  Call MD for temperature  above 101 degrees or any other signs of infection such as Urinary (bladder) infection, Upper respiratory infection, skin boils, etc.  ____ Stop taking any Fish Oil supplement or any Vitamins that contain Vitamin E at least 5 days prior to surgery.  ____ Do Not wear your contact lenses the day of your procedure.  You may wear your glasses.      ____Do not shave surgical site for 3 days prior to surgery.  ____ Practice Good hand washing before, during, and after procedure.      I have read or had read and explained to me, and understand the above information.  Additional comments or instructions:  For additional questions call [phone]      ANESTHESIA SIDE EFFECTS  -For the first 24 hours after surgery:  Do not drive, use heavy equipment, make important decisions, or drink alcohol  -It is normal to feel sleepy for several hours.  Rest until you are more awake.  -Have someone stay with you, if needed.  They can watch for problems and help keep you safe.  -Some possible post anesthesia side effects include: nausea and vomiting, sore throat and hoarseness, sleepiness, and dizziness.        Pre-Op Bathing Instructions    Before surgery, you can play an important role in your own health.    Because skin is not sterile, we need to be sure that your skin is as free of germs as possible. By following the instructions below, you can reduce the number of germs on your skin before surgery.    IMPORTANT: You will need to shower with a special soap called Hibiclens*, available at any pharmacy.  If you are allergic to Chlorhexidine (the antiseptic in Hibiclens), use an antibacterial soap such as Dial Soap for your preoperative shower.  You will shower with Hibiclens both the night before your surgery and the morning of your surgery.  Do not use Hibiclens on the head, face or genitals to avoid injury to those areas.    STEP #1: THE NIGHT BEFORE YOUR SURGERY     Do not shave the area of your body where your surgery will be  performed.  Shower and wash your hair and body as usual with your normal soap and shampoo.  Rinse your hair and body thoroughly after you shower to remove all soap residue.  With your hand, apply one packet of Hibiclens soap to the surgical site.   Wash the site gently for five (5) minutes. Do not scrub your skin too hard.   Do not wash with your regular soap after Hibiclens is used.  Rinse your body thoroughly.  Pat yourself dry with a clean, soft towel.  Do not use lotion, cream, or powder.  Wear clean clothes.    STEP #2: THE MORNING OF YOUR SURGERY     Repeat Step #1.    * Not to be used by people allergic to Chlorhexidine.

## 2023-10-24 NOTE — ANESTHESIA PREPROCEDURE EVALUATION
10/24/2023  Brandan Werner is a 78 y.o., male scheduled for ROBOTIC PROSTATECTOMY, SIMPLE and REMOVAL, PENILE PROSTHESIS, INFLATABLE 11/6/23.    Past Medical History:   Diagnosis Date    Anxiety     BPH (benign prostatic hyperplasia)     Cataract     Elevated PSA     Erectile dysfunction     Hyperlipidemia     Hypertension     Hypogonadism male     Kidney stone 5/20/2020    Obesity     PTSD (post-traumatic stress disorder)     Shoulder arthritis     Urinary tract infection      Past Surgical History:   Procedure Laterality Date    ARTHROPLASTY OF SHOULDER Right 9/14/2023    Procedure: ARTHROPLASTY, SHOULDER;  Surgeon: Gabe Manning MD;  Location: [facility] OR;  Service: Orthopedics;  Laterality: Right;  Ayaan david notified cc    COLONOSCOPY N/A 11/26/2018    Procedure: COLONOSCOPY;  Surgeon: Germán Moss MD;  Location: [address]);  Service: Endoscopy;  Laterality: N/A;    COLONOSCOPY W/ BIOPSIES  2010    CYSTOSCOPY Left 11/17/2022    Procedure: CYSTOSCOPY;  Surgeon: Dannie Murray MD;  Location: [facility] OR;  Service: Urology;  Laterality: Left;    EXTRACORPOREAL SHOCK WAVE LITHOTRIPSY Left 12/2/2022    Procedure: LITHOTRIPSY, ESWL [facility] [place] ESWL machine, scheduling call [phone] minutes;  Surgeon: Dannie Murray MD;  Location: [facility];  Service: Urology;  Laterality: Left;  Confirmed with [facility] 11/22 [facility] conf # [id number]    EXTRACORPOREAL SHOCK WAVE LITHOTRIPSY Left 1/30/2023    Procedure: LITHOTRIPSY, ESWL [facility] [place] ESWL machine, scheduling call [phone] minutes;  Surgeon: Dannie Murray MD;  Location: [facility];  Service: Urology;  Laterality: Left;  next [facility] conf #[id number]    PENILE PROSTHESIS IMPLANT      PERCUTANEOUS NEPHROLITHOTOMY Left 8/14/2023    Procedure: CYSTOSCOPY, LEFT RETROGRADE PYELOGRAM, LEFT URETEROSCOPY WITH  STONE BASKET EXTRACTION, PLACEMENT OF JJ STENT, LEFT NEPHROSTOMY TUBE REMOVAL;  Surgeon: Danine Murray MD;  Location: [facility];  Service: Urology;  Laterality: Left;  IR to place ureteral catheter prior, start time 1200  Cysto tower, flexible cysto/ureteroscopes, Laser in room, lithoclast in room, fluoroscopy with Tony to     Review of patient's allergies indicates:  Not on File  Current Outpatient Medications   Medication Instructions    ampicillin (PRINCIPEN) 500 mg, Oral, 4 times daily    ascorbic acid (vitamin C) (VITAMIN C) 250 mg, Oral, Daily    atorvastatin (LIPITOR) 20 mg, Oral, Daily    carbidopa-levodopa  mg (SINEMET)  mg per tablet TAKE 1 TABLET BY MOUTH THREE TIMES A DAY FOR PARKINSON'S DISE** WITH MEALSASE    celecoxib (CELEBREX) 400 mg, Oral, Daily    FLUoxetine 60 mg    lisinopriL (PRINIVIL,ZESTRIL) 5 mg, Oral, Daily    ondansetron (ZOFRAN) 4 mg, Oral, Every 8 hours PRN    oxyCODONE-acetaminophen (PERCOCET) 5-325 mg per tablet 1 tablet, Oral, Every 4 hours PRN    pantoprazole (PROTONIX) 40 mg, Oral, Daily    polyvinyl alcohol, artificial tears, (LIQUIFILM TEARS) 1.4 % ophthalmic solution INSTILL ONE DROP IN EACH EYE FOUR TIMES A DAY FOR DRY EYES    primidone (MYSOLINE) 100 mg, Oral, Nightly    tamsulosin (FLOMAX) 0.4 mg, Oral, Nightly    traZODone (DESYREL) 100 mg       Pre-op Assessment    I have reviewed the Patient Summary Reports.     I have reviewed the Nursing Notes. I have reviewed the NPO Status.   I have reviewed the Medications.     Review of Systems  Anesthesia Hx:  No problems with previous Anesthesia               Denies Personal Hx of Anesthesia complications.                    Social:  Former Smoker, No Alcohol Use       Hematology/Oncology:    Oncology Normal    -- Anemia:                                  EENT/Dental:  EENT/Dental Normal           Cardiovascular:  Exercise tolerance: good   Denies Pacemaker. Hypertension    Denies CABG/stent.        hyperlipidemia                        Hypertension         Pulmonary:         Calcified granuloma of lung                  Renal/:  Chronic Renal Disease renal calculi BPH      Kidney Function/Disease             Hepatic/GI:  Hepatic/GI Normal                 Musculoskeletal:  Arthritis               Neurological:  Neurology Normal Denies TIA.  Denies CVA.    Denies Seizures.     Neuro Symptoms of tremor                         Movement Disorder Dx, Parkinson's Disease   Endocrine:  Endocrine Normal            Psych:  Psychiatric History  depression                Physical Exam  General: Cooperative and Oriented    Airway:  Mallampati: II   Mouth Opening: Normal  Neck ROM: Normal ROM    Dental:  Intact      Lab Results   Component Value Date    WBC 6.43 08/22/2023    HGB 13.6 (L) 08/22/2023    HCT 42.2 08/22/2023     08/22/2023    CHOL 136 08/19/2022    TRIG 33 08/19/2022    HDL 64 08/19/2022    ALT 5 (L) 08/22/2023    AST 9 (L) 08/22/2023     08/22/2023    K 4.3 08/22/2023     08/22/2023    CREATININE 1.1 08/22/2023    BUN 19 08/22/2023    CO2 24 08/22/2023    TSH 1.327 08/19/2022    PSA 8.2 (H) 08/19/2022    INR 0.9 08/14/2023    HGBA1C 5.6 11/25/2019     Results for orders placed or performed in visit on 08/10/23   EKG 12-lead    Collection Time: 08/10/23 12:53 PM    Narrative    Test Reason : Z01.818,    Vent. Rate : 059 BPM     Atrial Rate : 059 BPM     P-R Int : 164 ms          QRS Dur : 108 ms      QT Int : 442 ms       P-R-T Axes : 041 004 025 degrees     QTc Int : 437 ms    Sinus bradycardia with Premature supraventricular complexes  Incomplete right bundle branch block  Cannot rule out Anterior infarct ,age undetermined  Abnormal ECG  When compared with ECG of 17-NOV-2022 12:14,  Premature supraventricular complexes are now Present  Minimal criteria for Anterior infarct are now Present  Confirmed by Nirmal Cramer III, MD ([id number]) on 8/10/2023 4:02:50 PM    Referred By: LAI GONZALEZ           Confirmed  By:Nirmal Cramer III, MD         Anesthesia Plan  Type of Anesthesia, risks & benefits discussed:    Anesthesia Type: Gen ETT  Intra-op Monitoring Plan: Standard ASA Monitors  Post Op Pain Control Plan: multimodal analgesia  Induction:  IV  ASA Score: 3  Day of Surgery Review of History & Physical: H&P Update referred to the surgeon/provider.  Anesthesia Plan Notes:       Ready For Surgery From Anesthesia Perspective.     .

## 2023-10-24 NOTE — PLAN OF CARE
[facility] Occupational Therapy  Initial Evaluation     Date: 10/24/2023  [id number]    Therapy Diagnosis:   Encounter Diagnoses   Name Primary?    Status post total shoulder replacement, right     Pain of right upper extremity     Weakness     Stiffness due to immobility      Physician: Gabe Manning MD    Physician Orders: OT evaluate and treat  Medical Diagnosis: Z96.611 (ICD-10-CM) - Status post total shoulder replacement, right  Surgical Procedure and Date: Right TSA, 9/14/2023  Evaluation Date: 10/24/2023  Insurance Authorization Period Expiration: 12/31/2023  Plan of Care Expiration: 1/19/2024  Date of Return to MD: 11/3/2023  Visit # / Visits authorized: 1 / 1  FOTO:1/3    Precautions:  Weightbearing; Patient will continue with physical therapy with the emphasis on his range of motion.  At the present time he should continue with pendulums, passive supine elevation to 90, and passive supine external rotation neutral.  I would like to see him back in 1 month at which time we will get an x-ray of the right shoulder    Time In: 1:45  Time Out: 2:30  Total Appointment Time (timed & untimed codes): 45 minutes    Subjective     History of Current Condition/Mechanism of Injury: Brandan reports: right shoulder weakness, stiffness and pain since surgery which limits him functionally    Involved Side: Right  Dominant Side: Right  Imaging: see imaging   Prior Therapy: received Home health   Occupation:  retired    Functional Limitations/Social History:    Previous functional status includes: Independent with all ADLs.     Current Functional Status   Home/Living environment: lives with their spouse      Limitation of Functional Status as follows:   ADLs/IADLs:     - Feeding: Independent    - Bathing: Independent    - Dressing/Grooming: Independent    - Driving: Independent     Leisure:unable to perform house hold tasks    Pain:  Functional Pain Scale Rating  0-10: Current 3/10, worst 3/10, best 0/10   Location: at incision  Description: Aching  Aggravating Factors: Laying, Night Time, and Lifting  Easing Factors: ice    Patient's Goals for Therapy: to decrease pain and increase functional use    Medical History:   Past Medical History:   Diagnosis Date    Anxiety     BPH (benign prostatic hyperplasia)     Cataract     Elevated PSA     Erectile dysfunction     Hyperlipidemia     Hypertension     Hypogonadism male     Kidney stone 5/20/2020    Obesity     PTSD (post-traumatic stress disorder)     Shoulder arthritis     Urinary tract infection        Surgical History:    has a past surgical history that includes Colonoscopy w/ biopsies (2010); Penile prosthesis implant; Colonoscopy (N/A, 11/26/2018); Cystoscopy (Left, 11/17/2022); Extracorporeal shock wave lithotripsy (Left, 12/2/2022); Extracorporeal shock wave lithotripsy (Left, 1/30/2023); Percutaneous nephrolithotomy (Left, 8/14/2023); and Arthroplasty of shoulder (Right, 9/14/2023).    Medications:   has a current medication list which includes the following prescription(s): ampicillin, ascorbic acid (vitamin c), atorvastatin, carbidopa-levodopa  mg, celecoxib, fluoxetine, lisinopril, oxycodone-acetaminophen, polyvinyl alcohol (artificial tears), primidone, tamsulosin, and trazodone, and the following Facility-Administered Medications: lactated ringers and lidocaine (pf) 10 mg/ml (1%).    Allergies:   Review of patient's allergies indicates:  No Known Allergies       Objective     Sensation Test: Patient denies any numbness/tingling    Observation/Inspection:rounded shoulders, forward head    Range of Motion/Strength:   Shoulder Left  Right  Pain/Dysfunction with Movement    AROM MMT AROM PROM    Flexion 120 5/5 NT 90    Extension 55 5/5 NT NT    Abduction 110 5/5 NT 70    HorizAdduction 20 5/5 NT NT    Internal rotation L1 5/5 NT To stomach    ER at 90° abd Back of head 5/5 NT NT    ER at 0° abd 75 5/5 NT 40     NT=Not tested  ROM Comments: Pain at end range, firm at end feel    Painful Arc: none noted    Tenderness upon Palpation:      Positive: incision    Special Tests:deferred    Scapular Control/Dyskinesis:    Normal / Subtle / Obvious  Comments    Left  subtle -    Right  subtle -     Intake Outcome Measure for FOTO Shoulder Survey    Therapist reviewed FOTO scores for Brandan Werner on 10/24/2023.   FOTO documents entered into [employer] - see Media section.    Intake Score: 52%         Treatment     Total Treatment time (time-based codes) separate from Evaluation: 8 minutes    Brandan received therapeutic exercises for 8 minutes including:  -pendulums, elbow, wrist and hand range of motion, scapular retraction, shoulder shrugs    Patient Education and Home Exercises      Education provided:   - on current condition and home exercise program     Written Home Exercises Provided: yes.  Exercises were reviewed and Brandan was able to demonstrate them prior to the end of the session.  Brandan demonstrated good  understanding of the education provided. See EMR under Patient Instructions for exercises provided during therapy sessions.     Pt was advised to perform these exercises free of pain, and to stop performing them if pain occurs.    Patient/Family Education: role of OT, goals for OT, scheduling/cancellations - pt verbalized understanding. Discussed insurance limitations with patient.    Assessment     Brandan Werner is a 78 y.o. male referred to outpatient occupational therapy and presents with a medical diagnosis of right TSA. Pt presents to clinic today with sling and pillow intact 6 weeks post op. Pt with some catheter issues which required him to push back his shoulder evaluation. Incision healed, no signs or symptoms of infection noted. Pt with decreased range of motion, weakness and pain all of which limit him functionally.      Assessment of Occupational Performance   Performance Deficits    Physical:  Joint  Mobility  Muscle Power/Strength  Muscle Endurance  Skin Integrity/Scar Formation  Muscle Tone  Postural Control  Pain    Cognitive:  No Deficits    Psychosocial:    No Deficits     Following medical record review it is determined that pt will benefit from occupational therapy services in order to maximize pain free and/or functional use of right shoulder. The following goals were discussed with the patient and patient is in agreement with them as to be addressed in the treatment plan. The patient's rehab potential is Good.     Anticipated barriers to occupational therapy: none    Plan of care discussed with patient: Yes  Patient's spiritual, cultural and educational needs considered and patient is agreeable to the plan of care and goals as stated below:     Medical Necessity is demonstrated by the following  Occupational Profile/History  Co-morbidities and personal factors that may impact the plan of care [x] LOW: Brief chart review  [] MODERATE: Expanded chart review   [] HIGH: Extensive chart review    Moderate / High Support Documentation: N/A     Examination  Performance deficits relating to physical, cognitive or psychosocial skills that result in activity limitations and/or participation restrictions  [x] LOW: addressing 1-3 Performance deficits  [] MODERATE: 3-5 Performance deficits  [] HIGH: 5+ Performance deficits (please support below)    Moderate / High Support Documentation: N/A     Treatment Options [x] LOW: Limited options  [] MODERATE: Several options  [] HIGH: Multiple options      Decision Making/ Complexity Score: low       The following goals were discussed with the patient and patient is in agreement with them as to be addressed in the treatment plan.     Short Term Goals to be met in 4 weeks: (11/23/2023)  1) Initiate Hep   2) Pt will increase Right shoulder PROM by 10 degrees grossly for improved performance with overhead ADL's  3) Pt will report 2/10 pain in (Right)shoulder at worst  4) Pt  will progress to active assisted range of motion exercises  5) Patient will be able to achieve less than or equal to 45% on FOTO shoulder survey demonstrating overall improved functional ability with upper extremity.     Long Term Goals to be met by discharge:  1) Independent with HEP  2) Pt will demonstrate (Right) shoulder AROM WFL grossly for Swisher with ADL's  3) Pt will demonstrate (Right) shoulder MMT WFL grossly for Swisher with functional activities  4) Independent and pain free with ADL's and IADL's  5) Patient will be able to achieve less than or equal to 25% on FOTO shoulder survey demonstrating overall improved functional ability with upper extremity.       Plan   Plan of Care Certification: 10/24/2023 to 1/19/2024.     Pt could benefit from therapy 2x week however requesting 1x a week currently due to other appointments, Outpatient Occupational Therapy 2 times weekly for 12 weeks to include the following interventions: Manual therapy/joint mobilizations, Modalities for pain management, Therapeutic exercises/activities., Strengthening, Joint Protection, and Energy Conservation.      SRIDHAR Vinson      I CERTIFY THE NEED FOR THESE SERVICES FURNISHED UNDER THIS PLAN OF TREATMENT AND WHILE UNDER MY CARE  Physician's comments:      Physician's Signature: ___________________________________________________    I certify the need for these services furnished under this plan of treatment and while under my care.        Gabe Manning MD, FAAOS  , Orthopaedic Sports Medicine  Residency   [facility] Department of Orthopaedic Surgery  Assistant Orthopaedic Surgeon, [place] [facility]  Head Team Physician, [place] [facility]

## 2023-10-27 ENCOUNTER — TELEPHONE (OUTPATIENT)
Dept: UROLOGY | Facility: CLINIC | Age: 78
End: 2023-10-27
Payer: OTHER GOVERNMENT

## 2023-10-27 DIAGNOSIS — Z96.611 STATUS POST TOTAL REPLACEMENT OF RIGHT SHOULDER: Primary | ICD-10-CM

## 2023-10-27 NOTE — TELEPHONE ENCOUNTER
Spoke to the patient wife in regarding to switching his insurance prior to surgery. I voiced that they will have to contact [facility] to [facility] and then we can go from there. The wife is aware and will be contacting them.

## 2023-10-27 NOTE — TELEPHONE ENCOUNTER
----- Message from Marianne Miranda sent at 10/27/2023  4:22 PM CDT -----  Type:  Needs Medical Advice    Who Called:  Pt Wife Aimee    Would the patient rather a call back or a response via [facility]?  call  Best Call Back Number:  [phone]  Additional Information:  Pt has a procedure scheduled on 11/6/23 and does not want to go through [employer] instead he prefers to [facility] through [facility]. Pt would like a call back as soon as possible.

## 2023-10-30 ENCOUNTER — TELEPHONE (OUTPATIENT)
Dept: UROLOGY | Facility: CLINIC | Age: 78
End: 2023-10-30
Payer: OTHER GOVERNMENT

## 2023-10-30 NOTE — PROGRESS NOTES
"[facility]  Occupational Therapy Treatment Note     Date: 10/31/2023  Name: Brandan Werner  Clinic Number: [id number]    Therapy Diagnosis:   Encounter Diagnoses   Name Primary?    Pain of right upper extremity Yes    Weakness     Stiffness due to immobility      Physician: Gabe Manning MD    Physician Orders: OT evaluate and treat  Medical Diagnosis: Z96.611 (ICD-10-CM) - Status post total shoulder replacement, right  Surgical Procedure and Date: Right TSA, 9/14/2023  Evaluation Date: 10/24/2023  Insurance Authorization Period Expiration: 12/31/2023  Plan of Care Expiration: 1/19/2024  Date of Return to MD: 11/3/2023  Visit # / Visits authorized: 2 / 1  FOTO:1/3     Precautions:  Weightbearing; Patient will continue with physical therapy with the emphasis on his range of motion.  At the present time he should continue with pendulums, passive supine elevation to 90, and passive supine external rotation neutral.  I would like to see him back in 1 month at which time we will get an x-ray of the right shoulder     Time In: 12:15  Time Out: 1:00  Total Appointment Time (timed & untimed codes): 45 minutes    Subjective     Pt reports: "I've been doing my exercises."  he was compliant with home exercise program given last session.   Response to previous treatment:low pain  Functional change: tolerated progression of treatment well    Pain: 3/10  Location: right shoulder      Objective     Objective Measures updated at progress report unless specified.   Sensation Test: Patient denies any numbness/tingling     Observation/Inspection:rounded shoulders, forward head     Range of Motion/Strength:   Shoulder Left   Right   Pain/Dysfunction with Movement     AROM MMT AROM PROM     Flexion 120 5/5 NT 90(=)     Extension 55 5/5 NT NT     Abduction 110 5/5 NT 95(+25)     HorizAdduction 20 5/5 NT NT     Internal rotation L1 5/5 NT To stomach     ER at 90° abd Back of head 5/5 NT NT     ER at 0° " abd 75 5/5 NT 50(+10)     NT=Not tested  ROM Comments: Pain at end range, firm at end feel     Painful Arc: none noted     Tenderness upon Palpation:      Positive: incision     Special Tests:deferred     Scapular Control/Dyskinesis:     Normal / Subtle / Obvious  Comments    Left  subtle -    Right  subtle -      Intake Outcome Measure for FOTO Shoulder Survey     Therapist reviewed FOTO scores for Brandan Werner on 10/24/2023.   FOTO documents entered into "[employer] - see Media section.     Intake Score: 52%        Treatment   Pt 6 weeks 5 days post op with sling and pillow intact    Brandan received the treatments listed below:     Supervised modalities after being cleared for contradictions: Hot Pack - right shoulder for pain management and tissue extensibility x 5 minutes    Manual therapy techniques: Soft tissue Mobilization were applied to the: right shoulder incision for 10 minutes, including:  -scar mobilization    Therapeutic exercises to develop ROM for 20 minutes, including:  -passive range of motion shoulder 10x  -elbow range of motion 30x  -supination/pronation 30x  -wrist range of motion 30x  -pendulums 3 ways 30x  -Table slides 2 minutes   -pulleys 3 minutes     Neuromuscular re-education activities to improve Posture for 10 minutes. The following activities were included:  -shoulder shrugs 30x  -scapular retraction 30x    Patient Education and Home Exercises     Education provided:   - on current condition and home exercise program   - Progress towards goals     Written Home Exercises Provided: Patient instructed to cont prior HEP.  Exercises were reviewed and Brandan was able to demonstrate them prior to the end of the session.  Brandan demonstrated good  understanding of the HEP provided.     See EMR under Patient Instructions for exercises provided prior visit.      Assessment    Pt with good participation this date. Pain report low throughout session. Pt responded well to scar mobilization. Pt able  to complete all exercises in a pain free range of motion and good technique. Pt meeting post op landmarks.     Brandan is progressing well towards his goals and there are no updates to goals at this time. Pt prognosis is Good.     Pt will continue to benefit from skilled outpatient occupational therapy to address the deficits listed in the problem list on initial evaluation provide pt/family education and to maximize pt's level of independence in the home and community environment.     Anticipated barriers to occupational therapy: none    Pt's spiritual, cultural and educational needs considered and pt agreeable to plan of care and goals.    Goals:  Short Term Goals to be met in 4 weeks: (11/23/2023)  1) Initiate Hep -met, ongoing  2) Pt will increase Right shoulder PROM by 10 degrees grossly for improved performance with overhead activities of daily living's -Not met, progressing  3) Pt will report 2/10 pain in (Right)shoulder at worst -Not met, progressing  4) Pt will progress to active assisted range of motion exercises -Not met, progressing  5) Patient will be able to achieve less than or equal to 45% on FOTO shoulder survey demonstrating overall improved functional ability with upper extremity. -Not met, progressing     Long Term Goals to be met by discharge:  1) Independent with home exercise program -Not met, progressing  2) Pt will demonstrate (Right) shoulder AROM WFL grossly for [place] with activities of daily living's -Not met, progressing  3) Pt will demonstrate (Right) shoulder MMT WFL grossly for [place] with functional activities -Not met, progressing  4) Independent and pain free with ADL's and IADL's -Not met, progressing  5) Patient will be able to achieve less than or equal to 25% on FOTO shoulder survey demonstrating overall improved functional ability with upper extremity. -Not met, progressing     Plan     Continue with OT plan of care   Updates/Grading for next session: progress as  able    SRIDHAR Vinson

## 2023-10-30 NOTE — TELEPHONE ENCOUNTER
----- Message from Douglas Cervantes sent at 10/30/2023  1:39 PM CDT -----  Pt Requesting Call Back    Who called: pt's wife  Who called for pt:  Best call back #: [phone]  Add notes: caller wants to know does Dr. Murray accept [employer] of Medicare; wants to know before she choose that insurance plan

## 2023-10-30 NOTE — TELEPHONE ENCOUNTER
Spoke with patient wife she states [facility] will submit paperwork for the insurance for the procedure.

## 2023-10-31 ENCOUNTER — OFFICE VISIT (OUTPATIENT)
Dept: UROLOGY | Facility: CLINIC | Age: 78
End: 2023-10-31
Payer: OTHER GOVERNMENT

## 2023-10-31 ENCOUNTER — CLINICAL SUPPORT (OUTPATIENT)
Dept: REHABILITATION | Facility: HOSPITAL | Age: 78
End: 2023-10-31
Payer: OTHER GOVERNMENT

## 2023-10-31 DIAGNOSIS — R53.1 WEAKNESS: ICD-10-CM

## 2023-10-31 DIAGNOSIS — Z74.09 STIFFNESS DUE TO IMMOBILITY: ICD-10-CM

## 2023-10-31 DIAGNOSIS — M79.601 PAIN OF RIGHT UPPER EXTREMITY: Primary | ICD-10-CM

## 2023-10-31 DIAGNOSIS — M25.60 STIFFNESS DUE TO IMMOBILITY: ICD-10-CM

## 2023-10-31 DIAGNOSIS — N40.1 BENIGN PROSTATIC HYPERPLASIA WITH URINARY RETENTION: Primary | ICD-10-CM

## 2023-10-31 DIAGNOSIS — R33.8 BENIGN PROSTATIC HYPERPLASIA WITH URINARY RETENTION: Primary | ICD-10-CM

## 2023-10-31 PROCEDURE — 99213 PR OFFICE/OUTPT VISIT, EST, LEVL III, 20-29 MIN: ICD-10-PCS | Mod: S$PBB,,, | Performed by: UROLOGY

## 2023-10-31 PROCEDURE — 99999 PR PBB SHADOW E&M-EST. PATIENT-LVL I: CPT | Mod: PBBFAC,,, | Performed by: UROLOGY

## 2023-10-31 PROCEDURE — 87086 URINE CULTURE/COLONY COUNT: CPT | Mod: HCNC | Performed by: UROLOGY

## 2023-10-31 PROCEDURE — 99999 PR PBB SHADOW E&M-EST. PATIENT-LVL I: ICD-10-PCS | Mod: PBBFAC,,, | Performed by: UROLOGY

## 2023-10-31 PROCEDURE — 99211 OFF/OP EST MAY X REQ PHY/QHP: CPT | Mod: PBBFAC,PO | Performed by: UROLOGY

## 2023-10-31 PROCEDURE — 97112 NEUROMUSCULAR REEDUCATION: CPT | Mod: HCNC,PN

## 2023-10-31 PROCEDURE — 99213 OFFICE O/P EST LOW 20 MIN: CPT | Mod: S$PBB,,, | Performed by: UROLOGY

## 2023-10-31 PROCEDURE — 97140 MANUAL THERAPY 1/> REGIONS: CPT | Mod: HCNC,PN

## 2023-10-31 PROCEDURE — 97110 THERAPEUTIC EXERCISES: CPT | Mod: HCNC,PN

## 2023-10-31 NOTE — H&P (VIEW-ONLY)
Subjective:       Patient ID: Brandan Werner is a 78 y.o. male.    Chief Complaint: No chief complaint on file.       This is a 78 y.o.  male patient with BPH, elevated PSA, kidney stone.    Past patient of Dr. Laboy last seen in 2020 for cystoscopy for microhematuria that was negative except large prostate.  H/o penile implant that still uses.  CTU in 2020 with 150 gm prostate no other findings.  Long h/o elevated PSA.    PSA 6.2 in 2013 and biopsy negative  Biopsy in 2013 with ASAP  PSA recently checked by PCP and was 8.2   Moderate/severe LUTs, AUA SS 19/2, PVR 67  Worsening frequency of urination on finasteride, tamsulosin.    UA showed microhematuria.  CTU done showed left 1 cm UPJ stone with moderate hydronephrosis 11/22 and left non obstructing stones.    Failed left stent, required PCN and antegrade stent  Left ESWL 12/2  Follow-up KUB shows resolution of UPJ stone continued lower pole left stone.  Repeat left LP stone ESWL 1/30/23, appeared to be good fragmentation.  KUB shows left dense stone left LP but still present.  Stent in place. Stent removed 2/7/23.   CT 5/1/23: left enlarging LP stone now 2.3 cm, mild left pelviectasis, read as possible lesion, on review with radiologist small stone near UPJ and mild pelviectasis with pelvis thickening no obvious mass.    Intermittent left sided pain, still LUTs from BPH but not as severe as prior.        Attempted left PCNL 8/14/23--IR could not get access down ureter, taken to cystoscopy suite, ureteroscopy done showing access between two calyces (high risk bleeding to dilate), survey of kidney for some time unable to locate dominant stone some stone fragmented in lower pole, thus access removed and stent placed.    Follow up CT 9/23 with left LP 1.1 cm stone.  Minimal pain.  Had shoulder surgery 9/14/23 and retention after, ludwig in place.   Passed void trial but then recurrent retention.  Has Ludwig in place.  Wishes to have intervention for enlarged  prostate.  Of note, does have inflatable penile prosthesis that he does not use but reports functions.  Cath changed for leaking 10/22/23, urine culture with enterococcus has been on keflex.           LAST PSA  Lab Results   Component Value Date    PSA 8.2 (H) 08/19/2022    PSA 7.0 (H) 03/02/2021    PSA 6.5 (H) 01/30/2020    PSA 3.0 11/02/2016    PSA 5.0 (H) 05/02/2014    PSA 6.2 (H) 10/18/2013    PSA 4.49 (H) 02/22/2013    PSA 4.1 (H) 05/09/2012    PSA 4.04 (H) 04/05/2012    PSA 2.8 08/31/2010    PSA 3.1 02/25/2009    PSA 2.2 09/06/2007    PSA 1.8 09/30/2006    PSA 1.7 12/28/2005    PSA 1.8 10/30/2004    PSADIAG 10.5 (H) 08/22/2023    PSADIAG 4.9 (H) 02/22/2023    PSADIAG 7.0 (H) 03/07/2019    PSADIAG 6.6 (H) 09/07/2018    PSADIAG 6.4 (H) 06/08/2018    PSADIAG 5.3 (H) 01/03/2018    PSADIAG 2.9 02/22/2016    PSADIAG 2.9 08/24/2015    PSADIAG 5.7 (H) 02/24/2015    PSADIAG 5.1 (H) 06/26/2014    PSADIAG 5.8 (H) 12/03/2013    PSATOTAL 6.5 (H) 05/09/2023    PSAFREE 1.69 (H) 05/09/2023       Lab Results   Component Value Date    CREATININE 1.1 08/22/2023       ---  PMH/PSH/Medications/Allergies/Social history reviewed and as in chart.    Review of Systems   Constitutional:  Negative for activity change, chills and fever.   HENT:  Negative for congestion.    Respiratory:  Negative for cough, chest tightness and shortness of breath.    Cardiovascular:  Negative for chest pain and palpitations.   Gastrointestinal:  Negative for abdominal distention, abdominal pain, nausea and vomiting.   Genitourinary:  Positive for difficulty urinating and flank pain. Negative for frequency, hematuria, penile pain, scrotal swelling and testicular pain.   Musculoskeletal:  Negative for gait problem.       Objective:      Physical Exam  HENT:      Head: Atraumatic.   Pulmonary:      Effort: Pulmonary effort is normal.   Neurological:      General: No focal deficit present.      Mental Status: He is alert and oriented to person, place, and  time.       Assessment:     Problem Noted   Kidney Stones 5/20/2020    Left 1.5 cm UPJ and non obstructing left lower pole greater than 1 cm on CTU 11/2022 with moderate hydronephrosis     Unable to place left stent 11/17/22 due to large prostate, prosthesis and unable to find ureteral orifice.   Left PCN 11/18/22, internalized to stent 11/28  Left ESWL UPJ stone 12/2/22  KUB after with 1.8 cm left lower pole stone consistent with lower pole stone on CT urogram, no renal pelvis/gualberto stent stone seen.  Left ESWL 1/30/23 good fragmentation  KUB shows continued LLP stone  CT 5/23: left enlarging LP stone 2.3 cm mild pelviectasis, small stone fragment at UPJ  Attempted left PCNL 8/14/23--IR could not get access down ureter, taken to cystoscopy suite, ureteroscopy done showing access between two calyx, survey of kidney for some time unable to locate dominant stone some stone fragmented in lower pole     Elevated Psa 3/11/2013    Biopsy 2013 ASAP 1 core, 2014 benign  PSA 8/22--8.2  Volume 183, PSAD 0.04       Benign Prostatic Hyperplasia With Urinary Retention     183 gm prostate on CTU 2022  Initial AUA SS (8/2022): 19/2  PVR 67  Recurrent retention 10/23.     History of Penile Implant 5/20/2020         Plan:     Cath change and culturue today  Continue antibiotic  Surgery for RASP/IPP explant on Monday.      We discussed robotic assisted simple/suprapubic prostatectomy in depth including preoperative preparation, surgical technique , postoperative recuperation and recovery, and short and long term complications. We discussed the risks of reoperation, incontinence and impotence. We discussed the chance of rectal injury,  nerve injury, and occult injury to the bowel during verress needle access.  We specifically addressed retrograde/anejaculation, incontinence, bleeding, bladder leak as possible risks. Expected hospital stay and need for large catheter were reviewed. We discussed possible continuing retention, LUTs.  He  wishes to proceed.       Dannie Murray MD

## 2023-11-01 ENCOUNTER — TELEPHONE (OUTPATIENT)
Dept: UROLOGY | Facility: CLINIC | Age: 78
End: 2023-11-01
Payer: OTHER GOVERNMENT

## 2023-11-01 LAB — BACTERIA UR CULT: NO GROWTH

## 2023-11-01 NOTE — TELEPHONE ENCOUNTER
Patient wife called back regarding his appointment on 11/07. I voiced to disregard that appointment it was made before his surgery date. The wife is aware.

## 2023-11-01 NOTE — TELEPHONE ENCOUNTER
----- Message from Ivana Guerrier sent at 11/1/2023  1:22 PM CDT -----  Type:  Patient Returning Call    Who Called:wife   Who Left Message for Patient:estrella   Does the patient know what this is regarding?:returning a call  Would the patient rather a call back or a response via [facility]? Call   Best Call Back Number:[phone]  Additional Information:

## 2023-11-01 NOTE — TELEPHONE ENCOUNTER
----- Message from Tracey Resendiz sent at 11/1/2023  9:42 AM CDT -----  Type:  Needs Medical Advice    Who Called: pt  Would the patient rather a call back or a response via [facility]? call  Best Call Back Number: [phone]  Additional Information: states pt has surgery on 11/6 but also see he has appt scheduled for 11/7 states wont pt be in the hospital for a few days would like to know what's going on

## 2023-11-03 ENCOUNTER — HOSPITAL ENCOUNTER (OUTPATIENT)
Dept: RADIOLOGY | Facility: HOSPITAL | Age: 78
Discharge: HOME | End: 2023-11-03
Attending: INTERNAL MEDICINE
Payer: MEDICARE

## 2023-11-03 ENCOUNTER — OFFICE VISIT (OUTPATIENT)
Dept: ORTHOPEDICS | Facility: CLINIC | Age: 78
End: 2023-11-03
Payer: OTHER GOVERNMENT

## 2023-11-03 ENCOUNTER — HOSPITAL ENCOUNTER (OUTPATIENT)
Dept: RADIOLOGY | Facility: HOSPITAL | Age: 78
Discharge: HOME | End: 2023-11-03
Attending: ORTHOPAEDIC SURGERY
Payer: MEDICARE

## 2023-11-03 VITALS
BODY MASS INDEX: 26.43 KG/M2 | SYSTOLIC BLOOD PRESSURE: 108 MMHG | WEIGHT: 195.13 LBS | HEART RATE: 61 BPM | HEIGHT: 72 IN | DIASTOLIC BLOOD PRESSURE: 67 MMHG

## 2023-11-03 DIAGNOSIS — Z96.611 STATUS POST TOTAL REPLACEMENT OF RIGHT SHOULDER: Primary | ICD-10-CM

## 2023-11-03 DIAGNOSIS — N20.0 KIDNEY STONES: ICD-10-CM

## 2023-11-03 DIAGNOSIS — Z96.611 STATUS POST TOTAL REPLACEMENT OF RIGHT SHOULDER: ICD-10-CM

## 2023-11-03 PROCEDURE — 99999 PR PBB SHADOW E&M-EST. PATIENT-LVL IV: CPT | Mod: PBBFAC,,, | Performed by: ORTHOPAEDIC SURGERY

## 2023-11-03 PROCEDURE — 76770 US RETROPERITONEAL COMPLETE: ICD-10-PCS | Mod: 26,HCNC,, | Performed by: RADIOLOGY

## 2023-11-03 PROCEDURE — 99024 PR POST-OP FOLLOW-UP VISIT: ICD-10-PCS | Mod: ,,, | Performed by: ORTHOPAEDIC SURGERY

## 2023-11-03 PROCEDURE — 99024 POSTOP FOLLOW-UP VISIT: CPT | Mod: ,,, | Performed by: ORTHOPAEDIC SURGERY

## 2023-11-03 PROCEDURE — 76770 US EXAM ABDO BACK WALL COMP: CPT | Mod: 26,HCNC,, | Performed by: RADIOLOGY

## 2023-11-03 PROCEDURE — 99214 OFFICE O/P EST MOD 30 MIN: CPT | Mod: PBBFAC,25,PN | Performed by: ORTHOPAEDIC SURGERY

## 2023-11-03 PROCEDURE — 73030 XR SHOULDER COMPLETE 2 OR MORE VIEWS RIGHT: ICD-10-PCS | Mod: 26,HCNC,RT, | Performed by: RADIOLOGY

## 2023-11-03 PROCEDURE — 76770 US EXAM ABDO BACK WALL COMP: CPT | Mod: TC,HCNC

## 2023-11-03 PROCEDURE — 73030 X-RAY EXAM OF SHOULDER: CPT | Mod: 26,HCNC,RT, | Performed by: RADIOLOGY

## 2023-11-03 PROCEDURE — 73030 X-RAY EXAM OF SHOULDER: CPT | Mod: TC,HCNC,PN,RT

## 2023-11-03 PROCEDURE — 99999 PR PBB SHADOW E&M-EST. PATIENT-LVL IV: ICD-10-PCS | Mod: PBBFAC,,, | Performed by: ORTHOPAEDIC SURGERY

## 2023-11-03 RX ORDER — FERROUS SULFATE 325(65) MG
325 TABLET ORAL
COMMUNITY
Start: 2023-10-18

## 2023-11-03 NOTE — PROGRESS NOTES
Patient ID:   Brandan Werner is a 78 y.o. male.    Chief Complaint:   7w 1d status post right anatomic total shoulder arthroplasty.    HPI:   Patient is returning today for evaluation of his right shoulder.  He reports a pain level of 3/10.  He is doing outpatient physical therapy.  Unfortunately, he is had difficulties with urinary retention postoperatively.  He is scheduled to have surgery tomorrow.  He has been diagnosed with a urinary tract infection is currently on antibiotics.  He denies any redness around the right shoulder skin incision.    Medications:    Current Outpatient Medications:     ampicillin (PRINCIPEN) 500 MG capsule, Take 1 capsule (500 mg total) by mouth 4 (four) times daily. for 14 days, Disp: 56 capsule, Rfl: 0    ascorbic acid, vitamin C, (VITAMIN C) 250 MG tablet, Take 250 mg by mouth once daily., Disp: , Rfl:     atorvastatin (LIPITOR) 40 MG tablet, Take 20 mg by mouth once daily., Disp: , Rfl:     carbidopa-levodopa  mg (SINEMET)  mg per tablet, TAKE 1 TABLET BY MOUTH THREE TIMES A DAY FOR PARKINSON'S DISE** WITH MEALSASE, Disp: , Rfl:     celecoxib (CELEBREX) 200 MG capsule, Take 400 mg by mouth once daily., Disp: , Rfl:     ferrous sulfate (FEOSOL) 325 mg (65 mg iron) Tab tablet, 325 mg., Disp: , Rfl:     FLUoxetine 20 MG capsule, 60 mg., Disp: , Rfl:     lisinopril (PRINIVIL,ZESTRIL) 5 MG tablet, Take 5 mg by mouth once daily., Disp: , Rfl:     oxyCODONE-acetaminophen (PERCOCET) 5-325 mg per tablet, Take 1 tablet by mouth every 4 (four) hours as needed for Pain., Disp: 28 tablet, Rfl: 0    polyvinyl alcohol, artificial tears, (LIQUIFILM TEARS) 1.4 % ophthalmic solution, INSTILL ONE DROP IN EACH EYE FOUR TIMES A DAY FOR DRY EYES, Disp: , Rfl:     tamsulosin (FLOMAX) 0.4 mg Cp24, Take 1 capsule (0.4 mg total) by mouth nightly., Disp: 30 capsule, Rfl: 11    traZODone (DESYREL) 100 MG tablet, 100 mg., Disp: , Rfl:     primidone (MYSOLINE) 50 MG Tab, Take 2 tablets (100 mg  total) by mouth every evening., Disp: 60 tablet, Rfl: 11  No current facility-administered medications for this visit.    Facility-Administered Medications Ordered in Other Visits:     lactated ringers infusion, , Intravenous, Continuous, Maureen Solomon DNP    LIDOcaine (PF) 10 mg/ml (1%) injection 10 mg, 1 mL, Intradermal, Once, Maureen Solomon DNP    Allergies:  Review of patient's allergies indicates:  No Known Allergies    Vitals:  /67   Pulse 61   Ht 6' (1.829 m)   Wt 88.5 kg (195 lb 1.7 oz)   BMI 26.46 kg/m²     Physical Examination:  Ortho Exam   Right shoulder exam:   Skin incision is healed.  There are several stitch abscesses.  Range of motion today reveals active elevation about 90 passive elevation 120, external rotation is to neutral.    Imaging studies:   I have ordered and independently reviewed the following imaging studies performed at [facility] today    Right shoulder x-ray series demonstrates total shoulder arthroplasty.  Retained osteophyte is seen along the inferior medial humeral head.  Stem is in slight varus.  No evidence of any loosening.    Assessment:  1. Status post total replacement of right shoulder        Plan:  Patient will advance his range of motion to include full passive elevation and active assisted elevation.  He will continue to restrict external rotation in neutral.  He will follow up in 6 weeks with a new x-ray of the right shoulder.       No follow-ups on file.

## 2023-11-06 ENCOUNTER — ANESTHESIA (OUTPATIENT)
Dept: SURGERY | Facility: HOSPITAL | Age: 78
End: 2023-11-06
Payer: OTHER GOVERNMENT

## 2023-11-06 ENCOUNTER — HOSPITAL ENCOUNTER (OUTPATIENT)
Facility: HOSPITAL | Age: 78
Discharge: HOME | End: 2023-11-07
Attending: UROLOGY | Admitting: UROLOGY
Payer: OTHER GOVERNMENT

## 2023-11-06 DIAGNOSIS — N40.1 BENIGN PROSTATIC HYPERPLASIA WITH URINARY RETENTION: ICD-10-CM

## 2023-11-06 DIAGNOSIS — R33.8 BENIGN PROSTATIC HYPERPLASIA WITH URINARY RETENTION: ICD-10-CM

## 2023-11-06 DIAGNOSIS — Z96.0 HISTORY OF PENILE IMPLANT: ICD-10-CM

## 2023-11-06 LAB
ANION GAP SERPL CALC-SCNC: 10 MMOL/L (ref 8–16)
BASOPHILS # BLD AUTO: 0.02 K/UL (ref 0–0.2)
BASOPHILS NFR BLD: 0.1 % (ref 0–1.9)
BUN SERPL-MCNC: 14 MG/DL (ref 8–23)
CALCIUM SERPL-MCNC: 8.4 MG/DL (ref 8.7–10.5)
CHLORIDE SERPL-SCNC: 108 MMOL/L (ref 95–110)
CO2 SERPL-SCNC: 23 MMOL/L (ref 23–29)
CREAT SERPL-MCNC: 1.1 MG/DL (ref 0.5–1.4)
DIFFERENTIAL METHOD: ABNORMAL
EOSINOPHIL # BLD AUTO: 0 K/UL (ref 0–0.5)
EOSINOPHIL NFR BLD: 0 % (ref 0–8)
ERYTHROCYTE [DISTWIDTH] IN BLOOD BY AUTOMATED COUNT: 12.8 % (ref 11.5–14.5)
EST. GFR  (NO RACE VARIABLE): >60 ML/MIN/1.73 M^2
GLUCOSE SERPL-MCNC: 157 MG/DL (ref 70–110)
HCT VFR BLD AUTO: 37.8 % (ref 40–54)
HGB BLD-MCNC: 12.3 G/DL (ref 14–18)
IMM GRANULOCYTES # BLD AUTO: 0.06 K/UL (ref 0–0.04)
IMM GRANULOCYTES NFR BLD AUTO: 0.4 % (ref 0–0.5)
LYMPHOCYTES # BLD AUTO: 0.8 K/UL (ref 1–4.8)
LYMPHOCYTES NFR BLD: 5.5 % (ref 18–48)
MAGNESIUM SERPL-MCNC: 1.7 MG/DL (ref 1.6–2.6)
MCH RBC QN AUTO: 30.8 PG (ref 27–31)
MCHC RBC AUTO-ENTMCNC: 32.5 G/DL (ref 32–36)
MCV RBC AUTO: 95 FL (ref 82–98)
MONOCYTES # BLD AUTO: 0.6 K/UL (ref 0.3–1)
MONOCYTES NFR BLD: 4 % (ref 4–15)
NEUTROPHILS # BLD AUTO: 13 K/UL (ref 1.8–7.7)
NEUTROPHILS NFR BLD: 90.4 % (ref 38–73)
NRBC BLD-RTO: 0 /100 WBC
PHOSPHATE SERPL-MCNC: 3.9 MG/DL (ref 2.7–4.5)
PLATELET # BLD AUTO: 164 K/UL (ref 150–450)
PMV BLD AUTO: 9.4 FL (ref 9.2–12.9)
POTASSIUM SERPL-SCNC: 4.5 MMOL/L (ref 3.5–5.1)
RBC # BLD AUTO: 4 M/UL (ref 4.6–6.2)
SODIUM SERPL-SCNC: 141 MMOL/L (ref 136–145)
WBC # BLD AUTO: 14.37 K/UL (ref 3.9–12.7)

## 2023-11-06 PROCEDURE — 63600175 PHARM REV CODE 636 W HCPCS: Performed by: UROLOGY

## 2023-11-06 PROCEDURE — 71000039 HC RECOVERY, EACH ADD'L HOUR: Performed by: UROLOGY

## 2023-11-06 PROCEDURE — C1729 CATH, DRAINAGE: HCPCS | Performed by: UROLOGY

## 2023-11-06 PROCEDURE — C9290 INJ, BUPIVACAINE LIPOSOME: HCPCS | Performed by: UROLOGY

## 2023-11-06 PROCEDURE — 71000033 HC RECOVERY, INTIAL HOUR: Performed by: UROLOGY

## 2023-11-06 PROCEDURE — 63600175 PHARM REV CODE 636 W HCPCS: Performed by: NURSE ANESTHETIST, CERTIFIED REGISTERED

## 2023-11-06 PROCEDURE — 88309 TISSUE EXAM BY PATHOLOGIST: CPT | Mod: 26,,, | Performed by: PATHOLOGY

## 2023-11-06 PROCEDURE — 27201423 OPTIME MED/SURG SUP & DEVICES STERILE SUPPLY: Performed by: UROLOGY

## 2023-11-06 PROCEDURE — 88344 PR IHC OR ICC EACH MULTIPLEX ANTIBODY STAIN PROCEDURE: ICD-10-PCS | Mod: 26,,, | Performed by: PATHOLOGY

## 2023-11-06 PROCEDURE — 55867 PR LAPS SURG PROSTATECTOMY SIMPLE ROBOTIC: ICD-10-PCS | Mod: 51,,, | Performed by: UROLOGY

## 2023-11-06 PROCEDURE — 84100 ASSAY OF PHOSPHORUS: CPT | Performed by: UROLOGY

## 2023-11-06 PROCEDURE — 25000003 PHARM REV CODE 250: Performed by: NURSE ANESTHETIST, CERTIFIED REGISTERED

## 2023-11-06 PROCEDURE — 83735 ASSAY OF MAGNESIUM: CPT | Performed by: UROLOGY

## 2023-11-06 PROCEDURE — 63600175 PHARM REV CODE 636 W HCPCS: Performed by: ANESTHESIOLOGY

## 2023-11-06 PROCEDURE — 63600175 PHARM REV CODE 636 W HCPCS: Performed by: NURSE PRACTITIONER

## 2023-11-06 PROCEDURE — 85025 COMPLETE CBC W/AUTO DIFF WBC: CPT | Performed by: UROLOGY

## 2023-11-06 PROCEDURE — 55867 LAPS SURG PRST8ECT SMPL STOT: CPT | Mod: 51,,, | Performed by: UROLOGY

## 2023-11-06 PROCEDURE — 36000712 HC OR TIME LEV V 1ST 15 MIN: Performed by: UROLOGY

## 2023-11-06 PROCEDURE — 25000003 PHARM REV CODE 250: Performed by: UROLOGY

## 2023-11-06 PROCEDURE — 37000008 HC ANESTHESIA 1ST 15 MINUTES: Performed by: UROLOGY

## 2023-11-06 PROCEDURE — 88309 TISSUE EXAM BY PATHOLOGIST: CPT | Performed by: PATHOLOGY

## 2023-11-06 PROCEDURE — D9220A PRA ANESTHESIA: ICD-10-PCS | Mod: CRNA,,, | Performed by: NURSE ANESTHETIST, CERTIFIED REGISTERED

## 2023-11-06 PROCEDURE — D9220A PRA ANESTHESIA: Mod: CRNA,,, | Performed by: NURSE ANESTHETIST, CERTIFIED REGISTERED

## 2023-11-06 PROCEDURE — 36000713 HC OR TIME LEV V EA ADD 15 MIN: Performed by: UROLOGY

## 2023-11-06 PROCEDURE — 54406 REMOVE MUTI-COMP PENIS PROS: CPT | Mod: ,,, | Performed by: UROLOGY

## 2023-11-06 PROCEDURE — 88309 PR  SURG PATH,LEVEL VI: ICD-10-PCS | Mod: 26,,, | Performed by: PATHOLOGY

## 2023-11-06 PROCEDURE — 88344 IMHCHEM/IMCYTCHM EA MLT ANTB: CPT | Mod: 26,,, | Performed by: PATHOLOGY

## 2023-11-06 PROCEDURE — D9220A PRA ANESTHESIA: ICD-10-PCS | Mod: ANES,,, | Performed by: ANESTHESIOLOGY

## 2023-11-06 PROCEDURE — 36415 COLL VENOUS BLD VENIPUNCTURE: CPT | Performed by: UROLOGY

## 2023-11-06 PROCEDURE — D9220A PRA ANESTHESIA: Mod: ANES,,, | Performed by: ANESTHESIOLOGY

## 2023-11-06 PROCEDURE — 37000009 HC ANESTHESIA EA ADD 15 MINS: Performed by: UROLOGY

## 2023-11-06 PROCEDURE — 82570 ASSAY OF URINE CREATININE: CPT | Performed by: UROLOGY

## 2023-11-06 PROCEDURE — 80048 BASIC METABOLIC PNL TOTAL CA: CPT | Performed by: UROLOGY

## 2023-11-06 PROCEDURE — 54406 PR REMVL,INFLAT PENILE PROSTH W/O REPLACMT: ICD-10-PCS | Mod: ,,, | Performed by: UROLOGY

## 2023-11-06 RX ORDER — HEPARIN SODIUM 5000 [USP'U]/ML
5000 INJECTION, SOLUTION INTRAVENOUS; SUBCUTANEOUS
Status: DISCONTINUED | OUTPATIENT
Start: 2023-11-06 | End: 2023-11-06 | Stop reason: HOSPADM

## 2023-11-06 RX ORDER — OXYCODONE HYDROCHLORIDE 5 MG/1
5 TABLET ORAL EVERY 4 HOURS PRN
Status: DISCONTINUED | OUTPATIENT
Start: 2023-11-06 | End: 2023-11-07 | Stop reason: HOSPADM

## 2023-11-06 RX ORDER — LIDOCAINE HYDROCHLORIDE 20 MG/ML
INJECTION INTRAVENOUS
Status: DISCONTINUED | OUTPATIENT
Start: 2023-11-06 | End: 2023-11-06

## 2023-11-06 RX ORDER — ONDANSETRON 2 MG/ML
INJECTION INTRAMUSCULAR; INTRAVENOUS
Status: DISCONTINUED | OUTPATIENT
Start: 2023-11-06 | End: 2023-11-06

## 2023-11-06 RX ORDER — TAMSULOSIN HYDROCHLORIDE 0.4 MG/1
0.4 CAPSULE ORAL NIGHTLY
Status: DISCONTINUED | OUTPATIENT
Start: 2023-11-06 | End: 2023-11-07 | Stop reason: HOSPADM

## 2023-11-06 RX ORDER — ACETAMINOPHEN 325 MG/1
650 TABLET ORAL EVERY 4 HOURS PRN
Status: DISCONTINUED | OUTPATIENT
Start: 2023-11-06 | End: 2023-11-07 | Stop reason: HOSPADM

## 2023-11-06 RX ORDER — SODIUM CHLORIDE, SODIUM LACTATE, POTASSIUM CHLORIDE, CALCIUM CHLORIDE 600; 310; 30; 20 MG/100ML; MG/100ML; MG/100ML; MG/100ML
INJECTION, SOLUTION INTRAVENOUS CONTINUOUS
Status: DISCONTINUED | OUTPATIENT
Start: 2023-11-06 | End: 2023-11-07 | Stop reason: HOSPADM

## 2023-11-06 RX ORDER — KETAMINE HCL IN 0.9 % NACL 50 MG/5 ML
SYRINGE (ML) INTRAVENOUS
Status: DISCONTINUED | OUTPATIENT
Start: 2023-11-06 | End: 2023-11-06

## 2023-11-06 RX ORDER — OXYCODONE HYDROCHLORIDE 5 MG/1
10 TABLET ORAL EVERY 4 HOURS PRN
Status: DISCONTINUED | OUTPATIENT
Start: 2023-11-06 | End: 2023-11-07 | Stop reason: HOSPADM

## 2023-11-06 RX ORDER — BUPIVACAINE HYDROCHLORIDE 2.5 MG/ML
INJECTION, SOLUTION INFILTRATION; PERINEURAL
Status: DISCONTINUED | OUTPATIENT
Start: 2023-11-06 | End: 2023-11-06 | Stop reason: HOSPADM

## 2023-11-06 RX ORDER — DEXAMETHASONE SODIUM PHOSPHATE 4 MG/ML
INJECTION, SOLUTION INTRA-ARTICULAR; INTRALESIONAL; INTRAMUSCULAR; INTRAVENOUS; SOFT TISSUE
Status: DISCONTINUED | OUTPATIENT
Start: 2023-11-06 | End: 2023-11-06

## 2023-11-06 RX ORDER — FLUOXETINE HYDROCHLORIDE 20 MG/1
60 CAPSULE ORAL DAILY
Status: DISCONTINUED | OUTPATIENT
Start: 2023-11-07 | End: 2023-11-07 | Stop reason: HOSPADM

## 2023-11-06 RX ORDER — ONDANSETRON 2 MG/ML
4 INJECTION INTRAMUSCULAR; INTRAVENOUS EVERY 8 HOURS PRN
Status: DISCONTINUED | OUTPATIENT
Start: 2023-11-06 | End: 2023-11-07 | Stop reason: HOSPADM

## 2023-11-06 RX ORDER — CEFAZOLIN SODIUM 2 G/50ML
2 SOLUTION INTRAVENOUS
Status: COMPLETED | OUTPATIENT
Start: 2023-11-06 | End: 2023-11-06

## 2023-11-06 RX ORDER — MORPHINE SULFATE 2 MG/ML
4 INJECTION, SOLUTION INTRAMUSCULAR; INTRAVENOUS EVERY 4 HOURS PRN
Status: DISCONTINUED | OUTPATIENT
Start: 2023-11-06 | End: 2023-11-07 | Stop reason: HOSPADM

## 2023-11-06 RX ORDER — LISINOPRIL 5 MG/1
5 TABLET ORAL DAILY
Status: DISCONTINUED | OUTPATIENT
Start: 2023-11-07 | End: 2023-11-07 | Stop reason: HOSPADM

## 2023-11-06 RX ORDER — HEPARIN SODIUM 5000 [USP'U]/ML
5000 INJECTION, SOLUTION INTRAVENOUS; SUBCUTANEOUS EVERY 12 HOURS
Status: DISCONTINUED | OUTPATIENT
Start: 2023-11-06 | End: 2023-11-07 | Stop reason: HOSPADM

## 2023-11-06 RX ORDER — HYDROMORPHONE HYDROCHLORIDE 2 MG/ML
0.2 INJECTION, SOLUTION INTRAMUSCULAR; INTRAVENOUS; SUBCUTANEOUS EVERY 5 MIN PRN
Status: DISCONTINUED | OUTPATIENT
Start: 2023-11-06 | End: 2023-11-06 | Stop reason: HOSPADM

## 2023-11-06 RX ORDER — CARBIDOPA AND LEVODOPA 25; 100 MG/1; MG/1
1 TABLET ORAL 3 TIMES DAILY
Status: DISCONTINUED | OUTPATIENT
Start: 2023-11-06 | End: 2023-11-07 | Stop reason: HOSPADM

## 2023-11-06 RX ORDER — ACETAMINOPHEN 500 MG
1000 TABLET ORAL EVERY 8 HOURS
Status: DISCONTINUED | OUTPATIENT
Start: 2023-11-07 | End: 2023-11-07 | Stop reason: HOSPADM

## 2023-11-06 RX ORDER — SODIUM CHLORIDE, SODIUM LACTATE, POTASSIUM CHLORIDE, CALCIUM CHLORIDE 600; 310; 30; 20 MG/100ML; MG/100ML; MG/100ML; MG/100ML
INJECTION, SOLUTION INTRAVENOUS CONTINUOUS
Status: DISCONTINUED | OUTPATIENT
Start: 2023-11-06 | End: 2023-11-07

## 2023-11-06 RX ORDER — METOPROLOL TARTRATE 1 MG/ML
5 INJECTION, SOLUTION INTRAVENOUS EVERY 6 HOURS PRN
Status: DISCONTINUED | OUTPATIENT
Start: 2023-11-06 | End: 2023-11-07 | Stop reason: HOSPADM

## 2023-11-06 RX ORDER — FENTANYL CITRATE 50 UG/ML
25 INJECTION, SOLUTION INTRAMUSCULAR; INTRAVENOUS EVERY 5 MIN PRN
Status: DISCONTINUED | OUTPATIENT
Start: 2023-11-06 | End: 2023-11-06 | Stop reason: HOSPADM

## 2023-11-06 RX ORDER — LANOLIN ALCOHOL/MO/W.PET/CERES
1 CREAM (GRAM) TOPICAL DAILY
Status: DISCONTINUED | OUTPATIENT
Start: 2023-11-07 | End: 2023-11-07 | Stop reason: HOSPADM

## 2023-11-06 RX ORDER — ATORVASTATIN CALCIUM 20 MG/1
20 TABLET, FILM COATED ORAL DAILY
Status: DISCONTINUED | OUTPATIENT
Start: 2023-11-07 | End: 2023-11-07 | Stop reason: HOSPADM

## 2023-11-06 RX ORDER — CLONIDINE HYDROCHLORIDE 0.1 MG/1
0.1 TABLET ORAL EVERY 4 HOURS PRN
Status: DISCONTINUED | OUTPATIENT
Start: 2023-11-06 | End: 2023-11-07 | Stop reason: HOSPADM

## 2023-11-06 RX ORDER — DOCUSATE SODIUM 100 MG/1
100 CAPSULE, LIQUID FILLED ORAL 2 TIMES DAILY
Status: DISCONTINUED | OUTPATIENT
Start: 2023-11-06 | End: 2023-11-07 | Stop reason: HOSPADM

## 2023-11-06 RX ORDER — HYDRALAZINE HYDROCHLORIDE 20 MG/ML
10 INJECTION INTRAMUSCULAR; INTRAVENOUS EVERY 6 HOURS PRN
Status: DISCONTINUED | OUTPATIENT
Start: 2023-11-06 | End: 2023-11-07 | Stop reason: HOSPADM

## 2023-11-06 RX ORDER — CEFAZOLIN SODIUM 2 G/50ML
2 SOLUTION INTRAVENOUS
Status: COMPLETED | OUTPATIENT
Start: 2023-11-06 | End: 2023-11-07

## 2023-11-06 RX ORDER — LIDOCAINE HYDROCHLORIDE 10 MG/ML
1 INJECTION, SOLUTION EPIDURAL; INFILTRATION; INTRACAUDAL; PERINEURAL ONCE
Status: DISCONTINUED | OUTPATIENT
Start: 2023-11-06 | End: 2023-11-06 | Stop reason: HOSPADM

## 2023-11-06 RX ORDER — NAPROXEN SODIUM 220 MG
220 TABLET ORAL 2 TIMES DAILY WITH MEALS
COMMUNITY

## 2023-11-06 RX ORDER — PROPOFOL 10 MG/ML
VIAL (ML) INTRAVENOUS
Status: DISCONTINUED | OUTPATIENT
Start: 2023-11-06 | End: 2023-11-06

## 2023-11-06 RX ORDER — ONDANSETRON 2 MG/ML
4 INJECTION INTRAMUSCULAR; INTRAVENOUS DAILY PRN
Status: DISCONTINUED | OUTPATIENT
Start: 2023-11-06 | End: 2023-11-06 | Stop reason: HOSPADM

## 2023-11-06 RX ORDER — ROCURONIUM BROMIDE 10 MG/ML
INJECTION, SOLUTION INTRAVENOUS
Status: DISCONTINUED | OUTPATIENT
Start: 2023-11-06 | End: 2023-11-06

## 2023-11-06 RX ORDER — EPHEDRINE SULFATE 50 MG/ML
INJECTION, SOLUTION INTRAVENOUS
Status: DISCONTINUED | OUTPATIENT
Start: 2023-11-06 | End: 2023-11-06

## 2023-11-06 RX ORDER — ACETAMINOPHEN 500 MG
1000 TABLET ORAL
Status: COMPLETED | OUTPATIENT
Start: 2023-11-06 | End: 2023-11-06

## 2023-11-06 RX ORDER — PROCHLORPERAZINE EDISYLATE 5 MG/ML
5 INJECTION INTRAMUSCULAR; INTRAVENOUS EVERY 30 MIN PRN
Status: DISCONTINUED | OUTPATIENT
Start: 2023-11-06 | End: 2023-11-06 | Stop reason: HOSPADM

## 2023-11-06 RX ORDER — ACETAMINOPHEN 10 MG/ML
1000 INJECTION, SOLUTION INTRAVENOUS ONCE
Status: COMPLETED | OUTPATIENT
Start: 2023-11-06 | End: 2023-11-06

## 2023-11-06 RX ORDER — FENTANYL CITRATE 50 UG/ML
INJECTION, SOLUTION INTRAMUSCULAR; INTRAVENOUS
Status: DISCONTINUED | OUTPATIENT
Start: 2023-11-06 | End: 2023-11-06

## 2023-11-06 RX ORDER — DIPHENHYDRAMINE HYDROCHLORIDE 50 MG/ML
12.5 INJECTION INTRAMUSCULAR; INTRAVENOUS EVERY 4 HOURS PRN
Status: DISCONTINUED | OUTPATIENT
Start: 2023-11-06 | End: 2023-11-07 | Stop reason: HOSPADM

## 2023-11-06 RX ORDER — OXYCODONE AND ACETAMINOPHEN 5; 325 MG/1; MG/1
1 TABLET ORAL
Status: DISCONTINUED | OUTPATIENT
Start: 2023-11-06 | End: 2023-11-06 | Stop reason: HOSPADM

## 2023-11-06 RX ADMIN — ROCURONIUM BROMIDE 10 MG: 10 INJECTION, SOLUTION INTRAVENOUS at 09:11

## 2023-11-06 RX ADMIN — DOCUSATE SODIUM 100 MG: 100 CAPSULE ORAL at 09:11

## 2023-11-06 RX ADMIN — HEPARIN SODIUM 5000 UNITS: 5000 INJECTION INTRAVENOUS; SUBCUTANEOUS at 07:11

## 2023-11-06 RX ADMIN — Medication 15 MG: at 01:11

## 2023-11-06 RX ADMIN — FENTANYL CITRATE 100 MCG: 50 INJECTION, SOLUTION INTRAMUSCULAR; INTRAVENOUS at 09:11

## 2023-11-06 RX ADMIN — CARBIDOPA AND LEVODOPA 1 TABLET: 25; 100 TABLET ORAL at 09:11

## 2023-11-06 RX ADMIN — FENTANYL CITRATE 25 MCG: 50 INJECTION, SOLUTION INTRAMUSCULAR; INTRAVENOUS at 12:11

## 2023-11-06 RX ADMIN — FENTANYL CITRATE 25 MCG: 50 INJECTION INTRAMUSCULAR; INTRAVENOUS at 04:11

## 2023-11-06 RX ADMIN — FENTANYL CITRATE 25 MCG: 50 INJECTION, SOLUTION INTRAMUSCULAR; INTRAVENOUS at 10:11

## 2023-11-06 RX ADMIN — SUGAMMADEX 200 MG: 100 INJECTION, SOLUTION INTRAVENOUS at 03:11

## 2023-11-06 RX ADMIN — ROCURONIUM BROMIDE 20 MG: 10 INJECTION, SOLUTION INTRAVENOUS at 11:11

## 2023-11-06 RX ADMIN — ACETAMINOPHEN 1000 MG: 10 INJECTION INTRAVENOUS at 09:11

## 2023-11-06 RX ADMIN — SODIUM CHLORIDE, SODIUM LACTATE, POTASSIUM CHLORIDE, AND CALCIUM CHLORIDE: .6; .31; .03; .02 INJECTION, SOLUTION INTRAVENOUS at 09:11

## 2023-11-06 RX ADMIN — FENTANYL CITRATE 25 MCG: 50 INJECTION INTRAMUSCULAR; INTRAVENOUS at 05:11

## 2023-11-06 RX ADMIN — CEFAZOLIN SODIUM 2 G: 2 SOLUTION INTRAVENOUS at 09:11

## 2023-11-06 RX ADMIN — PROPOFOL 150 MG: 10 INJECTION, EMULSION INTRAVENOUS at 09:11

## 2023-11-06 RX ADMIN — LIDOCAINE HYDROCHLORIDE 100 MG: 20 INJECTION, SOLUTION INTRAVENOUS at 09:11

## 2023-11-06 RX ADMIN — Medication 10 MG: at 10:11

## 2023-11-06 RX ADMIN — CEFAZOLIN SODIUM 2 G: 2 SOLUTION INTRAVENOUS at 01:11

## 2023-11-06 RX ADMIN — TAMSULOSIN HYDROCHLORIDE 0.4 MG: 0.4 CAPSULE ORAL at 09:11

## 2023-11-06 RX ADMIN — ROCURONIUM BROMIDE 20 MG: 10 INJECTION, SOLUTION INTRAVENOUS at 01:11

## 2023-11-06 RX ADMIN — Medication 25 MG: at 09:11

## 2023-11-06 RX ADMIN — HYDROMORPHONE HYDROCHLORIDE 0.2 MG: 2 INJECTION, SOLUTION INTRAMUSCULAR; INTRAVENOUS; SUBCUTANEOUS at 06:11

## 2023-11-06 RX ADMIN — GENTAMICIN SULFATE 240 MG: 40 INJECTION, SOLUTION INTRAMUSCULAR; INTRAVENOUS at 11:11

## 2023-11-06 RX ADMIN — ROCURONIUM BROMIDE 50 MG: 10 INJECTION, SOLUTION INTRAVENOUS at 09:11

## 2023-11-06 RX ADMIN — ROCURONIUM BROMIDE 20 MG: 10 INJECTION, SOLUTION INTRAVENOUS at 02:11

## 2023-11-06 RX ADMIN — ONDANSETRON 4 MG: 2 INJECTION, SOLUTION INTRAMUSCULAR; INTRAVENOUS at 09:11

## 2023-11-06 RX ADMIN — ACETAMINOPHEN 1000 MG: 500 TABLET ORAL at 07:11

## 2023-11-06 RX ADMIN — EPHEDRINE SULFATE 5 MG: 50 INJECTION, SOLUTION INTRAMUSCULAR; INTRAVENOUS; SUBCUTANEOUS at 12:11

## 2023-11-06 RX ADMIN — ROCURONIUM BROMIDE 20 MG: 10 INJECTION, SOLUTION INTRAVENOUS at 12:11

## 2023-11-06 RX ADMIN — HEPARIN SODIUM 5000 UNITS: 5000 INJECTION INTRAVENOUS; SUBCUTANEOUS at 09:11

## 2023-11-06 RX ADMIN — SODIUM CHLORIDE: 0.9 INJECTION, SOLUTION INTRAVENOUS at 11:11

## 2023-11-06 RX ADMIN — FENTANYL CITRATE 25 MCG: 50 INJECTION, SOLUTION INTRAMUSCULAR; INTRAVENOUS at 09:11

## 2023-11-06 RX ADMIN — OXYCODONE HYDROCHLORIDE 10 MG: 5 TABLET ORAL at 10:11

## 2023-11-06 RX ADMIN — HYDROMORPHONE HYDROCHLORIDE 0.2 MG: 2 INJECTION, SOLUTION INTRAMUSCULAR; INTRAVENOUS; SUBCUTANEOUS at 07:11

## 2023-11-06 RX ADMIN — SODIUM CHLORIDE: 0.9 INJECTION, SOLUTION INTRAVENOUS at 01:11

## 2023-11-06 RX ADMIN — VANCOMYCIN HYDROCHLORIDE 1000 MG: 1 INJECTION, POWDER, LYOPHILIZED, FOR SOLUTION INTRAVENOUS at 10:11

## 2023-11-06 RX ADMIN — DEXAMETHASONE SODIUM PHOSPHATE 4 MG: 4 INJECTION, SOLUTION INTRA-ARTICULAR; INTRALESIONAL; INTRAMUSCULAR; INTRAVENOUS; SOFT TISSUE at 09:11

## 2023-11-06 NOTE — OP NOTE
[facility]  Operative Note    Date: 11/06/2023    Pre-Op Diagnosis: IPP, BPH with urinary retention    Post-Op Diagnosis: same    Procedure(s) Performed:   1)  Explant of 3 piece IPP - Type: AMS   2)  DaVinci robotic assisted laparoscopic simple/suprapubic prostatectomy     Specimen(s): Prostate adenoma    Staff Surgeon: Dannie Murray MD    Assistant Surgeon: Elizabeth Resendiz MD    Anesthesia: General endotracheal anesthesia    Indications: Brandan Werner is a 78 y.o. male with IPP and BPH with urinary retention. He presents today for explant of IPP and Robotic Simple Prostatectomy.     Findings:   - 3 piece IPP removed in its entirety without issue through penoscrotal incision.  - Robotic simple prostatectomy performed without immediate complication  - No leak on leak testing after bladder closure.    Estimated Blood Loss: 375 cc    Drains:   1.  22F three way ludwig  2.  15F round NEERU drain      Procedure in detail:  After the risks and benefits of the procedure were explained consent was obtained. The patient was taken to the operating room and placed under general anesthesia. Sequential compression stockings were placed and cycling during the entire procedure. A preoperative timeout was then done with the correct patient, anticipated procedure, and surgical site verified by everyone in the room.  The patient was given preoperative antibiotics. He was placed in a supine position and the arms positioned at the patients side and padded. His abdomen and genitals were shaved. He was then prepped and draped in sterile fashion. An orogastric tube was placed.     A 20 Romanian ludwig catheter was placed.     His previous penoscrotal scar was identified. An incision was made over this and bovie cautery was used to dissect down to the corpora. The capsule surrounding the pump was entered first. We followed the tubing from the pump to make a corporotomy using bovie cautery. We addressed the  patient's right corpora first. We followed the corporotomy down to the tubing and could see the cylinder, which had been inflated. The cylinder was then deflated.  A right angle was placed around the cylinder to bring it out of the corporotomy. The cylinder was then removed with the RTE. The previous op note stated there was 5cm  RTE. We repeated the same on the patient's left. The entire time we felt for the urethra to ensure our corporotomy's were well away from it.    We then turned our attention towards the reservoir. We followed the tubing above the pubis. He had significant scar tissue and good capsule surrounding the tubing and especially the reservoir itself. Bovie electrocautery was used to excise the scar tissue overlying the tubing up until reaching the reservoir. The tubing was grasped with a hemostat. The tubing was cut and the reservoir was drained. Once the reservoir was drained, it was removed in its entirety.     The corpora and scrotum was copiously irrigated with normal saline. The corporotomies were closed with 3-0 PDS. The capsule surrounding the pump was excised. Hemostasis ws achieved. The dartos was then closed using 3-0 vicryl. The overlying skin was closed using 4-0 monocryl. The incision was covered with Dermabond.       Our attention was now turned to performing the robotic simple prostatectomy.     He was then placed in the steep Trendelenberg position.  Next, a Veress needle was passed just above the umbilicus.  A saline drop test and low opening pressure was obtained.  The abdomen was insufflated to 15. A 8 mm, blunt-tip trocar was placed just above the umbilicus. The zero-degree camera was passed within this and the following trocars were placed under direct vision; 8 mm robotic trocars were placed 7-8 cm laterally and inferiorly to the initially placed umbilical trocar. A third 8mm trocar was placed 7 cm lateral to the left-sided trocar. In the corresponding position on the right  side, a 12 mm trocar was placed, and then a 5 mm trocar was placed to the right upper quadrant and well above the umbilicus. The robot was then docked to the robot trocars. I used the zero-degree camera. I had the hot scissors in the right hand and the left hand with the fenestrated bipolar forceps in the left hand and in the far left hand the Prograsp forceps.     The bladder was filled with approximately 300 cc of normal saline.  A midline cystotomy was made approximately 6 cm in length.  The bladder mucosa was opened and the irrigation fluid was evacuated.  At this point I used 2 0 Vicryl stitches with a Weck and Lapra Ty at the end, using a total of 2 stay stitches, to open up the cystotomy and secured these to the abdominal wall using Weck clips.      With the cystotomy open adequately, the inside of the bladder was visualized. I could see a large median lobe of the prostate.  Ureteral orifices were identified.  I then used the 4th arm to lift the median lobe of the prostate and opened the bladder mucosa circumferentially around the bladder neck.  I dissected down onto the prostatic adenoma using a combination of blunt and monopolar cautery as well as bipolar cautery dissected the adenoma away from the prostatic capsule posteriorly all the way down the apex of the prostate.  This same plane was then carried laterally around the lateral aspects of the adenoma. Just anterior to the Dior catheter with the balloon deflated, I scored the bladder mucosa dissected down the anterior commissure and carried this down the apex of the prostate.  Again using combination of blunt as well as monopolar and bipolar cautery dissected the adenoma anteriorly to the apex I then divided the prostatic urethra at the apex and the catheter was identified.  A continued circumferential dissection on top of the adenoma until the adenoma was completely freed and this was removed and placed into an endocatch bag.  Throughout portions of  the dissection, I did use the robotic tenaculum to aid in retraction.      Following this I used bipolar cautery to obtain hemostasis in the prostatic fossa.  I used a 3-0 Vicryl stitch in a figure-eight fashion at the 5 and 7 o'clock positions to secure hemostasis of the pedicles.  Following this I used two 3-0 V lock stitches to reapproximate the bladder neck mucosa down to the posterior urethra and posterior prostate capsule.  Each stitche was then run from the 6 o'clock position circumferentially inside-out urethral mucosa outside in on the bladder neck and tensioned down appropriately.  The stitches were then tied in the anterior midline.  There was noted be adequate hemostasis.      The existing 20 Bolivian Dior catheter was then removed and a 22 Bolivian 3 way Dior catheter was introduced without any issues and slid easily into the bladder.  30 cc of sterile water was placed in the balloon this was placed on traction after small amount of Surgicel was placed in the prostatic fossa.  Hemostasis was confirmed.  Ureteral orifices were not injured.  This point closed the cystotomy with two 3-0 Vloc stitches from superior inferior and inferior to superior tying these in the midline.  The bladder was then filled with 200 cc normal saline there was noted be no leakage.  A 2-0 Vloc stitch was used to close the outer detrusor and peritoneal layers in running fashion.  The existing Dior catheter was connected to continuous bladder irrigation irrigation drainage is noted clear slightly pink.  The catheter was placed on general traction.  Hemostasis was confirmed.     The 4th arm robot was removed and 15 Bolivian round NEERU drain was placed through this trocar placed in the area of the repair.  The insufflation pressure was lowered and hemostasis was confirmed.  Each of the trocars were then removed under direct visual guidance.  I had previously placed at the beginning of the case the 0 Vicryl stitch using a Clint  the area the 12 mm trocar in the right side abdomen.  After insufflation pressure then evacuated the stitch was tied down to secure fashion this area.  The supraumbilical incision was enlarged to allow delivery of the specimen which had been previously placed in Endo-Catch bag.  After doing this, the specimen was passed off the field I closed the fascia with 1 PDS stitch in running fashion.  The drain was secured to the skin with a nylon stitch and placed to bulb suction.  Each incision was infiltrated with 0.25% Marcaine mixed with liposomal bupivacaine for postoperative pain control. Then, the incisions were closed with 4-0 Monocryl suture in a running subcuticular fashion.  Patient was then cleaned dried.  The catheter and drain were secured. The patient's scrotum was dressed with fluffs and mesh underwear. The patient was then transferred to the postanesthesia care unit in stable condition and tolerated procedure well.  There were no complications. All counts were correct.     Disposition: The patient will remain on the urology service overnight for observation and CBI will be titrated.    Elizabeth Resendiz MD    I was present and scrubbed for the entirety of the procedure.  I agree with the operative note and have edited as needed.    Dannie Murray MD

## 2023-11-06 NOTE — INTERVAL H&P NOTE
The patient has been examined and the H&P has been reviewed:    I concur with the findings and no changes have occurred since H&P was written.    Surgery risks, benefits and alternative options discussed and understood by patient/family.      Problem Noted   Kidney Stones 5/20/2020    Left 1.5 cm UPJ and non obstructing left lower pole greater than 1 cm on CTU 11/2022 with moderate hydronephrosis     Unable to place left stent 11/17/22 due to large prostate, prosthesis and unable to find ureteral orifice.   Left PCN 11/18/22, internalized to stent 11/28  Left ESWL UPJ stone 12/2/22  KUB after with 1.8 cm left lower pole stone consistent with lower pole stone on CT urogram, no renal pelvis/gualberto stent stone seen.  Left ESWL 1/30/23 good fragmentation  KUB shows continued LLP stone  CT 5/23: left enlarging LP stone 2.3 cm mild pelviectasis, small stone fragment at UPJ  Attempted left PCNL 8/14/23--IR could not get access down ureter, taken to cystoscopy suite, ureteroscopy done showing access between two calyx, survey of kidney for some time unable to locate dominant stone some stone fragmented in lower pole     Elevated Psa 3/11/2013    Biopsy 2013 ASAP 1 core, 2014 benign  PSA 8/22--8.2  Volume 183, PSAD 0.04       Benign Prostatic Hyperplasia With Urinary Retention     183 gm prostate on CTU 2022  Initial AUA SS (8/2022): 19/2  PVR 67  Recurrent retention 10/23.     History of Penile Implant 5/20/2020     OR for RASP, IPP explant.    The risks, benefits, alteratives of the procedure were discussed with the patient.  The patient was given time for questions, all questions were answered.  Written, informed consent was obtained.      Dannie Murray MD

## 2023-11-06 NOTE — ANESTHESIA PROCEDURE NOTES
Intubation    Date/Time: 11/6/2023 9:20 AM    Performed by: Patrick Cox CRNA  Authorized by: Mio Gloria MD    Intubation:     Induction:  Intravenous    Intubated:  Postinduction    Mask Ventilation:  Easy with oral airway    Attempts:  1    Attempted By:  CRNA    Method of Intubation:  Direct    Blade:  Jabari 3    Laryngeal View Grade: Grade I - full view of cords      Difficult Airway Encountered?: No      Complications:  None    Airway Device:  Oral endotracheal tube    Airway Device Size:  7.5    Style/Cuff Inflation:  Cuffed    Inflation Amount (mL):  8    Tube secured:  22    Secured at:  The lips    Placement Verified By:  Capnometry    Complicating Factors:  None    Findings Post-Intubation:  BS equal bilateral and atraumatic/condition of teeth unchanged

## 2023-11-06 NOTE — ANESTHESIA POSTPROCEDURE EVALUATION
Anesthesia Post Evaluation    Patient: Bartlett Dinvaut    Procedure(s) Performed: Procedure(s) (LRB):  ROBOTIC PROSTATECTOMY, SIMPLE (N/A)  REMOVAL, PENILE PROSTHESIS, INFLATABLE (N/A)    Final Anesthesia Type: general      Patient location during evaluation: PACU  Patient participation: Yes- Able to Participate  Level of consciousness: awake and alert  Post-procedure vital signs: reviewed and stable  Pain management: adequate  Airway patency: patent    PONV status at discharge: No PONV  Anesthetic complications: no      Cardiovascular status: stable  Respiratory status: spontaneous ventilation  Hydration status: euvolemic  Follow-up not needed.          Vitals Value Taken Time   /88 11/06/23 1707   Temp 36.5 °C (97.7 °F) 11/06/23 1545   Pulse 84 11/06/23 1711   Resp 11 11/06/23 1711   SpO2 92 % 11/06/23 1711   Vitals shown include unvalidated device data.      No case tracking events are documented in the log.      Pain/Levi Score: Pain Rating Prior to Med Admin: 8 (11/6/2023  4:22 PM)  Levi Score: 8 (11/6/2023  4:30 PM)

## 2023-11-07 ENCOUNTER — HOSPITAL ENCOUNTER (EMERGENCY)
Facility: HOSPITAL | Age: 78
Discharge: HOME | End: 2023-11-07
Attending: EMERGENCY MEDICINE
Payer: OTHER GOVERNMENT

## 2023-11-07 VITALS
BODY MASS INDEX: 27.09 KG/M2 | SYSTOLIC BLOOD PRESSURE: 150 MMHG | RESPIRATION RATE: 18 BRPM | TEMPERATURE: 99 F | OXYGEN SATURATION: 97 % | WEIGHT: 200 LBS | HEIGHT: 72 IN | HEART RATE: 81 BPM | DIASTOLIC BLOOD PRESSURE: 87 MMHG

## 2023-11-07 VITALS
HEART RATE: 70 BPM | TEMPERATURE: 98 F | BODY MASS INDEX: 28.54 KG/M2 | WEIGHT: 210.75 LBS | RESPIRATION RATE: 17 BRPM | HEIGHT: 72 IN | OXYGEN SATURATION: 94 % | SYSTOLIC BLOOD PRESSURE: 120 MMHG | DIASTOLIC BLOOD PRESSURE: 66 MMHG

## 2023-11-07 DIAGNOSIS — Z90.79 STATUS POST PROSTATECTOMY: ICD-10-CM

## 2023-11-07 DIAGNOSIS — R31.9 HEMATURIA, UNSPECIFIED TYPE: Primary | ICD-10-CM

## 2023-11-07 LAB
ANION GAP SERPL CALC-SCNC: 13 MMOL/L (ref 8–16)
BACTERIA #/AREA URNS HPF: ABNORMAL /HPF
BASOPHILS # BLD AUTO: 0.03 K/UL (ref 0–0.2)
BASOPHILS # BLD AUTO: 0.03 K/UL (ref 0–0.2)
BASOPHILS NFR BLD: 0.2 % (ref 0–1.9)
BASOPHILS NFR BLD: 0.3 % (ref 0–1.9)
BILIRUB UR QL STRIP: NEGATIVE
BODY FLUID SOURCE, CREATININE: NORMAL
BUN SERPL-MCNC: 16 MG/DL (ref 8–23)
CALCIUM SERPL-MCNC: 8.4 MG/DL (ref 8.7–10.5)
CHLORIDE SERPL-SCNC: 104 MMOL/L (ref 95–110)
CLARITY UR: ABNORMAL
CO2 SERPL-SCNC: 21 MMOL/L (ref 23–29)
COLOR UR: ABNORMAL
CREAT FLD-MCNC: 0.9 MG/DL
CREAT SERPL-MCNC: 1.1 MG/DL (ref 0.5–1.4)
DIFFERENTIAL METHOD: ABNORMAL
DIFFERENTIAL METHOD: ABNORMAL
EOSINOPHIL # BLD AUTO: 0 K/UL (ref 0–0.5)
EOSINOPHIL # BLD AUTO: 0.1 K/UL (ref 0–0.5)
EOSINOPHIL NFR BLD: 0.1 % (ref 0–8)
EOSINOPHIL NFR BLD: 0.5 % (ref 0–8)
ERYTHROCYTE [DISTWIDTH] IN BLOOD BY AUTOMATED COUNT: 13.1 % (ref 11.5–14.5)
ERYTHROCYTE [DISTWIDTH] IN BLOOD BY AUTOMATED COUNT: 13.1 % (ref 11.5–14.5)
EST. GFR  (NO RACE VARIABLE): >60 ML/MIN/1.73 M^2
GLUCOSE SERPL-MCNC: 113 MG/DL (ref 70–110)
GLUCOSE UR QL STRIP: NEGATIVE
HCT VFR BLD AUTO: 30.6 % (ref 40–54)
HCT VFR BLD AUTO: 35.4 % (ref 40–54)
HGB BLD-MCNC: 10.1 G/DL (ref 14–18)
HGB BLD-MCNC: 11.4 G/DL (ref 14–18)
HGB UR QL STRIP: ABNORMAL
HYALINE CASTS #/AREA URNS LPF: 0 /LPF
IMM GRANULOCYTES # BLD AUTO: 0.04 K/UL (ref 0–0.04)
IMM GRANULOCYTES # BLD AUTO: 0.04 K/UL (ref 0–0.04)
IMM GRANULOCYTES NFR BLD AUTO: 0.3 % (ref 0–0.5)
IMM GRANULOCYTES NFR BLD AUTO: 0.3 % (ref 0–0.5)
KETONES UR QL STRIP: NEGATIVE
LEUKOCYTE ESTERASE UR QL STRIP: ABNORMAL
LYMPHOCYTES # BLD AUTO: 2.2 K/UL (ref 1–4.8)
LYMPHOCYTES # BLD AUTO: 2.2 K/UL (ref 1–4.8)
LYMPHOCYTES NFR BLD: 17.4 % (ref 18–48)
LYMPHOCYTES NFR BLD: 18.2 % (ref 18–48)
MAGNESIUM SERPL-MCNC: 1.7 MG/DL (ref 1.6–2.6)
MCH RBC QN AUTO: 30.7 PG (ref 27–31)
MCH RBC QN AUTO: 30.9 PG (ref 27–31)
MCHC RBC AUTO-ENTMCNC: 32.2 G/DL (ref 32–36)
MCHC RBC AUTO-ENTMCNC: 33 G/DL (ref 32–36)
MCV RBC AUTO: 94 FL (ref 82–98)
MCV RBC AUTO: 95 FL (ref 82–98)
MICROSCOPIC COMMENT: ABNORMAL
MONOCYTES # BLD AUTO: 0.5 K/UL (ref 0.3–1)
MONOCYTES # BLD AUTO: 0.8 K/UL (ref 0.3–1)
MONOCYTES NFR BLD: 4.1 % (ref 4–15)
MONOCYTES NFR BLD: 6.2 % (ref 4–15)
NEUTROPHILS # BLD AUTO: 9.1 K/UL (ref 1.8–7.7)
NEUTROPHILS # BLD AUTO: 9.8 K/UL (ref 1.8–7.7)
NEUTROPHILS NFR BLD: 76.1 % (ref 38–73)
NEUTROPHILS NFR BLD: 76.9 % (ref 38–73)
NITRITE UR QL STRIP: NEGATIVE
NRBC BLD-RTO: 0 /100 WBC
NRBC BLD-RTO: 0 /100 WBC
PH UR STRIP: 6 [PH] (ref 5–8)
PHOSPHATE SERPL-MCNC: 3.1 MG/DL (ref 2.7–4.5)
PLATELET # BLD AUTO: 167 K/UL (ref 150–450)
PLATELET # BLD AUTO: 201 K/UL (ref 150–450)
PMV BLD AUTO: 9.4 FL (ref 9.2–12.9)
PMV BLD AUTO: 9.5 FL (ref 9.2–12.9)
POTASSIUM SERPL-SCNC: 4.6 MMOL/L (ref 3.5–5.1)
PROT UR QL STRIP: ABNORMAL
RBC # BLD AUTO: 3.27 M/UL (ref 4.6–6.2)
RBC # BLD AUTO: 3.71 M/UL (ref 4.6–6.2)
RBC #/AREA URNS HPF: >100 /HPF (ref 0–4)
SODIUM SERPL-SCNC: 138 MMOL/L (ref 136–145)
SP GR UR STRIP: 1.01 (ref 1–1.03)
URN SPEC COLLECT METH UR: ABNORMAL
UROBILINOGEN UR STRIP-ACNC: NEGATIVE EU/DL
WBC # BLD AUTO: 11.83 K/UL (ref 3.9–12.7)
WBC # BLD AUTO: 12.81 K/UL (ref 3.9–12.7)
WBC #/AREA URNS HPF: >100 /HPF (ref 0–5)

## 2023-11-07 PROCEDURE — 94761 N-INVAS EAR/PLS OXIMETRY MLT: CPT

## 2023-11-07 PROCEDURE — 80048 BASIC METABOLIC PNL TOTAL CA: CPT | Performed by: UROLOGY

## 2023-11-07 PROCEDURE — 94799 UNLISTED PULMONARY SVC/PX: CPT

## 2023-11-07 PROCEDURE — 83735 ASSAY OF MAGNESIUM: CPT | Performed by: UROLOGY

## 2023-11-07 PROCEDURE — 81000 URINALYSIS NONAUTO W/SCOPE: CPT | Performed by: EMERGENCY MEDICINE

## 2023-11-07 PROCEDURE — 87086 URINE CULTURE/COLONY COUNT: CPT | Performed by: EMERGENCY MEDICINE

## 2023-11-07 PROCEDURE — 99900035 HC TECH TIME PER 15 MIN (STAT)

## 2023-11-07 PROCEDURE — 36415 COLL VENOUS BLD VENIPUNCTURE: CPT | Performed by: UROLOGY

## 2023-11-07 PROCEDURE — 84100 ASSAY OF PHOSPHORUS: CPT | Performed by: UROLOGY

## 2023-11-07 PROCEDURE — 99283 EMERGENCY DEPT VISIT LOW MDM: CPT

## 2023-11-07 PROCEDURE — 25000003 PHARM REV CODE 250: Performed by: UROLOGY

## 2023-11-07 PROCEDURE — 85025 COMPLETE CBC W/AUTO DIFF WBC: CPT | Mod: 91 | Performed by: EMERGENCY MEDICINE

## 2023-11-07 PROCEDURE — 85025 COMPLETE CBC W/AUTO DIFF WBC: CPT | Performed by: UROLOGY

## 2023-11-07 PROCEDURE — 63600175 PHARM REV CODE 636 W HCPCS: Performed by: UROLOGY

## 2023-11-07 RX ORDER — IBUPROFEN 800 MG/1
800 TABLET ORAL EVERY 8 HOURS
Qty: 20 TABLET | Refills: 0 | Status: SHIPPED | OUTPATIENT
Start: 2023-11-07

## 2023-11-07 RX ORDER — DEXTROMETHORPHAN HYDROBROMIDE, GUAIFENESIN 5; 100 MG/5ML; MG/5ML
650 LIQUID ORAL EVERY 8 HOURS
Qty: 20 TABLET | Refills: 0 | Status: SHIPPED | OUTPATIENT
Start: 2023-11-07 | End: 2023-11-14

## 2023-11-07 RX ORDER — OXYCODONE HYDROCHLORIDE 5 MG/1
5 TABLET ORAL EVERY 6 HOURS PRN
Qty: 10 TABLET | Refills: 0 | Status: SHIPPED | OUTPATIENT
Start: 2023-11-07

## 2023-11-07 RX ORDER — CIPROFLOXACIN 500 MG/1
500 TABLET ORAL EVERY 12 HOURS
Qty: 14 TABLET | Refills: 0 | Status: SHIPPED | OUTPATIENT
Start: 2023-11-07 | End: 2023-11-14

## 2023-11-07 RX ORDER — POLYETHYLENE GLYCOL 3350 17 G/17G
17 POWDER, FOR SOLUTION ORAL DAILY PRN
Qty: 510 G | Refills: 1 | Status: SHIPPED | OUTPATIENT
Start: 2023-11-07

## 2023-11-07 RX ADMIN — CEFAZOLIN SODIUM 2 G: 2 SOLUTION INTRAVENOUS at 12:11

## 2023-11-07 RX ADMIN — CARBIDOPA AND LEVODOPA 1 TABLET: 25; 100 TABLET ORAL at 03:11

## 2023-11-07 RX ADMIN — DOCUSATE SODIUM 100 MG: 100 CAPSULE ORAL at 10:11

## 2023-11-07 RX ADMIN — GENTAMICIN SULFATE 240 MG: 40 INJECTION, SOLUTION INTRAMUSCULAR; INTRAVENOUS at 10:11

## 2023-11-07 RX ADMIN — FLUOXETINE 60 MG: 20 CAPSULE ORAL at 10:11

## 2023-11-07 RX ADMIN — CARBIDOPA AND LEVODOPA 1 TABLET: 25; 100 TABLET ORAL at 10:11

## 2023-11-07 RX ADMIN — ACETAMINOPHEN 1000 MG: 500 TABLET ORAL at 03:11

## 2023-11-07 RX ADMIN — LISINOPRIL 5 MG: 5 TABLET ORAL at 10:11

## 2023-11-07 RX ADMIN — VANCOMYCIN HYDROCHLORIDE 1000 MG: 1 INJECTION, POWDER, LYOPHILIZED, FOR SOLUTION INTRAVENOUS at 10:11

## 2023-11-07 RX ADMIN — HEPARIN SODIUM 5000 UNITS: 5000 INJECTION INTRAVENOUS; SUBCUTANEOUS at 10:11

## 2023-11-07 RX ADMIN — FERROUS SULFATE TAB 325 MG (65 MG ELEMENTAL FE) 1 EACH: 325 (65 FE) TAB at 10:11

## 2023-11-07 RX ADMIN — ATORVASTATIN CALCIUM 20 MG: 20 TABLET, FILM COATED ORAL at 10:11

## 2023-11-07 RX ADMIN — SODIUM CHLORIDE, POTASSIUM CHLORIDE, SODIUM LACTATE AND CALCIUM CHLORIDE: 600; 310; 30; 20 INJECTION, SOLUTION INTRAVENOUS at 04:11

## 2023-11-07 RX ADMIN — ACETAMINOPHEN 1000 MG: 500 TABLET ORAL at 05:11

## 2023-11-07 RX ADMIN — CEFAZOLIN SODIUM 2 G: 2 SOLUTION INTRAVENOUS at 05:11

## 2023-11-07 NOTE — PLAN OF CARE
VN reviewed discharge instructions with pt's wife . Using teach back method.  AVS printed and handed to pt by bedside nurse.  Reviewed follow-up appointments, medications, diet, and importance of medication compliance.  Reviewed home care instructions, treatment plan, self-management, and when to seek medical attention.  Allowed time for questions.  All questions answered.  Patient's wife  verbalized complete understanding of discharge instructions and voices no concerns.     Discharge instructions complete.  Bedside delivery done.  Transport/wheelchair requested.  Bedside nurse notified.

## 2023-11-07 NOTE — PROGRESS NOTES
Progress Note  [facility]    Consulted by: Dr. Murray, Urology  Reason for Consult: Medical Management    History of Present Illness:  Patient is a 78 y.o. male w/ PMHx as documents presents s/p robotic prostatectomy. Patient alert and oriented, able to follow commands, answers questions appropriately. Patient reports pain in groin. Denies any other symptoms or complaints at this time. Wife bedside.      Interval History: Hypotensive overnight, asymptomatic.. MAP >65. Given fluids with improvement. Currently hemodynamically stable. Able to tolerate PO. Endorsing mild abdominal discomfort with coughing.      PTA Medications   Medication Sig    ampicillin (PRINCIPEN) 500 MG capsule Take 1 capsule (500 mg total) by mouth 4 (four) times daily. for 14 days    ascorbic acid, vitamin C, (VITAMIN C) 250 MG tablet Take 250 mg by mouth once daily.    atorvastatin (LIPITOR) 40 MG tablet Take 20 mg by mouth once daily.    carbidopa-levodopa  mg (SINEMET)  mg per tablet TAKE 1 TABLET BY MOUTH THREE TIMES A DAY FOR PARKINSON'S DISE** WITH MEALSASE    celecoxib (CELEBREX) 200 MG capsule Take 400 mg by mouth once daily.    ferrous sulfate (FEOSOL) 325 mg (65 mg iron) Tab tablet 325 mg.    FLUoxetine 20 MG capsule 60 mg.    lisinopril (PRINIVIL,ZESTRIL) 5 MG tablet Take 5 mg by mouth once daily.    naproxen sodium (ANAPROX) 220 MG tablet Take 220 mg by mouth 2 (two) times daily with meals. 2 tablets twice a day    primidone (MYSOLINE) 50 MG Tab Take 2 tablets (100 mg total) by mouth every evening.    tamsulosin (FLOMAX) 0.4 mg Cp24 Take 1 capsule (0.4 mg total) by mouth nightly.    traZODone (DESYREL) 100 MG tablet 100 mg.    oxyCODONE-acetaminophen (PERCOCET) 5-325 mg per tablet Take 1 tablet by mouth every 4 (four) hours as needed for Pain.    polyvinyl alcohol, artificial tears, (LIQUIFILM TEARS) 1.4 % ophthalmic solution INSTILL ONE DROP IN EACH EYE FOUR TIMES A DAY FOR DRY EYES       Review of patient's  allergies indicates:  No Known Allergies    Past Medical History:   Diagnosis Date    Anxiety     BPH (benign prostatic hyperplasia)     Cataract     Elevated PSA     Erectile dysfunction     Hyperlipidemia     Hypertension     Hypogonadism male     Kidney stone 5/20/2020    Obesity     PTSD (post-traumatic stress disorder)     Shoulder arthritis     Urinary tract infection      Past Surgical History:   Procedure Laterality Date    ARTHROPLASTY OF SHOULDER Right 9/14/2023    Procedure: ARTHROPLASTY, SHOULDER;  Surgeon: Gabe Manning MD;  Location: [facility];  Service: Orthopedics;  Laterality: Right;  Ayaan david notified cc    COLONOSCOPY N/A 11/26/2018    Procedure: COLONOSCOPY;  Surgeon: Germán Moss MD;  [facility]  Service: Endoscopy;  Laterality: N/A;    COLONOSCOPY W/ BIOPSIES 2010    CYSTOSCOPY Left 11/17/2022    Procedure: CYSTOSCOPY;  Surgeon: Dannie Murray MD;  Location: [facility];  Service: Urology;  Laterality: Left;    EXTRACORPOREAL SHOCK WAVE LITHOTRIPSY Left 12/2/2022    Procedure: LITHOTRIPSY, ESWL [facility] [place] ESWL machine, scheduling call [phone] 60 minutes;  Surgeon: Dannie Murray MD;  Location: [facility];  Service: Urology;  Laterality: Left;  Confirmed with [facility] 11/22 [facility] conf # [id number]    EXTRACORPOREAL SHOCK WAVE LITHOTRIPSY Left 1/30/2023    Procedure: LITHOTRIPSY, ESWL [facility] [place] ESWL machine, scheduling call [phone] 60 minutes;  Surgeon: Dannie Murray MD;  Location: [facility];  Service: Urology;  Laterality: Left;  next [facility] conf #[id number]    PENILE PROSTHESIS IMPLANT      PERCUTANEOUS NEPHROLITHOTOMY Left 8/14/2023    Procedure: CYSTOSCOPY, LEFT RETROGRADE PYELOGRAM, LEFT URETEROSCOPY WITH STONE BASKET EXTRACTION, PLACEMENT OF JJ STENT, LEFT NEPHROSTOMY TUBE REMOVAL;  Surgeon: Dannie Murray MD;  Location: [facility];  Service: Urology;  Laterality: Left;  IR to place ureteral catheter prior, start  time 1200  Cysto tower, flexible cysto/ureteroscopes, Laser in room, lithoclast in room, fluoroscopy with Tony to     Family History   Problem Relation Age of Onset    Cancer Mother         cancer    Cataracts Mother     Heart disease Father 62        M.I.    Stroke Brother 62    Amblyopia Neg Hx     Blindness Neg Hx     Glaucoma Neg Hx     Macular degeneration Neg Hx     Retinal detachment Neg Hx     Strabismus Neg Hx     Prostate cancer Neg Hx     Kidney disease Neg Hx      Social History     Tobacco Use    Smoking status: Former     Current packs/day: 0.00     Average packs/day: 1 pack/day for 10.0 years (10.0 ttl pk-yrs)     Types: Cigarettes     Start date:      Quit date:      Years since quittin.8    Smokeless tobacco: Never   Substance Use Topics    Alcohol use: No    Drug use: No        Review of Systems:   Review of Systems   Constitutional:  Negative for fever.   Respiratory:  Negative for shortness of breath.    Cardiovascular:  Negative for chest pain.   Gastrointestinal:  Negative for abdominal pain.   Genitourinary:  Positive for hematuria.   Neurological:  Negative for headaches.      OBJECTIVE:     Vital Signs (Most Recent)  Temp: 97.7 °F (36.5 °C) (23)  Pulse: 72 (23)  Resp: 18 (23)  BP: 127/67 (23)  SpO2: (!) 93 % (23)    Physical Exam:  Physical Exam  Vitals and nursing note reviewed.   Constitutional:       General: He is not in acute distress.     Appearance: Normal appearance. He is not ill-appearing, toxic-appearing or diaphoretic.   HENT:      Head: Normocephalic.      Right Ear: External ear normal.      Left Ear: External ear normal.      Nose: Nose normal.      Mouth/Throat:      Pharynx: Oropharynx is clear.   Eyes:      Extraocular Movements: Extraocular movements intact.   Cardiovascular:      Rate and Rhythm: Normal rate and regular rhythm.      Pulses: Normal pulses.      Heart sounds: Normal heart sounds.    Pulmonary:      Effort: Pulmonary effort is normal.      Breath sounds: Normal breath sounds.   Abdominal:      General: Abdomen is flat. There is no distension.      Palpations: Abdomen is soft.      Tenderness: There is no abdominal tenderness. There is no guarding or rebound.   Genitourinary:     Comments: Stocking underwear in place. Dior connected to irrigation with pink tinged outflow. Mild blood noted on underwear with no signs of active bleeding at this time.  Musculoskeletal:         General: Normal range of motion.      Cervical back: Normal range of motion.      Right lower leg: No edema.      Left lower leg: No edema.   Skin:     General: Skin is warm.   Neurological:      General: No focal deficit present.      Mental Status: He is alert and oriented to person, place, and time. Mental status is at baseline.   Psychiatric:         Mood and Affect: Mood normal.         Behavior: Behavior normal.         Thought Content: Thought content normal.          Laboratory  Lab Results   Component Value Date    WBC 12.81 (H) 11/07/2023    HGB 11.4 (L) 11/07/2023    HCT 35.4 (L) 11/07/2023    MCV 95 11/07/2023     11/07/2023      CMP  Sodium   Date Value Ref Range Status   11/07/2023 138 136 - 145 mmol/L Final     Potassium   Date Value Ref Range Status   11/07/2023 4.6 3.5 - 5.1 mmol/L Final     Chloride   Date Value Ref Range Status   11/07/2023 104 95 - 110 mmol/L Final     CO2   Date Value Ref Range Status   11/07/2023 21 (L) 23 - 29 mmol/L Final     Glucose   Date Value Ref Range Status   11/07/2023 113 (H) 70 - 110 mg/dL Final     BUN   Date Value Ref Range Status   11/07/2023 16 8 - 23 mg/dL Final     Creatinine   Date Value Ref Range Status   11/07/2023 1.1 0.5 - 1.4 mg/dL Final     Calcium   Date Value Ref Range Status   11/07/2023 8.4 (L) 8.7 - 10.5 mg/dL Final     Total Protein   Date Value Ref Range Status   08/22/2023 6.4 6.0 - 8.4 g/dL Final     Albumin   Date Value Ref Range Status  "  08/22/2023 3.7 3.5 - 5.2 g/dL Final     Total Bilirubin   Date Value Ref Range Status   08/22/2023 0.5 0.1 - 1.0 mg/dL Final     Comment:     For infants and newborns, interpretation of results should be based  on gestational age, weight and in agreement with clinical  observations.    Premature Infant recommended reference ranges:  Up to 24 hours.............<8.0 mg/dL  Up to 48 hours............<12.0 mg/dL  3-5 days..................<15.0 mg/dL  6-29 days.................<15.0 mg/dL       Alkaline Phosphatase   Date Value Ref Range Status   08/22/2023 45 (L) 55 - 135 U/L Final     AST   Date Value Ref Range Status   08/22/2023 9 (L) 10 - 40 U/L Final     ALT   Date Value Ref Range Status   08/22/2023 5 (L) 10 - 44 U/L Final     Anion Gap   Date Value Ref Range Status   11/07/2023 13 8 - 16 mmol/L Final     eGFR if    Date Value Ref Range Status   02/22/2022 >60 >60 mL/min/1.73 m^2 Final     eGFR if non    Date Value Ref Range Status   02/22/2022 >60 >60 mL/min/1.73 m^2 Final     Comment:     Calculation used to obtain the estimated glomerular filtration  rate (eGFR) is the CKD-EPI equation.           Lab Results   Component Value Date    INR 0.9 08/14/2023    INR 1.0 11/17/2022      No results for input(s): "CPK", "CPKMB", "TROPONINI", "MB" in the last 168 hours.   No results for input(s): "TROPONINI", "CKTOTAL", "CKMB" in the last 168 hours.    BNP  No results for input(s): "BNP" in the last 168 hours.    Urinalysis  No results for input(s): "COLORU", "CLARITYU", "SPECGRAV", "PHUR", "PROTEINUA", "GLUCOSEU", "BILIRUBINCON", "BLOODU", "WBCU", "RBCU", "BACTERIA", "MUCUS", "NITRITE", "LEUKOCYTESUR", "UROBILINOGEN", "HYALINECASTS" in the last 24 hours.   LAST HbA1c  Lab Results   Component Value Date    HGBA1C 5.6 11/25/2019         Diagnostic Results:  Labs: Reviewed  ECG: Reviewed      ASSESSMENT/PLAN:   Patient is a 78 y.o. male w/ PMHx as documents presents s/p robotic " prostatectomy. Overall, appears back to baseline mental status wise and hemodynamically stable at this time.     Recommendations:  - Continue home medications as ordered by primary team.  - Continue to monitor BP.  - BP currently stable. Would discontinue IVF  - Continue abx (ancef) as ordered by primary team.  - Pain control per primary team.  - CBI per primary team.  -Medically stable for discharge from family medicine standpoint      Family medicine will sign off. Please reconsult if new issues arise. Thank you for the consult.       Sheila Damian MD  [facility], PGY-2  11/07/2023

## 2023-11-07 NOTE — PROGRESS NOTES
11/07/23 0343   Vital Signs   Pulse 75   Heart Rate Source Monitor   Resp 20   SpO2 (!) 91 %   BP (!) 98/55   MAP (mmHg) 71   BP Location Right arm   Patient Position Lying     Dr Cowan notified of drop in BP. Usual BP 140s/70s  Pt AAOx4. No evidence of bleeding noted. CBI output clear/pink,no clots.    states someone from team will come to assess at bedside.

## 2023-11-07 NOTE — PLAN OF CARE
CM spoke with pt's wife - she is on the way to transport pt to home   Discharging nurse to review all d/c meds and instructions    Urology nurse Aimee will set up pt's f/u apt after d/c   No dme, no hh ordered         Dannie Murray MD  Urology [phone] [address] [facility] [address]      Next Steps: Follow up in 2 week(s)  Instructions: Follow up with imaging prior, Post-op, Voiding trial YOU WILL BE CONTACTED WITH A HOSPITAL FOLLOW UP APT.    Chi Bright, PA-C  Family Medicine [phone] [address] [place] [id number]     Next Steps: Follow up on 11/13/2023  Instructions: 11:00 am  --- [facility]      11/07/23 1426   Final Note   Assessment Type Final Discharge Note   Anticipated Discharge Disposition Home   What phone number can be called within the next 1-3 days to see how you are doing after discharge? [phone]   Hospital Resources/Appts/Education Provided Appointment suggestion unavailable   Post-Acute Status   Discharge Delays None known at this time

## 2023-11-07 NOTE — PROGRESS NOTES
11/06/23 2104   Admission   Initial VN Admission Questions Complete   Shift   Virtual Nurse - Patient Verbalized Approval Of Camera Use   Safety/Activity   Patient Rounds bed in low position;placement of personal items at bedside;call light in patient/parent reach;visualized patient   Safety Promotion/Fall Prevention instructed to call staff for mobility;Fall Risk reviewed with patient/family;side rails raised x 2;medications reviewed;assistive device/personal item within reach;family to remain at bedside;room near unit station

## 2023-11-07 NOTE — PLAN OF CARE
CM met with pt -   Lives with wife Ivone    Independent prior to admit  - no dme, no HH   CM called and spoke with wife -- - pt's pcp is Dr. Chi Bright  p #     [facility]     POD #1  -- Prostatectomy  - per Urologist's note - pt will d/c to home with ludwig in place.     Wife will transport pt to home.       Future Appointments   Date Time Provider Department Center   12/5/2023  1:00 PM Theodora Webb LOTR [facility] [place] [facility]   12/12/2023  1:00 PM Theodora Webb [facility] [place] [facility]   12/15/2023  9:30 AM [facility] ORTHO CLINIC LIMIT 350LBS [facility] ORXRAY [place] [facility]   12/15/2023  9:45 AM Gabe Manning MD [facility] [id number] [place] [facility]   12/19/2023  1:45 PM Theodora Webb LOTR [facility] [place] [facility]   1/29/2024 10:30 AM Km Chicas MD [facility] IM [place]        11/07/23 1350   Discharge Planning   Assessment Type Discharge Planning Brief Assessment   Resource/Environmental Concerns none   Support Systems Spouse/significant other  (wife Ivone - [phone])   Equipment Currently Used at Home none   Current Living Arrangements home   Patient/Family Anticipates Transition to home;home with family   Patient/Family Anticipated Services at Transition none   DME Needed Upon Discharge  none   Discharge Plan A Home;Home with family

## 2023-11-07 NOTE — PROGRESS NOTES
Future Appointments   Date Time Provider Department Center   12/5/2023  1:00 PM Theodora Webb [facility] [place] [facility]   12/12/2023  1:00 PM Theodora Webb [facility] [place] [facility]   12/15/2023  9:30 AM [facility] ORTHO CLINIC LIMIT 350LBS [facility] ORXRAY [place] [facility]   12/15/2023  9:45 AM Gabe Manning MD [facility] [id number] [place] [facility]   12/19/2023  1:45 PM Theodora Webb LOTR [facility] [place] [facility]   1/29/2024 10:30 AM Km Chicas MD [facility] S[place]

## 2023-11-07 NOTE — PLAN OF CARE
Problem: Adult Inpatient Plan of Care  Goal: Plan of Care Review  Outcome: Ongoing, Progressing  Flowsheets (Taken 11/7/2023 0546)  Plan of Care Reviewed With: patient     Problem: Fall Injury Risk  Goal: Absence of Fall and Fall-Related Injury  Outcome: Ongoing, Progressing  Intervention: Promote Injury-Free Environment  Flowsheets (Taken 11/7/2023 0546)  Safety Promotion/Fall Prevention:   bed alarm set   Fall Risk reviewed with patient/family     Problem: Pain Acute  Goal: Acceptable Pain Control and Functional Ability  Outcome: Ongoing, Progressing  Intervention: Prevent or Manage Pain  Flowsheets (Taken 11/7/2023 0546)  Medication Review/Management: medications reviewed     Problem: Bleeding (Surgery Nonspecified)  Goal: Absence of Bleeding  Outcome: Ongoing, Progressing  Intervention: Monitor and Manage Bleeding  Flowsheets (Taken 11/7/2023 0547)  Bleeding Management: dressing monitored     Problem: Fluid and Electrolyte Imbalance (Surgery Nonspecified)  Goal: Fluid and Electrolyte Balance  Outcome: Ongoing, Progressing     Pt AAOx4. Vitals stable. CBI irrigating per order, output clear/pink without clots. Lap site intact. J/P drain care completed,dressing clean/intact. SCDs on. Bed alarm set,call bell within reach. Pt expresses 4-5/10 abd pain when coughing/belching; educated to splint with pillow. Questions/Concerns addressed.

## 2023-11-07 NOTE — DISCHARGE SUMMARY
[facility] Surg  Urology  Discharge Summary      Patient Name: Tj Gordon  MRN: [id number]  Admission Date: 11/6/2023  Hospital Length of Stay: 0 days  Discharge Date and Time:  11/07/2023 1:59 PM  Attending Physician: Elizabeth Resendiz MD    Discharging Provider: Elizabeth Resendiz MD  Primary Care Physician: Km Chicas MD    HPI:   No notes on file     Procedure(s) (LRB):  ROBOTIC PROSTATECTOMY, SIMPLE (N/A)  REMOVAL, PENILE PROSTHESIS, INFLATABLE (N/A)     Indwelling Lines/Drains at time of discharge:   Lines/Drains/Airways       Drain  Duration                  Closed/Suction Drain 11/06/23 1454 Tube - 1 Lateral LLQ Bulb 15 Fr. <1 day         Urethral Catheter 11/06/23 1412 Latex;Triple-lumen 22 Fr. <1 day                    Hospital Course (synopsis of major diagnoses, care, treatment, and services provided during the course of the hospital stay):    TJ GORDON 78 y.o.male underwent: Procedure(s) (LRB):  ROBOTIC PROSTATECTOMY, SIMPLE (N/A)  REMOVAL, PENILE PROSTHESIS, INFLATABLE (N/A). The patient tolerated the procedure well, was transferred to recovery post-op, and then transferred to the floor for continuation of medical care. The patient's clinical condition progressively improved. By the time of discharge, he was tolerating a diet without nausea or vomiting, pain was well controlled with oral medications, and he was ambulating without difficulty. CBI titrated to off. On POD 1 the patient was discharged to home. On discharge, the patient's incisions were c/d/i and the surgical site was soft and appropriately tender to palpation. NEERU drain output was stable and serosanguinous; NEERU Cr negative, removed prior to discharge. The patient will follow up in [facility] clinic in 1-2 weeks with cystogram prior for voiding trial.      Medications and instructions as below.  For more thorough information, please refer to the hospital record and operative report.    Consults:   Consults (From admission,  onward)          Status Ordering Provider     Inpatient consult to Hospitalist  Once        Provider:  Patrick Garcia MD    Completed LEISA MURRAY            Significant Diagnostic Studies:     Pending Diagnostic Studies:       Procedure Component Value Units Date/Time    Specimen to Pathology, Surgery Urology [[id number]] Collected: 11/06/23 1501    Order Status: Sent Lab Status: In process Updated: 11/07/23 0731    Specimen: Tissue             Final Active Diagnoses:    Diagnosis Date Noted POA    PRINCIPAL PROBLEM:  Benign prostatic hyperplasia with urinary retention [N40.1, R33.8]  Yes    History of penile implant [Z96.0] 05/20/2020 Not Applicable    Elevated PSA [R97.20] 03/11/2013 Yes      Problems Resolved During this Admission:       Discharged Condition: good    Disposition: Home or Self Care    Follow Up:   Follow-up Information       Leisa Murray MD Follow up in 2 week(s).    Specialty: Urology  Why: Follow up with imaging prior, Post-op, Voiding trial  Contact information:  [address]  [address]  [facility] [address] [phone]                           Patient Instructions:   No discharge procedures on file.  Medications:  Reconciled Home Medications:      Medication List        START taking these medications      acetaminophen 650 MG Tbsr  Commonly known as: TYLENOL  Take 1 tablet (650 mg total) by mouth every 8 (eight) hours. for 7 days     ibuprofen 800 MG tablet  Commonly known as: ADVIL,MOTRIN  Take 1 tablet (800 mg total) by mouth every 8 (eight) hours.     oxyCODONE 5 MG immediate release tablet  Commonly known as: ROXICODONE  Take 1 tablet (5 mg total) by mouth every 6 (six) hours as needed for Pain.     polyethylene glycol 17 gram/dose powder  Commonly known as: GLYCOLAX  Take 17 g by mouth daily as needed (take as needed for daily soft bowel movements).            CONTINUE taking these medications      ampicillin 500 MG capsule  Commonly known as:  PRINCIPEN  Take 1 capsule (500 mg total) by mouth 4 (four) times daily. for 14 days     ascorbic acid (vitamin C) 250 MG tablet  Commonly known as: VITAMIN C  Take 250 mg by mouth once daily.     atorvastatin 40 MG tablet  Commonly known as: LIPITOR  Take 20 mg by mouth once daily.     carbidopa-levodopa  mg  mg per tablet  Commonly known as: SINEMET  TAKE 1 TABLET BY MOUTH THREE TIMES A DAY FOR PARKINSON'S DISE** WITH MEALSASE     celecoxib 200 MG capsule  Commonly known as: CeleBREX  Take 400 mg by mouth once daily.     ferrous sulfate 325 mg (65 mg iron) Tab tablet  Commonly known as: FEOSOL  325 mg.     FLUoxetine 20 MG capsule  60 mg.     lisinopriL 5 MG tablet  Commonly known as: PRINIVIL,ZESTRIL  Take 5 mg by mouth once daily.     naproxen sodium 220 MG tablet  Commonly known as: ANAPROX  Take 220 mg by mouth 2 (two) times daily with meals. 2 tablets twice a day     oxyCODONE-acetaminophen 5-325 mg per tablet  Commonly known as: PERCOCET  Take 1 tablet by mouth every 4 (four) hours as needed for Pain.     polyvinyl alcohol (artificial tears) 1.4 % ophthalmic solution  Commonly known as: LIQUIFILM TEARS  INSTILL ONE DROP IN EACH EYE FOUR TIMES A DAY FOR DRY EYES     primidone 50 MG Tab  Commonly known as: MYSOLINE  Take 2 tablets (100 mg total) by mouth every evening.     tamsulosin 0.4 mg Cap  Commonly known as: FLOMAX  Take 1 capsule (0.4 mg total) by mouth nightly.     traZODone 100 MG tablet  Commonly known as: DESYREL  100 mg.              Time spent on the discharge of patient: 15 minutes    Elizabeth Resendiz MD  Urology  [facility] Surg

## 2023-11-07 NOTE — SUBJECTIVE & OBJECTIVE
Interval History: NAEON. AF. One episode of hypotension at 3am, not tachycardic. Pain controlled. Tolerating CLD. Has not ambulated.      Objective:     Temp:  [97.3 °F (36.3 °C)-98.4 °F (36.9 °C)] 98.4 °F (36.9 °C)  Pulse:  [57-97] 75  Resp:  [9-20] 20  SpO2:  [91 %-100 %] 91 %  BP: ()/(55-86) 98/55     Body mass index is 28.58 kg/m².           Drains       Drain  Duration                  Closed/Suction Drain 11/06/23 1454 Tube - 1 Lateral LLQ Bulb 15 Fr. <1 day         Urethral Catheter 11/06/23 1412 Latex;Triple-lumen 22 Fr. <1 day                     Physical Exam  Vitals reviewed.   Constitutional:       General: He is not in acute distress.     Appearance: He is not diaphoretic.   HENT:      Head: Normocephalic and atraumatic.      Nose: Nose normal.   Eyes:      Conjunctiva/sclera: Conjunctivae normal.   Cardiovascular:      Rate and Rhythm: Normal rate.   Pulmonary:      Effort: Pulmonary effort is normal. No respiratory distress.   Abdominal:      General: There is no distension.      Palpations: Abdomen is soft.      Tenderness: There is no abdominal tenderness. There is no guarding or rebound.      Comments: Lap incisions clean dry and intact. LLQ drain with minimal SS output.    Genitourinary:     Comments: 22Fr 3-way Dior catheter in place with clear output with CBI running. Penoscrotal incision clean dry and intact. Some penoscrotal bruising.   Musculoskeletal:         General: Normal range of motion.      Cervical back: Normal range of motion.   Skin:     General: Skin is dry.   Neurological:      Mental Status: He is alert.   Psychiatric:         Behavior: Behavior normal.         Thought Content: Thought content normal.           Significant Labs:    BMP:  Recent Labs   Lab 11/06/23  1720 11/07/23  0543    138   K 4.5 4.6    104   CO2 23 21*   BUN 14 16   CREATININE 1.1 1.1   CALCIUM 8.4* 8.4*       CBC:   Recent Labs   Lab 11/06/23  1800 11/07/23  0543   WBC 14.37* 12.81*   HGB  12.3* 11.4*   HCT 37.8* 35.4*    201       All pertinent labs results from the past 24 hours have been reviewed.    Significant Imaging:  All pertinent imaging results/findings from the past 24 hours have been reviewed.

## 2023-11-07 NOTE — NURSING
Dr Andre at bedside, received report from [facility], received the patient awake and alert sitting up in bed, bed locked in low with Alarm on and call light in reach at present, dual ludwig draining clear fluid, patient reports no pain at present, instructed to call for assistance, instructed the patient in the use of the IS q HR.

## 2023-11-07 NOTE — ASSESSMENT & PLAN NOTE
Brandan Werner is an 78 y.o. male that is s/p RASP and IPP Explant on 11/6/23. Doing well post-op.    - Pain - controlled  - Diet - tolerating CLD  - OOB, needs to ambulate this am  - Encourage IS  - DVT ppx   - Continue CBI this am, will plan to stop later this morning.  - NEERU Cr negative, will likely remove drain.  - Will reassess later this morning after patient ambulates.    - Dispo: Likely PM discharge with Dior indwelling.

## 2023-11-07 NOTE — PROGRESS NOTES
[facility] Surg  Urology  Progress Note    Patient Name: Brandan Werner  MRN: [id number]  Admission Date: 11/6/2023  Hospital Length of Stay: 0 days  Code Status: Prior   Attending Provider: Dannie Murray MD   Primary Care Physician: Km Chicas MD    Subjective:     HPI:  No notes on file    Interval History: NAEON. AF. One episode of hypotension at 3am, not tachycardic. Pain controlled. Tolerating CLD. Has not ambulated.      Objective:     Temp:  [97.3 °F (36.3 °C)-98.4 °F (36.9 °C)] 98.4 °F (36.9 °C)  Pulse:  [57-97] 75  Resp:  [9-20] 20  SpO2:  [91 %-100 %] 91 %  BP: ()/(55-86) 98/55     Body mass index is 28.58 kg/m².           Drains       Drain  Duration                  Closed/Suction Drain 11/06/23 1454 Tube - 1 Lateral LLQ Bulb 15 Fr. <1 day         Urethral Catheter 11/06/23 1412 Latex;Triple-lumen 22 Fr. <1 day                     Physical Exam  Vitals reviewed.   Constitutional:       General: He is not in acute distress.     Appearance: He is not diaphoretic.   HENT:      Head: Normocephalic and atraumatic.      Nose: Nose normal.   Eyes:      Conjunctiva/sclera: Conjunctivae normal.   Cardiovascular:      Rate and Rhythm: Normal rate.   Pulmonary:      Effort: Pulmonary effort is normal. No respiratory distress.   Abdominal:      General: There is no distension.      Palpations: Abdomen is soft.      Tenderness: There is no abdominal tenderness. There is no guarding or rebound.      Comments: Lap incisions clean dry and intact. LLQ drain with minimal SS output.    Genitourinary:     Comments: 22Fr 3-way Dior catheter in place with clear output with CBI running. Penoscrotal incision clean dry and intact. Some penoscrotal bruising.   Musculoskeletal:         General: Normal range of motion.      Cervical back: Normal range of motion.   Skin:     General: Skin is dry.   Neurological:      Mental Status: He is alert.   Psychiatric:         Behavior: Behavior normal.         Thought  Content: Thought content normal.           Significant Labs:    BMP:  Recent Labs   Lab 11/06/23  1720 11/07/23  0543    138   K 4.5 4.6    104   CO2 23 21*   BUN 14 16   CREATININE 1.1 1.1   CALCIUM 8.4* 8.4*       CBC:   Recent Labs   Lab 11/06/23  1800 11/07/23  0543   WBC 14.37* 12.81*   HGB 12.3* 11.4*   HCT 37.8* 35.4*    201       All pertinent labs results from the past 24 hours have been reviewed.    Significant Imaging:  All pertinent imaging results/findings from the past 24 hours have been reviewed.                  Assessment/Plan:     * Benign prostatic hyperplasia with urinary retention  Brandan Werner is an 78 y.o. male that is s/p RASP and IPP Explant on 11/6/23. Doing well post-op.    - Pain - controlled  - Diet - tolerating CLD  - OOB, needs to ambulate this am  - Encourage IS  - DVT ppx   - Continue CBI this am, will plan to stop later this morning.  - NEERU Cr negative, will likely remove drain.  - Will reassess later this morning after patient ambulates.    - Dispo: Likely PM discharge with Dior indwelling.           VTE Risk Mitigation (From admission, onward)           Ordered     heparin (porcine) injection 5,000 Units  Every 12 hours         11/06/23 2125     Place sequential compression device  Until discontinued         11/06/23 4819                    Elizabeth Resendiz MD  Urology  [facility] Surg

## 2023-11-07 NOTE — NURSING
Instructed caregiver in ludwig care at bedside for discharge, care giver voiced understanding of cleaning and how to empty the urinary catheter. AVS in hand and VN notified the patient and caregiver are ready to review.

## 2023-11-07 NOTE — CONSULTS
Consult note  [facility]    Consulted by: Dr. Murray, Urology  Reason for Consult: Medical Management    History of Present Illness:  Patient is a 78 y.o. male w/ PMHx as documents presents s/p robotic prostatectomy. Patient alert and oriented, able to follow commands, answers questions appropriately. Patient reports pain in groin. Denies any other symptoms or complaints at this time. Wife bedside.      PTA Medications   Medication Sig    ampicillin (PRINCIPEN) 500 MG capsule Take 1 capsule (500 mg total) by mouth 4 (four) times daily. for 14 days    ascorbic acid, vitamin C, (VITAMIN C) 250 MG tablet Take 250 mg by mouth once daily.    atorvastatin (LIPITOR) 40 MG tablet Take 20 mg by mouth once daily.    carbidopa-levodopa  mg (SINEMET)  mg per tablet TAKE 1 TABLET BY MOUTH THREE TIMES A DAY FOR PARKINSON'S DISE** WITH MEALSASE    celecoxib (CELEBREX) 200 MG capsule Take 400 mg by mouth once daily.    ferrous sulfate (FEOSOL) 325 mg (65 mg iron) Tab tablet 325 mg.    FLUoxetine 20 MG capsule 60 mg.    lisinopril (PRINIVIL,ZESTRIL) 5 MG tablet Take 5 mg by mouth once daily.    naproxen sodium (ANAPROX) 220 MG tablet Take 220 mg by mouth 2 (two) times daily with meals. 2 tablets twice a day    primidone (MYSOLINE) 50 MG Tab Take 2 tablets (100 mg total) by mouth every evening.    tamsulosin (FLOMAX) 0.4 mg Cp24 Take 1 capsule (0.4 mg total) by mouth nightly.    traZODone (DESYREL) 100 MG tablet 100 mg.    oxyCODONE-acetaminophen (PERCOCET) 5-325 mg per tablet Take 1 tablet by mouth every 4 (four) hours as needed for Pain.    polyvinyl alcohol, artificial tears, (LIQUIFILM TEARS) 1.4 % ophthalmic solution INSTILL ONE DROP IN EACH EYE FOUR TIMES A DAY FOR DRY EYES       Review of patient's allergies indicates:  No Known Allergies    Past Medical History:   Diagnosis Date    Anxiety     BPH (benign prostatic hyperplasia)     Cataract     Elevated PSA     Erectile dysfunction     Hyperlipidemia      Hypertension     Hypogonadism male     Kidney stone 5/20/2020    Obesity     PTSD (post-traumatic stress disorder)     Shoulder arthritis     Urinary tract infection      Past Surgical History:   Procedure Laterality Date    ARTHROPLASTY OF SHOULDER Right 9/14/2023    Procedure: ARTHROPLASTY, SHOULDER;  Surgeon: Gabe Manning MD;  Location: [facility];  Service: Orthopedics;  Laterality: Right;  Ayaan david notified cc    COLONOSCOPY N/A 11/26/2018    Procedure: COLONOSCOPY;  Surgeon: Germán Moss MD;  Location: [address]);  Service: Endoscopy;  Laterality: N/A;    COLONOSCOPY W/ BIOPSIES  2010    CYSTOSCOPY Left 11/17/2022    Procedure: CYSTOSCOPY;  Surgeon: Dannie Murray MD;  Location: [facility];  Service: Urology;  Laterality: Left;    EXTRACORPOREAL SHOCK WAVE LITHOTRIPSY Left 12/2/2022    Procedure: LITHOTRIPSY, ESWL [employer] [place] ESWL machine, scheduling call [phone] 60 minutes;  Surgeon: Dannie Murray MD;  Location: [facility];  Service: Urology;  Laterality: Left;  Confirmed with nexmed 11/22 [facility] conf # [id number]    EXTRACORPOREAL SHOCK WAVE LITHOTRIPSY Left 1/30/2023    Procedure: LITHOTRIPSY, ESWL [employer] [place] ESWL machine, scheduling call [phone] 60 minutes;  Surgeon: Dannie Murray MD;  Location: [facility];  Service: Urology;  Laterality: Left;  [facility] conf #[id number]    PENILE PROSTHESIS IMPLANT      PERCUTANEOUS NEPHROLITHOTOMY Left 8/14/2023    Procedure: CYSTOSCOPY, LEFT RETROGRADE PYELOGRAM, LEFT URETEROSCOPY WITH STONE BASKET EXTRACTION, PLACEMENT OF JJ STENT, LEFT NEPHROSTOMY TUBE REMOVAL;  Surgeon: Dannie Murray MD;  Location: [facility];  Service: Urology;  Laterality: Left;  IR to place ureteral catheter prior, start time 1200  Cysto tower, flexible cysto/ureteroscopes, Laser in room, lithoclast in room, fluoroscopy with Tony to     Family History   Problem Relation Age of Onset    Cancer Mother         cancer    Cataracts  Mother     Heart disease Father 62        M.I.    Stroke Brother 62    Amblyopia Neg Hx     Blindness Neg Hx     Glaucoma Neg Hx     Macular degeneration Neg Hx     Retinal detachment Neg Hx     Strabismus Neg Hx     Prostate cancer Neg Hx     Kidney disease Neg Hx      Social History     Tobacco Use    Smoking status: Former     Current packs/day: 0.00     Average packs/day: 1 pack/day for 10.0 years (10.0 ttl pk-yrs)     Types: Cigarettes     Start date:      Quit date:      Years since quittin.8    Smokeless tobacco: Never   Substance Use Topics    Alcohol use: No    Drug use: No        Review of Systems:   Review of Systems   Constitutional:  Negative for fever.   Respiratory:  Negative for shortness of breath.    Cardiovascular:  Negative for chest pain.   Gastrointestinal:  Negative for abdominal pain.   Genitourinary:  Positive for hematuria.   Neurological:  Negative for headaches.      OBJECTIVE:     Vital Signs (Most Recent)  Temp: 97.3 °F (36.3 °C) (23)  Pulse: 97 (23)  Resp: 19 (23)  BP: (!) 144/75 (23)  SpO2: (!) 94 % (23)    Physical Exam:  Physical Exam  Vitals and nursing note reviewed.   Constitutional:       General: He is not in acute distress.     Appearance: Normal appearance. He is not ill-appearing, toxic-appearing or diaphoretic.   HENT:      Head: Normocephalic.      Right Ear: External ear normal.      Left Ear: External ear normal.      Nose: Nose normal.      Mouth/Throat:      Pharynx: Oropharynx is clear.   Eyes:      Extraocular Movements: Extraocular movements intact.   Cardiovascular:      Rate and Rhythm: Regular rhythm. Tachycardia present.      Pulses: Normal pulses.      Heart sounds: Normal heart sounds.   Pulmonary:      Effort: Pulmonary effort is normal.      Breath sounds: Normal breath sounds.   Abdominal:      General: Abdomen is flat. There is no distension.      Palpations: Abdomen is soft.       Tenderness: There is no abdominal tenderness. There is no guarding or rebound.   Genitourinary:     Comments: Stocking underwear in place. Dior connected to irrigation with pink tinged outflow. Mild blood noted on underwear with no signs of active bleeding at this time.  Musculoskeletal:         General: Normal range of motion.      Cervical back: Normal range of motion.      Right lower leg: No edema.      Left lower leg: No edema.   Skin:     General: Skin is warm.   Neurological:      General: No focal deficit present.      Mental Status: He is alert and oriented to person, place, and time. Mental status is at baseline.   Psychiatric:         Mood and Affect: Mood normal.         Behavior: Behavior normal.         Thought Content: Thought content normal.          Laboratory  Lab Results   Component Value Date    WBC 14.37 (H) 11/06/2023    HGB 12.3 (L) 11/06/2023    HCT 37.8 (L) 11/06/2023    MCV 95 11/06/2023     11/06/2023      CMP  Sodium   Date Value Ref Range Status   11/06/2023 141 136 - 145 mmol/L Final     Potassium   Date Value Ref Range Status   11/06/2023 4.5 3.5 - 5.1 mmol/L Final     Chloride   Date Value Ref Range Status   11/06/2023 108 95 - 110 mmol/L Final     CO2   Date Value Ref Range Status   11/06/2023 23 23 - 29 mmol/L Final     Glucose   Date Value Ref Range Status   11/06/2023 157 (H) 70 - 110 mg/dL Final     BUN   Date Value Ref Range Status   11/06/2023 14 8 - 23 mg/dL Final     Creatinine   Date Value Ref Range Status   11/06/2023 1.1 0.5 - 1.4 mg/dL Final     Calcium   Date Value Ref Range Status   11/06/2023 8.4 (L) 8.7 - 10.5 mg/dL Final     Total Protein   Date Value Ref Range Status   08/22/2023 6.4 6.0 - 8.4 g/dL Final     Albumin   Date Value Ref Range Status   08/22/2023 3.7 3.5 - 5.2 g/dL Final     Total Bilirubin   Date Value Ref Range Status   08/22/2023 0.5 0.1 - 1.0 mg/dL Final     Comment:     For infants and newborns, interpretation of results should be based  on  "gestational age, weight and in agreement with clinical  observations.    Premature Infant recommended reference ranges:  Up to 24 hours.............<8.0 mg/dL  Up to 48 hours............<12.0 mg/dL  3-5 days..................<15.0 mg/dL  6-29 days.................<15.0 mg/dL       Alkaline Phosphatase   Date Value Ref Range Status   08/22/2023 45 (L) 55 - 135 U/L Final     AST   Date Value Ref Range Status   08/22/2023 9 (L) 10 - 40 U/L Final     ALT   Date Value Ref Range Status   08/22/2023 5 (L) 10 - 44 U/L Final     Anion Gap   Date Value Ref Range Status   11/06/2023 10 8 - 16 mmol/L Final     eGFR if    Date Value Ref Range Status   02/22/2022 >60 >60 mL/min/1.73 m^2 Final     eGFR if non    Date Value Ref Range Status   02/22/2022 >60 >60 mL/min/1.73 m^2 Final     Comment:     Calculation used to obtain the estimated glomerular filtration  rate (eGFR) is the CKD-EPI equation.           Lab Results   Component Value Date    INR 0.9 08/14/2023    INR 1.0 11/17/2022      No results for input(s): "CPK", "CPKMB", "TROPONINI", "MB" in the last 168 hours.   No results for input(s): "TROPONINI", "CKTOTAL", "CKMB" in the last 168 hours.    BNP  No results for input(s): "BNP" in the last 168 hours.    Urinalysis  No results for input(s): "COLORU", "CLARITYU", "SPECGRAV", "PHUR", "PROTEINUA", "GLUCOSEU", "BILIRUBINCON", "BLOODU", "WBCU", "RBCU", "BACTERIA", "MUCUS", "NITRITE", "LEUKOCYTESUR", "UROBILINOGEN", "HYALINECASTS" in the last 24 hours.   LAST HbA1c  Lab Results   Component Value Date    HGBA1C 5.6 11/25/2019         Diagnostic Results:  Labs: Reviewed  ECG: Reviewed      ASSESSMENT/PLAN:   Patient is a 78 y.o. male w/ PMHx as documents presents s/p robotic prostatectomy. Overall, appears back to baseline mental status wise and hemodynamically stable at this time.     Recommendations:  - Continue home medications as ordered by primary team.  - Continue to monitor BP.  - Continue " IVF per primary team. Recommend re-evaluate need for continue maintenance IVF in AM.  - Continue abx (ancef) as ordered by primary team.  - Pain control per primary team.  - CBI per primary team.      Family medicine will continue to follow. Please contact us if you have any further questions. Thank you for the consult.     ________________________  Wilder Lloyd MD  [facility] PGY-2

## 2023-11-08 NOTE — ED PROVIDER NOTES
Encounter Date: 11/7/2023       History     Chief Complaint   Patient presents with    Hematuria     Pt reports to ED with c/o hematuria. Pt had prostatectomy yesterday and was discharged from hospital today around 3 pm. Pt reports upon discharge he did not feel ready to be discharged. Pt reports he has noticed more blood than earlier in ludwig bag which is what brought him into ER. Pt denies abdominal pain, SOB, dizziness.      Patient is a 78-year-old male with a past history of BPH, hypertension, hyperlipidemia, elevated PSA who underwent simple robotic prostatectomy yesterday.  He was admitted overnight was discharge earlier this afternoon.  States that upon discharge from the hospital he noticed gross hematuria to his Ludwig catheter bag and was concerned thus prompting him to come to the ED. He denies any fevers, chills, nausea, vomtiing, dysuria, flank pain, rectal pain or abdominal pain. He denies any current use of blood thinning medications.       Review of patient's allergies indicates:  No Known Allergies  Past Medical History:   Diagnosis Date    Anxiety     BPH (benign prostatic hyperplasia)     Cataract     Elevated PSA     Erectile dysfunction     Hyperlipidemia     Hypertension     Hypogonadism male     Kidney stone 5/20/2020    Obesity     PTSD (post-traumatic stress disorder)     Shoulder arthritis     Urinary tract infection      Past Surgical History:   Procedure Laterality Date    ARTHROPLASTY OF SHOULDER Right 9/14/2023    Procedure: ARTHROPLASTY, SHOULDER;  Surgeon: Gabe Manning MD;  Location: [facility] OR;  Service: Orthopedics;  Laterality: Right;  Ayaan david notified cc    COLONOSCOPY N/A 11/26/2018    Procedure: COLONOSCOPY;  Surgeon: Germán Moss MD;  Location: [address]);  Service: Endoscopy;  Laterality: N/A;    COLONOSCOPY W/ BIOPSIES  2010    CYSTOSCOPY Left 11/17/2022    Procedure: CYSTOSCOPY;  Surgeon: Dannie Murray MD;  Location: [facility] OR;  Service:  Urology;  Laterality: Left;    EXTRACORPOREAL SHOCK WAVE LITHOTRIPSY Left 2022    Procedure: LITHOTRIPSY, ESWL [employer] [place] ESWL machine, scheduling call [phone] 60 minutes;  Surgeon: Dannie Murray MD;  Location: [facility] OR;  Service: Urology;  Laterality: Left;  Confirmed with [facility]  [facility] conf # [id number]    EXTRACORPOREAL SHOCK WAVE LITHOTRIPSY Left 2023    Procedure: LITHOTRIPSY, ESWL [employer] [place] ESWL machine, scheduling call [phone] 60 minutes;  Surgeon: Dannie Murray MD;  Location: [facility] OR;  Service: Urology;  Laterality: Left;  [facility] conf #[id number]    PENILE PROSTHESIS IMPLANT      PERCUTANEOUS NEPHROLITHOTOMY Left 2023    Procedure: CYSTOSCOPY, LEFT RETROGRADE PYELOGRAM, LEFT URETEROSCOPY WITH STONE BASKET EXTRACTION, PLACEMENT OF JJ STENT, LEFT NEPHROSTOMY TUBE REMOVAL;  Surgeon: Dannie Murray MD;  Location: [facility] OR;  Service: Urology;  Laterality: Left;  IR to place ureteral catheter prior, start time 1200  Cysto tower, flexible cysto/ureteroscopes, Laser in room, lithoclast in room, fluoroscopy with Tony to     Family History   Problem Relation Age of Onset    Cancer Mother         cancer    Cataracts Mother     Heart disease Father 62        M.I.    Stroke Brother 62    Amblyopia Neg Hx     Blindness Neg Hx     Glaucoma Neg Hx     Macular degeneration Neg Hx     Retinal detachment Neg Hx     Strabismus Neg Hx     Prostate cancer Neg Hx     Kidney disease Neg Hx      Social History     Tobacco Use    Smoking status: Former     Current packs/day: 0.00     Average packs/day: 1 pack/day for 10.0 years (10.0 ttl pk-yrs)     Types: Cigarettes     Start date:      Quit date:      Years since quittin.8    Smokeless tobacco: Never   Substance Use Topics    Alcohol use: No    Drug use: No     Review of Systems   Constitutional:  Negative for activity change, appetite change, chills, fatigue and fever.   Cardiovascular:  Negative for  chest pain, palpitations and leg swelling.   Gastrointestinal:  Negative for abdominal pain, nausea and vomiting.   Genitourinary:  Positive for hematuria. Negative for decreased urine volume, flank pain, frequency, penile swelling, scrotal swelling and testicular pain.   Musculoskeletal:  Negative for back pain and myalgias.       Physical Exam     Initial Vitals [11/07/23 2105]   BP Pulse Resp Temp SpO2   (!) 150/87 81 18 98.6 °F (37 °C) 97 %      MAP       --         Physical Exam    Nursing note and vitals reviewed.  Constitutional: He appears well-developed and well-nourished. No distress.   HENT:   Head: Normocephalic and atraumatic.   Right Ear: External ear normal.   Left Ear: External ear normal.   Eyes: Conjunctivae and EOM are normal. Pupils are equal, round, and reactive to light.   Neck: Neck supple.   Normal range of motion.  Cardiovascular:  Normal rate, regular rhythm, normal heart sounds and intact distal pulses.           Pulmonary/Chest: Breath sounds normal.   Abdominal: Abdomen is soft. Bowel sounds are normal.   Genitourinary:    Penis normal.      Genitourinary Comments: Dior catheter in place with gross hematuria noted to the bag; no signs of malfunction of the Dior; no trauma noted to the glans penis.      Musculoskeletal:      Cervical back: Normal range of motion and neck supple.     Skin: No rash noted. No erythema.   Psychiatric: He has a normal mood and affect.         ED Course   Procedures  Labs Reviewed   CBC W/ AUTO DIFFERENTIAL - Abnormal; Notable for the following components:       Result Value    RBC 3.27 (*)     Hemoglobin 10.1 (*)     Hematocrit 30.6 (*)     Gran # (ANC) 9.1 (*)     Gran % 76.9 (*)     All other components within normal limits   URINALYSIS, REFLEX TO URINE CULTURE - Abnormal; Notable for the following components:    Color, UA Brown (*)     Appearance, UA Cloudy (*)     Protein, UA 2+ (*)     Occult Blood UA 3+ (*)     Leukocytes, UA 3+ (*)     All other  components within normal limits    Narrative:     Specimen Source->Urine   URINALYSIS MICROSCOPIC - Abnormal; Notable for the following components:    RBC, UA >100 (*)     WBC, UA >100 (*)     All other components within normal limits    Narrative:     Specimen Source->Urine   CULTURE, URINE          Imaging Results    None          Medications - No data to display  Medical Decision Making  Amount and/or Complexity of Data Reviewed  Labs: ordered. Decision-making details documented in ED Course.    Risk  Prescription drug management.               ED Course as of 11/07/23 2256   Tue Nov 07, 2023   2146 Hemoglobin(!): 10.1  Reviewed by self - was 11 this AM  [LC]   2235 RBC, UA(!): >100 [LC]   2235 WBC, UA(!): >100  Reviewed by self - abnormally elevated.  [LC]   2235 Leukocytes, UA(!): 3+  Reviewed by self- abnormal elevation of her leukocytes.  [LC]      ED Course User Index  [LC] Kingsley Pittman MD               Medical Decision Making:   Initial Assessment:   See HPI   Differential Diagnosis:   UTI, bladder ca, penile trauma, malfunctioning Dior, post procedure bleeding   Clinical Tests:   Lab Tests: Ordered and Reviewed  ED Management:  - Hb is down 1g compared to most recent, but pt does not meet criteria for emergent blood transfusion  - post prostatectomy hematuria is common with less than 1% of patient requiring intervention  - UA consistent with infection - will place on Cipro  - pt denies any physical complaints, there is no active bleeding or signs of  trauma  - the patient is hemodynamically stable and no further emergent intervention is indicated  - pt stable for discharge at this time with instructions to f/u with his urologist as indicated on the discharge paperwork  - No further intervention is indicated at this time after having taken into account the patient's history, physical exam findings, and empirical and objective data obtained during the patient's emergency department workup.   - The  patient is at low risk for an emergent medical condition at this time, and I am of the belief that that it is safe to discharge the patient from the emergency department.   - The patient is instructed to follow up as outpatient as indicated on the discharge paperwork.    - I have discussed the specifics of the workup with the patient and the patient has verbalized understanding of the details of the workup, the diagnosis, the treatment plan, and the need for outpatient follow-up.    - Although the patient has no emergent etiology today this does not preclude the development of an emergent condition so, in addition, I have advised the patient that they can return to the ED and/or activate EMS at any time with worsening of their symptoms, change of their symptoms, or with any other medical complaint.    - The patient remained comfortable and stable during their visit in the ED.    - Discharge and follow-up instructions discussed with the patient who expressed understanding and willingness to comply with my recommendations.  - Results of all emergency department tests  discussed thoroughly with patient; all patient questions answered; pt in agreement with plan  - Pt instructed to follow up with PCP in 2-3 days for recheck of today's complaints  - Pt given strict emergency department return precautions for any new or worsening of symptoms  - Pt discharged from the emergency department in stable condition, in no acute distress          Clinical Impression:   Final diagnoses:  [R31.9] Hematuria, unspecified type (Primary)  [Z90.79] Status post prostatectomy        ED Disposition Condition    Discharge Stable          ED Prescriptions       Medication Sig Dispense Start Date End Date Auth. Provider    ciprofloxacin HCl (CIPRO) 500 MG tablet Take 1 tablet (500 mg total) by mouth every 12 (twelve) hours. for 7 days 14 tablet 11/7/2023 11/14/2023 Kingsley Pittman MD          Follow-up Information       Follow up With  Specialties Details Why Contact Info    Dannie Murray MD Urology Schedule an appointment as soon as possible for a visit   [address]  [address]  [facility] [id number] [phone]               Kingsley Pittman MD  11/07/23 7226

## 2023-11-09 ENCOUNTER — DOCUMENT SCAN (OUTPATIENT)
Dept: HOME HEALTH SERVICES | Facility: HOSPITAL | Age: 78
End: 2023-11-09
Payer: OTHER GOVERNMENT

## 2023-11-09 DIAGNOSIS — R33.8 BENIGN PROSTATIC HYPERPLASIA WITH URINARY RETENTION: Primary | ICD-10-CM

## 2023-11-09 DIAGNOSIS — N40.1 BENIGN PROSTATIC HYPERPLASIA WITH URINARY RETENTION: Primary | ICD-10-CM

## 2023-11-09 LAB — BACTERIA UR CULT: NO GROWTH

## 2023-11-10 ENCOUNTER — TELEPHONE (OUTPATIENT)
Dept: UROLOGY | Facility: CLINIC | Age: 78
End: 2023-11-10
Payer: OTHER GOVERNMENT

## 2023-11-10 NOTE — TELEPHONE ENCOUNTER
I called and left a message for patient indicating that I had scheduled his cystogram and also a VT for next week. I left my call back number and requested return phone call.

## 2023-11-15 ENCOUNTER — HOSPITAL ENCOUNTER (OUTPATIENT)
Dept: RADIOLOGY | Facility: HOSPITAL | Age: 78
Discharge: HOME | End: 2023-11-15
Attending: UROLOGY
Payer: OTHER GOVERNMENT

## 2023-11-15 DIAGNOSIS — R33.8 BENIGN PROSTATIC HYPERPLASIA WITH URINARY RETENTION: ICD-10-CM

## 2023-11-15 DIAGNOSIS — N40.1 BENIGN PROSTATIC HYPERPLASIA WITH URINARY RETENTION: ICD-10-CM

## 2023-11-15 PROCEDURE — 74430 CONTRAST X-RAY BLADDER: CPT | Mod: 26,,, | Performed by: RADIOLOGY

## 2023-11-15 PROCEDURE — 74430 FL CYSTOGRAM MIN 3 VIEWS RADIOLOGIST PERFORMED: ICD-10-PCS | Mod: 26,,, | Performed by: RADIOLOGY

## 2023-11-15 PROCEDURE — 51600 INJECTION FOR BLADDER X-RAY: CPT | Mod: ,,, | Performed by: RADIOLOGY

## 2023-11-15 PROCEDURE — 25500020 PHARM REV CODE 255: Performed by: UROLOGY

## 2023-11-15 PROCEDURE — 51600 INJECTION FOR BLADDER X-RAY: CPT

## 2023-11-15 PROCEDURE — 51600 FL CYSTOGRAM MIN 3 VIEWS RADIOLOGIST PERFORMED: ICD-10-PCS | Mod: ,,, | Performed by: RADIOLOGY

## 2023-11-15 RX ADMIN — IOTHALAMATE MEGLUMINE 125 ML: 172 INJECTION URETERAL at 10:11

## 2023-11-16 ENCOUNTER — EXTERNAL HOME HEALTH (OUTPATIENT)
Dept: HOME HEALTH SERVICES | Facility: HOSPITAL | Age: 78
End: 2023-11-16
Payer: OTHER GOVERNMENT

## 2023-11-16 ENCOUNTER — OFFICE VISIT (OUTPATIENT)
Dept: UROLOGY | Facility: CLINIC | Age: 78
End: 2023-11-16
Payer: OTHER GOVERNMENT

## 2023-11-16 VITALS
DIASTOLIC BLOOD PRESSURE: 79 MMHG | HEART RATE: 80 BPM | WEIGHT: 199.94 LBS | BODY MASS INDEX: 27.08 KG/M2 | HEIGHT: 72 IN | SYSTOLIC BLOOD PRESSURE: 131 MMHG

## 2023-11-16 DIAGNOSIS — R33.8 BENIGN PROSTATIC HYPERPLASIA WITH URINARY RETENTION: Primary | ICD-10-CM

## 2023-11-16 DIAGNOSIS — N40.1 BENIGN PROSTATIC HYPERPLASIA WITH URINARY RETENTION: Primary | ICD-10-CM

## 2023-11-16 PROCEDURE — 99214 OFFICE O/P EST MOD 30 MIN: CPT | Mod: PBBFAC,PO | Performed by: UROLOGY

## 2023-11-16 PROCEDURE — 99999 PR PBB SHADOW E&M-EST. PATIENT-LVL IV: ICD-10-PCS | Mod: PBBFAC,,, | Performed by: UROLOGY

## 2023-11-16 PROCEDURE — 99024 POSTOP FOLLOW-UP VISIT: CPT | Mod: S$PBB,,, | Performed by: UROLOGY

## 2023-11-16 PROCEDURE — 99999 PR PBB SHADOW E&M-EST. PATIENT-LVL IV: CPT | Mod: PBBFAC,,, | Performed by: UROLOGY

## 2023-11-16 PROCEDURE — 99024 PR POST-OP FOLLOW-UP VISIT: ICD-10-PCS | Mod: S$PBB,,, | Performed by: UROLOGY

## 2023-11-16 NOTE — PROGRESS NOTES
Subjective:       Patient ID: Brandan Werner is a 78 y.o. male.    Chief Complaint: No chief complaint on file.       This is a 78 y.o.  male patient with BPH, elevated PSA, kidney stone.    Past patient of Dr. Laboy last seen in 2020 for cystoscopy for microhematuria that was negative except large prostate.  H/o penile implant that still uses.  CTU in 2020 with 150 gm prostate no other findings.  Long h/o elevated PSA.    PSA 6.2 in 2013 and biopsy negative  Biopsy in 2013 with ASAP  PSA recently checked by PCP and was 8.2   Moderate/severe LUTs, AUA SS 19/2, PVR 67  Worsening frequency of urination on finasteride, tamsulosin.    UA showed microhematuria.  CTU done showed left 1 cm UPJ stone with moderate hydronephrosis 11/22 and left non obstructing stones.    Failed left stent, required PCN and antegrade stent  Left ESWL 12/2  Follow-up KUB shows resolution of UPJ stone continued lower pole left stone.  Repeat left LP stone ESWL 1/30/23, appeared to be good fragmentation.  KUB shows left dense stone left LP but still present.  Stent in place. Stent removed 2/7/23.   CT 5/1/23: left enlarging LP stone now 2.3 cm, mild left pelviectasis, read as possible lesion, on review with radiologist small stone near UPJ and mild pelviectasis with pelvis thickening no obvious mass.    Intermittent left sided pain, still LUTs from BPH but not as severe as prior.        Attempted left PCNL 8/14/23--IR could not get access down ureter, taken to cystoscopy suite, ureteroscopy done showing access between two calyces (high risk bleeding to dilate), survey of kidney for some time unable to locate dominant stone some stone fragmented in lower pole, thus access removed and stent placed.    Follow up CT 9/23 with left LP 1.1 cm stone.  Minimal pain.  Had shoulder surgery 9/14/23 and retention after, ludwig in place.   Passed void trial but then recurrent retention.  Has Ludwig in place.  Wishes to have intervention for enlarged  prostate.  Of note, does have inflatable penile prosthesis that he does not use but reports functions.  Cath changed for leaking 10/22/23, urine culture with enterococcus has been on keflex.   Urine culture negative prior to surgery.    RASP, IPP explant 11/6/23. Cystogram negative for leak 11/15/23.   Doing well.            LAST PSA  Lab Results   Component Value Date    PSA 8.2 (H) 08/19/2022    PSA 7.0 (H) 03/02/2021    PSA 6.5 (H) 01/30/2020    PSA 3.0 11/02/2016    PSA 5.0 (H) 05/02/2014    PSA 6.2 (H) 10/18/2013    PSA 4.49 (H) 02/22/2013    PSA 4.1 (H) 05/09/2012    PSA 4.04 (H) 04/05/2012    PSA 2.8 08/31/2010    PSA 3.1 02/25/2009    PSA 2.2 09/06/2007    PSA 1.8 09/30/2006    PSA 1.7 12/28/2005    PSA 1.8 10/30/2004    PSADIAG 10.5 (H) 08/22/2023    PSADIAG 4.9 (H) 02/22/2023    PSADIAG 7.0 (H) 03/07/2019    PSADIAG 6.6 (H) 09/07/2018    PSADIAG 6.4 (H) 06/08/2018    PSADIAG 5.3 (H) 01/03/2018    PSADIAG 2.9 02/22/2016    PSADIAG 2.9 08/24/2015    PSADIAG 5.7 (H) 02/24/2015    PSADIAG 5.1 (H) 06/26/2014    PSADIAG 5.8 (H) 12/03/2013    PSATOTAL 6.5 (H) 05/09/2023    PSAFREE 1.69 (H) 05/09/2023       Lab Results   Component Value Date    CREATININE 1.1 11/07/2023       ---  PMH/PSH/Medications/Allergies/Social history reviewed and as in chart.    Review of Systems   Constitutional:  Negative for activity change, chills and fever.   HENT:  Negative for congestion.    Respiratory:  Negative for cough, chest tightness and shortness of breath.    Cardiovascular:  Negative for chest pain and palpitations.   Gastrointestinal:  Negative for abdominal distention, abdominal pain, nausea and vomiting.   Genitourinary:  Positive for difficulty urinating and flank pain. Negative for frequency, hematuria, penile pain, scrotal swelling and testicular pain.   Musculoskeletal:  Negative for gait problem.       Objective:      Physical Exam  HENT:      Head: Atraumatic.   Pulmonary:      Effort: Pulmonary effort is normal.    Neurological:      General: No focal deficit present.      Mental Status: He is alert and oriented to person, place, and time.         Assessment:     Problem Noted   Benign Prostatic Hyperplasia With Urinary Retention     183 gm prostate on CTU 2022  Initial AUA SS (8/2022): 19/2  PVR 67  Recurrent retention 10/23.  RASP, IPP explant 11/6/23.      Elevated Psa 3/11/2013    Biopsy 2013 ASAP 1 core, 2014 benign  PSA 8/22--8.2  Volume 183, PSAD 0.04       Kidney Stones 5/20/2020    Left 1.5 cm UPJ and non obstructing left lower pole greater than 1 cm on CTU 11/2022 with moderate hydronephrosis     Unable to place left stent 11/17/22 due to large prostate, prosthesis and unable to find ureteral orifice.   Left PCN 11/18/22, internalized to stent 11/28  Left ESWL UPJ stone 12/2/22  KUB after with 1.8 cm left lower pole stone consistent with lower pole stone on CT urogram, no renal pelvis/gualberto stent stone seen.  Left ESWL 1/30/23 good fragmentation  KUB shows continued LLP stone  CT 5/23: left enlarging LP stone 2.3 cm mild pelviectasis, small stone fragment at UPJ  Attempted left PCNL 8/14/23--IR could not get access down ureter, taken to cystoscopy suite, ureteroscopy done showing access between two calyx, survey of kidney for some time unable to locate dominant stone some stone fragmented in lower pole     History of Penile Implant 5/20/2020         Plan:     Cystogram reviewed, negative.  Dior removed, return if unable to void.  Follow up in 2 weeks to ensure emptying.       Dannie Murray MD

## 2023-11-17 LAB
COMMENT: NORMAL
FINAL PATHOLOGIC DIAGNOSIS: NORMAL
GROSS: NORMAL
Lab: NORMAL

## 2023-11-21 NOTE — PROGRESS NOTES
10/31/23 @ 10:00 Using aseptic technique deflated balloon,16 Fr silicone coude ludwig cath removed. Catheter tip grossly intact. Pt tolerated well. Cleansed urethra with Betadine swabs X3 using aseptic technique. Inserted 16fr. Coude Ludwig cath using sterile technique with lubricant. Visualized light yellow urine return and advanced further. Inflated balloon with 10ml sterile water. Connected to drainage bag Pt tolerated well.  YENIFER Beltre

## 2023-12-04 NOTE — PROGRESS NOTES
"[facility]  Occupational Therapy Treatment Note     Date: 12/5/2023  Name: Brandan Werner  Clinic Number: [id number]    Therapy Diagnosis:   Encounter Diagnoses   Name Primary?    Pain of right upper extremity Yes    Weakness     Stiffness due to immobility      Physician: Gabe Manning MD    Physician Orders: OT evaluate and treat  Medical Diagnosis: Z96.611 (ICD-10-CM) - Status post total shoulder replacement, right  Surgical Procedure and Date: Right TSA, 9/14/2023  Evaluation Date: 10/24/2023  Insurance Authorization Period Expiration: 12/31/2023  Plan of Care Expiration: 1/19/2024  Date of Return to MD: 12/15/2023  Visit # / Visits authorized: 3 / 1  FOTO:2/3     Precautions:  Patient will advance his range of motion to include full passive elevation and active assisted elevation.  He will continue to restrict external rotation in neutral.  He will follow up in 6 weeks with a new x-ray of the right shoulder.      Time In: 12:55  Time Out: 1:40  Total Appointment Time (timed & untimed codes): 45 minutes    Subjective     Pt reports: "I've been out because I've had some other issues." "I just can't lift this arm much still."  he was compliant with home exercise program given last session.   Response to previous treatment:low pain  Functional change: tolerated progression of treatment well, improved range of motion     Pain: 3/10  Location: right shoulder      Objective     Objective Measures updated at progress report unless specified.   Sensation Test: Patient denies any numbness/tingling     Observation/Inspection:rounded shoulders, forward head     Range of Motion/Strength:   Shoulder Left   Right   Pain/Dysfunction with Movement     AROM MMT AROM PROM     Flexion 120 5/5 (+15)     Extension 55 5/5 NT NT     Abduction 110 5/5 (+10)     HorizAdduction 20 5/5 NT NT     Internal rotation L1 5/5 NT To stomach     ER at 90° abd Back of head 5/5 NT NT     ER at 0° abd 75 " 5/5 NT 0     NT=Not tested  ROM Comments: Pain at end range, firm at end feel     Painful Arc: none noted     Tenderness upon Palpation:      Positive: incision     Special Tests:deferred     Scapular Control/Dyskinesis:     Normal / Subtle / Obvious  Comments    Left  subtle -    Right  subtle -      Intake Outcome Measure for FOTO Shoulder Survey     Therapist reviewed FOTO scores for Brandan Liuvaut on 10/24/2023.   FOTO documents entered into [employer] - see Media section.     Intake Score: 52%  3rd visit:41%        Treatment   Pt 12 weeks post op this Thursday with sling discharged    Brandan received the treatments listed below:     Supervised modalities after being cleared for contradictions: Hot Pack - right shoulder for pain management and tissue extensibility x 5 minutes    Manual therapy techniques: Soft tissue Mobilization were applied to the: right shoulder incision for 10 minutes, including:  -scar mobilization    Therapeutic exercises to develop ROM for 20 minutes, including:  -passive range of motion shoulder 10x  -supine dowel chest press 2/15  -supine dowel flexion 2/15  -sidelying abduction 2/15  -sidelying external rotation to neutral 2/15  -wall slides 2/10   -pulleys 3 minutes     Neuromuscular re-education activities to improve Posture for 10 minutes. The following activities were included:  -shoulder shrugs 30x  -scapular retraction 30x    Patient Education and Home Exercises     Education provided:   - on current condition and issued updated home exercise program   - Progress towards goals     Written Home Exercises Provided: Patient instructed to cont prior HEP.  Exercises were reviewed and Brandan was able to demonstrate them prior to the end of the session.  Brandan demonstrated good  understanding of the HEP provided.     See EMR under Patient Instructions for exercises provided prior visit.      Assessment   He has not attended therapy in 1 month due to having other procedures and issues.  Despite that he performed well today. Pain report remains low in  sessions. Patient's range of motion improved from previous session.  Cleared to begin active assisted range of motion. Pt able to complete all exercises in a pain free range of motion and good technique. Pt meeting post op landmarks.     Brandan is progressing well towards his goals and there are no updates to goals at this time. Pt prognosis is Good.     Pt will continue to benefit from skilled outpatient occupational therapy to address the deficits listed in the problem list on initial evaluation provide pt/family education and to maximize pt's level of independence in the home and community environment.     Anticipated barriers to occupational therapy: none    Pt's spiritual, cultural and educational needs considered and pt agreeable to plan of care and goals.    Goals:  Short Term Goals to be met in 4 weeks: (11/23/2023)  1) Initiate Hep -met, ongoing  2) Pt will increase Right shoulder PROM by 10 degrees grossly for improved performance with overhead activities of daily living's -met  3) Pt will report 2/10 pain in (Right)shoulder at worst -Not met, progressing  4) Pt will progress to active assisted range of motion exercises -met  5) Patient will be able to achieve less than or equal to 45% on FOTO shoulder survey demonstrating overall improved functional ability with upper extremity. -Not met, progressing     Long Term Goals to be met by discharge:  1) Independent with home exercise program -Not met, progressing  2) Pt will demonstrate (Right) shoulder AROM WFL grossly for [place] with activities of daily living's -Not met, progressing  3) Pt will demonstrate (Right) shoulder MMT WFL grossly for [place] with functional activities -Not met, progressing  4) Independent and pain free with ADL's and IADL's -Not met, progressing  5) Patient will be able to achieve less than or equal to 25% on FOTO shoulder survey demonstrating overall  improved functional ability with upper extremity. -Not met, progressing     Plan     Continue with OT plan of care   Updates/Grading for next session: progress as able    SRIDHAR Vinson

## 2023-12-05 ENCOUNTER — CLINICAL SUPPORT (OUTPATIENT)
Dept: REHABILITATION | Facility: HOSPITAL | Age: 78
End: 2023-12-05
Payer: OTHER GOVERNMENT

## 2023-12-05 DIAGNOSIS — Z74.09 STIFFNESS DUE TO IMMOBILITY: ICD-10-CM

## 2023-12-05 DIAGNOSIS — R53.1 WEAKNESS: ICD-10-CM

## 2023-12-05 DIAGNOSIS — M25.60 STIFFNESS DUE TO IMMOBILITY: ICD-10-CM

## 2023-12-05 DIAGNOSIS — M79.601 PAIN OF RIGHT UPPER EXTREMITY: Primary | ICD-10-CM

## 2023-12-05 PROCEDURE — 97112 NEUROMUSCULAR REEDUCATION: CPT | Mod: PN

## 2023-12-05 PROCEDURE — 97110 THERAPEUTIC EXERCISES: CPT | Mod: PN

## 2023-12-05 PROCEDURE — 97140 MANUAL THERAPY 1/> REGIONS: CPT | Mod: PN

## 2023-12-05 NOTE — PROGRESS NOTES
Subjective:       Patient ID: Brandan Werner is a 78 y.o. male.    Chief Complaint: Follow-up (2 wk)       This is a 78 y.o.  male patient with BPH, elevated PSA, kidney stone.    Past patient of Dr. Laboy last seen in 2020 for cystoscopy for microhematuria that was negative except large prostate.  H/o penile implant that still uses.  CTU in 2020 with 150 gm prostate no other findings.  Long h/o elevated PSA.    PSA 6.2 in 2013 and biopsy negative  Biopsy in 2013 with ASAP  PSA recently checked by PCP and was 8.2   Moderate/severe LUTs, AUA SS 19/2, PVR 67  Worsening frequency of urination on finasteride, tamsulosin.    UA showed microhematuria.  CTU done showed left 1 cm UPJ stone with moderate hydronephrosis 11/22 and left non obstructing stones.    Failed left stent, required PCN and antegrade stent  Left ESWL 12/2  Follow-up KUB shows resolution of UPJ stone continued lower pole left stone.  Repeat left LP stone ESWL 1/30/23, appeared to be good fragmentation.  KUB shows left dense stone left LP but still present.  Stent in place. Stent removed 2/7/23.   CT 5/1/23: left enlarging LP stone now 2.3 cm, mild left pelviectasis, read as possible lesion, on review with radiologist small stone near UPJ and mild pelviectasis with pelvis thickening no obvious mass.    Intermittent left sided pain, still LUTs from BPH but not as severe as prior.        Attempted left PCNL 8/14/23--IR could not get access down ureter, taken to cystoscopy suite, ureteroscopy done showing access between two calyces (high risk bleeding to dilate), survey of kidney for some time unable to locate dominant stone some stone fragmented in lower pole, thus access removed and stent placed.    Follow up CT 9/23 with left LP 1.1 cm stone.  Minimal pain.  Had shoulder surgery 9/14/23 and retention after, ludwig in place.   Passed void trial but then recurrent retention.  Has Ludwig in place.  Wishes to have intervention for enlarged prostate.  Of note,  does have inflatable penile prosthesis that he does not use but reports functions.  Cath changed for leaking 10/22/23, urine culture with enterococcus has been on keflex.   Urine culture negative prior to surgery.    RASP, IPP explant 11/6/23. Cystogram negative for leak 11/15/23.   Doing well.    AUA SS current 3/1 (12/23), PVR 53.  No AARON.        IPSS Questionnaire (AUA-SS):  Over the past month    1)  Incomplete Emptying - How often have you had a sensation of not emptying your bladder completely after you finish urinating?  1 - Less than 1 time in 5   2)  Frequency - How often have you had to urinate again less than two hours after you finished urinating? 2 - Less than half the time   3)  Intermittency - How often have you found you stopped and started again several times when you urinated?  0 - Not at all   4) Urgency - How difficult have you found it to postpone urination?  0 - Not at all   5) Weak Stream - How often have you had a weak urinary stream?  0 - Not at all   6) Straining  - How often have you had to push or strain to begin urination?  0 - Not at all   7) Nocturia - How many times did you most typically get up to urinate from the time you went to bed until the time you got up in the morning?  0 - None   Total score:  0-7 mild, 8-19 moderate, 20-35 severe 3   Quality of Life:  1 - Pleased        LAST PSA  Lab Results   Component Value Date    PSA 8.2 (H) 08/19/2022    PSA 7.0 (H) 03/02/2021    PSA 6.5 (H) 01/30/2020    PSA 3.0 11/02/2016    PSA 5.0 (H) 05/02/2014    PSA 6.2 (H) 10/18/2013    PSA 4.49 (H) 02/22/2013    PSA 4.1 (H) 05/09/2012    PSA 4.04 (H) 04/05/2012    PSA 2.8 08/31/2010    PSA 3.1 02/25/2009    PSA 2.2 09/06/2007    PSA 1.8 09/30/2006    PSA 1.7 12/28/2005    PSA 1.8 10/30/2004    PSADIAG 10.5 (H) 08/22/2023    PSADIAG 4.9 (H) 02/22/2023    PSADIAG 7.0 (H) 03/07/2019    PSADIAG 6.6 (H) 09/07/2018    PSADIAG 6.4 (H) 06/08/2018    PSADIAG 5.3 (H) 01/03/2018    PSADIAG 2.9 02/22/2016     PSADIAG 2.9 08/24/2015    PSADIAG 5.7 (H) 02/24/2015    PSADIAG 5.1 (H) 06/26/2014    PSADIAG 5.8 (H) 12/03/2013    PSATOTAL 6.5 (H) 05/09/2023    PSAFREE 1.69 (H) 05/09/2023       Lab Results   Component Value Date    CREATININE 1.1 11/07/2023       ---  PMH/PSH/Medications/Allergies/Social history reviewed and as in chart.    Review of Systems   Constitutional:  Negative for activity change, chills and fever.   HENT:  Negative for congestion.    Respiratory:  Negative for cough, chest tightness and shortness of breath.    Cardiovascular:  Negative for chest pain and palpitations.   Gastrointestinal:  Negative for abdominal distention, abdominal pain, nausea and vomiting.   Genitourinary:  Negative for difficulty urinating, flank pain, frequency, hematuria, penile pain, scrotal swelling and testicular pain.   Musculoskeletal:  Negative for gait problem.       Objective:      Physical Exam  HENT:      Head: Atraumatic.   Pulmonary:      Effort: Pulmonary effort is normal.   Neurological:      General: No focal deficit present.      Mental Status: He is alert and oriented to person, place, and time.       Inc c/d/I    Assessment:     Problem Noted   Benign Prostatic Hyperplasia With Urinary Retention     183 gm prostate on CTU 2022  Initial AUA SS (8/2022): 19/2  PVR 67  Recurrent retention 10/23.  RASP, IPP explant 11/6/23, path negative  AUA SS current 3/1 (12/23), PVR 53     Elevated Psa 3/11/2013    Biopsy 2013 ASAP 1 core, 2014 benign  PSA 8/22--8.2  Volume 183, PSAD 0.04       Kidney Stones 5/20/2020    Left 1.5 cm UPJ and non obstructing left lower pole greater than 1 cm on CTU 11/2022 with moderate hydronephrosis     Unable to place left stent 11/17/22 due to large prostate, prosthesis and unable to find ureteral orifice.   Left PCN 11/18/22, internalized to stent 11/28  Left ESWL UPJ stone 12/2/22  KUB after with 1.8 cm left lower pole stone consistent with lower pole stone on CT urogram, no renal  pelvis/gualberto stent stone seen.  Left ESWL 1/30/23 good fragmentation  KUB shows continued LLP stone  CT 5/23: left enlarging LP stone 2.3 cm mild pelviectasis, small stone fragment at UPJ  Attempted left PCNL 8/14/23--IR could not get access down ureter, taken to cystoscopy suite, ureteroscopy done showing access between two calyx, survey of kidney for some time unable to locate dominant stone some stone fragmented in lower pole     History of Penile Implant 5/20/2020         Plan:     Doing well post RASP  Follow up in 3 months       Dannie Murray MD

## 2023-12-06 ENCOUNTER — OFFICE VISIT (OUTPATIENT)
Dept: UROLOGY | Facility: CLINIC | Age: 78
End: 2023-12-06
Payer: OTHER GOVERNMENT

## 2023-12-06 VITALS
WEIGHT: 204.13 LBS | HEART RATE: 47 BPM | SYSTOLIC BLOOD PRESSURE: 124 MMHG | DIASTOLIC BLOOD PRESSURE: 80 MMHG | BODY MASS INDEX: 27.65 KG/M2 | HEIGHT: 72 IN

## 2023-12-06 DIAGNOSIS — N40.1 BENIGN PROSTATIC HYPERPLASIA WITH URINARY RETENTION: Primary | ICD-10-CM

## 2023-12-06 DIAGNOSIS — R33.8 BENIGN PROSTATIC HYPERPLASIA WITH URINARY RETENTION: Primary | ICD-10-CM

## 2023-12-06 LAB — POC RESIDUAL URINE VOLUME: 53 ML (ref 0–100)

## 2023-12-06 PROCEDURE — 99024 PR POST-OP FOLLOW-UP VISIT: ICD-10-PCS | Mod: S$PBB,,, | Performed by: UROLOGY

## 2023-12-06 PROCEDURE — 99024 POSTOP FOLLOW-UP VISIT: CPT | Mod: S$PBB,,, | Performed by: UROLOGY

## 2023-12-06 PROCEDURE — 99214 OFFICE O/P EST MOD 30 MIN: CPT | Mod: PBBFAC,PO | Performed by: UROLOGY

## 2023-12-06 PROCEDURE — 99999PBSHW POCT BLADDER SCAN: Mod: PBBFAC,,,

## 2023-12-06 PROCEDURE — 99999PBSHW POCT BLADDER SCAN: ICD-10-PCS | Mod: PBBFAC,,,

## 2023-12-06 PROCEDURE — 51798 US URINE CAPACITY MEASURE: CPT | Mod: PBBFAC,PO | Performed by: UROLOGY

## 2023-12-06 PROCEDURE — 99999 PR PBB SHADOW E&M-EST. PATIENT-LVL IV: CPT | Mod: PBBFAC,,, | Performed by: UROLOGY

## 2023-12-06 PROCEDURE — 99999 PR PBB SHADOW E&M-EST. PATIENT-LVL IV: ICD-10-PCS | Mod: PBBFAC,,, | Performed by: UROLOGY

## 2023-12-11 NOTE — PROGRESS NOTES
"[facility]  Occupational Therapy Treatment Note     Date: 12/12/2023  Name: Brandan Werner  Clinic Number: [id number]    Therapy Diagnosis:   Encounter Diagnoses   Name Primary?    Pain of right upper extremity Yes    Weakness     Stiffness due to immobility      Physician: Gabe Manning MD    Physician Orders: OT evaluate and treat  Medical Diagnosis: Z96.611 (ICD-10-CM) - Status post total shoulder replacement, right  Surgical Procedure and Date: Right TSA, 9/14/2023  Evaluation Date: 10/24/2023  Insurance Authorization Period Expiration: 12/31/2023  Plan of Care Expiration: 1/19/2024  Date of Return to MD: 12/15/2023  Visit # / Visits authorized: 4/ 1  FOTO:2/3     Precautions:  Patient will advance his range of motion to include full passive elevation and active assisted elevation.  He will continue to restrict external rotation in neutral.  He will follow up in 6 weeks with a new x-ray of the right shoulder.      Time In: 1:00  Time Out: 1:45  Total Appointment Time (timed & untimed codes): 45 minutes    Subjective     Pt reports: "I've been doing those exercises."  he was compliant with home exercise program given last session.   Response to previous treatment:low pain  Functional change: tolerated progression of treatment well, improved range of motion     Pain: 3/10  Location: right shoulder      Objective     Objective Measures updated at progress report unless specified.   Sensation Test: Patient denies any numbness/tingling     Observation/Inspection:rounded shoulders, forward head     Range of Motion/Strength:   Shoulder Left   Right   Pain/Dysfunction with Movement     AROM MMT AROM PROM     Flexion 120 5/5 90 115(+10)     Extension 55 5/5 30 NT     Abduction 110 5/5 85 115(+10)     HorizAdduction 20 5/5 10 NT     Internal rotation L1 5/5 To stomach To stomach     ER at 90° abd Back of head 5/5 NT NT     ER at 0° abd 75 5/5 20 20(+20)     NT=Not tested  ROM Comments: " Pain at end range, firm at end feel     Painful Arc: none noted     Tenderness upon Palpation:      Positive: incision     Special Tests:deferred     Scapular Control/Dyskinesis:     Normal / Subtle / Obvious  Comments    Left  subtle -    Right  subtle -      Intake Outcome Measure for FOTO Shoulder Survey     Therapist reviewed FOTO scores for Brandan Werner on 10/24/2023.   FOTO documents entered into [employer] - see Media section.     Intake Score: 52%  3rd visit:41%        Treatment   Pt 12 +weeks post op with sling discharged    Brandan received the treatments listed below:     Manual therapy techniques: Soft tissue Mobilization were applied to the: right shoulder incision for 10 minutes, including:  -scar mobilization    Therapeutic exercises to develop ROM for 25 minutes, including:  -Scifit UBE forward/reverse Level 1 3 minutes each direction  -passive range of motion shoulder 10x  -supine dowel chest press 2/15  -supine dowel flexion 2/15  -sidelying abduction 2/15  -sidelying external rotation to neutral 2/15  -wall slides 2/15  -pulleys 3 minutes     Neuromuscular re-education activities to improve Posture for 10 minutes. The following activities were included:  -yellow band extension pulls 2/15  -yellow band rows 2/15    Patient Education and Home Exercises     Education provided:   - on current condition and issued updated home exercise program   - Progress towards goals     Written Home Exercises Provided: Patient instructed to cont prior HEP.  Exercises were reviewed and Brandan was able to demonstrate them prior to the end of the session.  Brandan demonstrated good  understanding of the HEP provided.     See EMR under Patient Instructions for exercises provided prior visit.      Assessment   Patient's with good participation this date. He continues to have soft tissue restrictions however responds well to manual.  Range of motion steadily improving. Pt able to complete all exercises in a pain free range  of motion and good technique. Pt meeting post op landmarks.     Brandan is progressing well towards his goals and there are no updates to goals at this time. Pt prognosis is Good.     Pt will continue to benefit from skilled outpatient occupational therapy to address the deficits listed in the problem list on initial evaluation provide pt/family education and to maximize pt's level of independence in the home and community environment.     Anticipated barriers to occupational therapy: none    Pt's spiritual, cultural and educational needs considered and pt agreeable to plan of care and goals.    Goals:  Short Term Goals to be met in 4 weeks: (11/23/2023)  1) Initiate Hep -met, ongoing  2) Pt will increase Right shoulder PROM by 10 degrees grossly for improved performance with overhead activities of daily living's -met  3) Pt will report 2/10 pain in (Right)shoulder at worst -Not met, progressing  4) Pt will progress to active assisted range of motion exercises -met  5) Patient will be able to achieve less than or equal to 45% on FOTO shoulder survey demonstrating overall improved functional ability with upper extremity. -Not met, progressing     Long Term Goals to be met by discharge:  1) Independent with home exercise program -Not met, progressing  2) Pt will demonstrate (Right) shoulder AROM WFL grossly for [place] with activities of daily living's -Not met, progressing  3) Pt will demonstrate (Right) shoulder MMT WFL grossly for [place] with functional activities -Not met, progressing  4) Independent and pain free with ADL's and IADL's -Not met, progressing  5) Patient will be able to achieve less than or equal to 25% on FOTO shoulder survey demonstrating overall improved functional ability with upper extremity. -Not met, progressing     Plan     Continue with OT plan of care   Updates/Grading for next session: progress as able    SRIDHAR Vinson

## 2023-12-12 ENCOUNTER — CLINICAL SUPPORT (OUTPATIENT)
Dept: REHABILITATION | Facility: HOSPITAL | Age: 78
End: 2023-12-12
Payer: OTHER GOVERNMENT

## 2023-12-12 DIAGNOSIS — M25.60 STIFFNESS DUE TO IMMOBILITY: ICD-10-CM

## 2023-12-12 DIAGNOSIS — Z74.09 STIFFNESS DUE TO IMMOBILITY: ICD-10-CM

## 2023-12-12 DIAGNOSIS — R53.1 WEAKNESS: ICD-10-CM

## 2023-12-12 DIAGNOSIS — M79.601 PAIN OF RIGHT UPPER EXTREMITY: Primary | ICD-10-CM

## 2023-12-12 PROCEDURE — 97110 THERAPEUTIC EXERCISES: CPT | Mod: PN

## 2023-12-12 PROCEDURE — 97112 NEUROMUSCULAR REEDUCATION: CPT | Mod: PN

## 2023-12-12 PROCEDURE — 97140 MANUAL THERAPY 1/> REGIONS: CPT | Mod: PN

## 2023-12-15 ENCOUNTER — HOSPITAL ENCOUNTER (OUTPATIENT)
Dept: RADIOLOGY | Facility: HOSPITAL | Age: 78
Discharge: HOME | End: 2023-12-15
Attending: ORTHOPAEDIC SURGERY
Payer: OTHER GOVERNMENT

## 2023-12-15 ENCOUNTER — OFFICE VISIT (OUTPATIENT)
Dept: ORTHOPEDICS | Facility: CLINIC | Age: 78
End: 2023-12-15
Payer: OTHER GOVERNMENT

## 2023-12-15 VITALS — WEIGHT: 204.13 LBS | BODY MASS INDEX: 27.65 KG/M2 | HEIGHT: 72 IN

## 2023-12-15 DIAGNOSIS — Z96.611 STATUS POST TOTAL REPLACEMENT OF RIGHT SHOULDER: ICD-10-CM

## 2023-12-15 DIAGNOSIS — Z96.611 STATUS POST TOTAL REPLACEMENT OF RIGHT SHOULDER: Primary | ICD-10-CM

## 2023-12-15 PROCEDURE — 99999 PR PBB SHADOW E&M-EST. PATIENT-LVL III: ICD-10-PCS | Mod: PBBFAC,,, | Performed by: ORTHOPAEDIC SURGERY

## 2023-12-15 PROCEDURE — 99999 PR PBB SHADOW E&M-EST. PATIENT-LVL III: CPT | Mod: PBBFAC,,, | Performed by: ORTHOPAEDIC SURGERY

## 2023-12-15 PROCEDURE — 73030 X-RAY EXAM OF SHOULDER: CPT | Mod: TC,PN,RT

## 2023-12-15 PROCEDURE — 73030 X-RAY EXAM OF SHOULDER: CPT | Mod: 26,RT,, | Performed by: RADIOLOGY

## 2023-12-15 PROCEDURE — 99213 OFFICE O/P EST LOW 20 MIN: CPT | Mod: PBBFAC,PN | Performed by: ORTHOPAEDIC SURGERY

## 2023-12-15 PROCEDURE — 99214 OFFICE O/P EST MOD 30 MIN: CPT | Mod: S$PBB,,, | Performed by: ORTHOPAEDIC SURGERY

## 2023-12-15 PROCEDURE — 99214 PR OFFICE/OUTPT VISIT, EST, LEVL IV, 30-39 MIN: ICD-10-PCS | Mod: S$PBB,,, | Performed by: ORTHOPAEDIC SURGERY

## 2023-12-15 PROCEDURE — 73030 XR SHOULDER COMPLETE 2 OR MORE VIEWS RIGHT: ICD-10-PCS | Mod: 26,RT,, | Performed by: RADIOLOGY

## 2023-12-15 NOTE — PROGRESS NOTES
Patient ID:   Brandan Werner is a 78 y.o. male.    Chief Complaint:   3m 1d s/p R TSA    HPI:   Mr. Werner is returning for evaluation of the shoulder. Pain continues to improve. Intensity is 2/10. ROM improving.    Medications:    Current Outpatient Medications:     ascorbic acid, vitamin C, (VITAMIN C) 250 MG tablet, Take 250 mg by mouth once daily., Disp: , Rfl:     atorvastatin (LIPITOR) 40 MG tablet, Take 20 mg by mouth once daily., Disp: , Rfl:     carbidopa-levodopa  mg (SINEMET)  mg per tablet, TAKE 1 TABLET BY MOUTH THREE TIMES A DAY FOR PARKINSON'S DISE** WITH MEALSASE, Disp: , Rfl:     celecoxib (CELEBREX) 200 MG capsule, Take 400 mg by mouth once daily., Disp: , Rfl:     ferrous sulfate (FEOSOL) 325 mg (65 mg iron) Tab tablet, 325 mg., Disp: , Rfl:     FLUoxetine 20 MG capsule, 60 mg., Disp: , Rfl:     ibuprofen (ADVIL,MOTRIN) 800 MG tablet, Take 1 tablet (800 mg total) by mouth every 8 (eight) hours., Disp: 20 tablet, Rfl: 0    lisinopril (PRINIVIL,ZESTRIL) 5 MG tablet, Take 5 mg by mouth once daily., Disp: , Rfl:     naproxen sodium (ANAPROX) 220 MG tablet, Take 220 mg by mouth 2 (two) times daily with meals. 2 tablets twice a day, Disp: , Rfl:     oxyCODONE (ROXICODONE) 5 MG immediate release tablet, Take 1 tablet (5 mg total) by mouth every 6 (six) hours as needed for Pain., Disp: 10 tablet, Rfl: 0    oxyCODONE-acetaminophen (PERCOCET) 5-325 mg per tablet, Take 1 tablet by mouth every 4 (four) hours as needed for Pain., Disp: 28 tablet, Rfl: 0    polyethylene glycol (GLYCOLAX) 17 gram/dose powder, Take 17 g by mouth daily as needed (take as needed for daily soft bowel movements)., Disp: 510 g, Rfl: 1    polyvinyl alcohol, artificial tears, (LIQUIFILM TEARS) 1.4 % ophthalmic solution, INSTILL ONE DROP IN EACH EYE FOUR TIMES A DAY FOR DRY EYES, Disp: , Rfl:     primidone (MYSOLINE) 50 MG Tab, Take 2 tablets (100 mg total) by mouth every evening., Disp: 60 tablet, Rfl: 11    tamsulosin  (FLOMAX) 0.4 mg Cp24, Take 1 capsule (0.4 mg total) by mouth nightly., Disp: 30 capsule, Rfl: 11    traZODone (DESYREL) 100 MG tablet, 100 mg., Disp: , Rfl:   No current facility-administered medications for this visit.    Facility-Administered Medications Ordered in Other Visits:     lactated ringers infusion, , Intravenous, Continuous, Maureen Solomon DNP    LIDOcaine (PF) 10 mg/ml (1%) injection 10 mg, 1 mL, Intradermal, Once, Maureen Solomon DNP    Allergies:  Review of patient's allergies indicates:  No Known Allergies    Vitals:  There were no vitals taken for this visit.    Physical Examination:  Ortho Exam   Right shoulder exam:  Well healed surgical incision  ROM: active elevation 120, ER 20      Imaging Studies:  I have ordered and independently reviewed the following imaging studies performed at [facility] today    X-ray Shoulder 2 or More Views Right  EXAMINATION:  XR SHOULDER COMPLETE 2 OR MORE VIEWS RIGHT    CLINICAL HISTORY:  Presence of right artificial shoulder joint    FINDINGS:  Shoulder complete three views right.    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  There is a right TSA in place good alignment and no complication seen.    Electronically signed by: Dawit River MD  Date:    12/15/2023  Time:    10:28                                                                                                                                                                                     Assessment:  1. Status post total replacement of right shoulder      Plan:  - Discussed adding light resistance strengthening exercises and continuing ROM exercises.   - Follow-up in three months with a new x-ray of the right shoulder       No follow-ups on file.

## 2023-12-18 NOTE — PROGRESS NOTES
"[facility]  Occupational Therapy Treatment Note     Date: 12/19/2023  Name: Brandan Werner  Clinic Number: [id number]    Therapy Diagnosis:   Encounter Diagnoses   Name Primary?    Pain of right upper extremity Yes    Weakness     Stiffness due to immobility      Physician: Gabe Manning MD    Physician Orders: OT evaluate and treat  Medical Diagnosis: Z96.611 (ICD-10-CM) - Status post total shoulder replacement, right  Surgical Procedure and Date: Right TSA, 9/14/2023  Evaluation Date: 10/24/2023  Insurance Authorization Period Expiration: 12/31/2023  Plan of Care Expiration: 1/19/2024  Date of Return to MD: 12/15/2023  Visit # / Visits authorized: 5/ 1  FOTO:2/3     Precautions:  Patient will advance his range of motion to include full passive elevation and active assisted elevation.  He will continue to restrict external rotation in neutral.  He will follow up in 6 weeks with a new x-ray of the right shoulder.      Time In: 2:15  Time Out: 2:50  Total Appointment Time (timed & untimed codes): 35 minutes    Subjective     Pt reports: "I was stuck in traffic." Patient's 30 minutes late for session  he was compliant with home exercise program given last session.   Response to previous treatment:low pain  Functional change: tolerated progression of treatment well, improved range of motion     Pain: 1/10  Location: right shoulder      Objective     Objective Measures updated at progress report unless specified.   Sensation Test: Patient denies any numbness/tingling     Observation/Inspection:rounded shoulders, forward head     Range of Motion/Strength:   Shoulder Left   Right   Pain/Dysfunction with Movement     AROM MMT AROM PROM     Flexion 120 5/5 90(=) 115(=)     Extension 55 5/5 45(+15) NT     Abduction 110 5/5 85(=) 115(=)     HorizAdduction 20 5/5 20(+10) NT     Internal rotation L1 5/5 To stomach To stomach     ER at 90° abd Back of head 5/5 NT NT     ER at 0° abd 75 5/5 " 35(+15) 45(+25)     NT=Not tested  ROM Comments: Pain at end range, firm at end feel     Painful Arc: none noted     Tenderness upon Palpation:      Positive: incision     Special Tests:deferred     Scapular Control/Dyskinesis:     Normal / Subtle / Obvious  Comments    Left  subtle -    Right  subtle -      Intake Outcome Measure for FOTO Shoulder Survey     Therapist reviewed FOTO scores for Brandan Werner on 10/24/2023.   FOTO documents entered into [employer] - see Media section.     Intake Score: 52%  3rd visit:41%        Treatment   Pt 12 +weeks post op with sling discharged    Brandan received the treatments listed below:     Manual therapy techniques: Soft tissue Mobilization were applied to the: right shoulder incision for 10 minutes, including:  -scar mobilization    Therapeutic exercises to develop ROM for 15 minutes, including:  -Scifit UBE forward/reverse Level 1 3 minutes each direction  -passive range of motion shoulder 10x  -supine dowel chest press 1# 2/15  -supine dowel flexion 1# 2/15  -sidelying abduction 2/15  -sidelying external rotation to neutral 2/15  -wall slides 2/15  -pulleys 3 minutes     Neuromuscular re-education activities to improve Posture for 10 minutes. The following activities were included:  -yellow band extension pulls 2/15  -yellow band rows 2/15    Patient Education and Home Exercises     Education provided:   - on current condition and issued updated home exercise program   - Progress towards goals     Written Home Exercises Provided: Patient instructed to cont prior HEP.  Exercises were reviewed and Brandan was able to demonstrate them prior to the end of the session.  Brandna demonstrated good  understanding of the HEP provided.     See EMR under Patient Instructions for exercises provided prior visit.      Assessment   Patient with good participation this date. He continues to have soft tissue restrictions however responds well to manual.  Pain report remains low in sessions.  Range of motion steadily improving. Pt able to complete all exercises in a pain free range of motion and good technique. Pt meeting post op landmarks.     Brandan is progressing well towards his goals and there are no updates to goals at this time. Pt prognosis is Good.     Pt will continue to benefit from skilled outpatient occupational therapy to address the deficits listed in the problem list on initial evaluation provide pt/family education and to maximize pt's level of independence in the home and community environment.     Anticipated barriers to occupational therapy: none    Pt's spiritual, cultural and educational needs considered and pt agreeable to plan of care and goals.    Goals:  Short Term Goals to be met in 4 weeks: (11/23/2023)  1) Initiate Hep -met, ongoing  2) Pt will increase Right shoulder PROM by 10 degrees grossly for improved performance with overhead activities of daily living's -met  3) Pt will report 2/10 pain in (Right)shoulder at worst -met  4) Pt will progress to active assisted range of motion exercises -met  5) Patient will be able to achieve less than or equal to 45% on FOTO shoulder survey demonstrating overall improved functional ability with upper extremity. -met     Long Term Goals to be met by discharge:  1) Independent with home exercise program -Not met, progressing  2) Pt will demonstrate (Right) shoulder AROM WFL grossly for [place] with activities of daily living's -Not met, progressing  3) Pt will demonstrate (Right) shoulder MMT WFL grossly for [place] with functional activities -Not met, progressing  4) Independent and pain free with ADL's and IADL's -Not met, progressing  5) Patient will be able to achieve less than or equal to 25% on FOTO shoulder survey demonstrating overall improved functional ability with upper extremity. -Not met, progressing     Plan     Continue with OT plan of care   Updates/Grading for next session: progress as able    Theodora Webb  LOTR

## 2023-12-19 ENCOUNTER — CLINICAL SUPPORT (OUTPATIENT)
Dept: REHABILITATION | Facility: HOSPITAL | Age: 78
End: 2023-12-19
Payer: OTHER GOVERNMENT

## 2023-12-19 DIAGNOSIS — M25.60 STIFFNESS DUE TO IMMOBILITY: ICD-10-CM

## 2023-12-19 DIAGNOSIS — R53.1 WEAKNESS: ICD-10-CM

## 2023-12-19 DIAGNOSIS — Z74.09 STIFFNESS DUE TO IMMOBILITY: ICD-10-CM

## 2023-12-19 DIAGNOSIS — M79.601 PAIN OF RIGHT UPPER EXTREMITY: Primary | ICD-10-CM

## 2023-12-19 PROCEDURE — 97110 THERAPEUTIC EXERCISES: CPT | Mod: PN

## 2023-12-19 PROCEDURE — 97112 NEUROMUSCULAR REEDUCATION: CPT | Mod: PN

## 2023-12-19 PROCEDURE — 97140 MANUAL THERAPY 1/> REGIONS: CPT | Mod: PN

## 2023-12-26 ENCOUNTER — CLINICAL SUPPORT (OUTPATIENT)
Dept: REHABILITATION | Facility: HOSPITAL | Age: 78
End: 2023-12-26
Payer: OTHER GOVERNMENT

## 2023-12-26 DIAGNOSIS — R53.1 WEAKNESS: ICD-10-CM

## 2023-12-26 DIAGNOSIS — M25.60 STIFFNESS DUE TO IMMOBILITY: ICD-10-CM

## 2023-12-26 DIAGNOSIS — M79.601 PAIN OF RIGHT UPPER EXTREMITY: Primary | ICD-10-CM

## 2023-12-26 DIAGNOSIS — Z74.09 STIFFNESS DUE TO IMMOBILITY: ICD-10-CM

## 2023-12-26 PROCEDURE — 97140 MANUAL THERAPY 1/> REGIONS: CPT | Mod: PN

## 2023-12-26 PROCEDURE — 97112 NEUROMUSCULAR REEDUCATION: CPT | Mod: PN

## 2023-12-26 PROCEDURE — 97110 THERAPEUTIC EXERCISES: CPT | Mod: PN

## 2023-12-26 NOTE — PROGRESS NOTES
"[facility]  Occupational Therapy Treatment Note     Date: 12/26/2023  Name: Brandan Werner  Clinic Number: [id number]    Therapy Diagnosis:   Encounter Diagnoses   Name Primary?    Pain of right upper extremity Yes    Weakness     Stiffness due to immobility      Physician: Gabe Manning MD    Physician Orders: OT evaluate and treat  Medical Diagnosis: Z96.611 (ICD-10-CM) - Status post total shoulder replacement, right  Surgical Procedure and Date: Right TSA, 9/14/2023  Evaluation Date: 10/24/2023  Insurance Authorization Period Expiration: 12/31/2023  Plan of Care Expiration: 1/19/2024  Date of Return to MD: none scheduled   Visit # / Visits authorized: 6/ 1  FOTO:2/3     Precautions:  Patient will advance his range of motion to include full passive elevation and active assisted elevation.  He will continue to restrict external rotation in neutral.  He will follow up in 6 weeks with a new x-ray of the right shoulder.      Time In: 1:45  Time Out: 2:30  Total Appointment Time (timed & untimed codes): 35 minutes    Subjective     Pt reports: "This cold weather makes my arm ache."  he was compliant with home exercise program given last session.   Response to previous treatment:low pain  Functional change: tolerated progression of treatment well, improved range of motion     Pain: 2/10  Location: right shoulder      Objective     Objective Measures updated at progress report unless specified.   Sensation Test: Patient denies any numbness/tingling     Observation/Inspection:rounded shoulders, forward head     Range of Motion/Strength:   Shoulder Left   Right   Pain/Dysfunction with Movement     AROM MMT AROM PROM     Flexion 120 5/5 90(=) 120(+5)     Extension 55 5/5 45(+15) NT     Abduction 110 5/5 85(=) 115(=)     HorizAdduction 20 5/5 20(+10) NT     Internal rotation L1 5/5 To stomach 55(+55)     ER at 90° abd Back of head 5/5 NT 60(+60)     ER at 0° abd 75 5/5 35(+15) 45(=)   "   NT=Not tested  ROM Comments: Pain at end range, firm at end feel     Painful Arc: none noted     Tenderness upon Palpation:      Positive: incision     Special Tests:deferred     Scapular Control/Dyskinesis:     Normal / Subtle / Obvious  Comments    Left  subtle -    Right  subtle -      Intake Outcome Measure for FOTO Shoulder Survey     Therapist reviewed FOTO scores for Brandan Werner on 10/24/2023.   FOTO documents entered into [employer] - see Media section.     Intake Score: 52%  3rd visit:41%        Treatment   Pt 12 +weeks post op with sling discharged    Brandan received the treatments listed below:     Manual therapy techniques: Soft tissue Mobilization were applied to the: right shoulder incision for 10 minutes, including:  -scar mobilization    Therapeutic exercises to develop ROM for 15 minutes, including:  -Scifit UBE forward/reverse Level 1 3 minutes each direction  -passive range of motion shoulder 10x  -supine dowel chest press 1# 2/15  -supine dowel flexion 1# 2/15  -sidelying abduction 2/15  -sidelying external rotation to neutral 2/15  -wall slides 2/15  -pulleys 3 minutes     Neuromuscular re-education activities to improve Posture for 10 minutes. The following activities were included:  -yellow band extension pulls 2/15  -yellow band rows 2/15    Patient Education and Home Exercises     Education provided:   - on current condition and issued updated home exercise program   - Progress towards goals     Written Home Exercises Provided: Patient instructed to cont prior HEP.  Exercises were reviewed and Brandan was able to demonstrate them prior to the end of the session.  Brandan demonstrated good  understanding of the HEP provided.     See EMR under Patient Instructions for exercises provided prior visit.      Assessment   Patient with good participation this date. He continues to have soft tissue restrictions however responds well to manual and passive range of motion.  Pain report remains low in  sessions. Range of motion steadily improving. Pt able to complete all exercises in a pain free range of motion and good technique. Pt meeting post op landmarks.     Brandan is progressing well towards his goals and there are no updates to goals at this time. Pt prognosis is Good.     Pt will continue to benefit from skilled outpatient occupational therapy to address the deficits listed in the problem list on initial evaluation provide pt/family education and to maximize pt's level of independence in the home and community environment.     Anticipated barriers to occupational therapy: none    Pt's spiritual, cultural and educational needs considered and pt agreeable to plan of care and goals.    Goals:  Short Term Goals to be met in 4 weeks: (11/23/2023)  1) Initiate Hep -met, ongoing  2) Pt will increase Right shoulder PROM by 10 degrees grossly for improved performance with overhead activities of daily living's -met  3) Pt will report 2/10 pain in (Right)shoulder at worst -met  4) Pt will progress to active assisted range of motion exercises -met  5) Patient will be able to achieve less than or equal to 45% on FOTO shoulder survey demonstrating overall improved functional ability with upper extremity. -met     Long Term Goals to be met by discharge:  1) Independent with home exercise program -Not met, progressing  2) Pt will demonstrate (Right) shoulder AROM WFL grossly for [place] with activities of daily living's -Not met, progressing  3) Pt will demonstrate (Right) shoulder MMT WFL grossly for [place] with functional activities -Not met, progressing  4) Independent and pain free with ADL's and IADL's -Not met, progressing  5) Patient will be able to achieve less than or equal to 25% on FOTO shoulder survey demonstrating overall improved functional ability with upper extremity. -Not met, progressing     Plan     Continue with OT plan of care   Updates/Grading for next session: progress as  able    SRIDHAR Vinson

## 2024-01-03 NOTE — PROGRESS NOTES
"[facility]  Occupational Therapy Treatment Note     Date: 1/4/2024  Name: Brandan Werner  Clinic Number: [id number]    Therapy Diagnosis:   Encounter Diagnoses   Name Primary?    Pain of right upper extremity Yes    Weakness     Stiffness due to immobility      Physician: Gabe Manning MD    Physician Orders: OT evaluate and treat  Medical Diagnosis: Z96.611 (ICD-10-CM) - Status post total shoulder replacement, right  Surgical Procedure and Date: Right TSA, 9/14/2023  Evaluation Date: 10/24/2023  Insurance Authorization Period Expiration: 12/31/2023  Plan of Care Expiration: 1/19/2024  Date of Return to MD: none scheduled   Visit # / Visits authorized: 6/ 1  FOTO:2/3     Precautions:  Patient will advance his range of motion to include full passive elevation and active assisted elevation.  He will continue to restrict external rotation in neutral.  He will follow up in 6 weeks with a new x-ray of the right shoulder.      Time In: 1:45  Time Out: 2:30  Total Appointment Time (timed & untimed codes): 35 minutes    Subjective     Pt reports: "This weather still makes my arm ache."  he was compliant with home exercise program given last session.   Response to previous treatment:low pain  Functional change: tolerated progression of treatment well, improved range of motion     Pain: 2/10  Location: right shoulder      Objective     Objective Measures updated at progress report unless specified.   Sensation Test: Patient denies any numbness/tingling     Observation/Inspection:rounded shoulders, forward head     Range of Motion/Strength:   Shoulder Left   Right   Pain/Dysfunction with Movement     AROM MMT AROM PROM     Flexion 120 5/5 90(=) 120(=)     Extension 55 5/5 45(+15) NT     Abduction 110 5/5 85(=) 115(=)     HorizAdduction 20 5/5 20(+10) NT     Internal rotation L1 5/5 To stomach 55(=)     ER at 90° abd Back of head 5/5 NT 60(=)     ER at 0° abd 75 5/5 35(+15) 55(+10)     NT=Not " tested  ROM Comments: Pain at end range, firm at end feel     Painful Arc: none noted     Tenderness upon Palpation:      Positive: incision     Special Tests:deferred     Scapular Control/Dyskinesis:     Normal / Subtle / Obvious  Comments    Left  subtle -    Right  subtle -      Intake Outcome Measure for FOTO Shoulder Survey     Therapist reviewed FOTO scores for Brandan Werner on 10/24/2023.   FOTO documents entered into [employer] - see Media section.     Intake Score: 52%  3rd visit:41%        Treatment   Pt 12 +weeks post op with sling discharged    Brandan received the treatments listed below:     Manual therapy techniques: Soft tissue Mobilization were applied to the: right shoulder incision for 10 minutes, including:  -scar mobilization    Therapeutic exercises to develop ROM for 25 minutes, including:  -Scifit UBE forward/reverse Level 1 3 minutes each direction  -passive range of motion shoulder 10x  -supine dowel chest press 1# 2/15  -supine dowel flexion 1# 2/15  -sidelying abduction 1# 2/15  -sidelying external rotation to neutral 1# 2/15  -wall slides 2/15  -pulleys 3 minutes     Neuromuscular re-education activities to improve Posture for 10 minutes. The following activities were included:  -red band extension pulls 2/15  -red band rows 2/15    Patient Education and Home Exercises     Education provided:   - on current condition and issued updated home exercise program   - Progress towards goals     Written Home Exercises Provided: Patient instructed to cont prior HEP.  Exercises were reviewed and Brandan was able to demonstrate them prior to the end of the session.  Brandan demonstrated good  understanding of the HEP provided.     See EMR under Patient Instructions for exercises provided prior visit.      Assessment   Patient with good participation this date. He continues to have soft tissue restrictions however responds well to manual and passive range of motion.  Pain report remains low in sessions.  No change noted in range of motion since last week. Pt able to complete all exercises in a pain free range of motion and good technique. Pt meeting post op landmarks.     Brandan is progressing well towards his goals and there are no updates to goals at this time. Pt prognosis is Good.     Pt will continue to benefit from skilled outpatient occupational therapy to address the deficits listed in the problem list on initial evaluation provide pt/family education and to maximize pt's level of independence in the home and community environment.     Anticipated barriers to occupational therapy: none    Pt's spiritual, cultural and educational needs considered and pt agreeable to plan of care and goals.    Goals:  Short Term Goals to be met in 4 weeks: (11/23/2023)  1) Initiate Hep -met, ongoing  2) Pt will increase Right shoulder PROM by 10 degrees grossly for improved performance with overhead activities of daily living's -met  3) Pt will report 2/10 pain in (Right)shoulder at worst -met  4) Pt will progress to active assisted range of motion exercises -met  5) Patient will be able to achieve less than or equal to 45% on FOTO shoulder survey demonstrating overall improved functional ability with upper extremity. -met     Long Term Goals to be met by discharge:  1) Independent with home exercise program -Not met, progressing  2) Pt will demonstrate (Right) shoulder AROM WFL grossly for [place] with activities of daily living's -Not met, progressing  3) Pt will demonstrate (Right) shoulder MMT WFL grossly for [place] with functional activities -Not met, progressing  4) Independent and pain free with ADL's and IADL's -Not met, progressing  5) Patient will be able to achieve less than or equal to 25% on FOTO shoulder survey demonstrating overall improved functional ability with upper extremity. -Not met, progressing     Plan     Continue with OT plan of care   Updates/Grading for next session: progress as  able    SRIDHAR Vinson

## 2024-01-04 ENCOUNTER — CLINICAL SUPPORT (OUTPATIENT)
Dept: REHABILITATION | Facility: HOSPITAL | Age: 79
End: 2024-01-04
Payer: OTHER GOVERNMENT

## 2024-01-04 DIAGNOSIS — Z74.09 STIFFNESS DUE TO IMMOBILITY: ICD-10-CM

## 2024-01-04 DIAGNOSIS — M25.60 STIFFNESS DUE TO IMMOBILITY: ICD-10-CM

## 2024-01-04 DIAGNOSIS — R53.1 WEAKNESS: ICD-10-CM

## 2024-01-04 DIAGNOSIS — M79.601 PAIN OF RIGHT UPPER EXTREMITY: Primary | ICD-10-CM

## 2024-01-04 PROCEDURE — 97140 MANUAL THERAPY 1/> REGIONS: CPT | Mod: PN

## 2024-01-04 PROCEDURE — 97110 THERAPEUTIC EXERCISES: CPT | Mod: PN

## 2024-01-04 PROCEDURE — 97112 NEUROMUSCULAR REEDUCATION: CPT | Mod: PN

## 2024-01-08 NOTE — PROGRESS NOTES
"[facility]  Occupational Therapy Treatment Note     Date: 1/9/2024  Name: Brandan Werner  Clinic Number: [id number]    Therapy Diagnosis:   Encounter Diagnoses   Name Primary?    Pain of right upper extremity Yes    Weakness     Stiffness due to immobility      Physician: Gabe Manning MD    Physician Orders: OT evaluate and treat  Medical Diagnosis: Z96.611 (ICD-10-CM) - Status post total shoulder replacement, right  Surgical Procedure and Date: Right TSA, 9/14/2023  Evaluation Date: 10/24/2023  Insurance Authorization Period Expiration: 12/31/2023  Plan of Care Expiration: 1/19/2024  Date of Return to MD: none scheduled   Visit # / Visits authorized: 7(+1)/ 1  FOTO:2/3     Precautions:  Patient will advance his range of motion to include full passive elevation and active assisted elevation.  He will continue to restrict external rotation in neutral.  He will follow up in 6 weeks with a new x-ray of the right shoulder.      Time In: 1:00  Time Out: 1:45  Total Appointment Time (timed & untimed codes): 35 minutes    Subjective     Pt reports: "This weather still makes my arm ache."  he was compliant with home exercise program given last session.   Response to previous treatment:low pain  Functional change: tolerated progression of treatment well, improved range of motion     Pain: 2/10  Location: right shoulder      Objective     Objective Measures updated at progress report unless specified.   Sensation Test: Patient denies any numbness/tingling     Observation/Inspection:rounded shoulders, forward head     Range of Motion/Strength:   Shoulder Left   Right   Pain/Dysfunction with Movement     AROM MMT AROM PROM     Flexion 120 5/5 90(=) 120(=)     Extension 55 5/5 45(+15) NT     Abduction 110 5/5 85(=) 115(=)     HorizAdduction 20 5/5 20(+10) NT     Internal rotation L1 5/5 To stomach 55(=)     ER at 90° abd Back of head 5/5 NT 60(=)     ER at 0° abd 75 5/5 35(+15) 55(=)     NT=Not " tested  ROM Comments: Pain at end range, firm at end feel     Painful Arc: none noted     Tenderness upon Palpation:      Positive: incision     Special Tests:deferred     Scapular Control/Dyskinesis:     Normal / Subtle / Obvious  Comments    Left  subtle -    Right  subtle -      Intake Outcome Measure for FOTO Shoulder Survey     Therapist reviewed FOTO scores for Brandan Werner on 10/24/2023.   FOTO documents entered into [employer] - see Media section.     Intake Score: 52%  3rd visit:41%        Treatment   Pt 12 +weeks post op with sling discharged    Brandan received the treatments listed below:     Manual therapy techniques: Soft tissue Mobilization were applied to the: right shoulder incision for 10 minutes, including:  -scar mobilization    Therapeutic exercises to develop ROM for 25 minutes, including:  -Scifit UBE forward/reverse Level 1 3 minutes each direction  -passive range of motion shoulder 10x  -supine dowel chest press 2# 2/15  -supine dowel flexion 2# 2/15  -sidelying abduction 1# 2/15  -sidelying external rotation to neutral 1# 2/15  -wall slides 2/15  -pulleys 3 minutes   -landmines 2# 2/15    Neuromuscular re-education activities to improve Posture for 10 minutes. The following activities were included:  -Green band extension pulls 2/15  -Green band rows 2/15    Patient Education and Home Exercises     Education provided:   - on current condition and issued updated home exercise program   - Progress towards goals     Written Home Exercises Provided: Patient instructed to cont prior HEP.  Exercises were reviewed and Brandan was able to demonstrate them prior to the end of the session.  Brandan demonstrated good  understanding of the HEP provided.     See EMR under Patient Instructions for exercises provided prior visit.      Assessment   Patient with good participation this date. Pain report remains low. He continues to have soft tissue restrictions however responds well to manual and passive range  of motion.  Pt able to complete all exercises in a pain free range of motion and good technique. Tolerated progression of treatment well with patient able to increase weight with exercises without difficulty. Pt meeting post op landmarks.     Brandan is progressing well towards his goals and there are no updates to goals at this time. Pt prognosis is Good.     Pt will continue to benefit from skilled outpatient occupational therapy to address the deficits listed in the problem list on initial evaluation provide pt/family education and to maximize pt's level of independence in the home and community environment.     Anticipated barriers to occupational therapy: none    Pt's spiritual, cultural and educational needs considered and pt agreeable to plan of care and goals.    Goals:  Short Term Goals to be met in 4 weeks: (11/23/2023)  1) Initiate Hep -met, ongoing  2) Pt will increase Right shoulder PROM by 10 degrees grossly for improved performance with overhead activities of daily living's -met  3) Pt will report 2/10 pain in (Right)shoulder at worst -met  4) Pt will progress to active assisted range of motion exercises -met  5) Patient will be able to achieve less than or equal to 45% on FOTO shoulder survey demonstrating overall improved functional ability with upper extremity. -met     Long Term Goals to be met by discharge:  1) Independent with home exercise program -Not met, progressing  2) Pt will demonstrate (Right) shoulder AROM WFL grossly for [place] with activities of daily living's -Not met, progressing  3) Pt will demonstrate (Right) shoulder MMT WFL grossly for [place] with functional activities -Not met, progressing  4) Independent and pain free with ADL's and IADL's -Not met, progressing  5) Patient will be able to achieve less than or equal to 25% on FOTO shoulder survey demonstrating overall improved functional ability with upper extremity. -Not met, progressing     Plan     Continue with  OT plan of care   Updates/Grading for next session: progress as able    SRIDHAR Vinson

## 2024-01-09 ENCOUNTER — CLINICAL SUPPORT (OUTPATIENT)
Dept: REHABILITATION | Facility: HOSPITAL | Age: 79
End: 2024-01-09
Payer: OTHER GOVERNMENT

## 2024-01-09 DIAGNOSIS — Z74.09 STIFFNESS DUE TO IMMOBILITY: ICD-10-CM

## 2024-01-09 DIAGNOSIS — R53.1 WEAKNESS: ICD-10-CM

## 2024-01-09 DIAGNOSIS — M25.60 STIFFNESS DUE TO IMMOBILITY: ICD-10-CM

## 2024-01-09 DIAGNOSIS — M79.601 PAIN OF RIGHT UPPER EXTREMITY: Primary | ICD-10-CM

## 2024-01-09 PROCEDURE — 97110 THERAPEUTIC EXERCISES: CPT | Mod: PN

## 2024-01-09 PROCEDURE — 97112 NEUROMUSCULAR REEDUCATION: CPT | Mod: PN

## 2024-01-09 PROCEDURE — 97140 MANUAL THERAPY 1/> REGIONS: CPT | Mod: PN

## 2024-01-16 ENCOUNTER — CLINICAL SUPPORT (OUTPATIENT)
Dept: REHABILITATION | Facility: HOSPITAL | Age: 79
End: 2024-01-16
Payer: OTHER GOVERNMENT

## 2024-01-16 DIAGNOSIS — R53.1 WEAKNESS: ICD-10-CM

## 2024-01-16 DIAGNOSIS — M79.601 PAIN OF RIGHT UPPER EXTREMITY: Primary | ICD-10-CM

## 2024-01-16 DIAGNOSIS — Z74.09 STIFFNESS DUE TO IMMOBILITY: ICD-10-CM

## 2024-01-16 DIAGNOSIS — M25.60 STIFFNESS DUE TO IMMOBILITY: ICD-10-CM

## 2024-01-16 PROCEDURE — 97110 THERAPEUTIC EXERCISES: CPT | Mod: PN

## 2024-01-16 PROCEDURE — 97112 NEUROMUSCULAR REEDUCATION: CPT | Mod: PN

## 2024-01-16 PROCEDURE — 97140 MANUAL THERAPY 1/> REGIONS: CPT | Mod: PN

## 2024-01-16 NOTE — PROGRESS NOTES
"[facility]  Occupational Therapy Treatment Note     Date: 1/16/2024  Name: Brandan Werner  Clinic Number: [id number]    Therapy Diagnosis:   Encounter Diagnoses   Name Primary?    Pain of right upper extremity Yes    Weakness     Stiffness due to immobility      Physician: Gabe Manning MD    Physician Orders: OT evaluate and treat  Medical Diagnosis: Z96.611 (ICD-10-CM) - Status post total shoulder replacement, right  Surgical Procedure and Date: Right TSA, 9/14/2023  Evaluation Date: 10/24/2023  Insurance Authorization Period Expiration: 12/31/2023  Plan of Care Expiration: 1/19/2024  Date of Return to MD: none scheduled   Visit # / Visits authorized: 8(+1)/ 1  FOTO:2/3     Precautions:  Patient will advance his range of motion to include full passive elevation and active assisted elevation.  He will continue to restrict external rotation in neutral.  He will follow up in 6 weeks with a new x-ray of the right shoulder.      Time In: 12:15  Time Out: 1:00  Total Appointment Time (timed & untimed codes): 45 minutes    Subjective     Pt reports: "will this pain ever go away."  he was compliant with home exercise program given last session.   Response to previous treatment:low pain  Functional change:good endurance with exercises    Pain: 2/10  Location: right shoulder      Objective     Objective Measures updated at progress report unless specified.   Sensation Test: Patient denies any numbness/tingling     Observation/Inspection:rounded shoulders, forward head     Range of Motion/Strength:   Shoulder Left   Right   Pain/Dysfunction with Movement     AROM MMT AROM PROM     Flexion 120 5/5 90(=) 120(=)     Extension 55 5/5 45(=) NT     Abduction 110 5/5 85(=) 115(=)     HorizAdduction 20 5/5 20(=) NT     Internal rotation L1 5/5 To stomach 55(=)     ER at 90° abd Back of head 5/5 NT 60(=)     ER at 0° abd 75 5/5 35(=) 55(=)     NT=Not tested  ROM Comments: Pain at end range, firm at " end feel     Painful Arc: none noted     Tenderness upon Palpation:      Positive: incision     Special Tests:deferred     Scapular Control/Dyskinesis:     Normal / Subtle / Obvious  Comments    Left  subtle -    Right  subtle -      Intake Outcome Measure for FOTO Shoulder Survey     Therapist reviewed FOTO scores for Brandan Werner on 10/24/2023.   FOTO documents entered into [employer] - see Media section.     Intake Score: 52%  3rd visit:41%        Treatment   Pt 12 +weeks post op with sling discharged    Brandan received the treatments listed below:     Manual therapy techniques: Soft tissue Mobilization were applied to the: right shoulder incision for 10 minutes, including:  -scar mobilization    Therapeutic exercises to develop ROM for 25 minutes, including:  -Scifit UBE forward/reverse Level 1 3 minutes each direction  -passive range of motion shoulder 10x  -supine dowel chest press 2# 2/15  -supine dowel flexion 2# 2/15  -sidelying abduction 1# 2/15  -sidelying external rotation to neutral 1# 2/15  -wall slides 2/15  -pulleys 3 minutes   -landmines 2# 2/15    Neuromuscular re-education activities to improve Posture for 10 minutes. The following activities were included:  -Green band extension pulls 2/15  -Green band rows 2/15    Patient Education and Home Exercises     Education provided:   - on current condition and issued updated home exercise program   - Progress towards goals     Written Home Exercises Provided: Patient instructed to cont prior HEP.  Exercises were reviewed and Brandan was able to demonstrate them prior to the end of the session.  Barndan demonstrated good  understanding of the HEP provided.     See EMR under Patient Instructions for exercises provided prior visit.      Assessment   Patient continues with good participation in sessions. Pain report remains low each week. He continues to have soft tissue restrictions and end range stiffness however responds well to manual and passive range of  motion.  Pt able to complete all exercises in a pain free range of motion and good technique.  Pt meeting post op landmarks.     Brandan is progressing well towards his goals and there are no updates to goals at this time. Pt prognosis is Good.     Pt will continue to benefit from skilled outpatient occupational therapy to address the deficits listed in the problem list on initial evaluation provide pt/family education and to maximize pt's level of independence in the home and community environment.     Anticipated barriers to occupational therapy: none    Pt's spiritual, cultural and educational needs considered and pt agreeable to plan of care and goals.    Goals:  Short Term Goals to be met in 4 weeks: (11/23/2023)  1) Initiate Hep -met, ongoing  2) Pt will increase Right shoulder PROM by 10 degrees grossly for improved performance with overhead activities of daily living's -met  3) Pt will report 2/10 pain in (Right)shoulder at worst -met  4) Pt will progress to active assisted range of motion exercises -met  5) Patient will be able to achieve less than or equal to 45% on FOTO shoulder survey demonstrating overall improved functional ability with upper extremity. -met     Long Term Goals to be met by discharge:  1) Independent with home exercise program -Not met, progressing  2) Pt will demonstrate (Right) shoulder AROM WFL grossly for [place] with activities of daily living's -Not met, progressing  3) Pt will demonstrate (Right) shoulder MMT WFL grossly for [place] with functional activities -Not met, progressing  4) Independent and pain free with ADL's and IADL's -Not met, progressing  5) Patient will be able to achieve less than or equal to 25% on FOTO shoulder survey demonstrating overall improved functional ability with upper extremity. -Not met, progressing     Plan     Continue with OT plan of care   Updates/Grading for next session: progress as able    SRIDHAR Vinson

## 2024-01-22 NOTE — PROGRESS NOTES
"[facility]  Occupational Therapy Treatment Note     Date: 1/23/2024  Name: Brandan Werner  Clinic Number: [id number]    Therapy Diagnosis:   Encounter Diagnoses   Name Primary?    Pain of right upper extremity Yes    Weakness     Stiffness due to immobility        Physician: Gabe Manning MD    Physician Orders: OT evaluate and treat  Medical Diagnosis: Z96.611 (ICD-10-CM) - Status post total shoulder replacement, right  Surgical Procedure and Date: Right TSA, 9/14/2023  Evaluation Date: 10/24/2023  Insurance Authorization Period Expiration: 12/31/2023  Plan of Care Expiration: 4/19/2024  Date of Return to MD: none scheduled   Visit # / Visits authorized: 9(+1)/ 1  FOTO:3/3     Precautions:  Patient will advance his range of motion to include full passive elevation and active assisted elevation.  He will continue to restrict external rotation in neutral.  He will follow up in 6 weeks with a new x-ray of the right shoulder.      Time In: 1:00  Time Out: 1:45  Total Appointment Time (timed & untimed codes): 45 minutes    Subjective     Pt reports: "I still have some pain with this weather."  he was compliant with home exercise program given last session.   Response to previous treatment:low pain  Functional change:good endurance with exercises    Pain: 2/10  Location: right shoulder      Objective     Objective Measures updated at progress report unless specified.   Sensation Test: Patient denies any numbness/tingling     Observation/Inspection:rounded shoulders, forward head     Range of Motion/Strength:   Shoulder Left   Right   Pain/Dysfunction with Movement     AROM MMT AROM PROM     Flexion 120 5/5 90(=) 120(=)     Extension 55 5/5 50(+5) NT     Abduction 110 5/5 85(=) 115(=)     HorizAdduction 20 5/5 20(=) NT     Internal rotation L1 5/5 To stomach 55(=)     ER at 90° abd Back of head 5/5 NT 60(=)     ER at 0° abd 75 5/5 45(+10) 55(=)     NT=Not tested  ROM Comments: Pain at end " range, firm at end feel     Painful Arc: none noted     Tenderness upon Palpation:      Positive: incision     Special Tests:deferred     Scapular Control/Dyskinesis:     Normal / Subtle / Obvious  Comments    Left  subtle -    Right  subtle -      Intake Outcome Measure for FOTO Shoulder Survey     Therapist reviewed FOTO scores for Brandan Werner on 10/24/2023.   FOTO documents entered into [employer] - see Media section.     Intake Score: 52%  3rd visit:41%  10th:32%        Treatment   Pt 12 +weeks post op with sling discharged    Brandan received the treatments listed below:     Manual therapy techniques: Soft tissue Mobilization were applied to the: right shoulder incision for 10 minutes, including:  -scar mobilization    Therapeutic exercises to develop ROM for 25 minutes, including:  -Scifit UBE forward/reverse Level 1 3 minutes each direction  -passive range of motion shoulder 10x  -supine dowel chest press 3# 2/15  -supine dowel flexion 3# 2/15  -sidelying abduction 1# 2/15  -sidelying external rotation to neutral 1# 2/15  -wall slides 2/15  -pulleys 3 minutes   -landmines 2# 2/15    Neuromuscular re-education activities to improve Posture for 10 minutes. The following activities were included:  -Green band extension pulls 2/15  -Green band rows 2/15    Patient Education and Home Exercises     Education provided:   - on current condition and issued updated home exercise program   - Progress towards goals     Written Home Exercises Provided: Patient instructed to cont prior HEP.  Exercises were reviewed and Brandan was able to demonstrate them prior to the end of the session.  Brandan demonstrated good  understanding of the HEP provided.     See EMR under Patient Instructions for exercises provided prior visit.      Assessment     Brandan is progressing well towards his goals and there are no updates to goals at this time. Pt prognosis is Good.     Pt will continue to benefit from skilled outpatient occupational  therapy to address the deficits listed in the problem list on initial evaluation provide pt/family education and to maximize pt's level of independence in the home and community environment.     Anticipated barriers to occupational therapy: none    Pt's spiritual, cultural and educational needs considered and pt agreeable to plan of care and goals.    Goals:  Long Term Goals to be met by discharge:  1) Independent with home exercise program -Not met, progressing  2) Pt will demonstrate (Right) shoulder AROM WFL grossly for [place] with activities of daily living's -Not met, progressing  3) Pt will demonstrate (Right) shoulder MMT WFL grossly for [place] with functional activities -Not met, progressing  4) Independent and pain free with ADL's and IADL's -Not met, progressing  5) Patient will be able to achieve less than or equal to 25% on FOTO shoulder survey demonstrating overall improved functional ability with upper extremity. -Not met, progressing     Plan     Continue with OT plan of care   Updates/Grading for next session: progress as able    SRIDHAR Vinson

## 2024-01-23 ENCOUNTER — CLINICAL SUPPORT (OUTPATIENT)
Dept: REHABILITATION | Facility: HOSPITAL | Age: 79
End: 2024-01-23
Payer: OTHER GOVERNMENT

## 2024-01-23 DIAGNOSIS — M79.601 PAIN OF RIGHT UPPER EXTREMITY: Primary | ICD-10-CM

## 2024-01-23 DIAGNOSIS — Z74.09 STIFFNESS DUE TO IMMOBILITY: ICD-10-CM

## 2024-01-23 DIAGNOSIS — R53.1 WEAKNESS: ICD-10-CM

## 2024-01-23 DIAGNOSIS — M25.60 STIFFNESS DUE TO IMMOBILITY: ICD-10-CM

## 2024-01-23 PROCEDURE — 97110 THERAPEUTIC EXERCISES: CPT | Mod: PN

## 2024-01-23 PROCEDURE — 97140 MANUAL THERAPY 1/> REGIONS: CPT | Mod: PN

## 2024-01-23 PROCEDURE — 97112 NEUROMUSCULAR REEDUCATION: CPT | Mod: PN

## 2024-01-24 NOTE — PLAN OF CARE
"  [facility]   Plan of Care Note     Name: Brandan Werner  [facility] Number: [id number]    Therapy Diagnosis:   Encounter Diagnoses   Name Primary?    Pain of right upper extremity Yes    Weakness     Stiffness due to immobility      Physician: Gabe Manning MD    Visit Date: 1/23/2024  Physician Orders: OT evaluate and treat  Medical Diagnosis: Z96.611 (ICD-10-CM) - Status post total shoulder replacement, right  Surgical Procedure and Date: Right TSA, 9/14/2023  Evaluation Date: 10/24/2023  Insurance Authorization Period Expiration: 12/31/2023  Plan of Care Expiration: 4/19/2024  Date of Return to MD: none scheduled   Visit # / Visits authorized: 9(+1)/ 1  FOTO:3/3     Precautions:  Patient will advance his range of motion to include full passive elevation and active assisted elevation.  He will continue to restrict external rotation in neutral.  He will follow up in 6 weeks with a new x-ray of the right shoulder.      Time In: 12:15  Time Out: 1:00  Total Appointment Time (timed & untimed codes): 45 minutes      Subjective     Update: "I still have some pain with this weather."    Objective    Sensation Test: Patient denies any numbness/tingling     Observation/Inspection:rounded shoulders, forward head     Range of Motion/Strength:   Shoulder Left   Right   Pain/Dysfunction with Movement     AROM MMT AROM PROM     Flexion 120 5/5 90(=) 120(=)     Extension 55 5/5 50(+5) NT     Abduction 110 5/5 85(=) 115(=)     HorizAdduction 20 5/5 20(=) NT     Internal rotation L1 5/5 To stomach 55(=)     ER at 90° abd Back of head 5/5 NT 60(=)     ER at 0° abd 75 5/5 45(+10) 55(=)     NT=Not tested  ROM Comments: Pain at end range, firm at end feel     Painful Arc: none noted     Tenderness upon Palpation:      Positive: incision     Special Tests:deferred     Scapular Control/Dyskinesis:     Normal / Subtle / Obvious  Comments    Left  subtle -    Right  subtle -      Intake Outcome Measure for " FOTO Shoulder Survey     Therapist reviewed FOTO scores for Brandan Werner on 10/24/2023.   FOTO documents entered into [employer] - see Media section.     Intake Score: 52%  3rd visit:41%  10th:32%        Assessment     Update: Patient has attended OT x 10 visits over the course of 3 months for treatment right shoulder weakness, stiffness and pain s/p TSA. Patient initially with other complications which caused patient to cancel some appointments. Otherwise he has been very motivated and pleasant throughout course of care. He is demonstrating overall improvements with range of motion and pain since initial evaluation. He has an improved FOTO score indicating increased functional use right upper extremity with self care tasks. Patient's partially meeting goals. Patient still limited by weakness and some pain and would continue to benefit from skilled services to address remaining deficits.       Reasons for Recertification of Therapy:   update plan of care     GOALS  Short Term Goals to be met in 4 weeks: (11/23/2023)  1) Initiate Hep -met, ongoing  2) Pt will increase Right shoulder PROM by 10 degrees grossly for improved performance with overhead activities of daily living's -met  3) Pt will report 2/10 pain in (Right)shoulder at worst -met  4) Pt will progress to active assisted range of motion exercises -met  5) Patient will be able to achieve less than or equal to 45% on FOTO shoulder survey demonstrating overall improved functional ability with upper extremity. -met     Long Term Goals to be met by discharge:  1) Independent with home exercise program -Not met, progressing  2) Pt will demonstrate (Right) shoulder AROM WFL grossly for [place] with activities of daily living's -Not met, progressing  3) Pt will demonstrate (Right) shoulder MMT WFL grossly for [place] with functional activities -Not met, progressing  4) Independent and pain free with ADL's and IADL's -Not met, progressing  5) Patient will be  able to achieve less than or equal to 25% on FOTO shoulder survey demonstrating overall improved functional ability with upper extremity. -Not met, progressing    Plan     Updated Certification Period: 1/23/2024 to 4/19/2024   Recommended Treatment Plan: 1 times per week for 12 weeks:  Manual Therapy, Moist Heat/ Ice, Patient Education, Self Care, Therapeutic Activities, and Therapeutic Exercise      SRIDHAR Vinson    I certify the need for these services furnished under this plan of treatment and while under my care.        Gabe Manning MD, FAAOS  , Orthopaedic Sports Medicine  Residency   [facility] Department of Orthopaedic Surgery  Assistant Orthopaedic Surgeon, [place] [facility]  Head Team Physician, [place] [facility]

## 2024-01-29 ENCOUNTER — OFFICE VISIT (OUTPATIENT)
Dept: INTERNAL MEDICINE | Facility: CLINIC | Age: 79
End: 2024-01-29
Payer: OTHER GOVERNMENT

## 2024-01-29 ENCOUNTER — LAB VISIT (OUTPATIENT)
Dept: LAB | Facility: HOSPITAL | Age: 79
End: 2024-01-29
Payer: OTHER GOVERNMENT

## 2024-01-29 VITALS
DIASTOLIC BLOOD PRESSURE: 78 MMHG | HEIGHT: 72 IN | TEMPERATURE: 98 F | SYSTOLIC BLOOD PRESSURE: 116 MMHG | HEART RATE: 68 BPM | BODY MASS INDEX: 27.47 KG/M2 | RESPIRATION RATE: 16 BRPM | WEIGHT: 202.81 LBS

## 2024-01-29 DIAGNOSIS — I10 ESSENTIAL HYPERTENSION: Primary | ICD-10-CM

## 2024-01-29 DIAGNOSIS — I10 ESSENTIAL HYPERTENSION: ICD-10-CM

## 2024-01-29 DIAGNOSIS — G20.A1 PARKINSON'S DISEASE, UNSPECIFIED WHETHER DYSKINESIA PRESENT, UNSPECIFIED WHETHER MANIFESTATIONS FLUCTUATE: ICD-10-CM

## 2024-01-29 DIAGNOSIS — E78.49 OTHER HYPERLIPIDEMIA: ICD-10-CM

## 2024-01-29 DIAGNOSIS — R73.09 BLOOD GLUCOSE ABNORMAL: ICD-10-CM

## 2024-01-29 DIAGNOSIS — N40.0 BENIGN PROSTATIC HYPERPLASIA, UNSPECIFIED WHETHER LOWER URINARY TRACT SYMPTOMS PRESENT: ICD-10-CM

## 2024-01-29 DIAGNOSIS — R68.89 COLD INTOLERANCE: ICD-10-CM

## 2024-01-29 DIAGNOSIS — K63.5 POLYP OF COLON, UNSPECIFIED PART OF COLON, UNSPECIFIED TYPE: ICD-10-CM

## 2024-01-29 DIAGNOSIS — Z12.11 COLON CANCER SCREENING: ICD-10-CM

## 2024-01-29 LAB
ALBUMIN SERPL BCP-MCNC: 3.9 G/DL (ref 3.5–5.2)
ALP SERPL-CCNC: 44 U/L (ref 55–135)
ALT SERPL W/O P-5'-P-CCNC: <5 U/L (ref 10–44)
ANION GAP SERPL CALC-SCNC: 6 MMOL/L (ref 8–16)
AST SERPL-CCNC: 10 U/L (ref 10–40)
BASOPHILS # BLD AUTO: 0.05 K/UL (ref 0–0.2)
BASOPHILS NFR BLD: 0.6 % (ref 0–1.9)
BILIRUB SERPL-MCNC: 0.5 MG/DL (ref 0.1–1)
BUN SERPL-MCNC: 20 MG/DL (ref 8–23)
CALCIUM SERPL-MCNC: 9.4 MG/DL (ref 8.7–10.5)
CHLORIDE SERPL-SCNC: 105 MMOL/L (ref 95–110)
CHOLEST SERPL-MCNC: 156 MG/DL (ref 120–199)
CHOLEST/HDLC SERPL: 2.4 {RATIO} (ref 2–5)
CO2 SERPL-SCNC: 27 MMOL/L (ref 23–29)
CREAT SERPL-MCNC: 0.9 MG/DL (ref 0.5–1.4)
DIFFERENTIAL METHOD BLD: ABNORMAL
EOSINOPHIL # BLD AUTO: 0.1 K/UL (ref 0–0.5)
EOSINOPHIL NFR BLD: 1.5 % (ref 0–8)
ERYTHROCYTE [DISTWIDTH] IN BLOOD BY AUTOMATED COUNT: 13.2 % (ref 11.5–14.5)
EST. GFR  (NO RACE VARIABLE): >60 ML/MIN/1.73 M^2
ESTIMATED AVG GLUCOSE: 103 MG/DL (ref 68–131)
GLUCOSE SERPL-MCNC: 76 MG/DL (ref 70–110)
HBA1C MFR BLD: 5.2 % (ref 4–5.6)
HCT VFR BLD AUTO: 39.7 % (ref 40–54)
HDLC SERPL-MCNC: 66 MG/DL (ref 40–75)
HDLC SERPL: 42.3 % (ref 20–50)
HGB BLD-MCNC: 12.7 G/DL (ref 14–18)
IMM GRANULOCYTES # BLD AUTO: 0.02 K/UL (ref 0–0.04)
IMM GRANULOCYTES NFR BLD AUTO: 0.3 % (ref 0–0.5)
LDLC SERPL CALC-MCNC: 81.6 MG/DL (ref 63–159)
LYMPHOCYTES # BLD AUTO: 2.7 K/UL (ref 1–4.8)
LYMPHOCYTES NFR BLD: 34.4 % (ref 18–48)
MCH RBC QN AUTO: 29.8 PG (ref 27–31)
MCHC RBC AUTO-ENTMCNC: 32 G/DL (ref 32–36)
MCV RBC AUTO: 93 FL (ref 82–98)
MONOCYTES # BLD AUTO: 0.5 K/UL (ref 0.3–1)
MONOCYTES NFR BLD: 6.2 % (ref 4–15)
NEUTROPHILS # BLD AUTO: 4.5 K/UL (ref 1.8–7.7)
NEUTROPHILS NFR BLD: 57 % (ref 38–73)
NONHDLC SERPL-MCNC: 90 MG/DL
NRBC BLD-RTO: 0 /100 WBC
PLATELET # BLD AUTO: 197 K/UL (ref 150–450)
PMV BLD AUTO: 10.5 FL (ref 9.2–12.9)
POTASSIUM SERPL-SCNC: 4.5 MMOL/L (ref 3.5–5.1)
PROT SERPL-MCNC: 6.5 G/DL (ref 6–8.4)
RBC # BLD AUTO: 4.26 M/UL (ref 4.6–6.2)
SODIUM SERPL-SCNC: 138 MMOL/L (ref 136–145)
TRIGL SERPL-MCNC: 42 MG/DL (ref 30–150)
TSH SERPL DL<=0.005 MIU/L-ACNC: 1.32 UIU/ML (ref 0.4–4)
WBC # BLD AUTO: 7.88 K/UL (ref 3.9–12.7)

## 2024-01-29 PROCEDURE — 36415 COLL VENOUS BLD VENIPUNCTURE: CPT | Mod: PO | Performed by: INTERNAL MEDICINE

## 2024-01-29 PROCEDURE — 85025 COMPLETE CBC W/AUTO DIFF WBC: CPT | Performed by: INTERNAL MEDICINE

## 2024-01-29 PROCEDURE — 99999 PR PBB SHADOW E&M-EST. PATIENT-LVL V: CPT | Mod: PBBFAC,,, | Performed by: INTERNAL MEDICINE

## 2024-01-29 PROCEDURE — 84443 ASSAY THYROID STIM HORMONE: CPT | Performed by: INTERNAL MEDICINE

## 2024-01-29 PROCEDURE — 80061 LIPID PANEL: CPT | Performed by: INTERNAL MEDICINE

## 2024-01-29 PROCEDURE — 99214 OFFICE O/P EST MOD 30 MIN: CPT | Mod: S$PBB,,, | Performed by: INTERNAL MEDICINE

## 2024-01-29 PROCEDURE — 99215 OFFICE O/P EST HI 40 MIN: CPT | Mod: PBBFAC,PO | Performed by: INTERNAL MEDICINE

## 2024-01-29 PROCEDURE — 80053 COMPREHEN METABOLIC PANEL: CPT | Performed by: INTERNAL MEDICINE

## 2024-01-29 PROCEDURE — 83036 HEMOGLOBIN GLYCOSYLATED A1C: CPT | Performed by: INTERNAL MEDICINE

## 2024-01-29 NOTE — PROGRESS NOTES
"[facility]  Occupational Therapy Treatment Note     Date: 1/30/2024  Name: Brandan Werner  Clinic Number: [id number]    Therapy Diagnosis:   Encounter Diagnoses   Name Primary?    Pain of right upper extremity Yes    Weakness     Stiffness due to immobility      Physician: Gabe Manning MD    Physician Orders: OT evaluate and treat  Medical Diagnosis: Z96.611 (ICD-10-CM) - Status post total shoulder replacement, right  Surgical Procedure and Date: Right TSA, 9/14/2023  Evaluation Date: 10/24/2023  Insurance Authorization Period Expiration: 12/31/2023  Plan of Care Expiration: 4/19/2024  Date of Return to MD: none scheduled   Visit # / Visits authorized: 10(+1)/ 1  FOTO:3/3     Precautions:  Patient will advance his range of motion to include full passive elevation and active assisted elevation.  He will continue to restrict external rotation in neutral.  He will follow up in 6 weeks with a new x-ray of the right shoulder.      Time In: 12:45  Time Out: 1:30  Total Appointment Time (timed & untimed codes): 45 minutes    Subjective     Pt reports: "I still have some pain sometimes."  he was compliant with home exercise program given last session.   Response to previous treatment:low pain  Functional change:good endurance with exercises    Pain: 2/10  Location: right shoulder      Objective     Objective Measures updated at progress report unless specified.   Sensation Test: Patient denies any numbness/tingling     Observation/Inspection:rounded shoulders, forward head     Range of Motion/Strength:   Shoulder Left   Right   Pain/Dysfunction with Movement     AROM MMT AROM PROM     Flexion 120 5/5 90(=) 120(=)     Extension 55 5/5 50(+5) NT     Abduction 110 5/5 85(=) 115(=)     HorizAdduction 20 5/5 20(=) NT     Internal rotation L1 5/5 To stomach 55(=)     ER at 90° abd Back of head 5/5 NT 60(=)     ER at 0° abd 75 5/5 45(+10) 55(=)     NT=Not tested  ROM Comments: Pain at end range, " firm at end feel     Painful Arc: none noted     Tenderness upon Palpation:      Positive: incision     Special Tests:deferred     Scapular Control/Dyskinesis:     Normal / Subtle / Obvious  Comments    Left  subtle -    Right  subtle -      Intake Outcome Measure for FOTO Shoulder Survey     Therapist reviewed FOTO scores for Brandan Werner on 10/24/2023.   FOTO documents entered into [employer] - see Media section.     Intake Score: 52%  3rd visit:41%  10th:32%        Treatment   Pt 12 +weeks post op with sling discharged    Brandan received the treatments listed below:     Manual therapy techniques: Soft tissue Mobilization were applied to the: right shoulder incision for 10 minutes, including:  -scar mobilization    Therapeutic exercises to develop ROM for 25 minutes, including:  -Scifit UBE forward/reverse Level 1 3 minutes each direction  -passive range of motion shoulder 10x  -supine dowel chest press 4# 2/15  -supine dowel flexion 4# 2/15  -sidelying abduction 1# 2/15  -sidelying external rotation to neutral 1# 2/15  -wall slides 2/15  -pulleys 3 minutes   -landmines 4# 2/15    Neuromuscular re-education activities to improve Posture for 10 minutes. The following activities were included:  -Green band extension pulls 2/15  -Green band rows 2/15    Patient Education and Home Exercises     Education provided:   - on current condition and issued updated home exercise program   - Progress towards goals     Written Home Exercises Provided: Patient instructed to cont prior HEP.  Exercises were reviewed and Brandan was able to demonstrate them prior to the end of the session.  Brandan demonstrated good  understanding of the HEP provided.     See EMR under Patient Instructions for exercises provided prior visit.      Assessment   Patient with good participation this date. Pain report remains low in sessions. Patient continues to have end range stiffness however responds fairly to manual and progressive low load stretch.  Patient able to complete all exercises with fair technique and without complaints.   Brandan is progressing well towards his goals and there are no updates to goals at this time. Pt prognosis is Good.     Pt will continue to benefit from skilled outpatient occupational therapy to address the deficits listed in the problem list on initial evaluation provide pt/family education and to maximize pt's level of independence in the home and community environment.     Anticipated barriers to occupational therapy: none    Pt's spiritual, cultural and educational needs considered and pt agreeable to plan of care and goals.    Goals:  Long Term Goals to be met by discharge:  1) Independent with home exercise program -Not met, progressing  2) Pt will demonstrate (Right) shoulder AROM WFL grossly for [place] with activities of daily living's -Not met, progressing  3) Pt will demonstrate (Right) shoulder MMT WFL grossly for [place] with functional activities -Not met, progressing  4) Independent and pain free with ADL's and IADL's -Not met, progressing  5) Patient will be able to achieve less than or equal to 25% on FOTO shoulder survey demonstrating overall improved functional ability with upper extremity. -Not met, progressing     Plan     Continue with OT plan of care   Updates/Grading for next session: progress as able    SRIDHAR Vinson

## 2024-01-30 ENCOUNTER — CLINICAL SUPPORT (OUTPATIENT)
Dept: REHABILITATION | Facility: HOSPITAL | Age: 79
End: 2024-01-30
Payer: OTHER GOVERNMENT

## 2024-01-30 DIAGNOSIS — Z74.09 STIFFNESS DUE TO IMMOBILITY: ICD-10-CM

## 2024-01-30 DIAGNOSIS — M25.60 STIFFNESS DUE TO IMMOBILITY: ICD-10-CM

## 2024-01-30 DIAGNOSIS — R53.1 WEAKNESS: ICD-10-CM

## 2024-01-30 DIAGNOSIS — M79.601 PAIN OF RIGHT UPPER EXTREMITY: Primary | ICD-10-CM

## 2024-01-30 PROCEDURE — 97112 NEUROMUSCULAR REEDUCATION: CPT | Mod: PN

## 2024-01-30 PROCEDURE — 97140 MANUAL THERAPY 1/> REGIONS: CPT | Mod: PN

## 2024-01-30 PROCEDURE — 97110 THERAPEUTIC EXERCISES: CPT | Mod: PN

## 2024-01-31 ENCOUNTER — TELEPHONE (OUTPATIENT)
Dept: ORTHOPEDICS | Facility: CLINIC | Age: 79
End: 2024-01-31
Payer: OTHER GOVERNMENT

## 2024-01-31 NOTE — TELEPHONE ENCOUNTER
----- Message from Hollie Campos sent at 1/31/2024  2:51 PM CST -----  Type:  Needs Medical Advice    Who Called: pt   Symptoms (please be specific): right shoulder  f/u     Would the patient rather a call back or a response via [facility]? Call back   Best Call Back Number:  [phone]  Additional Information: pt is almost complete for therapy and the pt needs to get seen to get evaluated to see if the pt will need to continue with further therapy

## 2024-01-31 NOTE — TELEPHONE ENCOUNTER
Spoke with patient's wife.  Appt made for this Friday at 9:15 with Dr. Manning for R shoulder pain.

## 2024-01-31 NOTE — TELEPHONE ENCOUNTER
----- Message from Hollie Campos sent at 1/31/2024  2:51 PM CST -----  Type:  Needs Medical Advice    Who Called: pt   Symptoms (please be specific): right shoulder  f/u     Would the patient rather a call back or a response via [facility]? Call back   Best Call Back Number:  [phone]  Additional Information: pt is almost complete for therapy and the pt needs to get seen to get evaluated to see if the pt will need to continue with further therapy

## 2024-02-01 DIAGNOSIS — M25.511 RIGHT SHOULDER PAIN, UNSPECIFIED CHRONICITY: Primary | ICD-10-CM

## 2024-02-02 ENCOUNTER — HOSPITAL ENCOUNTER (OUTPATIENT)
Dept: RADIOLOGY | Facility: HOSPITAL | Age: 79
Discharge: HOME | End: 2024-02-02
Attending: ORTHOPAEDIC SURGERY
Payer: OTHER GOVERNMENT

## 2024-02-02 ENCOUNTER — OFFICE VISIT (OUTPATIENT)
Dept: ORTHOPEDICS | Facility: CLINIC | Age: 79
End: 2024-02-02
Payer: OTHER GOVERNMENT

## 2024-02-02 VITALS — WEIGHT: 202.81 LBS | BODY MASS INDEX: 27.47 KG/M2 | HEIGHT: 72 IN

## 2024-02-02 DIAGNOSIS — M25.511 RIGHT SHOULDER PAIN, UNSPECIFIED CHRONICITY: Primary | ICD-10-CM

## 2024-02-02 DIAGNOSIS — M25.511 RIGHT SHOULDER PAIN, UNSPECIFIED CHRONICITY: ICD-10-CM

## 2024-02-02 DIAGNOSIS — Z96.611 STATUS POST TOTAL SHOULDER REPLACEMENT, RIGHT: Primary | ICD-10-CM

## 2024-02-02 PROCEDURE — 73030 X-RAY EXAM OF SHOULDER: CPT | Mod: 26,RT,, | Performed by: RADIOLOGY

## 2024-02-02 PROCEDURE — 99214 OFFICE O/P EST MOD 30 MIN: CPT | Mod: S$PBB,,, | Performed by: ORTHOPAEDIC SURGERY

## 2024-02-02 PROCEDURE — 73030 X-RAY EXAM OF SHOULDER: CPT | Mod: TC,PN,RT

## 2024-02-02 PROCEDURE — 99214 OFFICE O/P EST MOD 30 MIN: CPT | Mod: PBBFAC,PN | Performed by: ORTHOPAEDIC SURGERY

## 2024-02-02 PROCEDURE — 99999 PR PBB SHADOW E&M-EST. PATIENT-LVL IV: CPT | Mod: PBBFAC,,, | Performed by: ORTHOPAEDIC SURGERY

## 2024-02-02 NOTE — PROGRESS NOTES
Patient ID:   Brandan Werner is a 78 y.o. male.    Chief Complaint:   4m 19d status post right total shoulder arthroplasty    HPI:   The patient is returning for evaluation of the right shoulder.  Pain level is 3/10.  Pain is mostly over the top of his shoulder.  He would like more physical therapy.  He has only been on once a week.    Medications:    Current Outpatient Medications:     ascorbic acid, vitamin C, (VITAMIN C) 250 MG tablet, Take 250 mg by mouth once daily., Disp: , Rfl:     atorvastatin (LIPITOR) 40 MG tablet, Take 20 mg by mouth once daily., Disp: , Rfl:     carbidopa-levodopa  mg (SINEMET)  mg per tablet, TAKE 1 TABLET BY MOUTH THREE TIMES A DAY FOR PARKINSON'S DISE** WITH MEALSASE, Disp: , Rfl:     celecoxib (CELEBREX) 200 MG capsule, Take 400 mg by mouth once daily., Disp: , Rfl:     ferrous sulfate (FEOSOL) 325 mg (65 mg iron) Tab tablet, 325 mg., Disp: , Rfl:     FLUoxetine 20 MG capsule, 60 mg., Disp: , Rfl:     ibuprofen (ADVIL,MOTRIN) 800 MG tablet, Take 1 tablet (800 mg total) by mouth every 8 (eight) hours., Disp: 20 tablet, Rfl: 0    lisinopril (PRINIVIL,ZESTRIL) 5 MG tablet, Take 5 mg by mouth once daily., Disp: , Rfl:     naproxen sodium (ANAPROX) 220 MG tablet, Take 220 mg by mouth 2 (two) times daily with meals. 2 tablets twice a day, Disp: , Rfl:     oxyCODONE (ROXICODONE) 5 MG immediate release tablet, Take 1 tablet (5 mg total) by mouth every 6 (six) hours as needed for Pain., Disp: 10 tablet, Rfl: 0    oxyCODONE-acetaminophen (PERCOCET) 5-325 mg per tablet, Take 1 tablet by mouth every 4 (four) hours as needed for Pain., Disp: 28 tablet, Rfl: 0    polyethylene glycol (GLYCOLAX) 17 gram/dose powder, Take 17 g by mouth daily as needed (take as needed for daily soft bowel movements)., Disp: 510 g, Rfl: 1    polyvinyl alcohol, artificial tears, (LIQUIFILM TEARS) 1.4 % ophthalmic solution, INSTILL ONE DROP IN EACH EYE FOUR TIMES A DAY FOR DRY EYES, Disp: , Rfl:      tamsulosin (FLOMAX) 0.4 mg Cp24, Take 1 capsule (0.4 mg total) by mouth nightly., Disp: 30 capsule, Rfl: 11    traZODone (DESYREL) 100 MG tablet, 100 mg., Disp: , Rfl:     primidone (MYSOLINE) 50 MG Tab, Take 2 tablets (100 mg total) by mouth every evening., Disp: 60 tablet, Rfl: 11  No current facility-administered medications for this visit.    Facility-Administered Medications Ordered in Other Visits:     lactated ringers infusion, , Intravenous, Continuous, Maureen Solomon DNP    LIDOcaine (PF) 10 mg/ml (1%) injection 10 mg, 1 mL, Intradermal, Once, Maureen Solomon DNP    Allergies:  Review of patient's allergies indicates:  No Known Allergies    Past Medical History:  Past Medical History:   Diagnosis Date    Anxiety     BPH (benign prostatic hyperplasia)     Cataract     Elevated PSA     Erectile dysfunction     Hyperlipidemia     Hypertension     Hypogonadism male     Kidney stone 5/20/2020    Obesity     PTSD (post-traumatic stress disorder)     Shoulder arthritis     Urinary tract infection         Past Surgical History:  Past Surgical History:   Procedure Laterality Date    ARTHROPLASTY OF SHOULDER Right 9/14/2023    Procedure: ARTHROPLASTY, SHOULDER;  Surgeon: Gabe Manning MD;  Location: [facility];  Service: Orthopedics;  Laterality: Right;  Ayaan david notified cc    COLONOSCOPY N/A 11/26/2018    Procedure: COLONOSCOPY;  Surgeon: Germán Moss MD;  Location: [address]);  Service: Endoscopy;  Laterality: N/A;    COLONOSCOPY W/ BIOPSIES  2010    CYSTOSCOPY Left 11/17/2022    Procedure: CYSTOSCOPY;  Surgeon: Dannie Murray MD;  Location: [facility];  Service: Urology;  Laterality: Left;    EXTRACORPOREAL SHOCK WAVE LITHOTRIPSY Left 12/2/2022    Procedure: LITHOTRIPSY, ESWL [employer] [employer] ESWL machine, scheduling call [phone] 60 minutes;  Surgeon: Dannie Murray MD;  Location: [facility];  Service: Urology;  Laterality: Left;  Confirmed with [facility] 11/22 srf  conf # [id number]    EXTRACORPOREAL SHOCK WAVE LITHOTRIPSY Left 2023    Procedure: LITHOTRIPSY, ESWL [facility] [employer] ESWL machine, scheduling call [phone] 60 minutes;  Surgeon: Dannie Murray MD;  Location: [facility];  Service: Urology;  Laterality: Left;  next [facility] conf #[id number]    PENILE PROSTHESIS IMPLANT      PERCUTANEOUS NEPHROLITHOTOMY Left 2023    Procedure: CYSTOSCOPY, LEFT RETROGRADE PYELOGRAM, LEFT URETEROSCOPY WITH STONE BASKET EXTRACTION, PLACEMENT OF JJ STENT, LEFT NEPHROSTOMY TUBE REMOVAL;  Surgeon: Dannie Murray MD;  Location[facility];  Service: Urology;  Laterality: Left;  IR to place ureteral catheter prior, start time 1200  Cysto tower, flexible cysto/ureteroscopes, Laser in room, lithoclast in room, fluoroscopy with Tony to    REMOVAL OF INFLATABLE PENILE PROSTHESIS N/A 2023    Procedure: REMOVAL, PENILE PROSTHESIS, INFLATABLE;  Surgeon: Dannie Murray MD;  Location: [facility];  Service: Urology;  Laterality: N/A;    ROBOT-ASSISTED LAPAROSCOPIC SIMPLE PROSTATECTOMY N/A 2023    Procedure: ROBOTIC PROSTATECTOMY, SIMPLE;  Surgeon: Dannie Murray MD;  Location: [facility];  Service: Urology;  Laterality: N/A;  Xi robot, open tray, simple prostatectomy       Social History:  Social History     Occupational History    Occupation:      Comment: self-employed   Tobacco Use    Smoking status: Former     Current packs/day: 0.00     Average packs/day: 1 pack/day for 10.0 years (10.0 ttl pk-yrs)     Types: Cigarettes     Start date:      Quit date:      Years since quittin.1    Smokeless tobacco: Never   Substance and Sexual Activity    Alcohol use: No    Drug use: No    Sexual activity: Yes     Partners: Female       Family History:  Family History   Problem Relation Age of Onset    Cancer Mother         cancer    Cataracts Mother     Heart disease Father 62        M.I.    Stroke Brother 62    Amblyopia Neg Hx     Blindness Neg Hx      Glaucoma Neg Hx     Macular degeneration Neg Hx     Retinal detachment Neg Hx     Strabismus Neg Hx     Prostate cancer Neg Hx     Kidney disease Neg Hx         ROS:  Review of Systems   Musculoskeletal:  Positive for arthritis, joint pain, myalgias and stiffness.   All other systems reviewed and are negative.      Vitals:  Ht 6' (1.829 m)   Wt 92 kg (202 lb 13.2 oz)   BMI 27.51 kg/m²     Physical Examination:  Comprehensive Orthopaedic Musculoskeletal Exam    General      Constitutional: appears stated age, well-developed and well-nourished    Scleral icterus: no    Labored breathing: no    Psychiatric: normal mood and affect and no acute distress    Neurological: alert and oriented x3    Skin: intact    Lymphadenopathy: none     Ortho Exam   Right shoulder exam:   Range of motion today reveals active elevation to 110, passive elevation 160, external rotation at the side 20.  Negative belly press.    Imaging:  I have ordered and independently reviewed the following imaging studies performed at [facility] today    Right shoulder x-ray series demonstrates a total shoulder arthroplasty.  Stem is in slight varus.  Otherwise, components looked stable.    Assessment:  1. Status post total shoulder replacement, right      Plan:  Patient will have physical therapy renewed to help him try to restore his range of motion.  I would like for him to follow up in 3 months with a x-ray of the right shoulder     No follow-ups on file.

## 2024-02-06 ENCOUNTER — CLINICAL SUPPORT (OUTPATIENT)
Dept: REHABILITATION | Facility: HOSPITAL | Age: 79
End: 2024-02-06
Payer: OTHER GOVERNMENT

## 2024-02-06 DIAGNOSIS — M25.60 STIFFNESS DUE TO IMMOBILITY: ICD-10-CM

## 2024-02-06 DIAGNOSIS — R53.1 WEAKNESS: ICD-10-CM

## 2024-02-06 DIAGNOSIS — M79.601 PAIN OF RIGHT UPPER EXTREMITY: Primary | ICD-10-CM

## 2024-02-06 DIAGNOSIS — Z96.611 STATUS POST TOTAL SHOULDER REPLACEMENT, RIGHT: ICD-10-CM

## 2024-02-06 DIAGNOSIS — Z74.09 STIFFNESS DUE TO IMMOBILITY: ICD-10-CM

## 2024-02-06 PROCEDURE — 97112 NEUROMUSCULAR REEDUCATION: CPT | Mod: PN

## 2024-02-06 PROCEDURE — 97110 THERAPEUTIC EXERCISES: CPT | Mod: PN

## 2024-02-06 PROCEDURE — 97140 MANUAL THERAPY 1/> REGIONS: CPT | Mod: PN

## 2024-02-06 NOTE — PROGRESS NOTES
"[facility]  Occupational Therapy Treatment Note     Date: 2/6/2024  Name: Brandan Werner  Clinic Number: [id number]    Therapy Diagnosis:   Encounter Diagnoses   Name Primary?    Status post total shoulder replacement, right     Pain of right upper extremity Yes    Weakness     Stiffness due to immobility        Physician: Gabe Manning MD    Physician Orders: OT evaluate and treat  Medical Diagnosis: Z96.611 (ICD-10-CM) - Status post total shoulder replacement, right  Surgical Procedure and Date: Right TSA, 9/14/2023  Evaluation Date: 10/24/2023  Insurance Authorization Period Expiration: 12/31/2023  Plan of Care Expiration: 4/19/2024  Date of Return to MD: 5/3/2024  Visit # / Visits authorized: 11(+1)/ 1  FOTO:3/3     Precautions:  Patient will have physical therapy renewed to help him try to restore his range of motion.  I would like for him to follow up in 3 months with a x-ray of the right shoulder      Time In: 12:55  Time Out: 1:40  Total Appointment Time (timed & untimed codes): 45 minutes    Subjective     Pt reports: "I saw the doctor, he said to continue another month."  he was compliant with home exercise program given last session.   Response to previous treatment:low pain  Functional change:good endurance with exercises    Pain: 2/10  Location: right shoulder      Objective     Objective Measures updated at progress report unless specified.   Sensation Test: Patient denies any numbness/tingling     Observation/Inspection:rounded shoulders, forward head     Range of Motion/Strength:   Shoulder Left   Right   Pain/Dysfunction with Movement     AROM MMT AROM PROM     Flexion 120 5/5 90(=) 120(=)     Extension 55 5/5 50(+5) NT     Abduction 110 5/5 85(=) 115(=)     HorizAdduction 20 5/5 20(=) NT     Internal rotation L1 5/5 To stomach 55(=)     ER at 90° abd Back of head 5/5 NT 60(=)     ER at 0° abd 75 5/5 45(+10) 75(+20)     NT=Not tested  ROM Comments: Pain at end " range, firm at end feel     Painful Arc: none noted     Tenderness upon Palpation:      Positive: incision     Special Tests:deferred     Scapular Control/Dyskinesis:     Normal / Subtle / Obvious  Comments    Left  subtle -    Right  subtle -      Intake Outcome Measure for FOTO Shoulder Survey     Therapist reviewed FOTO scores for Brandan Werner on 10/24/2023.   FOTO documents entered into [employer] - see Media section.     Intake Score: 52%  3rd visit:41%  10th:32%        Treatment   Pt 12 +weeks post op with sling discharged    Brandan received the treatments listed below:     Manual therapy techniques: Soft tissue Mobilization were applied to the: right shoulder incision for 10 minutes, including:  -scar mobilization    Therapeutic exercises to develop ROM for 25 minutes, including:  -Scifit UBE forward/reverse Level 1 3 minutes each direction  -passive range of motion shoulder 10x  -supine dowel chest press 4# 2/15  -supine dowel flexion 4# 2/15  -sidelying abduction 1# 2/15  -sidelying external rotation to neutral 1# 2/15  -wall slides 2/15  -pulleys 3 minutes   -landmines 4# 2/15    Neuromuscular re-education activities to improve Posture for 10 minutes. The following activities were included:  -Green band extension pulls 2/15  -Green band rows 2/15    Patient Education and Home Exercises     Education provided:   - on current condition and issued updated home exercise program   - Progress towards goals     Written Home Exercises Provided: Patient instructed to cont prior HEP.  Exercises were reviewed and Brandan was able to demonstrate them prior to the end of the session.  Brandan demonstrated good  understanding of the HEP provided.     See EMR under Patient Instructions for exercises provided prior visit.      Assessment   Patient with good participation this date. Pain report remains low in sessions. Patient continues to have end range stiffness however responds fairly to manual and progressive low load  stretch. Patient able to complete all exercises with fair technique and without complaints. Range of motion slowly improving.   Brandan is progressing well towards his goals and there are no updates to goals at this time. Pt prognosis is Good.     Pt will continue to benefit from skilled outpatient occupational therapy to address the deficits listed in the problem list on initial evaluation provide pt/family education and to maximize pt's level of independence in the home and community environment.     Anticipated barriers to occupational therapy: none    Pt's spiritual, cultural and educational needs considered and pt agreeable to plan of care and goals.    Goals:  Long Term Goals to be met by discharge:  1) Independent with home exercise program -Not met, progressing  2) Pt will demonstrate (Right) shoulder AROM WFL grossly for [place] with activities of daily living's -Not met, progressing  3) Pt will demonstrate (Right) shoulder MMT WFL grossly for [place] with functional activities -Not met, progressing  4) Independent and pain free with ADL's and IADL's -Not met, progressing  5) Patient will be able to achieve less than or equal to 25% on FOTO shoulder survey demonstrating overall improved functional ability with upper extremity. -Not met, progressing     Plan     Continue with OT plan of care   Updates/Grading for next session: progress as able    SRIDHAR Vinson

## 2024-02-12 ENCOUNTER — CLINICAL SUPPORT (OUTPATIENT)
Dept: ENDOSCOPY | Facility: HOSPITAL | Age: 79
End: 2024-02-12
Attending: INTERNAL MEDICINE
Payer: OTHER GOVERNMENT

## 2024-02-12 ENCOUNTER — TELEPHONE (OUTPATIENT)
Dept: GASTROENTEROLOGY | Facility: CLINIC | Age: 79
End: 2024-02-12
Payer: OTHER GOVERNMENT

## 2024-02-12 DIAGNOSIS — K63.5 POLYP OF COLON, UNSPECIFIED PART OF COLON, UNSPECIFIED TYPE: ICD-10-CM

## 2024-02-12 DIAGNOSIS — Z12.11 COLON CANCER SCREENING: ICD-10-CM

## 2024-02-12 NOTE — PLAN OF CARE
Called to schedule colonoscopy. Wife stated she wants [place] location. Order place for [place]. Msg. Sent to [facility] Staff. Phone number provided to schedule for [place].

## 2024-02-12 NOTE — TELEPHONE ENCOUNTER
Endoscopy Scheduling Questionnaire:         Are you taking any blood thinners? no               If Yes  Have you been on them for longer than one year? N/a    2. Have you been diagnosed with Diverticulitis in past three months?  no    3. Are you on dialysis or have Kidney Disease? no    4. Previous Colonoscopy?  yes         If yes Do you have a history of colon polyps?  yes    5. Family History of Colon Cancer: yes         Relation: Mother       Age at Diagnosis: 80's      6. Are you a diabetic?  no    7. Do you have a history of constipation?  no    8. Are you taking a GLP-1 Agonist/Adipex (weight loss drugs)? no  Dulaglutide (Trulicity) (weekly)  Exenatide extended release (Bydureon bcise) (weekly)  Exenatide (Byetta) (twice daily)  Semaglutide (Ozempic) (weekly)  Liraglutide (Victoza, Saxenda) (daily)  Lixisenatide (Adlyxin) (daily)  Semaglutide (Rybelsus) (taken by mouth once daily)    Hold GLP-1 agonists on the day of the procedure/surgery for patients who take the medication daily.    Hold GLP-1 agonists a week prior to the procedure/surgery for patients who take the medication weekly.    Procedure scheduled with Dr. López  on  4/1/2024    The prep being used is golytely     The patient's prep instructions were sent via mail.      GOLYTELY/ COLYTE/ NULYTELY Prep Instructions    [facility] [address]  Clinic Office [phone]  Endoscopy Lab [phone]    You are scheduled for a Colonoscopy with Dr. López on 4/1/2024 at [facility] in [place].    Check in at the Hospital -1st floor, Information desk.   Call [phone] to reschedule.    An adult friend/family member must come with you to drive you home.  You cannot drive, take a taxi, Uber/Lyft or bus to leave the Endoscopy Center alone.  If you do not have someone to drive you home, your test will be cancelled.     Please follow the directions of your doctor if you take any pills that thin your blood. If you take these  meds: Aggrenox, Brilinta, Effient, Eliquis, Lovenox, Plavix, Pletal, Pradaxa, Ticilid, Xarelto or Coumadin, let the doctor's office know.    Please hold any GLP-1 medications prior to the procedure: Dulaglutide Trulicity(hold week prior), Exenatide Byetta (hold the morning of procedure), Semaglutide Ozempic (hold week prior), Liraglutide Victoza, Saxenda(hold week prior), Lixisenatide Adlyxin (hold the morning of procedure), Semaglutide Rybelsus (hold the morning of procedure), Tirzepatide Mounjaro (hold week prior)     DON'T: On the morning of the test do not take insulin or pills for diabetes.     DO: On the morning of the test, do take any pills for blood pressure, heart, anti-rejection and or seizures with a small sip of water. Bring any inhalers with you.    To have a good prep, you must follow these instructions - please do not use the directions from the pharmacy.    The doctor will send a prescription for the Golytely.      The Day Before the test:    You can only drink CLEAR LIQUIDS the whole day before your test.  You can't eat any food for the whole day.    You CAN have:  Water, Coffee or decaf coffee (no milk or cream)  Tea  Soft drinks - regular and sugar free  Jello (green or yellow)  Apple Juice, white grape juice, white cranberry juice  Gatorade, Power Aid, Crystal Light, El Aid  Lemonade and Limeade  Bouillon, clear soup  Snowball, popsicles  YOU CAN'T DRINK ANYTHING RED, PURPLE ORANGE OR BLUE   YOU CAN'T DRINK ALCOHOL  ONLY DRINK WHAT IS ON THE LIST      At 12 noon,   Add water up to the line on the jug of GOLYTELY (should be 1 gallon when done).  You can add a packet of yellow/green powder drink mix to the jug.   Put the bottle in the refrigerator if you prefer. It should taste better if it is cold. Do NOT put this solution over ice. It is ok to drink with a straw.    At 5 pm the NIGHT before your test:    Drink 1 glass (8 ounces) every 10 minutes until 1/2 of the jug is finished.   Put the jug back in the refrigerator after you finish the first half, and don't drink any more until 5 hours before you come to the hospital (see below for more specific details).    You can continue to drink clear liquids until you go to sleep.    The Day of the test - We will call you 2 days before your test to tell you what time to get there.    5 hours before you come to the hospital (this may be in the middle of the night)  Drink 1 glass (8 ounces) every 10 minutes until the jug is finished.     YOU CAN'T EAT OR DRINK ANYTHING ELSE ONCE YOU FINISH THE PREP    Leave all valuables and jewelry at home. You will be at the hospital for 2-4 hours.    Call the Endoscopy department at [phone] with any questions about your procedure.

## 2024-02-14 DIAGNOSIS — Z96.611 STATUS POST TOTAL SHOULDER REPLACEMENT, RIGHT: Primary | ICD-10-CM

## 2024-02-14 RX ORDER — POLYETHYLENE GLYCOL 3350, SODIUM SULFATE ANHYDROUS, SODIUM BICARBONATE, SODIUM CHLORIDE, POTASSIUM CHLORIDE 236; 22.74; 6.74; 5.86; 2.97 G/4L; G/4L; G/4L; G/4L; G/4L
4 POWDER, FOR SOLUTION ORAL ONCE
Qty: 1 EACH | Refills: 0 | Status: SHIPPED | OUTPATIENT
Start: 2024-02-14 | End: 2024-02-14

## 2024-02-15 ENCOUNTER — CLINICAL SUPPORT (OUTPATIENT)
Dept: REHABILITATION | Facility: HOSPITAL | Age: 79
End: 2024-02-15
Attending: ORTHOPAEDIC SURGERY
Payer: OTHER GOVERNMENT

## 2024-02-15 DIAGNOSIS — R53.1 WEAKNESS: ICD-10-CM

## 2024-02-15 DIAGNOSIS — M79.601 PAIN OF RIGHT UPPER EXTREMITY: Primary | ICD-10-CM

## 2024-02-15 DIAGNOSIS — M25.60 STIFFNESS DUE TO IMMOBILITY: ICD-10-CM

## 2024-02-15 DIAGNOSIS — Z74.09 STIFFNESS DUE TO IMMOBILITY: ICD-10-CM

## 2024-02-15 PROCEDURE — 97140 MANUAL THERAPY 1/> REGIONS: CPT | Mod: PN

## 2024-02-15 PROCEDURE — 97112 NEUROMUSCULAR REEDUCATION: CPT | Mod: PN

## 2024-02-15 PROCEDURE — 97110 THERAPEUTIC EXERCISES: CPT | Mod: PN

## 2024-02-15 NOTE — PROGRESS NOTES
"[facility]  Occupational Therapy Treatment Note     Date: 2/15/2024  Name: Brandan Werner  Clinic Number: [id number]    Therapy Diagnosis:   Encounter Diagnoses   Name Primary?    Pain of right upper extremity Yes    Weakness     Stiffness due to immobility      Physician: Gabe Manning MD    Physician Orders: OT evaluate and treat  Medical Diagnosis: Z96.611 (ICD-10-CM) - Status post total shoulder replacement, right  Surgical Procedure and Date: Right TSA, 9/14/2023  Evaluation Date: 10/24/2023  Insurance Authorization Period Expiration: 12/31/2023  Plan of Care Expiration: 4/19/2024  Date of Return to MD: 5/3/2024  Visit # / Visits authorized: 12(+1)/ 1  FOTO:3/3     Precautions:  Patient will have physical therapy renewed to help him try to restore his range of motion.  I would like for him to follow up in 3 months with a x-ray of the right shoulder      Time In: 10:00  Time Out: 10:45  Total Appointment Time (timed & untimed codes): 45 minutes    Subjective     Pt reports: "I still have trouble raising this arm up but it's better than it was before the surgery."  he was compliant with home exercise program given last session.   Response to previous treatment:low pain  Functional change:good endurance with exercises    Pain: 2/10  Location: right shoulder      Objective     Objective Measures updated at progress report unless specified.   Sensation Test: Patient denies any numbness/tingling     Observation/Inspection:rounded shoulders, forward head     Range of Motion/Strength:   Shoulder Left   Right   Pain/Dysfunction with Movement     AROM MMT AROM PROM     Flexion 120 5/5 90(=) 120(=)     Extension 55 5/5 50(+5) NT     Abduction 110 5/5 85(=) 115(=)     HorizAdduction 20 5/5 20(=) NT     Internal rotation L1 5/5 To stomach 55(=)     ER at 90° abd Back of head 5/5 NT 60(=)     ER at 0° abd 75 5/5 45(+10) 75(+20)     NT=Not tested  ROM Comments: Pain at end range, firm at end " feel     Painful Arc: none noted     Tenderness upon Palpation:      Positive: incision     Special Tests:deferred     Scapular Control/Dyskinesis:     Normal / Subtle / Obvious  Comments    Left  subtle -    Right  subtle -      Intake Outcome Measure for FOTO Shoulder Survey     Therapist reviewed FOTO scores for Brandan Werner on 10/24/2023.   FOTO documents entered into EPIC - see Media section.     Intake Score: 52%  3rd visit:41%  10th:32%        Treatment   Pt 12 +weeks post op with sling discharged    Brandan received the treatments listed below:     Manual therapy techniques: Soft tissue Mobilization were applied to the: right shoulder incision for 10 minutes, including:  -scar mobilization    Therapeutic exercises to develop ROM for 25 minutes, including:  -Scifit UBE forward/reverse Level 2 3 minutes each direction  -passive range of motion shoulder 10x  -supine dowel chest press 4# 2/15  -supine dowel flexion 4# 2/15  -sidelying abduction 1# 2/15  -sidelying external rotation to neutral 1# 2/15  -wall slides 2/15  -pulleys 3 minutes   -landmines 4# 2/15    Neuromuscular re-education activities to improve Posture for 10 minutes. The following activities were included:  -Green band extension pulls 2/15  -Green band rows 2/15  -Green Band external rotation 2/15    Patient Education and Home Exercises     Education provided:   - on current condition and issued updated home exercise program   - Progress towards goals     Written Home Exercises Provided: Patient instructed to cont prior HEP.  Exercises were reviewed and Brandan was able to demonstrate them prior to the end of the session.  Brandan demonstrated good  understanding of the HEP provided.     See EMR under Patient Instructions for exercises provided prior visit.      Assessment   Patient with good participation this date. Pain report remains low in sessions. Patient continues to have end range stiffness however responds fairly to manual and  progressive low load stretch. Patient able to complete all exercises with fair technique and without complaints. Range of motion slowly improving. Good endurance and patient's very motivated.  Brandan is progressing well towards his goals and there are no updates to goals at this time. Pt prognosis is Good.     Pt will continue to benefit from skilled outpatient occupational therapy to address the deficits listed in the problem list on initial evaluation provide pt/family education and to maximize pt's level of independence in the home and community environment.     Anticipated barriers to occupational therapy: none    Pt's spiritual, cultural and educational needs considered and pt agreeable to plan of care and goals.    Goals:  Long Term Goals to be met by discharge:  1) Independent with home exercise program -Not met, progressing  2) Pt will demonstrate (Right) shoulder AROM WFL grossly for [place] with activities of daily living's -Not met, progressing  3) Pt will demonstrate (Right) shoulder MMT WFL grossly for [place] with functional activities -Not met, progressing  4) Independent and pain free with ADL's and IADL's -Not met, progressing  5) Patient will be able to achieve less than or equal to 25% on FOTO shoulder survey demonstrating overall improved functional ability with upper extremity. -Not met, progressing     Plan     Continue with OT plan of care   Updates/Grading for next session: progress as able    SRIDHAR Vinson

## 2024-02-19 NOTE — PROGRESS NOTES
"[facility]  Occupational Therapy Treatment Note     Date: 2/20/2024  Name: Brandan Werner  Clinic Number: [id number]    Therapy Diagnosis:   Encounter Diagnoses   Name Primary?    Pain of right upper extremity Yes    Weakness     Stiffness due to immobility      Physician: Gabe Manning MD    Physician Orders: OT evaluate and treat  Medical Diagnosis: Z96.611 (ICD-10-CM) - Status post total shoulder replacement, right  Surgical Procedure and Date: Right TSA, 9/14/2023  Evaluation Date: 10/24/2023  Insurance Authorization Period Expiration: 12/31/2023  Plan of Care Expiration: 4/19/2024  Date of Return to MD: 5/3/2024  Visit # / Visits authorized: 13(+1)/ 1  FOTO:3/3     Precautions:  Patient will have physical therapy renewed to help him try to restore his range of motion.  I would like for him to follow up in 3 months with a x-ray of the right shoulder      Time In: 1:00  Time Out: 1:45  Total Appointment Time (timed & untimed codes): 45 minutes    Subjective     Pt reports: "I still have trouble reaching this arm up high."  he was compliant with home exercise program given last session.   Response to previous treatment:low pain  Functional change:good endurance with exercises    Pain: 2/10  Location: right shoulder      Objective     Objective Measures updated at progress report unless specified.   Sensation Test: Patient denies any numbness/tingling     Observation/Inspection:rounded shoulders, forward head     Range of Motion/Strength:   Shoulder Left   Right   Pain/Dysfunction with Movement     AROM MMT AROM PROM     Flexion 120 5/5 90(=) 120(=)     Extension 55 5/5 50(+5) NT     Abduction 110 5/5 85(=) 115(=)     HorizAdduction 20 5/5 20(=) NT     Internal rotation L1 5/5 To stomach 55(=)     ER at 90° abd Back of head 5/5 NT 60(=)     ER at 0° abd 75 5/5 45(+10) 75(+20)     NT=Not tested  ROM Comments: Pain at end range, firm at end feel     Painful Arc: none noted   " [facility]  Procedure Note    Chelsey Brock Patient Status:  Outpatient    1960 MRN [id number]   Location [facility] MRI Attending LING Guerrero   [facility] Day # 0 PCP Dominic Crain MD     Procedure: mri guided biopsy of the left breast    Pre-Procedure Diagnosis:  Left breast LOQ enhancement    Post-Procedure Diagnosis: Left breast LOQ enhancement    Anesthesia:  Local    Findings:  Left breast LOQ enhancement    Specimens: 3    Blood Loss:  minimal    Tourniquet Time: none  Complications:  None  Drains:  None    Rach Wheeler,   2023   Tenderness upon Palpation:      Positive: incision     Special Tests:deferred     Scapular Control/Dyskinesis:     Normal / Subtle / Obvious  Comments    Left  subtle -    Right  subtle -      Intake Outcome Measure for FOTO Shoulder Survey     Therapist reviewed FOTO scores for Brandan Werner on 10/24/2023.   FOTO documents entered into EPIC - see Media section.     Intake Score: 52%  3rd visit:41%  10th:32%        Treatment   Pt 12 +weeks post op with sling discharged    Brandan received the treatments listed below:     Manual therapy techniques: Soft tissue Mobilization were applied to the: right shoulder incision for 10 minutes, including:  -scar mobilization    Therapeutic exercises to develop ROM for 25 minutes, including:  -Scifit UBE forward/reverse Level 2 3 minutes each direction  -passive range of motion shoulder 10x  -supine dowel chest press 4# 2/15  -supine dowel flexion 4# 2/15  -sidelying abduction 1# 2/15  -sidelying external rotation to neutral 1# 2/15  -wall slides 2/15  -pulleys 3 minutes   -landmines 4# 2/15    Neuromuscular re-education activities to improve Posture for 10 minutes. The following activities were included:  -Green band extension pulls 2/15  -Green band rows 2/15  -Green Band external rotation 2/15    Patient Education and Home Exercises     Education provided:   - on current condition and issued updated home exercise program   - Progress towards goals     Written Home Exercises Provided: Patient instructed to cont prior HEP.  Exercises were reviewed and Brandan was able to demonstrate them prior to the end of the session.  Brandan demonstrated good  understanding of the HEP provided.     See EMR under Patient Instructions for exercises provided prior visit.      Assessment   Patient with good participation this date. Pain report remains low in sessions. Patient continues to have end range stiffness however responds fairly to manual and progressive low load stretch. Patient  able to complete all exercises with fair technique and without complaints. Range of motion slowly improving. Good endurance and patient's very motivated.  Brandan is progressing well towards his goals and there are no updates to goals at this time. Pt prognosis is Good.     Pt will continue to benefit from skilled outpatient occupational therapy to address the deficits listed in the problem list on initial evaluation provide pt/family education and to maximize pt's level of independence in the home and community environment.     Anticipated barriers to occupational therapy: none    Pt's spiritual, cultural and educational needs considered and pt agreeable to plan of care and goals.    Goals:  Long Term Goals to be met by discharge:  1) Independent with home exercise program -Not met, progressing  2) Pt will demonstrate (Right) shoulder AROM WFL grossly for [place] with activities of daily living's -Not met, progressing  3) Pt will demonstrate (Right) shoulder MMT WFL grossly for [place] with functional activities -Not met, progressing  4) Independent and pain free with ADL's and IADL's -Not met, progressing  5) Patient will be able to achieve less than or equal to 25% on FOTO shoulder survey demonstrating overall improved functional ability with upper extremity. -Not met, progressing     Plan     Continue with OT plan of care   Updates/Grading for next session: progress as able    SRIDHAR Vinson

## 2024-02-20 ENCOUNTER — CLINICAL SUPPORT (OUTPATIENT)
Dept: REHABILITATION | Facility: HOSPITAL | Age: 79
End: 2024-02-20
Payer: OTHER GOVERNMENT

## 2024-02-20 DIAGNOSIS — Z74.09 STIFFNESS DUE TO IMMOBILITY: ICD-10-CM

## 2024-02-20 DIAGNOSIS — M79.601 PAIN OF RIGHT UPPER EXTREMITY: Primary | ICD-10-CM

## 2024-02-20 DIAGNOSIS — M25.60 STIFFNESS DUE TO IMMOBILITY: ICD-10-CM

## 2024-02-20 DIAGNOSIS — R53.1 WEAKNESS: ICD-10-CM

## 2024-02-20 PROCEDURE — 97112 NEUROMUSCULAR REEDUCATION: CPT | Mod: PN

## 2024-02-20 PROCEDURE — 97140 MANUAL THERAPY 1/> REGIONS: CPT | Mod: PN

## 2024-02-20 PROCEDURE — 97110 THERAPEUTIC EXERCISES: CPT | Mod: PN

## 2024-02-22 NOTE — PROGRESS NOTES
Subjective:       Patient ID: Brandan Werner is a 78 y.o. male.    Chief Complaint: Hypertension (Doesn't know meds. I gave medlist to confirm at  home and call me to tell all accurate. ) and Low-back Pain (Gets low back pain when stands a lot. (Says recently had H/o kidney stones and bladder infection. )  pain 3-4 when has it. 0 pain today. )    HPI  The patient presents for follow-up of medical conditions which include hypertension, hyperlipidemia, Parkinson's disease.  The patient is also status post prostatectomy for BPH.  Some mild urinary leakage and increased urinary frequency is noted since the surgery.  He has been using Depends for the leakage.    The patient has been exercising using a bicycle.  No exertional chest pain or dyspnea is noted.  He reports his diet is better.  He has been eating less fatty foods and less salty foods.    The patient is receiving weekly physical therapy since his right shoulder surgery in 09/2023.    Review of Systems   Constitutional:  Positive for activity change. Negative for appetite change, fatigue and unexpected weight change.   HENT:  Negative for sinus pressure/congestion and sore throat.    Eyes:  Negative for visual disturbance.   Respiratory:  Negative for cough, chest tightness, shortness of breath and wheezing.    Cardiovascular:  Negative for chest pain, palpitations and leg swelling.   Gastrointestinal:  Negative for abdominal pain, blood in stool, nausea and vomiting.   Genitourinary:  Positive for bladder incontinence, enuresis and frequency. Negative for dysuria, hematuria and urgency.   Musculoskeletal:  Negative for arthralgias, back pain, gait problem, joint swelling, myalgias and neck stiffness.   Integumentary:  Negative for color change and rash.   Neurological:  Negative for dizziness, syncope, weakness, light-headedness, numbness and headaches.   Psychiatric/Behavioral:  Negative for sleep disturbance.             Physical Exam  Vitals and nursing  note reviewed.   Constitutional:       General: He is not in acute distress.     Appearance: Normal appearance. He is well-developed.      Comments: The patient has lost 5 lb since 08/28/2023.   HENT:      Head: Normocephalic and atraumatic.   Eyes:      General: No scleral icterus.     Extraocular Movements: Extraocular movements intact.      Conjunctiva/sclera: Conjunctivae normal.   Neck:      Thyroid: No thyromegaly.      Vascular: No JVD.   Cardiovascular:      Rate and Rhythm: Normal rate and regular rhythm.      Heart sounds: Normal heart sounds. No murmur heard.     No friction rub. No gallop.   Pulmonary:      Effort: Pulmonary effort is normal. No respiratory distress.      Breath sounds: Normal breath sounds. No wheezing or rales.   Abdominal:      General: Bowel sounds are normal.      Palpations: Abdomen is soft. There is no mass.      Tenderness: There is no abdominal tenderness.   Musculoskeletal:         General: No tenderness. Normal range of motion.      Cervical back: Normal range of motion and neck supple.      Right lower leg: No edema.      Left lower leg: No edema.   Lymphadenopathy:      Cervical: No cervical adenopathy.   Skin:     General: Skin is warm and dry.      Findings: No rash.   Neurological:      Mental Status: He is alert and oriented to person, place, and time.      Comments: Gait is normal.    A resting tremor is present bilaterally.   Psychiatric:         Mood and Affect: Mood normal.         Behavior: Behavior normal.               Assessment & Plan:      Brandan was seen today for hypertension.  Fasting blood tests will be obtained today.  Current medications will be continued for management of hypertension.    The patient will follow-up with neurology regarding Parkinson's disease.    Screening colonoscopy will be obtained.    Diagnoses and all orders for this visit:    Essential hypertension  -     Comprehensive Metabolic Panel; Future  -     Lipid Panel; Future  -     CBC  Auto Differential; Future  -     Hemoglobin A1C; Future  -     TSH; Future    Other hyperlipidemia  -     Comprehensive Metabolic Panel; Future  -     Lipid Panel; Future  -     CBC Auto Differential; Future  -     Hemoglobin A1C; Future  -     TSH; Future    Blood glucose abnormal  -     Comprehensive Metabolic Panel; Future  -     Lipid Panel; Future  -     CBC Auto Differential; Future  -     Hemoglobin A1C; Future  -     TSH; Future    Cold intolerance    Benign prostatic hyperplasia, unspecified whether lower urinary tract symptoms present    Parkinson's disease, unspecified whether dyskinesia present, unspecified whether manifestations fluctuate    Polyp of colon, unspecified part of colon, unspecified type  -     Ambulatory referral/consult to Endo Procedure ; Future    Colon cancer screening  -     Ambulatory referral/consult to Endo Procedure ; Future         Follow up in about 6 months (around 7/29/2024).     Km Chicas MD

## 2024-03-04 NOTE — PROGRESS NOTES
Subjective:       Patient ID: Brandan Werner is a 78 y.o. male.    Chief Complaint: No chief complaint on file.       This is a 78 y.o.  male patient with BPH, elevated PSA, kidney stone.    Past patient of Dr. Laboy last seen in 2020 for cystoscopy for microhematuria that was negative except large prostate.  H/o penile implant that still uses.  CTU in 2020 with 150 gm prostate no other findings.  Long h/o elevated PSA.    PSA 6.2 in 2013 and biopsy negative  Biopsy in 2013 with ASAP  PSA recently checked by PCP and was 8.2   Moderate/severe LUTs, AUA SS 19/2, PVR 67  Worsening frequency of urination on finasteride, tamsulosin.    UA showed microhematuria.  CTU done showed left 1 cm UPJ stone with moderate hydronephrosis 11/22 and left non obstructing stones.    Failed left stent, required PCN and antegrade stent  Left ESWL 12/2  Follow-up KUB shows resolution of UPJ stone continued lower pole left stone.  Repeat left LP stone ESWL 1/30/23, appeared to be good fragmentation.  KUB shows left dense stone left LP but still present.  Stent in place. Stent removed 2/7/23.   CT 5/1/23: left enlarging LP stone now 2.3 cm, mild left pelviectasis, read as possible lesion, on review with radiologist small stone near UPJ and mild pelviectasis with pelvis thickening no obvious mass.    Intermittent left sided pain, still LUTs from BPH but not as severe as prior.        Attempted left PCNL 8/14/23--IR could not get access down ureter, taken to cystoscopy suite, ureteroscopy done showing access between two calyces (high risk bleeding to dilate), survey of kidney for some time unable to locate dominant stone some stone fragmented in lower pole, thus access removed and stent placed.    Follow up CT 9/23 with left LP 1.1 cm stone.  Minimal pain.  Had shoulder surgery 9/14/23 and retention after, ludwig in place.   Passed void trial but then recurrent retention.  Has Ludwig in place.  Wishes to have intervention for enlarged  prostate.  Of note, does have inflatable penile prosthesis that he does not use but reports functions.  Cath changed for leaking 10/22/23, urine culture with enterococcus has been on keflex.   Urine culture negative prior to surgery.    RASP, IPP explant 11/6/23. Cystogram negative for leak 11/15/23.   Doing well.    AUA SS current 3/1 (12/23).  Does have some frequency.  Leakage at night, no AARON or daytime incontinence.  Will start myrbetriq .           IPSS Questionnaire (AUA-SS):  Over the past month    1)  Incomplete Emptying - How often have you had a sensation of not emptying your bladder completely after you finish urinating?  1 - Less than 1 time in 5   2)  Frequency - How often have you had to urinate again less than two hours after you finished urinating? 2 - Less than half the time   3)  Intermittency - How often have you found you stopped and started again several times when you urinated?  0 - Not at all   4) Urgency - How difficult have you found it to postpone urination?  0 - Not at all   5) Weak Stream - How often have you had a weak urinary stream?  0 - Not at all   6) Straining  - How often have you had to push or strain to begin urination?  0 - Not at all   7) Nocturia - How many times did you most typically get up to urinate from the time you went to bed until the time you got up in the morning?  0 - None   Total score:  0-7 mild, 8-19 moderate, 20-35 severe 3   Quality of Life:  1 - Pleased        LAST PSA  Lab Results   Component Value Date    PSA 8.2 (H) 08/19/2022    PSA 7.0 (H) 03/02/2021    PSA 6.5 (H) 01/30/2020    PSA 3.0 11/02/2016    PSA 5.0 (H) 05/02/2014    PSA 6.2 (H) 10/18/2013    PSA 4.49 (H) 02/22/2013    PSA 4.1 (H) 05/09/2012    PSA 4.04 (H) 04/05/2012    PSA 2.8 08/31/2010    PSA 3.1 02/25/2009    PSA 2.2 09/06/2007    PSA 1.8 09/30/2006    PSA 1.7 12/28/2005    PSA 1.8 10/30/2004    PSADIAG 10.5 (H) 08/22/2023    PSADIAG 4.9 (H) 02/22/2023    PSADIAG 7.0 (H) 03/07/2019     PSADIAG 6.6 (H) 09/07/2018    PSADIAG 6.4 (H) 06/08/2018    PSADIAG 5.3 (H) 01/03/2018    PSADIAG 2.9 02/22/2016    PSADIAG 2.9 08/24/2015    PSADIAG 5.7 (H) 02/24/2015    PSADIAG 5.1 (H) 06/26/2014    PSADIAG 5.8 (H) 12/03/2013    PSATOTAL 6.5 (H) 05/09/2023    PSAFREE 1.69 (H) 05/09/2023       Lab Results   Component Value Date    CREATININE 0.9 01/29/2024       ---  PMH/PSH/Medications/Allergies/Social history reviewed and as in chart.    Review of Systems   Constitutional:  Negative for activity change, chills and fever.   HENT:  Negative for congestion.    Respiratory:  Negative for cough, chest tightness and shortness of breath.    Cardiovascular:  Negative for chest pain and palpitations.   Gastrointestinal:  Negative for abdominal distention, abdominal pain, nausea and vomiting.   Genitourinary:  Negative for difficulty urinating, flank pain, frequency, hematuria, penile pain, scrotal swelling and testicular pain.   Musculoskeletal:  Negative for gait problem.       Objective:      Physical Exam  HENT:      Head: Atraumatic.   Pulmonary:      Effort: Pulmonary effort is normal.   Neurological:      General: No focal deficit present.      Mental Status: He is alert and oriented to person, place, and time.       Inc c/d/I    Assessment:     Problem Noted   Benign Prostatic Hyperplasia With Urinary Retention     183 gm prostate on CTU 2022  Initial AUA SS (8/2022): 19/2  PVR 67  Recurrent retention 10/23.  RASP, IPP explant 11/6/23, path negative  AUA SS current 3/1 (12/23), PVR 53     Elevated Psa 3/11/2013    Biopsy 2013 ASAP 1 core, 2014 benign  PSA 8/22--8.2  Volume 183, PSAD 0.04       Kidney Stones 5/20/2020    Left 1.5 cm UPJ and non obstructing left lower pole greater than 1 cm on CTU 11/2022 with moderate hydronephrosis     Unable to place left stent 11/17/22 due to large prostate, prosthesis and unable to find ureteral orifice.   Left PCN 11/18/22, internalized to stent 11/28  Left ESWL UPJ stone  12/2/22  KUB after with 1.8 cm left lower pole stone consistent with lower pole stone on CT urogram, no renal pelvis/gualberto stent stone seen.  Left ESWL 1/30/23 good fragmentation  KUB shows continued LLP stone  CT 5/23: left enlarging LP stone 2.3 cm mild pelviectasis, small stone fragment at UPJ  Attempted left PCNL 8/14/23--IR could not get access down ureter, taken to cystoscopy suite, ureteroscopy done showing access between two calyx, survey of kidney for some time unable to locate dominant stone some stone fragmented in lower pole     History of Penile Implant 5/20/2020         Plan:     Doing well, given some frequency of urination we will start beta 3 agonist.  PVR low.  Follow up in 8 weeks      Dannie Murray MD

## 2024-03-05 ENCOUNTER — OFFICE VISIT (OUTPATIENT)
Dept: UROLOGY | Facility: CLINIC | Age: 79
End: 2024-03-05
Payer: OTHER GOVERNMENT

## 2024-03-05 VITALS
HEART RATE: 51 BPM | WEIGHT: 208.31 LBS | DIASTOLIC BLOOD PRESSURE: 85 MMHG | BODY MASS INDEX: 28.22 KG/M2 | HEIGHT: 72 IN | SYSTOLIC BLOOD PRESSURE: 143 MMHG

## 2024-03-05 DIAGNOSIS — R35.0 FREQUENCY OF URINATION: ICD-10-CM

## 2024-03-05 DIAGNOSIS — N40.1 BENIGN PROSTATIC HYPERPLASIA WITH URINARY RETENTION: Primary | ICD-10-CM

## 2024-03-05 DIAGNOSIS — R33.8 BENIGN PROSTATIC HYPERPLASIA WITH URINARY RETENTION: Primary | ICD-10-CM

## 2024-03-05 LAB — POC RESIDUAL URINE VOLUME: 10 ML (ref 0–100)

## 2024-03-05 PROCEDURE — 99999PBSHW POCT BLADDER SCAN: Mod: PBBFAC,,,

## 2024-03-05 PROCEDURE — 99214 OFFICE O/P EST MOD 30 MIN: CPT | Mod: PBBFAC,PO,25 | Performed by: UROLOGY

## 2024-03-05 PROCEDURE — 99999 PR PBB SHADOW E&M-EST. PATIENT-LVL IV: CPT | Mod: PBBFAC,,, | Performed by: UROLOGY

## 2024-03-05 PROCEDURE — 99214 OFFICE O/P EST MOD 30 MIN: CPT | Mod: S$PBB,,, | Performed by: UROLOGY

## 2024-03-05 PROCEDURE — 51798 US URINE CAPACITY MEASURE: CPT | Mod: PBBFAC,PO | Performed by: UROLOGY

## 2024-03-05 RX ORDER — MIRABEGRON 25 MG/1
25 TABLET, FILM COATED, EXTENDED RELEASE ORAL DAILY
Qty: 30 TABLET | Refills: 11 | Status: SHIPPED | OUTPATIENT
Start: 2024-03-05 | End: 2025-03-05

## 2024-03-25 ENCOUNTER — HOSPITAL ENCOUNTER (EMERGENCY)
Facility: HOSPITAL | Age: 79
Discharge: HOME | End: 2024-03-25
Attending: EMERGENCY MEDICINE
Payer: OTHER GOVERNMENT

## 2024-03-25 VITALS
TEMPERATURE: 98 F | HEART RATE: 50 BPM | SYSTOLIC BLOOD PRESSURE: 166 MMHG | OXYGEN SATURATION: 94 % | RESPIRATION RATE: 12 BRPM | DIASTOLIC BLOOD PRESSURE: 80 MMHG

## 2024-03-25 DIAGNOSIS — R11.10 VOMITING: ICD-10-CM

## 2024-03-25 DIAGNOSIS — R00.1 BRADYCARDIA: ICD-10-CM

## 2024-03-25 DIAGNOSIS — R11.2 NAUSEA AND VOMITING, UNSPECIFIED VOMITING TYPE: Primary | ICD-10-CM

## 2024-03-25 LAB
ALBUMIN SERPL BCP-MCNC: 4.1 G/DL (ref 3.5–5.2)
ALP SERPL-CCNC: 49 U/L (ref 55–135)
ALT SERPL W/O P-5'-P-CCNC: 7 U/L (ref 10–44)
ANION GAP SERPL CALC-SCNC: 9 MMOL/L (ref 8–16)
AST SERPL-CCNC: 10 U/L (ref 10–40)
BASOPHILS # BLD AUTO: 0.04 K/UL (ref 0–0.2)
BASOPHILS NFR BLD: 0.6 % (ref 0–1.9)
BILIRUB SERPL-MCNC: 0.5 MG/DL (ref 0.1–1)
BILIRUB UR QL STRIP: NEGATIVE
BUN SERPL-MCNC: 15 MG/DL (ref 8–23)
CALCIUM SERPL-MCNC: 9 MG/DL (ref 8.7–10.5)
CHLORIDE SERPL-SCNC: 104 MMOL/L (ref 95–110)
CLARITY UR: CLEAR
CO2 SERPL-SCNC: 26 MMOL/L (ref 23–29)
COLOR UR: YELLOW
CREAT SERPL-MCNC: 1.1 MG/DL (ref 0.5–1.4)
DIFFERENTIAL METHOD BLD: ABNORMAL
EOSINOPHIL # BLD AUTO: 0.1 K/UL (ref 0–0.5)
EOSINOPHIL NFR BLD: 1.5 % (ref 0–8)
ERYTHROCYTE [DISTWIDTH] IN BLOOD BY AUTOMATED COUNT: 13.7 % (ref 11.5–14.5)
EST. GFR  (NO RACE VARIABLE): >60 ML/MIN/1.73 M^2
GLUCOSE SERPL-MCNC: 85 MG/DL (ref 70–110)
GLUCOSE UR QL STRIP: NEGATIVE
HCT VFR BLD AUTO: 42 % (ref 40–54)
HGB BLD-MCNC: 13.7 G/DL (ref 14–18)
HGB UR QL STRIP: NEGATIVE
IMM GRANULOCYTES # BLD AUTO: 0.02 K/UL (ref 0–0.04)
IMM GRANULOCYTES NFR BLD AUTO: 0.3 % (ref 0–0.5)
KETONES UR QL STRIP: NEGATIVE
LEUKOCYTE ESTERASE UR QL STRIP: NEGATIVE
LIPASE SERPL-CCNC: 24 U/L (ref 4–60)
LYMPHOCYTES # BLD AUTO: 2.6 K/UL (ref 1–4.8)
LYMPHOCYTES NFR BLD: 35.7 % (ref 18–48)
MCH RBC QN AUTO: 30.4 PG (ref 27–31)
MCHC RBC AUTO-ENTMCNC: 32.6 G/DL (ref 32–36)
MCV RBC AUTO: 93 FL (ref 82–98)
MONOCYTES # BLD AUTO: 0.4 K/UL (ref 0.3–1)
MONOCYTES NFR BLD: 5.4 % (ref 4–15)
NEUTROPHILS # BLD AUTO: 4.1 K/UL (ref 1.8–7.7)
NEUTROPHILS NFR BLD: 56.5 % (ref 38–73)
NITRITE UR QL STRIP: NEGATIVE
NRBC BLD-RTO: 0 /100 WBC
PH UR STRIP: 6 [PH] (ref 5–8)
PLATELET # BLD AUTO: 205 K/UL (ref 150–450)
PMV BLD AUTO: 9.9 FL (ref 9.2–12.9)
POTASSIUM SERPL-SCNC: 3.9 MMOL/L (ref 3.5–5.1)
PROT SERPL-MCNC: 6.9 G/DL (ref 6–8.4)
PROT UR QL STRIP: ABNORMAL
RBC # BLD AUTO: 4.5 M/UL (ref 4.6–6.2)
SODIUM SERPL-SCNC: 139 MMOL/L (ref 136–145)
SP GR UR STRIP: 1.02 (ref 1–1.03)
URN SPEC COLLECT METH UR: ABNORMAL
UROBILINOGEN UR STRIP-ACNC: NEGATIVE EU/DL
WBC # BLD AUTO: 7.19 K/UL (ref 3.9–12.7)

## 2024-03-25 PROCEDURE — 96374 THER/PROPH/DIAG INJ IV PUSH: CPT

## 2024-03-25 PROCEDURE — 83690 ASSAY OF LIPASE: CPT | Performed by: NURSE PRACTITIONER

## 2024-03-25 PROCEDURE — 80053 COMPREHEN METABOLIC PANEL: CPT | Performed by: NURSE PRACTITIONER

## 2024-03-25 PROCEDURE — 81003 URINALYSIS AUTO W/O SCOPE: CPT | Performed by: NURSE PRACTITIONER

## 2024-03-25 PROCEDURE — 93010 ELECTROCARDIOGRAM REPORT: CPT | Mod: ,,, | Performed by: INTERNAL MEDICINE

## 2024-03-25 PROCEDURE — 93005 ELECTROCARDIOGRAM TRACING: CPT

## 2024-03-25 PROCEDURE — 63600175 PHARM REV CODE 636 W HCPCS: Performed by: NURSE PRACTITIONER

## 2024-03-25 PROCEDURE — 99285 EMERGENCY DEPT VISIT HI MDM: CPT | Mod: 25

## 2024-03-25 PROCEDURE — 85025 COMPLETE CBC W/AUTO DIFF WBC: CPT | Performed by: NURSE PRACTITIONER

## 2024-03-25 RX ORDER — ONDANSETRON HYDROCHLORIDE 2 MG/ML
4 INJECTION, SOLUTION INTRAVENOUS
Status: COMPLETED | OUTPATIENT
Start: 2024-03-25 | End: 2024-03-25

## 2024-03-25 RX ORDER — ONDANSETRON 4 MG/1
4 TABLET, ORALLY DISINTEGRATING ORAL EVERY 8 HOURS PRN
Qty: 12 TABLET | Refills: 0 | Status: SHIPPED | OUTPATIENT
Start: 2024-03-25

## 2024-03-25 RX ADMIN — ONDANSETRON 4 MG: 2 INJECTION INTRAMUSCULAR; INTRAVENOUS at 03:03

## 2024-03-25 NOTE — ED NOTES
This RN assumed care of pt. Pt aao x 4. Pt lying on ED stretcher, respirations even and unlabored. Nadn. Pts family member at the bedside. Pt connected to cardiac monitoring, automatic bp cuff, and cont pulse ox. Bradycardic; vitally stable otherwise. Pt denies any pain currently. Explains he has had some intermittent nausea and lower back pain. Pt was dx with kidney stone and family is concerned for this. Pt denies any fever, abd pain, bowel or urinary issues. Patient answers all questions appropriately. Repositioned on stretcher and provided with warm blanket for comfort. Pt and pt family member updated on poc. Bed rails up x 2, call bell within reach, bed in lowest locked position. Pt denies any needs at this time. Care ongoing.

## 2024-03-25 NOTE — ED TRIAGE NOTES
Pt arrived with c/o middle back pain x 2 weeks.  Pt also reports pain had one episode of vomiting last week and once last night.  +nausea at present.  Per family member, pt has a kidney stone.  PT denies any fever, abd pain, or urinary symptoms. Pt answering questions appropriately, [place], respirations even and unlabored.  Aao x 4.

## 2024-03-25 NOTE — FIRST PROVIDER EVALUATION
Emergency Department TeleTriage Encounter Note      CHIEF COMPLAINT    Chief Complaint   Patient presents with    Vomiting     Pt complains of emesis and back pain x 3 days        VITAL SIGNS   Initial Vitals [03/25/24 1333]   BP Pulse Resp Temp SpO2   (!) 168/77 (!) 58 18 98.3 °F (36.8 °C) 95 %      MAP       --            ALLERGIES    Review of patient's allergies indicates:  No Known Allergies    PROVIDER TRIAGE NOTE  78-year-old male presents to the ER with complaints of 2 episodes of vomiting and right flank pain that began approximately 3 days ago.  He has history of kidney stones and states similar symptoms currently.  He denies any hematuria or decreased urinary output.  No fever.  No abdominal pain.    AAOx3, respirations even and non- labored, stable vitals, normal coloration of skin, sitting upright in triage chair, appears in no acute distress.          ORDERS  Labs Reviewed - No data to display    ED Orders (720h ago, onward)      None              Virtual Visit Note: The provider triage portion of this emergency department evaluation and documentation was performed via [employer], a HIPAA-compliant telemedicine application, in concert with a tele-presenter in the room. A face to face patient evaluation with one of my colleagues will occur once the patient is placed in an emergency department room.      DISCLAIMER: This note was prepared with [employer] voice recognition transcription software. Garbled syntax, mangled pronouns, and other bizarre constructions may be attributed to that software system.

## 2024-03-25 NOTE — ED PROVIDER NOTES
Encounter Date: 3/25/2024       History     Chief Complaint   Patient presents with    Vomiting     Pt complains of emesis and back pain x 3 days      Patient is a 70-year-old male with a past medical history of hypertension hyperlipidemia BPH anxiety PTSD arthritis who presents emergency room for evaluation of nausea and vomiting last night.  He had no diarrhea.  He denies any chest pain shortness breath abdominal pain fevers chills runny nose sore throat cough cold congestion.      Review of patient's allergies indicates:  No Known Allergies  Past Medical History:   Diagnosis Date    Anxiety     BPH (benign prostatic hyperplasia)     Cataract     Elevated PSA     Erectile dysfunction     Hyperlipidemia     Hypertension     Hypogonadism male     Kidney stone 5/20/2020    Obesity     PTSD (post-traumatic stress disorder)     Shoulder arthritis     Urinary tract infection      Past Surgical History:   Procedure Laterality Date    ARTHROPLASTY OF SHOULDER Right 9/14/2023    Procedure: ARTHROPLASTY, SHOULDER;  Surgeon: Gabe Manning MD;  Location: [facility];  Service: Orthopedics;  Laterality: Right;  Ayaan david notified cc    COLONOSCOPY N/A 11/26/2018    Procedure: COLONOSCOPY;  Surgeon: Germán Moss MD;  Location: [address]);  Service: Endoscopy;  Laterality: N/A;    COLONOSCOPY W/ BIOPSIES  2010    CYSTOSCOPY Left 11/17/2022    Procedure: CYSTOSCOPY;  Surgeon: Dannie Murray MD;  Location: [facility] OR;  Service: Urology;  Laterality: Left;    EXTRACORPOREAL SHOCK WAVE LITHOTRIPSY Left 12/2/2022    Procedure: LITHOTRIPSY, ESWL [facility] [place] ESWL machine, scheduling call [phone] 60 minutes;  Surgeon: Dannie Murray MD;  Location: [facility];  Service: Urology;  Laterality: Left;  Confirmed with [facility] 11/22 [facility] conf # [id number]    EXTRACORPOREAL SHOCK WAVE LITHOTRIPSY Left 1/30/2023    Procedure: LITHOTRIPSY, ESWL [facility] [place] ESWL machine, scheduling call  [phone] 60 minutes;  Surgeon: Dannie Murray MD;  Location: [facility] OR;  Service: Urology;  Laterality: Left;  [facility] conf #[id number]    PENILE PROSTHESIS IMPLANT      PERCUTANEOUS NEPHROLITHOTOMY Left 2023    Procedure: CYSTOSCOPY, LEFT RETROGRADE PYELOGRAM, LEFT URETEROSCOPY WITH STONE BASKET EXTRACTION, PLACEMENT OF JJ STENT, LEFT NEPHROSTOMY TUBE REMOVAL;  Surgeon: Dannie Murray MD;  Location: [facility] OR;  Service: Urology;  Laterality: Left;  IR to place ureteral catheter prior, start time 1200  Cysto tower, flexible cysto/ureteroscopes, Laser in room, lithoclast in room, fluoroscopy with Tony to    REMOVAL OF INFLATABLE PENILE PROSTHESIS N/A 2023    Procedure: REMOVAL, PENILE PROSTHESIS, INFLATABLE;  Surgeon: Dannie Murray MD;  Location: [facility] OR;  Service: Urology;  Laterality: N/A;    ROBOT-ASSISTED LAPAROSCOPIC SIMPLE PROSTATECTOMY N/A 2023    Procedure: ROBOTIC PROSTATECTOMY, SIMPLE;  Surgeon: Dannie Murray MD;  Location: [facility] OR;  Service: Urology;  Laterality: N/A;  Xi robot, open tray, simple prostatectomy     Family History   Problem Relation Age of Onset    Cancer Mother         cancer    Cataracts Mother     Heart disease Father 62        M.I.    Stroke Brother 62    Amblyopia Neg Hx     Blindness Neg Hx     Glaucoma Neg Hx     Macular degeneration Neg Hx     Retinal detachment Neg Hx     Strabismus Neg Hx     Prostate cancer Neg Hx     Kidney disease Neg Hx      Social History     Tobacco Use    Smoking status: Former     Current packs/day: 0.00     Average packs/day: 1 pack/day for 10.0 years (10.0 ttl pk-yrs)     Types: Cigarettes     Start date:      Quit date:      Years since quittin.2    Smokeless tobacco: Never   Substance Use Topics    Alcohol use: No    Drug use: No     Review of Systems   Constitutional:  Negative for fever.   HENT:  Negative for ear pain, rhinorrhea and sore throat.    Eyes:  Negative for pain and visual disturbance.    Respiratory:  Negative for cough and shortness of breath.    Cardiovascular:  Negative for chest pain.   Gastrointestinal:  Positive for nausea and vomiting. Negative for abdominal pain and diarrhea.   Genitourinary:  Negative for difficulty urinating.   Musculoskeletal:  Negative for arthralgias.   Skin:  Negative for rash.   Neurological:  Negative for weakness, numbness and headaches.   All other systems reviewed and are negative.      Physical Exam     Initial Vitals [03/25/24 1333]   BP Pulse Resp Temp SpO2   (!) 168/77 (!) 58 18 98.3 °F (36.8 °C) 95 %      MAP       --         Physical Exam    Nursing note and vitals reviewed.  Constitutional: He appears well-developed and well-nourished. No distress.   HENT:   Head: Normocephalic and atraumatic.   Right Ear: External ear normal.   Mouth/Throat: Oropharynx is clear and moist.   Eyes: Conjunctivae and EOM are normal. Pupils are equal, round, and reactive to light.   Neck: Neck supple.   Normal range of motion.  Cardiovascular:  Normal rate, regular rhythm, normal heart sounds and intact distal pulses.     Exam reveals no gallop and no friction rub.       No murmur heard.  Pulmonary/Chest: Breath sounds normal. He has no wheezes. He has no rhonchi. He has no rales.   Abdominal: Abdomen is soft. Bowel sounds are normal. There is no abdominal tenderness (no  abdominal pain or tenderness on exam). There is no rebound and no guarding.   Musculoskeletal:         General: No edema. Normal range of motion.      Cervical back: Normal range of motion and neck supple.     Neurological: He is alert and oriented to person, place, and time. He has normal strength.   Skin: Skin is warm and dry.   Psychiatric: He has a normal mood and affect.         ED Course   Procedures  Labs Reviewed   CBC W/ AUTO DIFFERENTIAL - Abnormal; Notable for the following components:       Result Value    RBC 4.50 (*)     Hemoglobin 13.7 (*)     All other components within normal limits    COMPREHENSIVE METABOLIC PANEL - Abnormal; Notable for the following components:    Alkaline Phosphatase 49 (*)     ALT 7 (*)     All other components within normal limits   URINALYSIS, REFLEX TO URINE CULTURE - Abnormal; Notable for the following components:    Protein, UA Trace (*)     All other components within normal limits    Narrative:     Specimen Source->Urine   LIPASE        ECG Results              EKG 12-lead (In process)        Collection Time Result Time QRS Duration OHS QTC Calculation    03/25/24 15:44:52 03/25/24 16:14:36 108 463                     In process by Interface, Lab In [facility] (03/25/24 16:14:43)                   Narrative:    Test Reason : R11.10,    Vent. Rate : 052 BPM     Atrial Rate : 052 BPM     P-R Int : 168 ms          QRS Dur : 108 ms      QT Int : 498 ms       P-R-T Axes : 073 064 048 degrees     QTc Int : 463 ms    Sinus bradycardia with sinus arrhythmia  Incomplete right bundle branch block  Cannot rule out Anterior infarct (cited on or before 10-AUG-2023)  Abnormal ECG  When compared with ECG of 10-AUG-2023 12:53,  Premature supraventricular complexes are no longer Present  Questionable change in The axis    Referred By: [email or web address]   SELF           Confirmed By:                                   Imaging Results              CT Renal Stone Study ABD Pelvis WO (Final result)  Result time 03/25/24 14:35:00      Final result by Jeremy Thompson MD (03/25/24 14:35:00)                   Impression:      1 cm LEFT lower pole renal calculus, nonobstructing.  No evidence for obstructing nephrolithiasis; no hydroureteronephrosis.      Electronically signed by: Jeremy Thompson MD  Date:    03/25/2024  Time:    14:35               Narrative:    EXAMINATION:  CT RENAL STONE STUDY ABD PELVIS WO    CLINICAL HISTORY:  Flank pain, kidney stone suspected;    TECHNIQUE:  Low dose axial images, sagittal and coronal reformations were obtained from the lung bases to the pubic symphysis.   Contrast was not administered.    COMPARISON:  None    FINDINGS:  Heart: Normal in size. No pericardial effusion.  Coronary vascular calcifications.    Lung Bases: Well aerated, without consolidation or pleural fluid.    Liver: Normal in size and attenuation, with no focal hepatic lesions.    Gallbladder: No calcified gallstones.    Bile Ducts: No evidence of dilated ducts.    Pancreas: No mass or peripancreatic fat stranding.    Spleen: Unremarkable.    Adrenals: Unremarkable.    Kidneys/ Ureters: 1 cm nonobstructing LEFT lower pole renal calculus.  Additional smaller LEFT mid kidney calculus, 0.5 cm.    Bladder: No evidence of wall thickening.    Reproductive organs: Prostate calcifications. .    GI Tract/Mesentery: No evidence of bowel obstruction or inflammation.    Peritoneal Space: No ascites. No free air.    Retroperitoneum: No significant adenopathy.    Abdominal wall: Unremarkable.    Vasculature: No significant atherosclerosis or aneurysm.    Bones: No acute fracture.  Diffuse degenerative changes in the spine.                                       Medications   ondansetron injection 4 mg (4 mg Intravenous Given 3/25/24 1538)     Medical Decision Making  Patient has nausea and vomiting.  He has no chest pain or bradycardic but he is asymptomatic.  He has not hypotensive right here and he feels much better.  He has no abdominal pain or tenderness on exam .  CT or triaged shows nothing acute.  He is nephrolithiasis but no evidence of urinary tract infection or ureterolithiasis.  Lipase is within normal limits.  I believe the patient is stable discharge.  I do not believe this is cardiac in nature.    Risk  Prescription drug management.               ED Course as of 03/25/24 1951   Mon Mar 25, 2024   1647 EKG independently interpreted by me as rate 52 sinus bradycardia with sinus arrhythmia, no significant ST segment elevation or depression, incomplete right bundle-branch block. [JS]      ED Course User  Index  [JS] Pranav Damon MD                           Clinical Impression:  Final diagnoses:  [R11.10] Vomiting  [R11.2] Nausea and vomiting, unspecified vomiting type (Primary)  [R00.1] Bradycardia          ED Disposition Condition    Discharge Stable          ED Prescriptions       Medication Sig Dispense Start Date End Date Auth. Provider    ondansetron (ZOFRAN-ODT) 4 MG TbDL Take 1 tablet (4 mg total) by mouth every 8 (eight) hours as needed. 12 tablet 3/25/2024 -- Pranav Damon MD          Follow-up Information       Follow up With Specialties Details Why Contact Info    Km Chicas MD Internal Medicine Schedule an appointment as soon as possible for a visit   [address] [facility] [address]  [phone]      [facility] Emergency Medicine  If symptoms worsen [address] [facility] [address] [phone]             Pranav Damon MD  03/25/24 1951

## 2024-03-25 NOTE — ED NOTES
Bed: EXAM 08  Expected date:   Expected time:   Means of arrival:   Comments:  [place]-after intake

## 2024-03-25 NOTE — DISCHARGE INSTRUCTIONS
You need to follow up with the primary care doctor.  Call tomorrow to schedule follow up appointment.  Return the ER for worsening symptoms.

## 2024-03-26 LAB
OHS QRS DURATION: 108 MS
OHS QTC CALCULATION: 463 MS

## 2024-03-27 ENCOUNTER — TELEPHONE (OUTPATIENT)
Dept: GASTROENTEROLOGY | Facility: CLINIC | Age: 79
End: 2024-03-27
Payer: OTHER GOVERNMENT

## 2024-03-27 ENCOUNTER — TELEPHONE (OUTPATIENT)
Dept: ENDOSCOPY | Facility: HOSPITAL | Age: 79
End: 2024-03-27
Payer: OTHER GOVERNMENT

## 2024-03-27 NOTE — TELEPHONE ENCOUNTER
Arrival time reminded.VU    ----- Message from Ivana Guerrier sent at 3/27/2024  4:04 PM CDT -----  Type:  Procedure questions    Who Called: pt  Does the patient know what this is regarding?: procedure time  Would the patient rather a call back or a response via [employer]? Call  Best Call Back Number: [phone]  Additional Information:

## 2024-03-27 NOTE — TELEPHONE ENCOUNTER
Spoke with patient's spouse about arrival time @ 3706.   Colon/Golytely    Prep instructions reviewed: the day before the procedure, follow a clear liquid diet all day, then start the first 1/2 of prep at 5pm and take 2nd 1/2 of prep @ 0745.  Pt must be completely NPO when prep completed @ 0945.              Medications: Do not take Insulin or oral diabetic medications the day of the procedure.  Take as prescribed: heart, seizure and blood pressure medication in the morning with a sip of water (less than an ounce).  Take any breathing medications and bring inhalers to hospital with you Leave all valuables and jewelry at home.     Wear comfortable clothes to procedure to change into hospital gown You cannot drive for 24 hours after your procedure because you will receive sedation for your procedure to make you comfortable.  A ride must be provided at discharge.

## 2024-03-28 ENCOUNTER — TELEPHONE (OUTPATIENT)
Dept: GASTROENTEROLOGY | Facility: CLINIC | Age: 79
End: 2024-03-28
Payer: OTHER GOVERNMENT

## 2024-03-28 NOTE — TELEPHONE ENCOUNTER
Rescheduled procedure to 04/16. VU    ----- Message from Sarah Grier sent at 3/28/2024  3:23 PM CDT -----  Regarding: Appt  Contact: [phone]  Patient is calling to reschedule appointment for procedure. Please contact patient

## 2024-04-16 ENCOUNTER — ANESTHESIA EVENT (OUTPATIENT)
Dept: ENDOSCOPY | Facility: HOSPITAL | Age: 79
End: 2024-04-16
Payer: MEDICARE

## 2024-04-16 ENCOUNTER — ANESTHESIA (OUTPATIENT)
Dept: ENDOSCOPY | Facility: HOSPITAL | Age: 79
End: 2024-04-16
Payer: MEDICARE

## 2024-04-16 ENCOUNTER — HOSPITAL ENCOUNTER (OUTPATIENT)
Facility: HOSPITAL | Age: 79
Discharge: HOME | End: 2024-04-16
Attending: INTERNAL MEDICINE | Admitting: INTERNAL MEDICINE
Payer: MEDICARE

## 2024-04-16 VITALS
BODY MASS INDEX: 26.41 KG/M2 | WEIGHT: 195 LBS | HEART RATE: 47 BPM | SYSTOLIC BLOOD PRESSURE: 133 MMHG | RESPIRATION RATE: 13 BRPM | TEMPERATURE: 98 F | DIASTOLIC BLOOD PRESSURE: 83 MMHG | HEIGHT: 72 IN | OXYGEN SATURATION: 87 %

## 2024-04-16 DIAGNOSIS — Z86.010 PERSONAL HISTORY OF COLONIC POLYPS: ICD-10-CM

## 2024-04-16 PROCEDURE — 45380 COLONOSCOPY AND BIOPSY: CPT | Mod: PT | Performed by: INTERNAL MEDICINE

## 2024-04-16 PROCEDURE — 63600175 PHARM REV CODE 636 W HCPCS: Performed by: NURSE ANESTHETIST, CERTIFIED REGISTERED

## 2024-04-16 PROCEDURE — 88305 TISSUE EXAM BY PATHOLOGIST: CPT | Mod: 59 | Performed by: PATHOLOGY

## 2024-04-16 PROCEDURE — D9220A PRA ANESTHESIA: Mod: PT,ANES,, | Performed by: ANESTHESIOLOGY

## 2024-04-16 PROCEDURE — 88305 TISSUE EXAM BY PATHOLOGIST: CPT | Mod: 26,,, | Performed by: PATHOLOGY

## 2024-04-16 PROCEDURE — 27201012 HC FORCEPS, HOT/COLD, DISP: Performed by: INTERNAL MEDICINE

## 2024-04-16 PROCEDURE — 25000003 PHARM REV CODE 250: Performed by: INTERNAL MEDICINE

## 2024-04-16 PROCEDURE — 37000009 HC ANESTHESIA EA ADD 15 MINS: Performed by: INTERNAL MEDICINE

## 2024-04-16 PROCEDURE — 45380 COLONOSCOPY AND BIOPSY: CPT | Mod: PT,,, | Performed by: INTERNAL MEDICINE

## 2024-04-16 PROCEDURE — D9220A PRA ANESTHESIA: Mod: PT,CRNA,, | Performed by: NURSE ANESTHETIST, CERTIFIED REGISTERED

## 2024-04-16 PROCEDURE — 25000003 PHARM REV CODE 250: Performed by: NURSE ANESTHETIST, CERTIFIED REGISTERED

## 2024-04-16 PROCEDURE — 37000008 HC ANESTHESIA 1ST 15 MINUTES: Performed by: INTERNAL MEDICINE

## 2024-04-16 RX ORDER — PROPOFOL 10 MG/ML
VIAL (ML) INTRAVENOUS CONTINUOUS PRN
Status: DISCONTINUED | OUTPATIENT
Start: 2024-04-16 | End: 2024-04-16

## 2024-04-16 RX ORDER — SODIUM CHLORIDE 0.9 % (FLUSH) 0.9 %
10 SYRINGE (ML) INJECTION
Status: DISCONTINUED | OUTPATIENT
Start: 2024-04-16 | End: 2024-04-16 | Stop reason: HOSPADM

## 2024-04-16 RX ORDER — SODIUM CHLORIDE 9 MG/ML
INJECTION, SOLUTION INTRAVENOUS CONTINUOUS
Status: DISCONTINUED | OUTPATIENT
Start: 2024-04-16 | End: 2024-04-16 | Stop reason: HOSPADM

## 2024-04-16 RX ORDER — PROPOFOL 10 MG/ML
VIAL (ML) INTRAVENOUS
Status: DISCONTINUED | OUTPATIENT
Start: 2024-04-16 | End: 2024-04-16

## 2024-04-16 RX ORDER — LIDOCAINE HYDROCHLORIDE 20 MG/ML
INJECTION INTRAVENOUS
Status: DISCONTINUED | OUTPATIENT
Start: 2024-04-16 | End: 2024-04-16

## 2024-04-16 RX ADMIN — PROPOFOL 125 MCG/KG/MIN: 10 INJECTION, EMULSION INTRAVENOUS at 12:04

## 2024-04-16 RX ADMIN — SODIUM CHLORIDE: 0.9 INJECTION, SOLUTION INTRAVENOUS at 11:04

## 2024-04-16 RX ADMIN — LIDOCAINE HYDROCHLORIDE 100 MG: 20 INJECTION, SOLUTION INTRAVENOUS at 12:04

## 2024-04-16 RX ADMIN — PROPOFOL 70 MG: 10 INJECTION, EMULSION INTRAVENOUS at 12:04

## 2024-04-16 NOTE — H&P
Short Stay Endoscopy History and Physical    PCP - Km Chicas MD    Procedure - Colonoscopy  ASA - per anesthesia  Mallampati - per anesthesia  History of Anesthesia problems - no  Family history Anesthesia problems - no   Plan of anesthesia - General    HPI:  This is a 78 y.o. male here for evaluation of : asymptomatic screening exam    Hx of polyps      ROS:  Constitutional: No fevers, chills, No weight loss  CV: No chest pain  Pulm: No cough, No shortness of breath  GI: see HPI  Derm: No rash    Medical History:  has a past medical history of Anxiety, BPH (benign prostatic hyperplasia), Cataract, Elevated PSA, Erectile dysfunction, Hyperlipidemia, Hypertension, Hypogonadism male, Kidney stone (5/20/2020), Obesity, PTSD (post-traumatic stress disorder), Shoulder arthritis, and Urinary tract infection.    Surgical History:  has a past surgical history that includes Colonoscopy w/ biopsies (2010); Penile prosthesis implant; Colonoscopy (N/A, 11/26/2018); Cystoscopy (Left, 11/17/2022); Extracorporeal shock wave lithotripsy (Left, 12/2/2022); Extracorporeal shock wave lithotripsy (Left, 1/30/2023); Percutaneous nephrolithotomy (Left, 8/14/2023); Arthroplasty of shoulder (Right, 9/14/2023); Robot-assisted laparoscopic simple prostatectomy (N/A, 11/6/2023); and Removal of inflatable penile prosthesis (N/A, 11/6/2023).    Family History: family history includes Cancer in his mother; Cataracts in his mother; Heart disease (age of onset: 62) in his father; Stroke (age of onset: 62) in his brother.. Otherwise no colon cancer, inflammatory bowel disease, or GI malignancies.    Social History:  reports that he quit smoking about 49 years ago. His smoking use included cigarettes. He started smoking about 59 years ago. He has a 10 pack-year smoking history. He has never used smokeless tobacco. He reports that he does not drink alcohol and does not use drugs.    Review of patient's allergies indicates:  No Known  Allergies    Medications:   Medications Prior to Admission   Medication Sig Dispense Refill Last Dose    atorvastatin (LIPITOR) 40 MG tablet Take 20 mg by mouth once daily.   4/15/2024    carbidopa-levodopa  mg (SINEMET)  mg per tablet TAKE 1 TABLET BY MOUTH THREE TIMES A DAY FOR PARKINSON'S DISE** WITH MEALSASE   4/15/2024    celecoxib (CELEBREX) 200 MG capsule Take 400 mg by mouth once daily.   4/15/2024    ferrous sulfate (FEOSOL) 325 mg (65 mg iron) Tab tablet 325 mg.   4/15/2024    FLUoxetine 20 MG capsule 60 mg.   4/15/2024    ibuprofen (ADVIL,MOTRIN) 800 MG tablet Take 1 tablet (800 mg total) by mouth every 8 (eight) hours. 20 tablet 0 Past Week    lisinopril (PRINIVIL,ZESTRIL) 5 MG tablet Take 5 mg by mouth once daily.   4/15/2024    mirabegron (MYRBETRIQ) 25 mg Tb24 ER tablet Take 1 tablet (25 mg total) by mouth once daily. 30 tablet 11 4/15/2024    tamsulosin (FLOMAX) 0.4 mg Cp24 Take 1 capsule (0.4 mg total) by mouth nightly. 30 capsule 11 Past Week    traZODone (DESYREL) 100 MG tablet 100 mg.   Past Week    ascorbic acid, vitamin C, (VITAMIN C) 250 MG tablet Take 250 mg by mouth once daily.       naproxen sodium (ANAPROX) 220 MG tablet Take 220 mg by mouth 2 (two) times daily with meals. 2 tablets twice a day   Unknown    ondansetron (ZOFRAN-ODT) 4 MG TbDL Take 1 tablet (4 mg total) by mouth every 8 (eight) hours as needed. 12 tablet 0 Unknown    oxyCODONE (ROXICODONE) 5 MG immediate release tablet Take 1 tablet (5 mg total) by mouth every 6 (six) hours as needed for Pain. 10 tablet 0 Unknown    oxyCODONE-acetaminophen (PERCOCET) 5-325 mg per tablet Take 1 tablet by mouth every 4 (four) hours as needed for Pain. 28 tablet 0     polyethylene glycol (GLYCOLAX) 17 gram/dose powder Take 17 g by mouth daily as needed (take as needed for daily soft bowel movements). 510 g 1     polyvinyl alcohol, artificial tears, (LIQUIFILM TEARS) 1.4 % ophthalmic solution INSTILL ONE DROP IN EACH EYE FOUR TIMES A  DAY FOR DRY EYES       primidone (MYSOLINE) 50 MG Tab Take 2 tablets (100 mg total) by mouth every evening. 60 tablet 11          Physical Exam:    Vital Signs:   Vitals:    04/16/24 1013   BP: (!) 161/84   Pulse: (!) 52   Resp: 18   Temp: 97.9 °F (36.6 °C)       General Appearance: Well appearing in no acute distress  Eyes:    No scleral icterus  ENT: Neck supple, Lips, mucosa, and tongue normal; teeth and gums normal  Abdomen: Soft, non tender, non distended with positive bowel sounds. No hepatosplenomegaly, ascites, or mass.  Extremities: 2+ pulses, no clubbing, cyanosis or edema  Skin: No rash      Labs:  Lab Results   Component Value Date    WBC 7.19 03/25/2024    HGB 13.7 (L) 03/25/2024    HCT 42.0 03/25/2024     03/25/2024    CHOL 156 01/29/2024    TRIG 42 01/29/2024    HDL 66 01/29/2024    ALT 7 (L) 03/25/2024    AST 10 03/25/2024     03/25/2024    K 3.9 03/25/2024     03/25/2024    CREATININE 1.1 03/25/2024    BUN 15 03/25/2024    CO2 26 03/25/2024    TSH 1.317 01/29/2024    PSA 8.2 (H) 08/19/2022    INR 0.9 08/14/2023    HGBA1C 5.2 01/29/2024       I have explained the risks and benefits of endoscopy procedures to the patient including but not limited to bleeding, perforation, infection, and death.  The patient was asked if they understand and allowed to ask any further questions to their satisfaction.    Philip López MD

## 2024-04-16 NOTE — LETTER
February 15, 2024    Brandan Werner  [address] [place] [address]                [address]  [place] [address]  Phone: [phone]  Fax: [phone] Dear Mr. Werner:    GOLYTELY/ COLYTE/ NULYTELY Prep Instructions    [facility] [address]  Clinic Office [phone]  Endoscopy Lab [phone]    You are scheduled for a Colonoscopy with Dr. López on 4/1/2024 at [facility] in [place].    Check in at the Hospital -1st floor, Information desk.   Call [phone] to reschedule.    An adult friend/family member must come with you to drive you home.  You cannot drive, take a taxi, Uber/Lyft or bus to leave the Endoscopy Center alone.  If you do not have someone to drive you home, your test will be cancelled.     Please follow the directions of your doctor if you take any pills that thin your blood. If you take these meds: Aggrenox, Brilinta, Effient, Eliquis, Lovenox, Plavix, Pletal, Pradaxa, Ticilid, Xarelto or Coumadin, let the doctor's office know.    Please hold any GLP-1 medications prior to the procedure: Dulaglutide Trulicity(hold week prior), Exenatide Byetta (hold the morning of procedure), Semaglutide Ozempic (hold week prior), Liraglutide Victoza, Saxenda(hold week prior), Lixisenatide Adlyxin (hold the morning of procedure), Semaglutide Rybelsus (hold the morning of procedure), Tirzepatide Mounjaro (hold week prior)     DON'T: On the morning of the test do not take insulin or pills for diabetes.     DO: On the morning of the test, do take any pills for blood pressure, heart, anti-rejection and or seizures with a small sip of water. Bring any inhalers with you.    To have a good prep, you must follow these instructions - please do not use the directions from the pharmacy.    The doctor will send a prescription for the Golytely.      The Day Before the test:    You can only drink CLEAR LIQUIDS the whole day before your test.  You  can't eat any food for the whole day.    You CAN have:  Water, Coffee or decaf coffee (no milk or cream)  Tea  Soft drinks - regular and sugar free  Jello (green or yellow)  Apple Juice, white grape juice, white cranberry juice  Gatorade, Power Aid, Crystal Light, El Aid  Lemonade and Limeade  Bouillon, clear soup  Snowball, popsicles  YOU CAN'T DRINK ANYTHING RED, PURPLE ORANGE OR BLUE   YOU CAN'T DRINK ALCOHOL  ONLY DRINK WHAT IS ON THE LIST      At 12 noon,   Add water up to the line on the jug of GOLYTELY (should be 1 gallon when done).  You can add a packet of yellow/green powder drink mix to the jug.   Put the bottle in the refrigerator if you prefer. It should taste better if it is cold. Do NOT put this solution over ice. It is ok to drink with a straw.    At 5 pm the NIGHT before your test:    Drink 1 glass (8 ounces) every 10 minutes until 1/2 of the jug is finished.  Put the jug back in the refrigerator after you finish the first half, and don't drink any more until 5 hours before you come to the hospital (see below for more specific details).    You can continue to drink clear liquids until you go to sleep.    The Day of the test - We will call you 2 days before your test to tell you what time to get there.    5 hours before you come to the hospital (this may be in the middle of the night)  Drink 1 glass (8 ounces) every 10 minutes until the jug is finished.     YOU CAN'T EAT OR DRINK ANYTHING ELSE ONCE YOU FINISH THE PREP    Leave all valuables and jewelry at home. You will be at the hospital for 2-4 hours.    Call the Endoscopy department at [phone] with any questions about your procedure.           If you have any questions or concerns, please don't hesitate to call.    Sincerely,        Mary Carmen Lawson MA

## 2024-04-16 NOTE — PROVATION PATIENT INSTRUCTIONS
Discharge Summary/Instructions after an Endoscopic Procedure  Patient Name: Brandan Werner  Patient MRN: [id number]  Patient YOB: 1945 Tuesday, April 16, 2024  Philip López MD  Dear patient,  As a result of recent federal legislation (The Federal Cures Act), you may   receive lab or pathology results from your procedure in your [facility]   account before your physician is able to contact you. Your physician or   their representative will relay the results to you with their   recommendations at their soonest availability.  Thank you,  Your health is very important to us during the Covid Crisis. Following your   procedure today, you will receive a daily text for 2 weeks asking about   signs or symptoms of Covid 19.  Please respond to this text when you   receive it so we can follow up and keep you as safe as possible.   RESTRICTIONS:  During your procedure today, you received medications for sedation.  These   medications may affect your judgment, balance and coordination.  Therefore,   for 24 hours, you have the following restrictions:   - DO NOT drive a car, operate machinery, make legal/financial decisions,   sign important papers or drink alcohol.    ACTIVITY:  Today: no heavy lifting, straining or running due to procedural   sedation/anesthesia.  The following day: return to full activity including work.  DIET:  Eat and drink normally unless instructed otherwise.     TREATMENT FOR COMMON SIDE EFFECTS:  - Mild abdominal pain, nausea, belching, bloating or excessive gas:  rest,   eat lightly and use a heating pad.  - Sore Throat: treat with throat lozenges and/or gargle with warm salt   water.  - Because air was used during the procedure, expelling large amounts of air   from your rectum or belching is normal.  - If a bowel prep was taken, you may not have a bowel movement for 1-3 days.    This is normal.  SYMPTOMS TO WATCH FOR AND REPORT TO YOUR PHYSICIAN:  1. Abdominal pain or bloating, other than  gas cramps.  2. Chest pain.  3. Back pain.  4. Signs of infection such as: chills or fever occurring within 24 hours   after the procedure.  5. Rectal bleeding, which would show as bright red, maroon, or black stools.   (A tablespoon of blood from the rectum is not serious, especially if   hemorrhoids are present.)  6. Vomiting.  7. Weakness or dizziness.  GO DIRECTLY TO THE NEAREST EMERGENCY ROOM IF YOU HAVE ANY OF THE FOLLOWING:      Difficulty breathing              Chills and/or fever over 101 F   Persistent vomiting and/or vomiting blood   Severe abdominal pain   Severe chest pain   Black, tarry stools   Bleeding- more than one tablespoon   Any other symptom or condition that you feel may need urgent attention  Your doctor recommends these additional instructions:  If any biopsies were taken, your doctors clinic will contact you in 1 to 2   weeks with any results.  - Discharge patient to home.   - Patient has a contact number available for emergencies.  The signs and   symptoms of potential delayed complications were discussed with the   patient.  Return to normal activities tomorrow.  Written discharge   instructions were provided to the patient.   - Resume previous diet.   - Continue present medications.   - Await pathology results.   - No repeat colonoscopy due to age.  For questions, problems or results please call your physician - Philip López MD.  EMERGENCY PHONE NUMBER: [phone],  LAB RESULTS: [phone]  IF A COMPLICATION OR EMERGENCY SITUATION ARISES AND YOU ARE UNABLE TO REACH   YOUR PHYSICIAN - GO DIRECTLY TO THE EMERGENCY ROOM.  Philip López MD  4/16/2024 12:43:01 PM  This report has been verified and signed electronically.  Dear patient,  As a result of recent federal legislation (The Federal Cures Act), you may   receive lab or pathology results from your procedure in your [facility]   account before your physician is able to contact you. Your physician or   their representative  will relay the results to you with their   recommendations at their soonest availability.  Thank you,  PROVATION

## 2024-04-16 NOTE — ANESTHESIA PREPROCEDURE EVALUATION
04/16/2024  Brandan Werner is a 78 y.o., male.      Pre-op Assessment     I have reviewed the Nursing Notes.    I have reviewed the Medications.     Review of Systems  Anesthesia Hx:  No problems with previous Anesthesia             Denies Family Hx of Anesthesia complications.     Social:  Non-Smoker, No Alcohol Use       Hematology/Oncology:  Hematology Normal   Oncology Normal                                   EENT/Dental:  EENT/Dental Normal           Cardiovascular:  Exercise tolerance: good   Hypertension                                  Hypertension         Pulmonary:  Pulmonary Normal                       Renal/:  Chronic Renal Disease        Kidney Function/Disease             Hepatic/GI:  Hepatic/GI Normal                 Musculoskeletal:  Musculoskeletal Normal                Neurological:  Neurology Normal                                      Endocrine:  Endocrine Normal            Psych:  Psychiatric History                  Physical Exam  General: Well nourished    Airway:  Mallampati: II / II  Mouth Opening: Normal  TM Distance: Normal  Tongue: Normal  Neck ROM: Normal ROM    Dental:  Intact        Anesthesia Plan  Type of Anesthesia, risks & benefits discussed:    Anesthesia Type: Gen Natural Airway  Intra-op Monitoring Plan: Standard ASA Monitors  Post Op Pain Control Plan: multimodal analgesia  Induction:  IV  Airway Plan: Direct, Post-Induction  Informed Consent: Informed consent signed with the Patient and all parties understand the risks and agree with anesthesia plan.  All questions answered.   ASA Score: 2    Ready For Surgery From Anesthesia Perspective.     .

## 2024-04-16 NOTE — TRANSFER OF CARE
Anesthesia Transfer of Care Note    Patient: Brandan Werner    Procedure(s) Performed: Procedure(s) (LRB):  COLONOSCOPY (N/A)    Patient location: GI    Anesthesia Type: general    Transport from OR: Transported from OR on room air with adequate spontaneous ventilation    Post pain: adequate analgesia    Post assessment: no apparent anesthetic complications    Post vital signs: stable    Level of consciousness: awake, alert and oriented    Nausea/Vomiting: no nausea/vomiting    Complications: none    Transfer of care protocol was followed      Last vitals: Visit Vitals  BP (!) 161/84   Pulse (!) 52   Temp 36.6 °C (97.9 °F)   Resp 18   Ht 6' (1.829 m)   Wt 88.5 kg (195 lb)   SpO2 96%   BMI 26.45 kg/m²

## 2024-04-17 NOTE — ANESTHESIA POSTPROCEDURE EVALUATION
Anesthesia Post Evaluation    Patient: Brandan Werner    Procedure(s) Performed: Procedure(s) (LRB):  COLONOSCOPY (N/A)    Final Anesthesia Type: general      Patient location during evaluation: GI PACU  Patient participation: Yes- Able to Participate  Level of consciousness: awake and alert  Post-procedure vital signs: reviewed and stable  Pain management: adequate  Airway patency: patent    PONV status at discharge: No PONV  Anesthetic complications: no      Cardiovascular status: blood pressure returned to baseline and hemodynamically stable  Respiratory status: unassisted  Hydration status: euvolemic  Follow-up not needed.              Vitals Value Taken Time   /83 04/16/24 1315   Temp 36.8 °C (98.2 °F) 04/16/24 1245   Pulse 47 04/16/24 1315   Resp 13 04/16/24 1315   SpO2 87 % 04/16/24 1315         Event Time   Out of Recovery 13:15:00         Pain/Levi Score: Levi Score: 10 (4/16/2024  1:15 PM)

## 2024-04-22 LAB
FINAL PATHOLOGIC DIAGNOSIS: NORMAL
GROSS: NORMAL
Lab: NORMAL

## 2024-04-30 ENCOUNTER — OFFICE VISIT (OUTPATIENT)
Dept: UROLOGY | Facility: CLINIC | Age: 79
End: 2024-04-30
Payer: OTHER GOVERNMENT

## 2024-04-30 VITALS
DIASTOLIC BLOOD PRESSURE: 75 MMHG | WEIGHT: 207.13 LBS | SYSTOLIC BLOOD PRESSURE: 110 MMHG | HEART RATE: 59 BPM | HEIGHT: 72 IN | BODY MASS INDEX: 28.05 KG/M2

## 2024-04-30 DIAGNOSIS — R35.0 FREQUENCY OF URINATION: Primary | ICD-10-CM

## 2024-04-30 DIAGNOSIS — R33.8 BENIGN PROSTATIC HYPERPLASIA WITH URINARY RETENTION: ICD-10-CM

## 2024-04-30 DIAGNOSIS — N40.1 BENIGN PROSTATIC HYPERPLASIA WITH URINARY RETENTION: ICD-10-CM

## 2024-04-30 LAB — POC RESIDUAL URINE VOLUME: 0 ML (ref 0–100)

## 2024-04-30 PROCEDURE — 99999 PR PBB SHADOW E&M-EST. PATIENT-LVL IV: CPT | Mod: PBBFAC,,, | Performed by: UROLOGY

## 2024-04-30 PROCEDURE — 51798 US URINE CAPACITY MEASURE: CPT | Mod: PBBFAC,PO | Performed by: UROLOGY

## 2024-04-30 PROCEDURE — 99214 OFFICE O/P EST MOD 30 MIN: CPT | Mod: PBBFAC,PO,25 | Performed by: UROLOGY

## 2024-04-30 RX ORDER — OXYBUTYNIN CHLORIDE 5 MG/1
5 TABLET, EXTENDED RELEASE ORAL DAILY
Qty: 30 TABLET | Refills: 5 | Status: SHIPPED | OUTPATIENT
Start: 2024-04-30 | End: 2024-10-27

## 2024-04-30 NOTE — PROGRESS NOTES
Subjective:       Patient ID: Brandan Werner is a 78 y.o. male.    Chief Complaint: No chief complaint on file.       This is a 78 y.o.  male patient with BPH, elevated PSA, kidney stone.    Past patient of Dr. Laboy last seen in 2020 for cystoscopy for microhematuria that was negative except large prostate.  H/o penile implant that still uses.  CTU in 2020 with 150 gm prostate no other findings.  Long h/o elevated PSA.    PSA 6.2 in 2013 and biopsy negative  Biopsy in 2013 with ASAP  PSA recently checked by PCP and was 8.2   Moderate/severe LUTs, AUA SS 19/2, PVR 67  Worsening frequency of urination on finasteride, tamsulosin.    UA showed microhematuria.  CTU done showed left 1 cm UPJ stone with moderate hydronephrosis 11/22 and left non obstructing stones.    Failed left stent, required PCN and antegrade stent  Left ESWL 12/2  Follow-up KUB shows resolution of UPJ stone continued lower pole left stone.  Repeat left LP stone ESWL 1/30/23, appeared to be good fragmentation.  KUB shows left dense stone left LP but still present.  Stent in place. Stent removed 2/7/23.   CT 5/1/23: left enlarging LP stone now 2.3 cm, mild left pelviectasis, read as possible lesion, on review with radiologist small stone near UPJ and mild pelviectasis with pelvis thickening no obvious mass.    Intermittent left sided pain, still LUTs from BPH but not as severe as prior.        Attempted left PCNL 8/14/23--IR could not get access down ureter, taken to cystoscopy suite, ureteroscopy done showing access between two calyces (high risk bleeding to dilate), survey of kidney for some time unable to locate dominant stone some stone fragmented in lower pole, thus access removed and stent placed.    Follow up CT 9/23 with left LP 1.1 cm stone.  Minimal pain.  Had shoulder surgery 9/14/23 and retention after, ludwig in place.   Passed void trial but then recurrent retention.  Has Ludwig in place.  Wishes to have intervention for enlarged  prostate.  Of note, does have inflatable penile prosthesis that he does not use but reports functions.  Cath changed for leaking 10/22/23, urine culture with enterococcus has been on keflex.   Urine culture negative prior to surgery.    RASP, IPP explant 11/6/23. Cystogram negative for leak 11/15/23.   Doing well.    AUA SS current 3/1 (12/23).  Does have some frequency.  Leakage at night, no AARON or daytime incontinence.  [place] did not approve Myrbetriq.    Still some frequency, but voiding well.         IPSS Questionnaire (AUA-SS):  Over the past month    1)  Incomplete Emptying - How often have you had a sensation of not emptying your bladder completely after you finish urinating?  1 - Less than 1 time in 5   2)  Frequency - How often have you had to urinate again less than two hours after you finished urinating? 2 - Less than half the time   3)  Intermittency - How often have you found you stopped and started again several times when you urinated?  0 - Not at all   4) Urgency - How difficult have you found it to postpone urination?  0 - Not at all   5) Weak Stream - How often have you had a weak urinary stream?  0 - Not at all   6) Straining  - How often have you had to push or strain to begin urination?  0 - Not at all   7) Nocturia - How many times did you most typically get up to urinate from the time you went to bed until the time you got up in the morning?  0 - None   Total score:  0-7 mild, 8-19 moderate, 20-35 severe 3   Quality of Life:  1 - Pleased        LAST PSA  Lab Results   Component Value Date    PSA 8.2 (H) 08/19/2022    PSA 7.0 (H) 03/02/2021    PSA 6.5 (H) 01/30/2020    PSA 3.0 11/02/2016    PSA 5.0 (H) 05/02/2014    PSA 6.2 (H) 10/18/2013    PSA 4.49 (H) 02/22/2013    PSA 4.1 (H) 05/09/2012    PSA 4.04 (H) 04/05/2012    PSA 2.8 08/31/2010    PSA 3.1 02/25/2009    PSA 2.2 09/06/2007    PSA 1.8 09/30/2006    PSA 1.7 12/28/2005    PSA 1.8 10/30/2004    PSADIAG 10.5 (H) 08/22/2023    PSADIAG 4.9 (H)  02/22/2023    PSADIAG 7.0 (H) 03/07/2019    PSADIAG 6.6 (H) 09/07/2018    PSADIAG 6.4 (H) 06/08/2018    PSADIAG 5.3 (H) 01/03/2018    PSADIAG 2.9 02/22/2016    PSADIAG 2.9 08/24/2015    PSADIAG 5.7 (H) 02/24/2015    PSADIAG 5.1 (H) 06/26/2014    PSADIAG 5.8 (H) 12/03/2013    PSATOTAL 6.5 (H) 05/09/2023    PSAFREE 1.69 (H) 05/09/2023       Lab Results   Component Value Date    CREATININE 1.1 03/25/2024       ---  PMH/PSH/Medications/Allergies/Social history reviewed and as in chart.    Review of Systems   Constitutional:  Negative for activity change, chills and fever.   HENT:  Negative for congestion.    Respiratory:  Negative for cough, chest tightness and shortness of breath.    Cardiovascular:  Negative for chest pain and palpitations.   Gastrointestinal:  Negative for abdominal distention, abdominal pain, nausea and vomiting.   Genitourinary:  Negative for difficulty urinating, flank pain, frequency, hematuria, penile pain, scrotal swelling and testicular pain.   Musculoskeletal:  Negative for gait problem.       Objective:      Physical Exam  HENT:      Head: Atraumatic.   Pulmonary:      Effort: Pulmonary effort is normal.   Neurological:      General: No focal deficit present.      Mental Status: He is alert and oriented to person, place, and time.     Inc c/d/I    Assessment:     Problem Noted   Benign Prostatic Hyperplasia With Urinary Retention     183 gm prostate on CTU 2022  Initial AUA SS (8/2022): 19/2  PVR 67  Recurrent retention 10/23.  RASP, IPP explant 11/6/23, path negative  AUA SS current 3/1 (12/23), PVR 53     Elevated Psa 3/11/2013    Biopsy 2013 ASAP 1 core, 2014 benign  PSA 8/22--8.2  Volume 183, PSAD 0.04       Kidney Stones 5/20/2020    Left 1.5 cm UPJ and non obstructing left lower pole greater than 1 cm on CTU 11/2022 with moderate hydronephrosis     Unable to place left stent 11/17/22 due to large prostate, prosthesis and unable to find ureteral orifice.   Left PCN 11/18/22,  internalized to stent 11/28  Left ESWL UPJ stone 12/2/22  KUB after with 1.8 cm left lower pole stone consistent with lower pole stone on CT urogram, no renal pelvis/gualberto stent stone seen.  Left ESWL 1/30/23 good fragmentation  KUB shows continued LLP stone  CT 5/23: left enlarging LP stone 2.3 cm mild pelviectasis, small stone fragment at UPJ  Attempted left PCNL 8/14/23--IR could not get access down ureter, taken to cystoscopy suite, ureteroscopy done showing access between two calyx, survey of kidney for some time unable to locate dominant stone some stone fragmented in lower pole     History of Penile Implant 5/20/2020         Plan:     Oxybutynin to pharmacy.  Side effects, risks, benefits and alternatives of the medication discussed.   PVR low.  Follow up in 3 months      Dannie Murray MD

## 2024-05-03 ENCOUNTER — OFFICE VISIT (OUTPATIENT)
Dept: ORTHOPEDICS | Facility: CLINIC | Age: 79
End: 2024-05-03
Payer: OTHER GOVERNMENT

## 2024-05-03 ENCOUNTER — HOSPITAL ENCOUNTER (OUTPATIENT)
Dept: RADIOLOGY | Facility: HOSPITAL | Age: 79
Discharge: HOME | End: 2024-05-03
Attending: ORTHOPAEDIC SURGERY
Payer: OTHER GOVERNMENT

## 2024-05-03 VITALS — BODY MASS INDEX: 28.04 KG/M2 | WEIGHT: 207 LBS | HEIGHT: 72 IN

## 2024-05-03 DIAGNOSIS — M25.511 RIGHT SHOULDER PAIN, UNSPECIFIED CHRONICITY: ICD-10-CM

## 2024-05-03 DIAGNOSIS — Z96.611 STATUS POST TOTAL SHOULDER REPLACEMENT, RIGHT: Primary | ICD-10-CM

## 2024-05-03 PROCEDURE — 99213 OFFICE O/P EST LOW 20 MIN: CPT | Mod: S$PBB,,, | Performed by: ORTHOPAEDIC SURGERY

## 2024-05-03 PROCEDURE — 73030 X-RAY EXAM OF SHOULDER: CPT | Mod: TC,PN,RT

## 2024-05-03 PROCEDURE — 99214 OFFICE O/P EST MOD 30 MIN: CPT | Mod: PBBFAC,25,PN | Performed by: ORTHOPAEDIC SURGERY

## 2024-05-03 PROCEDURE — 73030 X-RAY EXAM OF SHOULDER: CPT | Mod: 26,RT,, | Performed by: RADIOLOGY

## 2024-05-03 PROCEDURE — 99999 PR PBB SHADOW E&M-EST. PATIENT-LVL IV: CPT | Mod: PBBFAC,,, | Performed by: ORTHOPAEDIC SURGERY

## 2024-05-03 NOTE — PROGRESS NOTES
Patient ID:   Brandna Werner is a 78 y.o. male.    Chief Complaint:   7m 19d s/p right total shoulder arthroplasty    HPI:   The patient is returning for evaluation of the shoulder. He reports some pain and stiffness. He is interested in returning to PT.     Medications:    Current Outpatient Medications:     ascorbic acid, vitamin C, (VITAMIN C) 250 MG tablet, Take 250 mg by mouth once daily., Disp: , Rfl:     atorvastatin (LIPITOR) 40 MG tablet, Take 20 mg by mouth once daily., Disp: , Rfl:     carbidopa-levodopa  mg (SINEMET)  mg per tablet, TAKE 1 TABLET BY MOUTH THREE TIMES A DAY FOR PARKINSON'S DISE** WITH MEALSASE, Disp: , Rfl:     celecoxib (CELEBREX) 200 MG capsule, Take 400 mg by mouth once daily., Disp: , Rfl:     ferrous sulfate (FEOSOL) 325 mg (65 mg iron) Tab tablet, 325 mg., Disp: , Rfl:     FLUoxetine 20 MG capsule, 60 mg., Disp: , Rfl:     ibuprofen (ADVIL,MOTRIN) 800 MG tablet, Take 1 tablet (800 mg total) by mouth every 8 (eight) hours., Disp: 20 tablet, Rfl: 0    lisinopril (PRINIVIL,ZESTRIL) 5 MG tablet, Take 5 mg by mouth once daily., Disp: , Rfl:     mirabegron (MYRBETRIQ) 25 mg Tb24 ER tablet, Take 1 tablet (25 mg total) by mouth once daily., Disp: 30 tablet, Rfl: 11    naproxen sodium (ANAPROX) 220 MG tablet, Take 220 mg by mouth 2 (two) times daily with meals. 2 tablets twice a day, Disp: , Rfl:     ondansetron (ZOFRAN-ODT) 4 MG TbDL, Take 1 tablet (4 mg total) by mouth every 8 (eight) hours as needed., Disp: 12 tablet, Rfl: 0    oxybutynin (DITROPAN-XL) 5 MG TR24, Take 1 tablet (5 mg total) by mouth once daily., Disp: 30 tablet, Rfl: 5    oxyCODONE (ROXICODONE) 5 MG immediate release tablet, Take 1 tablet (5 mg total) by mouth every 6 (six) hours as needed for Pain., Disp: 10 tablet, Rfl: 0    oxyCODONE-acetaminophen (PERCOCET) 5-325 mg per tablet, Take 1 tablet by mouth every 4 (four) hours as needed for Pain., Disp: 28 tablet, Rfl: 0    polyethylene glycol (GLYCOLAX) 17  gram/dose powder, Take 17 g by mouth daily as needed (take as needed for daily soft bowel movements)., Disp: 510 g, Rfl: 1    polyvinyl alcohol, artificial tears, (LIQUIFILM TEARS) 1.4 % ophthalmic solution, INSTILL ONE DROP IN EACH EYE FOUR TIMES A DAY FOR DRY EYES, Disp: , Rfl:     tamsulosin (FLOMAX) 0.4 mg Cp24, Take 1 capsule (0.4 mg total) by mouth nightly., Disp: 30 capsule, Rfl: 11    traZODone (DESYREL) 100 MG tablet, 100 mg., Disp: , Rfl:     primidone (MYSOLINE) 50 MG Tab, Take 2 tablets (100 mg total) by mouth every evening., Disp: 60 tablet, Rfl: 11  No current facility-administered medications for this visit.    Facility-Administered Medications Ordered in Other Visits:     lactated ringers infusion, , Intravenous, Continuous, Maureen Solomon DNP    LIDOcaine (PF) 10 mg/ml (1%) injection 10 mg, 1 mL, Intradermal, Once, Maureen Solomon DNP    Allergies:  Review of patient's allergies indicates:  No Known Allergies    Vitals:  Ht 6' (1.829 m)   Wt 93.9 kg (207 lb 0.2 oz)   BMI 28.08 kg/m²     Physical Examination:  Ortho Exam   Right shoulder exam:  No erythema.   ROM: active elevation 130, ER10  5/5 elevation, ER, IR strength    Imaging Studies:  I have ordered and independently reviewed the following imaging studies performed at [facility] today    X-Ray Shoulder Trauma 3 view Right  EXAMINATION:  XR SHOULDER TRAUMA 3 VIEW RIGHT    CLINICAL HISTORY:  Pain in right shoulder    FINDINGS:  Shoulder trauma three views right.    There is a right TSA in place good alignment and no complication seen.  There is baseline DJD.    Electronically signed by: Dawit River MD  Date:    05/03/2024  Time:    09:05      Assessment:  1. Status post total shoulder replacement, right      Plan:  I will order PT in the hope of him getting more ROM. He will return after PT has been completed.     Orders Placed This Encounter    Ambulatory referral/consult to Physical/Occupational Therapy     No follow-ups on file.

## 2024-05-13 ENCOUNTER — CLINICAL SUPPORT (OUTPATIENT)
Dept: REHABILITATION | Facility: HOSPITAL | Age: 79
End: 2024-05-13
Attending: ORTHOPAEDIC SURGERY
Payer: OTHER GOVERNMENT

## 2024-05-13 DIAGNOSIS — M25.60 STIFFNESS DUE TO IMMOBILITY: ICD-10-CM

## 2024-05-13 DIAGNOSIS — Z74.09 STIFFNESS DUE TO IMMOBILITY: ICD-10-CM

## 2024-05-13 DIAGNOSIS — M79.601 PAIN OF RIGHT UPPER EXTREMITY: Primary | ICD-10-CM

## 2024-05-13 DIAGNOSIS — R53.1 WEAKNESS: ICD-10-CM

## 2024-05-13 PROCEDURE — 97140 MANUAL THERAPY 1/> REGIONS: CPT | Mod: PN

## 2024-05-13 PROCEDURE — 97112 NEUROMUSCULAR REEDUCATION: CPT | Mod: PN

## 2024-05-13 PROCEDURE — 97110 THERAPEUTIC EXERCISES: CPT | Mod: PN

## 2024-05-13 NOTE — PROGRESS NOTES
"[facility]  Occupational Therapy Treatment Note     Date: 5/13/2024  Name: Brandan Werner  Clinic Number: [id number]    Therapy Diagnosis:   Encounter Diagnoses   Name Primary?    Pain of right upper extremity Yes    Weakness     Stiffness due to immobility      Physician: Gabe Manning MD    Physician Orders: OT evaluate and treat  Medical Diagnosis: Z96.611 (ICD-10-CM) - Status post total shoulder replacement, right  Surgical Procedure and Date: Right TSA, 9/14/2023  Evaluation Date: 10/24/2023  Insurance Authorization Period Expiration: 12/31/2023  Plan of Care Expiration: 8/9/2024  Date of Return to MD: none scheduled   Visit # / Visits authorized: 14(+1)/ 1  FOTO:3/3     Precautions:  Patient will have physical therapy renewed to help him try to restore his range of motion.  I would like for him to follow up in 3 months with a x-ray of the right shoulder      Time In: 11:45  Time Out: 12:30  Total Appointment Time (timed & untimed codes): 45 minutes    Subjective     Pt reports: "This shoulder still pains me a little  he was compliant with home exercise program given last session.   Response to previous treatment:low pain  Functional change:improved active range of motion     Pain: 2/10  Location: right shoulder      Objective     Objective Measures updated at progress report unless specified.   Sensation Test: Patient denies any numbness/tingling     Observation/Inspection:rounded shoulders, forward head     Range of Motion/Strength:   Shoulder Left   Right   Pain/Dysfunction with Movement     AROM MMT AROM PROM     Flexion 120 5/5 95(+5) 130(+10)     Extension 55 5/5 55(+5) NT     Abduction 110 5/5 90(+5) 100(-15)     HorizAdduction 20 5/5 20(=) NT     Internal rotation L1 5/5 S2 50(-5)     ER at 90° abd Back of head 5/5 Back of head 60(=)     ER at 0° abd 75 5/5 35(-10) 65(-10)     NT=Not tested  ROM Comments: Pain at end range, firm at end feel     Painful Arc: none noted   "   Tenderness upon Palpation:      Positive: incision     Special Tests:deferred     Scapular Control/Dyskinesis:     Normal / Subtle / Obvious  Comments    Left  subtle -    Right  subtle -      Intake Outcome Measure for FOTO Shoulder Survey     Therapist reviewed FOTO scores for Brandan Werner on 10/24/2023.   FOTO documents entered into EPIC - see Media section.     Intake Score: 52%  3rd visit:41%  10th:32%        Treatment   Pt 7 months post op    Brandan received the treatments listed below:     Manual therapy techniques: Soft tissue Mobilization were applied to the: right shoulder incision for 10 minutes, including:  -scar mobilization    Therapeutic exercises to develop ROM for 25 minutes, including:  -Scifit UBE forward/reverse Level 2 3 minutes each direction  -passive range of motion shoulder 10x  -supine dowel chest press 4# 2/15  -supine dowel flexion 4# 2/15  -sidelying abduction 1# 2/15  -sidelying external rotation to neutral 1# 2/15  -wall slides 2/15  -pulleys 3 minutes   -landmines 4# 2/15    Neuromuscular re-education activities to improve Posture for 10 minutes. The following activities were included:  -Green band extension pulls 2/15  -Green band rows 2/15  -Green Band external rotation 2/15    Patient Education and Home Exercises     Education provided:   - on current condition and issued updated home exercise program   - Progress towards goals     Written Home Exercises Provided: Patient instructed to cont prior HEP.  Exercises were reviewed and Brandan was able to demonstrate them prior to the end of the session.  Brandan demonstrated good  understanding of the HEP provided.     See EMR under Patient Instructions for exercises provided prior visit.      Assessment     Brandan is progressing well towards his goals and there are no updates to goals at this time. Pt prognosis is Good.     Pt will continue to benefit from skilled outpatient occupational therapy to address the deficits listed in  the problem list on initial evaluation provide pt/family education and to maximize pt's level of independence in the home and community environment.     Anticipated barriers to occupational therapy: none    Pt's spiritual, cultural and educational needs considered and pt agreeable to plan of care and goals.    Goals:  Long Term Goals to be met by discharge:  1) Independent with home exercise program -Not met, progressing  2) Pt will demonstrate (Right) shoulder AROM WFL grossly for [place] with activities of daily living's -Not met, progressing  3) Pt will demonstrate (Right) shoulder MMT WFL grossly for [place] with functional activities -Not met, progressing  4) Independent and pain free with ADL's and IADL's -Not met, progressing  5) Patient will be able to achieve less than or equal to 25% on FOTO shoulder survey demonstrating overall improved functional ability with upper extremity. -Not met, progressing     Plan     Continue with OT plan of care   Updates/Grading for next session: progress as able    SRIDHAR Vinson

## 2024-05-13 NOTE — PLAN OF CARE
" [facility]  Occupational Therapy Plan of Care Note     Name: Brandan Werner  Clinic Number: [id number]    Therapy Diagnosis:   Encounter Diagnoses   Name Primary?    Pain of right upper extremity Yes    Weakness     Stiffness due to immobility      Physician: Gabe Manning MD    Visit Date: 5/13/2024    Physician Orders: OT evaluate and treat  Medical Diagnosis: Z96.611 (ICD-10-CM) - Status post total shoulder replacement, right  Surgical Procedure and Date: Right TSA, 9/14/2023  Evaluation Date: 10/24/2023  Insurance Authorization Period Expiration: 12/31/2023  Plan of Care Expiration: 8/9/2024  Date of Return to MD: none scheduled   Visit # / Visits authorized: 14(+1)/ 1  FOTO:3/3     Precautions: standard       Time In: 11:45  Time Out: 12:30  Total Appointment Time (timed & untimed codes): 45 minutes    Subjective     Update: "this shoulder still pains me a little."    Objective      Update:   Objective Measures updated at progress report unless specified.   Sensation Test: Patient denies any numbness/tingling     Observation/Inspection:rounded shoulders, forward head     Range of Motion/Strength:   Shoulder Left   Right   Pain/Dysfunction with Movement     AROM MMT AROM PROM     Flexion 120 5/5 95(+5) 130(+10)     Extension 55 5/5 55(+5) NT     Abduction 110 5/5 90(+5) 100(-15)     HorizAdduction 20 5/5 20(=) NT     Internal rotation L1 5/5 S2 50(-5)     ER at 90° abd Back of head 5/5 Back of head 60(=)     ER at 0° abd 75 5/5 35(-10) 65(-10)     NT=Not tested  ROM Comments: Pain at end range, firm at end feel     Painful Arc: none noted     Tenderness upon Palpation:      Positive: incision     Special Tests:deferred     Scapular Control/Dyskinesis:     Normal / Subtle / Obvious  Comments    Left  subtle -    Right  subtle -      Intake Outcome Measure for FOTO Shoulder Survey     Therapist reviewed FOTO scores for Brandan Werner on 10/24/2023.   FOTO documents entered into " EPIC - see Media section.     Intake Score: 52%  3rd visit:41%  10th:32%        Assessment     Update: Patient evaluated on 10/24/2023 s/p Right TSA. He attended 14 visits then therapy was interrupted due to insurance authorization. Patient saw Dr. Manning recently for a follow up visit and reports that he still has some pain and decreased range of motion in right shoulder. Patient's presents to clinic today 7 months post op. He is demonstrating slight improvement in right shoulder range of motion since last session. He reports slight pain at end ranges. He is still limited on his FOTO intake. Patient's would continue to benefit from therapy to address remaining deficits.     Previous Short Term Goals Status:   Long Term Goals to be met by discharge:  1) Independent with home exercise program -Not met, progressing  2) Pt will demonstrate (Right) shoulder AROM WFL grossly for [place] with activities of daily living's -Not met, progressing  3) Pt will demonstrate (Right) shoulder MMT WFL grossly for [place] with functional activities -Not met, progressing  4) Independent and pain free with ADL's and IADL's -Not met, progressing  5) Patient will be able to achieve less than or equal to 25% on FOTO shoulder survey demonstrating overall improved functional ability with upper extremity. -Not met, progressing  Long Term Goal Status: continue per initial plan of care.  Reasons for Recertification of Therapy:   update plan of care     Plan     Updated Certification Period: 5/13/2024 to 8/9/2024   Recommended Treatment Plan: 1 times per week for 8 weeks:  Manual Therapy, Moist Heat/ Ice, Neuromuscular Re-ed, Patient Education, Self Care, Therapeutic Activities, and Therapeutic Exercise    SRIDHAR Vinson    I certify the need for these services furnished under this plan of treatment and while under my care.        Gabe Manning MD, FAAOS  , Orthopaedic Sports Medicine  Residency Program  Director  [facility] Department of Orthopaedic Surgery  Assistant Orthopaedic Surgeon, [place] [facility]  Head Team Physician, [place] [facility]

## 2024-06-03 ENCOUNTER — HOSPITAL ENCOUNTER (EMERGENCY)
Facility: HOSPITAL | Age: 79
Discharge: HOME | End: 2024-06-03
Attending: EMERGENCY MEDICINE
Payer: OTHER GOVERNMENT

## 2024-06-03 VITALS
TEMPERATURE: 98 F | RESPIRATION RATE: 18 BRPM | DIASTOLIC BLOOD PRESSURE: 91 MMHG | OXYGEN SATURATION: 96 % | BODY MASS INDEX: 26.7 KG/M2 | SYSTOLIC BLOOD PRESSURE: 181 MMHG | HEART RATE: 80 BPM | WEIGHT: 196.88 LBS

## 2024-06-03 DIAGNOSIS — R19.7 DIARRHEA, UNSPECIFIED TYPE: Primary | ICD-10-CM

## 2024-06-03 LAB
ALBUMIN SERPL BCP-MCNC: 4.4 G/DL (ref 3.5–5.2)
ALP SERPL-CCNC: 50 U/L (ref 55–135)
ALT SERPL W/O P-5'-P-CCNC: <5 U/L (ref 10–44)
ANION GAP SERPL CALC-SCNC: 14 MMOL/L (ref 8–16)
AST SERPL-CCNC: 12 U/L (ref 10–40)
BACTERIA #/AREA URNS HPF: NORMAL /HPF
BASOPHILS # BLD AUTO: 0.04 K/UL (ref 0–0.2)
BASOPHILS NFR BLD: 0.3 % (ref 0–1.9)
BILIRUB SERPL-MCNC: 0.6 MG/DL (ref 0.1–1)
BILIRUB UR QL STRIP: NEGATIVE
BUN SERPL-MCNC: 22 MG/DL (ref 8–23)
CALCIUM SERPL-MCNC: 9.5 MG/DL (ref 8.7–10.5)
CHLORIDE SERPL-SCNC: 106 MMOL/L (ref 95–110)
CLARITY UR: CLEAR
CO2 SERPL-SCNC: 21 MMOL/L (ref 23–29)
COLOR UR: YELLOW
CREAT SERPL-MCNC: 1.1 MG/DL (ref 0.5–1.4)
DIFFERENTIAL METHOD BLD: ABNORMAL
EOSINOPHIL # BLD AUTO: 0 K/UL (ref 0–0.5)
EOSINOPHIL NFR BLD: 0 % (ref 0–8)
ERYTHROCYTE [DISTWIDTH] IN BLOOD BY AUTOMATED COUNT: 12.9 % (ref 11.5–14.5)
EST. GFR  (NO RACE VARIABLE): >60 ML/MIN/1.73 M^2
GLUCOSE SERPL-MCNC: 110 MG/DL (ref 70–110)
GLUCOSE UR QL STRIP: NEGATIVE
HCT VFR BLD AUTO: 41.8 % (ref 40–54)
HGB BLD-MCNC: 14.4 G/DL (ref 14–18)
HGB UR QL STRIP: NEGATIVE
HYALINE CASTS #/AREA URNS LPF: 0 /LPF
IMM GRANULOCYTES # BLD AUTO: 0.05 K/UL (ref 0–0.04)
IMM GRANULOCYTES NFR BLD AUTO: 0.4 % (ref 0–0.5)
KETONES UR QL STRIP: ABNORMAL
LEUKOCYTE ESTERASE UR QL STRIP: NEGATIVE
LIPASE SERPL-CCNC: 44 U/L (ref 4–60)
LYMPHOCYTES # BLD AUTO: 1.6 K/UL (ref 1–4.8)
LYMPHOCYTES NFR BLD: 13.4 % (ref 18–48)
MCH RBC QN AUTO: 31.7 PG (ref 27–31)
MCHC RBC AUTO-ENTMCNC: 34.4 G/DL (ref 32–36)
MCV RBC AUTO: 92 FL (ref 82–98)
MICROSCOPIC COMMENT: NORMAL
MONOCYTES # BLD AUTO: 0.3 K/UL (ref 0.3–1)
MONOCYTES NFR BLD: 2.7 % (ref 4–15)
NEUTROPHILS # BLD AUTO: 10 K/UL (ref 1.8–7.7)
NEUTROPHILS NFR BLD: 83.2 % (ref 38–73)
NITRITE UR QL STRIP: NEGATIVE
NRBC BLD-RTO: 0 /100 WBC
PH UR STRIP: 7 [PH] (ref 5–8)
PLATELET # BLD AUTO: 203 K/UL (ref 150–450)
PMV BLD AUTO: 10.2 FL (ref 9.2–12.9)
POTASSIUM SERPL-SCNC: 4.2 MMOL/L (ref 3.5–5.1)
PROT SERPL-MCNC: 7.3 G/DL (ref 6–8.4)
PROT UR QL STRIP: ABNORMAL
RBC # BLD AUTO: 4.54 M/UL (ref 4.6–6.2)
RBC #/AREA URNS HPF: 2 /HPF (ref 0–4)
SODIUM SERPL-SCNC: 141 MMOL/L (ref 136–145)
SP GR UR STRIP: 1.03 (ref 1–1.03)
SQUAMOUS #/AREA URNS HPF: 0 /HPF
URN SPEC COLLECT METH UR: ABNORMAL
UROBILINOGEN UR STRIP-ACNC: NEGATIVE EU/DL
WBC # BLD AUTO: 12.03 K/UL (ref 3.9–12.7)
WBC #/AREA URNS HPF: 3 /HPF (ref 0–5)

## 2024-06-03 PROCEDURE — 81000 URINALYSIS NONAUTO W/SCOPE: CPT

## 2024-06-03 PROCEDURE — 80053 COMPREHEN METABOLIC PANEL: CPT

## 2024-06-03 PROCEDURE — 85025 COMPLETE CBC W/AUTO DIFF WBC: CPT

## 2024-06-03 PROCEDURE — 83690 ASSAY OF LIPASE: CPT

## 2024-06-03 PROCEDURE — 99283 EMERGENCY DEPT VISIT LOW MDM: CPT

## 2024-06-04 NOTE — DISCHARGE INSTRUCTIONS
Thank you for choosing [facility]!     Our goal in the Emergency Department is to always provide outstanding medical care. You may receive a survey by mail or e-mail in the next week regarding your experience today. We would greatly appreciate you completing and returning the survey. Your feedback provides us with a way to recognize our staff who provide very good care, and it helps us learn how to improve when your experience was below our aspiration of excellence.      It is important to remember that some problems are difficult to diagnose and may not be found during your first visit. Be sure to follow up with your primary care doctor and review any labs/imaging that was performed during your visit with them. If you do not have a primary care doctor, you may contact the one listed on your discharge paperwork, or you may also call the [facility] Appointment Desk at [phone] to schedule an appointment.     All medications may potentially have side effects and it is impossible to predict which medications may give you side effects. If you feel that you are having a negative effect of any medication you should immediately stop taking them and seek medical attention.  Do not drive or make any important decisions for 24 hours if you have received any pain medications, sedatives or mood altering drugs during your ER visit.    We appreciate you trusting us with your medical care. We will be happy to take care of you for all of your future medical needs. You may return to the ER at any time for any new/concerning symptoms, worsening condition, or failure to improve. We hope you feel better soon.     Sincerely,    Chadd Boles Jr., MD  Board-Certified Emergency Medicine Physician  [facility]

## 2024-06-04 NOTE — ED PROVIDER NOTES
Emergency Department Encounter  Provider Note    Brandan Werner  [id number]  6/3/2024    Evaluation:    History Acquisition:     Chief Complaint   Patient presents with    Nausea     Pt presents to ED with c/o nausea and diarrhea approx. 3 days. Pt states the loose stools have slowed down today and has taken Pepto-bismal. Pt denies abdominal pain, cp, or SOB.        History of Present Illness:  Brandan Werner is a 78 y.o. male who has a past medical history of Anxiety, BPH (benign prostatic hyperplasia), Cataract, Elevated PSA, Erectile dysfunction, Hyperlipidemia, Hypertension, Hypogonadism male, Kidney stone (5/20/2020), Obesity, PTSD (post-traumatic stress disorder), Shoulder arthritis, and Urinary tract infection.    The patient presents to the ED due to diarrhea.   Patient reports symptoms started 3 days ago after eating a tuna fish sandwich from [facility].  He has been taking Pepto-Bismol for his diarrhea and reports some improvement. He denies any diarrhea today. He has no fever, vomiting, blood in stool, urinary complaints, abdominal pain, or any other concerns.     Additional historians utilized:  Spouse at bedside, states she is worried he has a kidney stone or gallstones.     Prior medical records were reviewed:   [facility]  Ortho visit 5/3 for shoulder pain  Urology visit 4/30 for BPH    The patient's list of active medical history, family/social history, medications, and allergies as documented has been reviewed.     Past Medical History:   Diagnosis Date    Anxiety     BPH (benign prostatic hyperplasia)     Cataract     Elevated PSA     Erectile dysfunction     Hyperlipidemia     Hypertension     Hypogonadism male     Kidney stone 5/20/2020    Obesity     PTSD (post-traumatic stress disorder)     Shoulder arthritis     Urinary tract infection      Past Surgical History:   Procedure Laterality Date    ARTHROPLASTY OF SHOULDER Right 9/14/2023    Procedure: ARTHROPLASTY, SHOULDER;  Surgeon:  Gabe Manning MD;  Location: [facility];  Service: Orthopedics;  Laterality: Right;  Ayaan david notified cc    COLONOSCOPY N/A 11/26/2018    Procedure: COLONOSCOPY;  Surgeon: Germán Moss MD;  [facility]  Service: Endoscopy;  Laterality: N/A;    COLONOSCOPY N/A 4/16/2024    Procedure: COLONOSCOPY;  Surgeon: Philip López MD;  Location: [facility];  Service: Endoscopy;  Laterality: N/A;    COLONOSCOPY W/ BIOPSIES  2010    CYSTOSCOPY Left 11/17/2022    Procedure: CYSTOSCOPY;  Surgeon: Dannie Murray MD;  Location: [facility] OR;  Service: Urology;  Laterality: Left;    EXTRACORPOREAL SHOCK WAVE LITHOTRIPSY Left 12/2/2022    Procedure: LITHOTRIPSY, ESWL [employer] [place] ESWL machine, scheduling call [phone] minutes;  Surgeon: Dannie Murray MD;  Location: [facility];  Service: Urology;  Laterality: Left;  Confirmed with nexmed 11/22 [facility] conf # [id number]    EXTRACORPOREAL SHOCK WAVE LITHOTRIPSY Left 1/30/2023    Procedure: LITHOTRIPSY, ESWL [email or web address] [place] ESWL machine, scheduling call [phone] minutes;  Surgeon: Dannie Murray MD;  Location: [facility];  Service: Urology;  Laterality: Left;  [facility] conf #[id number]    PENILE PROSTHESIS IMPLANT      PERCUTANEOUS NEPHROLITHOTOMY Left 8/14/2023    Procedure: CYSTOSCOPY, LEFT RETROGRADE PYELOGRAM, LEFT URETEROSCOPY WITH STONE BASKET EXTRACTION, PLACEMENT OF JJ STENT, LEFT NEPHROSTOMY TUBE REMOVAL;  Surgeon: Dannie Murray MD;  Location: [facility];  Service: Urology;  Laterality: Left;  IR to place ureteral catheter prior, start time 1200  Cysto tower, flexible cysto/ureteroscopes, Laser in room, lithoclast in room, fluoroscopy with Tony to    REMOVAL OF INFLATABLE PENILE PROSTHESIS N/A 11/6/2023    Procedure: REMOVAL, PENILE PROSTHESIS, INFLATABLE;  Surgeon: Dannie Murray MD;  Location: [facility];  Service: Urology;  Laterality: N/A;    ROBOT-ASSISTED LAPAROSCOPIC SIMPLE PROSTATECTOMY N/A  2023    Procedure: ROBOTIC PROSTATECTOMY, SIMPLE;  Surgeon: Dannie Murray MD;  Location: [facility];  Service: Urology;  Laterality: N/A;  Xi robot, open tray, simple prostatectomy     Family History   Problem Relation Name Age of Onset    Cancer Mother          cancer    Cataracts Mother      Heart disease Father  62        M.I.    Stroke Brother  62    Amblyopia Neg Hx      Blindness Neg Hx      Glaucoma Neg Hx      Macular degeneration Neg Hx      Retinal detachment Neg Hx      Strabismus Neg Hx      Prostate cancer Neg Hx      Kidney disease Neg Hx       Social History     Socioeconomic History    Marital status:    Occupational History    Occupation:      Comment: self-employed   Tobacco Use    Smoking status: Former     Current packs/day: 0.00     Average packs/day: 1 pack/day for 10.0 years (10.0 ttl pk-yrs)     Types: Cigarettes     Start date:      Quit date:      Years since quittin.4    Smokeless tobacco: Never   Substance and Sexual Activity    Alcohol use: No    Drug use: No    Sexual activity: Yes     Partners: Female     Social Determinants of Health     Financial Resource Strain: Low Risk  (2022)    Overall Financial Resource Strain (CARDIA)     Difficulty of Paying Living Expenses: Not hard at all   Food Insecurity: No Food Insecurity (2022)    Hunger Vital Sign     Worried About Running Out of Food in the Last Year: Never true     Ran Out of Food in the Last Year: Never true   Transportation Needs: No Transportation Needs (2022)    PRAPARE - Transportation     Lack of Transportation (Medical): No     Lack of Transportation (Non-Medical): No   Physical Activity: Unknown (2022)    Exercise Vital Sign     Days of Exercise per Week: Patient declined     Minutes of Exercise per Session: Patient declined   Stress: Unknown (2022)    [employer] [place] [employer] - Occupational Stress Questionnaire     Feeling of Stress :  Patient declined   Housing Stability: Unknown (11/18/2022)    Housing Stability Vital Sign     Unable to Pay for Housing in the Last Year: No     Unstable Housing in the Last Year: No       Medications:  Medication List with Changes/Refills   Current Medications    ASCORBIC ACID, VITAMIN C, (VITAMIN C) 250 MG TABLET    Take 250 mg by mouth once daily.    ATORVASTATIN (LIPITOR) 40 MG TABLET    Take 20 mg by mouth once daily.    CARBIDOPA-LEVODOPA  MG (SINEMET)  MG PER TABLET    TAKE 1 TABLET BY MOUTH THREE TIMES A DAY FOR PARKINSON'S DISE** WITH MEALSASE    CELECOXIB (CELEBREX) 200 MG CAPSULE    Take 400 mg by mouth once daily.    FERROUS SULFATE (FEOSOL) 325 MG (65 MG IRON) TAB TABLET    325 mg.    FLUOXETINE 20 MG CAPSULE    60 mg.    IBUPROFEN (ADVIL,MOTRIN) 800 MG TABLET    Take 1 tablet (800 mg total) by mouth every 8 (eight) hours.    LISINOPRIL (PRINIVIL,ZESTRIL) 5 MG TABLET    Take 5 mg by mouth once daily.    MIRABEGRON (MYRBETRIQ) 25 MG TB24 ER TABLET    Take 1 tablet (25 mg total) by mouth once daily.    NAPROXEN SODIUM (ANAPROX) 220 MG TABLET    Take 220 mg by mouth 2 (two) times daily with meals. 2 tablets twice a day    ONDANSETRON (ZOFRAN-ODT) 4 MG TBDL    Take 1 tablet (4 mg total) by mouth every 8 (eight) hours as needed.    OXYBUTYNIN (DITROPAN-XL) 5 MG TR24    Take 1 tablet (5 mg total) by mouth once daily.    OXYCODONE (ROXICODONE) 5 MG IMMEDIATE RELEASE TABLET    Take 1 tablet (5 mg total) by mouth every 6 (six) hours as needed for Pain.    OXYCODONE-ACETAMINOPHEN (PERCOCET) 5-325 MG PER TABLET    Take 1 tablet by mouth every 4 (four) hours as needed for Pain.    POLYETHYLENE GLYCOL (GLYCOLAX) 17 GRAM/DOSE POWDER    Take 17 g by mouth daily as needed (take as needed for daily soft bowel movements).    POLYVINYL ALCOHOL, ARTIFICIAL TEARS, (LIQUIFILM TEARS) 1.4 % OPHTHALMIC SOLUTION    INSTILL ONE DROP IN EACH EYE FOUR TIMES A DAY FOR DRY EYES    PRIMIDONE (MYSOLINE) 50 MG TAB    Take 2  tablets (100 mg total) by mouth every evening.    TAMSULOSIN (FLOMAX) 0.4 MG CP24    Take 1 capsule (0.4 mg total) by mouth nightly.    TRAZODONE (DESYREL) 100 MG TABLET    100 mg.       Allergies:  Review of patient's allergies indicates:  No Known Allergies    Review of Systems   Gastrointestinal:  Positive for diarrhea and nausea.         Physical Exam:     Initial Vitals [06/03/24 1912]   BP Pulse Resp Temp SpO2   (!) 181/91 80 18 98 °F (36.7 °C) 96 %      MAP       --         Physical Exam    Nursing note and vitals reviewed.  Constitutional: He appears well-developed and well-nourished. He is not diaphoretic. No distress.   Well-appearing, in no distress.    HENT:   Head: Normocephalic and atraumatic.   Mouth/Throat: Oropharynx is clear and moist.   Eyes: EOM are normal. Pupils are equal, round, and reactive to light.   Neck: No tracheal deviation present.   Cardiovascular:  Normal rate, regular rhythm, normal heart sounds and intact distal pulses.           Pulmonary/Chest: Breath sounds normal. No stridor. No respiratory distress.   Abdominal: Abdomen is soft and flat. He exhibits no distension and no mass. There is no abdominal tenderness.   No tenderness.  There is no rebound and no guarding.   Musculoskeletal:         General: No edema. Normal range of motion.     Neurological: He is alert and oriented to person, place, and time. No cranial nerve deficit or sensory deficit.   Skin: Skin is warm and dry. Capillary refill takes less than 2 seconds. No rash noted.   Psychiatric: He has a normal mood and affect. His behavior is normal. Thought content normal.         Differential Diagnoses:   Based on available information and initial assessment, Differential Diagnosis includes, but is not limited to:  SBO/volvulus, incarcerated/strangulated hernia, intussusception, ileus, appendicitis, cholecystitis, cholangitis, diverticulitis, esophagitis, hepatitis, nephrolithiasis, pancreatitis, gastroenteritis, colitis,  IBD/IBS, biliary colic, GERD, PUD, constipation, UTI/pyelonephritis,  disorder.      ED Management:   Procedures    Orders Placed This Encounter    CBC auto differential    Comprehensive metabolic panel    Lipase    Urinalysis, Reflex to Urine Culture Urine, Clean Catch    Urinalysis Microscopic    Insert peripheral IV          EKG:       Labs:     Labs Reviewed   CBC W/ AUTO DIFFERENTIAL - Abnormal; Notable for the following components:       Result Value    RBC 4.54 (*)     MCH 31.7 (*)     Gran # (ANC) 10.0 (*)     Immature Grans (Abs) 0.05 (*)     Gran % 83.2 (*)     Lymph % 13.4 (*)     Mono % 2.7 (*)     All other components within normal limits   COMPREHENSIVE METABOLIC PANEL - Abnormal; Notable for the following components:    CO2 21 (*)     Alkaline Phosphatase 50 (*)     ALT <5 (*)     All other components within normal limits   URINALYSIS, REFLEX TO URINE CULTURE - Abnormal; Notable for the following components:    Protein, UA 1+ (*)     Ketones, UA 3+ (*)     All other components within normal limits    Narrative:     Specimen Source->Urine   LIPASE   URINALYSIS MICROSCOPIC    Narrative:     Specimen Source->Urine     Independent review of the labs ordered include:   See ED course    Imaging:     Imaging Results    None            Medications Given:   Medications - No data to display     Medical Decision Making:    Additional Consideration:   Additional testing considered during clinical course: CT A/P considered but unlikely to benefit given no abdominal pain, labs reassuring, vitals stable in ED    Social determinants of health considered during development of treatment plan include: poor access to care    Current co-morbidities considered which impacted clinical decision making: BPH, Parkinson's disease, depression, PTSD, HTN, HLD, anemia     Case discussed with additional provider: none    ED Course as of 06/03/24 2224 Mon Jun 03, 2024 1929 SpO2: 96 % [SS]   1929 Resp: 18 [SS]   1929 Pulse: 80  [SS]   1929 Temp Source: Oral [SS]   1929 Temp: 98 °F (36.7 °C) [SS]   1929 BP(!): 181/91  Hypertensive, vitals otherwise reassuring [SS]   1944 CBC auto differential(!)  Unremarkable  [SS]   2101 Comprehensive metabolic panel(!)  Unremarkable  [SS]   2101 Lipase  WNL [SS]   2223 Urinalysis Microscopic [SS]   2223 Urinalysis, Reflex to Urine Culture Urine, Clean Catch(!)  UA without infection [SS]      ED Course User Index  [SS] Chadd Boles MD            Medical Decision Making  77 yo M with diarrheal illness x 3 days. Improving with Pepto-Bismol at home.  Afebrile, vitals and exam reassuring.  Labs unremarkable.  Suspect benign etiology. Will DC with supportive care and PCP f/u. Return precautions given.     Problems Addressed:  Diarrhea, unspecified type: acute illness or injury    Amount and/or Complexity of Data Reviewed  Independent Historian: caregiver  External Data Reviewed: notes.  Labs: ordered. Decision-making details documented in ED Course.    Risk  Diagnosis or treatment significantly limited by social determinants of health.        Clinical Impression:       ICD-10-CM ICD-9-CM   1. Diarrhea, unspecified type  R19.7 787.91         Follow-up Information       Follow up With Specialties Details Why Contact Info    Km Chicas MD Internal Medicine Schedule an appointment as soon as possible for a visit   [address]  [place] [id number]  [phone]               ED Disposition Condition    Discharge Stable              On re-evaluation, the patient's status has improved.  PCP follow-up as soon as possible was recommended.    After taking into careful account the patient's history, physical exam findings, as well as empirical and objective data obtained throughout ED workup, I feel no emergent medical condition has been identified. No further evaluation or admission was felt to be required, and the patient is stable for discharge from the ED. The patient and any additional  family present were updated with test results, overall clinical impression, and recommended further plan of care, including discharge instructions as provided and outpatient follow-up for continued evaluation and management as needed. All questions were answered. The patient expressed understanding and agreed with current plan for discharge and follow-up plan of care. Strict ED return precautions were provided, including return/worsening of current symptoms, new symptoms, or any other concerns.       Chadd Boles MD  06/04/24 [id number]

## 2024-07-06 ENCOUNTER — OFFICE VISIT (OUTPATIENT)
Dept: URGENT CARE | Facility: CLINIC | Age: 79
End: 2024-07-06
Payer: OTHER GOVERNMENT

## 2024-07-06 VITALS
HEIGHT: 72 IN | WEIGHT: 196 LBS | DIASTOLIC BLOOD PRESSURE: 95 MMHG | HEART RATE: 71 BPM | TEMPERATURE: 98 F | RESPIRATION RATE: 16 BRPM | OXYGEN SATURATION: 97 % | SYSTOLIC BLOOD PRESSURE: 159 MMHG | BODY MASS INDEX: 26.55 KG/M2

## 2024-07-06 DIAGNOSIS — J00 ACUTE RHINITIS: ICD-10-CM

## 2024-07-06 DIAGNOSIS — R19.7 DIARRHEA, UNSPECIFIED TYPE: ICD-10-CM

## 2024-07-06 DIAGNOSIS — R11.2 NAUSEA AND VOMITING, UNSPECIFIED VOMITING TYPE: ICD-10-CM

## 2024-07-06 DIAGNOSIS — J34.89 RHINORRHEA: ICD-10-CM

## 2024-07-06 DIAGNOSIS — B34.9 ACUTE VIRAL SYNDROME: Primary | ICD-10-CM

## 2024-07-06 LAB
CTP QC/QA: YES
SARS-COV-2 AG RESP QL IA.RAPID: NEGATIVE

## 2024-07-06 PROCEDURE — 96372 THER/PROPH/DIAG INJ SC/IM: CPT | Mod: S$GLB,,,

## 2024-07-06 PROCEDURE — 99213 OFFICE O/P EST LOW 20 MIN: CPT | Mod: 25,S$GLB,,

## 2024-07-06 PROCEDURE — 87811 SARS-COV-2 COVID19 W/OPTIC: CPT | Mod: QW,S$GLB,,

## 2024-07-06 RX ORDER — ONDANSETRON HYDROCHLORIDE 8 MG/1
8 TABLET, FILM COATED ORAL EVERY 8 HOURS PRN
Qty: 30 TABLET | Refills: 0 | Status: SHIPPED | OUTPATIENT
Start: 2024-07-06

## 2024-07-06 RX ORDER — CETIRIZINE HYDROCHLORIDE 10 MG/1
10 TABLET ORAL DAILY
Qty: 30 TABLET | Refills: 0 | Status: SHIPPED | OUTPATIENT
Start: 2024-07-06 | End: 2024-08-05

## 2024-07-06 RX ORDER — ONDANSETRON 2 MG/ML
4 INJECTION INTRAMUSCULAR; INTRAVENOUS
Status: COMPLETED | OUTPATIENT
Start: 2024-07-06 | End: 2024-07-06

## 2024-07-06 RX ORDER — FLUTICASONE PROPIONATE 50 MCG
1 SPRAY, SUSPENSION (ML) NASAL 2 TIMES DAILY
Qty: 16 G | Refills: 0 | Status: SHIPPED | OUTPATIENT
Start: 2024-07-06 | End: 2024-07-16

## 2024-07-06 RX ORDER — LOPERAMIDE HYDROCHLORIDE 2 MG/1
2 CAPSULE ORAL 4 TIMES DAILY PRN
Qty: 40 CAPSULE | Refills: 0 | Status: SHIPPED | OUTPATIENT
Start: 2024-07-06 | End: 2024-07-16

## 2024-07-06 RX ORDER — ACETAMINOPHEN 500 MG
500 TABLET ORAL EVERY 6 HOURS PRN
Qty: 20 TABLET | Refills: 0 | Status: SHIPPED | OUTPATIENT
Start: 2024-07-06

## 2024-07-06 RX ADMIN — ONDANSETRON 4 MG: 2 INJECTION INTRAMUSCULAR; INTRAVENOUS at 03:07

## 2024-07-06 NOTE — PATIENT INSTRUCTIONS
Please drink plenty of fluids.  Please get plenty of rest.  Please return here or go to the Emergency Department for any concerns or worsening of condition.  Take imodium as needed for diarrhea. Take zofran as needed for nausea.  Use flonase nasal spray twice daily and take daily zyrtec as directed for ten days. Recommended taking vitamin D and zinc supplements for immune support.   Recommended for patient to drink hot tea with honey. Consider eating softer foods such as soup and broth for the next couple of days to prevent further throat irritation. Recommended for patient to refrain from acidic foods (such as tomatoes or caffeine) to prevent throat irritation for the next couple of days.    If you do not have Hypertension or any history of palpitations, it is ok to take over the counter Sudafed or Mucinex D or Allegra-D or Claritin-D or Zyrtec-D.  If you do take one of the above, it is ok to combine that with plain over the counter Mucinex or Allegra or Claritin or Zyrtec.  If for example you are taking Zyrtec -D, you can combine that with Mucinex, but not Mucinex-D.  If you are taking Mucinex-D, you can combine that with plain Allegra or Claritin or Zyrtec.   If you do have Hypertension or palpitations, it is safe to take Coricidin HBP for relief of sinus symptoms.  If not allergic, please take over the counter Tylenol (Acetaminophen) and/or Motrin (Ibuprofen) as directed for control of pain and/or fever.  Please follow up with your primary care doctor or specialist as needed.    If you  smoke, please stop smoking.

## 2024-07-06 NOTE — PROGRESS NOTES
Subjective:      Patient ID: Brandan Werner is a 78 y.o. male.    Vitals:  height is 6' (1.829 m) and weight is 88.9 kg (196 lb). His temperature is 97.5 °F (36.4 °C). His blood pressure is 159/95 (abnormal) and his pulse is 71. His respiration is 16 and oxygen saturation is 97%.     Chief Complaint: Nausea    78-year-old male presents to the clinic today with chief complaint of nausea, vomiting, diarrhea, hot flashes, chills, rhinorrhea, and mild congestion. Symptoms started one day ago and have not improved.  Patient has taken pepto bismol with no relief.  He has had two episodes of emesis and four episodes of diarrhea. Denies any recent ill exposures. Denies any recent travel.  Denies any recent antibiotics use. Denies any melena, blood, or mucus in stool.  Denies hx of asthma. Denies numbness or tingling. Denies radiation of pain. Denies fever, body aches, chest pain, shortness of breath, wheezing, abdominal pain, or rashes.      Nausea  This is a new problem. The current episode started yesterday. The problem has been unchanged. Associated symptoms include a change in bowel habit, chills, congestion, diaphoresis, fatigue, nausea and vomiting. Pertinent negatives include no abdominal pain, chest pain, coughing, fever, headaches, myalgias, neck pain, rash or sore throat. Treatments tried: Pepto-besmol. The treatment provided no relief.       Constitution: Positive for chills, sweating and fatigue. Negative for activity change, appetite change, fever and generalized weakness.   HENT:  Positive for congestion. Negative for ear pain, ear discharge, foreign body in ear, tinnitus, hearing loss, facial swelling, nosebleeds, foreign body in nose, postnasal drip, sinus pain, sinus pressure, sore throat, trouble swallowing and voice change.    Neck: Negative for neck pain, neck stiffness and neck swelling.   Cardiovascular:  Negative for chest pain, leg swelling, palpitations and sob on exertion.   Eyes:  Negative for  eye discharge, eye itching, eye pain and eye redness.   Respiratory:  Negative for chest tightness, cough, sputum production, shortness of breath, wheezing and asthma.    Gastrointestinal:  Positive for nausea, vomiting and diarrhea. Negative for abdominal pain and constipation.   Genitourinary:  Negative for dysuria, frequency, urgency, urine decreased, flank pain and hematuria.   Musculoskeletal:  Negative for pain, pain with walking and muscle ache.   Skin:  Negative for rash, erythema and bruising.   Allergic/Immunologic: Negative for environmental allergies, seasonal allergies, food allergies, asthma, chronic cough, sneezing and flu shot.   Neurological:  Negative for dizziness, light-headedness, passing out, coordination disturbances, loss of balance, headaches, disorientation and altered mental status.   Psychiatric/Behavioral:  Negative for altered mental status, disorientation, confusion, agitation and nervous/anxious. The patient is not nervous/anxious.       Objective:     Physical Exam   Constitutional: He is oriented to person, place, and time. He appears well-developed. He is cooperative.  Non-toxic appearance. He appears ill. No distress.   HENT:   Head: Normocephalic and atraumatic.   Ears:   Right Ear: Tympanic membrane, external ear and ear canal normal. Decreased hearing is noted.   Left Ear: Tympanic membrane, external ear and ear canal normal. Decreased hearing is noted.   Nose: Rhinorrhea and purulent discharge present. No mucosal edema or nasal deformity. No epistaxis. Right sinus exhibits no maxillary sinus tenderness and no frontal sinus tenderness. Left sinus exhibits no maxillary sinus tenderness and no frontal sinus tenderness.   Mouth/Throat: Uvula is midline, oropharynx is clear and moist and mucous membranes are normal. No trismus in the jaw. Normal dentition. No uvula swelling. No oropharyngeal exudate, posterior oropharyngeal edema, posterior oropharyngeal erythema, tonsillar  abscesses or cobblestoning. Tonsils are 0 on the right. Tonsils are 0 on the left.   Eyes: Conjunctivae and lids are normal. No scleral icterus. Extraocular movement intact   Neck: Trachea normal and phonation normal. Neck supple. No edema present. No erythema present. No neck rigidity present.   Cardiovascular: Normal rate, regular rhythm, normal heart sounds and normal pulses.   Pulmonary/Chest: Effort normal and breath sounds normal. No stridor. No respiratory distress. He has no decreased breath sounds. He has no wheezes. He has no rhonchi. He has no rales.   Abdominal: Normal appearance and bowel sounds are normal. Soft. flat abdomen There is no abdominal tenderness. There is no rebound and no guarding.   Musculoskeletal: Normal range of motion.         General: No deformity. Normal range of motion.   Lymphadenopathy:     He has no cervical adenopathy.   Neurological: He is alert and oriented to person, place, and time. He exhibits normal muscle tone. Coordination normal.   Skin: Skin is warm, dry, intact, not diaphoretic and not pale. No erythema   Psychiatric: His speech is normal and behavior is normal. Judgment and thought content normal.   Nursing note and vitals reviewed.      Assessment:     1. Acute viral syndrome    2. Rhinorrhea    3. Nausea and vomiting, unspecified vomiting type    4. Acute rhinitis    5. Diarrhea, unspecified type          Results for orders placed or performed in visit on 07/06/24   SARS Coronavirus 2 Antigen, POCT Manual Read   Result Value Ref Range    SARS Coronavirus 2 Antigen Negative Negative     Acceptable Yes        Plan:       Acute viral syndrome  -     ondansetron (ZOFRAN) 8 MG tablet; Take 1 tablet (8 mg total) by mouth every 8 (eight) hours as needed for Nausea.  Dispense: 30 tablet; Refill: 0  -     loperamide (IMODIUM) 2 mg capsule; Take 1 capsule (2 mg total) by mouth 4 (four) times daily as needed for Diarrhea.  Dispense: 40 capsule; Refill: 0  -      fluticasone propionate (FLONASE) 50 mcg/actuation nasal spray; 1 spray (50 mcg total) by Each Nostril route 2 (two) times daily. for 10 days  Dispense: 16 g; Refill: 0  -     cetirizine (ZYRTEC) 10 MG tablet; Take 1 tablet (10 mg total) by mouth once daily.  Dispense: 30 tablet; Refill: 0  -     acetaminophen (TYLENOL) 500 MG tablet; Take 1 tablet (500 mg total) by mouth every 6 (six) hours as needed for Pain.  Dispense: 20 tablet; Refill: 0    Rhinorrhea  -     SARS Coronavirus 2 Antigen, POCT Manual Read  -     fluticasone propionate (FLONASE) 50 mcg/actuation nasal spray; 1 spray (50 mcg total) by Each Nostril route 2 (two) times daily. for 10 days  Dispense: 16 g; Refill: 0  -     cetirizine (ZYRTEC) 10 MG tablet; Take 1 tablet (10 mg total) by mouth once daily.  Dispense: 30 tablet; Refill: 0    Nausea and vomiting, unspecified vomiting type  -     ondansetron injection 4 mg  -     ondansetron (ZOFRAN) 8 MG tablet; Take 1 tablet (8 mg total) by mouth every 8 (eight) hours as needed for Nausea.  Dispense: 30 tablet; Refill: 0    Acute rhinitis  -     fluticasone propionate (FLONASE) 50 mcg/actuation nasal spray; 1 spray (50 mcg total) by Each Nostril route 2 (two) times daily. for 10 days  Dispense: 16 g; Refill: 0  -     cetirizine (ZYRTEC) 10 MG tablet; Take 1 tablet (10 mg total) by mouth once daily.  Dispense: 30 tablet; Refill: 0    Diarrhea, unspecified type  -     loperamide (IMODIUM) 2 mg capsule; Take 1 capsule (2 mg total) by mouth 4 (four) times daily as needed for Diarrhea.  Dispense: 40 capsule; Refill: 0      We had shared decision making for patient's treatment. We discussed side effects/alternatives/benefits/risk and patient would like to proceed with treatment plan. We also discussed other OTC treatment recommendations. Patient was counseled, explained with the test results meaning, expected course, and answered all of questions. Patient can take OTC Acetaminophen (Tylenol) and/or Ibuprofen  (Motrin) as needed for pain relief and/or fever relief. Continue to drink plenty of fluids. Follow up with PCP in the next 1-2 weeks as needed.  Gave patient strict ER/urgent care precautions in case symptoms worsen or if any new concerns arise.

## 2024-07-26 NOTE — PROGRESS NOTES
Subjective:       Patient ID: Brandan Werner is a 78 y.o. male.    Chief Complaint: Follow-up (3 month)       This is a 78 y.o.  male patient with BPH, elevated PSA, kidney stone.    Past patient of Dr. Laboy last seen in 2020 for cystoscopy for microhematuria that was negative except large prostate.  H/o penile implant that still uses.  CTU in 2020 with 150 gm prostate no other findings.  Long h/o elevated PSA.    PSA 6.2 in 2013 and biopsy negative  Biopsy in 2013 with ASAP  PSA recently checked by PCP and was 8.2   Moderate/severe LUTs, AUA SS 19/2, PVR 67  Worsening frequency of urination on finasteride, tamsulosin.    UA showed microhematuria.  CTU done showed left 1 cm UPJ stone with moderate hydronephrosis 11/22 and left non obstructing stones.    Failed left stent, required PCN and antegrade stent  Left ESWL 12/2  Follow-up KUB shows resolution of UPJ stone continued lower pole left stone.  Repeat left LP stone ESWL 1/30/23, appeared to be good fragmentation.  KUB shows left dense stone left LP but still present.  Stent in place. Stent removed 2/7/23.   CT 5/1/23: left enlarging LP stone now 2.3 cm, mild left pelviectasis, read as possible lesion, on review with radiologist small stone near UPJ and mild pelviectasis with pelvis thickening no obvious mass.    Intermittent left sided pain, still LUTs from BPH but not as severe as prior.        Attempted left PCNL 8/14/23--IR could not get access down ureter, taken to cystoscopy suite, ureteroscopy done showing access between two calyces (high risk bleeding to dilate), survey of kidney for some time unable to locate dominant stone some stone fragmented in lower pole, thus access removed and stent placed.    Follow up CT 9/23 with left LP 1.1 cm stone.  Minimal pain.  Had shoulder surgery 9/14/23 and retention after, ludwig in place.   Passed void trial but then recurrent retention.  Has Ludwig in place.  Wishes to have intervention for enlarged prostate.  Of  note, does have inflatable penile prosthesis that he does not use but reports functions.  Cath changed for leaking 10/22/23, urine culture with enterococcus has been on keflex.   Urine culture negative prior to surgery.    RASP, IPP explant 11/6/23. Cystogram negative for leak 11/15/23.   Doing well.    AUA SS current 3/1 (12/23).  Does have some frequency.  Leakage at night, no AARON or daytime incontinence.  [facility] did not [facility] Myrbetriq.    Still some frequency, but voiding well.         IPSS Questionnaire (AUA-SS):  Over the past month    1)  Incomplete Emptying - How often have you had a sensation of not emptying your bladder completely after you finish urinating?  1 - Less than 1 time in 5   2)  Frequency - How often have you had to urinate again less than two hours after you finished urinating? 2 - Less than half the time   3)  Intermittency - How often have you found you stopped and started again several times when you urinated?  0 - Not at all   4) Urgency - How difficult have you found it to postpone urination?  0 - Not at all   5) Weak Stream - How often have you had a weak urinary stream?  0 - Not at all   6) Straining  - How often have you had to push or strain to begin urination?  0 - Not at all   7) Nocturia - How many times did you most typically get up to urinate from the time you went to bed until the time you got up in the morning?  0 - None   Total score:  0-7 mild, 8-19 moderate, 20-35 severe 3   Quality of Life:  1 - Pleased        LAST PSA  Lab Results   Component Value Date    PSA 8.2 (H) 08/19/2022    PSA 7.0 (H) 03/02/2021    PSA 6.5 (H) 01/30/2020    PSA 3.0 11/02/2016    PSA 5.0 (H) 05/02/2014    PSA 6.2 (H) 10/18/2013    PSA 4.49 (H) 02/22/2013    PSA 4.1 (H) 05/09/2012    PSA 4.04 (H) 04/05/2012    PSA 2.8 08/31/2010    PSA 3.1 02/25/2009    PSA 2.2 09/06/2007    PSA 1.8 09/30/2006    PSA 1.7 12/28/2005    PSA 1.8 10/30/2004    PSADIAG 10.5 (H) 08/22/2023    PSADIAG 4.9 (H) 02/22/2023     PSADIAG 7.0 (H) 03/07/2019    PSADIAG 6.6 (H) 09/07/2018    PSADIAG 6.4 (H) 06/08/2018    PSADIAG 5.3 (H) 01/03/2018    PSADIAG 2.9 02/22/2016    PSADIAG 2.9 08/24/2015    PSADIAG 5.7 (H) 02/24/2015    PSADIAG 5.1 (H) 06/26/2014    PSADIAG 5.8 (H) 12/03/2013    PSATOTAL 6.5 (H) 05/09/2023    PSAFREE 1.69 (H) 05/09/2023       Lab Results   Component Value Date    CREATININE 1.1 06/03/2024       ---  PMH/PSH/Medications/Allergies/Social history reviewed and as in chart.    Review of Systems   Constitutional:  Negative for activity change, chills and fever.   HENT:  Negative for congestion.    Respiratory:  Negative for cough, chest tightness and shortness of breath.    Cardiovascular:  Negative for chest pain and palpitations.   Gastrointestinal:  Negative for abdominal distention, abdominal pain, nausea and vomiting.   Genitourinary:  Negative for difficulty urinating, flank pain, frequency, hematuria, penile pain, scrotal swelling and testicular pain.   Musculoskeletal:  Negative for gait problem.       Objective:      Physical Exam  HENT:      Head: Atraumatic.   Pulmonary:      Effort: Pulmonary effort is normal.   Neurological:      General: No focal deficit present.      Mental Status: He is alert and oriented to person, place, and time.       Inc c/d/I    Assessment:     Problem Noted   Benign Prostatic Hyperplasia With Urinary Retention     183 gm prostate on CTU 2022  Initial AUA SS (8/2022): 19/2  PVR 67  Recurrent retention 10/23.  RASP, IPP explant 11/6/23, path negative  AUA SS current 3/1 (12/23), PVR 53     Elevated Psa 3/11/2013    Biopsy 2013 ASAP 1 core, 2014 benign  PSA 8/22--8.2  Volume 183, PSAD 0.04       Kidney Stones 5/20/2020    Left 1.5 cm UPJ and non obstructing left lower pole greater than 1 cm on CTU 11/2022 with moderate hydronephrosis     Unable to place left stent 11/17/22 due to large prostate, prosthesis and unable to find ureteral orifice.   Left PCN 11/18/22, internalized to stent  11/28  Left ESWL UPJ stone 12/2/22  KUB after with 1.8 cm left lower pole stone consistent with lower pole stone on CT urogram, no renal pelvis/gualberto stent stone seen.  Left ESWL 1/30/23 good fragmentation  KUB shows continued LLP stone  CT 5/23: left enlarging LP stone 2.3 cm mild pelviectasis, small stone fragment at UPJ  Attempted left PCNL 8/14/23--IR could not get access down ureter, taken to cystoscopy suite, ureteroscopy done showing access between two calyx, survey of kidney for some time unable to locate dominant stone some stone fragmented in lower pole     History of Penile Implant 5/20/2020         Plan:     Refilled Oxybutynin to pharmacy.    Follow up in 6 months  Doing well, check PSA        Dannie Murray MD

## 2024-07-30 ENCOUNTER — LAB VISIT (OUTPATIENT)
Dept: LAB | Facility: HOSPITAL | Age: 79
End: 2024-07-30
Attending: UROLOGY
Payer: OTHER GOVERNMENT

## 2024-07-30 ENCOUNTER — OFFICE VISIT (OUTPATIENT)
Dept: UROLOGY | Facility: CLINIC | Age: 79
End: 2024-07-30
Payer: OTHER GOVERNMENT

## 2024-07-30 VITALS
WEIGHT: 195.88 LBS | HEART RATE: 94 BPM | BODY MASS INDEX: 26.53 KG/M2 | DIASTOLIC BLOOD PRESSURE: 84 MMHG | HEIGHT: 72 IN | SYSTOLIC BLOOD PRESSURE: 125 MMHG

## 2024-07-30 DIAGNOSIS — R97.20 ELEVATED PSA: ICD-10-CM

## 2024-07-30 DIAGNOSIS — R33.8 BENIGN PROSTATIC HYPERPLASIA WITH URINARY RETENTION: Primary | ICD-10-CM

## 2024-07-30 DIAGNOSIS — R35.0 FREQUENCY OF URINATION: ICD-10-CM

## 2024-07-30 DIAGNOSIS — N40.1 BENIGN PROSTATIC HYPERPLASIA WITH URINARY RETENTION: Primary | ICD-10-CM

## 2024-07-30 LAB
PROSTATE SPECIFIC ANTIGEN, TOTAL: 1.1 NG/ML (ref 0–4)
PSA FREE MFR SERPL: 16.36 %
PSA FREE SERPL-MCNC: 0.18 NG/ML (ref 0–1.5)

## 2024-07-30 PROCEDURE — 84154 ASSAY OF PSA FREE: CPT | Performed by: UROLOGY

## 2024-07-30 PROCEDURE — 99999 PR PBB SHADOW E&M-EST. PATIENT-LVL IV: CPT | Mod: PBBFAC,,, | Performed by: UROLOGY

## 2024-07-30 PROCEDURE — 99213 OFFICE O/P EST LOW 20 MIN: CPT | Mod: S$PBB,,, | Performed by: UROLOGY

## 2024-07-30 PROCEDURE — 84153 ASSAY OF PSA TOTAL: CPT | Performed by: UROLOGY

## 2024-07-30 PROCEDURE — 36415 COLL VENOUS BLD VENIPUNCTURE: CPT | Performed by: UROLOGY

## 2024-07-30 PROCEDURE — 99214 OFFICE O/P EST MOD 30 MIN: CPT | Mod: PBBFAC,PO | Performed by: UROLOGY

## 2024-07-30 RX ORDER — DONEPEZIL HYDROCHLORIDE 10 MG/1
10 TABLET, FILM COATED ORAL
COMMUNITY
Start: 2023-12-14

## 2024-07-30 RX ORDER — OXYBUTYNIN CHLORIDE 5 MG/1
5 TABLET, EXTENDED RELEASE ORAL DAILY
Qty: 90 TABLET | Refills: 1 | Status: SHIPPED | OUTPATIENT
Start: 2024-07-30 | End: 2025-01-26

## 2024-07-30 RX ORDER — LISINOPRIL 10 MG/1
5 TABLET ORAL
COMMUNITY
Start: 2024-06-10

## 2024-07-30 RX ORDER — MEMANTINE HYDROCHLORIDE 10 MG/1
10 TABLET ORAL
COMMUNITY
Start: 2024-06-10

## 2024-08-11 ENCOUNTER — OFFICE VISIT (OUTPATIENT)
Dept: URGENT CARE | Facility: CLINIC | Age: 79
End: 2024-08-11
Payer: OTHER GOVERNMENT

## 2024-08-11 VITALS
HEIGHT: 71 IN | OXYGEN SATURATION: 96 % | DIASTOLIC BLOOD PRESSURE: 94 MMHG | TEMPERATURE: 99 F | SYSTOLIC BLOOD PRESSURE: 135 MMHG | WEIGHT: 195 LBS | BODY MASS INDEX: 27.3 KG/M2 | HEART RATE: 79 BPM | RESPIRATION RATE: 20 BRPM

## 2024-08-11 DIAGNOSIS — H65.93 MIDDLE EAR EFFUSION, BILATERAL: Primary | ICD-10-CM

## 2024-08-11 DIAGNOSIS — R42 DIZZINESS: ICD-10-CM

## 2024-08-11 DIAGNOSIS — R11.0 NAUSEA: ICD-10-CM

## 2024-08-11 PROCEDURE — 99214 OFFICE O/P EST MOD 30 MIN: CPT | Mod: ,,,

## 2024-08-11 RX ORDER — PREDNISONE 20 MG/1
20 TABLET ORAL DAILY
Qty: 4 TABLET | Refills: 0 | Status: SHIPPED | OUTPATIENT
Start: 2024-08-11 | End: 2024-08-15

## 2024-08-11 RX ORDER — ONDANSETRON 4 MG/1
4 TABLET, ORALLY DISINTEGRATING ORAL EVERY 6 HOURS PRN
Qty: 10 TABLET | Refills: 0 | Status: SHIPPED | OUTPATIENT
Start: 2024-08-11

## 2024-08-11 NOTE — PROGRESS NOTES
"Subjective:      Patient ID: Brandan Werner is a 78 y.o. male.    Vitals:  height is 5' 11" (1.803 m) and weight is 88.5 kg (195 lb). His oral temperature is 98.7 °F (37.1 °C). His blood pressure is 135/94 (abnormal) and his pulse is 79. His respiration is 20 and oxygen saturation is 96%.     Chief Complaint: Diarrhea    Pt is a 77 yo male with PMHx of HTN, HLD, history of kidney stones. He is presenting with nausea, dizziness, diarrhea. Onset of symptoms was months. He was in the ER for similar symptoms in the past and diagnosed with kidney stones. Pt reports using OTC pepto bismol. Wife present and reports that he gets these symptoms often.  He only had 2 episodes of diarrhea today, denies any blood or mucus in stool.  Denies any chest pain, shortness of breath, vomiting, fever, chills, myalgia, sore throat, otalgia.    Diarrhea   This is a chronic problem. The current episode started more than 1 month ago. The problem occurs 2 to 4 times per day. The problem has been unchanged. The stool consistency is described as Watery. Pertinent negatives include no abdominal pain, arthralgias, chills, coughing, fever, myalgias or vomiting. Nothing aggravates the symptoms. There are no known risk factors. He has tried nothing for the symptoms.       Constitution: Negative for activity change, appetite change, chills, fatigue and fever.   HENT:  Negative for ear pain, congestion, postnasal drip, sinus pain, sinus pressure and sore throat.    Neck: Negative for neck pain and neck swelling.   Cardiovascular:  Negative for chest pain and sob on exertion.   Eyes:  Negative for eye trauma, eye discharge and eye redness.   Respiratory:  Negative for cough, shortness of breath and wheezing.    Gastrointestinal:  Positive for nausea and diarrhea. Negative for abdominal pain, vomiting and constipation.   Genitourinary:  Negative for dysuria, frequency, urgency and urine decreased.   Musculoskeletal:  Negative for pain, joint pain, " joint swelling, abnormal ROM of joint and muscle ache.   Skin:  Negative for color change, rash, laceration and erythema.   Neurological:  Positive for dizziness. Negative for light-headedness, altered mental status and numbness.   Psychiatric/Behavioral:  Negative for altered mental status and confusion.       Objective:     Physical Exam   Constitutional: He is oriented to person, place, and time. He appears well-developed. He is cooperative.  Non-toxic appearance. He does not appear ill. No distress.      Comments:Pt sitting erect on examination table. No acute respiratory distress, no use of accessory muscles, no notice of nasal flaring.        HENT:   Head: Normocephalic and atraumatic.   Ears:   Right Ear: Hearing, external ear and ear canal normal. Tympanic membrane is not erythematous. A middle ear effusion is present. no impacted cerumen  Left Ear: Hearing, external ear and ear canal normal. Tympanic membrane is not erythematous. A middle ear effusion is present. no impacted cerumen  Nose: Nose normal. No mucosal edema, rhinorrhea, nasal deformity or congestion. No epistaxis. Right sinus exhibits no maxillary sinus tenderness and no frontal sinus tenderness. Left sinus exhibits no maxillary sinus tenderness and no frontal sinus tenderness.   Mouth/Throat: Uvula is midline, oropharynx is clear and moist and mucous membranes are normal. Mucous membranes are moist. No trismus in the jaw. Normal dentition. No uvula swelling. No oropharyngeal exudate, posterior oropharyngeal edema or posterior oropharyngeal erythema. Oropharynx is clear.   Eyes: Conjunctivae and lids are normal. Pupils are equal, round, and reactive to light. No scleral icterus. Extraocular movement intact   Neck: Trachea normal and phonation normal. Neck supple. No edema present. No erythema present. No neck rigidity present.   Cardiovascular: Normal rate, regular rhythm, normal heart sounds and normal pulses.   Pulmonary/Chest: Effort normal  and breath sounds normal. No accessory muscle usage. No apnea, no tachypnea and no bradypnea. No respiratory distress. He has no decreased breath sounds. He has no rhonchi.   Abdominal: Normal appearance and bowel sounds are normal. Soft. flat abdomen There is no abdominal tenderness. There is no left CVA tenderness and no right CVA tenderness.   Musculoskeletal: Normal range of motion.         General: No deformity. Normal range of motion.   Neurological: He is alert and oriented to person, place, and time. He exhibits normal muscle tone. Coordination normal.   Skin: Skin is warm, dry, intact, not diaphoretic and not pale. No erythema   Psychiatric: His speech is normal and behavior is normal. Judgment and thought content normal.   Nursing note and vitals reviewed.        Assessment:     1. Middle ear effusion, bilateral    2. Dizziness    3. Nausea        Plan:   I have reviewed the patient chart and pertinent past imaging/labs.  Orthostatics within normal limits.  Vital signs stable.  Training for middle ear effusion with prednisone.  Patient given strict ER precautions and warning signs to monitor for.  Patient and wife deny any questions and state understanding.    Middle ear effusion, bilateral  -     predniSONE (DELTASONE) 20 MG tablet; Take 1 tablet (20 mg total) by mouth once daily. for 4 days  Dispense: 4 tablet; Refill: 0    Dizziness  -     Orthostatic vital signs    Nausea  -     ondansetron (ZOFRAN-ODT) 4 MG TbDL; Take 1 tablet (4 mg total) by mouth every 6 (six) hours as needed.  Dispense: 10 tablet; Refill: 0

## 2024-08-11 NOTE — PATIENT INSTRUCTIONS
Nausea: Zofran every 4-6 hours as needed  Dizziness: Prednisone for 4 days, in the morning  Monitor for new or worsening symptoms such as chest pain, shortness of breath, change in vision, weakness  Please return here or go to the Emergency Department for any concerns or worsening of condition.  If you were prescribed antibiotics, please take them to completion.  If you were prescribed a narcotic medication, do not drive or operate heavy equipment or machinery while taking these medications.  Please follow up with your primary care doctor or specialist as needed.    If you  smoke, please stop smoking.

## 2024-10-18 ENCOUNTER — OFFICE VISIT (OUTPATIENT)
Dept: ORTHOPEDICS | Facility: CLINIC | Age: 79
End: 2024-10-18
Payer: OTHER GOVERNMENT

## 2024-10-18 VITALS — BODY MASS INDEX: 27.32 KG/M2 | WEIGHT: 195.13 LBS | HEIGHT: 71 IN

## 2024-10-18 DIAGNOSIS — Z96.611 STATUS POST TOTAL SHOULDER REPLACEMENT, RIGHT: Primary | ICD-10-CM

## 2024-10-18 DIAGNOSIS — G89.29 CHRONIC BILATERAL LOW BACK PAIN WITHOUT SCIATICA: ICD-10-CM

## 2024-10-18 DIAGNOSIS — M19.011 ARTHRITIS OF RIGHT ACROMIOCLAVICULAR JOINT: ICD-10-CM

## 2024-10-18 DIAGNOSIS — M54.50 CHRONIC BILATERAL LOW BACK PAIN WITHOUT SCIATICA: ICD-10-CM

## 2024-10-18 PROCEDURE — 99214 OFFICE O/P EST MOD 30 MIN: CPT | Mod: 25,S$PBB,, | Performed by: ORTHOPAEDIC SURGERY

## 2024-10-18 PROCEDURE — 20605 DRAIN/INJ JOINT/BURSA W/O US: CPT | Mod: PBBFAC,PN,RT | Performed by: ORTHOPAEDIC SURGERY

## 2024-10-18 PROCEDURE — 99999PBSHW PR PBB SHADOW TECHNICAL ONLY FILED TO HB: Mod: PBBFAC,,,

## 2024-10-18 PROCEDURE — 99999 PR PBB SHADOW E&M-EST. PATIENT-LVL IV: CPT | Mod: PBBFAC,,, | Performed by: ORTHOPAEDIC SURGERY

## 2024-10-18 PROCEDURE — 99214 OFFICE O/P EST MOD 30 MIN: CPT | Mod: PBBFAC,PN,25 | Performed by: ORTHOPAEDIC SURGERY

## 2024-10-18 RX ORDER — TRIAMCINOLONE ACETONIDE 40 MG/ML
40 INJECTION, SUSPENSION INTRA-ARTICULAR; INTRAMUSCULAR
Status: DISCONTINUED | OUTPATIENT
Start: 2024-10-18 | End: 2024-10-18 | Stop reason: HOSPADM

## 2024-10-18 RX ADMIN — TRIAMCINOLONE ACETONIDE 40 MG: 40 INJECTION, SUSPENSION INTRA-ARTICULAR; INTRAMUSCULAR at 11:10

## 2024-10-18 NOTE — PROGRESS NOTES
Patient ID:   Brandan Werner is a 79 y.o. male.    Chief Complaint:   Right shoulder pain s/p R TSA     HPI:   The patient is 13 months s/p R TSA. He presenting today with pain in the superior aspect of the shoulder. He is pointing to the region of the AC joint.     He is also reporting low back pain. He denies any radicular symptoms in the lower extremities.      Medications:    Current Outpatient Medications:     acetaminophen (TYLENOL) 500 MG tablet, Take 1 tablet (500 mg total) by mouth every 6 (six) hours as needed for Pain., Disp: 20 tablet, Rfl: 0    ascorbic acid, vitamin C, (VITAMIN C) 250 MG tablet, Take 250 mg by mouth once daily., Disp: , Rfl:     atorvastatin (LIPITOR) 40 MG tablet, Take 20 mg by mouth once daily., Disp: , Rfl:     carbidopa-levodopa  mg (SINEMET)  mg per tablet, TAKE 1 TABLET BY MOUTH THREE TIMES A DAY FOR PARKINSON'S DISE** WITH MEALSASE, Disp: , Rfl:     celecoxib (CELEBREX) 200 MG capsule, Take 400 mg by mouth once daily., Disp: , Rfl:     donepeziL (ARICEPT) 10 MG tablet, Take 10 mg by mouth., Disp: , Rfl:     ferrous sulfate (FEOSOL) 325 mg (65 mg iron) Tab tablet, 325 mg., Disp: , Rfl:     FLUoxetine 20 MG capsule, 60 mg., Disp: , Rfl:     ibuprofen (ADVIL,MOTRIN) 800 MG tablet, Take 1 tablet (800 mg total) by mouth every 8 (eight) hours., Disp: 20 tablet, Rfl: 0    lisinopriL 10 MG tablet, Take 5 mg by mouth., Disp: , Rfl:     memantine (NAMENDA) 10 MG Tab, Take 10 mg by mouth., Disp: , Rfl:     naproxen sodium (ANAPROX) 220 MG tablet, Take 220 mg by mouth 2 (two) times daily with meals. 2 tablets twice a day, Disp: , Rfl:     ondansetron (ZOFRAN) 8 MG tablet, Take 1 tablet (8 mg total) by mouth every 8 (eight) hours as needed for Nausea., Disp: 30 tablet, Rfl: 0    ondansetron (ZOFRAN-ODT) 4 MG TbDL, Take 1 tablet (4 mg total) by mouth every 8 (eight) hours as needed., Disp: 12 tablet, Rfl: 0    ondansetron (ZOFRAN-ODT) 4 MG TbDL, Take 1 tablet (4 mg total) by  mouth every 6 (six) hours as needed., Disp: 10 tablet, Rfl: 0    oxybutynin (DITROPAN-XL) 5 MG TR24, Take 1 tablet (5 mg total) by mouth once daily., Disp: 90 tablet, Rfl: 1    oxyCODONE (ROXICODONE) 5 MG immediate release tablet, Take 1 tablet (5 mg total) by mouth every 6 (six) hours as needed for Pain., Disp: 10 tablet, Rfl: 0    oxyCODONE-acetaminophen (PERCOCET) 5-325 mg per tablet, Take 1 tablet by mouth every 4 (four) hours as needed for Pain., Disp: 28 tablet, Rfl: 0    polyethylene glycol (GLYCOLAX) 17 gram/dose powder, Take 17 g by mouth daily as needed (take as needed for daily soft bowel movements)., Disp: 510 g, Rfl: 1    polyvinyl alcohol, artificial tears, (LIQUIFILM TEARS) 1.4 % ophthalmic solution, INSTILL ONE DROP IN EACH EYE FOUR TIMES A DAY FOR DRY EYES, Disp: , Rfl:     tamsulosin (FLOMAX) 0.4 mg Cp24, Take 1 capsule (0.4 mg total) by mouth nightly., Disp: 30 capsule, Rfl: 11    traZODone (DESYREL) 100 MG tablet, 100 mg., Disp: , Rfl:     cetirizine (ZYRTEC) 10 MG tablet, Take 1 tablet (10 mg total) by mouth once daily., Disp: 30 tablet, Rfl: 0    primidone (MYSOLINE) 50 MG Tab, Take 2 tablets (100 mg total) by mouth every evening., Disp: 60 tablet, Rfl: 11  No current facility-administered medications for this visit.    Facility-Administered Medications Ordered in Other Visits:     lactated ringers infusion, , Intravenous, Continuous, Maureen Solomon DNP    LIDOcaine (PF) 10 mg/ml (1%) injection 10 mg, 1 mL, Intradermal, Once, Maureen Solomon DNP    Allergies:  Review of patient's allergies indicates:  No Known Allergies    Past Medical History:  Past Medical History:   Diagnosis Date    Anxiety     BPH (benign prostatic hyperplasia)     Cataract     Elevated PSA     Erectile dysfunction     Hyperlipidemia     Hypertension     Hypogonadism male     Kidney stone 5/20/2020    Obesity     PTSD (post-traumatic stress disorder)     Shoulder arthritis     Urinary tract infection         Past  Surgical History:  Past Surgical History:   Procedure Laterality Date    ARTHROPLASTY OF SHOULDER Right 9/14/2023    Procedure: ARTHROPLASTY, SHOULDER;  Surgeon: Gabe Manning MD;  Location: [facility];  Service: Orthopedics;  Laterality: Right;  Ayaan david notified cc    COLONOSCOPY N/A 11/26/2018    Procedure: COLONOSCOPY;  Surgeon: Germán Moss MD;  Location: [facility] ENDO ([address] [place] [address]);  Service: Endoscopy;  Laterality: N/A;    COLONOSCOPY N/A 4/16/2024    Procedure: COLONOSCOPY;  Surgeon: Phiilp López MD;  Location: [facility];  Service: Endoscopy;  Laterality: N/A;    COLONOSCOPY W/ BIOPSIES  2010    CYSTOSCOPY Left 11/17/2022    Procedure: CYSTOSCOPY;  Surgeon: Dannie Murray MD;  Location: [facility];  Service: Urology;  Laterality: Left;    EXTRACORPOREAL SHOCK WAVE LITHOTRIPSY Left 12/2/2022    Procedure: LITHOTRIPSY, ESWL [employer] [place] ESWL machine, scheduling call [phone] 60 minutes;  Surgeon: Dannie Murray MD;  Location: [facility];  Service: Urology;  Laterality: Left;  Confirmed with [facility] 11/22 [facility] conf # [id number]    EXTRACORPOREAL SHOCK WAVE LITHOTRIPSY Left 1/30/2023    Procedure: LITHOTRIPSY, ESWL [employer] [place] ESWL machine, scheduling call [phone] 60 minutes;  Surgeon: Dannie Murray MD;  Location: [facility];  Service: Urology;  Laterality: Left;  [facility] conf #[id number]    PENILE PROSTHESIS IMPLANT      PERCUTANEOUS NEPHROLITHOTOMY Left 8/14/2023    Procedure: CYSTOSCOPY, LEFT RETROGRADE PYELOGRAM, LEFT URETEROSCOPY WITH STONE BASKET EXTRACTION, PLACEMENT OF JJ STENT, LEFT NEPHROSTOMY TUBE REMOVAL;  Surgeon: Dannie Murray MD;  Location: [facility];  Service: Urology;  Laterality: Left;  IR to place ureteral catheter prior, start time 1200  Cysto tower, flexible cysto/ureteroscopes, Laser in room, lithoclast in room, fluoroscopy with Tony to    REMOVAL OF INFLATABLE PENILE PROSTHESIS N/A 11/6/2023    Procedure: REMOVAL, PENILE PROSTHESIS,  "INFLATABLE;  Surgeon: Dannie Murray MD;  Location: [facility] OR;  Service: Urology;  Laterality: N/A;    ROBOT-ASSISTED LAPAROSCOPIC SIMPLE PROSTATECTOMY N/A 2023    Procedure: ROBOTIC PROSTATECTOMY, SIMPLE;  Surgeon: Dannie Murray MD;  Location: [facility] OR;  Service: Urology;  Laterality: N/A;  Xi robot, open tray, simple prostatectomy       Social History:  Social History     Occupational History    Occupation:      Comment: self-employed   Tobacco Use    Smoking status: Former     Current packs/day: 0.00     Average packs/day: 1 pack/day for 10.0 years (10.0 ttl pk-yrs)     Types: Cigarettes     Start date:      Quit date:      Years since quittin.8    Smokeless tobacco: Never   Substance and Sexual Activity    Alcohol use: No    Drug use: No    Sexual activity: Yes     Partners: Female       Family History:  Family History   Problem Relation Name Age of Onset    Cancer Mother          cancer    Cataracts Mother      Heart disease Father  62        M.I.    Stroke Brother  62    Amblyopia Neg Hx      Blindness Neg Hx      Glaucoma Neg Hx      Macular degeneration Neg Hx      Retinal detachment Neg Hx      Strabismus Neg Hx      Prostate cancer Neg Hx      Kidney disease Neg Hx          ROS:  Review of Systems   Musculoskeletal:  Positive for arthritis, back pain, joint pain and myalgias.   All other systems reviewed and are negative.    Vitals:  Ht 5' 11" (1.803 m)   Wt 88.5 kg (195 lb 1.7 oz)   BMI 27.21 kg/m²     Physical Examination:  Comprehensive Orthopaedic Musculoskeletal Exam    General      Constitutional: appears stated age, well-developed and well-nourished    Scleral icterus: no    Labored breathing: no    Psychiatric: normal mood and affect and no acute distress    Neurological: alert and oriented x3    Skin: intact    Lymphadenopathy: none     Ortho Exam   Right shoulder exam:  Tenderness along the AC joint. Positive CBA test.  ROM: active elevation 150, ER " 20  Good cuff strength. Negative belly press.     Assessment:  1. Status post total shoulder replacement, right    2. Chronic bilateral low back pain without sciatica      Plan:  I offered to provide the patient with an AC joint injection. He wishes to proceed. In addition, referral placed for PT for his low back pain.     Orders Placed This Encounter    Ambulatory referral/consult to Physical/Occupational Therapy     No follow-ups on file.

## 2024-10-18 NOTE — PROCEDURES
Intermediate Joint Aspiration/Injection    Date/Time: 10/18/2024 11:30 AM    Performed by: Gabe Manning MD  Authorized by: Gabe Manning MD    Consent Done?:  Yes (Verbal)  Indications:  Arthritis  Site marked: The procedure site was marked    Timeout: Prior to procedure the correct patient, procedure, and site was verified    Prep: Patient was prepped and draped in usual sterile fashion    Medications:  40 mg triamcinolone acetonide 40 mg/mL  Patient tolerance:  Patient tolerated the procedure well with no immediate complications

## 2024-11-04 ENCOUNTER — CLINICAL SUPPORT (OUTPATIENT)
Dept: REHABILITATION | Facility: HOSPITAL | Age: 79
End: 2024-11-04
Attending: ORTHOPAEDIC SURGERY
Payer: OTHER GOVERNMENT

## 2024-11-04 DIAGNOSIS — R53.1 DECREASED STRENGTH: ICD-10-CM

## 2024-11-04 DIAGNOSIS — G89.29 CHRONIC BILATERAL LOW BACK PAIN WITHOUT SCIATICA: ICD-10-CM

## 2024-11-04 DIAGNOSIS — Z74.09 IMPAIRED FUNCTIONAL MOBILITY, BALANCE, AND ENDURANCE: Primary | ICD-10-CM

## 2024-11-04 DIAGNOSIS — M54.50 CHRONIC BILATERAL LOW BACK PAIN WITHOUT SCIATICA: ICD-10-CM

## 2024-11-04 PROCEDURE — 97530 THERAPEUTIC ACTIVITIES: CPT | Mod: PN

## 2024-11-04 PROCEDURE — 97161 PT EVAL LOW COMPLEX 20 MIN: CPT | Mod: PN

## 2024-11-04 NOTE — PLAN OF CARE
[facility]   Physical Therapy Initial Evaluation      Name: Brandan [facility] Number: [id number]    Therapy Diagnosis:   Encounter Diagnosis   Name Primary?    Chronic bilateral low back pain without sciatica         Physician: Gabe Manning MD    Physician Orders: PT Eval and Treat   Medical Diagnosis from Referral: M54.50,G89.29 (ICD-10-CM) - Chronic bilateral low back pain without sciatica   Evaluation Date: 11/4/2024  Authorization Period Expiration: 12/31/2024   Plan of Care Expiration: 12/05/2024  Progress Note Due: 11/19/2024  Date of Surgery: none  Visit # / Visits authorized: 1/ 20   FOTO: 1/5    Precautions: Standard; hard of hearing; memory loss    Time In: 2:10 pm   Time Out: 3:00 pm   Total Billable Time: 50 minutes    Subjective     Date of onset: 4-5 months    History of current condition - Brandan reports low back pain for about 4-5 months. Cannot recall any specific JEFFERY. Insidious onset. Pretty sedentary behaviors in the home. Used to go to the gym and would like to get back to it. Increased back pain when he lays down at night on his back with his legs straight and sitting down. Patient denies radicular symptoms, incontinence, weakness or changes in gait, saddle/perineal paresthesia, pain unchanged with rest, or unexplainable weight loss. He is hard of hearing and has memory loss.    Falls: none    Imaging: none    Prior Therapy: yes shoulder   Social History: lives with wide in one story home.   Occupation: retired .   Prior Level of Function: independent   Current Level of Function: independent     Pain:  Current 3/10, worst 7/10, best 2/10   Location: midline low back  Description: Aching and Dull  Aggravating Factors: Sitting, Laying, and Night Time  Easing Factors: ice and heating pad    Patients goals: improve function and quality of life.     Medical History:   Past Medical History:   Diagnosis Date    Anxiety     BPH (benign prostatic  hyperplasia)     Cataract     Elevated PSA     Erectile dysfunction     Hyperlipidemia     Hypertension     Hypogonadism male     Kidney stone 5/20/2020    Obesity     PTSD (post-traumatic stress disorder)     Shoulder arthritis     Urinary tract infection        Surgical History:   Brandan Werner  has a past surgical history that includes Colonoscopy w/ biopsies (2010); Penile prosthesis implant; Colonoscopy (N/A, 11/26/2018); Cystoscopy (Left, 11/17/2022); Extracorporeal shock wave lithotripsy (Left, 12/2/2022); Extracorporeal shock wave lithotripsy (Left, 1/30/2023); Percutaneous nephrolithotomy (Left, 8/14/2023); Arthroplasty of shoulder (Right, 9/14/2023); Robot-assisted laparoscopic simple prostatectomy (N/A, 11/6/2023); Removal of inflatable penile prosthesis (N/A, 11/6/2023); and Colonoscopy (N/A, 4/16/2024).    Medications:   Brandan has a current medication list which includes the following prescription(s): acetaminophen, ascorbic acid (vitamin c), atorvastatin, carbidopa-levodopa  mg, celecoxib, cetirizine, donepezil, ferrous sulfate, fluoxetine, ibuprofen, lisinopril, memantine, naproxen sodium, ondansetron, ondansetron, ondansetron, oxybutynin, oxycodone, oxycodone-acetaminophen, polyethylene glycol, polyvinyl alcohol (artificial tears), primidone, tamsulosin, and trazodone, and the following Facility-Administered Medications: lactated ringers and lidocaine (pf) 10 mg/ml (1%).    Allergies:   Review of patient's allergies indicates:  No Known Allergies     Objective        Posture: slight forward trunk lean     Palpation: TTP bilateral lumbar paraspinals     Functional Movements:  Gait Analysis: genu valgum; bilateral knee flexion    AROM:   Degrees Pain/Dysfunction   Flexion 70% NP  [place]'s Sign: -   Extension 10% NP   Right Side Bending  25% NP   Left Side Bending  25% NP   Right Rotation 25% NP   Left Rotation 25% NP     Hip AROM:   RLE LLE   Internal Rotation limited < 20 limited < 20  "  External Rotation limited < 20 limited < 20       Strength:  RLE  LLE    Hip flexion: 4-/5 Hip flexion: 4-/5   Knee flexion: 4-/5 Knee flexion: 4-/5   Knee extension: 5/5 Knee extension: 4+/5   Ankle Dorsiflexion: 4/5 Ankle Dorsiflexion: 4-/5   Ankle Plantarflexion: 4+/5 Ankle Plantarflexion: 4+/5     Special tests:   SLR: (-) bilateral   Slump: (+) left for calf tingling; (-) right   Flexion Preference: no  Extension Preference: no    Joint mobility:   Thoracic: NT  Lumbar: Tenderness upon palpation to spinous processes L1-L5    Other:  6 MWT: 960 feet - tingling in upper thoracic spine     Intake Outcome Measure for FOTO lumbar Survey    Therapist reviewed FOTO scores for Brandan Werner on 11/4/2024.   FOTO report - see Media section or FOTO account episode details.    Intake Score: 40%         Treatment     Total Treatment time (time-based codes) separate from Evaluation: 15 minutes     Brandan received the treatments listed below:      therapeutic activities to improve functional performance for 15  minutes, including:  SKTC: 3x10"   LTR: 10x   Supine sciatic nerve glide: 10x each   Bridges: 10x      Patient Education and Home Exercises     Education provided:   - Findings; prognosis and plan of care  - Home exercise program  - Modality options  - Therapist contact information      Written Home Exercises Provided: yes.  Exercises were reviewed and Brandan was able to demonstrate them prior to the end of the session.  Brandan demonstrated good understanding of the education provided. See EMR under Patient Instructions for exercises provided during therapy sessions.    Assessment     Brandan is a 79 y.o. male referred to outpatient Physical Therapy with a medical diagnosis of M54.50,G89.29 (ICD-10-CM) - Chronic bilateral low back pain without sciatica. Patient presents with signs and symptoms of chronic low back pain and generalized debility. Poor historian with history of memory impairments. Difficulty with " interpretation of special testing measures due to vague subjective report. Chronic low back pain seems to be more related to sedentary behaviors. Patient would benefit from lumbopelvic stabilization to improve function and prevent further decline.     Patient prognosis is Good.   Patient will benefit from skilled outpatient Physical Therapy to address the deficits stated above and in the chart below, provide patient /family education, and to maximize patientt's level of independence.     Plan of care discussed with patient: yes  Patient's spiritual, cultural and educational needs considered and patient is agreeable to the plan of care and goals as stated below:     Anticipated Barriers for therapy: sedentary lifestyle, co-morbidities, age     Medical Necessity is demonstrated by the following  History  Co-morbidities and personal factors that may impact the plan of care [] LOW: no personal factors / co-morbidities  [] MODERATE: 1-2 personal factors / co-morbidities  [x] HIGH: 3+ personal factors / co-morbidities    Moderate / High Support Documentation:   Co-morbidities affecting plan of care: anxiety, BPH, HLD, HTN, PTSD    Personal Factors:   age  coping style  social background  lifestyle     Examination  Body Structures and Functions, activity limitations and participation restrictions that may impact the plan of care [] LOW: addressing 1-2 elements  [x] MODERATE: 3+ elements  [] HIGH: 4+ elements (please support below)    Moderate / High Support Documentation: Based on PMHX, co morbidities , data from assessments and functional level of assistance required with task and clinical presentation directly impacting function.         Clinical Presentation [x] LOW: stable  [] MODERATE: Evolving  [] HIGH: Unstable     Decision Making/ Complexity Score: low       Goals:  Short Term Goals (2 Weeks):   1. Patient will be compliant with home exercise program to supplement therapy in decreasing pain with functional  "mobility.  2. Patient will improve impaired lower extremity manual muscle tests by 1/2 grade bilaterally to improve strength for standing tasks.  3. Patient will improve 30" sit<>stand score to 14 with bilateral upper extremity support to demonstrate increased functional strength.     Long Term Goals (4 Weeks):   1. Patient will improve FOTO score to </= 28% limited to decrease perceived limitation with maintaining/changing body position  2. Patient will improve impaired lower extremity manual muscle tests by 1 grade bilaterally to improve strength for standing tasks.  3. Patient will improve 30" sit<>stand score to >/=16 with minimal upper extremity support to demonstrate increased functional strength.   4. Patient will report 0 falls since initial evaluation to promote safety in the home and community.   5. Patient will improve distance during 6 MWT to >/= 1,100 feet to improve aerobic tolerance and functional capacity.     Plan     Plan of care Certification: 11/4/2024 to 12/05/2024.    Outpatient Physical Therapy 2 times weekly for 4 weeks to include the following interventions: Cervical/Lumbar Traction, Gait Training, Manual Therapy, Moist Heat/ Ice, Neuromuscular Re-ed, Patient Education, Self Care, Therapeutic Activities, and Therapeutic Exercise.     Emmanuelle Crenshaw, RODRIGO        Physician's Signature: _________________________________________ Date: ________________    I certify the need for these services furnished under this plan of treatment and while under my care.        Gabe Manning MD, FAAOS  , Orthopaedic Sports Medicine  Residency   [facility] Department of Orthopaedic Surgery  Assistant Orthopaedic Surgeon, [facility]  Head Team Physician, [facility]    "

## 2024-11-05 PROBLEM — M79.601 PAIN OF RIGHT UPPER EXTREMITY: Status: RESOLVED | Noted: 2023-10-24 | Resolved: 2024-11-05

## 2024-11-05 PROBLEM — Z91.81 HISTORY OF FALL: Status: RESOLVED | Noted: 2022-08-12 | Resolved: 2024-11-05

## 2024-11-05 PROBLEM — Z74.09 STIFFNESS DUE TO IMMOBILITY: Status: RESOLVED | Noted: 2023-10-24 | Resolved: 2024-11-05

## 2024-11-05 PROBLEM — Z74.09 IMPAIRED FUNCTIONAL MOBILITY, BALANCE, AND ENDURANCE: Status: ACTIVE | Noted: 2024-11-05

## 2024-11-05 PROBLEM — R53.1 DECREASED STRENGTH: Status: ACTIVE | Noted: 2024-11-05

## 2024-11-05 PROBLEM — M25.60 STIFFNESS DUE TO IMMOBILITY: Status: RESOLVED | Noted: 2023-10-24 | Resolved: 2024-11-05

## 2024-11-05 PROBLEM — R26.89 IMBALANCE: Status: RESOLVED | Noted: 2022-08-12 | Resolved: 2024-11-05

## 2024-11-05 PROBLEM — R53.1 WEAKNESS: Status: RESOLVED | Noted: 2023-10-24 | Resolved: 2024-11-05

## 2024-11-08 ENCOUNTER — CLINICAL SUPPORT (OUTPATIENT)
Dept: REHABILITATION | Facility: HOSPITAL | Age: 79
End: 2024-11-08
Payer: OTHER GOVERNMENT

## 2024-11-08 DIAGNOSIS — R53.1 DECREASED STRENGTH: ICD-10-CM

## 2024-11-08 DIAGNOSIS — Z74.09 IMPAIRED FUNCTIONAL MOBILITY, BALANCE, AND ENDURANCE: Primary | ICD-10-CM

## 2024-11-08 PROCEDURE — 97112 NEUROMUSCULAR REEDUCATION: CPT | Mod: PN,CQ

## 2024-11-08 PROCEDURE — 97110 THERAPEUTIC EXERCISES: CPT | Mod: PN,CQ

## 2024-11-08 PROCEDURE — 97530 THERAPEUTIC ACTIVITIES: CPT | Mod: PN,CQ

## 2024-11-08 NOTE — PROGRESS NOTES
"[facility]   Physical Therapy Treatment Note      Name: Brandan [facility]  Clinic Number: [id number]    Therapy Diagnosis:   Encounter Diagnoses   Name Primary?    Impaired functional mobility, balance, and endurance Yes    Decreased strength      Physician: Gabe Manning MD    Visit Date: 11/8/2024  Therapy Diagnosis:        Encounter Diagnosis   Name Primary?    Chronic bilateral low back pain without sciatica          Physician: Gabe Manning MD     Physician Orders: PT Eval and Treat   Medical Diagnosis from Referral: M54.50,G89.29 (ICD-10-CM) - Chronic bilateral low back pain without sciatica   Evaluation Date: 11/4/2024  Authorization Period Expiration: 12/31/2024   Plan of Care Expiration: 12/05/2024  Progress Note Due: 11/19/2024  Date of Surgery: none  Visit # / Visits authorized: 1/ 20   FOTO: 1/5     Precautions: Standard; hard of hearing; memory loss     Time In: 8:00 am  Time Out: 8:55 am   Total Billable Time: 55 minutes       PTA Visit #: 1/5       Subjective     Patient reports: he was partially compliant with his home exercise program.   He was compliant with home exercise program.  Response to previous treatment: Eval  Functional change: Ongoing    Pain: 5/10  Location: bilateral back      Objective      Objective Measures updated at progress report unless specified.     Treatment     Brandan received the treatments listed below:      therapeutic exercises to develop strength, endurance, ROM, flexibility, posture, and core stabilization for 40  minutes including:  Hook lying Transverse abdominus 15x5" hold  Hip ADD iso 20x w/sm ball  Hip ABD iso 20xw/green theraband   Reverse crunches 2 minutes green Swiss ball  Lower trunk rotation 2 minutes green Swiss ball  Straight leg raise 2x10   SKTC: 3x30" bilateral    Supine sciatic nerve glide: 10x each   Bridges: 1x (deferred due to increased pain)    manual therapy techniques: Joint mobilizations, Manual traction, " Manual Lymphatic Drainage, Soft tissue Mobilization, and Friction Massage were applied to the: lumbar paraspinals  for 0 minutes, including:  NP    neuromuscular re-education activities to improve: Balance, Coordination, Kinesthetic, Sense, Proprioception, and Posture for 10  minutes. The following activities were included:  Sci Fit bilateral lower extremity and upper extremity reciprocation 10 minutes for     therapeutic activities to improve functional performance for 05  minutes, including:  Sit to stand from chair 2x10        Patient Education and Home Exercises       Education provided:   - home exercise program review    Written Home Exercises Provided: Pt instructed to continue prior HEP. Exercises were reviewed and Brandan was able to demonstrate them prior to the end of the session.  Brandan demonstrated good  understanding of the education provided. See Electronic Medical Record under Patient Instructions for exercises provided during therapy sessions    Assessment     Brandan presents for his first follow up after evaluation. He is currently experiencing 5-6/10 bilateral low back pain. He  was partially  compliant with his home exercise program. [place] is [place] and forgot his hearing aids. Brandan required frequent cueing to perform therapeutic exercises effectively. He reports decreased pain following today's interventions     Brandan Is progressing well towards his goals.   Patient prognosis is Good.     Patient will continue to benefit from skilled outpatient physical therapy to address the deficits listed in the problem list box on initial evaluation, provide pt/family education and to maximize pt's level of independence in the home and community environment.     Patient's spiritual, cultural and educational needs considered and pt agreeable to plan of care and goals.     Anticipated barriers to physical therapy: sedentary lifestyle, co-morbidities, age       Goals:  Short Term Goals (2 Weeks):   1. Patient  "will be compliant with home exercise program to supplement therapy in decreasing pain with functional mobility.  2. Patient will improve impaired lower extremity manual muscle tests by 1/2 grade bilaterally to improve strength for standing tasks.  3. Patient will improve 30" sit<>stand score to 14 with bilateral upper extremity support to demonstrate increased functional strength.      Long Term Goals (4 Weeks):   1. Patient will improve FOTO score to </= 28% limited to decrease perceived limitation with maintaining/changing body position  2. Patient will improve impaired lower extremity manual muscle tests by 1 grade bilaterally to improve strength for standing tasks.  3. Patient will improve 30" sit<>stand score to >/=16 with minimal upper extremity support to demonstrate increased functional strength.   4. Patient will report 0 falls since initial evaluation to promote safety in the home and community.   5. Patient will improve distance during 6 MWT to >/= 1,100 feet to improve aerobic tolerance and functional capacity.      Plan     Continue physical therapy plan of care     Delvin Hernandez PTA       "

## 2024-11-12 ENCOUNTER — CLINICAL SUPPORT (OUTPATIENT)
Dept: REHABILITATION | Facility: HOSPITAL | Age: 79
End: 2024-11-12
Payer: OTHER GOVERNMENT

## 2024-11-12 DIAGNOSIS — Z74.09 IMPAIRED FUNCTIONAL MOBILITY, BALANCE, AND ENDURANCE: Primary | ICD-10-CM

## 2024-11-12 DIAGNOSIS — R53.1 DECREASED STRENGTH: ICD-10-CM

## 2024-11-12 PROCEDURE — 97110 THERAPEUTIC EXERCISES: CPT | Mod: PN,CQ

## 2024-11-12 PROCEDURE — 97112 NEUROMUSCULAR REEDUCATION: CPT | Mod: PN,CQ

## 2024-11-12 NOTE — PROGRESS NOTES
"[facility]   Physical Therapy Treatment Note      Name: Brandan [facility]  Clinic Number: [id number]    Therapy Diagnosis:   Encounter Diagnoses   Name Primary?    Impaired functional mobility, balance, and endurance Yes    Decreased strength          Physician: Gabe Manning MD    Visit Date: 11/12/2024  Therapy Diagnosis:        Encounter Diagnosis   Name Primary?    Chronic bilateral low back pain without sciatica          Physician: Gabe Manning MD     Physician Orders: PT Eval and Treat   Medical Diagnosis from Referral: M54.50,G89.29 (ICD-10-CM) - Chronic bilateral low back pain without sciatica   Evaluation Date: 11/4/2024  Authorization Period Expiration: 12/31/2024   Plan of Care Expiration: 12/05/2024  Progress Note Due: 11/19/2024  Date of Surgery: none  Visit # / Visits authorized: 2/ 20   FOTO: 1/5     Precautions: Standard; hard of hearing; memory loss     Time In: 1400  Time Out: 1455   Total Billable Time: 55 minutes       PTA Visit #: 2/5       Subjective     Patient reports: he was partially compliant with his home exercise program. Less pain today  He was compliant with home exercise program.  Response to previous treatment: no adverse effects   Functional change: Ongoing    Pain: 3/10  Location: bilateral back      Objective      Objective Measures updated at progress report unless specified.     Treatment     Brandan received the treatments listed below:      therapeutic exercises to develop strength, endurance, ROM, flexibility, posture, and core stabilization for 40  minutes including:  Hook lying Transverse abdominus 15x5" hold  Hip ADD iso 20x w/sm ball  Hip ABD iso 20xw/green theraband   Reverse crunches 2 minutes green Swiss ball  Lower trunk rotation 2 minutes green Swiss ball  Straight leg raise 2x10   SKTC: 3x30" bilateral    Supine sciatic nerve glide: 10x each   Bridges: 1x (deferred due to increased pain)    manual therapy techniques: Joint " mobilizations, Manual traction, Manual Lymphatic Drainage, Soft tissue Mobilization, and Friction Massage were applied to the: lumbar paraspinals  for 0 minutes, including:  NP    neuromuscular re-education activities to improve: Balance, Coordination, Kinesthetic, Sense, Proprioception, and Posture for 10  minutes. The following activities were included:  Sci Fit bilateral lower extremity and upper extremity reciprocation 10 minutes Level 4.0  for tissue extensibility     therapeutic activities to improve functional performance for 05  minutes, including:  Sit to stand from chair 2x10        Patient Education and Home Exercises       Education provided:   - home exercise program review    Written Home Exercises Provided: Pt instructed to continue prior HEP. Exercises were reviewed and Brandan was able to demonstrate them prior to the end of the session.  Brandan demonstrated good  understanding of the education provided. See Electronic Medical Record under Patient Instructions for exercises provided during therapy sessions    Assessment     Brandan presents for his first follow up after evaluation. He is currently experiencing less low back  pain today  3/10.He  was partially  compliant with his home exercise program. He is [place] but remembered  hearing aids. Brandan required frequent cueing to perform therapeutic exercises effectively. He reports decreased pain following today's interventions     rBandan Is progressing well towards his goals.   Patient prognosis is Good.     Patient will continue to benefit from skilled outpatient physical therapy to address the deficits listed in the problem list box on initial evaluation, provide pt/family education and to maximize pt's level of independence in the home and community environment.     Patient's spiritual, cultural and educational needs considered and pt agreeable to plan of care and goals.     Anticipated barriers to physical therapy: sedentary lifestyle,  "co-morbidities, age       Goals:  Short Term Goals (2 Weeks):   1. Patient will be compliant with home exercise program to supplement therapy in decreasing pain with functional mobility.  2. Patient will improve impaired lower extremity manual muscle tests by 1/2 grade bilaterally to improve strength for standing tasks.  3. Patient will improve 30" sit<>stand score to 14 with bilateral upper extremity support to demonstrate increased functional strength.      Long Term Goals (4 Weeks):   1. Patient will improve FOTO score to </= 28% limited to decrease perceived limitation with maintaining/changing body position  2. Patient will improve impaired lower extremity manual muscle tests by 1 grade bilaterally to improve strength for standing tasks.  3. Patient will improve 30" sit<>stand score to >/=16 with minimal upper extremity support to demonstrate increased functional strength.   4. Patient will report 0 falls since initial evaluation to promote safety in the home and community.   5. Patient will improve distance during 6 MWT to >/= 1,100 feet to improve aerobic tolerance and functional capacity.      Plan     Continue physical therapy plan of care     Delvin Hernandez PTA         "

## 2024-11-14 ENCOUNTER — TELEPHONE (OUTPATIENT)
Dept: UROLOGY | Facility: CLINIC | Age: 79
End: 2024-11-14
Payer: OTHER GOVERNMENT

## 2024-11-14 ENCOUNTER — PATIENT MESSAGE (OUTPATIENT)
Dept: UROLOGY | Facility: CLINIC | Age: 79
End: 2024-11-14
Payer: OTHER GOVERNMENT

## 2024-11-14 ENCOUNTER — CLINICAL SUPPORT (OUTPATIENT)
Dept: REHABILITATION | Facility: HOSPITAL | Age: 79
End: 2024-11-14
Payer: OTHER GOVERNMENT

## 2024-11-14 DIAGNOSIS — R53.1 DECREASED STRENGTH: ICD-10-CM

## 2024-11-14 DIAGNOSIS — Z74.09 IMPAIRED FUNCTIONAL MOBILITY, BALANCE, AND ENDURANCE: Primary | ICD-10-CM

## 2024-11-14 PROCEDURE — 97112 NEUROMUSCULAR REEDUCATION: CPT | Mod: PN

## 2024-11-14 NOTE — PROGRESS NOTES
"[facility]   Physical Therapy Treatment Note      Name: Brandan [facility]  Clinic Number: [id number]    Therapy Diagnosis:   Encounter Diagnoses   Name Primary?    Impaired functional mobility, balance, and endurance Yes    Decreased strength      Physician: Gabe Manning MD    Visit Date: 11/14/2024     Physician Orders: PT Eval and Treat   Medical Diagnosis from Referral: M54.50,G89.29 (ICD-10-CM) - Chronic bilateral low back pain without sciatica   Evaluation Date: 11/4/2024  Authorization Period Expiration: 12/31/2024   Plan of Care Expiration: 12/05/2024  Progress Note Due: 11/19/2024  Date of Surgery: none  Visit # / Visits authorized: 3/ 20   FOTO: 3/5     Precautions: Standard; hard of hearing; memory loss     Time In: 2:00 pm   Time Out: 2:58 pm   Total Billable Time: 58 minutes       PTA Visit #: 2/5       Subjective     Patient reports: slightly less pain with bed mobility but still present.    He was compliant with home exercise program.  Response to previous treatment: no adverse effects   Functional change: slightly less pain with bed mobility.    Pain: 3/10  Location: bilateral back      Objective      Objective Measures updated at progress report unless specified.     Treatment     Brandan received the treatments listed below:      therapeutic exercises to develop strength, endurance, ROM, flexibility, posture, and core stabilization for 28 minutes including:    LTR: 10x each   SKTC: 3x30" bilateral    Supine sciatic nerve glide: 10x each   BKFO: 10x5" each   Straight leg raise 2x10   Seated swiss ball roll out: 20x     neuromuscular re-education activities to improve: Balance, Coordination, Kinesthetic, Sense, Proprioception, and Posture for 30 minutes. The following activities were included:    Hook lying Transverse abdominus 15x5" hold  Hip ADD iso 20x w/sm ball  Hip ABD iso 20x w/belt   Seated rows: green theraband 2x10  Seated SAPD: red theraband 2x10  Sit to stand " "from chair with airex: 2x10 thigh support    Patient Education and Home Exercises       Education provided:   - home exercise program review    Written Home Exercises Provided: Pt instructed to continue prior HEP. Exercises were reviewed and Brandan was able to demonstrate them prior to the end of the session.  Brandan demonstrated good  understanding of the education provided. See Electronic Medical Record under Patient Instructions for exercises provided during therapy sessions    Assessment   Brandan is a 79 y.o. male referred to outpatient Physical Therapy with a medical diagnosis of M54.50,G89.29 (ICD-10-CM) - Chronic bilateral low back pain without sciatica. Patient presents with signs and symptoms of chronic low back pain and generalized debility. Hard of hearing and memory impairments result in frequent verbal cueing for exercise performance. Continued core stabilization and gluteal strengthening with addition of posterior chain stabilization in sitting. Vague subjective report due to memory impairments.    Brandan Is progressing well towards his goals.   Patient prognosis is Good.     Patient will continue to benefit from skilled outpatient physical therapy to address the deficits listed in the problem list box on initial evaluation, provide pt/family education and to maximize pt's level of independence in the home and community environment.     Patient's spiritual, cultural and educational needs considered and pt agreeable to plan of care and goals.     Anticipated barriers to physical therapy: sedentary lifestyle, co-morbidities, age       Goals:  Short Term Goals (2 Weeks):   1. Patient will be compliant with home exercise program to supplement therapy in decreasing pain with functional mobility.  2. Patient will improve impaired lower extremity manual muscle tests by 1/2 grade bilaterally to improve strength for standing tasks.  3. Patient will improve 30" sit<>stand score to 14 with bilateral upper " "extremity support to demonstrate increased functional strength.      Long Term Goals (4 Weeks):   1. Patient will improve FOTO score to </= 28% limited to decrease perceived limitation with maintaining/changing body position  2. Patient will improve impaired lower extremity manual muscle tests by 1 grade bilaterally to improve strength for standing tasks.  3. Patient will improve 30" sit<>stand score to >/=16 with minimal upper extremity support to demonstrate increased functional strength.   4. Patient will report 0 falls since initial evaluation to promote safety in the home and community.   5. Patient will improve distance during 6 MWT to >/= 1,100 feet to improve aerobic tolerance and functional capacity.      Plan     Continue physical therapy plan of care     Emmanuelle Crenshaw, PT           "

## 2024-11-19 ENCOUNTER — CLINICAL SUPPORT (OUTPATIENT)
Dept: REHABILITATION | Facility: HOSPITAL | Age: 79
End: 2024-11-19
Payer: OTHER GOVERNMENT

## 2024-11-19 DIAGNOSIS — R53.1 DECREASED STRENGTH: ICD-10-CM

## 2024-11-19 DIAGNOSIS — Z74.09 IMPAIRED FUNCTIONAL MOBILITY, BALANCE, AND ENDURANCE: Primary | ICD-10-CM

## 2024-11-19 PROCEDURE — 97112 NEUROMUSCULAR REEDUCATION: CPT | Mod: PN

## 2024-11-19 PROCEDURE — 97110 THERAPEUTIC EXERCISES: CPT | Mod: PN

## 2024-11-19 NOTE — PROGRESS NOTES
"[facility]   Physical Therapy Treatment Note      Name: Brandan [facility]  Clinic Number: [id number]    Therapy Diagnosis:   Encounter Diagnoses   Name Primary?    Impaired functional mobility, balance, and endurance Yes    Decreased strength      Physician: Gabe Manning MD    Visit Date: 11/19/2024     Physician Orders: PT Eval and Treat   Medical Diagnosis from Referral: M54.50,G89.29 (ICD-10-CM) - Chronic bilateral low back pain without sciatica   Evaluation Date: 11/4/2024  Authorization Period Expiration: 12/31/2024   Plan of Care Expiration: 12/05/2024  Progress Note Due: 11/19/2024  Date of Surgery: none  Visit # / Visits authorized: 4/ 20   FOTO: 4/5     Precautions: Standard; hard of hearing; memory loss     Time In: 12:00 pm   Time Out: 12:55 pm   Total Billable Time: 55 minutes - 1:1 physical therapist         PTA Visit #: 2/5       Subjective     Patient reports: feels slightly better since starting therapy. Continued difficulty with bed mobility as it is painful. He would only like to be seen by female therapists.    He was compliant with home exercise program.  Response to previous treatment: no adverse effects   Functional change: slightly less pain with bed mobility.    Pain: 3/10  Location: bilateral back      Objective      Objective Measures updated at progress report unless specified.     Treatment     Brandan received the treatments listed below:      therapeutic exercises to develop strength, endurance, ROM, flexibility, posture, and core stabilization for 25 minutes including:    Nu-step : level 5 for 8 minutes   LTR: 10x each   SKTC: 5x10" bilateral    BKFO: 10x5" each     Supine sciatic nerve glide: 10x each   Seated swiss ball roll out: 20x     neuromuscular re-education activities to improve: Balance, Coordination, Kinesthetic, Sense, Proprioception, and Posture for 30 minutes. The following activities were included:    Hook lying Transverse abdominus 15x5" " hold  Hip ADD iso 20x w/sm ball  Hip ABD iso 20x w/belt   Seated rows: blue theraband 3x10  Seated SAPD: red theraband 3x10  Seated low multifidi SAPD: red theraband 3x10   Sit to stand from chair with airex: 3x10 thigh support  Log roll technique demonstration, education, and practice     Patient Education and Home Exercises       Education provided:   - home exercise program review    Written Home Exercises Provided: Pt instructed to continue prior HEP. Exercises were reviewed and Brandan was able to demonstrate them prior to the end of the session.  Brandan demonstrated good  understanding of the education provided. See Electronic Medical Record under Patient Instructions for exercises provided during therapy sessions    Assessment   Brandan is a 79 y.o. male referred to outpatient Physical Therapy with a medical diagnosis of M54.50,G89.29 (ICD-10-CM) - Chronic bilateral low back pain without sciatica. Patient presents with signs and symptoms of chronic low back pain and generalized debility. Hard of hearing and memory impairments result in frequent verbal cueing for exercise performance. Education on log roll technique to reduce low back pain with bed mobility, but difficulty following instruction and proper performance of transition. Poor technique with banded theraband resisted posterior chain stabilization.     Brandan Is progressing well towards his goals.   Patient prognosis is Good.     Patient will continue to benefit from skilled outpatient physical therapy to address the deficits listed in the problem list box on initial evaluation, provide pt/family education and to maximize pt's level of independence in the home and community environment.     Patient's spiritual, cultural and educational needs considered and pt agreeable to plan of care and goals.     Anticipated barriers to physical therapy: sedentary lifestyle, co-morbidities, age       Goals:  Short Term Goals (2 Weeks):   1. Patient will be  "compliant with home exercise program to supplement therapy in decreasing pain with functional mobility.  2. Patient will improve impaired lower extremity manual muscle tests by 1/2 grade bilaterally to improve strength for standing tasks.  3. Patient will improve 30" sit<>stand score to 14 with bilateral upper extremity support to demonstrate increased functional strength.      Long Term Goals (4 Weeks):   1. Patient will improve FOTO score to </= 28% limited to decrease perceived limitation with maintaining/changing body position  2. Patient will improve impaired lower extremity manual muscle tests by 1 grade bilaterally to improve strength for standing tasks.  3. Patient will improve 30" sit<>stand score to >/=16 with minimal upper extremity support to demonstrate increased functional strength.   4. Patient will report 0 falls since initial evaluation to promote safety in the home and community.   5. Patient will improve distance during 6 MWT to >/= 1,100 feet to improve aerobic tolerance and functional capacity.      Plan     Continue physical therapy plan of care     Emmanuelle Crenshaw, PT             "

## 2024-11-21 ENCOUNTER — CLINICAL SUPPORT (OUTPATIENT)
Dept: REHABILITATION | Facility: HOSPITAL | Age: 79
End: 2024-11-21
Payer: OTHER GOVERNMENT

## 2024-11-21 DIAGNOSIS — R53.1 DECREASED STRENGTH: ICD-10-CM

## 2024-11-21 DIAGNOSIS — Z74.09 IMPAIRED FUNCTIONAL MOBILITY, BALANCE, AND ENDURANCE: Primary | ICD-10-CM

## 2024-11-21 PROCEDURE — 97112 NEUROMUSCULAR REEDUCATION: CPT | Mod: PN

## 2024-11-21 NOTE — PROGRESS NOTES
"[facility]   Physical Therapy Treatment Note      Name: Brandan [facility]  Clinic Number: [id number]    Therapy Diagnosis:   Encounter Diagnoses   Name Primary?    Impaired functional mobility, balance, and endurance Yes    Decreased strength      Physician: Gabe Manning MD    Visit Date: 11/21/2024     Physician Orders: PT Eval and Treat   Medical Diagnosis from Referral: M54.50,G89.29 (ICD-10-CM) - Chronic bilateral low back pain without sciatica   Evaluation Date: 11/4/2024  Authorization Period Expiration: 12/31/2024   Plan of Care Expiration: 12/05/2024  Progress Note Due: 11/19/2024  Date of Surgery: none  Visit # / Visits authorized: 5/ 20   FOTO: 5/5 NEXT     Precautions: Standard; hard of hearing; memory loss     Time In: 1:00 pm   Time Out: 1:55 pm   Total Billable Time: 30 minutes - 1:1 physical therapist         PTA Visit #: 2/5       Subjective     Patient reports: feeling a little better, but still pain with bed mobility.     He was compliant with home exercise program.  Response to previous treatment: no adverse effects   Functional change: slightly less pain with bed mobility.    Pain: 3/10  Location: bilateral back      Objective      Objective Measures updated at progress report unless specified.     Treatment     Brandan received the treatments listed below:      therapeutic exercises to develop strength, endurance, ROM, flexibility, posture, and core stabilization for 25 minutes including:    Nu-step : level 5 for 8 minutes   LTR: 10x each   SKTC: 5x10" bilateral    BKFO: 10x5" each   Open book: x10  Supine sciatic nerve glide: 10x each   Seated swiss ball roll out: 20x     neuromuscular re-education activities to improve: Balance, Coordination, Kinesthetic, Sense, Proprioception, and Posture for 30 minutes. The following activities were included:    Hook lying Transverse abdominus 15x5" hold  Standing rows: blue theraband 3x10  Standing SAPD: red theraband " 3x10  Standing low multifidi SAPD: red theraband 3x10   Side lying clamshells: red theraband 2x10  Standing heel raises on step: 3x10    Sit to stand from chair with airex: 3x10 thigh support    Patient Education and Home Exercises       Education provided:   - home exercise program review    Written Home Exercises Provided: Pt instructed to continue prior HEP. Exercises were reviewed and Brandan was able to demonstrate them prior to the end of the session.  Brandan demonstrated good  understanding of the education provided. See Electronic Medical Record under Patient Instructions for exercises provided during therapy sessions    Assessment   Brandan is a 79 y.o. male referred to outpatient Physical Therapy with a medical diagnosis of M54.50,G89.29 (ICD-10-CM) - Chronic bilateral low back pain without sciatica. Patient presents with signs and symptoms of chronic low back pain and generalized debility. Hard of hearing and memory impairments result in frequent verbal cueing for exercise performance. Additional posterolateral hip strengthening included this visit. Progressed to standing posterior chain stabilization without radicular symptoms noted during exercise performance. Will progress to anti-rotational core stabilization next visit.     Brandan Is progressing well towards his goals.   Patient prognosis is Good.     Patient will continue to benefit from skilled outpatient physical therapy to address the deficits listed in the problem list box on initial evaluation, provide pt/family education and to maximize pt's level of independence in the home and community environment.     Patient's spiritual, cultural and educational needs considered and pt agreeable to plan of care and goals.     Anticipated barriers to physical therapy: sedentary lifestyle, co-morbidities, age       Goals:  Short Term Goals (2 Weeks):   1. Patient will be compliant with home exercise program to supplement therapy in decreasing pain with  "functional mobility.  2. Patient will improve impaired lower extremity manual muscle tests by 1/2 grade bilaterally to improve strength for standing tasks.  3. Patient will improve 30" sit<>stand score to 14 with bilateral upper extremity support to demonstrate increased functional strength.      Long Term Goals (4 Weeks):   1. Patient will improve FOTO score to </= 28% limited to decrease perceived limitation with maintaining/changing body position  2. Patient will improve impaired lower extremity manual muscle tests by 1 grade bilaterally to improve strength for standing tasks.  3. Patient will improve 30" sit<>stand score to >/=16 with minimal upper extremity support to demonstrate increased functional strength.   4. Patient will report 0 falls since initial evaluation to promote safety in the home and community.   5. Patient will improve distance during 6 MWT to >/= 1,100 feet to improve aerobic tolerance and functional capacity.      Plan     Continue physical therapy plan of care     Emmanuelle Crenshaw, PT               "

## 2025-01-15 ENCOUNTER — HOSPITAL ENCOUNTER (EMERGENCY)
Facility: HOSPITAL | Age: 80
Discharge: HOME | End: 2025-01-15
Attending: EMERGENCY MEDICINE
Payer: OTHER GOVERNMENT

## 2025-01-15 VITALS
OXYGEN SATURATION: 97 % | SYSTOLIC BLOOD PRESSURE: 137 MMHG | RESPIRATION RATE: 16 BRPM | WEIGHT: 190 LBS | TEMPERATURE: 98 F | DIASTOLIC BLOOD PRESSURE: 96 MMHG | BODY MASS INDEX: 25.73 KG/M2 | HEIGHT: 72 IN | HEART RATE: 84 BPM

## 2025-01-15 DIAGNOSIS — S61.011A LACERATION OF RIGHT THUMB WITHOUT FOREIGN BODY WITHOUT DAMAGE TO NAIL, INITIAL ENCOUNTER: ICD-10-CM

## 2025-01-15 DIAGNOSIS — S61.411A SKIN TEAR OF RIGHT HAND WITHOUT COMPLICATION, INITIAL ENCOUNTER: ICD-10-CM
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
## 2025-01-15 PROCEDURE — 99284 EMERGENCY DEPT VISIT MOD MDM: CPT | Mod: 25

## 2025-01-15 PROCEDURE — 90471 IMMUNIZATION ADMIN: CPT | Performed by: EMERGENCY MEDICINE

## 2025-01-15 PROCEDURE — 25000003 PHARM REV CODE 250: Performed by: EMERGENCY MEDICINE

## 2025-01-15 PROCEDURE — 63600175 PHARM REV CODE 636 W HCPCS: Performed by: EMERGENCY MEDICINE

## 2025-01-15 PROCEDURE — 90715 TDAP VACCINE 7 YRS/> IM: CPT | Performed by: EMERGENCY MEDICINE

## 2025-01-15 PROCEDURE — 12002 RPR S/N/AX/GEN/TRNK2.6-7.5CM: CPT

## 2025-01-15 RX ORDER — CEPHALEXIN 500 MG/1
500 CAPSULE ORAL
Status: COMPLETED | OUTPATIENT
Start: 2025-01-15 | End: 2025-01-15

## 2025-01-15 RX ORDER — LIDOCAINE HYDROCHLORIDE 10 MG/ML
5 INJECTION, SOLUTION EPIDURAL; INFILTRATION; INTRACAUDAL; PERINEURAL
Status: COMPLETED | OUTPATIENT
Start: 2025-01-15 | End: 2025-01-15

## 2025-01-15 RX ORDER — CEPHALEXIN 500 MG/1
500 CAPSULE ORAL 4 TIMES DAILY
Qty: 20 CAPSULE | Refills: 0 | Status: SHIPPED | OUTPATIENT
Start: 2025-01-15 | End: 2025-01-20

## 2025-01-15 RX ADMIN — CEPHALEXIN 500 MG: 500 CAPSULE ORAL at 08:01

## 2025-01-15 RX ADMIN — CLOSTRIDIUM TETANI TOXOID ANTIGEN (FORMALDEHYDE INACTIVATED), CORYNEBACTERIUM DIPHTHERIAE TOXOID ANTIGEN (FORMALDEHYDE INACTIVATED), BORDETELLA PERTUSSIS TOXOID ANTIGEN (GLUTARALDEHYDE INACTIVATED), BORDETELLA PERTUSSIS FILAMENTOUS HEMAGGLUTININ ANTIGEN (FORMALDEHYDE INACTIVATED), BORDETELLA PERTUSSIS PERTACTIN ANTIGEN, AND BORDETELLA PERTUSSIS FIMBRIAE 2/3 ANTIGEN 0.5 ML: 5; 2; 2.5; 5; 3; 5 INJECTION, SUSPENSION INTRAMUSCULAR at 08:01

## 2025-01-15 RX ADMIN — LIDOCAINE HYDROCHLORIDE 50 MG: 10 INJECTION, SOLUTION EPIDURAL; INFILTRATION; INTRACAUDAL at 08:01

## 2025-01-16 NOTE — FIRST PROVIDER EVALUATION
Emergency Department TeleTriage Encounter Note      CHIEF COMPLAINT    Chief Complaint   Patient presents with    Fall     Trip and fall while carrying electrical box. Pt sustained avulsion to R thumb. Bleeding controlled PTA, and wound dressed in triage. Denies hitting head or LOC.        VITAL SIGNS   Initial Vitals [01/15/25 1757]   BP Pulse Resp Temp SpO2   (!) 139/91 82 20 98.5 °F (36.9 °C) 97 %      MAP       --            ALLERGIES    Review of patient's allergies indicates:  No Known Allergies    PROVIDER TRIAGE NOTE  This is a teletriage evaluation of a 79 y.o. male presenting to the ED complaining of trip and fall with resulting right thumb lac sustained today.    Pt is [place] and history is limited.    Alert, no distress.     Initial orders will be placed and care will be transferred to an alternate provider when patient is roomed for a full evaluation. Any additional orders and the final disposition will be determined by that provider.         ORDERS  Labs Reviewed - No data to display    ED Orders (720h ago, onward)      None              Virtual Visit Note: The provider triage portion of this emergency department evaluation and documentation was performed via [employer], a HIPAA-compliant telemedicine application, in concert with a tele-presenter in the room. A face to face patient evaluation with one of my colleagues will occur once the patient is placed in an emergency department room.      DISCLAIMER: This note was prepared with [employer] voice recognition transcription software. Garbled syntax, mangled pronouns, and other bizarre constructions may be attributed to that software system.

## 2025-01-16 NOTE — ED PROVIDER NOTES
Encounter Date: 1/15/2025       History     Chief Complaint   Patient presents with    Fall     Trip and fall while carrying electrical box. Pt sustained avulsion to R thumb. Bleeding controlled PTA, and wound dressed in triage. Denies hitting head or LOC.      The patient is a 79-year-old male who was carrying a heavy electrical box in the house and he dropped it on the ground and sustained a laceration to his right thumb.  He also has a skin tear to the right dorsal hand.  No other injuries.  He landed on his buttocks.  He is ambulatory without any pain to his buttocks or legs.  He did not hit his head, no loss of consciousness, no neck pain.      Review of patient's allergies indicates:  No Known Allergies  Past Medical History:   Diagnosis Date    Anxiety     BPH (benign prostatic hyperplasia)     Cataract     Elevated PSA     Erectile dysfunction     Hyperlipidemia     Hypertension     Hypogonadism male     Kidney stone 5/20/2020    Obesity     PTSD (post-traumatic stress disorder)     Shoulder arthritis     Urinary tract infection      Past Surgical History:   Procedure Laterality Date    ARTHROPLASTY OF SHOULDER Right 9/14/2023    Procedure: ARTHROPLASTY, SHOULDER;  Surgeon: Gabe Manning MD;  Location: [facility];  Service: Orthopedics;  Laterality: Right;  Ayaan perezson notified cc    COLONOSCOPY N/A 11/26/2018    Procedure: COLONOSCOPY;  Surgeon: Germán Moss MD;  Location: [facility] ([address] [place] [address]);  Service: Endoscopy;  Laterality: N/A;    COLONOSCOPY N/A 4/16/2024    Procedure: COLONOSCOPY;  Surgeon: Philip López MD;  Location: [facility] ENDO;  Service: Endoscopy;  Laterality: N/A;    COLONOSCOPY W/ BIOPSIES  2010    CYSTOSCOPY Left 11/17/2022    Procedure: CYSTOSCOPY;  Surgeon: Dannie Murray MD;  Location: [facility];  Service: Urology;  Laterality: Left;    EXTRACORPOREAL SHOCK WAVE LITHOTRIPSY Left 12/2/2022    Procedure: LITHOTRIPSY, ESWL [facility] ESWL machine, scheduling  call [phone] 60 minutes;  Surgeon: Dannie Murray MD;  Location: [facility] OR;  Service: Urology;  Laterality: Left;  Confirmed with [facility] 11/22 [facility] conf # [id number]    EXTRACORPOREAL SHOCK WAVE LITHOTRIPSY Left 1/30/2023    Procedure: LITHOTRIPSY, ESWL [facility] ESWL machine, scheduling call [phone] 60 minutes;  Surgeon: Dannie Murray MD;  Location: [facility] OR;  Service: Urology;  Laterality: Left;  next [facility] conf #[id number]    PENILE PROSTHESIS IMPLANT      PERCUTANEOUS NEPHROLITHOTOMY Left 8/14/2023    Procedure: CYSTOSCOPY, LEFT RETROGRADE PYELOGRAM, LEFT URETEROSCOPY WITH STONE BASKET EXTRACTION, PLACEMENT OF JJ STENT, LEFT NEPHROSTOMY TUBE REMOVAL;  Surgeon: Dannie Murray MD;  Location: [facility] OR;  Service: Urology;  Laterality: Left;  IR to place ureteral catheter prior, start time 1200  Cysto tower, flexible cysto/ureteroscopes, Laser in room, lithoclast in room, fluoroscopy with Tony to    REMOVAL OF INFLATABLE PENILE PROSTHESIS N/A 11/6/2023    Procedure: REMOVAL, PENILE PROSTHESIS, INFLATABLE;  Surgeon: Dannie Murray MD;  Location: [facility] OR;  Service: Urology;  Laterality: N/A;    ROBOT-ASSISTED LAPAROSCOPIC SIMPLE PROSTATECTOMY N/A 11/6/2023    Procedure: ROBOTIC PROSTATECTOMY, SIMPLE;  Surgeon: Dannie Murray MD;  Location: [facility] OR;  Service: Urology;  Laterality: N/A;  Xi robot, open tray, simple prostatectomy     Family History   Problem Relation Name Age of Onset    Cancer Mother          cancer    Cataracts Mother      Heart disease Father  62        M.I.    Stroke Brother  62    Amblyopia Neg Hx      Blindness Neg Hx      Glaucoma Neg Hx      Macular degeneration Neg Hx      Retinal detachment Neg Hx      Strabismus Neg Hx      Prostate cancer Neg Hx      Kidney disease Neg Hx       Social History     Tobacco Use    Smoking status: Former     Current packs/day: 0.00     Average packs/day: 1 pack/day for 10.0 years (10.0 ttl pk-yrs)     Types: Cigarettes      Start date:      Quit date:      Years since quittin.0    Smokeless tobacco: Never   Substance Use Topics    Alcohol use: No    Drug use: No     Review of Systems   All other systems reviewed and are negative.      Physical Exam     Initial Vitals [01/15/25 1757]   BP Pulse Resp Temp SpO2   (!) 139/91 82 20 98.5 °F (36.9 °C) 97 %      MAP       --         Physical Exam    Nursing note and vitals reviewed.  Constitutional: He appears well-developed and well-nourished.   HENT:   Head: Normocephalic and atraumatic.   Neck: Neck supple.   Normal range of motion.  Pulmonary/Chest: He exhibits no tenderness.   Abdominal: There is no abdominal tenderness.   Musculoskeletal:         General: Normal range of motion.      Cervical back: Normal range of motion and neck supple.      Comments: U shaped 2.5 cm laceration to the patient's right volar thumb over the proximal phalanx.  Thumb is neurovascularly intact.  DIP joint with full range of motion.  The laceration extends down into the subcutaneous fat layer, there were no tendons exposed, no joint involvement, no bony exposure.  Dorsal hand with a 2 cm skin tear     Neurological: He is alert and oriented to person, place, and time.   Skin: Skin is warm and dry.   Psychiatric: He has a normal mood and affect. His behavior is normal. Judgment and thought content normal.         ED Course   Lac Repair    Date/Time: 1/15/2025 8:05 PM    Performed by: Salina Webb MD  Authorized by: Salina Webb MD    Consent:     Consent obtained:  Verbal    Consent given by:  Patient    Risks discussed:  Infection, need for additional repair, nerve damage, tendon damage, vascular damage, retained foreign body and pain    Alternatives discussed:  No treatment  Universal protocol:     Procedure explained and questions answered to patient or proxy's satisfaction: yes      Patient identity confirmed:  Verbally with patient and arm band  Anesthesia:     Anesthesia method:  Local  infiltration    Local anesthetic:  Lidocaine 1% w/o epi  Laceration details:     Location:  Finger    Finger location:  R thumb    Length (cm):  2.5    Depth (mm):  5  Pre-procedure details:     Preparation:  Patient was prepped and draped in usual sterile fashion  Exploration:     Hemostasis achieved with:  Direct pressure    Wound exploration: wound explored through full range of motion and entire depth of wound visualized      Wound extent: no fascia violation noted, no foreign bodies/material noted, no muscle damage noted, no nerve damage noted, no tendon damage noted, no underlying fracture noted and no vascular damage noted    Treatment:     Area cleansed with:  To-Joel    Amount of cleaning:  Standard    Irrigation solution:  Sterile saline    Irrigation volume:  200    Irrigation method:  Syringe    Debridement:  None    Undermining:  None  Skin repair:     Repair method:  Sutures    Suture size:  4-0    Suture technique:  Simple interrupted    Number of sutures:  6  Approximation:     Approximation:  Close  Repair type:     Repair type:  Simple  Post-procedure details:     Dressing:  Non-adherent dressing    Procedure completion:  Tolerated well, no immediate complications  Lac Repair    Date/Time: 1/15/2025 8:06 PM    Performed by: Salina Webb MD  Authorized by: Salina Webb MD    Consent:     Consent obtained:  Verbal    Consent given by:  Patient    Risks discussed:  Infection, pain and poor cosmetic result    Alternatives discussed:  No treatment  Universal protocol:     Procedure explained and questions answered to patient or proxy's satisfaction: yes      Patient identity confirmed:  Verbally with patient and arm band  Anesthesia:     Anesthesia method:  None  Laceration details:     Location:  Hand    Hand location:  R hand, dorsum    Length (cm):  2    Depth (mm):  1  Pre-procedure details:     Preparation:  Patient was prepped and draped in usual sterile fashion  Exploration:     Hemostasis  achieved with:  Direct pressure  Treatment:     Area cleansed with:  Blas    Amount of cleaning:  Standard    Irrigation solution:  Sterile saline    Irrigation volume:  100    Irrigation method:  Syringe    Debridement:  None    Undermining:  None  Skin repair:     Repair method:  Tissue adhesive  Approximation:     Approximation:  Close  Repair type:     Repair type:  Simple  Post-procedure details:     Dressing:  Open (no dressing)    Procedure completion:  Tolerated well, no immediate complications    Labs Reviewed - No data to display       Imaging Results    None          Medications   cephALEXin capsule 500 mg (has no administration in time range)   LIDOcaine (PF) 10 mg/ml (1%) injection 50 mg (50 mg Infiltration Given 1/15/25 2004)   Tdap vaccine injection 0.5 mL (0.5 mLs Intramuscular Given 1/15/25 2004)     Medical Decision Making  Differential Diagnosis includes, but is not limited to:  Open fracture, vascular injury, tendon/ligament injury, nerve injury, retained foreign body, superficial laceration, abrasion.     MDM; the patient is a 79-year-old male who dropped a heavy metal electrical box and sustained a laceration to his right thumb.  He fell onto his buttocks but he has no pain to his buttocks he did not hit his head any has no neck pain or loss of consciousness.  The patient had the laceration repaired with sutures, he has a skin tear to his dorsal hand which was repaired with glue.  The patient tolerated the procedure well.  High who is down tonight and I can not take a picture of the wound.  The patient will be given a tetanus shot Keflex and discharged with Keflex prophylaxis.  He needs to follow up with his primary care doctor or urgent care in 10 days to have the sutures removed.    Risk  Prescription drug management.                                      Clinical Impression:  Final diagnoses:  [W19.XXXA] Fall, initial encounter (Primary)  [S61.011A] Laceration of right thumb without  foreign body without damage to nail, initial encounter  [S61.411A] Skin tear of right hand without complication, initial encounter                 Salina Webb MD  01/15/25 2040

## 2025-01-16 NOTE — ED NOTES
Wound to right hand cleaned and dressing applied to skin tear and sutured thumb.  Pt tolerated it well.

## 2025-01-16 NOTE — ED NOTES
Pt fell coming into his house.  He injured his right anterior hand and his right thumb.  Right  thumb has a bleeding laceration with dressing applied.  Skin tear to anterior right hand with no active bleeding.  Pt is alert and oriented x 3.

## 2025-01-16 NOTE — ED TRIAGE NOTES
Pt reports trip and fall earlier today while holding an electric box. He cut his thumb on the electric box, dressing in place.

## 2025-01-25 ENCOUNTER — OFFICE VISIT (OUTPATIENT)
Dept: URGENT CARE | Facility: CLINIC | Age: 80
End: 2025-01-25
Payer: OTHER GOVERNMENT

## 2025-01-25 VITALS
SYSTOLIC BLOOD PRESSURE: 134 MMHG | OXYGEN SATURATION: 97 % | RESPIRATION RATE: 17 BRPM | HEART RATE: 70 BPM | DIASTOLIC BLOOD PRESSURE: 78 MMHG | BODY MASS INDEX: 25.73 KG/M2 | HEIGHT: 72 IN | WEIGHT: 190 LBS | TEMPERATURE: 98 F

## 2025-01-25 DIAGNOSIS — Z48.02 ENCOUNTER FOR REMOVAL OF SUTURES: Primary | ICD-10-CM

## 2025-01-25 PROCEDURE — 99024 POSTOP FOLLOW-UP VISIT: CPT | Mod: S$GLB,,, | Performed by: NURSE PRACTITIONER

## 2025-01-25 PROCEDURE — 99499 UNLISTED E&M SERVICE: CPT | Mod: S$GLB,,, | Performed by: NURSE PRACTITIONER

## 2025-01-25 NOTE — PROCEDURES
Suture Removal    Date/Time: 1/25/2025 12:00 PM  Location procedure was performed: [facility] EMERGENCY DEPARTMENT    Performed by: Kiersten Saba NP  Authorized by: Kiersten Saba NP  Body area: upper extremity  Location details: right thumb  Wound Appearance: clean, well healed and no drainage  Sutures Removed: 6  Staples Removed: 0  Post-removal: no dressing applied  Complications: No  Patient tolerance: Patient tolerated the procedure well with no immediate complications

## 2025-01-25 NOTE — PROGRESS NOTES
Subjective:      Patient ID: Brandan Werner is a 79 y.o. male.    Vitals:  height is 6' (1.829 m) and weight is 86.2 kg (190 lb). His oral temperature is 98.3 °F (36.8 °C). His blood pressure is 134/78 and his pulse is 70. His respiration is 17 and oxygen saturation is 97%.     Chief Complaint: Suture / Staple Removal    This is a 79 y.o. male who presents today with a chief complaint of suture removal on right thumb, patient got stiches In hospital 10 days ago,. No draining, pain or redness.    Suture / Staple Removal  The sutures were placed 7 to 10 days ago. His temperature was unmeasured prior to arrival. There has been no drainage from the wound. There is no redness present. There is no swelling present. There is no pain present.       Skin:  Negative for erythema.      Past Medical History:   Diagnosis Date    Anxiety     BPH (benign prostatic hyperplasia)     Cataract     Elevated PSA     Erectile dysfunction     Hyperlipidemia     Hypertension     Hypogonadism male     Kidney stone 5/20/2020    Obesity     PTSD (post-traumatic stress disorder)     Shoulder arthritis     Urinary tract infection        Objective:     Physical Exam   Constitutional: He is oriented to person, place, and time. He appears well-developed.   HENT:   Head: Normocephalic and atraumatic. Head is without abrasion, without contusion and without laceration.   Ears:   Right Ear: External ear normal.   Left Ear: External ear normal.   Nose: Nose normal.   Mouth/Throat: Oropharynx is clear and moist and mucous membranes are normal.   Eyes: Conjunctivae, EOM and lids are normal. Pupils are equal, round, and reactive to light.   Neck: Trachea normal and phonation normal. Neck supple.   Cardiovascular: Normal rate, regular rhythm and normal heart sounds.   Pulmonary/Chest: Effort normal and breath sounds normal. No stridor. No respiratory distress.   Musculoskeletal: Normal range of motion.         General: Normal range of motion.       Comments: Six sutures removed from right thumb volar aspect, proximal thumb, no erythema, swelling or tenderness noted, well healed   Neurological: He is alert and oriented to person, place, and time.   Skin: Skin is warm, dry, intact and no rash. Capillary refill takes less than 2 seconds. No abrasion, No burn, No bruising, No erythema and No ecchymosis   Psychiatric: His speech is normal and behavior is normal. Judgment and thought content normal.   Nursing note and vitals reviewed.        Patient in no acute distress.  Vitals reassuring.  Discussed results/diagnosis/plan in depth with patient in clinic. Strict precautions given to patient to monitor for worsening signs and symptoms. Advised to follow up with primary.All questions answered. Strict ER precautions given. If your symptoms worsens or fail to improve you should go to the Emergency Room. Discharge and follow-up instructions given verbally/printed. Discharge and follow-up instructions discussed with the patient who expressed understanding and willingness to comply with my recommendations.Patient voiced understanding and in agreement with current treatment plan.     Please be advised this text was dictated with [employer] software and may contain errors due to translation.   Assessment:     1. Encounter for removal of sutures        Plan:       Encounter for removal of sutures  -     Suture Removal    Other orders  -     Cancel: Laceration Repair                  Patient Instructions   PLEASE READ YOUR DISCHARGE INSTRUCTIONS ENTIRELY AS IT CONTAINS IMPORTANT INFORMATION.    .   Signs of infection:  Increase in redness, increase in pain, purulent (pus) drainage. Contact clinic if infection concerns arise. Do not apply a great deal of tension or stress to the suture line.        Please return or see your primary care doctor if you develop new or worsening symptoms (fever, spreading redness, pus drainage, severe pain or swelling).     Please arrange follow up  with your primary medical clinic as soon as possible. You must understand that you've received an Urgent Care treatment only and that you may be released before all of your medical problems are known or treated. You, the patient, will arrange for follow up as instructed. If your symptoms worsen or fail to improve you should go to the Emergency Room.

## 2025-01-25 NOTE — PATIENT INSTRUCTIONS
PLEASE READ YOUR DISCHARGE INSTRUCTIONS ENTIRELY AS IT CONTAINS IMPORTANT INFORMATION.    .   Signs of infection:  Increase in redness, increase in pain, purulent (pus) drainage. Contact clinic if infection concerns arise. Do not apply a great deal of tension or stress to the suture line.        Please return or see your primary care doctor if you develop new or worsening symptoms (fever, spreading redness, pus drainage, severe pain or swelling).     Please arrange follow up with your primary medical clinic as soon as possible. You must understand that you've received an Urgent Care treatment only and that you may be released before all of your medical problems are known or treated. You, the patient, will arrange for follow up as instructed. If your symptoms worsen or fail to improve you should go to the Emergency Room.

## 2025-06-20 ENCOUNTER — PATIENT OUTREACH (OUTPATIENT)
Dept: ADMINISTRATIVE | Facility: HOSPITAL | Age: 80
End: 2025-06-20
Payer: MEDICARE

## 2025-06-20 VITALS — SYSTOLIC BLOOD PRESSURE: 134 MMHG | DIASTOLIC BLOOD PRESSURE: 78 MMHG

## 2025-07-17 DIAGNOSIS — M25.511 PAIN IN RIGHT SHOULDER: Primary | ICD-10-CM

## 2025-07-18 DIAGNOSIS — Z96.611 STATUS POST TOTAL SHOULDER REPLACEMENT, RIGHT: Primary | ICD-10-CM

## 2025-08-04 NOTE — PROGRESS NOTES
Patient ID:   Brandan Werner is a 79 y.o. male.    Chief Complaint:   Follow-up evaluation s/p R TSA    HPI:   The patient is almost 2 years out from undergoing right anatomic total shoulder arthroplasty.  He is presenting today mainly with pain over the superior aspect of his shoulder.  He describes it as a soreness.  Pain level is 5/10.  He is interested in considering an injection if possible.    Medications:  Current Medications[1]    Allergies:  Review of patient's allergies indicates:  No Known Allergies    Past Medical History:  Past Medical History:   Diagnosis Date    Anxiety     BPH (benign prostatic hyperplasia)     Cataract     Elevated PSA     Erectile dysfunction     Hyperlipidemia     Hypertension     Hypogonadism male     Kidney stone 5/20/2020    Obesity     PTSD (post-traumatic stress disorder)     Shoulder arthritis     Urinary tract infection         Past Surgical History:  Past Surgical History:   Procedure Laterality Date    ARTHROPLASTY OF SHOULDER Right 9/14/2023    Procedure: ARTHROPLASTY, SHOULDER;  Surgeon: Gabe Manning MD;  Location: [facility];  Service: Orthopedics;  Laterality: Right;  Ayaan david notified cc    COLONOSCOPY N/A 11/26/2018    Procedure: COLONOSCOPY;  Surgeon: Germán Moss MD;  Location: [facility] ([address] [place] [address]);  Service: Endoscopy;  Laterality: N/A;    COLONOSCOPY N/A 4/16/2024    Procedure: COLONOSCOPY;  Surgeon: Philip López MD;  Location: [facility];  Service: Endoscopy;  Laterality: N/A;    COLONOSCOPY W/ BIOPSIES  2010    CYSTOSCOPY Left 11/17/2022    Procedure: CYSTOSCOPY;  Surgeon: Dannie Murray MD;  Location: [facility] OR;  Service: Urology;  Laterality: Left;    EXTRACORPOREAL SHOCK WAVE LITHOTRIPSY Left 12/2/2022    Procedure: LITHOTRIPSY, ESWL [facility] [place] ESWL machine, scheduling call [phone] minutes;  Surgeon: Dannie Murray MD;  Location: [facility] OR;  Service: Urology;  Laterality: Left;  Confirmed with [facility]   [facility] conf # [id number]    EXTRACORPOREAL SHOCK WAVE LITHOTRIPSY Left 2023    Procedure: LITHOTRIPSY, ESWL [facility] [place] ESWL machine, scheduling call [phone] minutes;  Surgeon: Dannie Murray MD;  Location: [facility];  Service: Urology;  Laterality: Left;  next [facility] conf #[id number]    PENILE PROSTHESIS IMPLANT      PERCUTANEOUS NEPHROLITHOTOMY Left 2023    Procedure: CYSTOSCOPY, LEFT RETROGRADE PYELOGRAM, LEFT URETEROSCOPY WITH STONE BASKET EXTRACTION, PLACEMENT OF JJ STENT, LEFT NEPHROSTOMY TUBE REMOVAL;  Surgeon: Dannie Murray MD;  Location: [facility] OR;  Service: Urology;  Laterality: Left;  IR to place ureteral catheter prior, start time 1200  Cysto tower, flexible cysto/ureteroscopes, Laser in room, lithoclast in room, fluoroscopy with Tony to    REMOVAL OF INFLATABLE PENILE PROSTHESIS N/A 2023    Procedure: REMOVAL, PENILE PROSTHESIS, INFLATABLE;  Surgeon: Dannie Murray MD;  Location: [facility] OR;  Service: Urology;  Laterality: N/A;    ROBOT-ASSISTED LAPAROSCOPIC SIMPLE PROSTATECTOMY N/A 2023    Procedure: ROBOTIC PROSTATECTOMY, SIMPLE;  Surgeon: Dannie Murray MD;  Location: [facility];  Service: Urology;  Laterality: N/A;  Xi robot, open tray, simple prostatectomy       Social History:  Social History     Occupational History    Occupation:      Comment: self-employed   Tobacco Use    Smoking status: Former     Current packs/day: 0.00     Average packs/day: 1 pack/day for 10.0 years (10.0 ttl pk-yrs)     Types: Cigarettes     Start date:      Quit date:      Years since quittin.6    Smokeless tobacco: Never   Substance and Sexual Activity    Alcohol use: No    Drug use: No    Sexual activity: Yes     Partners: Female       Family History:  Family History   Problem Relation Name Age of Onset    Cancer Mother          cancer    Cataracts Mother      Heart disease Father  62        M.I.    Stroke Brother  62    Amblyopia Neg Hx       Blindness Neg Hx      Glaucoma Neg Hx      Macular degeneration Neg Hx      Retinal detachment Neg Hx      Strabismus Neg Hx      Prostate cancer Neg Hx      Kidney disease Neg Hx          ROS:  Review of Systems   Musculoskeletal:  Positive for arthritis and joint pain.   All other systems reviewed and are negative.      Vitals:  Ht 6' (1.829 m)   Wt 86.2 kg (190 lb 0.6 oz)   BMI 25.77 kg/m²     Physical Examination:  Comprehensive Orthopaedic Musculoskeletal Exam    General      Constitutional: appears stated age, well-developed and well-nourished    Scleral icterus: no    Labored breathing: no    Psychiatric: normal mood and affect and no acute distress    Neurological: alert and oriented x3    Skin: intact    Lymphadenopathy: none     Ortho Exam   Right shoulder exam:  Tenderness to palpation over the AC joint.  Range of motion is as follows: Active elevation 150, external rotation at the side 20, internal rotation to the lumbar spine.  5/5 rotator cuff strength in elevation, external rotation and internal rotation.    Imaging:  I have ordered and independently reviewed the following imaging studies performed at [facility] today    Right shoulder x-ray series is unremarkable.  There is evidence of prior anatomic total shoulder arthroplasty.  No definite evidence of any loosening.  No change in component position compared to prior x-rays.  AC joint arthritis present    Assessment:  1. Status post total shoulder replacement, right    2. Arthritis of right acromioclavicular joint    3. Pain in right shoulder      Plan:  I have offered to provide the patient with a right AC joint injection of corticosteroid.  He wishes to proceed.  He will return as needed.     No follow-ups on file.              [1]   Current Outpatient Medications:     acetaminophen (TYLENOL) 500 MG tablet, Take 1 tablet (500 mg total) by mouth every 6 (six) hours as needed for Pain., Disp: 20 tablet, Rfl: 0    ascorbic acid, vitamin C, (VITAMIN C)  250 MG tablet, Take 250 mg by mouth once daily., Disp: , Rfl:     atorvastatin (LIPITOR) 40 MG tablet, Take 20 mg by mouth once daily., Disp: , Rfl:     carbidopa-levodopa  mg (SINEMET)  mg per tablet, TAKE 1 TABLET BY MOUTH THREE TIMES A DAY FOR PARKINSON'S DISE** WITH MEALSASE, Disp: , Rfl:     celecoxib (CELEBREX) 200 MG capsule, Take 400 mg by mouth once daily., Disp: , Rfl:     donepeziL (ARICEPT) 10 MG tablet, Take 10 mg by mouth., Disp: , Rfl:     ferrous sulfate (FEOSOL) 325 mg (65 mg iron) Tab tablet, 325 mg., Disp: , Rfl:     FLUoxetine 20 MG capsule, 60 mg., Disp: , Rfl:     ibuprofen (ADVIL,MOTRIN) 800 MG tablet, Take 1 tablet (800 mg total) by mouth every 8 (eight) hours., Disp: 20 tablet, Rfl: 0    lisinopriL 10 MG tablet, Take 5 mg by mouth., Disp: , Rfl:     memantine (NAMENDA) 10 MG Tab, Take 10 mg by mouth., Disp: , Rfl:     naproxen sodium (ANAPROX) 220 MG tablet, Take 220 mg by mouth 2 (two) times daily with meals. 2 tablets twice a day, Disp: , Rfl:     ondansetron (ZOFRAN) 8 MG tablet, Take 1 tablet (8 mg total) by mouth every 8 (eight) hours as needed for Nausea., Disp: 30 tablet, Rfl: 0    ondansetron (ZOFRAN-ODT) 4 MG TbDL, Take 1 tablet (4 mg total) by mouth every 8 (eight) hours as needed., Disp: 12 tablet, Rfl: 0    ondansetron (ZOFRAN-ODT) 4 MG TbDL, Take 1 tablet (4 mg total) by mouth every 6 (six) hours as needed., Disp: 10 tablet, Rfl: 0    oxyCODONE (ROXICODONE) 5 MG immediate release tablet, Take 1 tablet (5 mg total) by mouth every 6 (six) hours as needed for Pain., Disp: 10 tablet, Rfl: 0    oxyCODONE-acetaminophen (PERCOCET) 5-325 mg per tablet, Take 1 tablet by mouth every 4 (four) hours as needed for Pain., Disp: 28 tablet, Rfl: 0    polyethylene glycol (GLYCOLAX) 17 gram/dose powder, Take 17 g by mouth daily as needed (take as needed for daily soft bowel movements)., Disp: 510 g, Rfl: 1    polyvinyl alcohol, artificial tears, (LIQUIFILM TEARS) 1.4 % ophthalmic  solution, INSTILL ONE DROP IN EACH EYE FOUR TIMES A DAY FOR DRY EYES, Disp: , Rfl:     tamsulosin (FLOMAX) 0.4 mg Cp24, Take 1 capsule (0.4 mg total) by mouth nightly., Disp: 30 capsule, Rfl: 11    traZODone (DESYREL) 100 MG tablet, 100 mg., Disp: , Rfl:     cetirizine (ZYRTEC) 10 MG tablet, Take 1 tablet (10 mg total) by mouth once daily., Disp: 30 tablet, Rfl: 0    oxybutynin (DITROPAN-XL) 5 MG TR24, Take 1 tablet (5 mg total) by mouth once daily., Disp: 90 tablet, Rfl: 1    primidone (MYSOLINE) 50 MG Tab, Take 2 tablets (100 mg total) by mouth every evening., Disp: 60 tablet, Rfl: 11  No current facility-administered medications for this visit.    Facility-Administered Medications Ordered in Other Visits:     lactated ringers infusion, , Intravenous, Continuous, Maureen Solomon DNP    LIDOcaine (PF) 10 mg/ml (1%) injection 10 mg, 1 mL, Intradermal, Once, aMureen Solomon, RICK

## 2025-08-05 ENCOUNTER — HOSPITAL ENCOUNTER (OUTPATIENT)
Facility: HOSPITAL | Age: 80
Discharge: HOME | End: 2025-08-05
Attending: ORTHOPAEDIC SURGERY
Payer: OTHER GOVERNMENT

## 2025-08-05 ENCOUNTER — OFFICE VISIT (OUTPATIENT)
Dept: ORTHOPEDICS | Facility: CLINIC | Age: 80
End: 2025-08-05
Payer: MEDICARE

## 2025-08-05 VITALS — HEIGHT: 72 IN | WEIGHT: 190.06 LBS | BODY MASS INDEX: 25.74 KG/M2

## 2025-08-05 DIAGNOSIS — Z96.611 STATUS POST TOTAL SHOULDER REPLACEMENT, RIGHT: ICD-10-CM

## 2025-08-05 DIAGNOSIS — M19.011 ARTHRITIS OF RIGHT ACROMIOCLAVICULAR JOINT: ICD-10-CM

## 2025-08-05 DIAGNOSIS — G89.29 CHRONIC RIGHT SHOULDER PAIN: ICD-10-CM

## 2025-08-05 DIAGNOSIS — Z96.611 STATUS POST TOTAL SHOULDER REPLACEMENT, RIGHT: Primary | ICD-10-CM

## 2025-08-05 DIAGNOSIS — M25.511 CHRONIC RIGHT SHOULDER PAIN: ICD-10-CM

## 2025-08-05 PROCEDURE — 20605 DRAIN/INJ JOINT/BURSA W/O US: CPT | Mod: RT,S$GLB,, | Performed by: ORTHOPAEDIC SURGERY

## 2025-08-05 PROCEDURE — 73030 X-RAY EXAM OF SHOULDER: CPT | Mod: TC,PN,RT

## 2025-08-05 PROCEDURE — 1125F AMNT PAIN NOTED PAIN PRSNT: CPT | Mod: CPTII,S$GLB,, | Performed by: ORTHOPAEDIC SURGERY

## 2025-08-05 PROCEDURE — 99214 OFFICE O/P EST MOD 30 MIN: CPT | Mod: 25,S$GLB,, | Performed by: ORTHOPAEDIC SURGERY

## 2025-08-05 PROCEDURE — 1101F PT FALLS ASSESS-DOCD LE1/YR: CPT | Mod: CPTII,S$GLB,, | Performed by: ORTHOPAEDIC SURGERY

## 2025-08-05 PROCEDURE — 99999 PR PBB SHADOW E&M-EST. PATIENT-LVL IV: CPT | Mod: PBBFAC,,, | Performed by: ORTHOPAEDIC SURGERY

## 2025-08-05 PROCEDURE — 3288F FALL RISK ASSESSMENT DOCD: CPT | Mod: CPTII,S$GLB,, | Performed by: ORTHOPAEDIC SURGERY

## 2025-08-05 PROCEDURE — 73030 X-RAY EXAM OF SHOULDER: CPT | Mod: 26,RT,, | Performed by: RADIOLOGY

## 2025-08-05 PROCEDURE — 1159F MED LIST DOCD IN RCRD: CPT | Mod: CPTII,S$GLB,, | Performed by: ORTHOPAEDIC SURGERY

## 2025-08-05 PROCEDURE — 1160F RVW MEDS BY RX/DR IN RCRD: CPT | Mod: CPTII,S$GLB,, | Performed by: ORTHOPAEDIC SURGERY

## 2025-08-05 RX ORDER — TRIAMCINOLONE ACETONIDE 40 MG/ML
40 INJECTION, SUSPENSION INTRA-ARTICULAR; INTRAMUSCULAR
Status: DISCONTINUED | OUTPATIENT
Start: 2025-08-05 | End: 2025-08-05 | Stop reason: HOSPADM

## 2025-08-05 RX ADMIN — TRIAMCINOLONE ACETONIDE 40 MG: 40 INJECTION, SUSPENSION INTRA-ARTICULAR; INTRAMUSCULAR at 11:08

## 2025-08-05 NOTE — PROCEDURES
Intermediate Joint Aspiration/Injection: R acromioclavicular    Date/Time: 8/5/2025 11:15 AM    Performed by: Gabe Manning MD  Authorized by: Gabe Manning MD    Site marked: The procedure site was marked    Timeout: Prior to procedure the correct patient, procedure, and site was verified      Location:  Shoulder    Local anesthesia used?: Yes    Local anesthetic:  Topical anesthetic and lidocaine 1% without epinephrine  Anesthetic total (ml):  1    Site:  R acromioclavicular  Prep: Patient was prepped and draped in usual sterile fashion    Ultrasonic Guidance for needle placement: No  Needle size:  22 G  Approach:  Superior  Medications:  40 mg triamcinolone acetonide 40 mg/mL  Patient tolerance:  Patient tolerated the procedure well with no immediate complications

## (undated) DEVICE — GAUZE SPONGE 4X4 12PLY

## (undated) DEVICE — DRAPE STERI U-SHAPED 47X51IN

## (undated) DEVICE — SUT ABS CLIP LAPRA-TY CTD

## (undated) DEVICE — PACK OPTIMA GEN ORTHO

## (undated) DEVICE — CONTAINER MULTIPURPOSE/SPECIME

## (undated) DEVICE — BLADE SAW SAG 25.40MM X 1.27MM

## (undated) DEVICE — EXTRACTOR STONE 4WR NIT 2.2F 1

## (undated) DEVICE — SPONGE DERMA 8PLY 2X2

## (undated) DEVICE — DRAPE C-ARM/MOBILE XRAY 44X80

## (undated) DEVICE — BNDG COFLEX FOAM LF2 ST 4X5YD

## (undated) DEVICE — YANKAUER OPEN TIP W/O VENT

## (undated) DEVICE — APPLICATOR CHLORAPREP ORN 26ML

## (undated) DEVICE — COVER TABLE HVY DTY 60X90IN

## (undated) DEVICE — SOL WATER STRL IRR 1000ML

## (undated) DEVICE — DRESSING AQUACEL SACRAL 9 X 9

## (undated) DEVICE — SOL NS 1000CC

## (undated) DEVICE — GLOVE 7.0 PROTEXIS PI BLUE

## (undated) DEVICE — COVER LIGHT HANDLE 80/CA

## (undated) DEVICE — JELLY LUBRICANT STERILE 4 OZ

## (undated) DEVICE — HEMOSTAT SURGICEL 4X8IN

## (undated) DEVICE — SET DECANTER MEDICHOICE

## (undated) DEVICE — PORT AIRSEAL 12/120MM LPI

## (undated) DEVICE — DRAPE INCISE IOBAN 2 23X23IN

## (undated) DEVICE — GOWN POLY REINF BRTH SLV XL

## (undated) DEVICE — DRAPE STERI-DRAPE 1000 17X11IN

## (undated) DEVICE — SUPPORT ULNA NERVE PROTECTOR

## (undated) DEVICE — APPLICATOR SURGICEL ENDOSCOPIC

## (undated) DEVICE — ELECTRODE REM PLYHSV RETURN 9

## (undated) DEVICE — SOL IRR NACL .9% 3000ML

## (undated) DEVICE — ELECTRODE PENCIL W/ROCKER NDL

## (undated) DEVICE — STRIP MEDI WND CLSR 1/2X4IN

## (undated) DEVICE — Device

## (undated) DEVICE — COVER OVERHEAD SURG LT BLUE

## (undated) DEVICE — PACK SURGERY START

## (undated) DEVICE — SUT 2/0 36IN COATED VICRYL

## (undated) DEVICE — BANDAGE ROLL COTTN 4.5INX4.1YD

## (undated) DEVICE — BAG TISS RETRV MONARCH 10MM

## (undated) DEVICE — DEVICE ANC SW STAT FOLEY 6-24

## (undated) DEVICE — SUT MCRYL PLUS 4-0 PS2 27IN

## (undated) DEVICE — GOWN POLY REINF X-LONG 2XL

## (undated) DEVICE — BAG LINGEMAN DRAIN UROLOGY

## (undated) DEVICE — SEAL UNIVERSAL 5MM-8MM XI

## (undated) DEVICE — DRAPE TIBURON ORTHOPEDIC SPLIT

## (undated) DEVICE — DRESSING XEROFORM NONADH 1X8IN

## (undated) DEVICE — DRESSING TRANS 8X12 TEGADERM

## (undated) DEVICE — TOWEL OR DISP STRL BLUE 4/PK

## (undated) DEVICE — TRAY FOLEY 16FR INFECTION CONT

## (undated) DEVICE — SYR 50ML CATH TIP

## (undated) DEVICE — ADHESIVE DERMABOND ADVANCED

## (undated) DEVICE — NDL INSUF ULTRA VERESS 120MM

## (undated) DEVICE — SUT VICRYL CTD 2-0 GI 27 SH

## (undated) DEVICE — DRAPE STERI INSTRUMENT 1018

## (undated) DEVICE — TAPE MEDIPORE 4IN X 2YDS

## (undated) DEVICE — PEROXIDE HYDROGEN 3% 16OZ

## (undated) DEVICE — SUT BLU BR 2 TAPERD NDL 1/2

## (undated) DEVICE — SET TRI-LUMEN FILTERED TUBE

## (undated) DEVICE — PAD ABDOMINAL 5X9 STERILE

## (undated) DEVICE — DRAIN JACKSON PRATT GOLD

## (undated) DEVICE — DRAPE THREE-QTR REINF 53X77IN

## (undated) DEVICE — SUT 1 36IN PDS II VIO MONO

## (undated) DEVICE — ENDOCATCH 15MM

## (undated) DEVICE — GLOVE ULTRATOUCH PLYSPHRN 7.5

## (undated) DEVICE — TUBING SUC UNIV W/CONN 12FT

## (undated) DEVICE — SUT 2-0 ETHILON 18 FS

## (undated) DEVICE — SPONGE LAP 18X18 PREWASHED

## (undated) DEVICE — GLOVE BIOGEL PI MICRO INDIC 8

## (undated) DEVICE — PLUG CATHETER STERILE FOLEY

## (undated) DEVICE — SEAL UROLOGY

## (undated) DEVICE — SUT V-LOC 90 GS22 2-0 VIO 23CM

## (undated) DEVICE — DRAPE NEPHRSCPY SURG 72X118IN

## (undated) DEVICE — TAPE ADH MEDIPORE 4 X 10YDS

## (undated) DEVICE — BAG INZII TISS RETRV 12/15MM

## (undated) DEVICE — BAG DRAIN URINARY CONT IRRG

## (undated) DEVICE — COVER PROXIMA MAYO STAND

## (undated) DEVICE — GAUZE AVANT SPNG 4PLY STRL 4X4

## (undated) DEVICE — WIRE GUIDE TFLN CT SPR STFF ST

## (undated) DEVICE — WIRE GUIDE LUBRCTD ANGL 3CM

## (undated) DEVICE — BAG TISSUE RETRIEVAL 225ML

## (undated) DEVICE — SUT PROLENE 4-0 RB-1 BL MO

## (undated) DEVICE — GLOVE BIOGEL SKINSENSE PI 8.0

## (undated) DEVICE — PROBE SHOCK PULSE 3.76MM URO

## (undated) DEVICE — CATH URTRL OPEN END STR TIP 5F

## (undated) DEVICE — BLADE SURG CARBON STEEL #10

## (undated) DEVICE — SUT 3/0 27IN PDS II VIO MO

## (undated) DEVICE — BLADE SURG CARBON STEEL SZ11

## (undated) DEVICE — SPRAY MASTISOL

## (undated) DEVICE — SET IRR URLGY 2LINE UNIV SPIKE

## (undated) DEVICE — PAD PREP CUFFED NS 24X48IN

## (undated) DEVICE — NDL 22GA X1 1/2 REG BEVEL

## (undated) DEVICE — GLOVE SURGICAL LATEX SZ 6.5

## (undated) DEVICE — DRAPE ARM DAVINCI XI

## (undated) DEVICE — PACK BASIC

## (undated) DEVICE — KIT SURGIFLO HEMOSTATIC MATRIX

## (undated) DEVICE — GUIDEWIRE NITINOL HYBRID 150CM

## (undated) DEVICE — SYR ONLY LUER LOCK 20CC

## (undated) DEVICE — DRESSING TRANS 4X4 3/4

## (undated) DEVICE — MIXER BONE CEMENT

## (undated) DEVICE — GUIDE WIRE MOTION .035 X 150CM

## (undated) DEVICE — CLIP HEMO-LOK MLX LARGE LF

## (undated) DEVICE — COVER TIP CURVED SCISSORS XI

## (undated) DEVICE — IRRIGATOR ENDOSCOPY DISP.

## (undated) DEVICE — SUT 2/0 30IN SILK BLK BRAI

## (undated) DEVICE — MARKER SKIN RULER AND LABEL

## (undated) DEVICE — DRAPE LEGGINGS CUFF 31X48IN

## (undated) DEVICE — GOWN POLY REINF BRTH SLV LG

## (undated) DEVICE — TROCAR ENDOPATH XCEL 5X100MM

## (undated) DEVICE — SYR 10CC LUER LOCK

## (undated) DEVICE — CATH URETERAL DUAL LUMEN 10FR

## (undated) DEVICE — SUT 0 VICRYL / CT-1

## (undated) DEVICE — DRAPE UNDERBUTTOCKS PCH STRL

## (undated) DEVICE — COVER MAYO STND XL 30X57IN

## (undated) DEVICE — SYS CLSR WND ENDOSCP XL

## (undated) DEVICE — SEE MEDLINE ITEM 154981

## (undated) DEVICE — PILLOW FACE ADLT FOAM W/VELCRO

## (undated) DEVICE — GLOVE BIOGEL ORTHOPEDIC 8

## (undated) DEVICE — STAPLER SKIN 35 WIDE

## (undated) DEVICE — BAG TISSUE RETRIEVAL 5MM

## (undated) DEVICE — SUPPORT SLING SHOT II MEDIUM

## (undated) DEVICE — SHEATH URET FLEXOR 12FR 45CM

## (undated) DEVICE — PAD ABDOMINAL STERILE 5X9IN

## (undated) DEVICE — APPLICATOR ENDOSCOPIC

## (undated) DEVICE — BAG PRESSURE INFUSER 3000CC

## (undated) DEVICE — PAD PINK TRENDELENBURG POS XL

## (undated) DEVICE — DRAPE U SPLIT SHEET 54X76IN

## (undated) DEVICE — PIN GUIDE AEQUALIS 2.5X220MM
Type: IMPLANTABLE DEVICE | Site: SHOULDER | Status: NON-FUNCTIONAL
Removed: 2023-09-14

## (undated) DEVICE — DRESSING XEROFORM 1X8IN

## (undated) DEVICE — SUT MONOCRYL 3-0 PS-2 UND

## (undated) DEVICE — DRAPE T CYSTOSCOPY STERILE

## (undated) DEVICE — SUT VICRYL 3-0 27 SH

## (undated) DEVICE — DRAPE TOP 53X102IN

## (undated) DEVICE — EVACUATOR WOUND BULB 100CC

## (undated) DEVICE — SPONGE GAUZE 16PLY 4X4

## (undated) DEVICE — SOL CLEARIFY VISUALIZATION LAP